# Patient Record
Sex: FEMALE | Race: BLACK OR AFRICAN AMERICAN | NOT HISPANIC OR LATINO | Employment: FULL TIME | URBAN - METROPOLITAN AREA
[De-identification: names, ages, dates, MRNs, and addresses within clinical notes are randomized per-mention and may not be internally consistent; named-entity substitution may affect disease eponyms.]

---

## 2017-07-10 ENCOUNTER — LAB VISIT (OUTPATIENT)
Dept: LAB | Facility: HOSPITAL | Age: 42
End: 2017-07-10
Attending: NURSE PRACTITIONER
Payer: COMMERCIAL

## 2017-07-10 ENCOUNTER — OFFICE VISIT (OUTPATIENT)
Dept: HEMATOLOGY/ONCOLOGY | Facility: CLINIC | Age: 42
End: 2017-07-10
Payer: COMMERCIAL

## 2017-07-10 VITALS
BODY MASS INDEX: 45.78 KG/M2 | RESPIRATION RATE: 16 BRPM | WEIGHT: 258.38 LBS | TEMPERATURE: 98 F | DIASTOLIC BLOOD PRESSURE: 67 MMHG | HEART RATE: 82 BPM | SYSTOLIC BLOOD PRESSURE: 110 MMHG | HEIGHT: 63 IN

## 2017-07-10 DIAGNOSIS — D72.10 EOSINOPHILIA: ICD-10-CM

## 2017-07-10 DIAGNOSIS — D63.8 ANEMIA OF OTHER CHRONIC DISEASE: Primary | ICD-10-CM

## 2017-07-10 DIAGNOSIS — D63.8 CHRONIC DISEASE ANEMIA: ICD-10-CM

## 2017-07-10 DIAGNOSIS — D63.8 ANEMIA OF OTHER CHRONIC DISEASE: ICD-10-CM

## 2017-07-10 DIAGNOSIS — D64.9 MILD ANEMIA: ICD-10-CM

## 2017-07-10 DIAGNOSIS — E87.6 HYPOKALEMIA: Primary | ICD-10-CM

## 2017-07-10 LAB
ALBUMIN SERPL BCP-MCNC: 4 G/DL
ALP SERPL-CCNC: 83 U/L
ALT SERPL W/O P-5'-P-CCNC: 30 U/L
ANION GAP SERPL CALC-SCNC: 7 MMOL/L
AST SERPL-CCNC: 27 U/L
BASOPHILS # BLD AUTO: 0.05 K/UL
BASOPHILS NFR BLD: 0.8 %
BILIRUB SERPL-MCNC: 0.5 MG/DL
BUN SERPL-MCNC: 12 MG/DL
CALCIUM SERPL-MCNC: 8.9 MG/DL
CHLORIDE SERPL-SCNC: 102 MMOL/L
CO2 SERPL-SCNC: 31 MMOL/L
CREAT SERPL-MCNC: 0.88 MG/DL
DIFFERENTIAL METHOD: ABNORMAL
EOSINOPHIL # BLD AUTO: 0.6 K/UL
EOSINOPHIL NFR BLD: 8.7 %
ERYTHROCYTE [DISTWIDTH] IN BLOOD BY AUTOMATED COUNT: 14.7 %
EST. GFR  (AFRICAN AMERICAN): >60 ML/MIN/1.73 M^2
EST. GFR  (NON AFRICAN AMERICAN): >60 ML/MIN/1.73 M^2
FERRITIN SERPL-MCNC: 54 NG/ML
GLUCOSE SERPL-MCNC: 108 MG/DL
HCT VFR BLD AUTO: 33.3 %
HGB BLD-MCNC: 10.6 G/DL
IRON SATN MFR SERPL: 18 %
IRON SERPL-MCNC: 50 UG/DL
LYMPHOCYTES # BLD AUTO: 2.5 K/UL
LYMPHOCYTES NFR BLD: 39.2 %
MCH RBC QN AUTO: 25.8 PG
MCHC RBC AUTO-ENTMCNC: 31.8 %
MCV RBC AUTO: 81 FL
MONOCYTES # BLD AUTO: 0.5 K/UL
MONOCYTES NFR BLD: 8.3 %
NEUTROPHILS # BLD AUTO: 2.7 K/UL
NEUTROPHILS NFR BLD: 43 %
NRBC BLD-RTO: 0 /100 WBC
PLATELET # BLD AUTO: 262 K/UL
PMV BLD AUTO: 10.2 FL
POTASSIUM SERPL-SCNC: 3.3 MMOL/L
PROT SERPL-MCNC: 7.5 G/DL
RBC # BLD AUTO: 4.11 M/UL
SODIUM SERPL-SCNC: 140 MMOL/L
TOTAL IRON BINDING CAPACITY: 284 UG/DL
WBC # BLD AUTO: 6.3 K/UL

## 2017-07-10 PROCEDURE — 82728 ASSAY OF FERRITIN: CPT

## 2017-07-10 PROCEDURE — 82728 ASSAY OF FERRITIN: CPT | Mod: PN

## 2017-07-10 PROCEDURE — 83540 ASSAY OF IRON: CPT

## 2017-07-10 PROCEDURE — 99999 PR PBB SHADOW E&M-EST. PATIENT-LVL IV: CPT | Mod: PBBFAC,,, | Performed by: NURSE PRACTITIONER

## 2017-07-10 PROCEDURE — 83540 ASSAY OF IRON: CPT | Mod: PN

## 2017-07-10 PROCEDURE — 80053 COMPREHEN METABOLIC PANEL: CPT | Mod: PN

## 2017-07-10 PROCEDURE — 80053 COMPREHEN METABOLIC PANEL: CPT

## 2017-07-10 PROCEDURE — 99213 OFFICE O/P EST LOW 20 MIN: CPT | Mod: S$GLB,,, | Performed by: NURSE PRACTITIONER

## 2017-07-10 PROCEDURE — 36415 COLL VENOUS BLD VENIPUNCTURE: CPT | Mod: PN

## 2017-07-10 PROCEDURE — 84238 ASSAY NONENDOCRINE RECEPTOR: CPT

## 2017-07-10 PROCEDURE — 85025 COMPLETE CBC W/AUTO DIFF WBC: CPT

## 2017-07-10 PROCEDURE — 85025 COMPLETE CBC W/AUTO DIFF WBC: CPT | Mod: PN

## 2017-07-10 RX ORDER — POTASSIUM CHLORIDE 20 MEQ/1
20 TABLET, EXTENDED RELEASE ORAL 2 TIMES DAILY
Qty: 60 TABLET | Refills: 11 | Status: SHIPPED | OUTPATIENT
Start: 2017-07-10 | End: 2017-07-25 | Stop reason: SDUPTHER

## 2017-07-10 NOTE — PROGRESS NOTES
HISTORY OF PRESENT ILLNESS:  This is a 42-year-old black female known to Dr. Caicedo for anemia of chronic disease as well as idiopathic eosinophilia.  The   patient did undergo a unilateral bone marrow aspiration/biopsy in 2007, which   did not reveal any clonal population of cells on flow and no evidence of   dysplasia within the marrow.  She is surveilled annually and presents to the   clinic today for evaluation.  She remains active and well.  She denies any recent   illness or difficulties with allergies.  She denies any fevers, chills, drenching night   sweats, painful lymphadenopathy, unexplained weight loss, weakness, fatigue,   chest pain, skin rashes, pruritus, etc. No other new complaints or pertinent findings   on a 14-point review of systems.    PHYSICAL EXAMINATION:  GENERAL:  Well-developed, well-nourished, obese black female in no acute   distress.  Alert and oriented x3.  VITAL SIGNS:  Weight 258.5 pounds (gain of 4 pounds in one year), /67,   pulse 82, respirations 16, temperature 97.9.  HEENT:  Normocephalic, atraumatic.  Oral mucosa pink and moist.  Lips without   lesions.  Tongue midline.  Oropharynx clear.  Nonicteric sclerae.   NECK:  Supple, no adenopathy.  No carotid bruits, thyromegaly or thyroid nodule.  HEART:  Regular rate and rhythm without murmur, gallop or rub.                LUNGS:  Clear to auscultation bilaterally.  Normal respiratory effort.       ABDOMEN:  Soft, nontender, nondistended with positive normoactive bowel sounds,   no hepatosplenomegaly.   EXTREMITIES:  2+ pretibial to ankle edema bilateral.  No cyanosis or clubbing.    Distal pulses are intact.  AXILLAE AND GROIN:  No palpable pathologic lymphadenopathy is appreciated.        SKIN:  Intact/turgor normal                                                       NEUROLOGIC:  Cranial nerves II-XII grossly intact.  Motor:  Good muscle bulk and   tone.  Strength/sensory 5/5 throughout.  Gait stable.     LABORATORY:     Lab Results   Component Value Date    WBC 6.30 07/10/2017    HGB 10.6 (L) 07/10/2017    HCT 33.3 (L) 07/10/2017    MCV 81 (L) 07/10/2017     07/10/2017   Eosinophils 8.7% (11.2, 9.1)  CMP  Sodium   Date Value Ref Range Status   07/10/2017 140 136 - 145 mmol/L Final     Potassium   Date Value Ref Range Status   07/10/2017 3.3 (L) 3.5 - 5.1 mmol/L Final     Chloride   Date Value Ref Range Status   07/10/2017 102 95 - 110 mmol/L Final     CO2   Date Value Ref Range Status   07/10/2017 31 22 - 31 mmol/L Final     Glucose   Date Value Ref Range Status   07/10/2017 108 70 - 110 mg/dL Final     Comment:     The ADA recommends the following guidelines for fasting glucose:  Normal:       less than 100 mg/dL  Prediabetes:  100 mg/dL to 125 mg/dL  Diabetes:     126 mg/dL or higher       BUN, Bld   Date Value Ref Range Status   07/10/2017 12 7 - 18 mg/dL Final     Creatinine   Date Value Ref Range Status   07/10/2017 0.88 0.50 - 1.40 mg/dL Final     Calcium   Date Value Ref Range Status   07/10/2017 8.9 8.4 - 10.2 mg/dL Final     Total Protein   Date Value Ref Range Status   07/10/2017 7.5 6.0 - 8.4 g/dL Final     Albumin   Date Value Ref Range Status   07/10/2017 4.0 3.5 - 5.2 g/dL Final     Total Bilirubin   Date Value Ref Range Status   07/10/2017 0.5 0.2 - 1.3 mg/dL Final     Comment:     For infants and newborns, interpretation of results should be based  on gestational age, weight and in agreement with clinical  observations.  Premature Infant recommended reference ranges:  Up to 24 hours.............<8.0 mg/dL  Up to 48 hours............<12.0 mg/dL  3-5 days..................<15.0 mg/dL  6-29 days.................<15.0 mg/dL       Alkaline Phosphatase   Date Value Ref Range Status   07/10/2017 83 38 - 145 U/L Final     AST   Date Value Ref Range Status   07/10/2017 27 14 - 36 U/L Final     ALT   Date Value Ref Range Status   07/10/2017 30 10 - 44 U/L Final     Anion Gap   Date Value Ref Range Status   07/10/2017  7 (L) 8 - 16 mmol/L Final     eGFR if    Date Value Ref Range Status   07/10/2017 >60 >60 mL/min/1.73 m^2 Final     eGFR if non    Date Value Ref Range Status   07/10/2017 >60 >60 mL/min/1.73 m^2 Final     Comment:     Calculation used to obtain the estimated glomerular filtration  rate (eGFR) is the CKD-EPI equation. Since race is unknown   in our information system, the eGFR values for   -American and Non--American patients are given   for each creatinine result.       IMPRESSION:  1.  Anemia of chronic disease, stable  2.  Mild idiopathic eosinophilia, elevated, stable.  3.  Chronic hypokalemia, followed by Dr. Booth.  4.  Obstructive sleep apnea, compliant with CPAP.  5.  Chronic right upper extremity lymphedema, not followed in the Lymphedema Clinic.  6.  Recent diagnosis of hepatomegaly, followed in Hepatology.    PLAN:  1.  In regards to #1 and #2, we will have the patient return in one year with   interval CBC and CMP for reevaluation.  2.  Iron studies/ferritin/sTFR to be drawn today; phone review.    Assessment/plan reviewed and approved by Dr. Caicedo.

## 2017-07-10 NOTE — TELEPHONE ENCOUNTER
----- Message from Anna Chilel sent at 7/10/2017 10:02 AM CDT -----  Contact: pt   Call pt regarding her potassium being low.  [phone]

## 2017-07-10 NOTE — TELEPHONE ENCOUNTER
K+ 3.3 today, Dr Caicedo's NP told her to call PCP as medications may need adjustment.  Patient reports hands cramping.

## 2017-07-10 NOTE — TELEPHONE ENCOUNTER
Please make sure she had been taking her potassium supplement as directed.  If so, then would recommend increasing it to 20 mEq twice a day with repeat potassium level in 2 weeks

## 2017-07-12 ENCOUNTER — PATIENT MESSAGE (OUTPATIENT)
Dept: HEMATOLOGY/ONCOLOGY | Facility: CLINIC | Age: 42
End: 2017-07-12

## 2017-07-12 LAB — STFR SERPL-MCNC: 3.9 MG/L

## 2017-07-24 ENCOUNTER — LAB VISIT (OUTPATIENT)
Dept: LAB | Facility: HOSPITAL | Age: 42
End: 2017-07-24
Attending: FAMILY MEDICINE
Payer: COMMERCIAL

## 2017-07-24 ENCOUNTER — TELEPHONE (OUTPATIENT)
Dept: FAMILY MEDICINE | Facility: CLINIC | Age: 42
End: 2017-07-24

## 2017-07-24 DIAGNOSIS — E87.6 HYPOKALEMIA: ICD-10-CM

## 2017-07-24 LAB — POTASSIUM SERPL-SCNC: 3.6 MMOL/L

## 2017-07-24 PROCEDURE — 84132 ASSAY OF SERUM POTASSIUM: CPT

## 2017-07-24 PROCEDURE — 36415 COLL VENOUS BLD VENIPUNCTURE: CPT | Mod: PO

## 2017-07-24 NOTE — TELEPHONE ENCOUNTER
----- Message from Donna Sheets sent at 7/24/2017  4:38 PM CDT -----  Patient requesting lab results. Please adv/call [phone].//thanks. cw

## 2017-07-25 DIAGNOSIS — E87.6 HYPOKALEMIA: ICD-10-CM

## 2017-07-25 RX ORDER — POTASSIUM CHLORIDE 20 MEQ/1
20 TABLET, EXTENDED RELEASE ORAL 2 TIMES DAILY
Qty: 60 TABLET | Refills: 0 | Status: SHIPPED | OUTPATIENT
Start: 2017-07-25 | End: 2017-08-18 | Stop reason: SDUPTHER

## 2017-08-18 ENCOUNTER — OFFICE VISIT (OUTPATIENT)
Dept: FAMILY MEDICINE | Facility: CLINIC | Age: 42
End: 2017-08-18
Payer: COMMERCIAL

## 2017-08-18 ENCOUNTER — HOSPITAL ENCOUNTER (OUTPATIENT)
Dept: RADIOLOGY | Facility: HOSPITAL | Age: 42
Discharge: HOME | End: 2017-08-18
Attending: NURSE PRACTITIONER
Payer: COMMERCIAL

## 2017-08-18 VITALS — BODY MASS INDEX: 43.24 KG/M2 | WEIGHT: 244.06 LBS | HEIGHT: 63 IN

## 2017-08-18 VITALS
DIASTOLIC BLOOD PRESSURE: 72 MMHG | SYSTOLIC BLOOD PRESSURE: 109 MMHG | HEART RATE: 81 BPM | WEIGHT: 244.19 LBS | BODY MASS INDEX: 43.27 KG/M2 | TEMPERATURE: 98 F | HEIGHT: 63 IN

## 2017-08-18 DIAGNOSIS — Z76.0 MEDICATION REFILL: ICD-10-CM

## 2017-08-18 DIAGNOSIS — E04.1 THYROID NODULE: ICD-10-CM

## 2017-08-18 DIAGNOSIS — Z00.00 ANNUAL PHYSICAL EXAM: ICD-10-CM

## 2017-08-18 DIAGNOSIS — Z00.00 ANNUAL PHYSICAL EXAM: Primary | ICD-10-CM

## 2017-08-18 DIAGNOSIS — I10 ESSENTIAL HYPERTENSION: ICD-10-CM

## 2017-08-18 DIAGNOSIS — E87.6 HYPOKALEMIA: ICD-10-CM

## 2017-08-18 DIAGNOSIS — D63.8 CHRONIC DISEASE ANEMIA: ICD-10-CM

## 2017-08-18 DIAGNOSIS — G47.30 SLEEP APNEA, UNSPECIFIED TYPE: ICD-10-CM

## 2017-08-18 PROCEDURE — 77063 BREAST TOMOSYNTHESIS BI: CPT | Mod: 26,,, | Performed by: RADIOLOGY

## 2017-08-18 PROCEDURE — 77067 SCR MAMMO BI INCL CAD: CPT | Mod: TC

## 2017-08-18 PROCEDURE — 77067 SCR MAMMO BI INCL CAD: CPT | Mod: 26,,, | Performed by: RADIOLOGY

## 2017-08-18 PROCEDURE — 99396 PREV VISIT EST AGE 40-64: CPT | Mod: S$GLB,,, | Performed by: NURSE PRACTITIONER

## 2017-08-18 PROCEDURE — 99999 PR PBB SHADOW E&M-EST. PATIENT-LVL IV: CPT | Mod: PBBFAC,,, | Performed by: NURSE PRACTITIONER

## 2017-08-18 RX ORDER — TRIAMTERENE/HYDROCHLOROTHIAZID 37.5-25 MG
1 TABLET ORAL DAILY
Qty: 90 TABLET | Refills: 3 | Status: SHIPPED | OUTPATIENT
Start: 2017-08-18 | End: 2018-08-30 | Stop reason: SDUPTHER

## 2017-08-18 RX ORDER — DILTIAZEM HYDROCHLORIDE 120 MG/1
120 CAPSULE, COATED, EXTENDED RELEASE ORAL DAILY
Qty: 90 CAPSULE | Refills: 3 | Status: SHIPPED | OUTPATIENT
Start: 2017-08-18 | End: 2018-08-30 | Stop reason: SDUPTHER

## 2017-08-18 RX ORDER — POTASSIUM CHLORIDE 20 MEQ/1
20 TABLET, EXTENDED RELEASE ORAL 2 TIMES DAILY
Qty: 60 TABLET | Refills: 11 | Status: SHIPPED | OUTPATIENT
Start: 2017-08-18 | End: 2018-08-30 | Stop reason: SDUPTHER

## 2017-08-18 RX ORDER — MONTELUKAST SODIUM 10 MG/1
10 TABLET ORAL NIGHTLY
Qty: 90 TABLET | Refills: 3 | Status: SHIPPED | OUTPATIENT
Start: 2017-08-18 | End: 2018-08-30 | Stop reason: SDUPTHER

## 2017-08-18 NOTE — PROGRESS NOTES
Subjective:       Patient ID: Akiko Jameson is a 42 y.o. female.    Chief Complaint: Annual Exam and Medication Refill  Pt in today for annual exam. HTN controlled with medication. Pt saw endocrinology in 2013 for thyroid nodule; states biopsy was unremarkable. Uses CPAP for sleep apnea; states compliant. History of anemia, hypokalemia; currently taking potassium. GERD controlled with medication. Seeing hematology for anemia; seen at lymphedema clinic last year. Labs, mammogram due. Pap smear UTD. Pt has no other complaints today.  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    Eosinophilia     mild    GERD (gastroesophageal reflux disease)     Granular cell tumor     Hypertension     Lymphedema of upper extremity following lymphadenectomy 12/6/2011    Mild anemia     Sleep apnea     Thyroid nodule     Urinary incontinence, mixed      Social History     Social History    Marital status:      Spouse name: N/A    Number of children: 2    Years of education: N/A     Occupational History     [employer]     Social History Main Topics    Smoking status: Never Smoker    Smokeless tobacco: Never Used    Alcohol use No    Drug use: No    Sexual activity: Not on file     Past Surgical History:   Procedure Laterality Date    AXILLARY NODE DISSECTION      granular cell tumor    back injections      CHOLECYSTECTOMY      ENDOMETRIAL ABLATION      FINE NEEDLE ASPIRATION      thyroid    NASAL SEPTUM SURGERY      SINUS SURGERY      TUBAL LIGATION         HPI  Review of Systems   Constitutional: Negative.  Negative for activity change and unexpected weight change.   HENT: Negative.  Negative for hearing loss, rhinorrhea and trouble swallowing.    Eyes: Negative.  Negative for discharge and visual disturbance.   Respiratory: Negative.  Negative for chest tightness and  wheezing.    Cardiovascular: Negative.  Negative for chest pain and palpitations.   Gastrointestinal: Negative.  Negative for blood in stool, constipation, diarrhea and vomiting.   Endocrine: Negative.  Negative for polydipsia and polyuria.   Genitourinary: Negative.  Negative for difficulty urinating, dysuria, hematuria and menstrual problem.   Musculoskeletal: Negative.  Negative for arthralgias, joint swelling and neck pain.   Skin: Negative.    Allergic/Immunologic: Negative.    Neurological: Negative.  Negative for weakness and headaches.   Psychiatric/Behavioral: Negative.  Negative for confusion and dysphoric mood.       Objective:      Physical Exam   Constitutional: She is oriented to person, place, and time. She appears well-developed and well-nourished.   HENT:   Head: Normocephalic.   Right Ear: External ear normal.   Left Ear: External ear normal.   Nose: Nose normal.   Mouth/Throat: Oropharynx is clear and moist.   Eyes: Conjunctivae are normal. Pupils are equal, round, and reactive to light.   Neck: Normal range of motion. Neck supple.   Cardiovascular: Normal rate, regular rhythm and normal heart sounds.    Pulmonary/Chest: Effort normal and breath sounds normal.   Abdominal: Soft. Bowel sounds are normal.   Musculoskeletal: Normal range of motion.   Neurological: She is alert and oriented to person, place, and time.   Skin: Skin is warm and dry. Capillary refill takes 2 to 3 seconds.   Psychiatric: She has a normal mood and affect. Her behavior is normal. Judgment and thought content normal.   Nursing note and vitals reviewed.      Assessment:       1. Annual physical exam    2. Thyroid nodule    3. Chronic disease anemia    4. Sleep apnea, unspecified type    5. Medication refill    6. Hypokalemia    7. Essential hypertension       Plan:       Akiko was seen today for annual exam and medication refill.    Diagnoses and all orders for this visit:    Annual physical exam  Chronic disease  anemia  Hypokalemia  Sleep apnea, unspecified type  Essential hypertension  -     Basic metabolic panel; Future  -     ALT (SGPT); Future  -     LIPID PANEL; Future  -     TSH; Future  -     CBC auto differential; Future  -     Mammo Digital Screening Bilateral With CAD; Future  -     US Soft Tissue Head Neck Thyroid; Future    Thyroid nodule  -     US Soft Tissue Head Neck Thyroid; Future    Medication refill  -     potassium chloride SA (K-DUR,KLOR-CON) 20 MEQ tablet; Take 1 tablet (20 mEq total) by mouth 2 (two) times daily. Appointment needed for further refills after this one  -     triamterene-hydrochlorothiazide 37.5-25 mg (MAXZIDE-25) 37.5-25 mg per tablet; Take 1 tablet by mouth once daily.  -     montelukast (SINGULAIR) 10 mg tablet; Take 1 tablet (10 mg total) by mouth nightly.  -     diltiaZEM (CARDIZEM CD) 120 MG Cp24; Take 1 capsule (120 mg total) by mouth once daily.

## 2017-08-22 ENCOUNTER — TELEPHONE (OUTPATIENT)
Dept: RADIOLOGY | Facility: HOSPITAL | Age: 42
End: 2017-08-22

## 2017-08-23 ENCOUNTER — HOSPITAL ENCOUNTER (OUTPATIENT)
Dept: RADIOLOGY | Facility: HOSPITAL | Age: 42
Discharge: HOME | End: 2017-08-23
Attending: NURSE PRACTITIONER
Payer: COMMERCIAL

## 2017-08-23 DIAGNOSIS — E04.1 THYROID NODULE: ICD-10-CM

## 2017-08-23 DIAGNOSIS — Z00.00 ANNUAL PHYSICAL EXAM: ICD-10-CM

## 2017-08-23 PROCEDURE — 76536 US EXAM OF HEAD AND NECK: CPT | Mod: 26,,, | Performed by: RADIOLOGY

## 2017-08-23 PROCEDURE — 76536 US EXAM OF HEAD AND NECK: CPT | Mod: TC,PO

## 2017-08-25 ENCOUNTER — TELEPHONE (OUTPATIENT)
Dept: FAMILY MEDICINE | Facility: CLINIC | Age: 42
End: 2017-08-25

## 2017-08-25 ENCOUNTER — PATIENT MESSAGE (OUTPATIENT)
Dept: FAMILY MEDICINE | Facility: CLINIC | Age: 42
End: 2017-08-25

## 2017-08-25 DIAGNOSIS — E01.0 THYROMEGALY: Primary | ICD-10-CM

## 2017-08-25 DIAGNOSIS — E04.1 THYROID NODULE: ICD-10-CM

## 2017-08-25 NOTE — TELEPHONE ENCOUNTER
Insurance form in box with lab filled in, needs signature.  Did you measure abd circumference at last visit, if not, she can come in for NV to have done, please advise

## 2017-08-28 ENCOUNTER — TELEPHONE (OUTPATIENT)
Dept: FAMILY MEDICINE | Facility: CLINIC | Age: 42
End: 2017-08-28

## 2017-08-28 NOTE — TELEPHONE ENCOUNTER
----- Message from Yeimi Abernathy NP sent at 8/25/2017  1:21 PM CDT -----  Reviewed; mammogram diagnostic, US left breast recommended.

## 2017-08-29 ENCOUNTER — CLINICAL SUPPORT (OUTPATIENT)
Dept: FAMILY MEDICINE | Facility: CLINIC | Age: 42
End: 2017-08-29
Payer: COMMERCIAL

## 2017-08-29 ENCOUNTER — HOSPITAL ENCOUNTER (OUTPATIENT)
Dept: RADIOLOGY | Facility: HOSPITAL | Age: 42
Discharge: HOME | End: 2017-08-29
Attending: NURSE PRACTITIONER
Payer: COMMERCIAL

## 2017-08-29 DIAGNOSIS — R92.8 ABNORMAL MAMMOGRAM: ICD-10-CM

## 2017-08-29 DIAGNOSIS — R60.9 CHRONIC EDEMA: Primary | ICD-10-CM

## 2017-08-29 PROCEDURE — 76642 ULTRASOUND BREAST LIMITED: CPT | Mod: TC,PO,LT

## 2017-08-29 PROCEDURE — 76642 ULTRASOUND BREAST LIMITED: CPT | Mod: 26,LT,, | Performed by: RADIOLOGY

## 2017-08-29 PROCEDURE — 99999 PR PBB SHADOW E&M-EST. PATIENT-LVL II: CPT | Mod: PBBFAC,,,

## 2017-08-29 PROCEDURE — 99499 UNLISTED E&M SERVICE: CPT | Mod: S$GLB,,, | Performed by: FAMILY MEDICINE

## 2017-08-31 ENCOUNTER — TELEPHONE (OUTPATIENT)
Dept: FAMILY MEDICINE | Facility: CLINIC | Age: 42
End: 2017-08-31

## 2017-08-31 ENCOUNTER — LAB VISIT (OUTPATIENT)
Dept: LAB | Facility: HOSPITAL | Age: 42
End: 2017-08-31
Payer: COMMERCIAL

## 2017-08-31 ENCOUNTER — HOSPITAL ENCOUNTER (OUTPATIENT)
Dept: ENDOCRINOLOGY | Facility: CLINIC | Age: 42
Discharge: HOME | End: 2017-08-31
Attending: INTERNAL MEDICINE
Payer: COMMERCIAL

## 2017-08-31 ENCOUNTER — OFFICE VISIT (OUTPATIENT)
Dept: ENDOCRINOLOGY | Facility: CLINIC | Age: 42
End: 2017-08-31
Payer: COMMERCIAL

## 2017-08-31 VITALS
WEIGHT: 257.94 LBS | HEART RATE: 77 BPM | RESPIRATION RATE: 16 BRPM | DIASTOLIC BLOOD PRESSURE: 82 MMHG | SYSTOLIC BLOOD PRESSURE: 135 MMHG | HEIGHT: 63 IN | BODY MASS INDEX: 45.7 KG/M2

## 2017-08-31 DIAGNOSIS — E66.01 MORBID OBESITY WITH BMI OF 45.0-49.9, ADULT: ICD-10-CM

## 2017-08-31 DIAGNOSIS — E04.2 MULTINODULAR GOITER (NONTOXIC): Primary | ICD-10-CM

## 2017-08-31 DIAGNOSIS — G47.30 SLEEP APNEA, UNSPECIFIED TYPE: ICD-10-CM

## 2017-08-31 LAB
ESTIMATED AVG GLUCOSE: 120 MG/DL
HBA1C MFR BLD HPLC: 5.8 %

## 2017-08-31 PROCEDURE — 88173 CYTOPATH EVAL FNA REPORT: CPT | Performed by: PATHOLOGY

## 2017-08-31 PROCEDURE — 3075F SYST BP GE 130 - 139MM HG: CPT | Mod: S$GLB,,, | Performed by: INTERNAL MEDICINE

## 2017-08-31 PROCEDURE — 76942 ECHO GUIDE FOR BIOPSY: CPT | Mod: S$GLB,,, | Performed by: INTERNAL MEDICINE

## 2017-08-31 PROCEDURE — 10022 US FINE NEEDLE ASPIRATION THYROID MULTI SITE (XPD): CPT | Mod: 59,S$GLB,, | Performed by: INTERNAL MEDICINE

## 2017-08-31 PROCEDURE — 99999 PR PBB SHADOW E&M-EST. PATIENT-LVL IV: CPT | Mod: PBBFAC,,, | Performed by: INTERNAL MEDICINE

## 2017-08-31 PROCEDURE — 3079F DIAST BP 80-89 MM HG: CPT | Mod: S$GLB,,, | Performed by: INTERNAL MEDICINE

## 2017-08-31 PROCEDURE — 3008F BODY MASS INDEX DOCD: CPT | Mod: S$GLB,,, | Performed by: INTERNAL MEDICINE

## 2017-08-31 PROCEDURE — 99204 OFFICE O/P NEW MOD 45 MIN: CPT | Mod: S$GLB,,, | Performed by: INTERNAL MEDICINE

## 2017-08-31 PROCEDURE — 83036 HEMOGLOBIN GLYCOSYLATED A1C: CPT

## 2017-08-31 PROCEDURE — 36415 COLL VENOUS BLD VENIPUNCTURE: CPT

## 2017-08-31 NOTE — PROGRESS NOTES
Subjective:      Patient ID: Akiko Jameson is a 42 y.o. female.    Chief Complaint:  Thyroid Nodule      History of Present Illness  Initial visit for thyroid nodules     Pt had initial thyroid u/s in 2013, and got left thyroid nodule FNA that was benign in 7/2013.   Her most recent f/u U/S in 8/2017 shows increase in left thyroid nodule from 2.1 to 4.1 cm (which was previously bx) and also increase in left lateral isthmus nodule from 0.9cm to 1.7cm.   She denies compressive sx.   No FH thyroid cancer. No radiation exposure to head/neck.  Has no personal hx of thyroid disease    Is morbidly obese. +FH DM. Has HTN, not previously screened for DM.   Denies polyuria, polydipsia, blurry vision      Review of Systems   Constitutional: Negative for unexpected weight change.   HENT: Positive for postnasal drip. Negative for trouble swallowing and voice change.    Eyes: Negative for visual disturbance.   Respiratory: Negative for choking.    Cardiovascular: Negative for leg swelling.   Gastrointestinal: Negative for abdominal pain.   Endocrine: Negative for polydipsia and polyuria.   Skin: Negative for wound.   Neurological: Negative for headaches.   Hematological: Does not bruise/bleed easily.   Psychiatric/Behavioral: Negative for sleep disturbance.       Objective:   Physical Exam   Constitutional: She appears well-developed.   HENT:   Right Ear: External ear normal.   Left Ear: External ear normal.   Nose: Nose normal.   Hearing normal  Dentition good    Neck: No tracheal deviation present. Thyromegaly (thyroid gland is asymmetrically enlarged. large left nodule, cannot appreciate isthmus nodule) present.   Cardiovascular: Normal rate.    No murmur heard.  Pulmonary/Chest: Effort normal and breath sounds normal.   Abdominal: Soft. There is no tenderness. No hernia.   Obese pannus, no wide purple striae    Musculoskeletal: She exhibits no edema.   Gait normal  No clubbing or cyanosis noted   Neurological: She  displays normal reflexes. No cranial nerve deficit.   Skin: No rash noted.   No nodules       Psychiatric: She has a normal mood and affect. Judgment normal.   Vitals reviewed.      Lab Review:   Lab Results   Component Value Date    TSH 0.666 08/18/2017         Assessment:     1. Multinodular goiter (nontoxic)  US Fine Needle Aspiration Thyroid Multi site (xpd)   2. Morbid obesity with BMI of 45.0-49.9, adult  Hemoglobin A1c   3. Sleep apnea, unspecified type          Plan:     Discussed management of thyroid nodules and indications for FNA and observation vs surgery   Will proceed with FNA of left thyroid nodule and left lateral isthmus  All questions answered    Will screen for DM given obesity and co morbidities     Discussed w Dr Rucker

## 2017-08-31 NOTE — TELEPHONE ENCOUNTER
This pt came in for abd circ measurement. Abd measurement is 120cm. She states you have her paperwork.   TY

## 2017-09-07 ENCOUNTER — PATIENT MESSAGE (OUTPATIENT)
Dept: ENDOCRINOLOGY | Facility: CLINIC | Age: 42
End: 2017-09-07

## 2017-09-08 NOTE — PROGRESS NOTES
I have reviewed the notes, assessments, performed this visit, and I concur with the documentation.

## 2017-10-28 ENCOUNTER — OFFICE VISIT (OUTPATIENT)
Dept: FAMILY MEDICINE | Facility: CLINIC | Age: 42
End: 2017-10-28
Payer: COMMERCIAL

## 2017-10-28 VITALS
DIASTOLIC BLOOD PRESSURE: 77 MMHG | SYSTOLIC BLOOD PRESSURE: 118 MMHG | HEIGHT: 63 IN | BODY MASS INDEX: 45.47 KG/M2 | WEIGHT: 256.63 LBS | HEART RATE: 84 BPM | TEMPERATURE: 98 F

## 2017-10-28 DIAGNOSIS — L03.119 CELLULITIS OF FOOT: Primary | ICD-10-CM

## 2017-10-28 PROCEDURE — 99213 OFFICE O/P EST LOW 20 MIN: CPT | Mod: S$GLB,,, | Performed by: NURSE PRACTITIONER

## 2017-10-28 PROCEDURE — 99999 PR PBB SHADOW E&M-EST. PATIENT-LVL III: CPT | Mod: PBBFAC,,, | Performed by: NURSE PRACTITIONER

## 2017-10-28 RX ORDER — CLINDAMYCIN HYDROCHLORIDE 150 MG/1
150 CAPSULE ORAL 2 TIMES DAILY
Qty: 14 CAPSULE | Refills: 0 | Status: SHIPPED | OUTPATIENT
Start: 2017-10-28 | End: 2017-12-14

## 2017-10-28 RX ORDER — PREDNISONE 20 MG/1
20 TABLET ORAL 2 TIMES DAILY
Qty: 10 TABLET | Refills: 0 | Status: SHIPPED | OUTPATIENT
Start: 2017-10-28 | End: 2017-11-02

## 2017-10-28 NOTE — PROGRESS NOTES
Subjective:       Patient ID: Akiko Jameson is a 42 y.o. female.    Chief Complaint: Insect Bite    Rash   This is a new problem. Episode onset: 3 days. The problem has been rapidly worsening since onset. The affected locations include the left foot. The rash is characterized by redness and swelling. She was exposed to an insect bite/sting. Pertinent negatives include no cough, diarrhea, eye pain, fatigue, fever, shortness of breath, sore throat or vomiting. Past treatments include antibiotic cream (alcohol rubs). The treatment provided no relief.       Review of Systems   Constitutional: Negative for fatigue, fever and unexpected weight change.   HENT: Negative for ear pain and sore throat.    Eyes: Negative for pain and visual disturbance.   Respiratory: Negative for cough and shortness of breath.    Cardiovascular: Negative for chest pain and palpitations.   Gastrointestinal: Negative for abdominal pain, diarrhea and vomiting.   Musculoskeletal: Negative for arthralgias and myalgias.   Skin: Positive for color change and rash.        Swelling to left foot   Neurological: Negative for dizziness and headaches.   Psychiatric/Behavioral: Negative for dysphoric mood and sleep disturbance. The patient is not nervous/anxious.        Vitals:    10/28/17 0913   BP: 118/77   Pulse: 84   Temp: 98.1 °F (36.7 °C)       Objective:     Current Outpatient Prescriptions   Medication Sig Dispense Refill    diltiaZEM (CARDIZEM CD) 120 MG Cp24 Take 1 capsule (120 mg total) by mouth once daily. 90 capsule 3    montelukast (SINGULAIR) 10 mg tablet Take 1 tablet (10 mg total) by mouth nightly. 90 tablet 3    multivitamin (THERAGRAN) per tablet Every day      potassium chloride SA (K-DUR,KLOR-CON) 20 MEQ tablet Take 1 tablet (20 mEq total) by mouth 2 (two) times daily. Appointment needed for further refills after this one 60 tablet 11    ranitidine (ZANTAC) 150 MG tablet Take 150 mg by mouth 2 (two) times daily.        triamterene-hydrochlorothiazide 37.5-25 mg (MAXZIDE-25) 37.5-25 mg per tablet Take 1 tablet by mouth once daily. 90 tablet 3    clindamycin (CLEOCIN) 150 MG capsule Take 1 capsule (150 mg total) by mouth 2 (two) times daily. 14 capsule 0    docusate sodium (COLACE) 100 MG capsule Take 1 capsule (100 mg total) by mouth 2 (two) times daily. Patient can take OTC if more cost effective 60 capsule 0    predniSONE (DELTASONE) 20 MG tablet Take 1 tablet (20 mg total) by mouth 2 (two) times daily. 10 tablet 0     No current facility-administered medications for this visit.        Physical Exam   Constitutional: She is oriented to person, place, and time. She appears well-developed and well-nourished. No distress.   HENT:   Head: Normocephalic and atraumatic.   Eyes: EOM are normal. Pupils are equal, round, and reactive to light.   Neck: Normal range of motion. Neck supple.   Cardiovascular: Normal rate and regular rhythm.    Pulmonary/Chest: Effort normal and breath sounds normal.   Musculoskeletal: Normal range of motion.   Neurological: She is alert and oriented to person, place, and time.   Skin: Skin is warm and dry. No rash noted.   3+ edema to left foot, + erythema, hot to touch, 2+ pedal pulses   Psychiatric: She has a normal mood and affect. Judgment normal.   Nursing note and vitals reviewed.      Assessment:       1. Cellulitis of foot        Plan:   Cellulitis of foot    Other orders  -     predniSONE (DELTASONE) 20 MG tablet; Take 1 tablet (20 mg total) by mouth 2 (two) times daily.  Dispense: 10 tablet; Refill: 0  -     clindamycin (CLEOCIN) 150 MG capsule; Take 1 capsule (150 mg total) by mouth 2 (two) times daily.  Dispense: 14 capsule; Refill: 0        No Follow-up on file.

## 2017-12-14 ENCOUNTER — HOSPITAL ENCOUNTER (OUTPATIENT)
Dept: RADIOLOGY | Facility: HOSPITAL | Age: 42
Discharge: HOME | End: 2017-12-14
Attending: FAMILY MEDICINE
Payer: COMMERCIAL

## 2017-12-14 ENCOUNTER — OFFICE VISIT (OUTPATIENT)
Dept: FAMILY MEDICINE | Facility: CLINIC | Age: 42
End: 2017-12-14
Payer: COMMERCIAL

## 2017-12-14 VITALS
SYSTOLIC BLOOD PRESSURE: 113 MMHG | TEMPERATURE: 98 F | HEIGHT: 63 IN | WEIGHT: 255 LBS | DIASTOLIC BLOOD PRESSURE: 73 MMHG | HEART RATE: 94 BPM | BODY MASS INDEX: 45.18 KG/M2

## 2017-12-14 DIAGNOSIS — M79.18 RHOMBOID MUSCLE PAIN: Primary | ICD-10-CM

## 2017-12-14 DIAGNOSIS — M79.18 RHOMBOID MUSCLE PAIN: ICD-10-CM

## 2017-12-14 PROCEDURE — 99213 OFFICE O/P EST LOW 20 MIN: CPT | Mod: 25,S$GLB,, | Performed by: FAMILY MEDICINE

## 2017-12-14 PROCEDURE — 99999 PR PBB SHADOW E&M-EST. PATIENT-LVL IV: CPT | Mod: PBBFAC,,, | Performed by: FAMILY MEDICINE

## 2017-12-14 PROCEDURE — 90471 IMMUNIZATION ADMIN: CPT | Mod: S$GLB,,, | Performed by: FAMILY MEDICINE

## 2017-12-14 PROCEDURE — 71020 XR CHEST PA AND LATERAL: CPT | Mod: 26,,, | Performed by: RADIOLOGY

## 2017-12-14 PROCEDURE — 90715 TDAP VACCINE 7 YRS/> IM: CPT | Mod: S$GLB,,, | Performed by: FAMILY MEDICINE

## 2017-12-14 PROCEDURE — 71020 XR CHEST PA AND LATERAL: CPT | Mod: TC,PO

## 2017-12-14 RX ORDER — MELOXICAM 15 MG/1
15 TABLET ORAL DAILY
Qty: 30 TABLET | Refills: 1 | Status: SHIPPED | OUTPATIENT
Start: 2017-12-14 | End: 2018-07-03 | Stop reason: ALTCHOICE

## 2017-12-14 NOTE — PROGRESS NOTES
Patient presents with a complaint of pain at the right rhomboid muscle area.  She states symptoms for several years, but some increase in the past 2 weeks.  Increased somewhat with turning and twisting motions.  No chest pain or shortness of breath.  No change with breathing.  No neck pain.  Current treatment Tylenol and Aleve without resolution.  No rash or cough.  No injury.    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    Eosinophilia     mild    GERD (gastroesophageal reflux disease)     Granular cell tumor     Hypertension     Lymphedema of upper extremity following lymphadenectomy 12/6/2011    Mild anemia     Sleep apnea     Thyroid nodule     Urinary incontinence, mixed      Past Surgical History:   Procedure Laterality Date    AXILLARY NODE DISSECTION      granular cell tumor    back injections      BREAST CYST ASPIRATION      CHOLECYSTECTOMY      ENDOMETRIAL ABLATION      FINE NEEDLE ASPIRATION      thyroid    NASAL SEPTUM SURGERY      SINUS SURGERY      TUBAL LIGATION       Social History     Social History    Marital status:      Spouse name: N/A    Number of children: 2    Years of education: N/A     Occupational History     [employer]     Social History Main Topics    Smoking status: Never Smoker    Smokeless tobacco: Never Used    Alcohol use No    Drug use: No    Sexual activity: Not on file     Other Topics Concern    Not on file     Social History Narrative    No narrative on file     Family History   Problem Relation Age of Onset    Diabetes Mother     Kidney failure Mother     Breast cancer Maternal Grandmother     Breast cancer Maternal Aunt     Cirrhosis Neg Hx      Review of patient's allergies indicates:   Allergen Reactions    Prevacid  [lansoprazole] Swelling     Lip swelling    Ace inhibitors      "Benazepril Other (See Comments)     Current Outpatient Prescriptions on File Prior to Visit   Medication Sig Dispense Refill    diltiaZEM (CARDIZEM CD) 120 MG Cp24 Take 1 capsule (120 mg total) by mouth once daily. 90 capsule 3    montelukast (SINGULAIR) 10 mg tablet Take 1 tablet (10 mg total) by mouth nightly. 90 tablet 3    multivitamin (THERAGRAN) per tablet Every day      potassium chloride SA (K-DUR,KLOR-CON) 20 MEQ tablet Take 1 tablet (20 mEq total) by mouth 2 (two) times daily. Appointment needed for further refills after this one 60 tablet 11    ranitidine (ZANTAC) 150 MG tablet Take 150 mg by mouth 2 (two) times daily.       triamterene-hydrochlorothiazide 37.5-25 mg (MAXZIDE-25) 37.5-25 mg per tablet Take 1 tablet by mouth once daily. 90 tablet 3    [DISCONTINUED] clindamycin (CLEOCIN) 150 MG capsule Take 1 capsule (150 mg total) by mouth 2 (two) times daily. 14 capsule 0    [DISCONTINUED] docusate sodium (COLACE) 100 MG capsule Take 1 capsule (100 mg total) by mouth 2 (two) times daily. Patient can take OTC if more cost effective 60 capsule 0     No current facility-administered medications on file prior to visit.            OBJECTIVE:     Vitals:    12/14/17 0759   BP: 113/73   Pulse: 94   Temp: 97.8 °F (36.6 °C)   Weight: 115.7 kg (255 lb)   Height: 5' 3" (1.6 m)     Wt Readings from Last 3 Encounters:   12/14/17 115.7 kg (255 lb)   10/28/17 116.4 kg (256 lb 9.9 oz)   08/31/17 117 kg (257 lb 15 oz)     APPEARANCE: Well nourished, well developed, in no acute distress.    HEAD: Normocephalic.  Atraumatic.  No sinus tenderness.  EYES:   Right eye: Pupil reactive.  Conjunctiva clear.    Left eye: Pupil reactive.  Conjunctiva clear.    NECK: Supple. No bruits.  No JVD.  No cervical lymphadenopathy.  No thyromegaly.    CHEST: Breath sounds clear bilaterally.  Normal respiratory effort  CARDIOVASCULAR: Normal rate.  Regular rhythm.  No murmurs.  No rub.  No gallops.  ABDOMEN: Bowel sounds normal.  " Soft.  No tenderness.  No organomegaly.  PERIPHERAL VASCULAR: No cyanosis.  No clubbing.  Chronic edema right upper extremity  NEUROLOGIC: No focal findings.  MENTAL STATUS: Alert.  Oriented x 3.  Back: She has tenderness palpation at the right rhomboid muscle area.  Some decreased range of motion.  Right shoulder full range of motion    Akiko was seen today for back pain.    Diagnoses and all orders for this visit:    Rhomboid muscle pain  -     X-Ray Chest PA And Lateral; Future  -     Ambulatory referral to Chiropractic    Other orders  -     meloxicam (MOBIC) 15 MG tablet; Take 1 tablet (15 mg total) by mouth once daily.  -     Tdap Vaccine; Future  -     Tdap Vaccine     follow-up not improved after above evaluation

## 2017-12-30 ENCOUNTER — OFFICE VISIT (OUTPATIENT)
Dept: FAMILY MEDICINE | Facility: CLINIC | Age: 42
End: 2017-12-30
Payer: COMMERCIAL

## 2017-12-30 VITALS
TEMPERATURE: 98 F | SYSTOLIC BLOOD PRESSURE: 139 MMHG | DIASTOLIC BLOOD PRESSURE: 82 MMHG | WEIGHT: 257.69 LBS | HEART RATE: 88 BPM | HEIGHT: 63 IN | BODY MASS INDEX: 45.66 KG/M2

## 2017-12-30 DIAGNOSIS — J01.90 ACUTE SINUSITIS, RECURRENCE NOT SPECIFIED, UNSPECIFIED LOCATION: Primary | ICD-10-CM

## 2017-12-30 DIAGNOSIS — R05.9 COUGH: ICD-10-CM

## 2017-12-30 PROCEDURE — 96372 THER/PROPH/DIAG INJ SC/IM: CPT | Mod: S$GLB,,, | Performed by: FAMILY MEDICINE

## 2017-12-30 PROCEDURE — 99999 PR PBB SHADOW E&M-EST. PATIENT-LVL III: CPT | Mod: PBBFAC,,, | Performed by: NURSE PRACTITIONER

## 2017-12-30 PROCEDURE — 99213 OFFICE O/P EST LOW 20 MIN: CPT | Mod: 25,S$GLB,, | Performed by: NURSE PRACTITIONER

## 2017-12-30 RX ORDER — DEXAMETHASONE SODIUM PHOSPHATE 4 MG/ML
8 INJECTION, SOLUTION INTRA-ARTICULAR; INTRALESIONAL; INTRAMUSCULAR; INTRAVENOUS; SOFT TISSUE
Status: COMPLETED | OUTPATIENT
Start: 2017-12-30 | End: 2017-12-30

## 2017-12-30 RX ORDER — CODEINE PHOSPHATE AND GUAIFENESIN 10; 100 MG/5ML; MG/5ML
10 SOLUTION ORAL EVERY 4 HOURS PRN
Qty: 240 ML | Refills: 0 | Status: SHIPPED | OUTPATIENT
Start: 2017-12-30 | End: 2018-01-09

## 2017-12-30 RX ORDER — AZITHROMYCIN 250 MG/1
250 TABLET, FILM COATED ORAL DAILY
Qty: 6 TABLET | Refills: 0 | Status: SHIPPED | OUTPATIENT
Start: 2017-12-30 | End: 2018-01-04

## 2017-12-30 RX ADMIN — DEXAMETHASONE SODIUM PHOSPHATE 8 MG: 4 INJECTION, SOLUTION INTRA-ARTICULAR; INTRALESIONAL; INTRAMUSCULAR; INTRAVENOUS; SOFT TISSUE at 11:12

## 2017-12-30 NOTE — PATIENT INSTRUCTIONS
Increase fluid intake  Rest  Tylenol/Motrin as needed for headache/pain  Mucinex (guaifenesin) twice daily to loosen secretions  Humidified air    Adult Self-Care for Colds  Colds are caused by viruses. They can't be cured with antibiotics. However, you can ease symptoms and support your body's efforts to heal itself.  No matter which symptoms you have, be sure to:  · Drink plenty of fluids (water or clear soup)  · Stop smoking and drinking alcohol  · Get plenty of rest    Understand a fever  · Take your temperature several times a day. If your fever is 100.4°F (38.0°C) for more than a day, call your healthcare provider.  · Relax, lie down. Go to bed if you want. Just get off your feet and rest. Also, drink plenty of fluids to avoid dehydration.  · Take acetaminophen or a nonsteroidal anti-inflammatory agent (NSAID), such as ibuprofen.  Treat a troubled nose kindly  · Breathe steam or heated humidified air to open blocked nasal passages.  a hot shower or use a vaporizer. Be careful not to get burned by the steam.  · Saline nasal sprays and decongestant tablets help open a stuffy nose. Antihistamines can also help, but they can cause side effects such as drowsiness and drying of the eyes, nose, and mouth.  Soothe a sore throat and cough  · Gargle every 2 hours with 1/4 teaspoon of salt dissolved in 1/2 cup of warm water. Suck on throat lozenges and cough drops to moisten your throat.  · Cough medicines are available but it is unclear how well they actually work.  · Take acetaminophen or an NSAID, such as ibuprofen, to ease throat pain  Ease digestive problems  · Put fluids back into your body. Take frequent sips of clear liquids such as water or broth. Avoid drinks that have a lot of sugar in them, such as juices and sodas. These can make diarrhea worse. Older children and adults can drink sports drinks.  · As your appetite returns, you can resume your normal diet. Ask your healthcare provider if there are  any foods you should avoid.  When to seek medical care  When you first notice symptoms, ask your healthcare provider if antiviral medicines are appropriate. Antibiotics should not be taken for colds or flu. Also, call your healthcare provider if you have any of the following symptoms or if you aren't feeling better after 7 days:  · Shortness of breath  · Pain or pressure in the chest or belly (abdomen)  · Worsening symptoms, especially after a period of improvement  · Fever of 100.4°F  (38.0°C) or higher, or fever that doesn't go down with medicine  · Sudden dizziness or confusion  · Severe or continued vomiting  · Signs of dehydration, including extreme thirst, dark urine, infrequent urination, dry mouth  · Spotted, red, or very sore throat   Date Last Reviewed: 12/1/2016  © 4961-3041 [employer]. [address] [place] [address], [place] [address]. All rights reserved. This information is not intended as a substitute for professional medical care. Always follow your healthcare professional's instructions.

## 2017-12-30 NOTE — PROGRESS NOTES
"Subjective:      Patient ID: Akiko Jameson is a 42 y.o. female.    Chief Complaint: Cough    Cough   This is a new problem. The current episode started in the past 7 days (4 days ago). The problem has been gradually worsening. The cough is productive of sputum (productive of yellow sputum). Associated symptoms include chills, ear pain, a fever, headaches, myalgias, nasal congestion, postnasal drip, rhinorrhea and a sore throat. Pertinent negatives include no chest pain, rash, shortness of breath or wheezing. Nothing aggravates the symptoms. She has tried OTC cough suppressant for the symptoms. The treatment provided mild relief.     Review of Systems   Constitutional: Positive for chills, fatigue and fever.   HENT: Positive for congestion, ear pain, postnasal drip, rhinorrhea, sinus pain, sinus pressure and sore throat.    Eyes: Negative.    Respiratory: Positive for cough. Negative for shortness of breath and wheezing.    Cardiovascular: Negative for chest pain, palpitations and leg swelling.   Gastrointestinal: Negative.    Endocrine: Negative.    Genitourinary: Negative.    Musculoskeletal: Positive for myalgias.   Skin: Negative for rash and wound.   Neurological: Positive for headaches. Negative for weakness and numbness.   Psychiatric/Behavioral: The patient is not nervous/anxious.        Objective:     /82   Pulse 88   Temp 98.4 °F (36.9 °C) (Oral)   Ht 5' 3" (1.6 m)   Wt 116.9 kg (257 lb 11.5 oz)   BMI 45.65 kg/m²     Physical Exam   Constitutional: She is oriented to person, place, and time. She appears well-developed and well-nourished.   HENT:   Head: Normocephalic.   Right Ear: Tympanic membrane is injected and retracted.   Left Ear: Tympanic membrane is injected.   Nose: Mucosal edema and rhinorrhea present. Nasal deformity: nasal mucosa erythematous and boggy. Right sinus exhibits maxillary sinus tenderness. Right sinus exhibits no frontal sinus tenderness. Left sinus exhibits " maxillary sinus tenderness. Left sinus exhibits no frontal sinus tenderness.   Mouth/Throat: Posterior oropharyngeal erythema present. No oropharyngeal exudate or posterior oropharyngeal edema.   Eyes: Pupils are equal, round, and reactive to light.   Cardiovascular: Normal rate, regular rhythm and normal heart sounds.    Pulmonary/Chest: Effort normal and breath sounds normal. No respiratory distress. She has no decreased breath sounds. She has no wheezes. She has no rhonchi. She has no rales.   Lymphadenopathy:     She has no cervical adenopathy.   Right arm chronic lymphedema   Neurological: She is alert and oriented to person, place, and time.   Skin: Skin is warm and dry. No rash noted.   Psychiatric: She has a normal mood and affect. Her behavior is normal. Judgment and thought content normal.   Vitals reviewed.    Assessment:     1. Acute sinusitis, recurrence not specified, unspecified location    2. Cough        Plan:     Problem List Items Addressed This Visit     None      Visit Diagnoses     Acute sinusitis, recurrence not specified, unspecified location    -  Primary    Relevant Medications    dexamethasone injection 8 mg (Start on 12/30/2017 11:45 AM)    azithromycin (ZITHROMAX Z-DENZEL) 250 MG tablet    guaifenesin-codeine 100-10 mg/5 ml (GUAIFENESIN AC)  mg/5 mL syrup    Cough        Relevant Medications    dexamethasone injection 8 mg (Start on 12/30/2017 11:45 AM)    guaifenesin-codeine 100-10 mg/5 ml (GUAIFENESIN AC)  mg/5 mL syrup      Symptomatic care discussed and educational handout provided  Report to ER if symptoms worsen    Return if symptoms worsen or fail to improve.

## 2018-05-24 ENCOUNTER — OFFICE VISIT (OUTPATIENT)
Dept: FAMILY MEDICINE | Facility: CLINIC | Age: 43
End: 2018-05-24
Payer: COMMERCIAL

## 2018-05-24 ENCOUNTER — HOSPITAL ENCOUNTER (OUTPATIENT)
Dept: RADIOLOGY | Facility: HOSPITAL | Age: 43
Discharge: HOME | End: 2018-05-24
Attending: NURSE PRACTITIONER
Payer: COMMERCIAL

## 2018-05-24 VITALS
SYSTOLIC BLOOD PRESSURE: 123 MMHG | WEIGHT: 255 LBS | HEART RATE: 83 BPM | HEIGHT: 65 IN | BODY MASS INDEX: 42.49 KG/M2 | DIASTOLIC BLOOD PRESSURE: 78 MMHG

## 2018-05-24 DIAGNOSIS — R19.8 PAIN WITH BOWEL MOVEMENTS: ICD-10-CM

## 2018-05-24 DIAGNOSIS — D17.9 LIPOMA, UNSPECIFIED SITE: Primary | ICD-10-CM

## 2018-05-24 DIAGNOSIS — R19.5 MUCUS IN STOOL: ICD-10-CM

## 2018-05-24 PROCEDURE — 3008F BODY MASS INDEX DOCD: CPT | Mod: CPTII,S$GLB,, | Performed by: NURSE PRACTITIONER

## 2018-05-24 PROCEDURE — 74019 RADEX ABDOMEN 2 VIEWS: CPT | Mod: TC,PO

## 2018-05-24 PROCEDURE — 3074F SYST BP LT 130 MM HG: CPT | Mod: CPTII,S$GLB,, | Performed by: NURSE PRACTITIONER

## 2018-05-24 PROCEDURE — 74019 RADEX ABDOMEN 2 VIEWS: CPT | Mod: 26,,, | Performed by: RADIOLOGY

## 2018-05-24 PROCEDURE — 99213 OFFICE O/P EST LOW 20 MIN: CPT | Mod: S$GLB,,, | Performed by: NURSE PRACTITIONER

## 2018-05-24 PROCEDURE — 99999 PR PBB SHADOW E&M-EST. PATIENT-LVL V: CPT | Mod: PBBFAC,,, | Performed by: NURSE PRACTITIONER

## 2018-05-24 PROCEDURE — 3078F DIAST BP <80 MM HG: CPT | Mod: CPTII,S$GLB,, | Performed by: NURSE PRACTITIONER

## 2018-05-24 RX ORDER — DOCUSATE SODIUM 100 MG/1
100 CAPSULE, LIQUID FILLED ORAL 2 TIMES DAILY
Qty: 14 CAPSULE | Refills: 0 | Status: SHIPPED | OUTPATIENT
Start: 2018-05-24 | End: 2018-05-31

## 2018-05-24 RX ORDER — POLYETHYLENE GLYCOL 3350 17 G/17G
17 POWDER, FOR SOLUTION ORAL DAILY
Qty: 1 BOTTLE | Refills: 0 | Status: SHIPPED | OUTPATIENT
Start: 2018-05-24 | End: 2018-05-31

## 2018-05-24 NOTE — PROGRESS NOTES
"Subjective:       Patient ID: Akiko Jameson is a 43 y.o. female.    Chief Complaint: No chief complaint on file.    Mass   This is a chronic problem. The current episode started more than 1 year ago. The problem occurs daily. The problem has been gradually worsening (increasing in size; painful). Associated symptoms include a change in bowel habit. Pertinent negatives include no abdominal pain, anorexia, arthralgias, chest pain, chills, congestion, coughing, diaphoresis, fatigue, fever, headaches, joint swelling, myalgias, nausea, neck pain, numbness, rash, sore throat, swollen glands, urinary symptoms, vertigo, visual change, vomiting or weakness. Nothing aggravates the symptoms. She has tried nothing (had US of area in 2016; showed fatty tissue) for the symptoms. The treatment provided no relief.   GI Problem   Primary symptoms do not include fever, weight loss, fatigue, abdominal pain, nausea, vomiting, diarrhea, melena, hematemesis, jaundice, hematochezia, dysuria, myalgias, arthralgias or rash. The illness began more than 7 days ago (x 3-4 m). The onset was gradual.   The illness does not include chills, anorexia, dysphagia, odynophagia, bloating, constipation, tenesmus, back pain or itching. Associated symptoms comments: Mucus in stool; states feels pain in "intestines" before bowel movements; states has BM daily. Associated medical issues do not include inflammatory bowel disease, GERD, gallstones, liver disease, alcohol abuse, PUD, gastric bypass, bowel resection, irritable bowel syndrome, hemorrhoids or diverticulitis.     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    Eosinophilia     mild    GERD (gastroesophageal reflux disease)     Granular cell tumor     Hypertension     Lymphedema of upper extremity following lymphadenectomy 12/6/2011    Mild anemia     Sleep apnea  "    Thyroid nodule     Urinary incontinence, mixed      Social History     Social History    Marital status:      Spouse name: N/A    Number of children: 2    Years of education: N/A     Occupational History     [employer]     Social History Main Topics    Smoking status: Never Smoker    Smokeless tobacco: Never Used    Alcohol use No    Drug use: No    Sexual activity: Not on file     Social History Narrative    No narrative on file     Past Surgical History:   Procedure Laterality Date    AXILLARY NODE DISSECTION      granular cell tumor    back injections      BREAST CYST ASPIRATION      CHOLECYSTECTOMY      ENDOMETRIAL ABLATION      FINE NEEDLE ASPIRATION      thyroid    NASAL SEPTUM SURGERY      SINUS SURGERY      TUBAL LIGATION         Review of Systems   Constitutional: Negative.  Negative for chills, diaphoresis, fatigue, fever and weight loss.   HENT: Negative.  Negative for congestion and sore throat.    Eyes: Negative.    Respiratory: Negative.  Negative for cough.    Cardiovascular: Negative.  Negative for chest pain.   Gastrointestinal: Positive for change in bowel habit. Negative for abdominal pain, anorexia, bloating, constipation, diarrhea, dysphagia, hematemesis, hematochezia, jaundice, melena, nausea and vomiting.        Mucus in stool, pain to abdomen prior to bowel movement   Endocrine: Negative.    Genitourinary: Negative.  Negative for dysuria.   Musculoskeletal: Negative.  Negative for arthralgias, back pain, joint swelling, myalgias and neck pain.   Skin: Negative.  Negative for itching and rash.   Allergic/Immunologic: Negative.    Neurological: Negative.  Negative for vertigo, weakness, numbness and headaches.   Psychiatric/Behavioral: Negative.        Objective:      Physical Exam   Constitutional: She is oriented to person, place, and time. She appears well-developed and well-nourished.   HENT:   Head: Normocephalic.   Right Ear: External ear normal.    Left Ear: External ear normal.   Nose: Nose normal.   Mouth/Throat: Oropharynx is clear and moist.   Eyes: Conjunctivae are normal. Pupils are equal, round, and reactive to light.   Neck: Normal range of motion. Neck supple.   Cardiovascular: Normal rate, regular rhythm and normal heart sounds.    Pulmonary/Chest: Effort normal and breath sounds normal.   Abdominal: Soft. Bowel sounds are normal. There is no tenderness. There is no rigidity, no rebound, no guarding, no CVA tenderness, no tenderness at McBurney's point and negative Still's sign.       Musculoskeletal: Normal range of motion.   Neurological: She is alert and oriented to person, place, and time.   Skin: Skin is warm and dry. Capillary refill takes 2 to 3 seconds.   Psychiatric: She has a normal mood and affect. Her behavior is normal. Judgment and thought content normal.   Nursing note and vitals reviewed.      Assessment:       1. Lipoma, unspecified site    2. Pain with bowel movements    3. Mucus in stool        Plan:           Diagnoses and all orders for this visit:    Lipoma, unspecified site  -     US Soft Tissue Misc; Future  -     Ambulatory referral to General Surgery    Pain with bowel movements  Mucus in stool  -     Ambulatory referral to Gastroenterology  -     Occult blood x 1, stool; Future  -     CULTURE, STOOL; Future  -     H. pylori antigen, stool; Future  -     WBC, Stool; Future  -     X-Ray Abdomen Flat And Erect; Future        Report to ER immediately if symptoms worsen

## 2018-05-25 ENCOUNTER — TELEPHONE (OUTPATIENT)
Dept: RADIOLOGY | Facility: HOSPITAL | Age: 43
End: 2018-05-25

## 2018-05-25 ENCOUNTER — LAB VISIT (OUTPATIENT)
Dept: LAB | Facility: HOSPITAL | Age: 43
End: 2018-05-25
Attending: FAMILY MEDICINE
Payer: COMMERCIAL

## 2018-05-25 DIAGNOSIS — R19.8 PAIN WITH BOWEL MOVEMENTS: ICD-10-CM

## 2018-05-25 LAB
OB PNL STL: NEGATIVE
WBC #/AREA STL HPF: NORMAL /[HPF]

## 2018-05-25 PROCEDURE — 87427 SHIGA-LIKE TOXIN AG IA: CPT | Mod: 59

## 2018-05-25 PROCEDURE — 87046 STOOL CULTR AEROBIC BACT EA: CPT

## 2018-05-25 PROCEDURE — 87338 HPYLORI STOOL AG IA: CPT

## 2018-05-25 PROCEDURE — 89055 LEUKOCYTE ASSESSMENT FECAL: CPT

## 2018-05-25 PROCEDURE — 82272 OCCULT BLD FECES 1-3 TESTS: CPT

## 2018-05-25 PROCEDURE — 87045 FECES CULTURE AEROBIC BACT: CPT

## 2018-05-28 ENCOUNTER — HOSPITAL ENCOUNTER (OUTPATIENT)
Dept: RADIOLOGY | Facility: HOSPITAL | Age: 43
Discharge: HOME | End: 2018-05-28
Attending: NURSE PRACTITIONER
Payer: COMMERCIAL

## 2018-05-28 ENCOUNTER — OFFICE VISIT (OUTPATIENT)
Dept: SURGERY | Facility: CLINIC | Age: 43
End: 2018-05-28
Payer: COMMERCIAL

## 2018-05-28 VITALS
DIASTOLIC BLOOD PRESSURE: 64 MMHG | SYSTOLIC BLOOD PRESSURE: 127 MMHG | BODY MASS INDEX: 42.81 KG/M2 | HEART RATE: 87 BPM | WEIGHT: 257.25 LBS

## 2018-05-28 DIAGNOSIS — D17.9 LIPOMA, UNSPECIFIED SITE: ICD-10-CM

## 2018-05-28 DIAGNOSIS — D17.1 LIPOMA OF ABDOMINAL WALL: Primary | ICD-10-CM

## 2018-05-28 LAB
BACTERIA STL CULT: NORMAL
E COLI SXT1 STL QL IA: NEGATIVE
E COLI SXT2 STL QL IA: NEGATIVE

## 2018-05-28 PROCEDURE — 76705 ECHO EXAM OF ABDOMEN: CPT | Mod: TC,PO

## 2018-05-28 PROCEDURE — 99999 PR PBB SHADOW E&M-EST. PATIENT-LVL III: CPT | Mod: PBBFAC,,, | Performed by: SURGERY

## 2018-05-28 PROCEDURE — 99243 OFF/OP CNSLTJ NEW/EST LOW 30: CPT | Mod: S$GLB,,, | Performed by: SURGERY

## 2018-05-28 PROCEDURE — 76705 ECHO EXAM OF ABDOMEN: CPT | Mod: 26,,, | Performed by: RADIOLOGY

## 2018-05-28 RX ORDER — SODIUM CHLORIDE 9 MG/ML
INJECTION, SOLUTION INTRAVENOUS CONTINUOUS
Status: CANCELLED | OUTPATIENT
Start: 2018-06-18

## 2018-05-28 NOTE — PATIENT INSTRUCTIONS
.Surgery is scheduled for 06/18/18 arrival time per the preop nurse.  Preop will call from [phone]  Fasting after midnight  Someone to drive you home      THE PREOP NURSE WILL CALL, SOMETIMES AS LATE AS 4 PM IN THE AFTERNOON THE DAY BEFORE SURGERY.    Bathe the night before and the morning of your procedure with a Chlorhexidine wash such as Hibiclens, can be purchased at most Pharmacy's.    Special Instruction:  .PRE-OP INSTRUCTIONS    Your procedure has been scheduled at:    [facility]pre-admit nurse  [phone]      DAY    DATE       Please call the pre-op nurse to schedule your pre-op testing and registration  Someone from the hospital will call you the evening before surgery to let you know what time you need to be at the hospital for surgery.                                               1:  DO NOT EAT OR DRINK ANYTHING AFTER MIDNIGHT THE NIGHT BEFORE SURGERY.     2:  You will need to stop any blood thinners 1 week prior to surgery.  This includes Aspirin, fish oil, Pradaxa, Coumadin, Plavix, Pletal.  Please contact the prescribing doctor to be sure it is ok to stop these medicines.    3:  Pre-admit nurse will go over your medicines and let you know which ones not to take the morning of surgery    4:  Plan to have someone drive you home after you are released from the hospital.  You WILL NOT be able to drive yourself.    5:  If you have any questions or need to change your surgery date, please call St[facility] at [phone]    AFTER SURGERY:    You can shower 48 hours after surgery, REMOVE WET BANDAGES AND BANDAIDS, leave the steri- strips on.  If you have not had a bowel movement within 3 days after surgery you may take a laxative of your choice.  Do not lift anything over 5-10 pounds.    You need to have a follow up appointment 7-14 days after surgery, call the office to schedule or if you have questions or concerns.    For [facility] patients, you will receive a [facility]  message with a link to schedule your post op appointment.

## 2018-05-28 NOTE — PROGRESS NOTES
Subjective:       Patient ID: Akiko Jameson is a 43 y.o. female.    Chief Complaint: Consult (lipoma/left side back )      HPI  43-year-old female presents with a several year history of a left flank mass.  She thinks this has slowly gotten bigger over the years.  Has never been infected.  She had an ultrasound in 2016 which showed evidence of a 1-2 cm lipoma in the area. The nodule is not very distinct at this time although there is a palpable mass there.  She would like to have it removed as it is painful and getting larger.    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    Eosinophilia     mild    GERD (gastroesophageal reflux disease)     Granular cell tumor     Hypertension     Lymphedema of upper extremity following lymphadenectomy 12/6/2011    Mild anemia     Sleep apnea     Thyroid nodule     Urinary incontinence, mixed      Past Surgical History:   Procedure Laterality Date    AXILLARY NODE DISSECTION      granular cell tumor    back injections      BREAST CYST ASPIRATION      CHOLECYSTECTOMY      ENDOMETRIAL ABLATION      FINE NEEDLE ASPIRATION      thyroid    NASAL SEPTUM SURGERY      SINUS SURGERY      TUBAL LIGATION           Current Outpatient Prescriptions:     diltiaZEM (CARDIZEM CD) 120 MG Cp24, Take 1 capsule (120 mg total) by mouth once daily., Disp: 90 capsule, Rfl: 3    docusate sodium (COLACE) 100 MG capsule, Take 1 capsule (100 mg total) by mouth 2 (two) times daily., Disp: 14 capsule, Rfl: 0    meloxicam (MOBIC) 15 MG tablet, Take 1 tablet (15 mg total) by mouth once daily., Disp: 30 tablet, Rfl: 1    montelukast (SINGULAIR) 10 mg tablet, Take 1 tablet (10 mg total) by mouth nightly., Disp: 90 tablet, Rfl: 3    multivitamin (THERAGRAN) per tablet, Every day, Disp: , Rfl:     potassium chloride SA (K-DUR,KLOR-CON) 20 MEQ tablet, Take 1  tablet (20 mEq total) by mouth 2 (two) times daily. Appointment needed for further refills after this one, Disp: 60 tablet, Rfl: 11    ranitidine (ZANTAC) 150 MG tablet, Take 150 mg by mouth 2 (two) times daily. , Disp: , Rfl:     triamterene-hydrochlorothiazide 37.5-25 mg (MAXZIDE-25) 37.5-25 mg per tablet, Take 1 tablet by mouth once daily., Disp: 90 tablet, Rfl: 3    polyethylene glycol (GLYCOLAX) 17 gram/dose powder, Take 17 g by mouth once daily., Disp: 1 Bottle, Rfl: 0    Review of patient's allergies indicates:   Allergen Reactions    Prevacid  [lansoprazole] Swelling     Lip swelling    Ace inhibitors     Benazepril Other (See Comments)       Family History   Problem Relation Age of Onset    Diabetes Mother     Kidney failure Mother     Breast cancer Maternal Grandmother     Breast cancer Maternal Aunt     Cirrhosis Neg Hx      Social History     Social History    Marital status:      Spouse name: N/A    Number of children: 2    Years of education: N/A     Occupational History     [employer]     Social History Main Topics    Smoking status: Never Smoker    Smokeless tobacco: Never Used    Alcohol use No    Drug use: No    Sexual activity: Not on file     Other Topics Concern    Not on file     Social History Narrative    No narrative on file       Review of Systems   Constitutional: Negative for chills, fever and unexpected weight change.   HENT: Negative for congestion, hearing loss, mouth sores and sore throat.    Eyes: Negative for visual disturbance.   Respiratory: Negative for cough, shortness of breath and wheezing.    Cardiovascular: Negative for chest pain and palpitations.   Gastrointestinal: Negative for abdominal distention, abdominal pain, blood in stool, diarrhea, nausea and vomiting.   Genitourinary: Negative for dysuria, frequency and hematuria.   Musculoskeletal: Negative for arthralgias, joint swelling and neck pain.   Skin: Negative for rash and wound.        See  HPI   Neurological: Negative for dizziness, syncope, weakness and headaches.   Hematological: Does not bruise/bleed easily.   Psychiatric/Behavioral: Negative for agitation, behavioral problems and dysphoric mood.     Objective:     Physical Exam   Constitutional: She is oriented to person, place, and time. She appears well-developed and well-nourished. No distress.   HENT:   Head: Normocephalic and atraumatic.   Mouth/Throat: Oropharynx is clear and moist. No oropharyngeal exudate.   Eyes: Conjunctivae and EOM are normal. Pupils are equal, round, and reactive to light. No scleral icterus.   Neck: Normal range of motion. No thyromegaly present.   Cardiovascular: Normal rate and regular rhythm.    No murmur heard.  Pulmonary/Chest: Effort normal and breath sounds normal. She has no wheezes. She has no rales.   Abdominal: Soft. Bowel sounds are normal. She exhibits mass. She exhibits no distension. There is no tenderness. No hernia.   At the left flank there is a palpable mass.  The borders are not distinct.  It is slightly tender.  There is no evidence of infection.   Musculoskeletal: Normal range of motion. She exhibits no edema.   Lymphadenopathy:     She has no cervical adenopathy.   Neurological: She is alert and oriented to person, place, and time. No cranial nerve deficit.   Skin: Skin is warm and dry. No rash noted. No erythema.   Psychiatric: She has a normal mood and affect. Her behavior is normal.     Narrative     Ultrasound of the left lateral abdominal wall in area of reported lump was performed.  There is 1.5 x 0.6 x 1.5 cm echogenic area suggesting fat and could be prominent fat accumulation or lipoma.  There is no focal fluid collection suggesting abscess.   Impression         Echogenic areas suggesting fat in the area of reported palpable finding           Assessment:     Encounter Diagnosis   Name Primary?    Lipoma of abdominal wall Yes       Plan:      1.  Plan excision of this left flank  palpable mass.  2. Risks and benefits of the planned procedure were discussed at length with the patient.  Risks and benefits of not proceeding with the procedure were discussed as well. All questions were answered. The patient expressed clear understanding and would like to proceed with the procedure as discussed.

## 2018-05-28 NOTE — LETTER
May 28, 2018      [place]  [address] [facility]  [place] [address]           [place] [facility] [address] [facility] [address] [place] [address]  Phone: [phone]          Patient: Akiko Jameson   MR Number: [id number]   YOB: 1975   Date of Visit: 5/28/2018       Dear Yeimi Abernathy:    Thank you for referring Akiko Jameson to me for evaluation. Attached you will find relevant portions of my assessment and plan of care.    If you have questions, please do not hesitate to call me. I look forward to following Akiko Jameson along with you.    Sincerely,    Armando Tate MD    Enclosure  CC:  No Recipients    If you would like to receive this communication electronically, please contact [email or web address] or [phone] to request more information on [employer] access.    For providers and/or their staff who would like to refer a patient to [facility], please contact us through our one-stop-shop provider referral line, [facility], at [phone].    If you feel you have received this communication in error or would no longer like to receive these types of communications, please e-mail [email or web address]

## 2018-05-29 ENCOUNTER — PATIENT MESSAGE (OUTPATIENT)
Dept: FAMILY MEDICINE | Facility: CLINIC | Age: 43
End: 2018-05-29

## 2018-05-31 LAB — H PYLORI AG STL QL: DETECTED

## 2018-06-01 ENCOUNTER — TELEPHONE (OUTPATIENT)
Dept: FAMILY MEDICINE | Facility: CLINIC | Age: 43
End: 2018-06-01

## 2018-06-01 DIAGNOSIS — A04.8 POSITIVE H. PYLORI TEST: Primary | ICD-10-CM

## 2018-06-01 RX ORDER — CLARITHROMYCIN 500 MG/1
500 TABLET, FILM COATED ORAL EVERY 12 HOURS
Qty: 28 TABLET | Refills: 0 | Status: SHIPPED | OUTPATIENT
Start: 2018-06-01 | End: 2018-06-15

## 2018-06-01 RX ORDER — AMOXICILLIN 500 MG/1
1000 CAPSULE ORAL EVERY 12 HOURS
Qty: 56 CAPSULE | Refills: 0 | Status: SHIPPED | OUTPATIENT
Start: 2018-06-01 | End: 2018-06-15

## 2018-06-01 NOTE — TELEPHONE ENCOUNTER
Pt appears to be allergic to PPIs, which are recommended in H. Pylori treatment. Continue zantac as prescribed and we'll recheck the h. Pylori stool in 8 w.

## 2018-06-01 NOTE — TELEPHONE ENCOUNTER
----- Message from Roxanne Live sent at 6/1/2018  9:09 AM CDT -----  Contact: pt  States she's returning Ewelina's call. Please call pt at [phone]. Thank you

## 2018-06-11 ENCOUNTER — OFFICE VISIT (OUTPATIENT)
Dept: GASTROENTEROLOGY | Facility: CLINIC | Age: 43
End: 2018-06-11
Payer: COMMERCIAL

## 2018-06-11 VITALS
DIASTOLIC BLOOD PRESSURE: 85 MMHG | SYSTOLIC BLOOD PRESSURE: 132 MMHG | BODY MASS INDEX: 45.32 KG/M2 | HEART RATE: 72 BPM | HEIGHT: 63 IN | WEIGHT: 255.75 LBS

## 2018-06-11 DIAGNOSIS — R10.13 EPIGASTRIC PAIN: ICD-10-CM

## 2018-06-11 DIAGNOSIS — Z86.19 HISTORY OF HELICOBACTER PYLORI INFECTION: ICD-10-CM

## 2018-06-11 DIAGNOSIS — R19.8 PAIN WITH BOWEL MOVEMENTS: ICD-10-CM

## 2018-06-11 DIAGNOSIS — R12 HEARTBURN: Primary | ICD-10-CM

## 2018-06-11 PROCEDURE — 99999 PR PBB SHADOW E&M-EST. PATIENT-LVL IV: CPT | Mod: PBBFAC,,, | Performed by: NURSE PRACTITIONER

## 2018-06-11 PROCEDURE — 99203 OFFICE O/P NEW LOW 30 MIN: CPT | Mod: S$GLB,,, | Performed by: NURSE PRACTITIONER

## 2018-06-11 PROCEDURE — 3075F SYST BP GE 130 - 139MM HG: CPT | Mod: CPTII,S$GLB,, | Performed by: NURSE PRACTITIONER

## 2018-06-11 PROCEDURE — 3008F BODY MASS INDEX DOCD: CPT | Mod: CPTII,S$GLB,, | Performed by: NURSE PRACTITIONER

## 2018-06-11 PROCEDURE — 3079F DIAST BP 80-89 MM HG: CPT | Mod: CPTII,S$GLB,, | Performed by: NURSE PRACTITIONER

## 2018-06-11 RX ORDER — METHOCARBAMOL 500 MG/1
500 TABLET, FILM COATED ORAL
Status: ON HOLD | COMMUNITY
Start: 2018-06-07 | End: 2018-06-18 | Stop reason: HOSPADM

## 2018-06-11 RX ORDER — POLYETHYLENE GLYCOL 3350 17 G/17G
17 POWDER, FOR SOLUTION ORAL DAILY
COMMUNITY
End: 2018-12-26

## 2018-06-11 RX ORDER — HYDROCODONE BITARTRATE AND ACETAMINOPHEN 5; 325 MG/1; MG/1
1 TABLET ORAL
Status: ON HOLD | COMMUNITY
Start: 2018-06-07 | End: 2018-06-18 | Stop reason: HOSPADM

## 2018-06-11 NOTE — PROGRESS NOTES
Subjective:       Patient ID: Akiko Jameson is a 43 y.o. female Body mass index is 45.3 kg/m².    Chief Complaint: Gastroesophageal Reflux ( painful BM)    This patient is new to me.  Referring Provider: MYA Montilla NP for pain with bowel movements and mucus in stool.  Established with KYLE Baez NP with Hepatology (last in 4/2015)    Gastroesophageal Reflux   She complains of abdominal pain (epigastric and periumbilical pain typically in the morning, worse if she skips breakfast, described as sharp pain; denies currently), belching, early satiety, globus sensation (occasional), heartburn and water brash. She reports no chest pain, no choking, no coughing, no dysphagia, no hoarse voice, no nausea or no sore throat. CHIEF COMPLAINT: MUCUS IN STOOL AND PAINFUL BOWEL MOVEMENTS (wakes her up from sleeping with bowel urgency)- started a couple of weeks ago, relieved once she has a bowel movement. This is a chronic problem. The current episode started more than 1 year ago (started several years ago). The problem occurs frequently (daily). The problem has been gradually worsening (since 4/2018). The heartburn changes with position. The symptoms are aggravated by lying down (ICEEs, snowballs; occurs with anything she eats & drinks). Pertinent negatives include no fatigue, melena or weight loss. Risk factors include obesity, NSAIDs and caffeine use (mobic use PRN occasional use taken daily for 2-3 days). She has tried a histamine-2 antagonist, an antibiotic, head elevation and a diet change (currently taking amoxil, biaxin and zantac 150 mg BID for h pylori (history of lip swelling with prevacid); TUMS PRN) for the symptoms. The treatment provided mild relief. Past procedures include an abdominal ultrasound and an EGD.     Review of Systems   Constitutional: Negative for appetite change, chills, fatigue, fever, unexpected weight change and weight loss.   HENT: Negative for hoarse voice, sore throat and trouble  swallowing.    Respiratory: Negative for cough, choking and shortness of breath.    Cardiovascular: Negative for chest pain.   Gastrointestinal: Positive for abdominal pain (epigastric and periumbilical pain typically in the morning, worse if she skips breakfast, described as sharp pain; denies currently) and heartburn. Negative for anal bleeding, blood in stool, constipation, diarrhea, dysphagia, melena, nausea, rectal pain and vomiting.        Bowel movements are daily of formed stool and occasional small amounts of stool; Controlled with glycolax daily; reports scheduled for lipoma removal with Dr. Tate on 6/18/18   Genitourinary: Negative for difficulty urinating, dysuria and flank pain.   Neurological: Negative for weakness.       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    Eosinophilia     mild    GERD (gastroesophageal reflux disease)     Granular cell tumor     Hypertension     Lymphedema of upper extremity following lymphadenectomy 12/6/2011    Mild anemia     Sleep apnea     compliant with CPAP    Thyroid nodule     Urinary incontinence, mixed      Past Surgical History:   Procedure Laterality Date    AXILLARY NODE DISSECTION      granular cell tumor    back injections      BREAST CYST ASPIRATION      CHOLECYSTECTOMY      ENDOMETRIAL ABLATION      FINE NEEDLE ASPIRATION      thyroid    KNEE ARTHROSCOPY W/ MENISCECTOMY Right     NASAL SEPTUM SURGERY      SINUS SURGERY      TUBAL LIGATION       Family History   Problem Relation Age of Onset    Diabetes Mother     Kidney failure Mother     Breast cancer Maternal Grandmother     Breast cancer Maternal Aunt     Cirrhosis Neg Hx     Colon polyps Neg Hx     Colon cancer Neg Hx     Crohn's disease Neg Hx     Esophageal cancer Neg Hx     Stomach cancer Neg Hx     Ulcerative colitis Neg Hx      Wt Readings  from Last 10 Encounters:   06/11/18 116 kg (255 lb 11.7 oz)   05/28/18 116.7 kg (257 lb 4.4 oz)   05/24/18 115.7 kg (255 lb)   12/30/17 116.9 kg (257 lb 11.5 oz)   12/14/17 115.7 kg (255 lb)   10/28/17 116.4 kg (256 lb 9.9 oz)   08/31/17 117 kg (257 lb 15 oz)   08/18/17 110.7 kg (244 lb 0.8 oz)   08/18/17 110.7 kg (244 lb 2.6 oz)   07/10/17 117.2 kg (258 lb 6.1 oz)     Lab Results   Component Value Date    WBC 5.94 08/18/2017    HGB 11.6 (L) 08/18/2017    HCT 36.1 (L) 08/18/2017    MCV 78 (L) 08/18/2017     08/18/2017     CMP  Sodium   Date Value Ref Range Status   08/18/2017 140 136 - 145 mmol/L Final     Potassium   Date Value Ref Range Status   08/18/2017 3.9 3.5 - 5.1 mmol/L Final     Comment:     *Slightly Hemolyzed     Chloride   Date Value Ref Range Status   08/18/2017 101 95 - 110 mmol/L Final     CO2   Date Value Ref Range Status   08/18/2017 27 23 - 29 mmol/L Final     Glucose   Date Value Ref Range Status   08/18/2017 93 70 - 110 mg/dL Final     BUN, Bld   Date Value Ref Range Status   08/18/2017 12 6 - 20 mg/dL Final     Creatinine   Date Value Ref Range Status   08/18/2017 0.9 0.5 - 1.4 mg/dL Final     Calcium   Date Value Ref Range Status   08/18/2017 10.1 8.7 - 10.5 mg/dL Final     Total Protein   Date Value Ref Range Status   07/10/2017 7.5 6.0 - 8.4 g/dL Final     Albumin   Date Value Ref Range Status   07/10/2017 4.0 3.5 - 5.2 g/dL Final     Total Bilirubin   Date Value Ref Range Status   07/10/2017 0.5 0.2 - 1.3 mg/dL Final     Comment:     For infants and newborns, interpretation of results should be based  on gestational age, weight and in agreement with clinical  observations.  Premature Infant recommended reference ranges:  Up to 24 hours.............<8.0 mg/dL  Up to 48 hours............<12.0 mg/dL  3-5 days..................<15.0 mg/dL  6-29 days.................<15.0 mg/dL       Alkaline Phosphatase   Date Value Ref Range Status   07/10/2017 83 38 - 145 U/L Final     AST   Date Value  Ref Range Status   07/10/2017 27 14 - 36 U/L Final     ALT   Date Value Ref Range Status   08/18/2017 26 10 - 44 U/L Final     Anion Gap   Date Value Ref Range Status   08/18/2017 12 8 - 16 mmol/L Final     eGFR if    Date Value Ref Range Status   08/18/2017 >60.0 >60 mL/min/1.73 m^2 Final     eGFR if non    Date Value Ref Range Status   08/18/2017 >60.0 >60 mL/min/1.73 m^2 Final     Comment:     Calculation used to obtain the estimated glomerular filtration  rate (eGFR) is the CKD-EPI equation. Since race is unknown   in our information system, the eGFR values for   -American and Non--American patients are given   for each creatinine result.       Lab Results   Component Value Date    TSH 0.666 08/18/2017     Lab Visit on 05/25/2018   Component Date Value Ref Range Status    Occult Blood 05/25/2018 Negative  Negative Final    Stool Culture 05/25/2018 No Salmonella,Shigella,Vibrio,Campylobacter,Yersinia isolated.   Final    H. Pylori Antigen, Stool 05/25/2018 Detected* Not detected Final    Comment: Antimicrobials, proton pump inhibitors, and bismuth preparations  inhibit H. pylori and ingestion up to two weeks prior to testing   may cause false negative results. If clinically indicated the   test should be repeated on a new specimen obtained two weeks   after discontinuing treatment.  @ Test Performed By:  [facility]  Modesto Mitchell M.D., Ph.D.,   [address] [place] [id number]  CLIA  [id number]      Stool WBC 05/25/2018 No neutrophils seen  No neutrophils seen Final    Shiga Toxin 1 E.coli 05/25/2018 Negative   Final    Shiga Toxin 2 E.coli 05/25/2018 Negative   Final     Reviewed prior medical records including radiology report of 5/28/18 limited abdominal ultrasound; 2/26/15 MRI/MRCP abdomen; 2/19/15 abdominal ultrasound; & 2/21/15 ct renal stone abdomen pelvis in care everywhere.    Objective:       Physical Exam   Constitutional: She is oriented to person, place, and time. She appears well-developed and well-nourished. No distress.   HENT:   Mouth/Throat: Oropharynx is clear and moist and mucous membranes are normal. No oral lesions. No oropharyngeal exudate.   Eyes: Conjunctivae are normal. Pupils are equal, round, and reactive to light. No scleral icterus.   Cardiovascular: Normal rate.    Pulmonary/Chest: Effort normal and breath sounds normal. No respiratory distress. She has no wheezes.   Abdominal: Soft. Normal appearance and bowel sounds are normal. She exhibits no distension, no abdominal bruit and no mass. There is no hepatosplenomegaly. There is no tenderness. There is no rigidity, no rebound, no guarding, no tenderness at McBurney's point and negative Still's sign. No hernia.   Neurological: She is alert and oriented to person, place, and time.   Skin: Skin is warm and dry. No rash noted. She is not diaphoretic. No erythema. No pallor.   Non-jaundiced   Psychiatric: She has a normal mood and affect. Her behavior is normal. Judgment and thought content normal.   Nursing note and vitals reviewed.      Assessment:       1. Heartburn    2. History of Helicobacter pylori infection    3. Pain with bowel movements    4. Epigastric pain        Plan:       Heartburn  - schedule EGD, discussed procedure with patient, patient verbalized understanding  - continue zantac 150 mg bid as directed  - complete amoxil and biaxin as directed  -discussed about the different types of medications used to treat reflux and how to use them, antacids can be used PRN for breakthrough heartburn symptoms by reducing stomach acid that is already produced, H2 blockers (zantac) work by limiting the amount acid production, & PPI's work to block acid production and are taken daily (history of allergy to prevacid), patient verbalized understanding.  -Educated patient on lifestyle modifications to help control/reduce  reflux/abdominal pain including: avoid large meals, avoid eating within 2-3 hours of bedtime (avoid late night eating & lying down soon after eating), elevate head of bed if nocturnal symptoms are present, smoking cessation (if current smoker), & weight loss (if overweight).   -Educated to avoid known foods which trigger reflux symptoms & to minimize/avoid high-fat foods, chocolate, caffeine, citrus, alcohol, & tomato products.  -Advised to avoid/limit use of NSAID's, since they can cause GI upset, bleeding, and/or ulcers. If needed, take with food.     History of Helicobacter pylori infection  - continue amoxil, biaxin and zantac as directed; discussed that typically h pylori treatment is with PPI but due to allergy to prevacid that this is likely why the prescribing provider avoid PPI use  - schedule EGD, discussed procedure with patient, patient verbalized understanding    Pain with bowel movements  Recommended daily exercise as tolerated, adequate water intake (six 8-oz glasses of water daily), and high fiber diet. OTC fiber supplements are recommended if diet does not reach daily fiber goal (25 grams daily), such as Metamucil, Citrucel, or FiberCon (take as directed, separate from other oral medications by >2 hours).  -Recommend taking an OTC stool softener such as Colace as directed to avoid hard stools and straining with bowel movements PRN  -continue OTC MiraLax once daily (17g PO) as directed  - If no improvement with above recommendations, try intermittently dosed Dulcolax OTC as directed (every 3-4  days) PRN to facilitate bowel movements  -If still no improvement with these measures, call/follow-up  - Possible colonoscopy pending results of testing and if symptoms persist  - Possible trial of bentyl or levsin pending results of testing and if symptoms persist    Epigastric pain  - schedule EGD, discussed procedure with patient, patient verbalized understanding    Follow-up in about 1 month (around  7/11/2018), or if symptoms worsen or fail to improve.      If no improvement in symptoms or symptoms worsen, call/follow-up at clinic or go to ER.

## 2018-06-11 NOTE — PATIENT INSTRUCTIONS
"  GERD (Adult)    The esophagus is a tube that carries food from the mouth to the stomach. A valve at the lower end of the esophagus prevents stomach acid from flowing upward. When this valve doesn't work properly, stomach contents may repeatedly flow back up (reflux) into the esophagus. This is called gastroesophageal reflux disease (GERD). GERD can irritate the esophagus. It can cause problems with swallowing or breathing. In severe cases, GERD can cause recurrent pneumonia or other serious problems.  Symptoms of reflux include burning, pressure or sharp pain in the upper abdomen or mid to lower chest. The pain can spread to the neck, back, or shoulder. There may be belching, an acid taste in the back of the throat, chronic cough, or sore throat or hoarseness. GERD symptoms often occur during the day after a big meal. They can also occur at night when lying down.   Home care  Lifestyle changes can help reduce symptoms. If needed, medicines may be prescribed. Symptoms often improve with treatment, but if treatment is stopped, the symptoms often return after a few months. So most persons with GERD will need to continue treatment.  Lifestyle changes  · Limit or avoid fatty, fried, and spicy foods, as well as coffee, chocolate, mint, and foods with high acid content such as tomatoes and citrus fruit and juices (orange, grapefruit, lemon).  · Dont eat large meals, especially at night. Frequent, smaller meals are best. Do not lie down right after eating. And dont eat anything 3 hours before going to bed.  · Avoid drinking alcohol and smoking. As much as possible, stay away from second hand smoke.  · If you are overweight, losing weight will reduce symptoms.   · Avoid wearing tight clothing around your stomach area.  · If your symptoms occur during sleep, use a foam wedge to elevate your upper body (not just your head.) Or, place 4" blocks under the head of your bed.  Medicines  If needed, medicines can help relieve " the symptoms of GERD and prevent damage to the esophagus. Discuss a medicine plan with your healthcare provider. This may include one or more of the following medicines:  · Antacids to help neutralize the normal acids in your stomach.  · Acid blockers (H2 blockers) to decrease acid production.  · Acid inhibitors (PPIs) to decrease acid production in a different way than the blockers. They may work better, but can take a little longer to take effect.  Take an antacid 30-60 minutes after eating and at bedtime, but not at the same time as an acid blocker.  Try not to take medicines such as ibuprofen and aspirin. If you are taking aspirin for your heart or other medical reasons, talk to your healthcare provider about stopping it.  Follow-up care  Follow up with your healthcare provider or as advised by our staff.  When to seek medical advice  Call your healthcare provider if any of the following occur:  · Stomach pain gets worse or moves to the lower right abdomen (appendix area)  · Chest pain appears or gets worse, or spreads to the back, neck, shoulder, or arm  · Frequent vomiting (cant keep down liquids)  · Blood in the stool or vomit (red or black in color)  · Feeling weak or dizzy  · Fever of 100.4ºF (38ºC) or higher, or as directed by your healthcare provider  Date Last Reviewed: 6/23/2015 © 2000-2017 [employer]. [address] [place] [address], [place] [address]. All rights reserved. This information is not intended as a substitute for professional medical care. Always follow your healthcare professional's instructions.        Treating Gastritis     Take your medicines as directed, even if your stomach pain goes away.    A medical evaluation will be done to find out the cause of your symptoms. The evaluation may include your health history, a physical exam, and some tests. Once your evaluation is done, treatment can begin. It may include taking certain medicines and making some lifestyle changes. Follow  your healthcare providers advice.  Taking medicines  Your healthcare providers may prescribe some medicines to neutralize or reduce excess stomach acids. If tests show that H. pylori are in your stomach lining, antibiotics may be prescribed. H.pylori are a type of bacteria that can cause gastritis.  Avoiding certain things  Be sure to avoid:  · Aspirin. Avoid taking aspirin and other anti-inflammatory medicines, such as ibuprofen. They can irritate your stomach lining. Also, check with your healthcare provider before taking or stopping any medicines.  · Spicy foods and caffeine. Stay away from foods prepared with spices, especially black pepper. Caffeine can also make your symptoms worse. So, avoid coffee, tea, cola drinks, and chocolate. Be sure to tell your healthcare provider about any other foods or liquids that bother your stomach.  · Tobacco and alcohol. Dont use tobacco or drink alcohol. Tobacco and alcohol can increase stomach acids and worsen your gastritis symptoms.  Reducing your stress  Stress may make your gastritis symptoms worse. Whenever you can, reduce the stress in your life. One way to do this is to start an exercise program--talk to your healthcare provider first. Also, try to get enough sleep, at least 8 hours a night.  Date Last Reviewed: 7/1/2016  © 9081-3159 [employer]. [address] [place] [address], [place] [address]. All rights reserved. This information is not intended as a substitute for professional medical care. Always follow your healthcare professional's instructions.

## 2018-06-11 NOTE — LETTER
June 13, 2018      [place]  [id number] [facility]  [place] [address]           [facility]  [address] [facility] [address] [place] [address]  Phone: [phone]          Patient: Akiko Jameson   MR Number: [id number]   YOB: 1975   Date of Visit: 6/11/2018       Dear Yeimi Abernathy:    Thank you for referring Akiko Jameson to me for evaluation. Attached you will find relevant portions of my assessment and plan of care.    If you have questions, please do not hesitate to call me. I look forward to following Akiko Jameson along with you.    Sincerely,    Gerda Gilliam, [facility]    Enclosure  CC:  No Recipients    If you would like to receive this communication electronically, please contact [email or web address] or [phone] to request more information on [employer] access.    For providers and/or their staff who would like to refer a patient to [facility], please contact us through our one-stop-shop provider referral line, [facility], at [phone].    If you feel you have received this communication in error or would no longer like to receive these types of communications, please e-mail [email or web address]

## 2018-06-13 ENCOUNTER — PATIENT MESSAGE (OUTPATIENT)
Dept: GASTROENTEROLOGY | Facility: CLINIC | Age: 43
End: 2018-06-13

## 2018-06-15 ENCOUNTER — ANESTHESIA EVENT (OUTPATIENT)
Dept: SURGERY | Facility: HOSPITAL | Age: 43
End: 2018-06-15
Payer: COMMERCIAL

## 2018-06-18 ENCOUNTER — ANESTHESIA (OUTPATIENT)
Dept: SURGERY | Facility: HOSPITAL | Age: 43
End: 2018-06-18
Payer: COMMERCIAL

## 2018-06-18 ENCOUNTER — SURGERY (OUTPATIENT)
Age: 43
End: 2018-06-18

## 2018-06-18 ENCOUNTER — HOSPITAL ENCOUNTER (OUTPATIENT)
Facility: HOSPITAL | Age: 43
Discharge: HOME | End: 2018-06-18
Attending: SURGERY | Admitting: SURGERY
Payer: COMMERCIAL

## 2018-06-18 DIAGNOSIS — D17.1 LIPOMA OF ABDOMINAL WALL: Primary | ICD-10-CM

## 2018-06-18 LAB
B-HCG UR QL: NEGATIVE
CTP QC/QA: YES

## 2018-06-18 PROCEDURE — 88307 TISSUE EXAM BY PATHOLOGIST: CPT | Performed by: PATHOLOGY

## 2018-06-18 PROCEDURE — 37000009 HC ANESTHESIA EA ADD 15 MINS: Mod: PO | Performed by: SURGERY

## 2018-06-18 PROCEDURE — 63600175 PHARM REV CODE 636 W HCPCS: Mod: PO | Performed by: NURSE ANESTHETIST, CERTIFIED REGISTERED

## 2018-06-18 PROCEDURE — 81025 URINE PREGNANCY TEST: CPT | Mod: PO | Performed by: SURGERY

## 2018-06-18 PROCEDURE — 71000033 HC RECOVERY, INTIAL HOUR: Mod: PO | Performed by: SURGERY

## 2018-06-18 PROCEDURE — D9220A PRA ANESTHESIA: Mod: CRNA,,, | Performed by: NURSE ANESTHETIST, CERTIFIED REGISTERED

## 2018-06-18 PROCEDURE — 63600175 PHARM REV CODE 636 W HCPCS: Mod: PO | Performed by: ANESTHESIOLOGY

## 2018-06-18 PROCEDURE — 63600175 PHARM REV CODE 636 W HCPCS: Mod: PO | Performed by: SURGERY

## 2018-06-18 PROCEDURE — 37000008 HC ANESTHESIA 1ST 15 MINUTES: Mod: PO | Performed by: SURGERY

## 2018-06-18 PROCEDURE — 36000707: Mod: PO | Performed by: SURGERY

## 2018-06-18 PROCEDURE — 25000003 PHARM REV CODE 250: Mod: PO | Performed by: NURSE ANESTHETIST, CERTIFIED REGISTERED

## 2018-06-18 PROCEDURE — 88307 TISSUE EXAM BY PATHOLOGIST: CPT | Mod: 26,,, | Performed by: PATHOLOGY

## 2018-06-18 PROCEDURE — 25000003 PHARM REV CODE 250: Mod: PO | Performed by: ANESTHESIOLOGY

## 2018-06-18 PROCEDURE — D9220A PRA ANESTHESIA: Mod: ANES,,, | Performed by: ANESTHESIOLOGY

## 2018-06-18 PROCEDURE — 36000706: Mod: PO | Performed by: SURGERY

## 2018-06-18 PROCEDURE — 21930 EXC BACK LES SC < 3 CM: CPT | Mod: ,,, | Performed by: SURGERY

## 2018-06-18 RX ORDER — SODIUM CHLORIDE 0.9 % (FLUSH) 0.9 %
3 SYRINGE (ML) INJECTION
Status: DISCONTINUED | OUTPATIENT
Start: 2018-06-18 | End: 2018-06-18 | Stop reason: HOSPADM

## 2018-06-18 RX ORDER — PROPOFOL 10 MG/ML
VIAL (ML) INTRAVENOUS
Status: DISCONTINUED | OUTPATIENT
Start: 2018-06-18 | End: 2018-06-18

## 2018-06-18 RX ORDER — ONDANSETRON 2 MG/ML
INJECTION INTRAMUSCULAR; INTRAVENOUS
Status: DISCONTINUED | OUTPATIENT
Start: 2018-06-18 | End: 2018-06-18

## 2018-06-18 RX ORDER — SODIUM CHLORIDE, SODIUM LACTATE, POTASSIUM CHLORIDE, CALCIUM CHLORIDE 600; 310; 30; 20 MG/100ML; MG/100ML; MG/100ML; MG/100ML
INJECTION, SOLUTION INTRAVENOUS CONTINUOUS
Status: DISCONTINUED | OUTPATIENT
Start: 2018-06-18 | End: 2018-06-18 | Stop reason: HOSPADM

## 2018-06-18 RX ORDER — SODIUM CHLORIDE 9 MG/ML
INJECTION, SOLUTION INTRAVENOUS CONTINUOUS
Status: DISCONTINUED | OUTPATIENT
Start: 2018-06-18 | End: 2018-06-18 | Stop reason: HOSPADM

## 2018-06-18 RX ORDER — MIDAZOLAM HYDROCHLORIDE 1 MG/ML
INJECTION, SOLUTION INTRAMUSCULAR; INTRAVENOUS
Status: DISCONTINUED | OUTPATIENT
Start: 2018-06-18 | End: 2018-06-18

## 2018-06-18 RX ORDER — FENTANYL CITRATE 50 UG/ML
INJECTION, SOLUTION INTRAMUSCULAR; INTRAVENOUS
Status: DISCONTINUED | OUTPATIENT
Start: 2018-06-18 | End: 2018-06-18

## 2018-06-18 RX ORDER — HYDROCODONE BITARTRATE AND ACETAMINOPHEN 5; 325 MG/1; MG/1
1 TABLET ORAL EVERY 4 HOURS PRN
Status: DISCONTINUED | OUTPATIENT
Start: 2018-06-18 | End: 2018-06-18 | Stop reason: HOSPADM

## 2018-06-18 RX ORDER — DEXAMETHASONE SODIUM PHOSPHATE 4 MG/ML
8 INJECTION, SOLUTION INTRA-ARTICULAR; INTRALESIONAL; INTRAMUSCULAR; INTRAVENOUS; SOFT TISSUE
Status: COMPLETED | OUTPATIENT
Start: 2018-06-18 | End: 2018-06-18

## 2018-06-18 RX ORDER — LIDOCAINE HYDROCHLORIDE 10 MG/ML
0.5 INJECTION, SOLUTION EPIDURAL; INFILTRATION; INTRACAUDAL; PERINEURAL ONCE AS NEEDED
Status: COMPLETED | OUTPATIENT
Start: 2018-06-18 | End: 2018-06-18

## 2018-06-18 RX ORDER — SUCCINYLCHOLINE CHLORIDE 20 MG/ML
INJECTION INTRAMUSCULAR; INTRAVENOUS
Status: DISCONTINUED | OUTPATIENT
Start: 2018-06-18 | End: 2018-06-18

## 2018-06-18 RX ORDER — OXYCODONE HYDROCHLORIDE 5 MG/1
5 TABLET ORAL ONCE AS NEEDED
Status: DISCONTINUED | OUTPATIENT
Start: 2018-06-18 | End: 2018-06-18 | Stop reason: HOSPADM

## 2018-06-18 RX ORDER — KETOROLAC TROMETHAMINE 30 MG/ML
INJECTION, SOLUTION INTRAMUSCULAR; INTRAVENOUS
Status: DISCONTINUED | OUTPATIENT
Start: 2018-06-18 | End: 2018-06-18

## 2018-06-18 RX ORDER — FENTANYL CITRATE 50 UG/ML
25 INJECTION, SOLUTION INTRAMUSCULAR; INTRAVENOUS EVERY 5 MIN PRN
Status: DISCONTINUED | OUTPATIENT
Start: 2018-06-18 | End: 2018-06-18 | Stop reason: HOSPADM

## 2018-06-18 RX ORDER — LIDOCAINE HCL/PF 100 MG/5ML
SYRINGE (ML) INTRAVENOUS
Status: DISCONTINUED | OUTPATIENT
Start: 2018-06-18 | End: 2018-06-18

## 2018-06-18 RX ORDER — ROCURONIUM BROMIDE 10 MG/ML
INJECTION, SOLUTION INTRAVENOUS
Status: DISCONTINUED | OUTPATIENT
Start: 2018-06-18 | End: 2018-06-18

## 2018-06-18 RX ORDER — KETAMINE HYDROCHLORIDE 100 MG/ML
INJECTION, SOLUTION INTRAMUSCULAR; INTRAVENOUS
Status: DISCONTINUED | OUTPATIENT
Start: 2018-06-18 | End: 2018-06-18

## 2018-06-18 RX ORDER — HYDROCODONE BITARTRATE AND ACETAMINOPHEN 7.5; 325 MG/1; MG/1
1 TABLET ORAL EVERY 4 HOURS PRN
Qty: 20 TABLET | Refills: 0 | Status: SHIPPED | OUTPATIENT
Start: 2018-06-18 | End: 2018-06-28

## 2018-06-18 RX ORDER — CEFAZOLIN SODIUM 1 G/3ML
2 INJECTION, POWDER, FOR SOLUTION INTRAMUSCULAR; INTRAVENOUS
Status: COMPLETED | OUTPATIENT
Start: 2018-06-18 | End: 2018-06-18

## 2018-06-18 RX ADMIN — LIDOCAINE HYDROCHLORIDE 1 MG: 10 INJECTION, SOLUTION EPIDURAL; INFILTRATION; INTRACAUDAL; PERINEURAL at 11:06

## 2018-06-18 RX ADMIN — KETAMINE HYDROCHLORIDE 20 MG: 100 INJECTION, SOLUTION, CONCENTRATE INTRAMUSCULAR; INTRAVENOUS at 12:06

## 2018-06-18 RX ADMIN — SUCCINYLCHOLINE CHLORIDE 140 MG: 20 INJECTION, SOLUTION INTRAMUSCULAR; INTRAVENOUS at 11:06

## 2018-06-18 RX ADMIN — DEXAMETHASONE SODIUM PHOSPHATE 8 MG: 4 INJECTION, SOLUTION INTRAMUSCULAR; INTRAVENOUS at 11:06

## 2018-06-18 RX ADMIN — CEFAZOLIN 2 G: 1 INJECTION, POWDER, FOR SOLUTION INTRAVENOUS at 11:06

## 2018-06-18 RX ADMIN — PROPOFOL 200 MG: 10 INJECTION, EMULSION INTRAVENOUS at 11:06

## 2018-06-18 RX ADMIN — SODIUM CHLORIDE, SODIUM LACTATE, POTASSIUM CHLORIDE, AND CALCIUM CHLORIDE: .6; .31; .03; .02 INJECTION, SOLUTION INTRAVENOUS at 11:06

## 2018-06-18 RX ADMIN — LIDOCAINE HYDROCHLORIDE 50 MG: 20 INJECTION PARENTERAL at 11:06

## 2018-06-18 RX ADMIN — MIDAZOLAM HYDROCHLORIDE 2 MG: 1 INJECTION, SOLUTION INTRAMUSCULAR; INTRAVENOUS at 11:06

## 2018-06-18 RX ADMIN — ROCURONIUM BROMIDE 5 MG: 10 INJECTION, SOLUTION INTRAVENOUS at 11:06

## 2018-06-18 RX ADMIN — KETOROLAC TROMETHAMINE 30 MG: 30 INJECTION, SOLUTION INTRAMUSCULAR; INTRAVENOUS at 12:06

## 2018-06-18 RX ADMIN — FENTANYL CITRATE 50 MCG: 50 INJECTION, SOLUTION INTRAMUSCULAR; INTRAVENOUS at 11:06

## 2018-06-18 RX ADMIN — LIDOCAINE HYDROCHLORIDE 50 MG: 20 INJECTION PARENTERAL at 12:06

## 2018-06-18 RX ADMIN — ONDANSETRON 4 MG: 2 INJECTION, SOLUTION INTRAMUSCULAR; INTRAVENOUS at 12:06

## 2018-06-18 NOTE — DISCHARGE SUMMARY
Discharge Summary  General Surgery      Admit Date: 6/18/2018    Discharge Date :6/18/2018    Attending Physician: Armando Tate     Discharge Physician: Armando Tate    Discharged Condition: good    Discharge Diagnosis: Lipoma of abdominal wall [D17.1]    Treatments/Procedures: Procedure(s) (LRB):  REMOVAL-MASS (Left)    Hospital Course: Uneventful; Discharged home from Recovery    Significant Diagnostic Studies: none    Disposition: Home or Self Care    Diet: Regular    Follow up: Office 10-14 days    Activity: No heavy lifting till seen in office.    Patient Instructions:   Current Discharge Medication List      START taking these medications    Details   HYDROcodone-acetaminophen (NORCO) 7.5-325 mg per tablet Take 1 tablet by mouth every 4 (four) hours as needed for Pain.  Qty: 20 tablet, Refills: 0         CONTINUE these medications which have NOT CHANGED    Details   diltiaZEM (CARDIZEM CD) 120 MG Cp24 Take 1 capsule (120 mg total) by mouth once daily.  Qty: 90 capsule, Refills: 3      montelukast (SINGULAIR) 10 mg tablet Take 1 tablet (10 mg total) by mouth nightly.  Qty: 90 tablet, Refills: 3      multivitamin (THERAGRAN) per tablet Every day      polyethylene glycol (GLYCOLAX) 17 gram/dose powder Take 17 g by mouth once daily.      potassium chloride SA (K-DUR,KLOR-CON) 20 MEQ tablet Take 1 tablet (20 mEq total) by mouth 2 (two) times daily. Appointment needed for further refills after this one  Qty: 60 tablet, Refills: 11    Associated Diagnoses: Hypokalemia      ranitidine (ZANTAC) 150 MG tablet Take 1 tablet (150 mg total) by mouth 2 (two) times daily.  Qty: 180 tablet, Refills: 2      triamterene-hydrochlorothiazide 37.5-25 mg (MAXZIDE-25) 37.5-25 mg per tablet Take 1 tablet by mouth once daily.  Qty: 90 tablet, Refills: 3      meloxicam (MOBIC) 15 MG tablet Take 1 tablet (15 mg total) by mouth once daily.  Qty: 30 tablet, Refills: 1         STOP taking these medications        HYDROcodone-acetaminophen (NORCO) 5-325 mg per tablet Comments:   Reason for Stopping:         methocarbamol (ROBAXIN) 500 MG Tab Comments:   Reason for Stopping:                 Discharge Procedure Orders  Diet general     Remove dressing in 48 hours

## 2018-06-18 NOTE — TRANSFER OF CARE
"Anesthesia Transfer of Care Note    Patient: Akiko Jameson    Procedure(s) Performed: Procedure(s) (LRB):  REMOVAL-MASS (Left)    Patient location: PACU    Anesthesia Type: general    Transport from OR: Transported from OR on room air with adequate spontaneous ventilation    Post pain: adequate analgesia    Post assessment: no apparent anesthetic complications    Post vital signs: stable    Level of consciousness: awake, alert and oriented    Nausea/Vomiting: no nausea/vomiting    Complications: none    Transfer of care protocol was followed      Last vitals:   Visit Vitals  /81 (BP Location: Right arm, Patient Position: Lying)   Pulse 70   Temp 36.3 °C (97.3 °F) (Skin)   Resp 18   Ht 5' 3" (1.6 m)   Wt 114.8 kg (253 lb)   SpO2 99%   Breastfeeding? No   BMI 44.82 kg/m²     "

## 2018-06-18 NOTE — BRIEF OP NOTE
Patient: Akiko Jameson     Date of Procedure: 6/18/2018    Procedure: Excision of left flank mass    Surgeon: Armando Krishna MD    Assistant: None    Pre-op Diagnosis: Lipoma of abdominal wall [D17.1]     Post-op Diagnosis: Lipoma of abdominal wall [D17.1]    Findings: lipoma    Specimen: lipoma    EBL: 15 cc    Complications: None

## 2018-06-18 NOTE — ANESTHESIA PREPROCEDURE EVALUATION
06/18/2018  Akiko Jameson is a 43 y.o., female.    Anesthesia Evaluation      I have reviewed the Medications.     Review of Systems  Anesthesia Hx:  No problems with previous Anesthesia   Social:  Non-Smoker, No Alcohol Use    Cardiovascular:   Hypertension    Pulmonary:   Sleep Apnea, CPAP    Renal/:  Renal/ Normal     Hepatic/GI:   GERD, poorly controlled    Neurological:  Neurology Normal    Endocrine:  Endocrine Normal        Physical Exam  General:  Morbid Obesity    Airway/Jaw/Neck:  Airway Findings: Mouth Opening: Normal Tongue: Normal  General Airway Assessment: Adult, Average  Mallampati: III  Jaw/Neck Findings:  Neck ROM: Normal ROM       Chest/Lungs:  Chest/Lungs Findings: Clear to auscultation, Normal Respiratory Rate     Heart/Vascular:  Heart Findings: Rate: Normal  Rhythm: Regular Rhythm  Sounds: Normal  Heart murmur: negative       Mental Status:  Mental Status Findings:  Cooperative, Alert and Oriented         Anesthesia Plan  Type of Anesthesia, risks & benefits discussed:  Anesthesia Type:  general  Patient's Preference:   Intra-op Monitoring Plan:   Intra-op Monitoring Plan Comments:   Post Op Pain Control Plan:   Post Op Pain Control Plan Comments:   Induction:   IV  Beta Blocker:  Patient is not currently on a Beta-Blocker (No further documentation required).       Informed Consent: Patient understands risks and agrees with Anesthesia plan.  Questions answered. Anesthesia consent signed with patient.  ASA Score: 3     Day of Surgery Review of History & Physical:            Ready For Surgery From Anesthesia Perspective.

## 2018-06-18 NOTE — INTERVAL H&P NOTE
The patient has been examined and the H&P has been reviewed:    I concur with the findings and no changes have occurred since H&P was written.    Anesthesia/Surgery risks, benefits and alternative options discussed and understood by patient/family.          Active Hospital Problems    Diagnosis  POA    Lipoma of abdominal wall [D17.1]  Yes      Resolved Hospital Problems    Diagnosis Date Resolved POA   No resolved problems to display.

## 2018-06-18 NOTE — ANESTHESIA POSTPROCEDURE EVALUATION
"Anesthesia Post Evaluation    Patient: Akiko Jameson    Procedure(s) Performed: Procedure(s) (LRB):  REMOVAL-MASS (Left)    Final Anesthesia Type: general  Patient location during evaluation: PACU  Patient participation: Yes- Able to Participate  Level of consciousness: awake and alert and oriented  Post-procedure vital signs: reviewed and stable  Pain management: adequate  Airway patency: patent  PONV status at discharge: No PONV  Anesthetic complications: no      Cardiovascular status: hemodynamically stable and blood pressure returned to baseline  Respiratory status: unassisted, spontaneous ventilation and room air  Hydration status: euvolemic  Follow-up not needed.        Visit Vitals  BP (!) 168/77 (BP Location: Left leg, Patient Position: Sitting)   Pulse 74   Temp 36.3 °C (97.3 °F) (Skin)   Resp 15   Ht 5' 3" (1.6 m)   Wt 114.8 kg (253 lb)   SpO2 98%   Breastfeeding? No   BMI 44.82 kg/m²       Pain/Cele Score: Pain Assessment Performed: Yes (6/18/2018 11:04 AM)  Presence of Pain: denies (6/18/2018  1:33 PM)  Cele Score: 10 (6/18/2018  1:15 PM)      "

## 2018-06-18 NOTE — OP NOTE
DATE OF PROCEDURE:  06/18/2018.    PROCEDURE PERFORMED:  Excision of left flank mass.    PREOPERATIVE DIAGNOSIS:  Left flank mass.    POSTOPERATIVE DIAGNOSIS:  Left flank mass.    SURGEON:  Armando Tate Jr., M.D.    SPECIMEN:  Left flank mass.    PROCEDURE IN DETAIL:  After appropriate consent was obtained, consent forms   signed and questions answered, the patient was taken to the Operating Room,   placed on the operating room table where general anesthesia was induced.    Preoperative antibiotics were administered.  SCDs were in place.  Time-out   procedure was performed.  The area had been previously marked.  This area was   prepped and draped in the usual sterile fashion.  A skin incision was made and   dissection performed into the subcutaneous tissues.  Dissection was then   performed around the mass, which had the appearance of a lipoma, and it was   excised in its entirety.  Once that was done, 0.25% Marcaine was injected and   adequate hemostasis was achieved after irrigation.  Deep tissues were then   closed with a running 3-0 Vicryl suture and the skin was closed with a 4-0   Monocryl subcuticular stitch.  Steri-Strips and dressings were applied.  The   patient was awakened, extubated and transferred to Recovery in stable condition.    She tolerated the procedure without complication.  Estimated blood loss was   approximately 20 mL.  Counts correct at the end of the procedure.      RTL/HN  dd: 06/18/2018 12:38:04 (CDT)  td: 06/18/2018 12:44:47 (CDT)  Doc ID   #[id number]  Job ID #[id number]    CC:

## 2018-06-18 NOTE — DISCHARGE INSTRUCTIONS
WOUND CARE    After Surgery    DOS:   May shower in 24 hours   May remove outer dressing in 48 hours. Leave steri-strips in place. If steri-strips get wet, pat dry. If they fall off, do not worry.   Minimal activity for 48 hours.   Advance diet as tolerated.   Resume home medications as prescribed    DONT:   Do not soak in the tub   No driving for 24 hours or while taking narcotic pain medication   DO NOT TAKE ADDITIONAL TYLENOL/ACETAMINOPHEN WHILE TAKING NARCOTIC PAIN MEDICATION THAT CONTAINS TYLENOL/ACETAMINOPHEN.    CALL PHYSICIAN FOR:   Redness, swelling or bleeding.   Fever greater then 101.   Drainage from the incision site that appears infected.   Persistent pain not relieved by pain medication.    RETURN APPOINTMENT: Call to schedule appointment in 10-14 days    FOR EMERGENCIES: Contact your doctor at [phone]        Recovery After Procedural Sedation (Adult)  You have been given medicine by vein to make you sleep during your surgery. This may have included both a pain medicine and sleeping medicine. Most of the effects have worn off. But you may still have some drowsiness for the next 6 to 8 hours.  Home care  Follow these guidelines when you get home:  · For the next 8 hours, you should be watched by a responsible adult. This person should make sure your condition is not getting worse.  · Don't drink any alcohol for the next 24 hours.  · Don't drive, operate dangerous machinery, or make important business or personal decisions during the next 24 hours.  Note: Your healthcare provider may tell you not to take any medicine by mouth for pain or sleep in the next 4 hours. These medicines may react with the medicines you were given in the hospital. This could cause a much stronger response than usual.  Follow-up care  Follow up with your healthcare provider if you are not alert and back to your usual level of activity within 12 hours.  When to seek medical advice  Call your healthcare  provider right away if any of these occur:  · Drowsiness gets worse  · Weakness or dizziness gets worse  · Repeated vomiting  · You can't be awakened   Date Last Reviewed: 10/18/2016  © 1338-7548 [employer]. [address] [place] [address], [place] [address]. All rights reserved. This information is not intended as a substitute for professional medical care. Always follow your healthcare professional's instructions.

## 2018-06-18 NOTE — H&P (VIEW-ONLY)
Subjective:       Patient ID: Akiko Jameson is a 43 y.o. female.    Chief Complaint: Consult (lipoma/left side back )      HPI  43-year-old female presents with a several year history of a left flank mass.  She thinks this has slowly gotten bigger over the years.  Has never been infected.  She had an ultrasound in 2016 which showed evidence of a 1-2 cm lipoma in the area. The nodule is not very distinct at this time although there is a palpable mass there.  She would like to have it removed as it is painful and getting larger.    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    Eosinophilia     mild    GERD (gastroesophageal reflux disease)     Granular cell tumor     Hypertension     Lymphedema of upper extremity following lymphadenectomy 12/6/2011    Mild anemia     Sleep apnea     Thyroid nodule     Urinary incontinence, mixed      Past Surgical History:   Procedure Laterality Date    AXILLARY NODE DISSECTION      granular cell tumor    back injections      BREAST CYST ASPIRATION      CHOLECYSTECTOMY      ENDOMETRIAL ABLATION      FINE NEEDLE ASPIRATION      thyroid    NASAL SEPTUM SURGERY      SINUS SURGERY      TUBAL LIGATION           Current Outpatient Prescriptions:     diltiaZEM (CARDIZEM CD) 120 MG Cp24, Take 1 capsule (120 mg total) by mouth once daily., Disp: 90 capsule, Rfl: 3    docusate sodium (COLACE) 100 MG capsule, Take 1 capsule (100 mg total) by mouth 2 (two) times daily., Disp: 14 capsule, Rfl: 0    meloxicam (MOBIC) 15 MG tablet, Take 1 tablet (15 mg total) by mouth once daily., Disp: 30 tablet, Rfl: 1    montelukast (SINGULAIR) 10 mg tablet, Take 1 tablet (10 mg total) by mouth nightly., Disp: 90 tablet, Rfl: 3    multivitamin (THERAGRAN) per tablet, Every day, Disp: , Rfl:     potassium chloride SA (K-DUR,KLOR-CON) 20 MEQ tablet, Take 1  tablet (20 mEq total) by mouth 2 (two) times daily. Appointment needed for further refills after this one, Disp: 60 tablet, Rfl: 11    ranitidine (ZANTAC) 150 MG tablet, Take 150 mg by mouth 2 (two) times daily. , Disp: , Rfl:     triamterene-hydrochlorothiazide 37.5-25 mg (MAXZIDE-25) 37.5-25 mg per tablet, Take 1 tablet by mouth once daily., Disp: 90 tablet, Rfl: 3    polyethylene glycol (GLYCOLAX) 17 gram/dose powder, Take 17 g by mouth once daily., Disp: 1 Bottle, Rfl: 0    Review of patient's allergies indicates:   Allergen Reactions    Prevacid  [lansoprazole] Swelling     Lip swelling    Ace inhibitors     Benazepril Other (See Comments)       Family History   Problem Relation Age of Onset    Diabetes Mother     Kidney failure Mother     Breast cancer Maternal Grandmother     Breast cancer Maternal Aunt     Cirrhosis Neg Hx      Social History     Social History    Marital status:      Spouse name: N/A    Number of children: 2    Years of education: N/A     Occupational History     [employer]     Social History Main Topics    Smoking status: Never Smoker    Smokeless tobacco: Never Used    Alcohol use No    Drug use: No    Sexual activity: Not on file     Other Topics Concern    Not on file     Social History Narrative    No narrative on file       Review of Systems   Constitutional: Negative for chills, fever and unexpected weight change.   HENT: Negative for congestion, hearing loss, mouth sores and sore throat.    Eyes: Negative for visual disturbance.   Respiratory: Negative for cough, shortness of breath and wheezing.    Cardiovascular: Negative for chest pain and palpitations.   Gastrointestinal: Negative for abdominal distention, abdominal pain, blood in stool, diarrhea, nausea and vomiting.   Genitourinary: Negative for dysuria, frequency and hematuria.   Musculoskeletal: Negative for arthralgias, joint swelling and neck pain.   Skin: Negative for rash and wound.        See  HPI   Neurological: Negative for dizziness, syncope, weakness and headaches.   Hematological: Does not bruise/bleed easily.   Psychiatric/Behavioral: Negative for agitation, behavioral problems and dysphoric mood.     Objective:     Physical Exam   Constitutional: She is oriented to person, place, and time. She appears well-developed and well-nourished. No distress.   HENT:   Head: Normocephalic and atraumatic.   Mouth/Throat: Oropharynx is clear and moist. No oropharyngeal exudate.   Eyes: Conjunctivae and EOM are normal. Pupils are equal, round, and reactive to light. No scleral icterus.   Neck: Normal range of motion. No thyromegaly present.   Cardiovascular: Normal rate and regular rhythm.    No murmur heard.  Pulmonary/Chest: Effort normal and breath sounds normal. She has no wheezes. She has no rales.   Abdominal: Soft. Bowel sounds are normal. She exhibits mass. She exhibits no distension. There is no tenderness. No hernia.   At the left flank there is a palpable mass.  The borders are not distinct.  It is slightly tender.  There is no evidence of infection.   Musculoskeletal: Normal range of motion. She exhibits no edema.   Lymphadenopathy:     She has no cervical adenopathy.   Neurological: She is alert and oriented to person, place, and time. No cranial nerve deficit.   Skin: Skin is warm and dry. No rash noted. No erythema.   Psychiatric: She has a normal mood and affect. Her behavior is normal.     Narrative     Ultrasound of the left lateral abdominal wall in area of reported lump was performed.  There is 1.5 x 0.6 x 1.5 cm echogenic area suggesting fat and could be prominent fat accumulation or lipoma.  There is no focal fluid collection suggesting abscess.   Impression         Echogenic areas suggesting fat in the area of reported palpable finding           Assessment:     Encounter Diagnosis   Name Primary?    Lipoma of abdominal wall Yes       Plan:      1.  Plan excision of this left flank  palpable mass.  2. Risks and benefits of the planned procedure were discussed at length with the patient.  Risks and benefits of not proceeding with the procedure were discussed as well. All questions were answered. The patient expressed clear understanding and would like to proceed with the procedure as discussed.

## 2018-06-19 ENCOUNTER — TELEPHONE (OUTPATIENT)
Dept: SURGERY | Facility: CLINIC | Age: 43
End: 2018-06-19

## 2018-06-19 VITALS
BODY MASS INDEX: 44.83 KG/M2 | DIASTOLIC BLOOD PRESSURE: 77 MMHG | TEMPERATURE: 97 F | RESPIRATION RATE: 15 BRPM | SYSTOLIC BLOOD PRESSURE: 168 MMHG | OXYGEN SATURATION: 98 % | HEART RATE: 74 BPM | HEIGHT: 63 IN | WEIGHT: 253 LBS

## 2018-06-19 LAB — HUMAN PAPILLOMAVIRUS (HPV): NORMAL

## 2018-06-19 NOTE — TELEPHONE ENCOUNTER
----- Message from Ramonita Johnson sent at 6/19/2018  8:18 AM CDT -----  Type:  Sooner Apoointment Request    Caller is requesting a sooner appointment.  Caller declined first available appointment listed below.  Caller will not accept being placed on the waitlist and is requesting a message be sent to doctor.    Name of Caller:  michael shah  When is the first available appointment?  7/16/18  Symptoms:  1 week post op  Best Call Back Number:  [phone]    Additional Information:  Na

## 2018-06-19 NOTE — TELEPHONE ENCOUNTER
Advised that Dr Tate will be out of the office for 3 weeks, returning July 16.  I can schedule her post op visit with his partner Dr Patterson.  Patient agrees and is scheduled on Thursday, June 28, 2018 at 830 am.  Requesting a return to work note to return on Thursday, June 21, 2018.  I will get the note tomorrow and send it to her via e mail.

## 2018-06-28 ENCOUNTER — OFFICE VISIT (OUTPATIENT)
Dept: SURGERY | Facility: CLINIC | Age: 43
End: 2018-06-28
Payer: COMMERCIAL

## 2018-06-28 VITALS — TEMPERATURE: 98 F | WEIGHT: 249.13 LBS | BODY MASS INDEX: 44.13 KG/M2

## 2018-06-28 DIAGNOSIS — Z09 POSTOP CHECK: Primary | ICD-10-CM

## 2018-06-28 PROBLEM — D17.1 LIPOMA OF ABDOMINAL WALL: Status: RESOLVED | Noted: 2018-06-18 | Resolved: 2018-06-28

## 2018-06-28 PROCEDURE — 99024 POSTOP FOLLOW-UP VISIT: CPT | Mod: S$GLB,,, | Performed by: SURGERY

## 2018-06-28 PROCEDURE — 99999 PR PBB SHADOW E&M-EST. PATIENT-LVL II: CPT | Mod: PBBFAC,,, | Performed by: SURGERY

## 2018-06-28 NOTE — PROGRESS NOTES
Cc: post op    HPI: 43 y.o.  female  1.5  weeks s/p excision of lesion from her L flank by Dr Tate.   Pt feeling well.  Having some soreness at incision site    PE: AFVSS    AAOx3  CTA  Soft/NT/nd  Inc: c/d/i    Induration and swelling noted beneath the incision.     Path: pendin    A/P:   Pt doing well post surgery.   F/U with me prn  Did recommend f/u wit Dr Tate in next week or two for pathology confirmation

## 2018-07-05 ENCOUNTER — ANESTHESIA EVENT (OUTPATIENT)
Dept: ENDOSCOPY | Facility: HOSPITAL | Age: 43
End: 2018-07-05
Payer: COMMERCIAL

## 2018-07-05 ENCOUNTER — ANESTHESIA (OUTPATIENT)
Dept: ENDOSCOPY | Facility: HOSPITAL | Age: 43
End: 2018-07-05
Payer: COMMERCIAL

## 2018-07-05 ENCOUNTER — PATIENT MESSAGE (OUTPATIENT)
Dept: SURGERY | Facility: CLINIC | Age: 43
End: 2018-07-05

## 2018-07-05 ENCOUNTER — SURGERY (OUTPATIENT)
Age: 43
End: 2018-07-05

## 2018-07-05 ENCOUNTER — HOSPITAL ENCOUNTER (OUTPATIENT)
Facility: HOSPITAL | Age: 43
Discharge: HOME | End: 2018-07-05
Attending: INTERNAL MEDICINE | Admitting: INTERNAL MEDICINE
Payer: COMMERCIAL

## 2018-07-05 VITALS
WEIGHT: 254 LBS | SYSTOLIC BLOOD PRESSURE: 129 MMHG | DIASTOLIC BLOOD PRESSURE: 83 MMHG | HEIGHT: 63 IN | RESPIRATION RATE: 18 BRPM | HEART RATE: 72 BPM | OXYGEN SATURATION: 98 % | BODY MASS INDEX: 45 KG/M2 | TEMPERATURE: 98 F

## 2018-07-05 DIAGNOSIS — R10.13 EPIGASTRIC PAIN: ICD-10-CM

## 2018-07-05 LAB
B-HCG UR QL: NEGATIVE
CTP QC/QA: YES
H PYLORI INDEX VALUE: POSITIVE

## 2018-07-05 PROCEDURE — 37000009 HC ANESTHESIA EA ADD 15 MINS: Mod: PO | Performed by: INTERNAL MEDICINE

## 2018-07-05 PROCEDURE — D9220A PRA ANESTHESIA: Mod: CRNA,,, | Performed by: NURSE ANESTHETIST, CERTIFIED REGISTERED

## 2018-07-05 PROCEDURE — 87449 NOS EACH ORGANISM AG IA: CPT | Mod: PO | Performed by: INTERNAL MEDICINE

## 2018-07-05 PROCEDURE — 88305 TISSUE EXAM BY PATHOLOGIST: CPT | Performed by: PATHOLOGY

## 2018-07-05 PROCEDURE — 88342 IMHCHEM/IMCYTCHM 1ST ANTB: CPT | Mod: 26,,, | Performed by: PATHOLOGY

## 2018-07-05 PROCEDURE — 88305 TISSUE EXAM BY PATHOLOGIST: CPT | Mod: 26,,, | Performed by: PATHOLOGY

## 2018-07-05 PROCEDURE — 81025 URINE PREGNANCY TEST: CPT | Mod: PO | Performed by: INTERNAL MEDICINE

## 2018-07-05 PROCEDURE — 37000008 HC ANESTHESIA 1ST 15 MINUTES: Mod: PO | Performed by: INTERNAL MEDICINE

## 2018-07-05 PROCEDURE — 25000003 PHARM REV CODE 250: Mod: PO | Performed by: INTERNAL MEDICINE

## 2018-07-05 PROCEDURE — 43239 EGD BIOPSY SINGLE/MULTIPLE: CPT | Mod: ,,, | Performed by: INTERNAL MEDICINE

## 2018-07-05 PROCEDURE — 25000003 PHARM REV CODE 250: Mod: PO | Performed by: NURSE ANESTHETIST, CERTIFIED REGISTERED

## 2018-07-05 PROCEDURE — 27201012 HC FORCEPS, HOT/COLD, DISP: Mod: PO | Performed by: INTERNAL MEDICINE

## 2018-07-05 PROCEDURE — 43239 EGD BIOPSY SINGLE/MULTIPLE: CPT | Mod: PO | Performed by: INTERNAL MEDICINE

## 2018-07-05 PROCEDURE — 88342 IMHCHEM/IMCYTCHM 1ST ANTB: CPT | Performed by: PATHOLOGY

## 2018-07-05 PROCEDURE — 63600175 PHARM REV CODE 636 W HCPCS: Mod: PO | Performed by: NURSE ANESTHETIST, CERTIFIED REGISTERED

## 2018-07-05 PROCEDURE — D9220A PRA ANESTHESIA: Mod: ANES,,, | Performed by: ANESTHESIOLOGY

## 2018-07-05 RX ORDER — GLYCOPYRROLATE 0.2 MG/ML
INJECTION INTRAMUSCULAR; INTRAVENOUS
Status: DISCONTINUED | OUTPATIENT
Start: 2018-07-05 | End: 2018-07-05

## 2018-07-05 RX ORDER — LIDOCAINE HYDROCHLORIDE 10 MG/ML
1 INJECTION, SOLUTION EPIDURAL; INFILTRATION; INTRACAUDAL; PERINEURAL ONCE
Status: COMPLETED | OUTPATIENT
Start: 2018-07-05 | End: 2018-07-05

## 2018-07-05 RX ORDER — PROPOFOL 10 MG/ML
VIAL (ML) INTRAVENOUS
Status: DISCONTINUED | OUTPATIENT
Start: 2018-07-05 | End: 2018-07-05

## 2018-07-05 RX ORDER — SODIUM CHLORIDE, SODIUM LACTATE, POTASSIUM CHLORIDE, CALCIUM CHLORIDE 600; 310; 30; 20 MG/100ML; MG/100ML; MG/100ML; MG/100ML
INJECTION, SOLUTION INTRAVENOUS CONTINUOUS
Status: DISCONTINUED | OUTPATIENT
Start: 2018-07-06 | End: 2018-07-05 | Stop reason: HOSPADM

## 2018-07-05 RX ORDER — LIDOCAINE HCL/PF 100 MG/5ML
SYRINGE (ML) INTRAVENOUS
Status: DISCONTINUED | OUTPATIENT
Start: 2018-07-05 | End: 2018-07-05

## 2018-07-05 RX ADMIN — PROPOFOL 30 MG: 10 INJECTION, EMULSION INTRAVENOUS at 08:07

## 2018-07-05 RX ADMIN — PROPOFOL 20 MG: 10 INJECTION, EMULSION INTRAVENOUS at 08:07

## 2018-07-05 RX ADMIN — PROPOFOL 100 MG: 10 INJECTION, EMULSION INTRAVENOUS at 08:07

## 2018-07-05 RX ADMIN — LIDOCAINE HYDROCHLORIDE: 10 INJECTION, SOLUTION EPIDURAL; INFILTRATION; INTRACAUDAL; PERINEURAL at 07:07

## 2018-07-05 RX ADMIN — SODIUM CHLORIDE, SODIUM LACTATE, POTASSIUM CHLORIDE, AND CALCIUM CHLORIDE: .6; .31; .03; .02 INJECTION, SOLUTION INTRAVENOUS at 08:07

## 2018-07-05 RX ADMIN — LIDOCAINE HYDROCHLORIDE 100 MG: 20 INJECTION, SOLUTION INTRAVENOUS at 08:07

## 2018-07-05 RX ADMIN — GLYCOPYRROLATE 0.2 MG: 0.2 INJECTION, SOLUTION INTRAMUSCULAR; INTRAVENOUS at 08:07

## 2018-07-05 NOTE — TRANSFER OF CARE
"Anesthesia Transfer of Care Note    Patient: Akiko Jameson    Procedure(s) Performed: Procedure(s) (LRB):  EGD (ESOPHAGOGASTRODUODENOSCOPY) (N/A)    Patient location: PACU    Anesthesia Type: general    Transport from OR: Transported from OR on room air with adequate spontaneous ventilation    Post pain: adequate analgesia    Post assessment: no apparent anesthetic complications and tolerated procedure well    Post vital signs: stable    Level of consciousness: awake, alert and oriented    Nausea/Vomiting: no nausea/vomiting    Complications: none    Transfer of care protocol was followed      Last vitals:   Visit Vitals  /67 (BP Location: Right arm, Patient Position: Lying)   Pulse 68   Temp 36.4 °C (97.5 °F) (Skin)   Resp 18   Ht 5' 3" (1.6 m)   Wt 115.2 kg (254 lb)   SpO2 98%   Breastfeeding? No   BMI 44.99 kg/m²     "

## 2018-07-05 NOTE — [employer] PATIENT INSTRUCTIONS
Discharge Summary/Instructions after an Endoscopic Procedure  Patient Name: Akiko Jameson  Patient MRN: [id number]  Patient YOB: 1975 Thursday, July 05, 2018  Edgar Gamble MD  RESTRICTIONS:  During your procedure today, you received medications for sedation.  These   medications may affect your judgment, balance and coordination.  Therefore,   for 24 hours, you have the following restrictions:   - DO NOT drive a car, operate machinery, make legal/financial decisions,   sign important papers or drink alcohol.    ACTIVITY:  Today: no heavy lifting, straining or running due to procedural   sedation/anesthesia.  The following day: return to full activity including work.  DIET:  Eat and drink normally unless instructed otherwise.     TREATMENT FOR COMMON SIDE EFFECTS:  - Mild abdominal pain, nausea, belching, bloating or excessive gas:  rest,   eat lightly and use a heating pad.  - Sore Throat: treat with throat lozenges and/or gargle with warm salt   water.  - Because air was used during the procedure, expelling large amounts of air   from your rectum or belching is normal.  - If a bowel prep was taken, you may not have a bowel movement for 1-3 days.    This is normal.  SYMPTOMS TO WATCH FOR AND REPORT TO YOUR PHYSICIAN:  1. Abdominal pain or bloating, other than gas cramps.  2. Chest pain.  3. Back pain.  4. Signs of infection such as: chills or fever occurring within 24 hours   after the procedure.  5. Rectal bleeding, which would show as bright red, maroon, or black stools.   (A tablespoon of blood from the rectum is not serious, especially if   hemorrhoids are present.)  6. Vomiting.  7. Weakness or dizziness.  GO DIRECTLY TO THE NEAREST EMERGENCY ROOM IF YOU HAVE ANY OF THE FOLLOWING:      Difficulty breathing              Chills and/or fever over 101 F   Persistent vomiting and/or vomiting blood   Severe abdominal pain   Severe chest pain   Black, tarry stools   Bleeding- more than one  tablespoon   Any other symptom or condition that you feel may need urgent attention  Your doctor recommends these additional instructions:  If any biopsies were taken, your doctors clinic will contact you in 1 to 2   weeks with any results.  - Discharge patient to home.   - Await pathology and CLOtest results.   - Follow an antireflux regimen.   - Continue present medications.   - Use Zantac (ranitidine) 300 mg PO BID.   - Call the G.I. clinic in 2 weeks for reports (if you haven't heard from us   sooner)  [phone].  - Return to GI clinic in 6-8 weeks.   - If no improvement, next perform a modified barium swallow/esophagram.  For questions, problems or results please call your physician - Edgar Gamble MD at Work:  [phone].  EMERGENCY PHONE NUMBER: [phone], LAB RESULTS: [phone]  IF A COMPLICATION OR EMERGENCY SITUATION ARISES AND YOU ARE UNABLE TO REACH   YOUR PHYSICIAN - GO DIRECTLY TO THE EMERGENCY ROOM.  ___________________________________________  Nurse Signature  ___________________________________________  Patient/Designated Responsible Party Signature  Edgar Gamble MD  7/5/2018 9:01:59 AM  This report has been verified and signed electronically.  [employer]

## 2018-07-05 NOTE — DISCHARGE INSTRUCTIONS
Recovery After Procedural Sedation (Adult)  You have been given medicine by vein to make you sleep during your surgery. This may have included both a pain medicine and sleeping medicine. Most of the effects have worn off. But you may still have some drowsiness for the next 6 to 8 hours.  Home care  Follow these guidelines when you get home:  · For the next 8 hours, you should be watched by a responsible adult. This person should make sure your condition is not getting worse.  · Don't drink any alcohol for the next 24 hours.  · Don't drive, operate dangerous machinery, or make important business or personal decisions during the next 24 hours.  Note: Your healthcare provider may tell you not to take any medicine by mouth for pain or sleep in the next 4 hours. These medicines may react with the medicines you were given in the hospital. This could cause a much stronger response than usual.  Follow-up care  Follow up with your healthcare provider if you are not alert and back to your usual level of activity within 12 hours.  When to seek medical advice  Call your healthcare provider right away if any of these occur:  · Drowsiness gets worse  · Weakness or dizziness gets worse  · Repeated vomiting  · You can't be awakened   Date Last Reviewed: 10/18/2016  © 4016-0545 [employer]. [address] [place] [address], [place] [address]. All rights reserved. This information is not intended as a substitute for professional medical care. Always follow your healthcare professional's instructions.        Tips to Control Acid Reflux    To control acid reflux, youll need to make some basic diet and lifestyle changes. The simple steps outlined below may be all youll need to ease discomfort.  Watch what you eat  · Avoid fatty foods and spicy foods.  · Eat fewer acidic foods, such as citrus and tomato-based foods. These can increase symptoms.  · Limit drinking alcohol, caffeine, and fizzy beverages. All increase acid  reflux.  · Try limiting chocolate, peppermint, and spearmint. These can worsen acid reflux in some people.  Watch when you eat  · Avoid lying down for 3 hours after eating.  · Do not snack before going to bed.  Raise your head  Raising your head and upper body by 4 to 6 inches helps limit reflux when youre lying down. Put blocks under the head of your bed frame to raise it.  Other changes  · Lose weight, if you need to  · Dont exercise near bedtime  · Avoid tight-fitting clothes  · Limit aspirin and ibuprofen  · Stop smoking   Date Last Reviewed: 7/1/2016  © 2385-6555 [employer]. [address] [place] [address], [place] [address]. All rights reserved. This information is not intended as a substitute for professional medical care. Always follow your healthcare professional's instructions.

## 2018-07-05 NOTE — ANESTHESIA POSTPROCEDURE EVALUATION
"Anesthesia Post Evaluation    Patient: Akiko Jameson    Procedure(s) Performed: Procedure(s) (LRB):  EGD (ESOPHAGOGASTRODUODENOSCOPY) (N/A)    Final Anesthesia Type: general  Patient location during evaluation: PACU  Patient participation: Yes- Able to Participate  Level of consciousness: awake and alert  Post-procedure vital signs: reviewed and stable  Pain management: adequate  Airway patency: patent  PONV status at discharge: No PONV  Anesthetic complications: no      Cardiovascular status: blood pressure returned to baseline  Respiratory status: unassisted  Hydration status: euvolemic  Follow-up not needed.        Visit Vitals  /83 (BP Location: Left arm, Patient Position: Lying)   Pulse 72   Temp 36.5 °C (97.7 °F) (Skin)   Resp 18   Ht 5' 3" (1.6 m)   Wt 115.2 kg (254 lb)   SpO2 98%   Breastfeeding? No   BMI 44.99 kg/m²       Pain/Cele Score: Pain Assessment Performed: Yes (7/5/2018  8:41 AM)  Presence of Pain: denies (7/5/2018  8:41 AM)  Cele Score: 9 (7/5/2018  8:41 AM)      "

## 2018-07-05 NOTE — ANESTHESIA PREPROCEDURE EVALUATION
07/05/2018  Akiko Jameson is a 43 y.o., female.    Pre-op Assessment    I have reviewed the Patient Summary Reports.     I have reviewed the Nursing Notes.   I have reviewed the Medications.     Review of Systems  Anesthesia Hx:  No problems with previous Anesthesia  Denies Family Hx of Anesthesia complications.   Denies Personal Hx of Anesthesia complications.   Social:  Non-Smoker, No Alcohol Use    Cardiovascular:   Hypertension    Pulmonary:   Sleep Apnea, CPAP    Renal/:  Renal/ Normal     Hepatic/GI:   GERD, poorly controlled    Neurological:  Neurology Normal    Endocrine:  Endocrine Normal        Physical Exam  General:  Morbid Obesity    Airway/Jaw/Neck:  Airway Findings: Mouth Opening: Normal Tongue: Normal  General Airway Assessment: Adult, Average  Mallampati: III  Jaw/Neck Findings:  Neck ROM: Normal ROM       Chest/Lungs:  Chest/Lungs Findings: Clear to auscultation, Normal Respiratory Rate     Heart/Vascular:  Heart Findings: Rate: Normal  Rhythm: Regular Rhythm  Sounds: Normal  Heart murmur: negative       Mental Status:  Mental Status Findings:  Cooperative, Alert and Oriented         Anesthesia Plan  Type of Anesthesia, risks & benefits discussed:  Anesthesia Type:  general  Patient's Preference:   Intra-op Monitoring Plan:   Intra-op Monitoring Plan Comments:   Post Op Pain Control Plan:   Post Op Pain Control Plan Comments:   Induction:   IV  Beta Blocker:  Patient is not currently on a Beta-Blocker (No further documentation required).       Informed Consent: Patient understands risks and agrees with Anesthesia plan.  Questions answered. Anesthesia consent signed with patient.  ASA Score: 3     Day of Surgery Review of History & Physical:    H&P update referred to the provider.         Ready For Surgery From Anesthesia Perspective.

## 2018-07-05 NOTE — BRIEF OP NOTE
Discharge Note  Short Stay      SUMMARY     Admit Date: 7/5/2018    Attending Physician: Edgar Gamble Jr., MD     Discharge Physician: Edgar Gamble Jr., MD    Discharge Date: 7/5/2018 9:05 AM    Final Diagnosis: Heartburn [R12]  Epigastric abdominal pain [R10.13]  H. pylori infection [A04.8]    Impression:          - Normal oropharynx.                       - GERD, as evident by free flow of gastric contents                        into the esophagus.                       - Normal esophagus.                       - Z-line regular, 35 cm from the incisors.                       - Patulous lower esophageal sphincter.                       - Small hiatal hernia.                       - Minimal antritis. Biopsied.                       - Normal stomach otherwise.                       - Normal examined duodenum.  Recommendation:      - Discharge patient to home.                       - Await pathology and CLOtest results.                       - Follow an antireflux regimen.                       - Continue present medications.                       - Use Zantac (ranitidine) 300 mg PO BID.                       - Call the G.I. clinic in 2 weeks for reports (if                        you haven't heard from us sooner) [phone].                       - Return to GI clinic in 6-8 weeks.                       - If no improvement, next perform a modified barium                        swallow/esophagram.  Edgar Gamble MD  7/5/2018   Disposition: HOME OR SELF CARE    Patient Instructions:   Current Discharge Medication List      CONTINUE these medications which have CHANGED    Details   ranitidine (ZANTAC) 300 MG tablet Take 1 tablet (300 mg total) by mouth 2 (two) times daily. , ac BF & about 1 hr ac HS.  Qty: 120 tablet, Refills: 5         CONTINUE these medications which have NOT CHANGED    Details   diltiaZEM (CARDIZEM CD) 120 MG Cp24 Take 1 capsule (120 mg total) by mouth once daily.  Qty: 90 capsule,  Refills: 3      montelukast (SINGULAIR) 10 mg tablet Take 1 tablet (10 mg total) by mouth nightly.  Qty: 90 tablet, Refills: 3      multivitamin (THERAGRAN) per tablet Every day      polyethylene glycol (GLYCOLAX) 17 gram/dose powder Take 17 g by mouth once daily.      potassium chloride SA (K-DUR,KLOR-CON) 20 MEQ tablet Take 1 tablet (20 mEq total) by mouth 2 (two) times daily. Appointment needed for further refills after this one  Qty: 60 tablet, Refills: 11    Associated Diagnoses: Hypokalemia      triamterene-hydrochlorothiazide 37.5-25 mg (MAXZIDE-25) 37.5-25 mg per tablet Take 1 tablet by mouth once daily.  Qty: 90 tablet, Refills: 3             Discharge Procedure Orders (must include Diet, Follow-up, Activity)    Follow Up:  Follow up with PCP as per your routine.  Please follow an anti reflux diet.  Activity as tolerated.    No driving day of procedure.

## 2018-07-06 ENCOUNTER — TELEPHONE (OUTPATIENT)
Dept: GASTROENTEROLOGY | Facility: CLINIC | Age: 43
End: 2018-07-06

## 2018-07-12 ENCOUNTER — OFFICE VISIT (OUTPATIENT)
Dept: HEMATOLOGY/ONCOLOGY | Facility: CLINIC | Age: 43
End: 2018-07-12
Payer: COMMERCIAL

## 2018-07-12 ENCOUNTER — LAB VISIT (OUTPATIENT)
Dept: LAB | Facility: HOSPITAL | Age: 43
End: 2018-07-12
Attending: NURSE PRACTITIONER
Payer: COMMERCIAL

## 2018-07-12 ENCOUNTER — TELEPHONE (OUTPATIENT)
Dept: GASTROENTEROLOGY | Facility: CLINIC | Age: 43
End: 2018-07-12

## 2018-07-12 ENCOUNTER — TELEPHONE (OUTPATIENT)
Dept: ENDOCRINOLOGY | Facility: CLINIC | Age: 43
End: 2018-07-12

## 2018-07-12 VITALS
BODY MASS INDEX: 44.57 KG/M2 | HEIGHT: 63 IN | DIASTOLIC BLOOD PRESSURE: 82 MMHG | WEIGHT: 251.56 LBS | HEART RATE: 83 BPM | RESPIRATION RATE: 18 BRPM | TEMPERATURE: 98 F | SYSTOLIC BLOOD PRESSURE: 143 MMHG

## 2018-07-12 DIAGNOSIS — D72.10 EOSINOPHILIA: ICD-10-CM

## 2018-07-12 DIAGNOSIS — D63.8 CHRONIC DISEASE ANEMIA: Primary | ICD-10-CM

## 2018-07-12 LAB
ALBUMIN SERPL BCP-MCNC: 4.2 G/DL
ALP SERPL-CCNC: 94 U/L
ALT SERPL W/O P-5'-P-CCNC: 40 U/L
ANION GAP SERPL CALC-SCNC: 12 MMOL/L
AST SERPL-CCNC: 36 U/L
BASOPHILS # BLD AUTO: 0.04 K/UL
BASOPHILS NFR BLD: 0.7 %
BILIRUB SERPL-MCNC: 0.5 MG/DL
BUN SERPL-MCNC: 14 MG/DL
CALCIUM SERPL-MCNC: 9.2 MG/DL
CHLORIDE SERPL-SCNC: 99 MMOL/L
CO2 SERPL-SCNC: 29 MMOL/L
CREAT SERPL-MCNC: 0.84 MG/DL
DIFFERENTIAL METHOD: ABNORMAL
EOSINOPHIL # BLD AUTO: 0.6 K/UL
EOSINOPHIL NFR BLD: 9.6 %
ERYTHROCYTE [DISTWIDTH] IN BLOOD BY AUTOMATED COUNT: 14.9 %
EST. GFR  (AFRICAN AMERICAN): >60 ML/MIN/1.73 M^2
EST. GFR  (NON AFRICAN AMERICAN): >60 ML/MIN/1.73 M^2
GLUCOSE SERPL-MCNC: 110 MG/DL
HCT VFR BLD AUTO: 33.9 %
HGB BLD-MCNC: 10.8 G/DL
LYMPHOCYTES # BLD AUTO: 2.6 K/UL
LYMPHOCYTES NFR BLD: 42.4 %
MCH RBC QN AUTO: 25.5 PG
MCHC RBC AUTO-ENTMCNC: 31.9 G/DL
MCV RBC AUTO: 80 FL
MONOCYTES # BLD AUTO: 0.6 K/UL
MONOCYTES NFR BLD: 9.4 %
NEUTROPHILS # BLD AUTO: 2.3 K/UL
NEUTROPHILS NFR BLD: 37.9 %
NRBC BLD-RTO: 0 /100 WBC
PLATELET # BLD AUTO: 285 K/UL
PMV BLD AUTO: 10.8 FL
POTASSIUM SERPL-SCNC: 3.5 MMOL/L
PROT SERPL-MCNC: 8.2 G/DL
RBC # BLD AUTO: 4.24 M/UL
SODIUM SERPL-SCNC: 140 MMOL/L
WBC # BLD AUTO: 6.15 K/UL

## 2018-07-12 PROCEDURE — 3077F SYST BP >= 140 MM HG: CPT | Mod: CPTII,S$GLB,, | Performed by: NURSE PRACTITIONER

## 2018-07-12 PROCEDURE — 80053 COMPREHEN METABOLIC PANEL: CPT | Mod: PN

## 2018-07-12 PROCEDURE — 99213 OFFICE O/P EST LOW 20 MIN: CPT | Mod: S$GLB,,, | Performed by: NURSE PRACTITIONER

## 2018-07-12 PROCEDURE — 85025 COMPLETE CBC W/AUTO DIFF WBC: CPT | Mod: PN

## 2018-07-12 PROCEDURE — 99999 PR PBB SHADOW E&M-EST. PATIENT-LVL III: CPT | Mod: PBBFAC,,, | Performed by: NURSE PRACTITIONER

## 2018-07-12 PROCEDURE — 80053 COMPREHEN METABOLIC PANEL: CPT

## 2018-07-12 PROCEDURE — 85025 COMPLETE CBC W/AUTO DIFF WBC: CPT

## 2018-07-12 PROCEDURE — 36415 COLL VENOUS BLD VENIPUNCTURE: CPT | Mod: PN

## 2018-07-12 PROCEDURE — 3008F BODY MASS INDEX DOCD: CPT | Mod: CPTII,S$GLB,, | Performed by: NURSE PRACTITIONER

## 2018-07-12 PROCEDURE — 3079F DIAST BP 80-89 MM HG: CPT | Mod: CPTII,S$GLB,, | Performed by: NURSE PRACTITIONER

## 2018-07-12 NOTE — TELEPHONE ENCOUNTER
----- Message from Sasha Balbuena sent at 7/11/2018 10:11 AM CDT -----  Contact: pt  Pt would like to be called back regarding scheduling an appt after 9/03/2018 want a morning appt for thyroid f/up    Pt can be reached at [phone]

## 2018-07-12 NOTE — PROGRESS NOTES
HISTORY OF PRESENT ILLNESS:  This is a 43-year-old black female known to   Dr. Caicedo for anemia of chronic disease as well as idiopathic eosinophilia.  The   patient did undergo a unilateral bone marrow aspiration/biopsy in 2007, which   did not reveal any clonal population of cells on flow and no evidence of   dysplasia within the marrow.  She is surveilled annually and presents to the   clinic today for evaluation.  She reports remaining active and well.  She denies   any recent illness or difficulties with allergies.  She denies any fevers, chills,   drenching night sweats, painful lymphadenopathy, unexplained weight loss,   weakness, fatigue, chest pain, skin rashes, pruritus, etc. No other new complaints  or pertinent findings on a 14-point review of systems.    PHYSICAL EXAMINATION:  GENERAL:  Well-developed, well-nourished, obese black female in no acute   distress.  Alert and oriented x3.  VITAL SIGNS:  Weight:  Loss of 7 pounds in 1 year  Wt Readings from Last 3 Encounters:   07/12/18 114.1 kg (251 lb 8.7 oz)   07/03/18 115.2 kg (254 lb)   06/28/18 113 kg (249 lb 1.9 oz)     Temp Readings from Last 3 Encounters:   07/12/18 98.1 °F (36.7 °C)   07/05/18 97.7 °F (36.5 °C) (Skin)   06/28/18 98.1 °F (36.7 °C)     BP Readings from Last 3 Encounters:   07/12/18 (!) 143/82   07/05/18 129/83   06/18/18 (!) 168/77     Pulse Readings from Last 3 Encounters:   07/12/18 83   07/05/18 72   06/18/18 74     HEENT:  Normocephalic, atraumatic.  Oral mucosa pink and moist.  Lips without   lesions.  Tongue midline.  Oropharynx clear.  Nonicteric sclerae.   NECK:  Supple, no adenopathy.  No carotid bruits, thyromegaly or thyroid nodule.  HEART:  Regular rate and rhythm without murmur, gallop or rub.                LUNGS:  Clear to auscultation bilaterally.  Normal respiratory effort.       ABDOMEN:  Soft, nontender, nondistended with positive normoactive bowel sounds.  EXTREMITIES:  1+ pretibial to ankle edema bilateral.  No  cyanosis or clubbing.    Distal pulses are intact.  AXILLAE AND GROIN:  No palpable pathologic lymphadenopathy is appreciated.        SKIN:  Intact/turgor normal                                                       NEUROLOGIC:  Cranial nerves II-XII grossly intact.  Motor:  Good muscle bulk and   tone.  Strength/sensory 5/5 throughout.  Gait stable.     LABORATORY:    Lab Results   Component Value Date    WBC 6.15 07/12/2018    HGB 10.8 (L) 07/12/2018    HCT 33.9 (L) 07/12/2018    MCV 80 (L) 07/12/2018     07/12/2018     37.9% grans,   Eosinophils 9.6 (8.7, 11.2, 9.1)    CMP  Sodium   Date Value Ref Range Status   07/12/2018 140 136 - 145 mmol/L Final     Potassium   Date Value Ref Range Status   07/12/2018 3.5 3.5 - 5.1 mmol/L Final     Chloride   Date Value Ref Range Status   07/12/2018 99 95 - 110 mmol/L Final     CO2   Date Value Ref Range Status   07/12/2018 29 22 - 31 mmol/L Final     Glucose   Date Value Ref Range Status   07/12/2018 110 70 - 110 mg/dL Final     Comment:     The ADA recommends the following guidelines for fasting glucose:  Normal:       less than 100 mg/dL  Prediabetes:  100 mg/dL to 125 mg/dL  Diabetes:     126 mg/dL or higher       BUN, Bld   Date Value Ref Range Status   07/12/2018 14 7 - 18 mg/dL Final     Creatinine   Date Value Ref Range Status   07/12/2018 0.84 0.50 - 1.40 mg/dL Final     Calcium   Date Value Ref Range Status   07/12/2018 9.2 8.4 - 10.2 mg/dL Final     Total Protein   Date Value Ref Range Status   07/12/2018 8.2 6.0 - 8.4 g/dL Final     Albumin   Date Value Ref Range Status   07/12/2018 4.2 3.5 - 5.2 g/dL Final     Total Bilirubin   Date Value Ref Range Status   07/12/2018 0.5 0.2 - 1.3 mg/dL Final     Alkaline Phosphatase   Date Value Ref Range Status   07/12/2018 94 38 - 145 U/L Final     AST   Date Value Ref Range Status   07/12/2018 36 14 - 36 U/L Final     ALT   Date Value Ref Range Status   07/12/2018 40 10 - 44 U/L Final     Anion Gap   Date Value Ref  Range Status   07/12/2018 12 8 - 16 mmol/L Final     eGFR if    Date Value Ref Range Status   07/12/2018 >60 >60 mL/min/1.73 m^2 Final     eGFR if non    Date Value Ref Range Status   07/12/2018 >60 >60 mL/min/1.73 m^2 Final     Comment:     Calculation used to obtain the estimated glomerular filtration  rate (eGFR) is the CKD-EPI equation.        IMPRESSION:  1.  Anemia of chronic disease, stable  2.  Mild idiopathic eosinophilia, elevated, stable.  3.  Chronic hypokalemia, followed by Dr. Booth.  4.  Obstructive sleep apnea, compliant with CPAP.  5.  Chronic right upper extremity lymphedema, not followed in the Lymphedema Clinic.  6.  Recent diagnosis of hepatomegaly, followed in Hepatology.    PLAN:   In regards to #1 and #2, we will have the patient return in one year with   interval CBC and CMP for reevaluation.      Assessment/plan reviewed and approved by Dr. Lane.

## 2018-07-12 NOTE — TELEPHONE ENCOUNTER
----- Message from Prudencio Sheets sent at 7/12/2018 11:57 AM CDT -----  Type: Needs Medical Advice    Who Called:  [facility]  Symptoms (please be specific):  NA   How long has patient had these symptoms:  [facility] name and phone #:  [facility] Call Back Number: [phone]  Additional Information: The representative called asking  if patient had biospy during EGD on 07/05/2018

## 2018-07-25 RX ORDER — AMOXICILLIN 500 MG/1
1000 CAPSULE ORAL 2 TIMES DAILY WITH MEALS
Qty: 56 CAPSULE | Refills: 0 | Status: SHIPPED | OUTPATIENT
Start: 2018-07-25 | End: 2018-08-08

## 2018-07-25 RX ORDER — CLARITHROMYCIN 500 MG/1
500 TABLET, FILM COATED ORAL 2 TIMES DAILY WITH MEALS
Qty: 28 TABLET | Refills: 0 | Status: SHIPPED | OUTPATIENT
Start: 2018-07-25 | End: 2018-08-08

## 2018-07-25 NOTE — TELEPHONE ENCOUNTER
Let her know they did special stains on the tissue from the stomach, and they saw the bacteria.  So we'll have her take abx for a couple weeks.     Biaxin 500, # 20, 1 po bid with meals x 14 days.  Amoxicillin 500 mg, # 40, 2 po bid with meals x 14 days.  Ranitidine 600 mg (2 tabs),   bid (before breakfast and 1 hour before supper) for 2 weeks, then back to 1 tab twice a day thereafter.

## 2018-08-24 ENCOUNTER — OFFICE VISIT (OUTPATIENT)
Dept: GASTROENTEROLOGY | Facility: CLINIC | Age: 43
End: 2018-08-24
Payer: COMMERCIAL

## 2018-08-24 VITALS
BODY MASS INDEX: 45.5 KG/M2 | SYSTOLIC BLOOD PRESSURE: 108 MMHG | HEIGHT: 63 IN | HEART RATE: 76 BPM | WEIGHT: 256.81 LBS | DIASTOLIC BLOOD PRESSURE: 73 MMHG

## 2018-08-24 DIAGNOSIS — K44.9 HIATAL HERNIA: ICD-10-CM

## 2018-08-24 DIAGNOSIS — R10.13 EPIGASTRIC PAIN: ICD-10-CM

## 2018-08-24 DIAGNOSIS — Z86.19 HISTORY OF HELICOBACTER PYLORI INFECTION: ICD-10-CM

## 2018-08-24 DIAGNOSIS — R19.8 PAIN WITH BOWEL MOVEMENTS: ICD-10-CM

## 2018-08-24 DIAGNOSIS — Z98.890 HISTORY OF ESOPHAGOGASTRODUODENOSCOPY (EGD): Primary | ICD-10-CM

## 2018-08-24 PROCEDURE — 99999 PR PBB SHADOW E&M-EST. PATIENT-LVL IV: CPT | Mod: PBBFAC,,, | Performed by: NURSE PRACTITIONER

## 2018-08-24 PROCEDURE — 3074F SYST BP LT 130 MM HG: CPT | Mod: CPTII,S$GLB,, | Performed by: NURSE PRACTITIONER

## 2018-08-24 PROCEDURE — 99214 OFFICE O/P EST MOD 30 MIN: CPT | Mod: S$GLB,,, | Performed by: NURSE PRACTITIONER

## 2018-08-24 PROCEDURE — 3078F DIAST BP <80 MM HG: CPT | Mod: CPTII,S$GLB,, | Performed by: NURSE PRACTITIONER

## 2018-08-24 PROCEDURE — 3008F BODY MASS INDEX DOCD: CPT | Mod: CPTII,S$GLB,, | Performed by: NURSE PRACTITIONER

## 2018-08-24 RX ORDER — HYDROCODONE POLISTIREX AND CHLORPHENIRAMINE POLISTIREX 10; 8 MG/5ML; MG/5ML
5 SUSPENSION, EXTENDED RELEASE ORAL
COMMUNITY
Start: 2016-07-22 | End: 2018-08-30

## 2018-08-24 RX ORDER — MELOXICAM 7.5 MG/1
7.5 TABLET ORAL
COMMUNITY
Start: 2018-06-07 | End: 2018-12-26

## 2018-08-24 NOTE — PATIENT INSTRUCTIONS
Gastritis (Adult)    Gastritis is inflammation and irritation of the stomach lining. It can be present for a short time (acute) or be long lasting (chronic). Gastritis is often caused by infection with bacteria called H pylori. More than a third of people in the [place] have this bacteria in their bodies. In many cases, H pylori causes no problems or symptoms. In some people, though, the infection irritates the stomach lining and causes gastritis. Other causes of stomach irritation include drinking alcohol or taking pain-relieving medicines called NSAIDs (such as aspirin or ibuprofen).   Symptoms of gastritis can include:  · Abdominal pain or bloating  · Loss of appetite  · Nausea or vomiting  · Vomiting blood or having black stools  · Feeling more tired than usual  An inflamed and irritated stomach lining is more likely to develop a sore called an ulcer. To help prevent this, gastritis should be treated.  Home care  If needed, medicines may be prescribed. If you have H pylori infection, treating it will likely relieve your symptoms. Other changes can help reduce stomach irritation and help it heal.  · If you have been prescribed medicines for H pylori infection, take them as directed. Take all of the medicine until it is finished or your healthcare provider tells you to stop, even if you feel better.  · Your healthcare provider may recommend avoiding NSAIDs. If you take daily aspirin for your heart or other medical reasons, do not stop without talking to your healthcare provider first.  · Avoid drinking alcohol.  · Stop smoking. Smoking can irritate the stomach and delay healing. As much as possible, stay away from second hand smoke.  Follow-up care  Follow up with your healthcare provider, or as advised by our staff. Testing may be needed to check for inflammation or an ulcer.  When to seek medical advice  Call your healthcare provider for any of the following:  · Stomach pain that gets worse or moves to the lower  right abdomen (appendix area)  · Chest pain that appears or gets worse, or spreads to the back, neck, shoulder, or arm  · Frequent vomiting (cant keep down liquids)  · Blood in the stool or vomit (red or black in color)  · Feeling weak or dizzy  · Fever of 100.4ºF (38ºC) or higher, or as directed by your healthcare provider  Date Last Reviewed: 6/22/2015 © 2000-2017 [employer]. [address] [place] [address]. All rights reserved. This information is not intended as a substitute for professional medical care. Always follow your healthcare professional's instructions.        What Is a Hiatal Hernia?    Hiatal hernia is when the area where the stomach and esophagus meet bulges up through the diaphragm into the chest cavity. In some cases, part of the stomach may bulge above the diaphragm. Stomach acid may move up into the esophagus and cause symptoms. The symptoms are often blamed on gastroesophageal reflux disease (GERD). You may only know about the hernia when it shows up on an X-ray taken for other reasons.   What you may feel  The hiatus is a normal hole in the diaphragm. The esophagus passes through this hole and leads to the stomach. In some cases, part of the stomach may bulge above the diaphragm. This bulge is called a hernia. Stomach acid may move up into the esophagus and cause symptoms.  When you eat, the muscle at the hiatus relaxes to allow food to pass into the stomach. It tightens again to keep food and digestive acids in the stomach.  Many people with hiatal hernias have mild symptoms. You may notice the following GERD symptoms:  · Heartburn or other chest discomfort  · A feeling of chest fullness after a meal  · Frequent burping  · Acid taste in the mouth  · Trouble swallowing  Treating symptoms  If you have been diagnosed with hiatal hernia, these suggestions may help improve symptoms:  · Lose excess weight. Extra weight puts pressure on the stomach and esophagus.  · Dont  lie down after eating. Sit up for at least an hour after eating. Lying down after eating can increase symptoms.  · Avoid certain foods and drinks. These include fatty foods, chocolate, coffee, mint, and other foods that cause symptoms for you.  · Dont smoke or drink alcohol. These can worsen symptoms.  · Look at your medicines. Discuss your medicines with your healthcare provider. Many medicines can cause symptoms.  · Consider an antacid medicine. Ask your healthcare provider about over-the-counter and prescription medicines that may help.  · Ask about surgery, if needed. Surgery is a treatment choice for some people. Your healthcare provider can determine if surgery is an option for you.    Date Last Reviewed: 10/1/2016  © 1667-7062 [employer]. [address] [place] [address], [place] [address]. All rights reserved. This information is not intended as a substitute for professional medical care. Always follow your healthcare professional's instructions.

## 2018-08-24 NOTE — PROGRESS NOTES
Subjective:       Patient ID: Akiko Jameson is a 43 y.o. female Body mass index is 45.5 kg/m².    Chief Complaint: Follow-up (s/p egd, Zantac helping)    Established with KYLE Baez NP with Hepatology (last in 4/2015), Dr. Gamble & myself.    Gastroesophageal Reflux   She complains of abdominal pain (occasional epigastric and periumbilical pain typically in the morning, described as dull pain; denies currently, significantly improved), belching, heartburn (rarely) and water brash. She reports no chest pain, no choking, no coughing, no dysphagia, no early satiety, no globus sensation, no hoarse voice, no nausea or no sore throat. PAINFUL BOWEL MOVEMENTS have significantly improved as well- only once since our last visit (wakes her up from sleeping with bowel urgency)- started a few months ago, relieved once she has a bowel movement. This is a chronic problem. The current episode started more than 1 year ago (started several years ago). The problem occurs rarely. The problem has been rapidly improving. The heartburn changes with position. The symptoms are aggravated by lying down. Pertinent negatives include no fatigue, melena or weight loss. Risk factors include obesity, NSAIDs, caffeine use and hiatal hernia (mobic use PRN occasional use). She has tried a histamine-2 antagonist, an antibiotic, head elevation and a diet change (zantac 300 mg BID for h pylori (history of lip swelling with prevacid); started second course of amoxil and biaxin on 8/18/18; PAST: amoxil & biaxin x 1 courses; TUMS) for the symptoms. The treatment provided significant relief. Past procedures include an abdominal ultrasound and an EGD.     Review of Systems   Constitutional: Negative for appetite change, chills, fatigue, fever, unexpected weight change and weight loss.   HENT: Negative for hoarse voice, sore throat and trouble swallowing.    Respiratory: Negative for cough, choking and shortness of breath.    Cardiovascular:  Negative for chest pain.   Gastrointestinal: Positive for abdominal pain (occasional epigastric and periumbilical pain typically in the morning, described as dull pain; denies currently, significantly improved) and heartburn (rarely). Negative for anal bleeding, blood in stool, constipation, diarrhea, dysphagia, melena, nausea, rectal pain and vomiting.        Bowel movements are daily of formed stool and occasional small amounts of stool; Controlled with glycolax once every other daily   Genitourinary: Negative for difficulty urinating, dysuria and flank pain.   Neurological: Negative for weakness.       Past Medical History:   Diagnosis Date    ALLERGIC RHINITIS     Cancer     Cellulitis     Eosinophilia     mild    GERD (gastroesophageal reflux disease)     Granular cell tumor     Hypertension     Lymphedema     Mild anemia     Sleep apnea     compliant with CPAP    Thyroid nodule     Urinary incontinence, mixed      Past Surgical History:   Procedure Laterality Date    ARTHROSCOPY- PARTIAL MEDIAL MENISCECTOMY Right 3/31/2016    [facility]    AXILLARY NODE DISSECTION      granular cell tumor    BREAST CYST ASPIRATION      CHOLECYSTECTOMY      CHONDROPLASTY-KNEE Right 3/31/2016    [facility]    EGD (ESOPHAGOGASTRODUODENOSCOPY) N/A 7/5/2018    Performed by Edgar Gamble Jr., MD at [facility] ENDO    ENDOMETRIAL ABLATION      ESOPHAGOGASTRODUODENOSCOPY N/A 7/5/2018    Procedure: EGD (ESOPHAGOGASTRODUODENOSCOPY);  Surgeon: Edgar Gamble Jr., MD;  Location: [facility] ENDO;  Service: Endoscopy;  Laterality: N/A;    EXCISION OF MASS OF ABDOMEN Left 6/18/2018    Procedure: EXCISION, MASS, ABDOMEN - flank left;  Surgeon: Armando Tate MD;  Location: [facility] OR;  Service: General;  Laterality: Left;  left flank removal of mass    EXCISION, MASS, ABDOMEN - flank left Left 6/18/2018    [facility] by [facility]ando [facility] at [facility] OR     FINE NEEDLE ASPIRATION      thyroid    KNEE ARTHROSCOPY W/ MENISCECTOMY Right     NASAL SEPTUM SURGERY      PARTIAL SYNOVECTOMY-KNEE Right 3/31/2016    [facility]    TUBAL LIGATION      UPPER GASTROINTESTINAL ENDOSCOPY  07/05/2018    Dr. Gamble     Family History   Problem Relation Age of Onset    Diabetes Mother     Kidney failure Mother     Breast cancer Maternal Grandmother     Breast cancer Maternal Aunt     Ovarian cancer Cousin     Cirrhosis Neg Hx     Colon polyps Neg Hx     Colon cancer Neg Hx     Crohn's disease Neg Hx     Esophageal cancer Neg Hx     Stomach cancer Neg Hx     Ulcerative colitis Neg Hx      Wt Readings from Last 10 Encounters:   08/24/18 116.5 kg (256 lb 13.4 oz)   07/12/18 114.1 kg (251 lb 8.7 oz)   07/03/18 115.2 kg (254 lb)   06/28/18 113 kg (249 lb 1.9 oz)   06/15/18 114.8 kg (253 lb)   06/11/18 116 kg (255 lb 11.7 oz)   05/28/18 116.7 kg (257 lb 4.4 oz)   05/24/18 115.7 kg (255 lb)   12/30/17 116.9 kg (257 lb 11.5 oz)   12/14/17 115.7 kg (255 lb)     Lab Results   Component Value Date    WBC 6.15 07/12/2018    HGB 10.8 (L) 07/12/2018    HCT 33.9 (L) 07/12/2018    MCV 80 (L) 07/12/2018     07/12/2018     CMP  Sodium   Date Value Ref Range Status   07/12/2018 140 136 - 145 mmol/L Final     Potassium   Date Value Ref Range Status   07/12/2018 3.5 3.5 - 5.1 mmol/L Final     Chloride   Date Value Ref Range Status   07/12/2018 99 95 - 110 mmol/L Final     CO2   Date Value Ref Range Status   07/12/2018 29 22 - 31 mmol/L Final     Glucose   Date Value Ref Range Status   07/12/2018 110 70 - 110 mg/dL Final     Comment:     The ADA recommends the following guidelines for fasting glucose:  Normal:       less than 100 mg/dL  Prediabetes:  100 mg/dL to 125 mg/dL  Diabetes:     126 mg/dL or higher       BUN, Bld   Date Value Ref Range Status   07/12/2018 14 7 - 18 mg/dL Final     Creatinine   Date Value Ref Range Status   07/12/2018 0.84 0.50  "- 1.40 mg/dL Final     Calcium   Date Value Ref Range Status   07/12/2018 9.2 8.4 - 10.2 mg/dL Final     Total Protein   Date Value Ref Range Status   07/12/2018 8.2 6.0 - 8.4 g/dL Final     Albumin   Date Value Ref Range Status   07/12/2018 4.2 3.5 - 5.2 g/dL Final     Total Bilirubin   Date Value Ref Range Status   07/12/2018 0.5 0.2 - 1.3 mg/dL Final     Alkaline Phosphatase   Date Value Ref Range Status   07/12/2018 94 38 - 145 U/L Final     AST   Date Value Ref Range Status   07/12/2018 36 14 - 36 U/L Final     ALT   Date Value Ref Range Status   07/12/2018 40 10 - 44 U/L Final     Anion Gap   Date Value Ref Range Status   07/12/2018 12 8 - 16 mmol/L Final     eGFR if    Date Value Ref Range Status   07/12/2018 >60 >60 mL/min/1.73 m^2 Final     eGFR if non    Date Value Ref Range Status   07/12/2018 >60 >60 mL/min/1.73 m^2 Final     Comment:     Calculation used to obtain the estimated glomerular filtration  rate (eGFR) is the CKD-EPI equation.        Lab Results   Component Value Date    TSH 0.666 08/18/2017     Reviewed prior medical records including radiology report of 5/28/18 limited abdominal ultrasound; 2/26/15 MRI/MRCP abdomen; 2/19/15 abdominal ultrasound; & 2/21/15 ct renal stone abdomen pelvis in care everywhere.    7/5/18 EGD was reviewed and procedure report states:   " Findings:       The oropharynx was normal.       Gastroesophageal reflux is evident by free flow of gastric contents        to at least the middle third of the esophagus.       The examined esophagus was normal.       The Z-line was regular and was found 35 cm from the incisors.       A patulous lower esophageal sphincter was found.       A small hiatal hernia was present.       Patchy minimal inflammation characterized by erythema was found in        the prepyloric region of the stomach. Biopsies were taken with a        cold forceps for Helicobacter pylori testing using CLOtest. Biopsies        were " "taken with a cold forceps for histology. Biopsies were taken        with a cold forceps for histology.       The stomach was otherwise normal.       The examined duodenum was normal.  Impression:          - Normal oropharynx.                       - GERD, as evident by free flow of gastric contents                        into the esophagus.                       - Normal esophagus.                       - Z-line regular, 35 cm from the incisors.                       - Patulous lower esophageal sphincter.                       - Small hiatal hernia.                       - Minimal antritis. Biopsied.                       - Normal stomach otherwise.                       - Normal examined duodenum.  Recommendation:      - Discharge patient to home.                       - Await pathology and CLOtest results.                       - Follow an antireflux regimen.                       - Continue present medications.                       - Use Zantac (ranitidine) 300 mg PO BID.                       - Call the G.I. clinic in 2 weeks for reports (if                        you haven't heard from us sooner) [phone].                       - Return to GI clinic in 6-8 weeks.                       - If no improvement, next perform a modified barium                        swallow/esophagram.".  Biopsy results:   "FINAL PATHOLOGIC DIAGNOSIS  Antrum, biopsy:  - Antral mucosa with moderate chronic active gastritis and reactive epithelial changes.  - Immunohistochemistry with appropriate control is positive for H. Pylori."  Objective:      Physical Exam   Constitutional: She is oriented to person, place, and time. She appears well-developed and well-nourished. No distress.   HENT:   Mouth/Throat: Oropharynx is clear and moist and mucous membranes are normal. No oral lesions. No oropharyngeal exudate.   Eyes: Conjunctivae are normal. Pupils are equal, round, and reactive to light. No scleral icterus.   Cardiovascular: Normal " rate.   Pulmonary/Chest: Effort normal and breath sounds normal. No respiratory distress. She has no wheezes.   Abdominal: Soft. Normal appearance and bowel sounds are normal. She exhibits no distension, no abdominal bruit and no mass. There is no hepatosplenomegaly. There is no tenderness. There is no rigidity, no rebound, no guarding, no tenderness at McBurney's point and negative Still's sign. No hernia.   Neurological: She is alert and oriented to person, place, and time.   Skin: Skin is warm and dry. No rash noted. She is not diaphoretic. No erythema. No pallor.   Non-jaundiced   Psychiatric: She has a normal mood and affect. Her behavior is normal. Judgment and thought content normal.   Nursing note and vitals reviewed.      Assessment:       1. History of esophagogastroduodenoscopy (EGD)    2. History of Helicobacter pylori infection    3. Hiatal hernia        Plan:       History of esophagogastroduodenoscopy (EGD), epigastric pain, & History of Helicobacter pylori infection  -     H. pylori antigen, stool; Future; Expected date: 11/24/2018  - continue amoxil, biaxin and zantac as directed by Dr. Gamble  - recommend taking zantac 600 mg bid as directed by Dr. Gamble while on antibiotics and thereafter recommend decreasing to 300 mg twice daily  -discussed about the different types of medications used to treat reflux and how to use them, antacids can be used PRN for breakthrough heartburn symptoms by reducing stomach acid that is already produced, H2 blockers (zantac) work by limiting the amount acid production, & PPI's work to block acid production and are taken daily (history of allergy to prevacid), patient verbalized understanding.  - CONTINUE lifestyle modifications to help control/reduce reflux/abdominal pain including: avoid large meals, avoid eating within 2-3 hours of bedtime (avoid late night eating & lying down soon after eating), elevate head of bed if nocturnal symptoms are present, smoking  cessation (if current smoker), & weight loss (if overweight).   - avoid known foods which trigger reflux symptoms & to minimize/avoid high-fat foods, chocolate, caffeine, citrus, alcohol, & tomato products.  - avoid/limit use of NSAID's, since they can cause GI upset, bleeding, and/or ulcers. If needed, take with food.     Hiatal hernia  -discussed diagnosis with patient & that it is usually managed by controlling reflux symptoms, surgery is an option, but usually performed if reflux is uncontrolled by medication management and lifestyle/dietary modifications; if symptoms persist despite medication management and lifestyle/dietary modifications, we can refer to general surgery to consult about surgical options, patient verbalized understanding    Pain with bowel movements  Recommended daily exercise as tolerated, adequate water intake (six 8-oz glasses of water daily), and high fiber diet. OTC fiber supplements are recommended if diet does not reach daily fiber goal (25 grams daily), such as Metamucil, Citrucel, or FiberCon (take as directed, separate from other oral medications by >2 hours).  -Recommend taking an OTC stool softener such as Colace as directed to avoid hard stools and straining with bowel movements PRN  -continue OTC MiraLax once daily (17g PO) as directed  - If no improvement with above recommendations, try intermittently dosed Dulcolax OTC as directed (every 3-4  days) PRN to facilitate bowel movements  -If still no improvement with these measures, call/follow-up    Follow-up in about 1 month (around 9/24/2018), or if symptoms worsen or fail to improve.      If no improvement in symptoms or symptoms worsen, call/follow-up at clinic or go to ER.

## 2018-08-28 ENCOUNTER — TELEPHONE (OUTPATIENT)
Dept: FAMILY MEDICINE | Facility: CLINIC | Age: 43
End: 2018-08-28

## 2018-08-28 DIAGNOSIS — Z12.39 SCREENING FOR BREAST CANCER: Primary | ICD-10-CM

## 2018-08-28 NOTE — TELEPHONE ENCOUNTER
----- Message from Elvia Hammer sent at 8/28/2018 10:49 AM CDT -----  Contact: pt  Calling in regards to schedule a mammogram and need orders and please advise [phone]

## 2018-08-29 ENCOUNTER — PATIENT OUTREACH (OUTPATIENT)
Dept: ADMINISTRATIVE | Facility: HOSPITAL | Age: 43
End: 2018-08-29

## 2018-08-30 ENCOUNTER — HOSPITAL ENCOUNTER (OUTPATIENT)
Dept: RADIOLOGY | Facility: HOSPITAL | Age: 43
Discharge: HOME | End: 2018-08-30
Attending: FAMILY MEDICINE
Payer: COMMERCIAL

## 2018-08-30 ENCOUNTER — OFFICE VISIT (OUTPATIENT)
Dept: FAMILY MEDICINE | Facility: CLINIC | Age: 43
End: 2018-08-30
Payer: COMMERCIAL

## 2018-08-30 VITALS
SYSTOLIC BLOOD PRESSURE: 112 MMHG | WEIGHT: 253 LBS | DIASTOLIC BLOOD PRESSURE: 70 MMHG | BODY MASS INDEX: 44.83 KG/M2 | HEART RATE: 74 BPM | BODY MASS INDEX: 45.36 KG/M2 | HEIGHT: 63 IN | WEIGHT: 256 LBS | HEIGHT: 63 IN | TEMPERATURE: 98 F

## 2018-08-30 DIAGNOSIS — E04.1 THYROID NODULE: ICD-10-CM

## 2018-08-30 DIAGNOSIS — Z76.0 MEDICATION REFILL: ICD-10-CM

## 2018-08-30 DIAGNOSIS — E87.6 HYPOKALEMIA: ICD-10-CM

## 2018-08-30 DIAGNOSIS — D63.8 CHRONIC DISEASE ANEMIA: ICD-10-CM

## 2018-08-30 DIAGNOSIS — E66.01 MORBID OBESITY WITH BMI OF 40.0-44.9, ADULT: ICD-10-CM

## 2018-08-30 DIAGNOSIS — I10 ESSENTIAL HYPERTENSION: ICD-10-CM

## 2018-08-30 DIAGNOSIS — Z12.39 SCREENING FOR BREAST CANCER: ICD-10-CM

## 2018-08-30 DIAGNOSIS — Z00.00 ANNUAL PHYSICAL EXAM: Primary | ICD-10-CM

## 2018-08-30 DIAGNOSIS — G47.30 SLEEP APNEA, UNSPECIFIED TYPE: ICD-10-CM

## 2018-08-30 PROCEDURE — 3074F SYST BP LT 130 MM HG: CPT | Mod: CPTII,S$GLB,, | Performed by: NURSE PRACTITIONER

## 2018-08-30 PROCEDURE — 77063 BREAST TOMOSYNTHESIS BI: CPT | Mod: TC,PO

## 2018-08-30 PROCEDURE — 77067 SCR MAMMO BI INCL CAD: CPT | Mod: 26,,, | Performed by: RADIOLOGY

## 2018-08-30 PROCEDURE — 99396 PREV VISIT EST AGE 40-64: CPT | Mod: S$GLB,,, | Performed by: NURSE PRACTITIONER

## 2018-08-30 PROCEDURE — 99999 PR PBB SHADOW E&M-EST. PATIENT-LVL IV: CPT | Mod: PBBFAC,,, | Performed by: NURSE PRACTITIONER

## 2018-08-30 PROCEDURE — 3078F DIAST BP <80 MM HG: CPT | Mod: CPTII,S$GLB,, | Performed by: NURSE PRACTITIONER

## 2018-08-30 PROCEDURE — 77063 BREAST TOMOSYNTHESIS BI: CPT | Mod: 26,,, | Performed by: RADIOLOGY

## 2018-08-30 RX ORDER — TRIAMTERENE/HYDROCHLOROTHIAZID 37.5-25 MG
1 TABLET ORAL DAILY
Qty: 90 TABLET | Refills: 3 | Status: SHIPPED | OUTPATIENT
Start: 2018-08-30 | End: 2019-09-03 | Stop reason: SDUPTHER

## 2018-08-30 RX ORDER — DILTIAZEM HYDROCHLORIDE 120 MG/1
120 CAPSULE, COATED, EXTENDED RELEASE ORAL DAILY
Qty: 90 CAPSULE | Refills: 3 | Status: SHIPPED | OUTPATIENT
Start: 2018-08-30 | End: 2019-09-03 | Stop reason: SDUPTHER

## 2018-08-30 RX ORDER — POTASSIUM CHLORIDE 20 MEQ/1
20 TABLET, EXTENDED RELEASE ORAL 2 TIMES DAILY
Qty: 60 TABLET | Refills: 11 | Status: SHIPPED | OUTPATIENT
Start: 2018-08-30 | End: 2019-09-03 | Stop reason: SDUPTHER

## 2018-08-30 RX ORDER — MONTELUKAST SODIUM 10 MG/1
10 TABLET ORAL NIGHTLY
Qty: 90 TABLET | Refills: 3 | Status: SHIPPED | OUTPATIENT
Start: 2018-08-30 | End: 2019-09-03 | Stop reason: SDUPTHER

## 2018-08-30 NOTE — PROGRESS NOTES
Subjective:       Patient ID: Akiko Jameson is a 43 y.o. female.    Chief Complaint: Annual Exam  Pt in today for annual exam. Pt sees hematology for chronic anemia. She sees endocrinology for thyroid nodules. Sleep apnea managed with CPAP. HTN, hypokalemia managed with medication. Healthy diet, weight loss discussed. GERD well controlled. Mammogram done today. Pap smear UTD. Pt has no other complaints today.  Past Medical History:   Diagnosis Date    ALLERGIC RHINITIS     Cancer     Cellulitis     Eosinophilia     mild    GERD (gastroesophageal reflux disease)     Granular cell tumor     Hypertension     Lymphedema     Mild anemia     Sleep apnea     compliant with CPAP    Thyroid nodule     Urinary incontinence, mixed      Social History     Socioeconomic History    Marital status:      Spouse name: Not on file    Number of children: 2    Years of education: Not on file    Highest education level: Not on file   Social Needs    Financial resource strain: Not on file    Food insecurity - worry: Not on file    Food insecurity - inability: Not on file    Transportation needs - medical: Not on file    Transportation needs - non-medical: Not on file   Occupational History     Employer: [employer]   Tobacco Use    Smoking status: Never Smoker    Smokeless tobacco: Never Used   Substance and Sexual Activity    Alcohol use: No    Drug use: No    Sexual activity: Not on file   Other Topics Concern    Not on file   Social History Narrative    Not on file     Past Surgical History:   Procedure Laterality Date    AXILLARY NODE DISSECTION      granular cell tumor    BREAST CYST ASPIRATION      CHOLECYSTECTOMY      ENDOMETRIAL ABLATION      FINE NEEDLE ASPIRATION      thyroid    KNEE ARTHROSCOPY W/ MENISCECTOMY Right     NASAL SEPTUM SURGERY      TUBAL LIGATION      UPPER GASTROINTESTINAL ENDOSCOPY  07/05/2018    Dr. Gamble       HPI  Review of Systems   Constitutional:  Negative.    HENT: Negative.    Eyes: Negative.    Respiratory: Negative.    Cardiovascular: Negative.    Gastrointestinal: Negative.    Endocrine: Negative.    Genitourinary: Negative.    Musculoskeletal: Negative.    Skin: Negative.    Allergic/Immunologic: Negative.    Neurological: Negative.    Psychiatric/Behavioral: Negative.        Objective:      Physical Exam   Constitutional: She is oriented to person, place, and time. She appears well-developed and well-nourished.   HENT:   Head: Normocephalic.   Right Ear: External ear normal.   Left Ear: External ear normal.   Nose: Nose normal.   Mouth/Throat: Oropharynx is clear and moist.   Eyes: Conjunctivae are normal. Pupils are equal, round, and reactive to light.   Neck: Normal range of motion. Neck supple.   Cardiovascular: Normal rate, regular rhythm and normal heart sounds.   Pulmonary/Chest: Effort normal and breath sounds normal.   Abdominal: Soft. Bowel sounds are normal.   Musculoskeletal: Normal range of motion.   Neurological: She is alert and oriented to person, place, and time.   Skin: Skin is warm and dry. Capillary refill takes 2 to 3 seconds.   Psychiatric: She has a normal mood and affect. Her behavior is normal. Judgment and thought content normal.   Nursing note and vitals reviewed.      Assessment:       1. Annual physical exam    2. Sleep apnea, unspecified type    3. Thyroid nodule    4. Chronic disease anemia    5. Medication refill    6. Hypokalemia    7. Morbid obesity with BMI of 40.0-44.9, adult        Plan:           Akiko was seen today for annual exam.    Diagnoses and all orders for this visit:    Annual physical exam  Essential hypertension  Sleep apnea, unspecified type  Thyroid nodule  Chronic disease anemia  Hypokalemia  Morbid obesity with BMI of 40.0-44.9, adult  -     Lipid panel; Future  -     TSH; Future        CBC, CMP UTD        Healthy diet, weight loss recommended        F/U with specialists as scheduled    Medication  refill  -     potassium chloride SA (K-DUR,KLOR-CON) 20 MEQ tablet; Take 1 tablet (20 mEq total) by mouth 2 (two) times daily. Appointment needed for further refills after this one  -     triamterene-hydrochlorothiazide 37.5-25 mg (MAXZIDE-25) 37.5-25 mg per tablet; Take 1 tablet by mouth once daily.  -     montelukast (SINGULAIR) 10 mg tablet; Take 1 tablet (10 mg total) by mouth nightly.  -     diltiaZEM (CARDIZEM CD) 120 MG Cp24; Take 1 capsule (120 mg total) by mouth once daily.

## 2018-09-24 ENCOUNTER — OFFICE VISIT (OUTPATIENT)
Dept: ENDOCRINOLOGY | Facility: CLINIC | Age: 43
End: 2018-09-24
Payer: COMMERCIAL

## 2018-09-24 ENCOUNTER — HOSPITAL ENCOUNTER (OUTPATIENT)
Dept: RADIOLOGY | Facility: HOSPITAL | Age: 43
Discharge: HOME | End: 2018-09-24
Attending: INTERNAL MEDICINE
Payer: COMMERCIAL

## 2018-09-24 VITALS
HEIGHT: 63 IN | WEIGHT: 251.75 LBS | DIASTOLIC BLOOD PRESSURE: 76 MMHG | RESPIRATION RATE: 16 BRPM | SYSTOLIC BLOOD PRESSURE: 122 MMHG | BODY MASS INDEX: 44.61 KG/M2

## 2018-09-24 DIAGNOSIS — E04.2 MULTINODULAR GOITER (NONTOXIC): ICD-10-CM

## 2018-09-24 PROCEDURE — 3074F SYST BP LT 130 MM HG: CPT | Mod: CPTII,S$GLB,, | Performed by: INTERNAL MEDICINE

## 2018-09-24 PROCEDURE — 3008F BODY MASS INDEX DOCD: CPT | Mod: CPTII,S$GLB,, | Performed by: INTERNAL MEDICINE

## 2018-09-24 PROCEDURE — 76536 US EXAM OF HEAD AND NECK: CPT | Mod: TC,PO

## 2018-09-24 PROCEDURE — 76536 US EXAM OF HEAD AND NECK: CPT | Mod: 26,,, | Performed by: RADIOLOGY

## 2018-09-24 PROCEDURE — 99999 PR PBB SHADOW E&M-EST. PATIENT-LVL III: CPT | Mod: PBBFAC,,, | Performed by: INTERNAL MEDICINE

## 2018-09-24 PROCEDURE — 99214 OFFICE O/P EST MOD 30 MIN: CPT | Mod: S$GLB,,, | Performed by: INTERNAL MEDICINE

## 2018-09-24 PROCEDURE — 3078F DIAST BP <80 MM HG: CPT | Mod: CPTII,S$GLB,, | Performed by: INTERNAL MEDICINE

## 2018-09-24 NOTE — PROGRESS NOTES
Subjective:      Patient ID: Akiko Jameson is a 43 y.o. female.    Chief Complaint:  MNG       History of Present Illness  Ms. Jameson returns to clinic today for MNG follow up   Last seen by Dr. Loza and Dr. Rucker     The patient initially was found to have an enlarged thyroid gland on physical examination with first thyroid ultrasound ~ 2013. She reports having a fine needle aspiration biopsy in [place] in 07/2013 which was benign     Her most recent f/u U/S in 8/2017 shows an increase in left thyroid nodule from 2.1 to 4.1 cm (which was previously bx) and also increase in left lateral isthmus nodule from 0.9cm to 1.7cm    Patient had FNA biopsy of isthmus and left thyroid nodule performed on 08/31/2017:   SPECIMEN  1) FNA Thyroid Isthmus (Clinician)  2) FNA Thyroid Left (Clinician)  FINAL PATHOLOGIC DIAGNOSIS  1. THYROID, ISTHMUS (ASPIRATION):  [facility] Thyroid Cytology Category: Benign  [place] follicular cells and abundant colloid  2. THYROID, LEFT (ASPIRATION):  [facility] Thyroid Cytology Category: Benign  [place] follicular cells and colloid      She denies compressive sx  Denies voice changes   Denies dysphagia   Denies shortness of breath when lying flat     She denies family history of thyroid cancer or thyroid disease     No radiation exposure to head/neck.  Has no personal hx of thyroid disease    Is morbidly obese. +FH DM. Has HTN  A1c - 5.8% from 08/2017   Denies polyuria, polydipsia, blurry vision      Review of Systems   Constitutional: Negative for unexpected weight change.   HENT: Negative for sore throat, trouble swallowing and voice change.    Eyes: Negative for visual disturbance.   Respiratory: Negative for choking.    Cardiovascular: Negative for leg swelling.   Gastrointestinal: Negative for abdominal pain.   Endocrine: Negative for polydipsia and polyuria.   Skin: Negative for wound.   Neurological: Negative for headaches.   Hematological: Does not bruise/bleed easily.    Psychiatric/Behavioral: Negative for sleep disturbance.     Objective:   Physical Exam   Constitutional: She is oriented to person, place, and time. She appears well-developed. No distress.   Neck: No tracheal deviation present. Thyromegaly (thyroid gland is asymmetrically enlarged. large left nodule, cannot appreciate isthmus nodule) present.   Gland is firm on palpation      Cardiovascular: Normal rate.   No murmur heard.  Pulmonary/Chest: Effort normal.   Musculoskeletal: She exhibits no edema.   Neurological: She is alert and oriented to person, place, and time.   Skin: No rash noted.   Psychiatric: She has a normal mood and affect.   Nursing note and vitals reviewed.      Lab Review:   Lab Results   Component Value Date    TSH 0.695 08/30/2018     Results for JESSIKA JIMENEZ (MRN [id number]) as of 9/24/2018 08:12   Ref. Range 8/31/2017 12:05   Hemoglobin A1C Latest Ref Range: 4.0 - 5.6 % 5.8 (H)   Estimated Avg Glucose Latest Ref Range: 68 - 131 mg/dL 120     Results for JESSIKA JIMENEZ (MRN [id number]) as of 9/24/2018 08:12   Ref. Range 8/30/2018 09:17   Cholesterol Latest Ref Range: 120 - 199 mg/dL 153   HDL Latest Ref Range: 40 - 75 mg/dL 49   LDL Cholesterol Latest Ref Range: 63.0 - 159.0 mg/dL 93.4   Total Cholesterol/HDL Ratio Latest Ref Range: 2.0 - 5.0  3.1   Triglycerides Latest Ref Range: 30 - 150 mg/dL 53     Assessment:     1. Multinodular goiter (nontoxic)  US Soft Tissue Head Neck Thyroid    CANCELED: US Soft Tissue Head Neck Thyroid        Plan:     1. MNG   -- reviewed thyroid USS and pathology report from 2017   -- discussed management options including possible repeat FNA, observation or surgery   -- discussed  Indications for repeat FNA: interval change, compressive symptoms   -- will proceed with repeat thyroid USS   -- will message with results   -- all of the patient's questions were answered

## 2018-09-27 ENCOUNTER — TELEPHONE (OUTPATIENT)
Dept: ENDOCRINOLOGY | Facility: CLINIC | Age: 43
End: 2018-09-27

## 2018-09-27 DIAGNOSIS — E04.2 MULTINODULAR GOITER (NONTOXIC): Primary | ICD-10-CM

## 2018-09-27 NOTE — TELEPHONE ENCOUNTER
Patient notified via phone to discuss results of thyroid ultrasound study. Patient informed of results: increasing size of dominant left lobe thyroid nodule. Discussed concerns regarding size of nodule and risk of malignancy. Patient is not interested in surgery at this time. Therefore we will proceed with repeat FNA biopsy of left thyroid nodule due interval change. Discussed possible FNA biopsy results including benign, malignant, FLUS or non diagnostic. Informed that non diagnostic and FLUS results will require repeat FNA. All of the patient's questions were answered.

## 2018-10-24 ENCOUNTER — HOSPITAL ENCOUNTER (OUTPATIENT)
Dept: ENDOCRINOLOGY | Facility: CLINIC | Age: 43
Discharge: HOME | End: 2018-10-24
Attending: INTERNAL MEDICINE
Payer: COMMERCIAL

## 2018-10-24 DIAGNOSIS — E04.2 MULTINODULAR GOITER (NONTOXIC): ICD-10-CM

## 2018-10-24 PROCEDURE — 76942 ECHO GUIDE FOR BIOPSY: CPT | Mod: S$GLB,,, | Performed by: INTERNAL MEDICINE

## 2018-10-24 PROCEDURE — 88173 CYTOPATH EVAL FNA REPORT: CPT | Mod: 26,,, | Performed by: PATHOLOGY

## 2018-10-24 PROCEDURE — 88173 CYTOPATH EVAL FNA REPORT: CPT | Performed by: PATHOLOGY

## 2018-10-24 PROCEDURE — 10022 US FINE NEEDLE ASPIRATION WITH IMAGING: CPT | Mod: S$GLB,,, | Performed by: INTERNAL MEDICINE

## 2018-12-04 ENCOUNTER — LAB VISIT (OUTPATIENT)
Dept: LAB | Facility: HOSPITAL | Age: 43
End: 2018-12-04
Attending: NURSE PRACTITIONER
Payer: COMMERCIAL

## 2018-12-04 DIAGNOSIS — Z86.19 HISTORY OF HELICOBACTER PYLORI INFECTION: ICD-10-CM

## 2018-12-04 PROCEDURE — 87338 HPYLORI STOOL AG IA: CPT

## 2018-12-11 ENCOUNTER — PATIENT MESSAGE (OUTPATIENT)
Dept: GASTROENTEROLOGY | Facility: CLINIC | Age: 43
End: 2018-12-11

## 2018-12-11 LAB — H PYLORI AG STL QL IA: DETECTED

## 2018-12-13 ENCOUNTER — TELEPHONE (OUTPATIENT)
Dept: GASTROENTEROLOGY | Facility: CLINIC | Age: 43
End: 2018-12-13

## 2018-12-13 DIAGNOSIS — A04.8 H. PYLORI INFECTION: Primary | ICD-10-CM

## 2018-12-13 RX ORDER — CLARITHROMYCIN 500 MG/1
500 TABLET, FILM COATED ORAL 2 TIMES DAILY
Qty: 28 TABLET | Refills: 0 | Status: SHIPPED | OUTPATIENT
Start: 2018-12-13 | End: 2018-12-26

## 2018-12-13 RX ORDER — OMEPRAZOLE 40 MG/1
40 CAPSULE, DELAYED RELEASE ORAL
Qty: 28 CAPSULE | Refills: 0 | Status: SHIPPED | OUTPATIENT
Start: 2018-12-13 | End: 2018-12-26

## 2018-12-13 RX ORDER — METRONIDAZOLE 500 MG/1
500 TABLET ORAL 3 TIMES DAILY
Qty: 42 TABLET | Refills: 0 | Status: SHIPPED | OUTPATIENT
Start: 2018-12-13 | End: 2018-12-21 | Stop reason: DRUGHIGH

## 2018-12-13 NOTE — TELEPHONE ENCOUNTER
Discussed case and results with Dr. Gamble. He recommended patient try OTC prilosec 20 mg and take 1 tablet every morning. If no lip swelling, then can take prilosec with treatment course of prilosec 40 mg BID x 14 days, biaxin 500 mg BID x 14 days and flagyl 500 mg TID x 14 days. If lip swelling occurs, then recommend stopping medicine and contacting our office or going to the ER for further evaluation and management.    I called the patient. Patient reports she has taken OTC prilosec without any lip swelling and without any problems. Patient also reports she has taken Benadryl in the past without problems. Informed patient that the lip swelling was not likely from the prevacid since she tolerated another PPI without problems and patient reports the prevacid was started the same time as the blood pressure medications that are listed as an allergy.  Discussed about h pylori treatment and that it is recommended with PPI use. Discussed about doing treatment with prilosec. Patient verbalized understanding and agreed with management plan. Recommend follow-up in 1-2 months with repeat h pylori stool study in 2 months. Patient reports she will contact us if she has any problems to contact us. Discontinue zantac while on h pylori treatment, can restart it if any GI symptoms return after h pylori treatment course is completed.

## 2018-12-18 ENCOUNTER — PATIENT MESSAGE (OUTPATIENT)
Dept: GASTROENTEROLOGY | Facility: CLINIC | Age: 43
End: 2018-12-18

## 2018-12-18 DIAGNOSIS — A04.8 H. PYLORI INFECTION: Primary | ICD-10-CM

## 2018-12-20 ENCOUNTER — PATIENT MESSAGE (OUTPATIENT)
Dept: GASTROENTEROLOGY | Facility: CLINIC | Age: 43
End: 2018-12-20

## 2018-12-20 NOTE — TELEPHONE ENCOUNTER
"I called patient to review Dr. Gamble's recommendations, but no answer on home or mobile phone, left messages    Dr. Gamble's recommendations: "Ms Jameson had tolerated 2 courses of Biaxin without SEs this year.  So I doubt that it is the culprit.   But I would nevertheless change the Biax to metronidazole, along with amoxicillin, and the protonix.   But start each one separately, taking just one for 2-3 days to see if any SEs.   Then the next. And the next.   metronidzole 500 mg tid with meals for 20 days.   Then add the amoxil 1000 mg tid w meals for 17 days.   Then protonix bid for a month.  The 3 will overlap for 14 days.     And if fails this, next refer to ID. (Routing comment) ".    "

## 2018-12-21 RX ORDER — AMOXICILLIN 500 MG/1
1000 CAPSULE ORAL EVERY 12 HOURS
Qty: 68 CAPSULE | Refills: 0 | Status: SHIPPED | OUTPATIENT
Start: 2018-12-21 | End: 2018-12-26

## 2018-12-21 RX ORDER — METRONIDAZOLE 500 MG/1
500 TABLET ORAL 3 TIMES DAILY
Qty: 60 TABLET | Refills: 0 | Status: SHIPPED | OUTPATIENT
Start: 2018-12-21 | End: 2018-12-26

## 2018-12-21 RX ORDER — PANTOPRAZOLE SODIUM 40 MG/1
40 TABLET, DELAYED RELEASE ORAL 2 TIMES DAILY
Qty: 60 TABLET | Refills: 0 | Status: SHIPPED | OUTPATIENT
Start: 2018-12-21 | End: 2019-03-15

## 2018-12-26 ENCOUNTER — OFFICE VISIT (OUTPATIENT)
Dept: FAMILY MEDICINE | Facility: CLINIC | Age: 43
End: 2018-12-26
Payer: COMMERCIAL

## 2018-12-26 VITALS
WEIGHT: 259.63 LBS | SYSTOLIC BLOOD PRESSURE: 136 MMHG | HEIGHT: 63 IN | TEMPERATURE: 99 F | BODY MASS INDEX: 46 KG/M2 | HEART RATE: 74 BPM | DIASTOLIC BLOOD PRESSURE: 86 MMHG

## 2018-12-26 DIAGNOSIS — L03.113 CELLULITIS OF RIGHT HAND: Primary | ICD-10-CM

## 2018-12-26 DIAGNOSIS — I89.0 LYMPHEDEMA OF UPPER EXTREMITY FOLLOWING LYMPHADENECTOMY: ICD-10-CM

## 2018-12-26 DIAGNOSIS — E89.89 LYMPHEDEMA OF UPPER EXTREMITY FOLLOWING LYMPHADENECTOMY: ICD-10-CM

## 2018-12-26 DIAGNOSIS — A04.8 H. PYLORI INFECTION: ICD-10-CM

## 2018-12-26 PROCEDURE — 3075F SYST BP GE 130 - 139MM HG: CPT | Mod: CPTII,S$GLB,, | Performed by: FAMILY MEDICINE

## 2018-12-26 PROCEDURE — 99999 PR PBB SHADOW E&M-EST. PATIENT-LVL III: CPT | Mod: PBBFAC,,, | Performed by: FAMILY MEDICINE

## 2018-12-26 PROCEDURE — 3079F DIAST BP 80-89 MM HG: CPT | Mod: CPTII,S$GLB,, | Performed by: FAMILY MEDICINE

## 2018-12-26 PROCEDURE — 99213 OFFICE O/P EST LOW 20 MIN: CPT | Mod: S$GLB,,, | Performed by: FAMILY MEDICINE

## 2018-12-26 PROCEDURE — 3008F BODY MASS INDEX DOCD: CPT | Mod: CPTII,S$GLB,, | Performed by: FAMILY MEDICINE

## 2018-12-26 RX ORDER — HYDROCODONE BITARTRATE AND ACETAMINOPHEN 5; 325 MG/1; MG/1
1 TABLET ORAL EVERY 6 HOURS PRN
Qty: 12 TABLET | Refills: 0 | Status: SHIPPED | OUTPATIENT
Start: 2018-12-26 | End: 2019-01-05

## 2018-12-26 RX ORDER — AMOXICILLIN AND CLAVULANATE POTASSIUM 875; 125 MG/1; MG/1
1 TABLET, FILM COATED ORAL 2 TIMES DAILY
Qty: 20 TABLET | Refills: 0 | Status: SHIPPED | OUTPATIENT
Start: 2018-12-26 | End: 2019-01-05

## 2018-12-26 RX ORDER — SULFAMETHOXAZOLE AND TRIMETHOPRIM 800; 160 MG/1; MG/1
1 TABLET ORAL 2 TIMES DAILY
Qty: 20 TABLET | Refills: 0 | Status: SHIPPED | OUTPATIENT
Start: 2018-12-26 | End: 2019-01-05

## 2018-12-26 NOTE — Clinical Note
Please see note.  Patient came in today with cellulitis at her right arm associated with lymphedema.  She is being placed on Augmentin and Bactrim.  Recently diagnosed H pylori and was given Biaxin, Flagyl, omeprazole.  Had presumed reaction with possible tongue and throat swelling with ER visit and medications were discontinued, but being started back 1 x 1 per GI.  I advised that she hold off on the treatment for H pylori until we resolve the cellulitis then she can go back and have that taken care of.  She has had prior angioedema assumed associated ACE-inhibitor which she is no longer taking, so I wonder if angioedema might not be related to medications at all..

## 2018-12-26 NOTE — PROGRESS NOTES
Patient presents with a complaint of pain and swelling in the right hand starting this morning.  No fever.  She has had multiple prior episodes of cellulitis in the right hand and arm due to chronic lymphedema related to prior remote surgery.  Prior hospitalizations for same, but none recently.  She has had successful treatment in the past using Augmentin and Bactrim.   Patient was recently diagnosed with H pylori couple weeks ago.  She was prescribed clarithromycin, metronidazole, omeprazole.  She started taking these but developed some possible swelling at the neck and tongue and went to the ER.  Medications were discontinued.  She was to resume medications 1 at a time, but has only resumed the metronidazole.  She does have a prior history of angioedema which we thought was related to Ace inhibitors which she is no longer taking.  She has tolerated Biaxin in the past as well as Augmentin and Bactrim.  She denies any current swelling or breathing symptoms.    Past Medical History:  Past Medical History:   Diagnosis Date    ALLERGIC RHINITIS     Cancer     Cellulitis     Eosinophilia     mild    GERD (gastroesophageal reflux disease)     Granular cell tumor     Hypertension     Lymphedema     Mild anemia     Sleep apnea     compliant with CPAP    Thyroid nodule     Urinary incontinence, mixed      Past Surgical History:   Procedure Laterality Date    ARTHROSCOPY- PARTIAL MEDIAL MENISCECTOMY Right 3/31/2016    [facility] OR    AXILLARY NODE DISSECTION      granular cell tumor    BREAST CYST ASPIRATION      CHOLECYSTECTOMY      CHONDROPLASTY-KNEE Right 3/31/2016    [facility] OR    EGD (ESOPHAGOGASTRODUODENOSCOPY) N/A 7/5/2018    Performed by Edgar Gamble Jr., MD at [facility] ENDO    ENDOMETRIAL ABLATION      EXCISION, MASS, ABDOMEN - flank left Left 6/18/2018    [facility] Armando Tate [facility] at [facility] OR    FINE NEEDLE ASPIRATION       thyroid    KNEE ARTHROSCOPY W/ MENISCECTOMY Right     NASAL SEPTUM SURGERY      PARTIAL SYNOVECTOMY-KNEE Right 3/31/2016    [facility]    TUBAL LIGATION      UPPER GASTROINTESTINAL ENDOSCOPY  07/05/2018    Dr. Gamble     Social History     Socioeconomic History    Marital status:      Spouse name: Not on file    Number of children: 2    Years of education: Not on file    Highest education level: Not on file   Social Needs    Financial resource strain: Not on file    Food insecurity - worry: Not on file    Food insecurity - inability: Not on file    Transportation needs - medical: Not on file    Transportation needs - non-medical: Not on file   Occupational History     Employer: [employer]   Tobacco Use    Smoking status: Never Smoker    Smokeless tobacco: Never Used   Substance and Sexual Activity    Alcohol use: No    Drug use: No    Sexual activity: Not on file   Other Topics Concern    Not on file   Social History Narrative    Not on file     Family History   Problem Relation Age of Onset    Diabetes Mother     Kidney failure Mother     Breast cancer Maternal Grandmother     Breast cancer Maternal Aunt     Ovarian cancer Cousin     Cirrhosis Neg Hx     Colon polyps Neg Hx     Colon cancer Neg Hx     Crohn's disease Neg Hx     Esophageal cancer Neg Hx     Stomach cancer Neg Hx     Ulcerative colitis Neg Hx      Review of patient's allergies indicates:   Allergen Reactions    Biaxin [clarithromycin] Swelling    Prilosec [omeprazole magnesium] Swelling    Prevacid [lansoprazole] Swelling     Lip swelling- was taking this along with blood pressure medication: benazepril; not sure which caused it. Reports she has taken OTC prilosec without any problems.    Ace inhibitors Swelling     Facial swelling    Benazepril Swelling     Lip swelling     Current Outpatient Medications on File Prior to Visit   Medication Sig Dispense Refill     diltiaZEM (CARDIZEM CD) 120 MG Cp24 Take 1 capsule (120 mg total) by mouth once daily. 90 capsule 3    montelukast (SINGULAIR) 10 mg tablet Take 1 tablet (10 mg total) by mouth nightly. 90 tablet 3    multivitamin (THERAGRAN) per tablet Every day      pantoprazole (PROTONIX) 40 MG tablet Take 1 tablet (40 mg total) by mouth 2 (two) times daily. 60 tablet 0    potassium chloride SA (K-DUR,KLOR-CON) 20 MEQ tablet Take 1 tablet (20 mEq total) by mouth 2 (two) times daily. Appointment needed for further refills after this one 60 tablet 11    triamterene-hydrochlorothiazide 37.5-25 mg (MAXZIDE-25) 37.5-25 mg per tablet Take 1 tablet by mouth once daily. 90 tablet 3    [DISCONTINUED] amoxicillin (AMOXIL) 500 MG capsule Take 2 capsules (1,000 mg total) by mouth every 12 (twelve) hours. for 17 days 68 capsule 0    [DISCONTINUED] clarithromycin (BIAXIN) 500 MG tablet Take 1 tablet (500 mg total) by mouth 2 (two) times daily. for 14 days 28 tablet 0    [DISCONTINUED] meloxicam (MOBIC) 7.5 MG tablet Take 7.5 mg by mouth.      [DISCONTINUED] metroNIDAZOLE (FLAGYL) 500 MG tablet Take 1 tablet (500 mg total) by mouth 3 (three) times daily. DO NOT drink any alcohol while taking & for 72 hours after taking for 20 days 60 tablet 0    [DISCONTINUED] omeprazole (PRILOSEC) 40 MG capsule Take 1 capsule (40 mg total) by mouth 2 (two) times daily before meals. for 14 days 28 capsule 0    [DISCONTINUED] polyethylene glycol (GLYCOLAX) 17 gram/dose powder Take 17 g by mouth once daily.       No current facility-administered medications on file prior to visit.            ROS:  GENERAL: No fever, chills,  or significant weight changes.   CARDIOVASCULAR: Denies chest pain, PND, orthopnea or reduced exercise tolerance.  ABDOMEN: Appetite fine. Denies diarrhea, abdominal pain, hematemesis or blood in stool.  URINARY: No flank pain, dysuria or hematuria.      OBJECTIVE:     Vitals:    12/26/18 1610   BP: 136/86   Pulse: 74   Temp: 98.6  "°F (37 °C)   TempSrc: Oral   Weight: 117.8 kg (259 lb 9.6 oz)   Height: 5' 3" (1.6 m)     Wt Readings from Last 3 Encounters:   12/26/18 117.8 kg (259 lb 9.6 oz)   09/24/18 114.2 kg (251 lb 12.3 oz)   08/30/18 116.1 kg (256 lb)     APPEARANCE: Well nourished, well developed, in no acute distress.    HEAD: Normocephalic.  Atraumatic.    EYES:   Right eye: Pupil reactive.  Conjunctiva clear.    Left eye: Pupil reactive.  Conjunctiva clear.    NECK: Supple.  MENTAL STATUS: Alert.  Oriented x 3.  She has some swelling and tenderness with associated lymphedema at the right hand and arm.  No adenopathy noted.      Akiko was seen today for hand pain.    Diagnoses and all orders for this visit:    Cellulitis of right hand    Lymphedema of upper extremity following lymphadenectomy    H. pylori infection    Other orders  -     sulfamethoxazole-trimethoprim 800-160mg (BACTRIM DS) 800-160 mg Tab; Take 1 tablet by mouth 2 (two) times daily. for 10 days  -     amoxicillin-clavulanate 875-125mg (AUGMENTIN) 875-125 mg per tablet; Take 1 tablet by mouth 2 (two) times daily. for 10 days  -     HYDROcodone-acetaminophen (NORCO) 5-325 mg per tablet; Take 1 tablet by mouth every 6 (six) hours as needed for Pain.      Discontinue the metronidazole for now.  Treat the cellulitis with Bactrim and Augmentin as she has tolerated and improved with before.  Once this has all resolved and then she can go back and have the H pylori treated.  I would have some suspicion that the symptoms she described may not be related to any of the medications as she has had prior problems with angioedema which we thought was related to ACE-inhibitor, but she is not on Ace inhibitors now.  If she has further symptoms we may have to have her see the allergist.    "

## 2018-12-27 ENCOUNTER — TELEPHONE (OUTPATIENT)
Dept: GASTROENTEROLOGY | Facility: CLINIC | Age: 43
End: 2018-12-27

## 2018-12-27 NOTE — TELEPHONE ENCOUNTER
----- Message from Dwayne Booth MD sent at 12/26/2018  4:48 PM CST -----  Please see note.  Patient came in today with cellulitis at her right arm associated with lymphedema.  She is being placed on Augmentin and Bactrim.  Recently diagnosed H pylori and was given Biaxin, Flagyl, omeprazole.  Had presumed reaction with possible tongue and throat swelling with ER visit and medications were discontinued, but being started back 1 x 1 per GI.  I advised that she hold off on the treatment for H pylori until we resolve the cellulitis then she can go back and have that taken care of.  She has had prior angioedema assumed associated ACE-inhibitor which she is no longer taking, so I wonder if angioedema might not be related to medications at all..

## 2019-03-15 ENCOUNTER — OFFICE VISIT (OUTPATIENT)
Dept: FAMILY MEDICINE | Facility: CLINIC | Age: 44
End: 2019-03-15
Payer: COMMERCIAL

## 2019-03-15 ENCOUNTER — LAB VISIT (OUTPATIENT)
Dept: LAB | Facility: HOSPITAL | Age: 44
End: 2019-03-15
Attending: NURSE PRACTITIONER
Payer: COMMERCIAL

## 2019-03-15 VITALS
WEIGHT: 262 LBS | TEMPERATURE: 99 F | DIASTOLIC BLOOD PRESSURE: 81 MMHG | HEIGHT: 63 IN | BODY MASS INDEX: 46.42 KG/M2 | HEART RATE: 76 BPM | SYSTOLIC BLOOD PRESSURE: 135 MMHG

## 2019-03-15 DIAGNOSIS — R22.1 NECK FULLNESS: ICD-10-CM

## 2019-03-15 DIAGNOSIS — E01.0 THYROMEGALY: Primary | ICD-10-CM

## 2019-03-15 DIAGNOSIS — R13.10 DYSPHAGIA, UNSPECIFIED TYPE: ICD-10-CM

## 2019-03-15 DIAGNOSIS — E01.0 THYROMEGALY: ICD-10-CM

## 2019-03-15 LAB
BASOPHILS # BLD AUTO: 0.04 K/UL
BASOPHILS NFR BLD: 0.6 %
DIFFERENTIAL METHOD: ABNORMAL
EOSINOPHIL # BLD AUTO: 0.6 K/UL
EOSINOPHIL NFR BLD: 8.7 %
ERYTHROCYTE [DISTWIDTH] IN BLOOD BY AUTOMATED COUNT: 15.1 %
HCT VFR BLD AUTO: 35.2 %
HGB BLD-MCNC: 10.9 G/DL
IMM GRANULOCYTES # BLD AUTO: 0.01 K/UL
IMM GRANULOCYTES NFR BLD AUTO: 0.1 %
LYMPHOCYTES # BLD AUTO: 2.3 K/UL
LYMPHOCYTES NFR BLD: 33.9 %
MCH RBC QN AUTO: 25.1 PG
MCHC RBC AUTO-ENTMCNC: 31 G/DL
MCV RBC AUTO: 81 FL
MONOCYTES # BLD AUTO: 0.7 K/UL
MONOCYTES NFR BLD: 10 %
NEUTROPHILS # BLD AUTO: 3.2 K/UL
NEUTROPHILS NFR BLD: 46.7 %
NRBC BLD-RTO: 0 /100 WBC
PLATELET # BLD AUTO: 292 K/UL
PMV BLD AUTO: 11.4 FL
RBC # BLD AUTO: 4.35 M/UL
TSH SERPL DL<=0.005 MIU/L-ACNC: 0.69 UIU/ML
WBC # BLD AUTO: 6.87 K/UL

## 2019-03-15 PROCEDURE — 3075F SYST BP GE 130 - 139MM HG: CPT | Mod: CPTII,S$GLB,, | Performed by: NURSE PRACTITIONER

## 2019-03-15 PROCEDURE — 99999 PR PBB SHADOW E&M-EST. PATIENT-LVL IV: CPT | Mod: PBBFAC,,, | Performed by: NURSE PRACTITIONER

## 2019-03-15 PROCEDURE — 99213 OFFICE O/P EST LOW 20 MIN: CPT | Mod: S$GLB,,, | Performed by: NURSE PRACTITIONER

## 2019-03-15 PROCEDURE — 99213 PR OFFICE/OUTPT VISIT, EST, LEVL III, 20-29 MIN: ICD-10-PCS | Mod: S$GLB,,, | Performed by: NURSE PRACTITIONER

## 2019-03-15 PROCEDURE — 3079F DIAST BP 80-89 MM HG: CPT | Mod: CPTII,S$GLB,, | Performed by: NURSE PRACTITIONER

## 2019-03-15 PROCEDURE — 84443 ASSAY THYROID STIM HORMONE: CPT

## 2019-03-15 PROCEDURE — 3008F PR BODY MASS INDEX (BMI) DOCUMENTED: ICD-10-PCS | Mod: CPTII,S$GLB,, | Performed by: NURSE PRACTITIONER

## 2019-03-15 PROCEDURE — 3008F BODY MASS INDEX DOCD: CPT | Mod: CPTII,S$GLB,, | Performed by: NURSE PRACTITIONER

## 2019-03-15 PROCEDURE — 85025 COMPLETE CBC W/AUTO DIFF WBC: CPT

## 2019-03-15 PROCEDURE — 3075F PR MOST RECENT SYSTOLIC BLOOD PRESS GE 130-139MM HG: ICD-10-PCS | Mod: CPTII,S$GLB,, | Performed by: NURSE PRACTITIONER

## 2019-03-15 PROCEDURE — 99999 PR PBB SHADOW E&M-EST. PATIENT-LVL IV: ICD-10-PCS | Mod: PBBFAC,,, | Performed by: NURSE PRACTITIONER

## 2019-03-15 PROCEDURE — 36415 COLL VENOUS BLD VENIPUNCTURE: CPT | Mod: PO

## 2019-03-15 PROCEDURE — 3079F PR MOST RECENT DIASTOLIC BLOOD PRESSURE 80-89 MM HG: ICD-10-PCS | Mod: CPTII,S$GLB,, | Performed by: NURSE PRACTITIONER

## 2019-03-15 NOTE — PROGRESS NOTES
"Subjective:       Patient ID: Akiko Jameson is a 44 y.o. female.    Chief Complaint: Dysphagia  Pt in today for neck fullness. Pt states began to experience 2-3 d ago; states, "It feels like my thyroid is swollen or like there is something in the front pushing on my throat. I can feel it when I swallow." Denies throat pain, GERD. She does have thyroid nodules; FNA 10/2018 was unremarkable. Pt has no other complaints today.  Past Medical History:   Diagnosis Date    ALLERGIC RHINITIS     Cancer     Cellulitis     Eosinophilia     mild    GERD (gastroesophageal reflux disease)     Granular cell tumor     Hypertension     Lymphedema     Mild anemia     Sleep apnea     compliant with CPAP    Thyroid nodule     Urinary incontinence, mixed      Social History     Socioeconomic History    Marital status:      Spouse name: Not on file    Number of children: 2    Years of education: Not on file    Highest education level: Not on file   Social Needs    Financial resource strain: Not on file    Food insecurity - worry: Not on file    Food insecurity - inability: Not on file    Transportation needs - medical: Not on file    Transportation needs - non-medical: Not on file   Occupational History     Employer: [employer]   Tobacco Use    Smoking status: Never Smoker    Smokeless tobacco: Never Used   Substance and Sexual Activity    Alcohol use: No    Drug use: No    Sexual activity: Not on file   Other Topics Concern    Not on file   Social History Narrative    Not on file     Past Surgical History:   Procedure Laterality Date    ARTHROSCOPY- PARTIAL MEDIAL MENISCECTOMY Right 3/31/2016    [facility] OR    AXILLARY NODE DISSECTION      granular cell tumor    BREAST CYST ASPIRATION      CHOLECYSTECTOMY      CHONDROPLASTY-KNEE Right 3/31/2016    [facility] OR    EGD (ESOPHAGOGASTRODUODENOSCOPY) N/A 7/5/2018    Performed by " Edgar [facility] Jr[facility] ENDO    ENDOMETRIAL ABLATION      EXCISION, MASS, ABDOMEN - flank left Left 6/18/2018    [facility]    FINE NEEDLE ASPIRATION      thyroid    KNEE ARTHROSCOPY W/ MENISCECTOMY Right     NASAL SEPTUM SURGERY      PARTIAL SYNOVECTOMY-KNEE Right 3/31/2016    [facility] OR    TUBAL LIGATION      UPPER GASTROINTESTINAL ENDOSCOPY  07/05/2018    Dr. Gamble       HPI  Review of Systems   Constitutional: Negative.  Negative for activity change and unexpected weight change.   HENT: Positive for trouble swallowing. Negative for hearing loss and rhinorrhea.         Neck fullness   Eyes: Negative.  Negative for discharge and visual disturbance.   Respiratory: Negative.  Negative for chest tightness and wheezing.    Cardiovascular: Negative.  Negative for chest pain and palpitations.   Gastrointestinal: Negative.  Negative for blood in stool, constipation, diarrhea and vomiting.   Endocrine: Negative.  Negative for polydipsia and polyuria.   Genitourinary: Negative.  Negative for difficulty urinating, dysuria, hematuria and menstrual problem.   Musculoskeletal: Negative.  Negative for arthralgias, joint swelling and neck pain.   Skin: Negative.    Allergic/Immunologic: Negative.    Neurological: Negative.  Negative for weakness and headaches.   Psychiatric/Behavioral: Negative.  Negative for confusion and dysphoric mood.       Objective:      Physical Exam   Constitutional: She is oriented to person, place, and time. She appears well-developed and well-nourished.   HENT:   Head: Normocephalic.   Right Ear: External ear normal.   Left Ear: External ear normal.   Nose: Nose normal.   Mouth/Throat: Oropharynx is clear and moist.   Eyes: Conjunctivae are normal. Pupils are equal, round, and reactive to light.   Neck: Normal range of motion. Neck supple. Thyromegaly present. No thyroid mass present.   Cardiovascular: Normal rate,  regular rhythm and normal heart sounds.   Pulmonary/Chest: Effort normal and breath sounds normal.   Abdominal: Soft. Bowel sounds are normal.   Musculoskeletal: Normal range of motion.   Neurological: She is alert and oriented to person, place, and time.   Skin: Skin is warm and dry. Capillary refill takes 2 to 3 seconds.   Psychiatric: She has a normal mood and affect. Her behavior is normal. Judgment and thought content normal.   Nursing note and vitals reviewed.      Assessment:       1. Thyromegaly    2. Neck fullness    3. Dysphagia, unspecified type        Plan:           Akiko was seen today for dysphagia.    Diagnoses and all orders for this visit:    Thyromegaly  Neck fullness  Dysphagia, unspecified type  -     US Soft Tissue Head Neck Thyroid; Future  -     TSH; Future  -     CBC auto differential; Future

## 2019-03-19 ENCOUNTER — HOSPITAL ENCOUNTER (OUTPATIENT)
Dept: RADIOLOGY | Facility: HOSPITAL | Age: 44
Discharge: HOME | End: 2019-03-19
Attending: NURSE PRACTITIONER
Payer: COMMERCIAL

## 2019-03-19 DIAGNOSIS — R13.10 DYSPHAGIA, UNSPECIFIED TYPE: ICD-10-CM

## 2019-03-19 DIAGNOSIS — R22.1 NECK FULLNESS: ICD-10-CM

## 2019-03-19 DIAGNOSIS — E01.0 THYROMEGALY: ICD-10-CM

## 2019-03-19 PROCEDURE — 76536 US EXAM OF HEAD AND NECK: CPT | Mod: TC,PO

## 2019-03-19 PROCEDURE — 76536 US EXAM OF HEAD AND NECK: CPT | Mod: 26,,, | Performed by: RADIOLOGY

## 2019-03-19 PROCEDURE — 76536 US SOFT TISSUE HEAD NECK THYROID: ICD-10-PCS | Mod: 26,,, | Performed by: RADIOLOGY

## 2019-04-11 ENCOUNTER — OFFICE VISIT (OUTPATIENT)
Dept: GASTROENTEROLOGY | Facility: CLINIC | Age: 44
End: 2019-04-11
Payer: COMMERCIAL

## 2019-04-11 VITALS
RESPIRATION RATE: 18 BRPM | BODY MASS INDEX: 46.6 KG/M2 | SYSTOLIC BLOOD PRESSURE: 137 MMHG | HEART RATE: 96 BPM | HEIGHT: 63 IN | WEIGHT: 263 LBS | DIASTOLIC BLOOD PRESSURE: 80 MMHG

## 2019-04-11 DIAGNOSIS — R10.33 PERIUMBILICAL ABDOMINAL PAIN: ICD-10-CM

## 2019-04-11 DIAGNOSIS — K62.89 RECTAL PAIN: Primary | ICD-10-CM

## 2019-04-11 DIAGNOSIS — Z86.2 HISTORY OF ANEMIA: ICD-10-CM

## 2019-04-11 DIAGNOSIS — Z86.19 HISTORY OF HELICOBACTER PYLORI INFECTION: ICD-10-CM

## 2019-04-11 PROCEDURE — 99214 PR OFFICE/OUTPT VISIT, EST, LEVL IV, 30-39 MIN: ICD-10-PCS | Mod: S$GLB,,, | Performed by: NURSE PRACTITIONER

## 2019-04-11 PROCEDURE — 99999 PR PBB SHADOW E&M-EST. PATIENT-LVL IV: CPT | Mod: PBBFAC,,, | Performed by: NURSE PRACTITIONER

## 2019-04-11 PROCEDURE — 99999 PR PBB SHADOW E&M-EST. PATIENT-LVL IV: ICD-10-PCS | Mod: PBBFAC,,, | Performed by: NURSE PRACTITIONER

## 2019-04-11 PROCEDURE — 99214 OFFICE O/P EST MOD 30 MIN: CPT | Mod: S$GLB,,, | Performed by: NURSE PRACTITIONER

## 2019-04-11 PROCEDURE — 3008F BODY MASS INDEX DOCD: CPT | Mod: CPTII,S$GLB,, | Performed by: NURSE PRACTITIONER

## 2019-04-11 PROCEDURE — 3079F PR MOST RECENT DIASTOLIC BLOOD PRESSURE 80-89 MM HG: ICD-10-PCS | Mod: CPTII,S$GLB,, | Performed by: NURSE PRACTITIONER

## 2019-04-11 PROCEDURE — 3079F DIAST BP 80-89 MM HG: CPT | Mod: CPTII,S$GLB,, | Performed by: NURSE PRACTITIONER

## 2019-04-11 PROCEDURE — 3008F PR BODY MASS INDEX (BMI) DOCUMENTED: ICD-10-PCS | Mod: CPTII,S$GLB,, | Performed by: NURSE PRACTITIONER

## 2019-04-11 PROCEDURE — 3075F SYST BP GE 130 - 139MM HG: CPT | Mod: CPTII,S$GLB,, | Performed by: NURSE PRACTITIONER

## 2019-04-11 PROCEDURE — 3075F PR MOST RECENT SYSTOLIC BLOOD PRESS GE 130-139MM HG: ICD-10-PCS | Mod: CPTII,S$GLB,, | Performed by: NURSE PRACTITIONER

## 2019-04-11 RX ORDER — DOCUSATE SODIUM 100 MG/1
100 CAPSULE, LIQUID FILLED ORAL EVERY OTHER DAY
COMMUNITY

## 2019-04-11 NOTE — PROGRESS NOTES
Subjective:       Patient ID: Akiko Jameson is a 44 y.o. female Body mass index is 46.59 kg/m².    Chief Complaint: Other (rectal pain)    Established with KYLE Baez NP with Hepatology, Dr. Gamble & myself.    Ptozzy is here with chief complaint of rectal pain. Rectal pain started ~3/2019- wakes her up from sleeping with rectal pain- lasts 3-4 minutes, described sharp pain; bowel urgency has resolved. Denies association of rectal pain with bowel movements.    Gastroesophageal Reflux   She complains of abdominal pain (rare epigastric and periumbilical pain, described as dull pain that occurs when she only eats one meal a day; denies currently, significantly improved, less often since our last visit), heartburn (rarely) and water brash. She reports no belching, no chest pain, no choking, no coughing, no dysphagia, no early satiety, no globus sensation, no hoarse voice, no nausea or no sore throat. This is a chronic problem. The current episode started more than 1 year ago (started several years ago). The problem occurs rarely. The problem has been resolved (controlled overall with zantac). The heartburn changes with position. The symptoms are aggravated by lying down. Pertinent negatives include no fatigue, melena or weight loss. Risk factors include obesity, NSAIDs, caffeine use and hiatal hernia (mobic use PRN occasional use). She has tried a histamine-2 antagonist, an antibiotic, head elevation and a diet change (zantac 300 mg BID; h pylori (history of lip swelling with prevacid); PAST: amoxil & biaxin x 1 courses; TUMS, reports she never completed h pylori treatment as previously advised by Dr. Gamble since her PCP told her it likely wouldn't work) for the symptoms. The treatment provided significant relief. Past procedures include an abdominal ultrasound and an EGD.     Review of Systems   Constitutional: Negative for appetite change, chills, fatigue, fever and weight loss.   HENT: Negative for hoarse  voice, sore throat and trouble swallowing.    Respiratory: Negative for cough, choking and shortness of breath.    Cardiovascular: Negative for chest pain.   Gastrointestinal: Positive for abdominal pain (rare epigastric and periumbilical pain, described as dull pain that occurs when she only eats one meal a day; denies currently, significantly improved, less often since our last visit), heartburn (rarely) and rectal pain (CHIEF COMPLAINT:). Negative for anal bleeding, blood in stool, constipation, diarrhea, dysphagia, melena, nausea and vomiting.        Bowel movements are 1-2 times daily of soft formed stool; Controlled with colace 100 mg once every other day; PAST TREATMENT: mirlax   Genitourinary: Negative for difficulty urinating, dysuria and flank pain.   Neurological: Negative for weakness.       No LMP recorded. Patient has had an ablation.    Past Medical History:   Diagnosis Date    ALLERGIC RHINITIS     Cellulitis     Eosinophilia     mild    GERD (gastroesophageal reflux disease)     Granular cell tumor     H. pylori infection 2018    Hypertension     Lymphedema     Mild anemia     Sleep apnea     compliant with CPAP    Thyroid nodule     Urinary incontinence, mixed      Past Surgical History:   Procedure Laterality Date    ARTHROSCOPY- PARTIAL MEDIAL MENISCECTOMY Right 3/31/2016    [facility]    AXILLARY NODE DISSECTION      granular cell tumor    BREAST CYST ASPIRATION      CHOLECYSTECTOMY      CHONDROPLASTY-KNEE Right 3/31/2016    [facility] OR    EGD (ESOPHAGOGASTRODUODENOSCOPY) N/A 7/5/2018    Performed by Edgar Gamble Jr., [facility] at [facility] ENDO    ENDOMETRIAL ABLATION      EXCISION, MASS, ABDOMEN - flank left Left 6/18/2018    [facility] Armando Tate [facility] OR    FINE NEEDLE ASPIRATION      thyroid    KNEE ARTHROSCOPY W/ MENISCECTOMY Right     NASAL SEPTUM SURGERY      PARTIAL SYNOVECTOMY-KNEE Right  3/31/2016    [facility]    TUBAL LIGATION      UPPER GASTROINTESTINAL ENDOSCOPY  07/05/2018    Dr. Gamble     Family History   Problem Relation Age of Onset    Diabetes Mother     Kidney failure Mother     Breast cancer Maternal Grandmother     Breast cancer Maternal Aunt     Ovarian cancer Cousin     Cirrhosis Neg Hx     Colon polyps Neg Hx     Colon cancer Neg Hx     Crohn's disease Neg Hx     Esophageal cancer Neg Hx     Stomach cancer Neg Hx     Ulcerative colitis Neg Hx      Wt Readings from Last 10 Encounters:   04/11/19 119.3 kg (263 lb 0.1 oz)   03/15/19 118.8 kg (262 lb)   12/26/18 117.8 kg (259 lb 9.6 oz)   09/24/18 114.2 kg (251 lb 12.3 oz)   08/30/18 116.1 kg (256 lb)   08/30/18 114.8 kg (253 lb)   08/24/18 116.5 kg (256 lb 13.4 oz)   07/12/18 114.1 kg (251 lb 8.7 oz)   07/03/18 115.2 kg (254 lb)   06/28/18 113 kg (249 lb 1.9 oz)     Lab Results   Component Value Date    WBC 6.87 03/15/2019    HGB 10.9 (L) 03/15/2019    HCT 35.2 (L) 03/15/2019    MCV 81 (L) 03/15/2019     03/15/2019     CMP  Sodium   Date Value Ref Range Status   07/12/2018 140 136 - 145 mmol/L Final     Potassium   Date Value Ref Range Status   07/12/2018 3.5 3.5 - 5.1 mmol/L Final     Chloride   Date Value Ref Range Status   07/12/2018 99 95 - 110 mmol/L Final     CO2   Date Value Ref Range Status   07/12/2018 29 22 - 31 mmol/L Final     Glucose   Date Value Ref Range Status   07/12/2018 110 70 - 110 mg/dL Final     Comment:     The ADA recommends the following guidelines for fasting glucose:  Normal:       less than 100 mg/dL  Prediabetes:  100 mg/dL to 125 mg/dL  Diabetes:     126 mg/dL or higher       BUN, Bld   Date Value Ref Range Status   07/12/2018 14 7 - 18 mg/dL Final     Creatinine   Date Value Ref Range Status   07/12/2018 0.84 0.50 - 1.40 mg/dL Final     Calcium   Date Value Ref Range Status   07/12/2018 9.2 8.4 - 10.2 mg/dL Final     Total Protein   Date Value Ref Range  "Status   07/12/2018 8.2 6.0 - 8.4 g/dL Final     Albumin   Date Value Ref Range Status   07/12/2018 4.2 3.5 - 5.2 g/dL Final     Total Bilirubin   Date Value Ref Range Status   07/12/2018 0.5 0.2 - 1.3 mg/dL Final     Alkaline Phosphatase   Date Value Ref Range Status   07/12/2018 94 38 - 145 U/L Final     AST   Date Value Ref Range Status   07/12/2018 36 14 - 36 U/L Final     ALT   Date Value Ref Range Status   07/12/2018 40 10 - 44 U/L Final     Anion Gap   Date Value Ref Range Status   07/12/2018 12 8 - 16 mmol/L Final     eGFR if    Date Value Ref Range Status   07/12/2018 >60 >60 mL/min/1.73 m^2 Final     eGFR if non    Date Value Ref Range Status   07/12/2018 >60 >60 mL/min/1.73 m^2 Final     Comment:     Calculation used to obtain the estimated glomerular filtration  rate (eGFR) is the CKD-EPI equation.        Lab Results   Component Value Date    TSH 0.694 03/15/2019     Reviewed prior medical records including radiology report of 5/28/18 limited abdominal ultrasound; 2/26/15 MRI/MRCP abdomen; 2/19/15 abdominal ultrasound; & 2/21/15 ct renal stone abdomen pelvis in care everywhere.    7/5/18 EGD was reviewed and procedure report states:   " Findings:       The oropharynx was normal.       Gastroesophageal reflux is evident by free flow of gastric contents        to at least the middle third of the esophagus.       The examined esophagus was normal.       The Z-line was regular and was found 35 cm from the incisors.       A patulous lower esophageal sphincter was found.       A small hiatal hernia was present.       Patchy minimal inflammation characterized by erythema was found in        the prepyloric region of the stomach. Biopsies were taken with a        cold forceps for Helicobacter pylori testing using CLOtest. Biopsies        were taken with a cold forceps for histology. Biopsies were taken        with a cold forceps for histology.       The stomach was otherwise " "normal.       The examined duodenum was normal.  Impression:          - Normal oropharynx.                       - GERD, as evident by free flow of gastric contents                        into the esophagus.                       - Normal esophagus.                       - Z-line regular, 35 cm from the incisors.                       - Patulous lower esophageal sphincter.                       - Small hiatal hernia.                       - Minimal antritis. Biopsied.                       - Normal stomach otherwise.                       - Normal examined duodenum.  Recommendation:      - Discharge patient to home.                       - Await pathology and CLOtest results.                       - Follow an antireflux regimen.                       - Continue present medications.                       - Use Zantac (ranitidine) 300 mg PO BID.                       - Call the G.I. clinic in 2 weeks for reports (if                        you haven't heard from us sooner) [phone].                       - Return to GI clinic in 6-8 weeks.                       - If no improvement, next perform a modified barium                        swallow/esophagram.".  Biopsy results:   "FINAL PATHOLOGIC DIAGNOSIS  Antrum, biopsy:  - Antral mucosa with moderate chronic active gastritis and reactive epithelial changes.  - Immunohistochemistry with appropriate control is positive for H. Pylori."  Objective:      Physical Exam   Constitutional: She is oriented to person, place, and time. She appears well-developed and well-nourished. No distress.   HENT:   Mouth/Throat: Oropharynx is clear and moist and mucous membranes are normal. No oral lesions. No oropharyngeal exudate.   Eyes: Pupils are equal, round, and reactive to light. Conjunctivae are normal. No scleral icterus.   Pulmonary/Chest: Effort normal and breath sounds normal. No respiratory distress. She has no wheezes.   Abdominal: Soft. Normal appearance and bowel sounds are " normal. She exhibits no distension, no abdominal bruit and no mass. There is no tenderness. There is no rigidity, no rebound, no guarding, no tenderness at McBurney's point and negative Still's sign.   Patient declined rectal exam.   Neurological: She is alert and oriented to person, place, and time.   Skin: Skin is warm and dry. No rash noted. She is not diaphoretic. No erythema. No pallor.   Non-jaundiced   Psychiatric: She has a normal mood and affect. Her behavior is normal. Judgment and thought content normal.   Nursing note and vitals reviewed.      Assessment:       1. Rectal pain    2. History of anemia    3. History of Helicobacter pylori infection    4. Periumbilical abdominal pain        Plan:       Rectal pain  - schedule Colonoscopy, discussed procedure with the patient, patient verbalized understanding  - Possible proctalgia fugax  Recommended daily exercise as tolerated, adequate water intake (six 8-oz glasses of water daily), and high fiber diet. OTC fiber supplements are recommended if diet does not reach daily fiber goal (25 grams daily), such as Metamucil, Citrucel, or FiberCon (take as directed, separate from other oral medications by >2 hours).  -CONTINUE OTC stool softener such as Colace as directed to avoid hard stools and straining with bowel movements PRN  -If still no improvement with these measures, call/follow-up    History of anemia  Recommend follow-up with Primary Care Provider/hematology for continued evaluation and management.  - schedule Colonoscopy, discussed procedure with the patient, patient verbalized understanding    History of Helicobacter pylori infection & Periumbilical abdominal pain  Recommend follow-up with Dr. Gamble for continued evaluation and management (patient had allergies/adverse reactions to medications used to treat H. Pylori).  - CONTINUE zantac 300 mg bid as directed by Dr. Gamble  -discussed about the different types of medications used to treat reflux and  how to use them, antacids can be used PRN for breakthrough heartburn symptoms by reducing stomach acid that is already produced, H2 blockers (zantac) work by limiting the amount acid production, & PPI's work to block acid production and are taken daily (history of allergy to prevacid), patient verbalized understanding.  - CONTINUE lifestyle modifications to help control/reduce reflux/abdominal pain including: avoid large meals, avoid eating within 2-3 hours of bedtime (avoid late night eating & lying down soon after eating), elevate head of bed if nocturnal symptoms are present, smoking cessation (if current smoker), & weight loss (if overweight).   - avoid known foods which trigger reflux symptoms & to minimize/avoid high-fat foods, chocolate, caffeine, citrus, alcohol, & tomato products.  - avoid/limit use of NSAID's, since they can cause GI upset, bleeding, and/or ulcers. If needed, take with food.     Follow up in about 1 month (around 5/11/2019), or if symptoms worsen or fail to improve, for follow-up with Dr. Gamble.      If no improvement in symptoms or symptoms worsen, call/follow-up at clinic or go to ER.

## 2019-04-11 NOTE — PATIENT INSTRUCTIONS
Eating a High-Fiber Diet  Fiber is what gives strength and structure to plants. Most grains, beans, vegetables, and fruits contain fiber. Foods rich in fiber are often low in calories and fat, and they fill you up more. They may also reduce your risks for certain health problems. To find out the amount of fiber in canned, packaged, or frozen foods, read the Nutrition Facts label. It tells you how much fiber is in a serving.    Types of fiber and their benefits  There are two types of fiber: insoluble and soluble. They both aid digestion and help you maintain a healthy weight.  · Insoluble fiber. This is found in whole grains, cereals, certain fruits and vegetables such as apple skin, corn, and carrots. Insoluble fiber may prevent constipation and reduce the risk for certain types of cancer.  · Soluble fiber. This type of fiber is in oats, beans, and certain fruits and vegetables such as strawberries and peas. Soluble fiber can reduce cholesterol, which may help lower the risk for heart disease. It also helps control blood sugar levels.  Look for high-fiber foods  Try these foods to add fiber to your diet:  · Whole-grain breads and cereals. Try to eat 6 to 8 ounces a day. Include wheat and oat bran cereals, whole-wheat muffins or toast, and corn tortillas in your meals.  · Fruits. Try to eat 2 cups a day. Apples, oranges, strawberries, pears, and bananas are good sources. (Note: Fruit juice is low in fiber.)  · Vegetables. Try to eat at least 2.5 cups a day. Add asparagus, carrots, broccoli, peas, and corn to your meals.  · Beans. One cup of cooked lentils gives you over 15 grams of fiber. Try navy beans, lentils, and chickpeas.  · Seeds. A small handful of seeds gives you about 3 grams of fiber. Try sunflower seeds.  Keep track of your fiber  Keep track of how much fiber you eat. Start by reading food labels. Then eat a variety of foods high in fiber. As you begin to eat more fiber, ask your healthcare provider  how much water you should be drinking to keep your digestive system working smoothly.  You should aim for a certain amount of fiber in your diet each day. If you are a woman, that amount is between 25 and 28 grams per day. Men should aim for 30 to 33 grams per day. After age 50, your daily fiber needs drop to 22 grams for women and 28 grams for men.  Before you reach for the fiber supplements, think about this. Fiber is found naturally in healthy whole foods. It gives you that feeling of fullness after you eat. Taking fiber supplements or eating fiber-enriched foods will not give you this full feeling.  Your fiber intake is a good measure for the quality of your overall diet. If you are missing out on your daily amount of fiber, you may be lacking other important nutrients as well.  Date Last Reviewed: 5/11/2015 © 2000-2017 [employer]. [address] [place] [address]. All rights reserved. This information is not intended as a substitute for professional medical care. Always follow your healthcare professional's instructions.

## 2019-05-08 ENCOUNTER — PATIENT MESSAGE (OUTPATIENT)
Dept: GASTROENTEROLOGY | Facility: CLINIC | Age: 44
End: 2019-05-08

## 2019-05-08 ENCOUNTER — TELEPHONE (OUTPATIENT)
Dept: GASTROENTEROLOGY | Facility: CLINIC | Age: 44
End: 2019-05-08

## 2019-05-08 NOTE — TELEPHONE ENCOUNTER
----- Message from Izzy Jurado sent at 5/8/2019 10:22 AM CDT -----  Type: Needs Medical Advice    Who Called:  Patient  Best Call Back Number: [phone]  Additional Information: Office just moved her procedure date. She is asking if office can send her the instructions on my [facility] or fax to [phone]. Please call to advise.

## 2019-05-09 NOTE — H&P
History & Physical - Short Stay  Gastroenterology      SUBJECTIVE:     Procedure: Colonoscopy    Chief Complaint/Indication for Procedure: Rectal pain.    History of Present Illness:  Office Visit     4/11/2019  [facility] - Gastroenterology      BETO Awan   Gastroenterology   Rectal pain +3 more   Dx   Other         Reason for Visit          Progress Notes        Subjective:       Patient ID: Akiko Jameson is a 44 y.o. female Body mass index is 46.59 kg/m².     Chief Complaint: Other (rectal pain)     Established with KYLE Baez NP with Hepatology, Dr. Gamble & myself.     Ptient is here with chief complaint of rectal pain. Rectal pain started ~3/2019- wakes her up from sleeping with rectal pain- lasts 3-4 minutes, described sharp pain; bowel urgency has resolved. Denies association of rectal pain with bowel movements.     Gastroesophageal Reflux   She complains of abdominal pain (rare epigastric and periumbilical pain, described as dull pain that occurs when she only eats one meal a day; denies currently, significantly improved, less often since our last visit), heartburn (rarely) and water brash. She reports no belching, no chest pain, no choking, no coughing, no dysphagia, no early satiety, no globus sensation, no hoarse voice, no nausea or no sore throat. This is a chronic problem. The current episode started more than 1 year ago (started several years ago). The problem occurs rarely. The problem has been resolved (controlled overall with zantac). The heartburn changes with position. The symptoms are aggravated by lying down. Pertinent negatives include no fatigue, melena or weight loss. Risk factors include obesity, NSAIDs, caffeine use and hiatal hernia (mobic use PRN occasional use). She has tried a histamine-2 antagonist, an antibiotic, head elevation and a diet change (zantac 300 mg BID; h pylori (history of lip swelling with prevacid); PAST: amoxil & biaxin x 1 courses; TUMS,  reports she never completed h pylori treatment as previously advised by Dr. Gamble since her PCP told her it likely wouldn't work) for the symptoms. The treatment provided significant relief. Past procedures include an abdominal ultrasound and an EGD.      Gastrointestinal: Positive for abdominal pain (rare epigastric and periumbilical pain, described as dull pain that occurs when she only eats one meal a day; denies currently, significantly improved, less often since our last visit), heartburn (rarely) and rectal pain (CHIEF COMPLAINT:). Negative for anal bleeding, blood in stool, constipation, diarrhea, dysphagia, melena, nausea and vomiting.        Bowel movements are 1-2 times daily of soft formed stool; Controlled with colace 100 mg once every other day; PAST TREATMENT: mirlax     Assessment:       1. Rectal pain    2. History of anemia    3. History of Helicobacter pylori infection    4. Periumbilical abdominal pain        Plan:       Rectal pain  - schedule Colonoscopy, discussed procedure with the patient, patient verbalized understanding  - Possible proctalgia fugax  Recommended daily exercise as tolerated, adequate water intake (six 8-oz glasses of water daily), and high fiber diet. OTC fiber supplements are recommended if diet does not reach daily fiber goal (25 grams daily), such as Metamucil, Citrucel, or FiberCon (take as directed, separate from other oral medications by >2 hours).  -CONTINUE OTC stool softener such as Colace as directed to avoid hard stools and straining with bowel movements PRN  -If still no improvement with these measures, call/follow-up     History of anemia  Recommend follow-up with Primary Care Provider/hematology for continued evaluation and management.  - schedule Colonoscopy, discussed procedure with the patient, patient verbalized understanding     History of Helicobacter pylori infection & Periumbilical abdominal pain  Recommend follow-up with Dr. Gamble for continued  evaluation and management (patient had allergies/adverse reactions to medications used to treat H. Pylori).  - CONTINUE zantac 300 mg bid as directed by Dr. Gamble  -discussed about the different types of medications used to treat reflux and how to use them, antacids can be used PRN for breakthrough heartburn symptoms by reducing stomach acid that is already produced, H2 blockers (zantac) work by limiting the amount acid production, & PPI's work to block acid production and are taken daily (history of allergy to prevacid), patient verbalized understanding.  - CONTINUE lifestyle modifications to help control/reduce reflux/abdominal pain including: avoid large meals, avoid eating within 2-3 hours of bedtime (avoid late night eating & lying down soon after eating), elevate head of bed if nocturnal symptoms are present, smoking cessation (if current smoker), & weight loss (if overweight).   - avoid known foods which trigger reflux symptoms & to minimize/avoid high-fat foods, chocolate, caffeine, citrus, alcohol, & tomato products.  - avoid/limit use of NSAID's, since they can cause GI upset, bleeding, and/or ulcers. If needed, take with food.      Follow up in about 1 month (around 5/11/2019), or if symptoms worsen or fail to improve, for follow-up with Dr. Gamble.              Wt Readings from Last 10 Encounters:   04/11/19 119.3 kg (263 lb 0.1 oz)   03/15/19 118.8 kg (262 lb)   12/26/18 117.8 kg (259 lb 9.6 oz)   09/24/18 114.2 kg (251 lb 12.3 oz)   08/30/18 116.1 kg (256 lb)   08/30/18 114.8 kg (253 lb)   08/24/18 116.5 kg (256 lb 13.4 oz)   07/12/18 114.1 kg (251 lb 8.7 oz)   07/03/18 115.2 kg (254 lb)   06/28/18 113 kg (249 lb 1.9 oz)         PTA Medications   Medication Sig    diltiaZEM (CARDIZEM CD) 120 MG Cp24 Take 1 capsule (120 mg total) by mouth once daily.    docusate sodium (COLACE) 100 MG capsule Take 100 mg by mouth every other day.     montelukast (SINGULAIR) 10 mg tablet Take 1 tablet (10 mg total) by  mouth nightly.    multivitamin (THERAGRAN) per tablet Every day    potassium chloride SA (K-DUR,KLOR-CON) 20 MEQ tablet Take 1 tablet (20 mEq total) by mouth 2 (two) times daily. Appointment needed for further refills after this one    ranitidine (ZANTAC) 300 MG tablet TAKE 1 TABLET BY MOUTH TWICE DAILY before breakfast and about ONE HOUR before bedtime    triamterene-hydrochlorothiazide 37.5-25 mg (MAXZIDE-25) 37.5-25 mg per tablet Take 1 tablet by mouth once daily.       Review of patient's allergies indicates:   Allergen Reactions    Biaxin [clarithromycin] Swelling    Prilosec [omeprazole magnesium] Swelling    Prevacid [lansoprazole] Swelling     Lip swelling- was taking this along with blood pressure medication: benazepril; not sure which caused it. Reports she has taken OTC prilosec without any problems.    Ace inhibitors Swelling     Facial swelling    Benazepril Swelling     Lip swelling        Past Medical History:   Diagnosis Date    ALLERGIC RHINITIS     Cellulitis     Eosinophilia     mild    GERD (gastroesophageal reflux disease)     Granular cell tumor     H. pylori infection 2018    Hypertension     Lymphedema     Mild anemia     Sleep apnea     compliant with CPAP    Thyroid nodule     Urinary incontinence, mixed      Past Surgical History:   Procedure Laterality Date    ARTHROSCOPY- PARTIAL MEDIAL MENISCECTOMY Right 3/31/2016    [facility]    AXILLARY NODE DISSECTION      granular cell tumor    BREAST CYST ASPIRATION      left    CHOLECYSTECTOMY      CHONDROPLASTY-KNEE Right 3/31/2016    [facility]    EGD (ESOPHAGOGASTRODUODENOSCOPY) N/A 7/5/2018    [facility] by Edgar [facility]ble Jr[facility] at [facility]    ENDOMETRIAL ABLATION      EXCISION, MASS, ABDOMEN - flank left Left 6/18/2018    [facility] Armando Tate [facility] at [facility]    FINE NEEDLE ASPIRATION      thyroid    KNEE ARTHROSCOPY W/ MENISCECTOMY  "Right     NASAL SEPTUM SURGERY      PARTIAL SYNOVECTOMY-KNEE Right 3/31/2016    [facility]    TUBAL LIGATION      UPPER GASTROINTESTINAL ENDOSCOPY  07/05/2018    Dr. Gamble     Family History   Problem Relation Age of Onset    Diabetes Mother     Kidney failure Mother     Breast cancer Maternal Grandmother     Breast cancer Maternal Aunt     Ovarian cancer Cousin     Cirrhosis Neg Hx     Colon polyps Neg Hx     Colon cancer Neg Hx     Crohn's disease Neg Hx     Esophageal cancer Neg Hx     Stomach cancer Neg Hx     Ulcerative colitis Neg Hx      Social History     Tobacco Use    Smoking status: Never Smoker    Smokeless tobacco: Never Used   Substance Use Topics    Alcohol use: No    Drug use: No         OBJECTIVE:     Vital Signs (Most Recent)  Temp: 97.5 °F (36.4 °C) (05/10/19 0856)  Pulse: 71 (05/10/19 0856)  Resp: 16 (05/10/19 0856)  BP: 135/72 (05/10/19 0856)  SpO2: 100 % (05/10/19 0856)    Physical Exam:  :Ht 5' 3" (1.6 m)   Wt 119.3 kg (263 lb 0.1 oz) BMI 46.59 kg/m²                   GENERAL:  Comfortable, in no acute distress.                                 HEENT EXAM:  Nonicteric.  No adenopathy.  Oropharynx is clear.            NECK:  Supple.                                                               LUNGS:  Clear.                                                               CARDIAC:  Regular rate and rhythm.  S1, S2.  No murmur.                      ABDOMEN:  Obese.  Soft, positive bowel sounds, nontender.  No hepatosplenomegaly or masses.  No rebound or guarding.                        EXTREMITIES:  No edema.     MENTAL STATUS:  Alert and oriented.    ASSESSMENT/PLAN:     Assessment: Colorectal cancer screening    Plan: Colonoscopy    Anesthesia Plan:   MAC / General Anaesthesia    ASA Grade: ASA 2 - Patient with mild systemic disease with no functional limitations    MALLAMPATI SCORE: II (hard and soft palate, upper portion of tonsils anduvula " visible)

## 2019-05-10 ENCOUNTER — ANESTHESIA (OUTPATIENT)
Dept: ENDOSCOPY | Facility: HOSPITAL | Age: 44
End: 2019-05-10
Payer: COMMERCIAL

## 2019-05-10 ENCOUNTER — ANESTHESIA EVENT (OUTPATIENT)
Dept: ENDOSCOPY | Facility: HOSPITAL | Age: 44
End: 2019-05-10
Payer: COMMERCIAL

## 2019-05-10 ENCOUNTER — HOSPITAL ENCOUNTER (OUTPATIENT)
Facility: HOSPITAL | Age: 44
Discharge: HOME | End: 2019-05-10
Attending: INTERNAL MEDICINE | Admitting: INTERNAL MEDICINE
Payer: COMMERCIAL

## 2019-05-10 VITALS
WEIGHT: 260 LBS | RESPIRATION RATE: 16 BRPM | SYSTOLIC BLOOD PRESSURE: 168 MMHG | OXYGEN SATURATION: 100 % | TEMPERATURE: 97 F | HEIGHT: 63 IN | HEART RATE: 72 BPM | BODY MASS INDEX: 46.07 KG/M2 | DIASTOLIC BLOOD PRESSURE: 80 MMHG

## 2019-05-10 DIAGNOSIS — K62.89 RECTAL PAIN: ICD-10-CM

## 2019-05-10 LAB
B-HCG UR QL: NEGATIVE
CTP QC/QA: YES

## 2019-05-10 PROCEDURE — 37000008 HC ANESTHESIA 1ST 15 MINUTES: Mod: PO | Performed by: INTERNAL MEDICINE

## 2019-05-10 PROCEDURE — 37000009 HC ANESTHESIA EA ADD 15 MINS: Mod: PO | Performed by: INTERNAL MEDICINE

## 2019-05-10 PROCEDURE — 45378 PR COLONOSCOPY,DIAGNOSTIC: ICD-10-PCS | Mod: ,,, | Performed by: INTERNAL MEDICINE

## 2019-05-10 PROCEDURE — D9220A PRA ANESTHESIA: Mod: CRNA,,, | Performed by: NURSE ANESTHETIST, CERTIFIED REGISTERED

## 2019-05-10 PROCEDURE — D9220A PRA ANESTHESIA: ICD-10-PCS | Mod: ANES,,, | Performed by: ANESTHESIOLOGY

## 2019-05-10 PROCEDURE — 63600175 PHARM REV CODE 636 W HCPCS: Mod: PO | Performed by: NURSE ANESTHETIST, CERTIFIED REGISTERED

## 2019-05-10 PROCEDURE — 45378 DIAGNOSTIC COLONOSCOPY: CPT | Mod: PO | Performed by: INTERNAL MEDICINE

## 2019-05-10 PROCEDURE — 45378 DIAGNOSTIC COLONOSCOPY: CPT | Mod: ,,, | Performed by: INTERNAL MEDICINE

## 2019-05-10 PROCEDURE — D9220A PRA ANESTHESIA: Mod: ANES,,, | Performed by: ANESTHESIOLOGY

## 2019-05-10 PROCEDURE — 81025 URINE PREGNANCY TEST: CPT | Mod: PO | Performed by: INTERNAL MEDICINE

## 2019-05-10 PROCEDURE — 25000003 PHARM REV CODE 250: Mod: PO | Performed by: INTERNAL MEDICINE

## 2019-05-10 PROCEDURE — 25000003 PHARM REV CODE 250: Mod: PO | Performed by: NURSE ANESTHETIST, CERTIFIED REGISTERED

## 2019-05-10 PROCEDURE — D9220A PRA ANESTHESIA: ICD-10-PCS | Mod: CRNA,,, | Performed by: NURSE ANESTHETIST, CERTIFIED REGISTERED

## 2019-05-10 RX ORDER — PROPOFOL 10 MG/ML
VIAL (ML) INTRAVENOUS CONTINUOUS PRN
Status: DISCONTINUED | OUTPATIENT
Start: 2019-05-10 | End: 2019-05-10

## 2019-05-10 RX ORDER — HYOSCYAMINE SULFATE 0.12 MG/1
0.25 TABLET SUBLINGUAL EVERY 4 HOURS PRN
Qty: 20 TABLET | Refills: 11 | Status: SHIPPED | OUTPATIENT
Start: 2019-05-10 | End: 2020-11-05

## 2019-05-10 RX ORDER — LIDOCAINE HCL/PF 100 MG/5ML
SYRINGE (ML) INTRAVENOUS
Status: DISCONTINUED | OUTPATIENT
Start: 2019-05-10 | End: 2019-05-10

## 2019-05-10 RX ORDER — SODIUM CHLORIDE 0.9 % (FLUSH) 0.9 %
10 SYRINGE (ML) INJECTION
Status: DISCONTINUED | OUTPATIENT
Start: 2019-05-10 | End: 2019-05-10 | Stop reason: HOSPADM

## 2019-05-10 RX ORDER — PROPOFOL 10 MG/ML
VIAL (ML) INTRAVENOUS
Status: DISCONTINUED | OUTPATIENT
Start: 2019-05-10 | End: 2019-05-10

## 2019-05-10 RX ORDER — SODIUM CHLORIDE, SODIUM LACTATE, POTASSIUM CHLORIDE, CALCIUM CHLORIDE 600; 310; 30; 20 MG/100ML; MG/100ML; MG/100ML; MG/100ML
INJECTION, SOLUTION INTRAVENOUS CONTINUOUS
Status: DISCONTINUED | OUTPATIENT
Start: 2019-05-10 | End: 2019-05-10 | Stop reason: HOSPADM

## 2019-05-10 RX ORDER — LIDOCAINE HYDROCHLORIDE 10 MG/ML
1 INJECTION INFILTRATION; PERINEURAL ONCE
Status: COMPLETED | OUTPATIENT
Start: 2019-05-10 | End: 2019-05-10

## 2019-05-10 RX ORDER — HYDROCORTISONE ACETATE PRAMOXINE HCL 2.5; 1 G/100G; G/100G
CREAM TOPICAL 3 TIMES DAILY
Qty: 30 G | Refills: 5 | Status: SHIPPED | OUTPATIENT
Start: 2019-05-10 | End: 2019-05-20

## 2019-05-10 RX ORDER — GLYCOPYRROLATE 0.2 MG/ML
INJECTION INTRAMUSCULAR; INTRAVENOUS
Status: DISCONTINUED | OUTPATIENT
Start: 2019-05-10 | End: 2019-05-10

## 2019-05-10 RX ADMIN — LIDOCAINE HYDROCHLORIDE: 10 INJECTION, SOLUTION EPIDURAL; INFILTRATION; INTRACAUDAL; PERINEURAL at 09:05

## 2019-05-10 RX ADMIN — PROPOFOL 150 MCG/KG/MIN: 10 INJECTION, EMULSION INTRAVENOUS at 09:05

## 2019-05-10 RX ADMIN — PROPOFOL 100 MG: 10 INJECTION, EMULSION INTRAVENOUS at 09:05

## 2019-05-10 RX ADMIN — GLYCOPYRROLATE 0.4 MG: 0.2 INJECTION, SOLUTION INTRAMUSCULAR; INTRAVENOUS at 09:05

## 2019-05-10 RX ADMIN — LIDOCAINE HYDROCHLORIDE 100 MG: 20 INJECTION, SOLUTION INTRAVENOUS at 09:05

## 2019-05-10 RX ADMIN — SODIUM CHLORIDE, SODIUM LACTATE, POTASSIUM CHLORIDE, AND CALCIUM CHLORIDE: .6; .31; .03; .02 INJECTION, SOLUTION INTRAVENOUS at 09:05

## 2019-05-10 NOTE — ANESTHESIA PREPROCEDURE EVALUATION
05/10/2019  Akiko Jameson is a 44 y.o., female.    Anesthesia Evaluation    I have reviewed the Patient Summary Reports.    I have reviewed the Nursing Notes.      Review of Systems  Anesthesia Hx:  No problems with previous Anesthesia    Cardiovascular:   Hypertension, well controlled    Pulmonary:   Sleep Apnea, CPAP    Hepatic/GI:   GERD, well controlled        Physical Exam  General:  Obesity    Airway/Jaw/Neck:  Airway Findings: Mallampati: II                Anesthesia Plan  Type of Anesthesia, risks & benefits discussed:  Anesthesia Type:  general  Patient's Preference:   Intra-op Monitoring Plan:   Intra-op Monitoring Plan Comments:   Post Op Pain Control Plan:   Post Op Pain Control Plan Comments:   Induction:   IV  Beta Blocker:  Patient is not currently on a Beta-Blocker (No further documentation required).       Informed Consent: Patient understands risks and agrees with Anesthesia plan.  Questions answered. Anesthesia consent signed with patient.  ASA Score: 2     Day of Surgery Review of History & Physical:    H&P update referred to the surgeon.         Ready For Surgery From Anesthesia Perspective.

## 2019-05-10 NOTE — PLAN OF CARE
Patient tolerating oral liquids without difficulty. No apparent s&s of distress noted at this time, no complaints voiced at this time  Discharge instructions reviewed with patient/family/friend with good verbal feedback received. Patient ready for discharge

## 2019-05-10 NOTE — TRANSFER OF CARE
"Anesthesia Transfer of Care Note    Patient: Akiko Jameson    Procedure(s) Performed: Procedure(s) (LRB):  COLONOSCOPY (N/A)    Patient location: PACU    Anesthesia Type: general    Transport from OR: Transported from OR on room air with adequate spontaneous ventilation    Post pain: adequate analgesia    Post assessment: no apparent anesthetic complications    Post vital signs: stable    Level of consciousness: responds to stimulation    Complications: none    Transfer of care protocol was followed      Last vitals:   Visit Vitals  /72 (BP Location: Left arm, Patient Position: Lying)   Pulse 71   Temp 36.4 °C (97.5 °F) (Skin)   Resp 16   Ht 5' 3" (1.6 m)   Wt 117.9 kg (260 lb)   SpO2 100%   Breastfeeding? No   BMI 46.06 kg/m²     "

## 2019-05-10 NOTE — DISCHARGE INSTRUCTIONS
Recovery After Procedural Sedation (Adult)  You have been given medicine by vein to make you sleep during your surgery. This may have included both a pain medicine and sleeping medicine. Most of the effects have worn off. But you may still have some drowsiness for the next 6 to 8 hours.  Home care  Follow these guidelines when you get home:  · For the next 8 hours, you should be watched by a responsible adult. This person should make sure your condition is not getting worse.  · Don't drink any alcohol for the next 24 hours.  · Don't drive, operate dangerous machinery, or make important business or personal decisions during the next 24 hours.  Note: Your healthcare provider may tell you not to take any medicine by mouth for pain or sleep in the next 4 hours. These medicines may react with the medicines you were given in the hospital. This could cause a much stronger response than usual.  Follow-up care  Follow up with your healthcare provider if you are not alert and back to your usual level of activity within 12 hours.  When to seek medical advice  Call your healthcare provider right away if any of these occur:  · Drowsiness gets worse  · Weakness or dizziness gets worse  · Repeated vomiting  · You can't be awakened   Date Last Reviewed: 10/18/2016  © 5946-2499 [employer]. [address] [place] [address], [place] [address]. All rights reserved. This information is not intended as a substitute for professional medical care. Always follow your healthcare professional's instructions.      PROBIOTICS:  Now that your colon is so cleaned out, now is a good time for a round of PROBIOTICS.  Eat a container of Greek Yogurt, such as OIKOS or CHOBANI,  Or Activia or Dannon    Greek Yogurt.    Or Take a similar Probiotic product such as Align or Culturelle or Aurelia-Q, every day for a month.                  (The products listed are non-prescription, but you may need to ask the pharmacist for their location.)  Repeat  this 5-6 times a year.        High-Fiber Diet  Fiber is in fruits, vegetables, cereals, and grains. Fiber passes through your body undigested. A high-fiber diet helps food move through your intestinal tract. The added bulk is helpful in preventing constipation. In people with diverticulosis, fiber helps clean out the pouches along the colon wall. It also prevents new pouches from forming. A high-fiber diet reduces the risk of colon cancer. It also lowers blood cholesterol and prevents high blood sugar in people with diabetes.    The fiber-rich foods listed below should be part of your diet. If you are not used to high-fiber foods, start with 1 or 2 foods from this list. Every 3 to 4 days add a new one to your diet. Do this until you are eating 4 high-fiber foods per day. This should give you 20 to 35 grams of fiber a day. It is also important to drink a lot of water when you are on this diet. You should have 6 to 8 glasses of water a day. Water makes the fiber swell and increases the benefit.  Foods high in dietary fiber  The following foods are high in dietary fiber:  · Breads. Breads made with 100% whole-wheat flour; diamond, wheat, or rye crackers; whole-grain tortillas, bran muffins.  · Cereals. Whole-grain and bran cereals with bran (shredded wheat, wheat flakes, raisin bran, corn bran); oatmeal, rolled oats, granola, and brown rice.  · Fruits. Fresh fruits and their edible skins (pears, prunes, raisins, berries, apples, and apricots); bananas, citrus fruit, mangoes, pineapple; and prune juice.  · Nuts. Any nuts and seeds.  · Vegetables. Best served raw or lightly cooked. All types, especially: green peas, celery, eggplant, potatoes, spinach, broccoli, [place] sprouts, winter squash, carrots, cauliflower, soybeans, lentils, and fresh and dried beans of all kinds.  · Other. Popcorn, any spices.  Date Last Reviewed: 8/1/2016  © 3663-3158 [employer]. [address] [place] [address], [place] [address]. All  rights reserved. This information is not intended as a substitute for professional medical care. Always follow your healthcare professional's instructions.

## 2019-05-10 NOTE — ANESTHESIA POSTPROCEDURE EVALUATION
Anesthesia Post Evaluation    Patient: Akiko Jameson    Procedure(s) Performed: Procedure(s) (LRB):  COLONOSCOPY (N/A)    Final Anesthesia Type: general  Patient location during evaluation: PACU  Patient participation: Yes- Able to Participate  Level of consciousness: awake and alert  Post-procedure vital signs: reviewed and stable  Pain management: adequate  Airway patency: patent  PONV status at discharge: No PONV  Anesthetic complications: no      Cardiovascular status: blood pressure returned to baseline and hemodynamically stable  Respiratory status: unassisted  Hydration status: euvolemic  Follow-up not needed.          Vitals Value Taken Time   /80 5/10/2019 10:26 AM   Temp 36.1 °C (97 °F) 5/10/2019  9:57 AM   Pulse 72 5/10/2019 10:26 AM   Resp 16 5/10/2019 10:26 AM   SpO2 100 % 5/10/2019 10:26 AM         No case tracking events are documented in the log.      Pain/Cele Score: Cele Score: 10 (5/10/2019 10:03 AM)

## 2019-05-10 NOTE — BRIEF OP NOTE
Discharge Note  Short Stay      SUMMARY     Admit Date: 5/10/2019    Attending Physician: Edgar Gamble Jr., MD     Discharge Physician: Edgar Gamble Jr., MD    Discharge Date: 5/10/2019 10:20 AM    Final Diagnosis: Rectal pain [K62.89]  Pain with bowel movements [R19.8]  Impression:          - The anus is normal.                       - Non-bleeding internal hemorrhoids.                       - The rectum and recto-sigmoid colon are normal.                       - Rectal spasms?                       - Diverticulosis in the sigmoid colon.                       - The examination was otherwise normal.                       - The examined portion of the ileum was normal.                       - No specimens collected.  Recommendation:      - Discharge patient to home.                       - High fiber diet.                       - Use fiber, for example Citrucel, Fibercon, Konsyl                        or Metamucil.                       - Take a PROBIOTIC, such as a carton of GREEK YOGURT                        (Chobani or Oikos, or Activia or Dannon); or tablets                        of ALIGN or CULTURELLE or LEE-Q (all                        non-prescription), every day for a month.                       - Use Analpram HC Cream 2.5%: Apply externally BID                        for 1 week.                       - Use Levsin, NuLev (hyoscyamine) 0.125 mg 1-2 tabs                        SL q 4 hours PRN.                       - If symptoms persist, refer to a colo-rectal                        surgeon.                       - Repeat colonoscopy in 10 years for screening                        purposes.                       - Continue present medications.                       - Patient has a contact number available for                        emergencies. The signs and symptoms of potential                        delayed complications were discussed with the                        patient. Return to normal  activities tomorrow.                        Written discharge instructions were provided to the                        patient.                       - Return to normal activities tomorrow.  Edgar Gamble MD  5/10/2019   Disposition: HOME OR SELF CARE    Patient Instructions:   Current Discharge Medication List      START taking these medications    Details   hydrocortisone-pramoxine (ANALPRAM-HC) 2.5-1 % Crea Place rectally 3 (three) times daily. Apply to perianal area up to 3 times a day as needed. for 10 days  Qty: 30 g, Refills: 5      hyoscyamine (LEVSIN/SL) 0.125 mg Subl Take 2 tablets (0.25 mg total) by mouth every 4 (four) hours as needed.  Qty: 20 tablet, Refills: 11         CONTINUE these medications which have NOT CHANGED    Details   diltiaZEM (CARDIZEM CD) 120 MG Cp24 Take 1 capsule (120 mg total) by mouth once daily.  Qty: 90 capsule, Refills: 3      docusate sodium (COLACE) 100 MG capsule Take 100 mg by mouth every other day.       montelukast (SINGULAIR) 10 mg tablet Take 1 tablet (10 mg total) by mouth nightly.  Qty: 90 tablet, Refills: 3      multivitamin (THERAGRAN) per tablet Every day      potassium chloride SA (K-DUR,KLOR-CON) 20 MEQ tablet Take 1 tablet (20 mEq total) by mouth 2 (two) times daily. Appointment needed for further refills after this one  Qty: 60 tablet, Refills: 11    Associated Diagnoses: Hypokalemia      ranitidine (ZANTAC) 300 MG tablet TAKE 1 TABLET BY MOUTH TWICE DAILY before breakfast and about ONE HOUR before bedtime  Refills: 5      triamterene-hydrochlorothiazide 37.5-25 mg (MAXZIDE-25) 37.5-25 mg per tablet Take 1 tablet by mouth once daily.  Qty: 90 tablet, Refills: 3             Discharge Procedure Orders (must include Diet, Follow-up, Activity)    Follow Up:  Follow up with PCP as per your routine.  Please follow a high fiber diet.  Activity as tolerated.    No driving day of procedure.    PROBIOTICS:  Now that your colon is so cleaned out, now is a good time  for a round of PROBIOTICS.  Eat a container of Greek Yogurt, such as OIKOS or CHOBANI,  Or Activia or Dannon    Greek Yogurt.    Or Take a similar Probiotic product such as Align or Culturelle or Aurelia-Q, every day for a month.                  (The products listed are non-prescription, but you may need to ask the pharmacist for their location.)  Repeat this 5-6 times a year.

## 2019-05-10 NOTE — [employer] PATIENT INSTRUCTIONS
Discharge Summary/Instructions for after Colonoscopy without   Biopsy/Polypectomy  Akiko Jameson    Friday, May 10, 2019  Edgar Gamble MD  RESTRICTIONS ON ACTIVITY:  - Do not drive a car or operate machinery until the day after the procedure.      - The following day: return to full activity including work.  - For  3 days: No heavy lifting, straining or running.  - Diet: You may eat solid foods, but no gassy foods (i.e. beans, broccoli,   cabbage, etc).  TREATMENT FOR COMMON SIDE EFFECTS:  - Mild abdominal pain and bloating or excessive gas: rest, eat lightly and   use a heating pad.  SYMPTOMS TO WATCH FOR AND REPORT TO YOUR PHYSICIAN:  1. Severe abdominal pain.  2. Fever within 24 hours after a procedure.  3. A large amount of rectal bleeding. (A small amount of blood from the   rectum is not serious, especially if hemorrhoids are present.  3.  Because air was put into your colon during the procedure, expelling   large amounts of air from your rectum is normal.  4.  You may not have a bowel movement for 1-3 days because of the   colonoscopy prep.  This is normal.  5.  Call immediately if you notice any of the following:   Chills and/or fever over 101   Persistent vomiting   Severe abdominal pain, other than gas cramps   Severe chest pain   Black, tarry stools   Any bleeding - exceeding one tablespoon  Your doctor recommends these additional instructions:  Eat a high fiber diet.   Take a fiber supplement, for example Citrucel, Fibercon, Konsyl or   Metamucil.   Take a PROBIOTIC, such as a carton of GREEK YOGURT (Chobani or Oikos,  or   Activia or Dannon);  or tablets of ALIGN or CULTURELLE or LEE-Q (all   non-prescription), every day for a month.   And repeat this 5-6 times a   year.  Use Analpram HC Cream 2.5%, apply externally twice a day for one week.   Take Levsin, NuLev (hyoscyamine) 0.125 mg 1-2 tablets under your tongue   every four hours as needed, to relieve rectal pain.   Your physician has  recommended a repeat colonoscopy in 10 years for   screening purposes.  None  If you have any questions or problems, please call your physician.  EMERGENCY PHONE NUMBER: [phone]  LAB RESULTS: Call in two (2) weeks for lab results, [phone]  ___________________________________________  Nurse Signature  ___________________________________________  Patient/Designated Responsible Party Signature  Edgar Gamble MD  5/10/2019 10:16:57 AM  This report has been verified and signed electronically.  [employer]

## 2019-06-04 ENCOUNTER — TELEPHONE (OUTPATIENT)
Dept: GASTROENTEROLOGY | Facility: CLINIC | Age: 44
End: 2019-06-04

## 2019-07-30 ENCOUNTER — TELEPHONE (OUTPATIENT)
Dept: FAMILY MEDICINE | Facility: CLINIC | Age: 44
End: 2019-07-30

## 2019-07-30 DIAGNOSIS — Z12.39 SCREENING FOR BREAST CANCER: Primary | ICD-10-CM

## 2019-07-30 NOTE — TELEPHONE ENCOUNTER
----- Message from Julia Garcia sent at 7/30/2019 11:42 AM CDT -----  Contact: pt  Please call pt @ [phone] regarding an order to get a mammograph, pt would like to do it the same day of annual exam 8/13.

## 2019-07-30 NOTE — TELEPHONE ENCOUNTER
----- Message from Julia Garcia sent at 7/30/2019 11:42 AM CDT -----  Contact: pt  Please call pt @ [phone] regarding an order to get a mammograph, pt would like to do it the same day of annual exam 8/13.

## 2019-07-30 NOTE — TELEPHONE ENCOUNTER
Left message on voice mail that mammogram last done 8/30/18 so 8/13/19 would be too soon and may not be covered by insurance.

## 2019-08-05 ENCOUNTER — TELEPHONE (OUTPATIENT)
Dept: HEMATOLOGY/ONCOLOGY | Facility: CLINIC | Age: 44
End: 2019-08-05

## 2019-08-05 NOTE — TELEPHONE ENCOUNTER
----- Message from Mary Beth Vicente sent at 8/2/2019  2:06 PM CDT -----  Contact: Self  Pt made contact via [facility] as follows:    Appointment Request From: Akiko Jameson    With Provider: Case Pal NP [[facility]]    Preferred Date Range: 9/3/2019 - 9/3/2019    Preferred Times: Monday Afternoon    Reason for visit: Existing Patient    Comments:  Annual visit to check blood levels.    Thank you.

## 2019-08-06 ENCOUNTER — PATIENT MESSAGE (OUTPATIENT)
Dept: HEMATOLOGY/ONCOLOGY | Facility: CLINIC | Age: 44
End: 2019-08-06

## 2019-08-09 DIAGNOSIS — M79.672 LEFT FOOT PAIN: Primary | ICD-10-CM

## 2019-08-13 ENCOUNTER — OFFICE VISIT (OUTPATIENT)
Dept: ORTHOPEDICS | Facility: CLINIC | Age: 44
End: 2019-08-13
Payer: COMMERCIAL

## 2019-08-13 ENCOUNTER — HOSPITAL ENCOUNTER (OUTPATIENT)
Dept: RADIOLOGY | Facility: HOSPITAL | Age: 44
Discharge: HOME | End: 2019-08-13
Attending: ORTHOPAEDIC SURGERY
Payer: COMMERCIAL

## 2019-08-13 ENCOUNTER — PATIENT MESSAGE (OUTPATIENT)
Dept: ORTHOPEDICS | Facility: CLINIC | Age: 44
End: 2019-08-13

## 2019-08-13 VITALS
WEIGHT: 259.94 LBS | HEART RATE: 87 BPM | HEIGHT: 63 IN | BODY MASS INDEX: 46.06 KG/M2 | SYSTOLIC BLOOD PRESSURE: 161 MMHG | DIASTOLIC BLOOD PRESSURE: 98 MMHG

## 2019-08-13 DIAGNOSIS — M79.672 LEFT FOOT PAIN: ICD-10-CM

## 2019-08-13 DIAGNOSIS — E66.01 OBESITY, CLASS III, BMI 40-49.9 (MORBID OBESITY): Primary | ICD-10-CM

## 2019-08-13 PROCEDURE — 99203 PR OFFICE/OUTPT VISIT, NEW, LEVL III, 30-44 MIN: ICD-10-PCS | Mod: S$GLB,,, | Performed by: ORTHOPAEDIC SURGERY

## 2019-08-13 PROCEDURE — 3080F DIAST BP >= 90 MM HG: CPT | Mod: CPTII,S$GLB,, | Performed by: ORTHOPAEDIC SURGERY

## 2019-08-13 PROCEDURE — 73630 X-RAY EXAM OF FOOT: CPT | Mod: TC,PO,LT

## 2019-08-13 PROCEDURE — 3008F PR BODY MASS INDEX (BMI) DOCUMENTED: ICD-10-PCS | Mod: CPTII,S$GLB,, | Performed by: ORTHOPAEDIC SURGERY

## 2019-08-13 PROCEDURE — 3077F SYST BP >= 140 MM HG: CPT | Mod: CPTII,S$GLB,, | Performed by: ORTHOPAEDIC SURGERY

## 2019-08-13 PROCEDURE — 3080F PR MOST RECENT DIASTOLIC BLOOD PRESSURE >= 90 MM HG: ICD-10-PCS | Mod: CPTII,S$GLB,, | Performed by: ORTHOPAEDIC SURGERY

## 2019-08-13 PROCEDURE — 73630 X-RAY EXAM OF FOOT: CPT | Mod: 26,LT,, | Performed by: RADIOLOGY

## 2019-08-13 PROCEDURE — 73630 XR FOOT COMPLETE 3 VIEW LEFT: ICD-10-PCS | Mod: 26,LT,, | Performed by: RADIOLOGY

## 2019-08-13 PROCEDURE — 3077F PR MOST RECENT SYSTOLIC BLOOD PRESSURE >= 140 MM HG: ICD-10-PCS | Mod: CPTII,S$GLB,, | Performed by: ORTHOPAEDIC SURGERY

## 2019-08-13 PROCEDURE — 3008F BODY MASS INDEX DOCD: CPT | Mod: CPTII,S$GLB,, | Performed by: ORTHOPAEDIC SURGERY

## 2019-08-13 PROCEDURE — 99999 PR PBB SHADOW E&M-EST. PATIENT-LVL III: ICD-10-PCS | Mod: PBBFAC,,, | Performed by: ORTHOPAEDIC SURGERY

## 2019-08-13 PROCEDURE — 99203 OFFICE O/P NEW LOW 30 MIN: CPT | Mod: S$GLB,,, | Performed by: ORTHOPAEDIC SURGERY

## 2019-08-13 PROCEDURE — 99999 PR PBB SHADOW E&M-EST. PATIENT-LVL III: CPT | Mod: PBBFAC,,, | Performed by: ORTHOPAEDIC SURGERY

## 2019-08-13 RX ORDER — MELOXICAM 15 MG/1
15 TABLET ORAL DAILY
Refills: 0 | COMMUNITY
Start: 2019-07-30 | End: 2019-08-14

## 2019-08-13 RX ORDER — MUPIROCIN 20 MG/G
OINTMENT TOPICAL
Refills: 0 | COMMUNITY
Start: 2019-06-28 | End: 2019-08-13

## 2019-08-14 RX ORDER — NABUMETONE 500 MG/1
500 TABLET, FILM COATED ORAL 2 TIMES DAILY
Qty: 60 TABLET | Refills: 3 | Status: SHIPPED | OUTPATIENT
Start: 2019-08-14 | End: 2020-06-08

## 2019-08-20 ENCOUNTER — PATIENT OUTREACH (OUTPATIENT)
Dept: ADMINISTRATIVE | Facility: HOSPITAL | Age: 44
End: 2019-08-20

## 2019-08-20 NOTE — PROGRESS NOTES
"CC: Heel pain     HPI: Akiko Jameson 44 y.o.  Pt c/o pain in the left heel which is burning/ aching in nature, 6/10 on the pain scale. The patient says the pain started about 1 month ago. No history of injury or trauma. Pt denies weakness, numbness, and tingling. No previous treatment for this problem. She works as a .       PAST MEDICAL/SURGICAL/FAMILY/SOCIAL/ HISTORY: REVIEWED     ALLERGIES/MEDICATIONS: REVIEWED     Review of Systems:     Constitution: Negative.   HEENT: Negative.   Eyes: Negative.   Cardiovascular: Negative.   Respiratory: Negative.   Endocrine: Negative.   Hematologic/Lymphatic: Negative.   Skin: Negative.   Musculoskeletal: Positive for pain in left heel.   Gastrointestinal: Negative.   Genitourinary: Negative.   Neurological: Negative.   Psychiatric/Behavioral: Negative.   Allergic/Immunologic: Negative.     PHYSICAL EXAM:     Vitals:    08/13/19 1534   BP: (!) 161/98   Pulse: 87     Ht Readings from Last 1 Encounters:   08/13/19 5' 3" (1.6 m)     Wt Readings from Last 1 Encounters:   08/13/19 117.9 kg (259 lb 14.8 oz)     BMI 47    GENERAL: Well developed, well nourished, no acute distress. Super morbidly obese.   SKIN: Skin is intact. No atrophy, abrasions or lesions are noted.   Neurological: Normal mental status. Appropriate and conversant. Alert and oriented x 3.  GAIT: Walks with non-antalgic gait.    left lower extremity compared with right lower extremity: 2+ dorsalis pedis pulse.  Normal range of motion tibiotalar and subtalar joints. Normal alignment of the forefoot and hindfoot.  5/5 strength plantar flexion and dorsiflexion. Positive moderate tenderness to palpation at the proximal medial insertion of plantar fascia. Sensation to light touch intact sural, saphenous, superficial peroneal and deep peroneal nerves.     XRAYS: 3 views of left foot obtained and reviewed today reveal  spurring at insertion of Achilles tendon and spurring at the proximal medial insertion " of plantar fascia on the os calcis. No evidence of fractures or dislocations.    ASSESSMENT: Left foot plantar fasciitis     PLAN:  I spent 20 minutes in consulation with the patient today. More than half the time was spent counseling the patient on their condition.  I instructed the patient on stretching exercises. I gave them the [employer] webpage to order 3/4 inch inserts. I gave them a handout on plantar fasciitis. We discussed injection but will start with PT first. We performed a custom orthotic/brace adjustment, fitting and training with the patient today. The patient demonstrated understanding and proper care. This was performed for 15 minutes.  Resting night splint was given. PT 2 times per week for 6 weeks. I also referred her to Dr. Perez for evaluation for weight loss surgery.

## 2019-09-03 ENCOUNTER — OFFICE VISIT (OUTPATIENT)
Dept: FAMILY MEDICINE | Facility: CLINIC | Age: 44
End: 2019-09-03
Payer: COMMERCIAL

## 2019-09-03 ENCOUNTER — HOSPITAL ENCOUNTER (OUTPATIENT)
Dept: RADIOLOGY | Facility: HOSPITAL | Age: 44
Discharge: HOME | End: 2019-09-03
Attending: FAMILY MEDICINE
Payer: COMMERCIAL

## 2019-09-03 VITALS
BODY MASS INDEX: 45.54 KG/M2 | DIASTOLIC BLOOD PRESSURE: 78 MMHG | HEART RATE: 67 BPM | WEIGHT: 257 LBS | TEMPERATURE: 98 F | HEIGHT: 63 IN | SYSTOLIC BLOOD PRESSURE: 124 MMHG

## 2019-09-03 VITALS — BODY MASS INDEX: 45.54 KG/M2 | WEIGHT: 257 LBS | HEIGHT: 63 IN

## 2019-09-03 DIAGNOSIS — E87.6 HYPOKALEMIA: ICD-10-CM

## 2019-09-03 DIAGNOSIS — I10 ESSENTIAL HYPERTENSION: ICD-10-CM

## 2019-09-03 DIAGNOSIS — Z12.39 SCREENING FOR BREAST CANCER: ICD-10-CM

## 2019-09-03 DIAGNOSIS — E89.89 LYMPHEDEMA OF UPPER EXTREMITY FOLLOWING LYMPHADENECTOMY: ICD-10-CM

## 2019-09-03 DIAGNOSIS — E04.1 THYROID NODULE: ICD-10-CM

## 2019-09-03 DIAGNOSIS — I89.0 LYMPHEDEMA OF UPPER EXTREMITY FOLLOWING LYMPHADENECTOMY: ICD-10-CM

## 2019-09-03 DIAGNOSIS — D63.8 CHRONIC DISEASE ANEMIA: ICD-10-CM

## 2019-09-03 DIAGNOSIS — Z76.0 MEDICATION REFILL: ICD-10-CM

## 2019-09-03 DIAGNOSIS — K21.9 GASTROESOPHAGEAL REFLUX DISEASE, ESOPHAGITIS PRESENCE NOT SPECIFIED: ICD-10-CM

## 2019-09-03 DIAGNOSIS — G47.30 SLEEP APNEA, UNSPECIFIED TYPE: ICD-10-CM

## 2019-09-03 DIAGNOSIS — Z23 NEED FOR PROPHYLACTIC VACCINATION AND INOCULATION AGAINST INFLUENZA: ICD-10-CM

## 2019-09-03 DIAGNOSIS — Z00.00 ANNUAL PHYSICAL EXAM: Primary | ICD-10-CM

## 2019-09-03 PROCEDURE — 77067 SCR MAMMO BI INCL CAD: CPT | Mod: 26,,, | Performed by: RADIOLOGY

## 2019-09-03 PROCEDURE — 90686 FLU VACCINE (QUAD) GREATER THAN OR EQUAL TO 3YO PRESERVATIVE FREE IM: ICD-10-PCS | Mod: S$GLB,,, | Performed by: NURSE PRACTITIONER

## 2019-09-03 PROCEDURE — 3074F PR MOST RECENT SYSTOLIC BLOOD PRESSURE < 130 MM HG: ICD-10-PCS | Mod: CPTII,S$GLB,, | Performed by: NURSE PRACTITIONER

## 2019-09-03 PROCEDURE — 3078F PR MOST RECENT DIASTOLIC BLOOD PRESSURE < 80 MM HG: ICD-10-PCS | Mod: CPTII,S$GLB,, | Performed by: NURSE PRACTITIONER

## 2019-09-03 PROCEDURE — 77067 MAMMO DIGITAL SCREENING BILAT WITH TOMOSYNTHESIS_CAD: ICD-10-PCS | Mod: 26,,, | Performed by: RADIOLOGY

## 2019-09-03 PROCEDURE — 77063 BREAST TOMOSYNTHESIS BI: CPT | Mod: 26,,, | Performed by: RADIOLOGY

## 2019-09-03 PROCEDURE — 90471 IMMUNIZATION ADMIN: CPT | Mod: S$GLB,,, | Performed by: NURSE PRACTITIONER

## 2019-09-03 PROCEDURE — 3074F SYST BP LT 130 MM HG: CPT | Mod: CPTII,S$GLB,, | Performed by: NURSE PRACTITIONER

## 2019-09-03 PROCEDURE — 99999 PR PBB SHADOW E&M-EST. PATIENT-LVL IV: ICD-10-PCS | Mod: PBBFAC,,, | Performed by: NURSE PRACTITIONER

## 2019-09-03 PROCEDURE — 77067 SCR MAMMO BI INCL CAD: CPT | Mod: TC,PO

## 2019-09-03 PROCEDURE — 3078F DIAST BP <80 MM HG: CPT | Mod: CPTII,S$GLB,, | Performed by: NURSE PRACTITIONER

## 2019-09-03 PROCEDURE — 90471 FLU VACCINE (QUAD) GREATER THAN OR EQUAL TO 3YO PRESERVATIVE FREE IM: ICD-10-PCS | Mod: S$GLB,,, | Performed by: NURSE PRACTITIONER

## 2019-09-03 PROCEDURE — 99396 PR PREVENTIVE VISIT,EST,40-64: ICD-10-PCS | Mod: 25,S$GLB,, | Performed by: NURSE PRACTITIONER

## 2019-09-03 PROCEDURE — 99999 PR PBB SHADOW E&M-EST. PATIENT-LVL IV: CPT | Mod: PBBFAC,,, | Performed by: NURSE PRACTITIONER

## 2019-09-03 PROCEDURE — 90686 IIV4 VACC NO PRSV 0.5 ML IM: CPT | Mod: S$GLB,,, | Performed by: NURSE PRACTITIONER

## 2019-09-03 PROCEDURE — 99396 PREV VISIT EST AGE 40-64: CPT | Mod: 25,S$GLB,, | Performed by: NURSE PRACTITIONER

## 2019-09-03 PROCEDURE — 77063 MAMMO DIGITAL SCREENING BILAT WITH TOMOSYNTHESIS_CAD: ICD-10-PCS | Mod: 26,,, | Performed by: RADIOLOGY

## 2019-09-03 RX ORDER — MONTELUKAST SODIUM 10 MG/1
10 TABLET ORAL NIGHTLY
Qty: 90 TABLET | Refills: 3 | Status: ON HOLD | OUTPATIENT
Start: 2019-09-03 | End: 2021-03-10 | Stop reason: SDUPTHER

## 2019-09-03 RX ORDER — TRIAMTERENE/HYDROCHLOROTHIAZID 37.5-25 MG
1 TABLET ORAL DAILY
Qty: 90 TABLET | Refills: 3 | Status: ON HOLD | OUTPATIENT
Start: 2019-09-03 | End: 2021-03-10 | Stop reason: SDUPTHER

## 2019-09-03 RX ORDER — POTASSIUM CHLORIDE 20 MEQ/1
20 TABLET, EXTENDED RELEASE ORAL 2 TIMES DAILY
Qty: 60 TABLET | Refills: 11 | Status: ON HOLD | OUTPATIENT
Start: 2019-09-03 | End: 2021-03-10 | Stop reason: SDUPTHER

## 2019-09-03 RX ORDER — DILTIAZEM HYDROCHLORIDE 120 MG/1
120 CAPSULE, COATED, EXTENDED RELEASE ORAL DAILY
Qty: 90 CAPSULE | Refills: 3 | Status: ON HOLD | OUTPATIENT
Start: 2019-09-03 | End: 2021-03-10 | Stop reason: SDUPTHER

## 2019-09-03 NOTE — PROGRESS NOTES
Subjective:       Patient ID: Akiko Jameson is a 44 y.o. female.    Chief Complaint: Annual Exam  Pt in today for annual exam. Pt sees hematology for chronic anemia. She sees endocrinology for thyroid nodules. Sleep apnea managed with CPAP. HTN, hypokalemia managed with medication. Healthy diet, weight loss discussed. GERD well controlled. Mammogram done today. Pap smear UTD. Pt has no other complaints today.  Past Medical History:   Diagnosis Date    ALLERGIC RHINITIS     Cellulitis     Eosinophilia     mild    GERD (gastroesophageal reflux disease)     Granular cell tumor     H. pylori infection 2018    Hypertension     Lymphedema     Mild anemia     Sleep apnea     compliant with CPAP    Thyroid nodule     Urinary incontinence, mixed      Social History     Socioeconomic History    Marital status:      Spouse name: Not on file    Number of children: 2    Years of education: Not on file    Highest education level: Not on file   Occupational History     Employer: [employer]   Social Needs    Financial resource strain: Not on file    Food insecurity:     Worry: Not on file     Inability: Not on file    Transportation needs:     Medical: Not on file     Non-medical: Not on file   Tobacco Use    Smoking status: Never Smoker    Smokeless tobacco: Never Used   Substance and Sexual Activity    Alcohol use: No    Drug use: No    Sexual activity: Not on file   Lifestyle    Physical activity:     Days per week: Not on file     Minutes per session: Not on file    Stress: Not on file   Relationships    Social connections:     Talks on phone: Not on file     Gets together: Not on file     Attends [employer] service: Not on file     Active member of club or organization: Not on file     Attends meetings of clubs or organizations: Not on file     Relationship status: Not on file   Other Topics Concern    Not on file   Social History Narrative    Not on file     Past Surgical History:    Procedure Laterality Date    ARTHROSCOPY- PARTIAL MEDIAL MENISCECTOMY Right 3/31/2016    [facility]    AXILLARY NODE DISSECTION      granular cell tumor    BREAST CYST ASPIRATION      left    CHOLECYSTECTOMY      CHONDROPLASTY-KNEE Right 3/31/2016    [facility] OR    COLONOSCOPY N/A 5/10/2019    [facility] Jr[facility] ENDO    EGD (ESOPHAGOGASTRODUODENOSCOPY) N/A 7/5/2018    [facility] ENDO    ENDOMETRIAL ABLATION      EXCISION, MASS, ABDOMEN - flank left Left 6/18/2018    [facility] OR    FINE NEEDLE ASPIRATION      thyroid    KNEE ARTHROSCOPY W/ MENISCECTOMY Right     NASAL SEPTUM SURGERY      PARTIAL SYNOVECTOMY-KNEE Right 3/31/2016    [facility] OR    TUBAL LIGATION      UPPER GASTROINTESTINAL ENDOSCOPY  07/05/2018    Dr. Gamble       HPI  Review of Systems   Constitutional: Negative.  Negative for activity change and unexpected weight change.   HENT: Negative.  Negative for hearing loss, rhinorrhea and trouble swallowing.    Eyes: Negative.  Negative for discharge and visual disturbance.   Respiratory: Negative.  Negative for chest tightness and wheezing.    Cardiovascular: Negative.  Negative for chest pain and palpitations.   Gastrointestinal: Negative.  Negative for blood in stool, constipation, diarrhea and vomiting.   Endocrine: Negative.  Negative for polydipsia and polyuria.   Genitourinary: Negative.  Negative for difficulty urinating, dysuria, hematuria and menstrual problem.   Musculoskeletal: Negative.  Negative for arthralgias, joint swelling and neck pain.   Skin: Negative.    Allergic/Immunologic: Negative.    Neurological: Negative.  Negative for weakness and headaches.   Psychiatric/Behavioral: Negative.  Negative for confusion and dysphoric mood.       Objective:      Physical Exam    Constitutional: She is oriented to person, place, and time. She appears well-developed and well-nourished.   HENT:   Head: Normocephalic.   Right Ear: External ear normal.   Left Ear: External ear normal.   Nose: Nose normal.   Mouth/Throat: Oropharynx is clear and moist.   Eyes: Pupils are equal, round, and reactive to light. Conjunctivae are normal.   Neck: Normal range of motion. Neck supple.   Cardiovascular: Normal rate, regular rhythm and normal heart sounds.   Pulmonary/Chest: Effort normal and breath sounds normal.   Abdominal: Soft. Bowel sounds are normal.   Musculoskeletal: Normal range of motion.   Neurological: She is alert and oriented to person, place, and time.   Skin: Skin is warm and dry. Capillary refill takes 2 to 3 seconds.   Psychiatric: She has a normal mood and affect. Her behavior is normal. Judgment and thought content normal.   Nursing note and vitals reviewed.      Assessment:       1. Annual physical exam    2. Essential hypertension    3. Thyroid nodule    4. Lymphedema of upper extremity following lymphadenectomy    5. Sleep apnea, unspecified type    6. Hypokalemia    7. Medication refill    8. Need for prophylactic vaccination and inoculation against influenza    9.      Gastroesophageal reflux disease, esophagitis presence not specified   10.    Chronic disease anemia  Plan:       Akiko was seen today for annual exam.    Diagnoses and all orders for this visit:    Annual physical exam  Essential hypertension  Thyroid nodule  Lymphedema of upper extremity following lymphadenectomy  Sleep apnea, unspecified type  Chronic disease anemia  Hypokalemia  Gastroesophageal reflux disease, esophagitis presence not specified  Medication refill  Need for prophylactic vaccination and inoculation against influenza  -     Flu Vaccine - Quadrivalent (PF) (6 months & older)  -     TSH; Future  -     Lipid panel; Future        CMP, CBC UTD  -     potassium chloride SA (K-DUR,KLOR-CON) 20 MEQ  tablet; Take 1 tablet (20 mEq total) by mouth 2 (two) times daily. Appointment needed for further refills after this one  -     diltiaZEM (CARDIZEM CD) 120 MG Cp24; Take 1 capsule (120 mg total) by mouth once daily.  -     montelukast (SINGULAIR) 10 mg tablet; Take 1 tablet (10 mg total) by mouth nightly.  -     triamterene-hydrochlorothiazide 37.5-25 mg (MAXZIDE-25) 37.5-25 mg per tablet; Take 1 tablet by mouth once daily.

## 2019-09-20 ENCOUNTER — OFFICE VISIT (OUTPATIENT)
Dept: OPHTHALMOLOGY | Facility: CLINIC | Age: 44
End: 2019-09-20
Payer: COMMERCIAL

## 2019-09-20 DIAGNOSIS — H53.8 BLURRY VISION, RIGHT EYE: Primary | ICD-10-CM

## 2019-09-20 DIAGNOSIS — H52.7 REFRACTIVE ERROR: ICD-10-CM

## 2019-09-20 DIAGNOSIS — H52.13 MYOPIC ASTIGMATISM OF BOTH EYES: ICD-10-CM

## 2019-09-20 DIAGNOSIS — H52.203 MYOPIC ASTIGMATISM OF BOTH EYES: ICD-10-CM

## 2019-09-20 PROCEDURE — 99999 PR PBB SHADOW E&M-EST. PATIENT-LVL III: CPT | Mod: PBBFAC,,, | Performed by: OPHTHALMOLOGY

## 2019-09-20 PROCEDURE — 99999 PR PBB SHADOW E&M-EST. PATIENT-LVL III: ICD-10-PCS | Mod: PBBFAC,,, | Performed by: OPHTHALMOLOGY

## 2019-09-20 PROCEDURE — 92004 PR EYE EXAM, NEW PATIENT,COMPREHESV: ICD-10-PCS | Mod: S$GLB,,, | Performed by: OPHTHALMOLOGY

## 2019-09-20 PROCEDURE — 92004 COMPRE OPH EXAM NEW PT 1/>: CPT | Mod: S$GLB,,, | Performed by: OPHTHALMOLOGY

## 2019-09-20 NOTE — PATIENT INSTRUCTIONS
Understanding Focusing Problems  Your vision depends on how light is focused in your eye. An eye has different parts to it, such as the cornea, lens, and iris. These parts work together to refract, or bend, and focus light rays. With normal vision, light is focused on the back of the eyeball. This area is called the retina. But in some cases, all or part of the eye is not the right shape. This causes light to focus in the wrong place. It makes vision blurry. Three common problems with focusing include hyperopia, myopia, and astigmatism.        Emmetropia       Hyperopia      Emmetropia or normal vision happens when light focuses on the retina.  Hyperopia or farsightedness occurs when light focuses behind the retina. Nearby objects look blurry.     Myopia       Astigmatism      Myopia or nearsightedness occurs when light focuses in front of the retina. Distant objects look blurry.  Astigmatism occurs when light focuses in more than one place. Both nearby and distant objects can look blurry.  If youre over 40  With age, the lens becomes stiff. Stiffening of the lens makes it more difficult for the lens to accommodate, or change shape, to focus light. This leads to presbyopia, or trouble focusing on nearby objects.   Date Last Reviewed: 8/16/2015  © 1131-6784 [employer]. [address] [place] [address], [place] [address]. All rights reserved. This information is not intended as a substitute for professional medical care. Always follow your healthcare professional's instructions.

## 2019-09-20 NOTE — PROGRESS NOTES
HPI     Eye Problem      Additional comments: Blurred va OD               Comments     DLE: 11/18 in Dr Diaz's office    Pt states over past 2 month she has noticed that OD seems blurrier without   her glasses. Sees well with her gls on but has not been able to read well   with present gls which are progressive. Went back to Dr Pagan's office   and was told the problem was that she does not want to embrace the   bifocals. This is her 1st pair of bifocals. Left eye seems fine.           Last edited by Cheryle Quintana on 9/20/2019 10:06 AM. (History)            Assessment /Plan     For exam results, see Encounter Report.    Blurry vision, right eye  -     Ambulatory Referral to Optometry    Myopic astigmatism of both eyes  -     Ambulatory Referral to Optometry    Refractive error  -     Ambulatory Referral to Optometry      Referral to Dr. Mccoy for contact lens fitting, may do better with CTL due to high astigmatism OD>OS  Rx for glasses as needed.

## 2019-09-26 ENCOUNTER — TELEPHONE (OUTPATIENT)
Dept: FAMILY MEDICINE | Facility: CLINIC | Age: 44
End: 2019-09-26

## 2019-09-26 NOTE — TELEPHONE ENCOUNTER
Patient recently seen for annual but forgot to ask you if you will refill her ranitidine, I informed her this has been prescribed by GI but she said you always prescribe it, please advise.

## 2019-09-26 NOTE — TELEPHONE ENCOUNTER
----- Message from Katherineskinny Brooks sent at 9/26/2019  9:07 AM CDT -----  Contact: self  Type:  RX Refill Request    Who Called: Paulinendtravis  Refill or New Rx:refill  RX Name and Strength:ranitidine (ZANTAC) 300 MG tablet  How is the patient currently taking it? (ex. 1XDay):  Is this a 30 day or 90 day RX:30  Preferred Pharmacy with phone number:  [facility] [place] [id number] [facility] [address]  Phone: [phone] Fax: [phone]    Local or Mail Order:local  Ordering Provider:Emmy  Would the patient rather a call back or a response via [facility]? call  Best Call Back Number:[phone]  Additional Information: patient states that she is completely out of this medication

## 2019-10-21 ENCOUNTER — OFFICE VISIT (OUTPATIENT)
Dept: FAMILY MEDICINE | Facility: CLINIC | Age: 44
End: 2019-10-21
Payer: COMMERCIAL

## 2019-10-21 VITALS
DIASTOLIC BLOOD PRESSURE: 79 MMHG | HEIGHT: 63 IN | HEART RATE: 88 BPM | SYSTOLIC BLOOD PRESSURE: 139 MMHG | BODY MASS INDEX: 45.93 KG/M2 | WEIGHT: 259.19 LBS | TEMPERATURE: 99 F

## 2019-10-21 DIAGNOSIS — J02.9 SORE THROAT: Primary | ICD-10-CM

## 2019-10-21 LAB — DEPRECATED S PYO AG THROAT QL EIA: NEGATIVE

## 2019-10-21 PROCEDURE — 87147 CULTURE TYPE IMMUNOLOGIC: CPT | Mod: 59

## 2019-10-21 PROCEDURE — 3008F BODY MASS INDEX DOCD: CPT | Mod: CPTII,S$GLB,, | Performed by: FAMILY MEDICINE

## 2019-10-21 PROCEDURE — 3008F PR BODY MASS INDEX (BMI) DOCUMENTED: ICD-10-PCS | Mod: CPTII,S$GLB,, | Performed by: FAMILY MEDICINE

## 2019-10-21 PROCEDURE — 87081 CULTURE SCREEN ONLY: CPT

## 2019-10-21 PROCEDURE — 3078F PR MOST RECENT DIASTOLIC BLOOD PRESSURE < 80 MM HG: ICD-10-PCS | Mod: CPTII,S$GLB,, | Performed by: FAMILY MEDICINE

## 2019-10-21 PROCEDURE — 99999 PR PBB SHADOW E&M-EST. PATIENT-LVL III: CPT | Mod: PBBFAC,,, | Performed by: FAMILY MEDICINE

## 2019-10-21 PROCEDURE — 3075F PR MOST RECENT SYSTOLIC BLOOD PRESS GE 130-139MM HG: ICD-10-PCS | Mod: CPTII,S$GLB,, | Performed by: FAMILY MEDICINE

## 2019-10-21 PROCEDURE — 99213 OFFICE O/P EST LOW 20 MIN: CPT | Mod: S$GLB,,, | Performed by: FAMILY MEDICINE

## 2019-10-21 PROCEDURE — 3075F SYST BP GE 130 - 139MM HG: CPT | Mod: CPTII,S$GLB,, | Performed by: FAMILY MEDICINE

## 2019-10-21 PROCEDURE — 99213 PR OFFICE/OUTPT VISIT, EST, LEVL III, 20-29 MIN: ICD-10-PCS | Mod: S$GLB,,, | Performed by: FAMILY MEDICINE

## 2019-10-21 PROCEDURE — 3078F DIAST BP <80 MM HG: CPT | Mod: CPTII,S$GLB,, | Performed by: FAMILY MEDICINE

## 2019-10-21 PROCEDURE — 99999 PR PBB SHADOW E&M-EST. PATIENT-LVL III: ICD-10-PCS | Mod: PBBFAC,,, | Performed by: FAMILY MEDICINE

## 2019-10-21 PROCEDURE — 87880 STREP A ASSAY W/OPTIC: CPT | Mod: PO

## 2019-10-21 RX ORDER — METHYLPREDNISOLONE 4 MG/1
TABLET ORAL
Qty: 21 TABLET | Refills: 0 | Status: SHIPPED | OUTPATIENT
Start: 2019-10-21 | End: 2019-10-29

## 2019-10-21 NOTE — PROGRESS NOTES
Complains of sore throat, swollen glands, 101.8 fever. No significant cough. Symptoms started _ yesterday.  States exposure to child with strep, but her child was not actually tested for strep.. No dysphagia.    Past Medical History:  Past Medical History:   Diagnosis Date    ALLERGIC RHINITIS     Cellulitis     Eosinophilia     mild    GERD (gastroesophageal reflux disease)     Granular cell tumor     H. pylori infection 2018    Hypertension     Lymphedema     Mild anemia     Sleep apnea     compliant with CPAP    Thyroid nodule     Urinary incontinence, mixed      Past Surgical History:   Procedure Laterality Date    AXILLARY NODE DISSECTION      granular cell tumor    BREAST CYST ASPIRATION      left    CHOLECYSTECTOMY      COLONOSCOPY N/A 5/10/2019    Procedure: COLONOSCOPY;  Surgeon: Edgar Gamble Jr., MD;  Location: [facility] ENDO;  Service: Endoscopy;  Laterality: N/A;    ENDOMETRIAL ABLATION      ESOPHAGOGASTRODUODENOSCOPY N/A 7/5/2018    Procedure: EGD (ESOPHAGOGASTRODUODENOSCOPY);  Surgeon: Edgar Gamble Jr., MD;  Location: [facility] ENDO;  Service: Endoscopy;  Laterality: N/A;    EXCISION OF MASS OF ABDOMEN Left 6/18/2018    Procedure: EXCISION, MASS, ABDOMEN - flank left;  Surgeon: Armando Tate MD;  Location: [facility] OR;  Service: General;  Laterality: Left;  left flank removal of mass    FINE NEEDLE ASPIRATION      thyroid    KNEE ARTHROSCOPY W/ MENISCECTOMY Right     NASAL SEPTUM SURGERY      TUBAL LIGATION      UPPER GASTROINTESTINAL ENDOSCOPY  07/05/2018    Dr. Gamble     Review of patient's allergies indicates:   Allergen Reactions    Biaxin [clarithromycin] Swelling    Prilosec [omeprazole magnesium] Swelling    Prevacid [lansoprazole] Swelling     Lip swelling- was taking this along with blood pressure medication: benazepril; not sure which caused it. Reports she has taken OTC prilosec without any problems.    Ace inhibitors Swelling     Facial swelling    Benazepril  Swelling     Lip swelling     Current Outpatient Medications on File Prior to Visit   Medication Sig Dispense Refill    diltiaZEM (CARDIZEM CD) 120 MG Cp24 Take 1 capsule (120 mg total) by mouth once daily. 90 capsule 3    docusate sodium (COLACE) 100 MG capsule Take 100 mg by mouth every other day.       montelukast (SINGULAIR) 10 mg tablet Take 1 tablet (10 mg total) by mouth nightly. 90 tablet 3    multivitamin (THERAGRAN) per tablet Every day      potassium chloride SA (K-DUR,KLOR-CON) 20 MEQ tablet Take 1 tablet (20 mEq total) by mouth 2 (two) times daily. Appointment needed for further refills after this one 60 tablet 11    ranitidine (ZANTAC) 300 MG tablet Take 1 tablet (300 mg total) by mouth every evening. 90 tablet 3    triamterene-hydrochlorothiazide 37.5-25 mg (MAXZIDE-25) 37.5-25 mg per tablet Take 1 tablet by mouth once daily. 90 tablet 3    hyoscyamine (LEVSIN/SL) 0.125 mg Subl Take 2 tablets (0.25 mg total) by mouth every 4 (four) hours as needed. 20 tablet 11    nabumetone (RELAFEN) 500 MG tablet Take 1 tablet (500 mg total) by mouth 2 (two) times daily. 60 tablet 3     No current facility-administered medications on file prior to visit.      Social History     Socioeconomic History    Marital status:      Spouse name: Not on file    Number of children: 2    Years of education: Not on file    Highest education level: Not on file   Occupational History     Employer: [employer]   Social Needs    Financial resource strain: Not on file    Food insecurity:     Worry: Not on file     Inability: Not on file    Transportation needs:     Medical: Not on file     Non-medical: Not on file   Tobacco Use    Smoking status: Never Smoker    Smokeless tobacco: Never Used   Substance and Sexual Activity    Alcohol use: No    Drug use: No    Sexual activity: Not on file   Lifestyle    Physical activity:     Days per week: Not on file     Minutes per session: Not on file    Stress: Not on  file   Relationships    Social connections:     Talks on phone: Not on file     Gets together: Not on file     Attends Religion service: Not on file     Active member of club or organization: Not on file     Attends meetings of clubs or organizations: Not on file     Relationship status: Not on file   Other Topics Concern    Not on file   Social History Narrative    Not on file     Family History   Problem Relation Age of Onset    Diabetes Mother     Kidney failure Mother     Breast cancer Maternal Grandmother     Breast cancer Maternal Aunt     Ovarian cancer Cousin     Breast cancer Cousin     Blindness Father     Cirrhosis Neg Hx     Colon polyps Neg Hx     Colon cancer Neg Hx     Crohn's disease Neg Hx     Esophageal cancer Neg Hx     Stomach cancer Neg Hx     Ulcerative colitis Neg Hx     Amblyopia Neg Hx     Cataracts Neg Hx     Glaucoma Neg Hx     Macular degeneration Neg Hx     Retinal detachment Neg Hx     Strabismus Neg Hx      Answers for HPI/ROS submitted by the patient on 10/21/2019   Sore throat  Chronicity: new  Onset: yesterday  Progression since onset: rapidly worsening  Pain worse on: right  Fever: 102 - 102.9 F  Fever duration: less than 1 day  Pain - numeric: 8/10  abdominal pain: No  congestion: No  cough: No  diarrhea: No  drooling: No  ear discharge: No  ear pain: Yes  headaches: No  hoarse voice: No  neck pain: No  plugged ear sensation: No  shortness of breath: No  stridor: No  swollen glands: No  trouble swallowing: Yes  vomiting: No  strep: Yes  Treatments tried: acetaminophen, gargles  Improvement on treatment: no relief  Pain severity: moderate          Physical Exam: See vital signs note   HEENT: Head is atraumatic normocephalic, sinuses nontender. Ears: Normal tympanic membranes and external auditory canal. eyes pupils equal and reactive. Nose clear.   Throat with pharyngeal erythema, exudate on the right.   Neck supple. mildly tender right anterior cervical  adenopathy.  Positive chronic thyromegaly   Chest clear to auscultation. Normal respiratory effort     Akiko was seen today for sore throat, chills, fever and nausea.    Diagnoses and all orders for this visit:    Sore throat  -     Throat Screen, Rapid    Other orders  -     Strep A culture, throat  -     methylPREDNISolone (MEDROL DOSEPACK) 4 mg tablet; follow package directions      Strep screen negative.  Culture pending.    Plan:   May use Tylenol or Advil. Sore throat lozenges. Follow-up if symptoms not resolving with recommended treatment

## 2019-10-23 ENCOUNTER — PATIENT MESSAGE (OUTPATIENT)
Dept: FAMILY MEDICINE | Facility: CLINIC | Age: 44
End: 2019-10-23

## 2019-10-23 LAB
BACTERIA THROAT CULT: ABNORMAL
BACTERIA THROAT CULT: ABNORMAL

## 2019-10-23 RX ORDER — CODEINE PHOSPHATE AND GUAIFENESIN 10; 100 MG/5ML; MG/5ML
SOLUTION ORAL
Qty: 118 ML | Refills: 0 | Status: SHIPPED | OUTPATIENT
Start: 2019-10-23 | End: 2019-12-07 | Stop reason: SDUPTHER

## 2019-10-23 RX ORDER — AMOXICILLIN 875 MG/1
875 TABLET, FILM COATED ORAL 2 TIMES DAILY
Qty: 20 TABLET | Refills: 0 | Status: SHIPPED | OUTPATIENT
Start: 2019-10-23 | End: 2019-11-02

## 2019-10-23 NOTE — TELEPHONE ENCOUNTER
The strep culture showed group G strep.  This is not 1 that shows up on the strep A screen.  Strep G does not always require treatment however since she was having symptoms I would recommend treat with amoxicillin.  Prescription sent to pharmacy.

## 2019-10-29 ENCOUNTER — HOSPITAL ENCOUNTER (OUTPATIENT)
Dept: RADIOLOGY | Facility: HOSPITAL | Age: 44
Discharge: HOME | End: 2019-10-29
Attending: NURSE PRACTITIONER
Payer: COMMERCIAL

## 2019-10-29 ENCOUNTER — OFFICE VISIT (OUTPATIENT)
Dept: FAMILY MEDICINE | Facility: CLINIC | Age: 44
End: 2019-10-29
Payer: COMMERCIAL

## 2019-10-29 VITALS
HEIGHT: 63 IN | DIASTOLIC BLOOD PRESSURE: 77 MMHG | RESPIRATION RATE: 18 BRPM | HEART RATE: 78 BPM | OXYGEN SATURATION: 96 % | SYSTOLIC BLOOD PRESSURE: 147 MMHG | BODY MASS INDEX: 45.36 KG/M2 | TEMPERATURE: 99 F | WEIGHT: 256 LBS

## 2019-10-29 DIAGNOSIS — R05.9 COUGH: ICD-10-CM

## 2019-10-29 DIAGNOSIS — J02.9 PHARYNGITIS, UNSPECIFIED ETIOLOGY: Primary | ICD-10-CM

## 2019-10-29 PROCEDURE — 96372 THER/PROPH/DIAG INJ SC/IM: CPT | Mod: S$GLB,,, | Performed by: NURSE PRACTITIONER

## 2019-10-29 PROCEDURE — 99999 PR PBB SHADOW E&M-EST. PATIENT-LVL V: ICD-10-PCS | Mod: PBBFAC,,, | Performed by: NURSE PRACTITIONER

## 2019-10-29 PROCEDURE — 71046 X-RAY EXAM CHEST 2 VIEWS: CPT | Mod: TC,PO

## 2019-10-29 PROCEDURE — 71046 XR CHEST PA AND LATERAL: ICD-10-PCS | Mod: 26,,, | Performed by: RADIOLOGY

## 2019-10-29 PROCEDURE — 3078F PR MOST RECENT DIASTOLIC BLOOD PRESSURE < 80 MM HG: ICD-10-PCS | Mod: CPTII,S$GLB,, | Performed by: NURSE PRACTITIONER

## 2019-10-29 PROCEDURE — 3008F BODY MASS INDEX DOCD: CPT | Mod: CPTII,S$GLB,, | Performed by: NURSE PRACTITIONER

## 2019-10-29 PROCEDURE — 96372 PR INJECTION,THERAP/PROPH/DIAG2ST, IM OR SUBCUT: ICD-10-PCS | Mod: S$GLB,,, | Performed by: NURSE PRACTITIONER

## 2019-10-29 PROCEDURE — 3077F SYST BP >= 140 MM HG: CPT | Mod: CPTII,S$GLB,, | Performed by: NURSE PRACTITIONER

## 2019-10-29 PROCEDURE — 99213 OFFICE O/P EST LOW 20 MIN: CPT | Mod: 25,S$GLB,, | Performed by: NURSE PRACTITIONER

## 2019-10-29 PROCEDURE — 3077F PR MOST RECENT SYSTOLIC BLOOD PRESSURE >= 140 MM HG: ICD-10-PCS | Mod: CPTII,S$GLB,, | Performed by: NURSE PRACTITIONER

## 2019-10-29 PROCEDURE — 71046 X-RAY EXAM CHEST 2 VIEWS: CPT | Mod: 26,,, | Performed by: RADIOLOGY

## 2019-10-29 PROCEDURE — 99213 PR OFFICE/OUTPT VISIT, EST, LEVL III, 20-29 MIN: ICD-10-PCS | Mod: 25,S$GLB,, | Performed by: NURSE PRACTITIONER

## 2019-10-29 PROCEDURE — 3008F PR BODY MASS INDEX (BMI) DOCUMENTED: ICD-10-PCS | Mod: CPTII,S$GLB,, | Performed by: NURSE PRACTITIONER

## 2019-10-29 PROCEDURE — 99999 PR PBB SHADOW E&M-EST. PATIENT-LVL V: CPT | Mod: PBBFAC,,, | Performed by: NURSE PRACTITIONER

## 2019-10-29 PROCEDURE — 3078F DIAST BP <80 MM HG: CPT | Mod: CPTII,S$GLB,, | Performed by: NURSE PRACTITIONER

## 2019-10-29 RX ORDER — PROMETHAZINE HYDROCHLORIDE AND DEXTROMETHORPHAN HYDROBROMIDE 6.25; 15 MG/5ML; MG/5ML
5 SYRUP ORAL 2 TIMES DAILY PRN
Qty: 118 ML | Refills: 0 | Status: SHIPPED | OUTPATIENT
Start: 2019-10-29 | End: 2019-11-08

## 2019-10-29 RX ORDER — DEXAMETHASONE SODIUM PHOSPHATE 4 MG/ML
4 INJECTION, SOLUTION INTRA-ARTICULAR; INTRALESIONAL; INTRAMUSCULAR; INTRAVENOUS; SOFT TISSUE
Status: COMPLETED | OUTPATIENT
Start: 2019-10-29 | End: 2019-10-29

## 2019-10-29 RX ADMIN — DEXAMETHASONE SODIUM PHOSPHATE 4 MG: 4 INJECTION, SOLUTION INTRA-ARTICULAR; INTRALESIONAL; INTRAMUSCULAR; INTRAVENOUS; SOFT TISSUE at 08:10

## 2019-10-29 NOTE — LETTER
October 29, 2019      [facility]  [address] [address]  Phone: [phone]  Fax: [phone]       Patient: Akiko Jameson   YOB: 1975  Date of Visit: 10/29/2019    To Whom It May Concern:    Claudio Jameson  was at [facility] on 10/29/2019. She may return to work/school on 10/20/2019 with no restrictions. If you have any questions or concerns, or if I can be of further assistance, please do not hesitate to contact me.    Sincerely,    Aida Valdez LPN

## 2019-10-29 NOTE — PROGRESS NOTES
Subjective:       Patient ID: Akiko Jameson is a 44 y.o. female.    Chief Complaint: Cough and Sore Throat    Cough   This is a recurrent problem. The current episode started in the past 7 days. The problem has been gradually worsening. The problem occurs every few minutes. The cough is productive of sputum. Associated symptoms include a sore throat. Pertinent negatives include no chest pain, chills, ear congestion, ear pain, fever, headaches, heartburn, hemoptysis, myalgias, nasal congestion, postnasal drip, rash, rhinorrhea, shortness of breath, sweats, weight loss or wheezing. Nothing aggravates the symptoms. She has tried OTC cough suppressant, leukotriene antagonists, oral steroids, prescription cough suppressant and rest (Currently taking abx) for the symptoms. The treatment provided no relief. There is no history of asthma, bronchiectasis, bronchitis, COPD, emphysema, environmental allergies or pneumonia.     Past Medical History:   Diagnosis Date    ALLERGIC RHINITIS     Cellulitis     Eosinophilia     mild    GERD (gastroesophageal reflux disease)     Granular cell tumor     H. pylori infection 2018    Hypertension     Lymphedema     Mild anemia     Sleep apnea     compliant with CPAP    Thyroid nodule     Urinary incontinence, mixed      Social History     Socioeconomic History    Marital status:      Spouse name: Not on file    Number of children: 2    Years of education: Not on file    Highest education level: Not on file   Occupational History     Employer: [employer]   Social Needs    Financial resource strain: Not on file    Food insecurity:     Worry: Not on file     Inability: Not on file    Transportation needs:     Medical: Not on file     Non-medical: Not on file   Tobacco Use    Smoking status: Never Smoker    Smokeless tobacco: Never Used   Substance and Sexual Activity    Alcohol use: No    Drug use: No    Sexual activity: Not on file   Lifestyle     Physical activity:     Days per week: Not on file     Minutes per session: Not on file    Stress: Not on file   Relationships    Social connections:     Talks on phone: Not on file     Gets together: Not on file     Attends Jewish service: Not on file     Active member of club or organization: Not on file     Attends meetings of clubs or organizations: Not on file     Relationship status: Not on file   Other Topics Concern    Not on file   Social History Narrative    Not on file     Past Surgical History:   Procedure Laterality Date    AXILLARY NODE DISSECTION      granular cell tumor    BREAST CYST ASPIRATION      left    CHOLECYSTECTOMY      COLONOSCOPY N/A 5/10/2019    Procedure: COLONOSCOPY;  Surgeon: Edgar Gamble Jr., MD;  Location: [facility] ENDO;  Service: Endoscopy;  Laterality: N/A;    ENDOMETRIAL ABLATION      ESOPHAGOGASTRODUODENOSCOPY N/A 7/5/2018    Procedure: EGD (ESOPHAGOGASTRODUODENOSCOPY);  Surgeon: Edgar Gamble Jr., MD;  Location: [facility] ENDO;  Service: Endoscopy;  Laterality: N/A;    EXCISION OF MASS OF ABDOMEN Left 6/18/2018    Procedure: EXCISION, MASS, ABDOMEN - flank left;  Surgeon: Armando Tate MD;  Location: [facility] OR;  Service: General;  Laterality: Left;  left flank removal of mass    FINE NEEDLE ASPIRATION      thyroid    KNEE ARTHROSCOPY W/ MENISCECTOMY Right     NASAL SEPTUM SURGERY      TUBAL LIGATION      UPPER GASTROINTESTINAL ENDOSCOPY  07/05/2018    Dr. Gamble       Review of Systems   Constitutional: Negative.  Negative for chills, fever and weight loss.   HENT: Positive for sore throat. Negative for ear pain, postnasal drip and rhinorrhea.    Eyes: Negative.    Respiratory: Positive for cough. Negative for hemoptysis, shortness of breath and wheezing.    Cardiovascular: Negative.  Negative for chest pain.   Gastrointestinal: Negative.  Negative for heartburn.   Endocrine: Negative.    Genitourinary: Negative.    Musculoskeletal: Negative.  Negative for  myalgias.   Skin: Negative.  Negative for rash.   Allergic/Immunologic: Negative.  Negative for environmental allergies.   Neurological: Negative.  Negative for headaches.   Psychiatric/Behavioral: Negative.        Objective:      Physical Exam   Constitutional: She is oriented to person, place, and time. She appears well-developed and well-nourished.   HENT:   Head: Normocephalic.   Right Ear: Hearing, tympanic membrane, external ear and ear canal normal.   Left Ear: Hearing, tympanic membrane, external ear and ear canal normal.   Nose: Rhinorrhea present. Right sinus exhibits no maxillary sinus tenderness and no frontal sinus tenderness. Left sinus exhibits no maxillary sinus tenderness and no frontal sinus tenderness.   Mouth/Throat: Uvula is midline and mucous membranes are normal. Posterior oropharyngeal erythema present.   Eyes: Pupils are equal, round, and reactive to light. Conjunctivae are normal.   Neck: Normal range of motion. Neck supple.   Cardiovascular: Normal rate, regular rhythm and normal heart sounds.   Pulmonary/Chest: Effort normal and breath sounds normal.   Abdominal: Soft. Bowel sounds are normal.   Musculoskeletal: Normal range of motion.   Neurological: She is alert and oriented to person, place, and time.   Skin: Skin is warm and dry. Capillary refill takes 2 to 3 seconds.   Psychiatric: She has a normal mood and affect. Her behavior is normal. Judgment and thought content normal.   Nursing note and vitals reviewed.      Assessment:       1. Pharyngitis, unspecified etiology    2. Cough       Plan:           Akiko was seen today for cough and sore throat.    Diagnoses and all orders for this visit:    Pharyngitis, unspecified etiology  Cough  -     X-Ray Chest PA And Lateral; Future  -     dexamethasone injection 4 mg  -     promethazine-dextromethorphan (PROMETHAZINE-DM) 6.25-15 mg/5 mL Syrp; Take 5 mLs by mouth 2 (two) times daily as needed.        Continue current medication         Report to ER immediately if symptoms worsen

## 2019-10-30 ENCOUNTER — OFFICE VISIT (OUTPATIENT)
Dept: OPTOMETRY | Facility: CLINIC | Age: 44
End: 2019-10-30
Payer: COMMERCIAL

## 2019-10-30 DIAGNOSIS — Z46.0 CONTACT LENS/GLASSES FITTING: Primary | ICD-10-CM

## 2019-10-30 PROCEDURE — 99499 NO LOS: ICD-10-PCS | Mod: S$GLB,,, | Performed by: OPTOMETRIST

## 2019-10-30 PROCEDURE — 99999 PR PBB SHADOW E&M-EST. PATIENT-LVL III: ICD-10-PCS | Mod: PBBFAC,,, | Performed by: OPTOMETRIST

## 2019-10-30 PROCEDURE — 92310 PR CONTACT LENS FITTING (NO CHANGE): ICD-10-PCS | Mod: CSM,,, | Performed by: OPTOMETRIST

## 2019-10-30 PROCEDURE — 99499 UNLISTED E&M SERVICE: CPT | Mod: S$GLB,,, | Performed by: OPTOMETRIST

## 2019-10-30 PROCEDURE — 99999 PR PBB SHADOW E&M-EST. PATIENT-LVL III: CPT | Mod: PBBFAC,,, | Performed by: OPTOMETRIST

## 2019-10-30 PROCEDURE — 92310 CONTACT LENS FITTING OU: CPT | Mod: CSM,,, | Performed by: OPTOMETRIST

## 2019-10-30 NOTE — PROGRESS NOTES
HPI     Contact lens fit-dle-9/20/19 Jose    Denies any changes since last visit.,    Last edited by Ankita Caruso on 10/30/2019  8:29 AM. (History)        ROS     Positive for: Eyes    Negative for: Constitutional, Gastrointestinal, Neurological, Skin,   Genitourinary, Musculoskeletal, HENT, Endocrine, Cardiovascular,   Respiratory, Psychiatric, Allergic/Imm, Heme/Lymph    Last edited by KIRA Mccoy, OD on 10/30/2019  8:42 AM. (History)        Assessment /Plan     For exam results, see Encounter Report.    Contact lens/glasses fitting      1. Good initial result with Oasys lenses  Will order higher power trials for OD--proclear XR and high cyl Oasys  Will need otc readers for near ~ +1.25-1.50    I/R today with good tolerance  DW build time  RTC after 2nd trials dispensing to finalize Rx

## 2019-10-30 NOTE — PATIENT INSTRUCTIONS
DISP TRIAL CLS  Reviewed insertion/removal and cleaning.  Gave sample solutions.  Build up wearing time-6-8 hours first day, add one hour per day, up to all waking hours.  Pt advised to use NP or CL rewetting gtts prn for dryness

## 2019-11-04 ENCOUNTER — OFFICE VISIT (OUTPATIENT)
Dept: ALLERGY | Facility: CLINIC | Age: 44
End: 2019-11-04
Payer: COMMERCIAL

## 2019-11-04 ENCOUNTER — LAB VISIT (OUTPATIENT)
Dept: LAB | Facility: HOSPITAL | Age: 44
End: 2019-11-04
Attending: ALLERGY & IMMUNOLOGY
Payer: COMMERCIAL

## 2019-11-04 ENCOUNTER — TELEPHONE (OUTPATIENT)
Dept: OPTOMETRY | Facility: CLINIC | Age: 44
End: 2019-11-04

## 2019-11-04 VITALS — OXYGEN SATURATION: 98 % | BODY MASS INDEX: 45.32 KG/M2 | HEIGHT: 63 IN | WEIGHT: 255.75 LBS | HEART RATE: 96 BPM

## 2019-11-04 DIAGNOSIS — L29.9 PRURITUS: ICD-10-CM

## 2019-11-04 DIAGNOSIS — J31.0 CHRONIC RHINITIS: ICD-10-CM

## 2019-11-04 DIAGNOSIS — L50.8 RECURRENT URTICARIA: ICD-10-CM

## 2019-11-04 DIAGNOSIS — T78.3XXA ANGIOEDEMA, INITIAL ENCOUNTER: ICD-10-CM

## 2019-11-04 DIAGNOSIS — T78.3XXA ANGIOEDEMA, INITIAL ENCOUNTER: Primary | ICD-10-CM

## 2019-11-04 LAB
THYROPEROXIDASE IGG SERPL-ACNC: <6 IU/ML
TSH SERPL DL<=0.005 MIU/L-ACNC: 0.6 UIU/ML (ref 0.4–4)

## 2019-11-04 PROCEDURE — 99204 PR OFFICE/OUTPT VISIT, NEW, LEVL IV, 45-59 MIN: ICD-10-PCS | Mod: S$GLB,,, | Performed by: ALLERGY & IMMUNOLOGY

## 2019-11-04 PROCEDURE — 36415 COLL VENOUS BLD VENIPUNCTURE: CPT | Mod: PO

## 2019-11-04 PROCEDURE — 84165 PROTEIN E-PHORESIS SERUM: CPT

## 2019-11-04 PROCEDURE — 86003 ALLG SPEC IGE CRUDE XTRC EA: CPT | Mod: 59

## 2019-11-04 PROCEDURE — 84443 ASSAY THYROID STIM HORMONE: CPT

## 2019-11-04 PROCEDURE — 99999 PR PBB SHADOW E&M-EST. PATIENT-LVL III: CPT | Mod: PBBFAC,,, | Performed by: ALLERGY & IMMUNOLOGY

## 2019-11-04 PROCEDURE — 88184 FLOWCYTOMETRY/ TC 1 MARKER: CPT

## 2019-11-04 PROCEDURE — 99999 PR PBB SHADOW E&M-EST. PATIENT-LVL III: ICD-10-PCS | Mod: PBBFAC,,, | Performed by: ALLERGY & IMMUNOLOGY

## 2019-11-04 PROCEDURE — 86376 MICROSOMAL ANTIBODY EACH: CPT

## 2019-11-04 PROCEDURE — 3008F BODY MASS INDEX DOCD: CPT | Mod: CPTII,S$GLB,, | Performed by: ALLERGY & IMMUNOLOGY

## 2019-11-04 PROCEDURE — 88185 FLOWCYTOMETRY/TC ADD-ON: CPT | Mod: 59

## 2019-11-04 PROCEDURE — 84165 PATHOLOGIST INTERPRETATION SPE: ICD-10-PCS | Mod: 26,,, | Performed by: PATHOLOGY

## 2019-11-04 PROCEDURE — 84165 PROTEIN E-PHORESIS SERUM: CPT | Mod: 26,,, | Performed by: PATHOLOGY

## 2019-11-04 PROCEDURE — 86003 ALLG SPEC IGE CRUDE XTRC EA: CPT

## 2019-11-04 PROCEDURE — 84445 ASSAY OF TSI GLOBULIN: CPT

## 2019-11-04 PROCEDURE — 99204 OFFICE O/P NEW MOD 45 MIN: CPT | Mod: S$GLB,,, | Performed by: ALLERGY & IMMUNOLOGY

## 2019-11-04 PROCEDURE — 3008F PR BODY MASS INDEX (BMI) DOCUMENTED: ICD-10-PCS | Mod: CPTII,S$GLB,, | Performed by: ALLERGY & IMMUNOLOGY

## 2019-11-04 NOTE — TELEPHONE ENCOUNTER
----- Message from Dinora Avila sent at 11/4/2019  9:42 AM CST -----  Ms Jameson came in saying that she was struggling to get used to her new contacts. A family member suggested there were drops he was given before that helped with the adjustment period. If yall can give her a call with suggestions to help, her cell is the best contact number.       spoke to pt about trouble with contacts let her know there is not a gtt to help with insertion. Only the gtts we used once for appt use only. Pt understood.

## 2019-11-04 NOTE — PROGRESS NOTES
"Subjective:       Patient ID: Akiko Jameson is a 44 y.o. female.    Chief Complaint:  Allergic Reaction (suspects she was having allergic reaction ot several things. face swelling, itching)      45 yo woman presents for new patient evaluation of face swelling and itching. She states been over the past year she has "allergic reactions" to different things. She was on prevacid then Prilosec and Biaxin and had face swelling and itching and throat tight so not sure which was trigger. Since then had itchy face and tight throat after banana chips. Had itching with shrimp but fine with crab and crawfish. Had hives and face swelling in a restaurant with peanuts on ground. Never had food allergy prior to this. No insect or latex allergy. She has rhinitis with sneeze, runny nose congestion off and on through year. Is on Singulair for it. Had bad reaction around hay once. No asthma, or eczema. She had lip and neck swelling to ACE-I in 211.       Environmental History: see history section for home environment  Review of Systems   Constitutional: Negative for appetite change, chills, fatigue and fever.   HENT: Positive for congestion, facial swelling, postnasal drip, rhinorrhea, sneezing and trouble swallowing. Negative for ear discharge, ear pain, nosebleeds, sinus pressure, sore throat and voice change.    Eyes: Negative for discharge, redness, itching and visual disturbance.   Respiratory: Negative for cough, choking, chest tightness, shortness of breath and wheezing.    Cardiovascular: Negative for chest pain, palpitations and leg swelling.   Gastrointestinal: Negative for abdominal distention, abdominal pain, constipation, diarrhea, nausea and vomiting.   Genitourinary: Negative for difficulty urinating.   Musculoskeletal: Negative for arthralgias, gait problem, joint swelling and myalgias.   Skin: Positive for rash. Negative for color change.   Neurological: Negative for dizziness, syncope, weakness, " light-headedness and headaches.   Hematological: Negative for adenopathy. Does not bruise/bleed easily.   Psychiatric/Behavioral: Negative for agitation, behavioral problems, confusion and sleep disturbance. The patient is not nervous/anxious.         Objective:      Physical Exam   Constitutional: She is oriented to person, place, and time. She appears well-developed and well-nourished. No distress.   HENT:   Head: Normocephalic and atraumatic.   Right Ear: Hearing, tympanic membrane, external ear and ear canal normal.   Left Ear: Hearing, tympanic membrane, external ear and ear canal normal.   Nose: No mucosal edema, rhinorrhea, sinus tenderness or septal deviation. No epistaxis. Right sinus exhibits no maxillary sinus tenderness and no frontal sinus tenderness. Left sinus exhibits no maxillary sinus tenderness and no frontal sinus tenderness.   Mouth/Throat: Uvula is midline, oropharynx is clear and moist and mucous membranes are normal. No uvula swelling.   Eyes: Conjunctivae are normal. Right eye exhibits no discharge. Left eye exhibits no discharge.   Neck: Normal range of motion. No thyromegaly present.   Cardiovascular: Normal rate, regular rhythm and normal heart sounds.   No murmur heard.  Pulmonary/Chest: Effort normal and breath sounds normal. No respiratory distress. She has no wheezes.   Abdominal: Soft. She exhibits no distension. There is no tenderness.   Musculoskeletal: Normal range of motion. She exhibits no edema or tenderness.   Lymphadenopathy:     She has no cervical adenopathy.   Neurological: She is alert and oriented to person, place, and time.   Skin: Skin is warm and dry. No rash noted. No erythema.   Psychiatric: She has a normal mood and affect. Her behavior is normal. Judgment and thought content normal.   Nursing note and vitals reviewed.      Laboratory:   none performed   Assessment:       1. Angioedema, initial encounter    2. Pruritus    3. Recurrent urticaria    4. Chronic  rhinitis         Plan:       1. Advised pt face swelling and itch may be allergic vs other systemic vs autoimmune, will send labs to further eval  2. Continue montelukast 10 mg daily and consider adding daily antihistamine  3. Phone review

## 2019-11-05 ENCOUNTER — TELEPHONE (OUTPATIENT)
Dept: FAMILY MEDICINE | Facility: CLINIC | Age: 44
End: 2019-11-05

## 2019-11-05 DIAGNOSIS — R05.3 COUGH, PERSISTENT: ICD-10-CM

## 2019-11-05 DIAGNOSIS — R05.3 PERSISTENT COUGH: ICD-10-CM

## 2019-11-05 DIAGNOSIS — Z01.89 LABORATORY TEST: Primary | ICD-10-CM

## 2019-11-05 LAB
ALBUMIN SERPL ELPH-MCNC: 3.71 G/DL (ref 3.35–5.55)
ALPHA1 GLOB SERPL ELPH-MCNC: 0.33 G/DL (ref 0.17–0.41)
ALPHA2 GLOB SERPL ELPH-MCNC: 0.7 G/DL (ref 0.43–0.99)
B-GLOBULIN SERPL ELPH-MCNC: 0.97 G/DL (ref 0.5–1.1)
GAMMA GLOB SERPL ELPH-MCNC: 1.49 G/DL (ref 0.67–1.58)
PATHOLOGIST INTERPRETATION SPE: NORMAL
PROT SERPL-MCNC: 7.2 G/DL (ref 6–8.4)

## 2019-11-05 NOTE — TELEPHONE ENCOUNTER
CT chest, d-dimer, serum creatinine ordered. Creatinine results needed prior to CT being ordered. If symptoms worsening, report to ER.

## 2019-11-05 NOTE — TELEPHONE ENCOUNTER
Pt states that the Promethazine-DM or the Tussi is not helping with her cough, pt is want to know what she should do next for her constant cough.

## 2019-11-05 NOTE — TELEPHONE ENCOUNTER
----- Message from Tarah Madera sent at 11/5/2019 12:09 PM CST -----  Contact: self/[phone]  Would like to consult with nurse regarding medication, patient states she was taking medication for a cough that Yeimi Montilla prescribe, and it not working. Please call back at [phone]. Thanks/ar

## 2019-11-06 ENCOUNTER — LAB VISIT (OUTPATIENT)
Dept: LAB | Facility: HOSPITAL | Age: 44
End: 2019-11-06
Attending: FAMILY MEDICINE
Payer: COMMERCIAL

## 2019-11-06 DIAGNOSIS — R05.3 PERSISTENT COUGH: ICD-10-CM

## 2019-11-06 DIAGNOSIS — R05.3 COUGH, PERSISTENT: ICD-10-CM

## 2019-11-06 DIAGNOSIS — Z01.89 LABORATORY TEST: ICD-10-CM

## 2019-11-06 LAB
A ALTERNATA IGE QN: <0.1 KU/L
A FUMIGATUS IGE QN: <0.1 KU/L
ALLERGEN MELON IGE: <0.1 KU/L
ALMOND IGE QN: <0.1 KU/L
AVOCADO IGE QN: <0.1 KU/L
BAHIA GRASS IGE QN: 0.14 KU/L
BALD CYPRESS IGE QN: <0.1 KU/L
BANANA IGE QN: <0.1 KU/L
BERMUDA GRASS IGE QN: 0.16 KU/L
CASHEW NUT IGE QN: <0.1 KU/L
CAT DANDER IGE QN: <0.1 KU/L
COMMON RAGWEED IGE QN: <0.1 KU/L
COTTONWOOD IGE QN: <0.1 KU/L
COW MILK IGE QN: <0.1 KU/L
CRAB IGE QN: <0.1 KU/L
CRAWFISH IGE QN: <0.1 KU/L
CREAT SERPL-MCNC: 0.8 MG/DL (ref 0.5–1.4)
D DIMER PPP IA.FEU-MCNC: 0.43 MG/L FEU
D FARINAE IGE QN: <0.1 KU/L
D PTERONYSS IGE QN: <0.1 KU/L
DEPRECATED A ALTERNATA IGE RAST QL: NORMAL
DEPRECATED A FUMIGATUS IGE RAST QL: NORMAL
DEPRECATED ALMOND IGE RAST QL: NORMAL
DEPRECATED AVOCADO IGE RAST QL: NORMAL
DEPRECATED BAHIA GRASS IGE RAST QL: ABNORMAL
DEPRECATED BALD CYPRESS IGE RAST QL: NORMAL
DEPRECATED BANANA IGE RAST QL: NORMAL
DEPRECATED BERMUDA GRASS IGE RAST QL: ABNORMAL
DEPRECATED CASHEW NUT IGE RAST QL: NORMAL
DEPRECATED CAT DANDER IGE RAST QL: NORMAL
DEPRECATED COMMON RAGWEED IGE RAST QL: NORMAL
DEPRECATED COTTONWOOD IGE RAST QL: NORMAL
DEPRECATED COW MILK IGE RAST QL: NORMAL
DEPRECATED CRAB IGE RAST QL: NORMAL
DEPRECATED CRAWFISH IGE RAST QL: NORMAL
DEPRECATED D FARINAE IGE RAST QL: NORMAL
DEPRECATED D PTERONYSS IGE RAST QL: NORMAL
DEPRECATED DOG DANDER IGE RAST QL: NORMAL
DEPRECATED EGG WHITE IGE RAST QL: NORMAL
DEPRECATED ELDER IGE RAST QL: NORMAL
DEPRECATED ENGL PLANTAIN IGE RAST QL: NORMAL
DEPRECATED JOHNSON GRASS IGE RAST QL: ABNORMAL
DEPRECATED LOBSTER IGE RAST QL: NORMAL
DEPRECATED MACADAMIA IGE RAST QL: NORMAL
DEPRECATED P NOTATUM IGE RAST QL: NORMAL
DEPRECATED PEANUT IGE RAST QL: NORMAL
DEPRECATED PECAN/HICK NUT IGE RAST QL: NORMAL
DEPRECATED PECAN/HICK TREE IGE RAST QL: NORMAL
DEPRECATED S ROSTRATA IGE RAST QL: NORMAL
DEPRECATED SALTWORT IGE RAST QL: NORMAL
DEPRECATED SHRIMP IGE RAST QL: NORMAL
DEPRECATED SILVER BIRCH IGE RAST QL: NORMAL
DEPRECATED SOYBEAN IGE RAST QL: NORMAL
DEPRECATED TIMOTHY IGE RAST QL: NORMAL
DEPRECATED WHEAT IGE RAST QL: NORMAL
DEPRECATED WHITE OAK IGE RAST QL: NORMAL
DEPRECATED WILLOW IGE RAST QL: NORMAL
DOG DANDER IGE QN: <0.1 KU/L
EGG WHITE IGE QN: <0.1 KU/L
ELDER IGE QN: <0.1 KU/L
ENGL PLANTAIN IGE QN: <0.1 KU/L
EST. GFR  (AFRICAN AMERICAN): >60 ML/MIN/1.73 M^2
EST. GFR  (NON AFRICAN AMERICAN): >60 ML/MIN/1.73 M^2
JOHNSON GRASS IGE QN: 0.16 KU/L
LOBSTER IGE QN: <0.1 KU/L
MACADAMIA IGE QN: <0.1 KU/L
MELON CLASS: NORMAL
P NOTATUM IGE QN: <0.1 KU/L
PEANUT IGE QN: <0.1 KU/L
PECAN/HICK NUT IGE QN: <0.1 KU/L
PECAN/HICK TREE IGE QN: <0.1 KU/L
S ROSTRATA IGE QN: <0.1 KU/L
SALTWORT IGE QN: <0.1 KU/L
SHRIMP IGE QN: <0.1 KU/L
SILVER BIRCH IGE QN: <0.1 KU/L
SOYBEAN IGE QN: <0.1 KU/L
TIMOTHY IGE QN: <0.1 KU/L
TSI SER-ACNC: <0.1 IU/L
WHEAT IGE QN: <0.1 KU/L
WHITE OAK IGE QN: <0.1 KU/L
WILLOW IGE QN: <0.1 KU/L

## 2019-11-06 PROCEDURE — 82565 ASSAY OF CREATININE: CPT

## 2019-11-06 PROCEDURE — 36415 COLL VENOUS BLD VENIPUNCTURE: CPT | Mod: PO

## 2019-11-06 PROCEDURE — 82565 ASSAY OF CREATININE: CPT | Mod: PO

## 2019-11-06 PROCEDURE — 85379 FIBRIN DEGRADATION QUANT: CPT

## 2019-11-18 ENCOUNTER — PATIENT MESSAGE (OUTPATIENT)
Dept: ALLERGY | Facility: CLINIC | Age: 44
End: 2019-11-18

## 2019-11-18 LAB
CIU ASSOCIATED BASOPHIL ACTIVATION: 2.7 % (ref 0–12)
IGE RECEPTOR ANTIBODY: NORMAL

## 2019-11-19 ENCOUNTER — HOSPITAL ENCOUNTER (OUTPATIENT)
Dept: RADIOLOGY | Facility: HOSPITAL | Age: 44
Discharge: HOME | End: 2019-11-19
Attending: NURSE PRACTITIONER
Payer: COMMERCIAL

## 2019-11-19 ENCOUNTER — TELEPHONE (OUTPATIENT)
Dept: FAMILY MEDICINE | Facility: CLINIC | Age: 44
End: 2019-11-19

## 2019-11-19 DIAGNOSIS — R05.3 PERSISTENT COUGH: ICD-10-CM

## 2019-11-19 DIAGNOSIS — R05.3 COUGH, PERSISTENT: ICD-10-CM

## 2019-11-19 PROCEDURE — 71260 CT CHEST WITH CONTRAST: ICD-10-PCS | Mod: 26,,, | Performed by: RADIOLOGY

## 2019-11-19 PROCEDURE — 25500020 PHARM REV CODE 255: Mod: PO | Performed by: NURSE PRACTITIONER

## 2019-11-19 PROCEDURE — 71260 CT THORAX DX C+: CPT | Mod: 26,,, | Performed by: RADIOLOGY

## 2019-11-19 PROCEDURE — 71260 CT THORAX DX C+: CPT | Mod: TC,PO

## 2019-11-19 RX ADMIN — IOHEXOL 100 ML: 300 INJECTION, SOLUTION INTRAVENOUS at 09:11

## 2019-11-19 NOTE — TELEPHONE ENCOUNTER
The CT scan shows enlarged thyroid as seen previously.  Small hiatal hernia.  Otherwise looks okay, no issues with the lungs..  Please follow up appointment with me if persistent symptoms.    This CT report should be within epic.  Please check with Radiology to see why this is not in our epic system.

## 2019-11-19 NOTE — TELEPHONE ENCOUNTER
Patient had CT ordered per Yeimi, done at [facility], results in box, please review and advise as Yeimi is out this week

## 2019-12-07 ENCOUNTER — OFFICE VISIT (OUTPATIENT)
Dept: FAMILY MEDICINE | Facility: CLINIC | Age: 44
End: 2019-12-07
Payer: COMMERCIAL

## 2019-12-07 VITALS
TEMPERATURE: 99 F | HEART RATE: 86 BPM | HEIGHT: 63 IN | SYSTOLIC BLOOD PRESSURE: 140 MMHG | WEIGHT: 255 LBS | BODY MASS INDEX: 45.18 KG/M2 | DIASTOLIC BLOOD PRESSURE: 86 MMHG

## 2019-12-07 DIAGNOSIS — R05.3 COUGH, PERSISTENT: Primary | ICD-10-CM

## 2019-12-07 PROCEDURE — 99213 OFFICE O/P EST LOW 20 MIN: CPT | Mod: S$GLB,,, | Performed by: INTERNAL MEDICINE

## 2019-12-07 PROCEDURE — 3077F SYST BP >= 140 MM HG: CPT | Mod: CPTII,S$GLB,, | Performed by: INTERNAL MEDICINE

## 2019-12-07 PROCEDURE — 99999 PR PBB SHADOW E&M-EST. PATIENT-LVL III: CPT | Mod: PBBFAC,,, | Performed by: INTERNAL MEDICINE

## 2019-12-07 PROCEDURE — 3077F PR MOST RECENT SYSTOLIC BLOOD PRESSURE >= 140 MM HG: ICD-10-PCS | Mod: CPTII,S$GLB,, | Performed by: INTERNAL MEDICINE

## 2019-12-07 PROCEDURE — 99213 PR OFFICE/OUTPT VISIT, EST, LEVL III, 20-29 MIN: ICD-10-PCS | Mod: S$GLB,,, | Performed by: INTERNAL MEDICINE

## 2019-12-07 PROCEDURE — 3079F PR MOST RECENT DIASTOLIC BLOOD PRESSURE 80-89 MM HG: ICD-10-PCS | Mod: CPTII,S$GLB,, | Performed by: INTERNAL MEDICINE

## 2019-12-07 PROCEDURE — 99999 PR PBB SHADOW E&M-EST. PATIENT-LVL III: ICD-10-PCS | Mod: PBBFAC,,, | Performed by: INTERNAL MEDICINE

## 2019-12-07 PROCEDURE — 3079F DIAST BP 80-89 MM HG: CPT | Mod: CPTII,S$GLB,, | Performed by: INTERNAL MEDICINE

## 2019-12-07 PROCEDURE — 3008F BODY MASS INDEX DOCD: CPT | Mod: CPTII,S$GLB,, | Performed by: INTERNAL MEDICINE

## 2019-12-07 PROCEDURE — 3008F PR BODY MASS INDEX (BMI) DOCUMENTED: ICD-10-PCS | Mod: CPTII,S$GLB,, | Performed by: INTERNAL MEDICINE

## 2019-12-07 RX ORDER — CETIRIZINE HYDROCHLORIDE 10 MG/1
10 TABLET ORAL DAILY
Refills: 0 | COMMUNITY
Start: 2019-12-07 | End: 2023-11-02

## 2019-12-07 RX ORDER — FLUTICASONE PROPIONATE 50 MCG
SPRAY, SUSPENSION (ML) NASAL
Qty: 16 G | Refills: 12 | Status: SHIPPED | OUTPATIENT
Start: 2019-12-07 | End: 2020-11-05

## 2019-12-07 RX ORDER — CODEINE PHOSPHATE AND GUAIFENESIN 10; 100 MG/5ML; MG/5ML
SOLUTION ORAL
Qty: 118 ML | Refills: 0 | Status: ON HOLD | OUTPATIENT
Start: 2019-12-07 | End: 2020-02-29 | Stop reason: HOSPADM

## 2019-12-07 RX ORDER — PROMETHAZINE HYDROCHLORIDE AND DEXTROMETHORPHAN HYDROBROMIDE 6.25; 15 MG/5ML; MG/5ML
5 SYRUP ORAL EVERY 6 HOURS PRN
Qty: 118 ML | Refills: 0 | Status: SHIPPED | OUTPATIENT
Start: 2019-12-07 | End: 2019-12-07 | Stop reason: SDUPTHER

## 2019-12-07 NOTE — PROGRESS NOTES
Assessment/Plan:    Cough, persistent  Chronic x 3 months. Non-productive. No other associated symptoms. Nasal mucosal erythema on exam. CXR and chest CT with no abnormal pulmonary findings. Suspect post-nasal drip present even though patient not aware, and could be etiology of cough. Plan to start on oral antihistamine and intranasal steroid. Patient also has hx of GERD with uncontrolled symptoms. Allergic to PPI. If no improvement with therapy provided today, may want further look into GI cause. Instructed to follow up with PCP if no improvement in one week    _____________________________________________________________________________________________________________________________________________________    Orders this visit:    Cough, persistent  -     fluticasone propionate (FLONASE) 50 mcg/actuation nasal spray; Use 2 sprays to each nostril daily  Dispense: 16 g; Refill: 12  -     cetirizine (ZYRTEC) 10 MG tablet; Take 1 tablet (10 mg total) by mouth once daily.; Refill: 0  -     Discontinue: promethazine-dextromethorphan (PROMETHAZINE-DM) 6.25-15 mg/5 mL Syrp; Take 5 mLs by mouth every 6 (six) hours as needed (cough).  Dispense: 118 mL; Refill: 0  -     guaifenesin-codeine 100-10 mg/5 ml (TUSSI-ORGANIDIN NR)  mg/5 mL syrup; Take 5-10 ml po every 6 hours as needed for cough  Dispense: 118 mL; Refill: 0      Follow up if symptoms worsen or fail to improve.    Maryann Ayoub MD  _____________________________________________________________________________________________________________________________________________________    HPI:    Patient is in clinic today as an established patient with a new complaint of chronic cough. Patient reports a cough for the past 3 months. Started after a strep  She describes cough as wet but unable to produce sputum. Cough occurs all day long but worse at night. Reports that cough was getting better but now worse again, starting last week. She is taking coricidin,  cough drops, cough syrups and tessalon perles. None of these things help. Denies nasal congestion, post-nasal drip, sore throat. Denies fevers. Denies tobacco use or tobacco exposure. Does have some worsening reflux symptoms but allergic to PPI. On zantac. She has been evaluated multiple times for this cough. Workup included chest XR and CT, all with no abnormal findings.     No other complaints today.    Past Medical History:  Past Medical History:   Diagnosis Date    ALLERGIC RHINITIS     Cellulitis     Eosinophilia     mild    GERD (gastroesophageal reflux disease)     Granular cell tumor     H. pylori infection 2018    Hypertension     Lymphedema     Mild anemia     Sleep apnea     compliant with CPAP    Thyroid nodule     Urinary incontinence, mixed      Past Surgical History:   Procedure Laterality Date    AXILLARY NODE DISSECTION      granular cell tumor    BREAST CYST ASPIRATION      left    CHOLECYSTECTOMY      COLONOSCOPY N/A 5/10/2019    Procedure: COLONOSCOPY;  Surgeon: Edgar Gamble Jr., MD;  Location: [facility];  Service: Endoscopy;  Laterality: N/A;    ENDOMETRIAL ABLATION      ESOPHAGOGASTRODUODENOSCOPY N/A 7/5/2018    Procedure: EGD (ESOPHAGOGASTRODUODENOSCOPY);  Surgeon: Edgar Gamble Jr., MD;  Location: [facility];  Service: Endoscopy;  Laterality: N/A;    EXCISION OF MASS OF ABDOMEN Left 6/18/2018    Procedure: EXCISION, MASS, ABDOMEN - flank left;  Surgeon: Armando Tate MD;  Location: [facility] OR;  Service: General;  Laterality: Left;  left flank removal of mass    FINE NEEDLE ASPIRATION      thyroid    KNEE ARTHROSCOPY W/ MENISCECTOMY Right     NASAL SEPTUM SURGERY      TUBAL LIGATION      UPPER GASTROINTESTINAL ENDOSCOPY  07/05/2018    Dr. Gamble     Review of patient's allergies indicates:   Allergen Reactions    Biaxin [clarithromycin] Swelling    Prilosec [omeprazole magnesium] Swelling    Prevacid [lansoprazole] Swelling     Lip swelling- was taking this  along with blood pressure medication: benazepril; not sure which caused it. Reports she has taken OTC prilosec without any problems.    Ace inhibitors Swelling     Facial swelling    Benazepril Swelling     Lip swelling     Social History     Tobacco Use    Smoking status: Never Smoker    Smokeless tobacco: Never Used   Substance Use Topics    Alcohol use: No    Drug use: No     Family History   Problem Relation Age of Onset    Diabetes Mother     Kidney failure Mother     Breast cancer Maternal Grandmother     Breast cancer Maternal Aunt     Ovarian cancer Cousin     Breast cancer Cousin     Blindness Father     Cirrhosis Neg Hx     Colon polyps Neg Hx     Colon cancer Neg Hx     Crohn's disease Neg Hx     Esophageal cancer Neg Hx     Stomach cancer Neg Hx     Ulcerative colitis Neg Hx     Amblyopia Neg Hx     Cataracts Neg Hx     Glaucoma Neg Hx     Macular degeneration Neg Hx     Retinal detachment Neg Hx     Strabismus Neg Hx     Allergic rhinitis Neg Hx     Allergies Neg Hx     Angioedema Neg Hx     Asthma Neg Hx     Atopy Neg Hx     Eczema Neg Hx     Immunodeficiency Neg Hx     Rhinitis Neg Hx     Urticaria Neg Hx      Current Outpatient Medications on File Prior to Visit   Medication Sig Dispense Refill    diltiaZEM (CARDIZEM CD) 120 MG Cp24 Take 1 capsule (120 mg total) by mouth once daily. 90 capsule 3    docusate sodium (COLACE) 100 MG capsule Take 100 mg by mouth every other day.       montelukast (SINGULAIR) 10 mg tablet Take 1 tablet (10 mg total) by mouth nightly. 90 tablet 3    multivitamin (THERAGRAN) per tablet Every day      potassium chloride SA (K-DUR,KLOR-CON) 20 MEQ tablet Take 1 tablet (20 mEq total) by mouth 2 (two) times daily. Appointment needed for further refills after this one 60 tablet 11    ranitidine (ZANTAC) 300 MG tablet Take 1 tablet (300 mg total) by mouth every evening. 90 tablet 3    triamterene-hydrochlorothiazide 37.5-25 mg  "(MAXZIDE-25) 37.5-25 mg per tablet Take 1 tablet by mouth once daily. 90 tablet 3    hyoscyamine (LEVSIN/SL) 0.125 mg Subl Take 2 tablets (0.25 mg total) by mouth every 4 (four) hours as needed. (Patient not taking: Reported on 12/7/2019) 20 tablet 11    nabumetone (RELAFEN) 500 MG tablet Take 1 tablet (500 mg total) by mouth 2 (two) times daily. (Patient not taking: Reported on 12/7/2019) 60 tablet 3    [DISCONTINUED] guaifenesin-codeine 100-10 mg/5 ml (TUSSI-ORGANIDIN NR)  mg/5 mL syrup Take 5-10 ml po every 6 hours as needed for cough (Patient not taking: Reported on 12/7/2019) 118 mL 0     No current facility-administered medications on file prior to visit.        Review of Systems   Constitutional: Negative for fever.   HENT: Negative for congestion, postnasal drip and sore throat.    Respiratory: Positive for cough. Negative for shortness of breath and wheezing.        Vitals:    12/07/19 0954   BP: (!) 140/86   Pulse: 86   Temp: 98.5 °F (36.9 °C)   Weight: 115.7 kg (255 lb)   Height: 5' 3" (1.6 m)       Wt Readings from Last 3 Encounters:   12/07/19 115.7 kg (255 lb)   11/04/19 116 kg (255 lb 11.7 oz)   10/29/19 116.1 kg (256 lb)       Physical Exam   Constitutional: She is oriented to person, place, and time. She appears well-developed and well-nourished. No distress.   HENT:   Head: Normocephalic and atraumatic.   Eyes: Conjunctivae and EOM are normal.   Neck: Normal range of motion. Neck supple.   Cardiovascular: Normal rate, regular rhythm, normal heart sounds and intact distal pulses.   No murmur heard.  Pulmonary/Chest: Effort normal and breath sounds normal. No respiratory distress. She has no wheezes. She has no rales.   Musculoskeletal: Normal range of motion.   Neurological: She is alert and oriented to person, place, and time.   Skin: Skin is warm and dry. No rash noted.   Psychiatric: She has a normal mood and affect.       "

## 2019-12-07 NOTE — PATIENT INSTRUCTIONS

## 2019-12-07 NOTE — ASSESSMENT & PLAN NOTE
Chronic x 3 months. Non-productive. No other associated symptoms. Nasal mucosal erythema on exam. CXR and chest CT with no abnormal pulmonary findings. Suspect post-nasal drip present even though patient not aware, and could be etiology of cough. Plan to start on oral antihistamine and intranasal steroid. Patient also has hx of GERD with uncontrolled symptoms. Allergic to PPI. If no improvement with therapy provided today, may want further look into GI cause. Instructed to follow up with PCP if no improvement in one week

## 2019-12-19 ENCOUNTER — OFFICE VISIT (OUTPATIENT)
Dept: FAMILY MEDICINE | Facility: CLINIC | Age: 44
End: 2019-12-19
Payer: COMMERCIAL

## 2019-12-19 VITALS
TEMPERATURE: 98 F | SYSTOLIC BLOOD PRESSURE: 139 MMHG | BODY MASS INDEX: 46.21 KG/M2 | HEART RATE: 75 BPM | DIASTOLIC BLOOD PRESSURE: 82 MMHG | WEIGHT: 260.81 LBS | HEIGHT: 63 IN

## 2019-12-19 DIAGNOSIS — K21.9 GASTROESOPHAGEAL REFLUX DISEASE, ESOPHAGITIS PRESENCE NOT SPECIFIED: ICD-10-CM

## 2019-12-19 DIAGNOSIS — R05.3 CHRONIC COUGH: Primary | ICD-10-CM

## 2019-12-19 PROCEDURE — 99999 PR PBB SHADOW E&M-EST. PATIENT-LVL V: CPT | Mod: PBBFAC,,, | Performed by: NURSE PRACTITIONER

## 2019-12-19 PROCEDURE — 99213 PR OFFICE/OUTPT VISIT, EST, LEVL III, 20-29 MIN: ICD-10-PCS | Mod: S$GLB,,, | Performed by: NURSE PRACTITIONER

## 2019-12-19 PROCEDURE — 3079F PR MOST RECENT DIASTOLIC BLOOD PRESSURE 80-89 MM HG: ICD-10-PCS | Mod: CPTII,S$GLB,, | Performed by: NURSE PRACTITIONER

## 2019-12-19 PROCEDURE — 3075F SYST BP GE 130 - 139MM HG: CPT | Mod: CPTII,S$GLB,, | Performed by: NURSE PRACTITIONER

## 2019-12-19 PROCEDURE — 3008F BODY MASS INDEX DOCD: CPT | Mod: CPTII,S$GLB,, | Performed by: NURSE PRACTITIONER

## 2019-12-19 PROCEDURE — 3075F PR MOST RECENT SYSTOLIC BLOOD PRESS GE 130-139MM HG: ICD-10-PCS | Mod: CPTII,S$GLB,, | Performed by: NURSE PRACTITIONER

## 2019-12-19 PROCEDURE — 99213 OFFICE O/P EST LOW 20 MIN: CPT | Mod: S$GLB,,, | Performed by: NURSE PRACTITIONER

## 2019-12-19 PROCEDURE — 99999 PR PBB SHADOW E&M-EST. PATIENT-LVL V: ICD-10-PCS | Mod: PBBFAC,,, | Performed by: NURSE PRACTITIONER

## 2019-12-19 PROCEDURE — 3079F DIAST BP 80-89 MM HG: CPT | Mod: CPTII,S$GLB,, | Performed by: NURSE PRACTITIONER

## 2019-12-19 PROCEDURE — 3008F PR BODY MASS INDEX (BMI) DOCUMENTED: ICD-10-PCS | Mod: CPTII,S$GLB,, | Performed by: NURSE PRACTITIONER

## 2019-12-19 RX ORDER — SUCRALFATE 1 G/1
1 TABLET ORAL 4 TIMES DAILY
Qty: 40 TABLET | Refills: 0 | Status: SHIPPED | OUTPATIENT
Start: 2019-12-19 | End: 2019-12-29

## 2019-12-19 NOTE — PROGRESS NOTES
Subjective:       Patient ID: Akiko Jameson is a 44 y.o. female.    Chief Complaint: Cough    Cough   This is a recurrent problem. The current episode started more than 1 month ago. The problem has been gradually worsening. The problem occurs every few minutes. The cough is productive of sputum. Associated symptoms include chest pain and heartburn. Pertinent negatives include no chills, ear congestion, ear pain, fever, headaches, hemoptysis, myalgias, nasal congestion, postnasal drip, rash, rhinorrhea, sore throat, shortness of breath, sweats, weight loss or wheezing. Associated symptoms comments: Currently taking zantac for GERD. Nothing aggravates the symptoms. She has tried OTC cough suppressant, a beta-agonist inhaler, body position changes, leukotriene antagonists, oral steroids, prescription cough suppressant and rest for the symptoms. The treatment provided mild relief. Her past medical history is significant for bronchitis. There is no history of asthma, bronchiectasis, COPD, emphysema, environmental allergies or pneumonia.   CXR, CT chest done in October, November; both unremarkable. Pt states had h pylori in 2018 but was unable to complete treatment due to allergic reaction to medication. Upper GI in 2018; last GI visit 4/2019; has not done 1 m follow up.   Past Medical History:   Diagnosis Date    ALLERGIC RHINITIS     Cellulitis     Eosinophilia     mild    GERD (gastroesophageal reflux disease)     Granular cell tumor     H. pylori infection 2018    Hypertension     Lymphedema     Mild anemia     Sleep apnea     compliant with CPAP    Thyroid nodule     Urinary incontinence, mixed      Social History     Socioeconomic History    Marital status:      Spouse name: Not on file    Number of children: 2    Years of education: Not on file    Highest education level: Not on file   Occupational History     Employer: [employer]   Social Needs    Financial resource strain: Not on  file    Food insecurity:     Worry: Not on file     Inability: Not on file    Transportation needs:     Medical: Not on file     Non-medical: Not on file   Tobacco Use    Smoking status: Never Smoker    Smokeless tobacco: Never Used   Substance and Sexual Activity    Alcohol use: No    Drug use: No    Sexual activity: Not on file   Lifestyle    Physical activity:     Days per week: Not on file     Minutes per session: Not on file    Stress: Not on file   Relationships    Social connections:     Talks on phone: Not on file     Gets together: Not on file     Attends Zoroastrianism service: Not on file     Active member of club or organization: Not on file     Attends meetings of clubs or organizations: Not on file     Relationship status: Not on file   Other Topics Concern    Not on file   Social History Narrative    Not on file     Past Surgical History:   Procedure Laterality Date    AXILLARY NODE DISSECTION      granular cell tumor    BREAST CYST ASPIRATION      left    CHOLECYSTECTOMY      COLONOSCOPY N/A 5/10/2019    Procedure: COLONOSCOPY;  Surgeon: Edgar Gamble Jr., MD;  Location: [facility];  Service: Endoscopy;  Laterality: N/A;    ENDOMETRIAL ABLATION      ESOPHAGOGASTRODUODENOSCOPY N/A 7/5/2018    Procedure: EGD (ESOPHAGOGASTRODUODENOSCOPY);  Surgeon: Edgar Gamble Jr., MD;  Location: [facility];  Service: Endoscopy;  Laterality: N/A;    EXCISION OF MASS OF ABDOMEN Left 6/18/2018    Procedure: EXCISION, MASS, ABDOMEN - flank left;  Surgeon: Armando Tate MD;  Location: [facility];  Service: General;  Laterality: Left;  left flank removal of mass    FINE NEEDLE ASPIRATION      thyroid    KNEE ARTHROSCOPY W/ MENISCECTOMY Right     NASAL SEPTUM SURGERY      TUBAL LIGATION      UPPER GASTROINTESTINAL ENDOSCOPY  07/05/2018    Dr. Gamble       Review of Systems   Constitutional: Negative.  Negative for chills, fever and weight loss.   HENT: Negative.  Negative for ear pain, postnasal  drip, rhinorrhea and sore throat.    Eyes: Negative.    Respiratory: Positive for cough. Negative for hemoptysis, shortness of breath and wheezing.    Cardiovascular: Positive for chest pain.   Gastrointestinal: Positive for heartburn.   Endocrine: Negative.    Genitourinary: Negative.    Musculoskeletal: Negative.  Negative for myalgias.   Skin: Negative.  Negative for rash.   Allergic/Immunologic: Negative.  Negative for environmental allergies.   Neurological: Negative.  Negative for headaches.   Psychiatric/Behavioral: Negative.        Objective:      Physical Exam   Constitutional: She is oriented to person, place, and time. She appears well-developed and well-nourished.   HENT:   Head: Normocephalic.   Right Ear: External ear normal.   Left Ear: External ear normal.   Nose: Nose normal.   Mouth/Throat: Oropharynx is clear and moist.   Eyes: Pupils are equal, round, and reactive to light. Conjunctivae are normal.   Neck: Normal range of motion. Neck supple.   Cardiovascular: Normal rate, regular rhythm and normal heart sounds.   Pulmonary/Chest: Effort normal and breath sounds normal.   Abdominal: Soft. Bowel sounds are normal.   Musculoskeletal: Normal range of motion.   Neurological: She is alert and oriented to person, place, and time.   Skin: Skin is warm and dry. Capillary refill takes 2 to 3 seconds.   Psychiatric: She has a normal mood and affect. Her behavior is normal. Judgment and thought content normal.   Nursing note and vitals reviewed.      Assessment:       1. Chronic cough    2. Gastroesophageal reflux disease, esophagitis presence not specified        Plan:           Akiko was seen today for cough.    Diagnoses and all orders for this visit:    Chronic cough  Gastroesophageal reflux disease, esophagitis presence not specified  -     H. pylori antigen, stool; Future  -     Ambulatory referral to Gastroenterology  -     sucralfate (CARAFATE) 1 gram tablet; Take 1 tablet (1 g total) by mouth 4  (four) times daily. for 10 days       Report to ER immediately if symptoms worsen

## 2019-12-20 ENCOUNTER — OFFICE VISIT (OUTPATIENT)
Dept: GASTROENTEROLOGY | Facility: CLINIC | Age: 44
End: 2019-12-20
Payer: COMMERCIAL

## 2019-12-20 ENCOUNTER — LAB VISIT (OUTPATIENT)
Dept: LAB | Facility: HOSPITAL | Age: 44
End: 2019-12-20
Attending: FAMILY MEDICINE
Payer: COMMERCIAL

## 2019-12-20 VITALS
SYSTOLIC BLOOD PRESSURE: 150 MMHG | HEIGHT: 63 IN | WEIGHT: 261 LBS | DIASTOLIC BLOOD PRESSURE: 90 MMHG | HEART RATE: 88 BPM | BODY MASS INDEX: 46.25 KG/M2

## 2019-12-20 DIAGNOSIS — Z88.9 DRUG ALLERGY: ICD-10-CM

## 2019-12-20 DIAGNOSIS — R05.3 CHRONIC COUGH: ICD-10-CM

## 2019-12-20 DIAGNOSIS — K21.9 GASTROESOPHAGEAL REFLUX DISEASE, ESOPHAGITIS PRESENCE NOT SPECIFIED: Primary | ICD-10-CM

## 2019-12-20 DIAGNOSIS — K21.9 GASTROESOPHAGEAL REFLUX DISEASE, ESOPHAGITIS PRESENCE NOT SPECIFIED: ICD-10-CM

## 2019-12-20 DIAGNOSIS — A04.8 H. PYLORI INFECTION: ICD-10-CM

## 2019-12-20 PROCEDURE — 3077F PR MOST RECENT SYSTOLIC BLOOD PRESSURE >= 140 MM HG: ICD-10-PCS | Mod: CPTII,S$GLB,, | Performed by: PHYSICIAN ASSISTANT

## 2019-12-20 PROCEDURE — 87338 HPYLORI STOOL AG IA: CPT

## 2019-12-20 PROCEDURE — 3077F SYST BP >= 140 MM HG: CPT | Mod: CPTII,S$GLB,, | Performed by: PHYSICIAN ASSISTANT

## 2019-12-20 PROCEDURE — 3080F DIAST BP >= 90 MM HG: CPT | Mod: CPTII,S$GLB,, | Performed by: PHYSICIAN ASSISTANT

## 2019-12-20 PROCEDURE — 99999 PR PBB SHADOW E&M-EST. PATIENT-LVL IV: CPT | Mod: PBBFAC,,, | Performed by: PHYSICIAN ASSISTANT

## 2019-12-20 PROCEDURE — 99999 PR PBB SHADOW E&M-EST. PATIENT-LVL IV: ICD-10-PCS | Mod: PBBFAC,,, | Performed by: PHYSICIAN ASSISTANT

## 2019-12-20 PROCEDURE — 3008F PR BODY MASS INDEX (BMI) DOCUMENTED: ICD-10-PCS | Mod: CPTII,S$GLB,, | Performed by: PHYSICIAN ASSISTANT

## 2019-12-20 PROCEDURE — 3008F BODY MASS INDEX DOCD: CPT | Mod: CPTII,S$GLB,, | Performed by: PHYSICIAN ASSISTANT

## 2019-12-20 PROCEDURE — 99214 PR OFFICE/OUTPT VISIT, EST, LEVL IV, 30-39 MIN: ICD-10-PCS | Mod: S$GLB,,, | Performed by: PHYSICIAN ASSISTANT

## 2019-12-20 PROCEDURE — 99214 OFFICE O/P EST MOD 30 MIN: CPT | Mod: S$GLB,,, | Performed by: PHYSICIAN ASSISTANT

## 2019-12-20 PROCEDURE — 3080F PR MOST RECENT DIASTOLIC BLOOD PRESSURE >= 90 MM HG: ICD-10-PCS | Mod: CPTII,S$GLB,, | Performed by: PHYSICIAN ASSISTANT

## 2019-12-20 NOTE — LETTER
December 23, 2019      [place]  [address]  [place] [address]           [facility]  [id number] [place] [id number]  Phone: [phone]  Fax: [phone]          Patient: Akiko Jameson   MR Number: [id number]   YOB: 1975   Date of Visit: 12/20/2019       Dear Yeimi Abernathy:    Thank you for referring Akiko Jameson to me for evaluation. Attached you will find relevant portions of my assessment and plan of care.    If you have questions, please do not hesitate to call me. I look forward to following Akiko Jameson along with you.    Sincerely,    Elyssa Gutierres PA-C    Enclosure  CC:  No Recipients    If you would like to receive this communication electronically, please contact [email or web address] or [phone] to request more information on [employer] access.    For providers and/or their staff who would like to refer a patient to [facility], please contact us through our one-stop-shop provider referral line, [facility] , at [phone].    If you feel you have received this communication in error or would no longer like to receive these types of communications, please e-mail [email or web address]

## 2019-12-20 NOTE — PROGRESS NOTES
Subjective:      Patient ID: Akiko Jameson is a 44 y.o. female.    Chief Complaint: Gastroesophageal Reflux    HPI:  Patient here for evaluation of the above. Patient has suffered with reflux for years and takes Ranitidine BID, as she has a possible allergy to PPI medication. She feels as though her body is immune to Ranitidine because she takes it so much. Patient was put on Omeprazole and Benazapril at the same time which resulted in angioedema. Patient is unaware of what medication actually caused symptoms. Her PCP also just put her on Carafate - she has only taken this since day before our appointment. She is now complaining of increase in reflux symptoms and chronic cough. She states that she knows the cough is from her reflux because she can feel it coming up in her throat. She complains of mild epigastric discomfort, lower chest discomfort and heartburn symptoms. She has had bronchitis in the past but significant lung history. She has tried numerous cough medications, none of which are improving symptoms.    Review of Systems   Constitutional: Negative for appetite change, chills and fever.   HENT: Negative for trouble swallowing.    Eyes: Negative for visual disturbance.   Respiratory: Positive for cough. Negative for chest tightness and shortness of breath.    Cardiovascular: Positive for chest pain (when refluxing).   Gastrointestinal: Positive for abdominal pain (some mid epigastric pain but also soreness from continued coughing.). Negative for blood in stool, constipation, diarrhea, nausea and vomiting.   Genitourinary: Negative for dysuria and hematuria.   Musculoskeletal: Negative for back pain and myalgias.   Skin: Negative for rash.   Neurological: Negative for dizziness, weakness and light-headedness.   Psychiatric/Behavioral: Negative for confusion. The patient is not nervous/anxious.        Medical History: Reviewed    Social History: Reviewed    Allergies: Reviewed    Objective:      Physical Exam   Constitutional: She is oriented to person, place, and time. She appears well-developed and well-nourished. No distress.   HENT:   Head: Normocephalic and atraumatic.   Eyes: EOM are normal. Right eye exhibits no discharge. Left eye exhibits no discharge.   Neck: Normal range of motion.   Cardiovascular: Normal rate, regular rhythm and normal heart sounds.   Pulmonary/Chest: Effort normal and breath sounds normal. No stridor. No respiratory distress. She has no wheezes.   Patient coughing consistently on exam.   Abdominal: Soft. Bowel sounds are normal. She exhibits no distension. There is no tenderness. There is no guarding.   Musculoskeletal: Normal range of motion. She exhibits no deformity.   Lymphedema to right hand.   Neurological: She is alert and oriented to person, place, and time.   Skin: Skin is warm and dry. She is not diaphoretic.   Psychiatric: She has a normal mood and affect. Her behavior is normal. Judgment and thought content normal.       Assessment:     1. Gastroesophageal reflux disease, esophagitis presence not specified    2. Chronic cough    3. H. pylori infection    4. Drug allergy        Plan:     -Refer to allergist for possible PPI allergy. Allergy was likely due to Benazapril, as it caused angioedema.  -Will schedule for ph study and manometry - after resulted will send referral to surgery.  -Wait for H. Pylori to result - if positive, will send to Dr. Bob as patient currently can't take PPI  -Continue with Sucralfate and Ranitidine. May switch to Famotidine.    Akiko was seen today for gastroesophageal reflux.    Diagnoses and all orders for this visit:    Gastroesophageal reflux disease, esophagitis presence not specified  -     Case request GI: IMPEDANCE PH STUDY, ESOPHAGEAL, 24 HOUR, IN PATIENT NOT TAKING ACID REDUCING MEDICATION, MANOMETRY, ESOPHAGUS, WITH IMPEDANCE MEASUREMENT    Chronic cough  -     Case request GI: IMPEDANCE PH STUDY, ESOPHAGEAL, 24 HOUR, IN  PATIENT NOT TAKING ACID REDUCING MEDICATION, MANOMETRY, ESOPHAGUS, WITH IMPEDANCE MEASUREMENT    H. pylori infection    Drug allergy  -     Ambulatory consult to Allergy        No follow-ups on file.    Thank you for the opportunity to participate in the care of this patient.   Elyssa Gutierres PA-C.

## 2019-12-26 ENCOUNTER — PATIENT MESSAGE (OUTPATIENT)
Dept: GASTROENTEROLOGY | Facility: CLINIC | Age: 44
End: 2019-12-26

## 2019-12-27 LAB — H PYLORI AG STL QL IA: DETECTED

## 2019-12-30 DIAGNOSIS — A04.8 H. PYLORI INFECTION: Primary | ICD-10-CM

## 2019-12-30 NOTE — PROGRESS NOTES
-Positive H. Pylori with possible allergy to PPI. Have also placed referral to allergy/immunology.

## 2020-01-06 ENCOUNTER — HOSPITAL ENCOUNTER (OUTPATIENT)
Facility: HOSPITAL | Age: 45
Discharge: HOME | End: 2020-01-06
Attending: INTERNAL MEDICINE | Admitting: INTERNAL MEDICINE
Payer: COMMERCIAL

## 2020-01-06 VITALS
HEART RATE: 70 BPM | BODY MASS INDEX: 45.32 KG/M2 | DIASTOLIC BLOOD PRESSURE: 81 MMHG | SYSTOLIC BLOOD PRESSURE: 157 MMHG | RESPIRATION RATE: 16 BRPM | WEIGHT: 255.75 LBS | HEIGHT: 63 IN

## 2020-01-06 PROCEDURE — 91035 G-ESOPH REFLX TST W/ELECTROD: CPT | Mod: 26,,, | Performed by: INTERNAL MEDICINE

## 2020-01-06 PROCEDURE — 91010 ESOPHAGUS MOTILITY STUDY: CPT

## 2020-01-06 PROCEDURE — 91035 PR GERD TST W/ MUCOS PH ELECTROD: ICD-10-PCS | Mod: 26,,, | Performed by: INTERNAL MEDICINE

## 2020-01-06 PROCEDURE — 25000003 PHARM REV CODE 250: Performed by: INTERNAL MEDICINE

## 2020-01-06 PROCEDURE — 91038 ESOPH IMPED FUNCT TEST > 1HR: CPT

## 2020-01-06 PROCEDURE — 91010 PR ESOPHAGEAL MOTILITY STUDY, MA2METRY: ICD-10-PCS | Mod: 26,,, | Performed by: INTERNAL MEDICINE

## 2020-01-06 PROCEDURE — 91010 ESOPHAGUS MOTILITY STUDY: CPT | Mod: 26,,, | Performed by: INTERNAL MEDICINE

## 2020-01-06 RX ORDER — LIDOCAINE HYDROCHLORIDE 20 MG/ML
SOLUTION OROPHARYNGEAL
Status: COMPLETED | OUTPATIENT
Start: 2020-01-06 | End: 2020-01-06

## 2020-01-06 RX ADMIN — LIDOCAINE HYDROCHLORIDE 2 ML: 20 SOLUTION ORAL; TOPICAL at 09:01

## 2020-01-06 NOTE — DISCHARGE INSTRUCTIONS
How Acid Reflux Affects Your Throat    Do you have to clear your throat or cough often? Are you hoarse? Do you have trouble swallowing? If you have these or other throat symptoms, you may have acid reflux. This occurs when stomach acid flows back up and irritates your throat.  Why you have throat symptoms  There are muscles (esophageal sphincters) at both ends of the tube that carries food to your stomach (the esophagus). These muscles relax to let food pass. Then they tighten to keep stomach acid down. When the lower esophageal sphincter (LES) doesnt tighten enough, acid can flow back (reflux) from your stomach into your esophagus. This may cause heartburn. In some cases the upper esophageal sphincter (UES) also doesnt work well. Then acid can travel higher and enter your throat (pharynx). In many cases, this causes throat symptoms.  Common throat symptoms  · Need to clear your throat often  · Feeling like youre choking  · Long-term (chronic) cough  · Hoarseness  · Trouble swallowing  · Feel like you have a lump in your throat  · Sour or acid taste  · Sore throat that keeps coming back   Date Last Reviewed: 7/1/2016  © 3820-0725 "[employer]". [address] [place] [address]. All rights reserved. This information is not intended as a substitute for professional medical care. Always follow your healthcare professional's instructions.        Esophageal Manometry     A catheter measures pressure along the esophagus.     Esophageal manometry is a test to measure the strength and function of the esophagus (the food pipe). Results can help identify causes of heartburn, swallowing problems, or chest pain. The test can also help plan surgery and determine the success of previous surgery.  Preparing for the test  Be sure to talk to your healthcare provider about any medicines you take. Some medicines can affect the test results. Also ask any questions you have about the risks of the test. These  include irritation to the nose and throat. Be sure not to smoke, eat, or drink for up to 12 hours before the test.  During the test  Manometry takes about an hour. Usually you lie down during the test. Your nose and throat are numbed. Then a soft, thin tube is placed through the nose and down the esophagus. At first you may notice a gagging feeling. You will be asked to swallow several times. Holes along the tube measure the pressure while you swallow. Measurements are printed out as tracings, much like a heart test tracing. After the test, another catheter may be left in the esophagus for up to 24 hours to measure acid (pH) levels.  After esophageal manometry  Youll probably discuss the results of the test with your healthcare provider at another appointment. This is because time is needed to review the tracings. You may have a mild sore throat for a short time. As soon as the numbness in your throat is gone, you can return to eating and your normal activities.  Date Last Reviewed: 6/1/2016  © 1603-3240 [employer]. [address] [place] [address], [place] [address]. All rights reserved. This information is not intended as a substitute for professional medical care. Always follow your healthcare professional's instructions.

## 2020-01-06 NOTE — OR NURSING
Pt was educated on pH probe test and discussed all discharge/home instructions. PH probe was inserted into pt's R nares and advanced to appropriate level. Pt tolerated insertion well. Probe was secured to nose/face and study began. Use of monitor was discussed and demonstrated. Pt was given handouts on all home instructions. Pt instructed to return to unit tomorrow for removal of probe. Pt VU. No immediate complications noted.

## 2020-01-06 NOTE — OR NURSING
R nares numbed with lidocaine prior to insertion. Manometry probe [id number] inserted into pt's R nares and advanced to position. Pt tolerated insertion well, no immediate complications noted. Procedure was completed. Manometry probe was then removed, intact, and no immediate complications noted. Pt tolerated procedure well.

## 2020-01-13 ENCOUNTER — PATIENT MESSAGE (OUTPATIENT)
Dept: GASTROENTEROLOGY | Facility: CLINIC | Age: 45
End: 2020-01-13

## 2020-01-20 NOTE — [employer] PATIENT INSTRUCTIONS
Discharge Summary/Instructions after an Endoscopic Procedure  Patient Name: Akiko Jameson  Patient MRN: [id number]  Patient YOB: 1975 Monday, January 06, 2020 Hortencia Ortiz MD  RESTRICTIONS:  During your procedure today, you received medications for sedation.  These   medications may affect your judgment, balance and coordination.  Therefore,   for 24 hours, you have the following restrictions:   - DO NOT drive a car, operate machinery, make legal/financial decisions,   sign important papers or drink alcohol.    ACTIVITY:  Today: no heavy lifting, straining or running due to procedural   sedation/anesthesia.  The following day: return to full activity including work.  DIET:  Eat and drink normally unless instructed otherwise.     TREATMENT FOR COMMON SIDE EFFECTS:  - Mild abdominal pain, nausea, belching, bloating or excessive gas:  rest,   eat lightly and use a heating pad.  - Sore Throat: treat with throat lozenges and/or gargle with warm salt   water.  - Because air was used during the procedure, expelling large amounts of air   from your rectum or belching is normal.  - If a bowel prep was taken, you may not have a bowel movement for 1-3 days.    This is normal.  SYMPTOMS TO WATCH FOR AND REPORT TO YOUR PHYSICIAN:  1. Abdominal pain or bloating, other than gas cramps.  2. Chest pain.  3. Back pain.  4. Signs of infection such as: chills or fever occurring within 24 hours   after the procedure.  5. Rectal bleeding, which would show as bright red, maroon, or black stools.   (A tablespoon of blood from the rectum is not serious, especially if   hemorrhoids are present.)  6. Vomiting.  7. Weakness or dizziness.  GO DIRECTLY TO THE NEAREST EMERGENCY ROOM IF YOU HAVE ANY OF THE FOLLOWING:      Difficulty breathing              Chills and/or fever over 101 F   Persistent vomiting and/or vomiting blood   Severe abdominal pain   Severe chest pain   Black, tarry stools   Bleeding- more than one  tablespoon   Any other symptom or condition that you feel may need urgent attention  Your doctor recommends these additional instructions:  If any biopsies were taken, your doctors clinic will contact you in 1 to 2   weeks with any results.  - Per referring provider.  For questions, problems or results please call your physician Hortencia Ortiz MD at Work:  [phone]  If you have any questions about the above instructions, call the GI   department at [phone] or call the endoscopy unit at [phone]   from 7am until 3 pm.  [facility], EMERGENCY ROOM PHONE NUMBER:   [phone]  IF A COMPLICATION OR EMERGENCY SITUATION ARISES AND YOU ARE UNABLE TO REACH   YOUR PHYSICIAN - GO DIRECTLY TO THE EMERGENCY ROOM.  I have read or have had read to me these discharge instructions for my   procedure and have received a written copy.  I understand these   instructions and will follow-up with my physician if I have any questions.     __________________________________       _____________________________________  Nurse Signature                                          Patient/Designated   Responsible Party Signature  MD Hortencia Starkey MD  1/20/2020 4:35:44 PM  This report has been verified and signed electronically.  [employer]

## 2020-01-21 NOTE — PROVATION PATIENT INSTRUCTIONS
Discharge Summary/Instructions after an Endoscopic Procedure  Patient Name: Akiko Jameson  Patient MRN: [id number]  Patient YOB: 1975 Monday, January 06, 2020 Hortencia Ortiz MD  RESTRICTIONS:  During your procedure today, you received medications for sedation.  These   medications may affect your judgment, balance and coordination.  Therefore,   for 24 hours, you have the following restrictions:   - DO NOT drive a car, operate machinery, make legal/financial decisions,   sign important papers or drink alcohol.    ACTIVITY:  Today: no heavy lifting, straining or running due to procedural   sedation/anesthesia.  The following day: return to full activity including work.  DIET:  Eat and drink normally unless instructed otherwise.     TREATMENT FOR COMMON SIDE EFFECTS:  - Mild abdominal pain, nausea, belching, bloating or excessive gas:  rest,   eat lightly and use a heating pad.  - Sore Throat: treat with throat lozenges and/or gargle with warm salt   water.  - Because air was used during the procedure, expelling large amounts of air   from your rectum or belching is normal.  - If a bowel prep was taken, you may not have a bowel movement for 1-3 days.    This is normal.  SYMPTOMS TO WATCH FOR AND REPORT TO YOUR PHYSICIAN:  1. Abdominal pain or bloating, other than gas cramps.  2. Chest pain.  3. Back pain.  4. Signs of infection such as: chills or fever occurring within 24 hours   after the procedure.  5. Rectal bleeding, which would show as bright red, maroon, or black stools.   (A tablespoon of blood from the rectum is not serious, especially if   hemorrhoids are present.)  6. Vomiting.  7. Weakness or dizziness.  GO DIRECTLY TO THE NEAREST EMERGENCY ROOM IF YOU HAVE ANY OF THE FOLLOWING:      Difficulty breathing              Chills and/or fever over 101 F   Persistent vomiting and/or vomiting blood   Severe abdominal pain   Severe chest pain   Black, tarry stools   Bleeding- more than one  tablespoon   Any other symptom or condition that you feel may need urgent attention  Your doctor recommends these additional instructions:  If any biopsies were taken, your doctors clinic will contact you in 1 to 2   weeks with any results.  - Per referring provider.  For questions, problems or results please call your physician Hortencia Ortiz MD at Work:  [phone]  If you have any questions about the above instructions, call the GI   department at [phone] or call the endoscopy unit at [phone]   from 7am until 3 pm.  [facility], EMERGENCY ROOM PHONE NUMBER:   [phone]  IF A COMPLICATION OR EMERGENCY SITUATION ARISES AND YOU ARE UNABLE TO REACH   YOUR PHYSICIAN - GO DIRECTLY TO THE EMERGENCY ROOM.  I have read or have had read to me these discharge instructions for my   procedure and have received a written copy.  I understand these   instructions and will follow-up with my physician if I have any questions.     __________________________________       _____________________________________  Nurse Signature                                          Patient/Designated   Responsible Party Signature  MD Hortencia Starkey MD  1/21/2020 1:32:43 PM  This report has been verified and signed electronically.  [employer]

## 2020-01-22 ENCOUNTER — PATIENT MESSAGE (OUTPATIENT)
Dept: ALLERGY | Facility: CLINIC | Age: 45
End: 2020-01-22

## 2020-01-22 ENCOUNTER — PATIENT MESSAGE (OUTPATIENT)
Dept: GASTROENTEROLOGY | Facility: CLINIC | Age: 45
End: 2020-01-22

## 2020-01-22 ENCOUNTER — TELEPHONE (OUTPATIENT)
Dept: GASTROENTEROLOGY | Facility: CLINIC | Age: 45
End: 2020-01-22

## 2020-01-22 ENCOUNTER — TELEPHONE (OUTPATIENT)
Dept: OPTOMETRY | Facility: CLINIC | Age: 45
End: 2020-01-22

## 2020-01-22 DIAGNOSIS — K44.9 HIATAL HERNIA: ICD-10-CM

## 2020-01-22 DIAGNOSIS — R05.3 CHRONIC COUGH: ICD-10-CM

## 2020-01-22 DIAGNOSIS — K21.9 GASTROESOPHAGEAL REFLUX DISEASE, ESOPHAGITIS PRESENCE NOT SPECIFIED: Primary | ICD-10-CM

## 2020-01-22 DIAGNOSIS — R10.13 EPIGASTRIC PAIN: ICD-10-CM

## 2020-01-22 NOTE — TELEPHONE ENCOUNTER
----- Message from Erika Palacios LPN sent at 1/21/2020  4:15 PM CST -----  Patient does not want to see Tamika Panchal Allergist. She is going to go back to her Allergist in [place].  ----- Message -----  From: Cori Almaguer MA  Sent: 1/21/2020   3:36 PM CST  To: Erika Palacios LPN, COLLINS Ng PA-C sent referral on 12/20/19. Referral # [id number]   ----- Message -----  From: Erika Palacios LPN  Sent: 1/21/2020   2:43 PM CST  To: Cori Almaguer MA    Patient last saw [facility].I do not see a good referrral.Please asvise. Thank you.  ----- Message -----  From: Cori Almaguer MA  Sent: 1/21/2020  10:32 AM CST  To: On Allergy/Immunology Clinical Support    Referral sent for patient appointment due to possible allergy to PPI.

## 2020-01-28 ENCOUNTER — TELEPHONE (OUTPATIENT)
Dept: OPTOMETRY | Facility: CLINIC | Age: 45
End: 2020-01-28

## 2020-02-04 ENCOUNTER — OFFICE VISIT (OUTPATIENT)
Dept: SURGERY | Facility: CLINIC | Age: 45
End: 2020-02-04
Payer: COMMERCIAL

## 2020-02-04 VITALS
BODY MASS INDEX: 46.47 KG/M2 | SYSTOLIC BLOOD PRESSURE: 150 MMHG | DIASTOLIC BLOOD PRESSURE: 84 MMHG | TEMPERATURE: 98 F | HEART RATE: 82 BPM | WEIGHT: 262.38 LBS

## 2020-02-04 DIAGNOSIS — K21.9 GASTROESOPHAGEAL REFLUX DISEASE WITHOUT ESOPHAGITIS: Primary | ICD-10-CM

## 2020-02-04 PROBLEM — I10 HYPERTENSIVE DISORDER: Status: ACTIVE | Noted: 2019-07-30

## 2020-02-04 PROCEDURE — 3008F BODY MASS INDEX DOCD: CPT | Mod: CPTII,S$GLB,, | Performed by: SURGERY

## 2020-02-04 PROCEDURE — 99214 PR OFFICE/OUTPT VISIT, EST, LEVL IV, 30-39 MIN: ICD-10-PCS | Mod: S$GLB,,, | Performed by: SURGERY

## 2020-02-04 PROCEDURE — 99999 PR PBB SHADOW E&M-EST. PATIENT-LVL III: ICD-10-PCS | Mod: PBBFAC,,, | Performed by: SURGERY

## 2020-02-04 PROCEDURE — 3079F DIAST BP 80-89 MM HG: CPT | Mod: CPTII,S$GLB,, | Performed by: SURGERY

## 2020-02-04 PROCEDURE — 3077F SYST BP >= 140 MM HG: CPT | Mod: CPTII,S$GLB,, | Performed by: SURGERY

## 2020-02-04 PROCEDURE — 99999 PR PBB SHADOW E&M-EST. PATIENT-LVL III: CPT | Mod: PBBFAC,,, | Performed by: SURGERY

## 2020-02-04 PROCEDURE — 3079F PR MOST RECENT DIASTOLIC BLOOD PRESSURE 80-89 MM HG: ICD-10-PCS | Mod: CPTII,S$GLB,, | Performed by: SURGERY

## 2020-02-04 PROCEDURE — 3077F PR MOST RECENT SYSTOLIC BLOOD PRESSURE >= 140 MM HG: ICD-10-PCS | Mod: CPTII,S$GLB,, | Performed by: SURGERY

## 2020-02-04 PROCEDURE — 3008F PR BODY MASS INDEX (BMI) DOCUMENTED: ICD-10-PCS | Mod: CPTII,S$GLB,, | Performed by: SURGERY

## 2020-02-04 PROCEDURE — 99214 OFFICE O/P EST MOD 30 MIN: CPT | Mod: S$GLB,,, | Performed by: SURGERY

## 2020-02-04 RX ORDER — FAMOTIDINE 10 MG/1
10 TABLET ORAL DAILY
COMMUNITY
End: 2020-06-08

## 2020-02-04 NOTE — H&P (VIEW-ONLY)
History & Physical  General Surgery      SUBJECTIVE:     Chief Complaint/Reason for Admission: Consult (reflux)      History of Present Illness:  Patient is a 45 y.o. female presents with Consult (reflux)    45-year-old female referred by Gastroenterology for evaluation of symptomatic reflux.  The patient has a longstanding history of reflux and is currently on 600 mg of Zantac daily with little relief due to PPI allergy.  She reports water brash, substernal burning, and continued pain with diet alteration.  Prior endoscopy in 2018 showed evidence of reflux without esophagitis.  Recent Bravo capsule study with DeMeester score of 88.  Normal manometry.  Due to her intolerance to medications and continued persistent symptoms, she was referred to surgery for evaluation.    She has a prior history of laparoscopic cholecystectomy.  Otherwise, no other abdominal operations.  Denies family history of GI malignancies.      Current Outpatient Medications:     cetirizine (ZYRTEC) 10 MG tablet, Take 1 tablet (10 mg total) by mouth once daily., Disp: , Rfl: 0    diltiaZEM (CARDIZEM CD) 120 MG Cp24, Take 1 capsule (120 mg total) by mouth once daily., Disp: 90 capsule, Rfl: 3    docusate sodium (COLACE) 100 MG capsule, Take 100 mg by mouth every other day. , Disp: , Rfl:     famotidine (PEPCID) 10 MG tablet, Take 10 mg by mouth once daily., Disp: , Rfl:     fluticasone propionate (FLONASE) 50 mcg/actuation nasal spray, Use 2 sprays to each nostril daily, Disp: 16 g, Rfl: 12    montelukast (SINGULAIR) 10 mg tablet, Take 1 tablet (10 mg total) by mouth nightly., Disp: 90 tablet, Rfl: 3    multivitamin (THERAGRAN) per tablet, Every day, Disp: , Rfl:     potassium chloride SA (K-DUR,KLOR-CON) 20 MEQ tablet, Take 1 tablet (20 mEq total) by mouth 2 (two) times daily. Appointment needed for further refills after this one, Disp: 60 tablet, Rfl: 11    triamterene-hydrochlorothiazide 37.5-25 mg (MAXZIDE-25) 37.5-25 mg per tablet,  Take 1 tablet by mouth once daily., Disp: 90 tablet, Rfl: 3    guaifenesin-codeine 100-10 mg/5 ml (TUSSI-ORGANIDIN NR)  mg/5 mL syrup, Take 5-10 ml po every 6 hours as needed for cough, Disp: 118 mL, Rfl: 0    hyoscyamine (LEVSIN/SL) 0.125 mg Subl, Take 2 tablets (0.25 mg total) by mouth every 4 (four) hours as needed. (Patient not taking: Reported on 12/7/2019), Disp: 20 tablet, Rfl: 11    nabumetone (RELAFEN) 500 MG tablet, Take 1 tablet (500 mg total) by mouth 2 (two) times daily. (Patient not taking: Reported on 12/7/2019), Disp: 60 tablet, Rfl: 3    ranitidine (ZANTAC) 300 MG tablet, Take 1 tablet (300 mg total) by mouth every evening., Disp: 90 tablet, Rfl: 3    Review of patient's allergies indicates:   Allergen Reactions    Biaxin [clarithromycin] Swelling    Prilosec [omeprazole magnesium] Swelling    Prevacid [lansoprazole] Swelling     Lip swelling- was taking this along with blood pressure medication: benazepril; not sure which caused it. Reports she has taken OTC prilosec without any problems.    Ace inhibitors Swelling     Facial swelling    Benazepril Swelling     Lip swelling       Past Medical History:   Diagnosis Date    ALLERGIC RHINITIS     Cellulitis     Eosinophilia     mild    GERD (gastroesophageal reflux disease)     Granular cell tumor     H. pylori infection 2018    Hypertension     Lymphedema     Mild anemia     Sleep apnea     compliant with CPAP    Thyroid nodule     Urinary incontinence, mixed      Past Surgical History:   Procedure Laterality Date    24 HOUR IMPEDANCE PH MONITORING OF ESOPHAGUS IN PATIENT NOT TAKING ACID REDUCING MEDICATIONS N/A 1/6/2020    Procedure: IMPEDANCE PH STUDY, ESOPHAGEAL, 24 HOUR, IN PATIENT NOT TAKING ACID REDUCING MEDICATION;  Surgeon: First Available Tamika Panchal;  Location: [facility];  Service: Endoscopy;  Laterality: N/A;    AXILLARY NODE DISSECTION      granular cell tumor    BREAST CYST ASPIRATION      left     CHOLECYSTECTOMY      COLONOSCOPY N/A 5/10/2019    Procedure: COLONOSCOPY;  Surgeon: Edgar Gamble Jr., MD;  Location: [facility] ENDO;  Service: Endoscopy;  Laterality: N/A;    ENDOMETRIAL ABLATION      ESOPHAGEAL MANOMETRY WITH MEASUREMENT OF IMPEDANCE N/A 1/6/2020    Procedure: MANOMETRY, ESOPHAGUS, WITH IMPEDANCE MEASUREMENT;  Surgeon: First Available Austin;  Location: [facility] ENDO;  Service: Endoscopy;  Laterality: N/A;    ESOPHAGOGASTRODUODENOSCOPY N/A 7/5/2018    Procedure: EGD (ESOPHAGOGASTRODUODENOSCOPY);  Surgeon: Edgar Gamble Jr., MD;  Location: [facility] ENDO;  Service: Endoscopy;  Laterality: N/A;    EXCISION OF MASS OF ABDOMEN Left 6/18/2018    Procedure: EXCISION, MASS, ABDOMEN - flank left;  Surgeon: Armando Tate MD;  Location: [facility] OR;  Service: General;  Laterality: Left;  left flank removal of mass    FINE NEEDLE ASPIRATION      thyroid    KNEE ARTHROSCOPY W/ MENISCECTOMY Right     NASAL SEPTUM SURGERY      TUBAL LIGATION      UPPER GASTROINTESTINAL ENDOSCOPY  07/05/2018    Dr. Gamble     Family History   Problem Relation Age of Onset    Diabetes Mother     Kidney failure Mother     Breast cancer Maternal Grandmother     Breast cancer Maternal Aunt     Ovarian cancer Cousin     Breast cancer Cousin     Blindness Father     Cirrhosis Neg Hx     Colon polyps Neg Hx     Colon cancer Neg Hx     Crohn's disease Neg Hx     Esophageal cancer Neg Hx     Stomach cancer Neg Hx     Ulcerative colitis Neg Hx     Amblyopia Neg Hx     Cataracts Neg Hx     Glaucoma Neg Hx     Macular degeneration Neg Hx     Retinal detachment Neg Hx     Strabismus Neg Hx     Allergic rhinitis Neg Hx     Allergies Neg Hx     Angioedema Neg Hx     Asthma Neg Hx     Atopy Neg Hx     Eczema Neg Hx     Immunodeficiency Neg Hx     Rhinitis Neg Hx     Urticaria Neg Hx      Social History     Tobacco Use    Smoking status: Never Smoker    Smokeless tobacco: Never Used   Substance Use Topics     Alcohol use: No    Drug use: No        Review of Systems:  Review of Systems   Constitutional: Negative for unexpected weight change.   HENT: Negative for congestion, trouble swallowing and voice change.    Eyes: Negative for visual disturbance.   Respiratory: Positive for cough (Chronic). Negative for shortness of breath.    Cardiovascular: Negative for chest pain.   Gastrointestinal: Positive for abdominal pain. Negative for constipation, diarrhea and nausea.   Genitourinary: Negative for difficulty urinating.   Musculoskeletal: Positive for joint swelling ( right hand lymphedema post surgery, chronic).   Skin: Negative for rash.   Allergic/Immunologic: Negative for immunocompromised state.   Neurological: Negative for weakness.   Psychiatric/Behavioral: The patient is not nervous/anxious.        OBJECTIVE:     Vital Signs (Most Recent)  BP (!) 150/84   Pulse 82   Temp 98 °F (36.7 °C) (Oral)   Wt 119 kg (262 lb 5.6 oz)   BMI 46.47 kg/m²     Physical Exam:   Physical Exam   Constitutional: She is oriented to person, place, and time. She appears well-developed and well-nourished. No distress.   Obese   HENT:   Head: Normocephalic and atraumatic.   Eyes: EOM are normal.   Neck: Neck supple.   Cardiovascular: Normal rate and regular rhythm.   Pulmonary/Chest: Effort normal.   Abdominal: Soft. She exhibits no distension. There is no tenderness.   Obese  Prior lap osmel incisions noted, healed   Musculoskeletal: Normal range of motion. She exhibits edema (Right hand).   Neurological: She is alert and oriented to person, place, and time.   Skin: Skin is warm.   Vitals reviewed.    CT chest reviewed, hernia present.     ASSESSMENT/PLAN:     Gastroesophageal reflux disease without esophagitis      Discuss surgical management of reflux.  Patient is candidate for Nissen fundoplication.     She will discuss surgery with her  and call with surgical date.    Risks, benefits, and alternatives including bleeding,  scarring, infection, as hematoma, seroma, damage to surrounding structures, or need for further procedures were discussed with the patient who agrees to proceed with surgery.     Jina Kaufman

## 2020-02-04 NOTE — PATIENT INSTRUCTIONS
POSTOPERATIVE GUIDELINES AFTER FUNDOPLICATION        You just had one of the following fundoplication surgeries for your severe reflux disease and hiatal hernia.   Laparoscopic Nissen fundoplication   Laparoscopic Toupet fundoplication   Laparoscopic [place] fundoplication   Transoral Incisionless fundoplication (TIF)   Open fundoplication     After surgery, you likely will stay in the hospital for two to seven days, depending on which surgery approach you had.  Use the following information to help in your recovery from surgery. Generally, you can expect a recovery period of two to six weeks after surgery. Smoking slows recovery time and weakens the surgical repair. If you smoke, ask your health care provider for smoking cessation information.    Activity guidelines    At times, you may feel tired and weak. However, increasing your activity gradually is important to regain your strength. As early as possible, walk and increase your level of daily activity. Walking is strongly encouraged after surgery. Start with short distances and increase as your tolerance allows. Stairs are okay. Sexual activity may resume after one week.  Depending on which surgery approach you had, you may be instructed to wait one to two weeks after hospital dismissal before driving. However, do not drive or operate motorized equipment if you are taking narcotic pain medication.    No lifting over 20 pounds the first two weeks. Weeks 3 thru 6, you may lift items up to 25 pounds. After 7 weeks, there are no activity restrictions. Do not return to physical exercise until at least 4 weeks after surgery. Examples of these strenuous activities include; vacuuming, shoveling snow, and swinging a tennis racket or golf club. Lifting heavy objects and participating in strenuous activities before your tissues heal may damage the fundoplication.    Discomfort    You may have pain near your incisions and some shoulder discomfort during recovery. This is  very normal and expected. You may have numbness or a tingling sensation near the incision or under the breast. Sometimes you will experience shoulder pain, neck pain, or deep pain in the chest. This is due in part to the gas used at laparoscopy, but more so to the sutures placed in the diaphragm muscle. These should diminish gradually during recovery. Moist heat to the shoulder tends to work better than narcotics for this pain. However if the pains are increasing and you are concerned about heart problems, call 911. It is also common to have a raw and sore throat following TIF. This will improve with time.    Medications    You will have been given a prescription for a narcotic pain reliever such as Oxycodone/Acetaminophen (Percocet). Use these as directed, not to exceed 10 per day. As soon as you are able to, please switch to Acetaminophen (Tylenol). As directed on the package, up to 3000 mg/day. If you have problems with pills sticking, avoid aspirin, ibuprofen (Motrin, Advil), Naproxen (Aleve), and other non-steroidal anti-inflammatories, as they can irritate the esophagus if they get stuck.   You may resume other medications you were on prior to surgery. If your pills are sticking, consult with your pharmacist or primary care provider about breaking or crushing your pills. You have a prescription for an antinausea medication, probably procholorperazine (Compazine). Use this as directed if you are nauseated, in order to avoid retching.  Special note on your heartburn medications:    You may discontinue any heartburn medications if you had laparoscopic fundoplication surgery.   You should continue heartburn medications for two weeks after TIF (Transoral Incisionless Fundoplication) surgery, you may discontinue then.    Wound care    Your incisions likely will take several weeks to months to heal fully.   Gently wash your incisions with soap and water and then rinse, either in the shower or with washcloth. Pat  the area dry with a clean towel. Then keep your incisions clean and dry. You must remove Band-Aid dressings over your incisions in 48 hours following your surgery, and should be left uncovered (no dressing) and open to the air. However, if an incision is draining, place gauze dressing over the site. Change the dressing daily to keep the area dry. Do not use ointments on the incisions. For one month after surgery, do not soak your incisions in water (no baths, hot tubs or swimming), and may leave your incisions open to the air.  There are no sutures to remove. Sutures are buried inside and will dissolve over time.    When to contact your surgeon    Notify my office if you have:   A temperature of 100.4 degrees Fahrenheit (38 degree Celsius) or grater   Increased tenderness, redness, drainage or foul smelling odor from the incisions   Difficulty swallowing fluids   Food that remains stuck in your esophagus   Unusual chest pain or leg pain   Vomiting not resolved with Compazine  Please call my office at [phone] during regular office hours or call the hospital and ask for a surgeon on call.     Diet    After surgery, swallowing may be more difficult. Food may seem to go down slowly or stick in your esophagus. The cause may be temporary swelling at the surgical site (the junction of the stomach and the esophagus), and should improve as you recover.    You may begin taking liquids the day after surgery. Stay on a liquid diet up to a yogurt consistency approximately 10 days after surgery. If broth and juice goes down okay, you may advance your diet to a yogurt consistency. You should not need to chew your food.  After tolerating liquid diet for ten days, you may need to continue the soft-food diet for two to three weeks.    While on a soft-food diet:   Eat foods that are moist, easy to chew, and break down in your mouth, so you can swallow them comfortably.   Avoid coarse foods such as raw vegetables, dry  rolls and tough meats.    You may feel full after eating only a few bites o food or feel bloated after eating. If so, try these suggestions:   Sit upright for 1 hour after eating and drink. Do not eat within 2 hours of bedtime.   Take small bites of food and chew well.   Eat slowly.   Stop eating when you start feeling full.   Eat small, frequent meals, if necessary.   Take a walk after eating.    To avoid bloating or gas, do not:   Use straws.   Drink carbonated beverages for two to four weeks after surgery.   Slurp your drinks.   Chew gum.   Avoid spicy foods and gas/acid forming foods such as tomato based products, peppermint, pepper, caffeine, alcohol, onions, green pepper, fatty foods, beans, citrus, and fiber supplements.   If you become constipated, it is okay to use a laxative, such as milk of magnesia.    If food sticks    It is not uncommon for patients to experience food sticking -sometimes the only thing you feel is severe pain on swallowing -for 4-8 weeks after surgery. When this happens the best things to do are to stand up, to walk around slowly, and to try sipping some lukewarm water. Generally these pains will pass within 10-15 minutes; if they persist longer you should call Dr. Kelly or surgeon on call.      Follow up appointment    Review the appointment instructions and follow-up recommendations on your discharge summary. About 14 days to 21 days after your surgery, return for a follow-up appointment. It is extremely important that you come in for this visit.   Dr. Kelly or his nurses at [phone]. If no answer, call main number for the hospital. You could send me an email also to [email or web address]. I check my email constantly during daytime.    Return to work    For Transoral Incisionless Fundoplication (TIF) patients, most patients can return to work in 3-7 days depending on the physical activity required for your job.  For all other laparoscopic antireflux surgery patients, 2  to 3 weeks of time-off is necessary.    Dietary guideline supplement    Eat small, frequent meals. Chew to baby food consistency before swallowing.  The followings are generally well-tolerated food group:   Beverages: all except carbonated drinks   Meats/alternatives:  o Tender or minced, moist meats  o Fish (no bones)  o Poultry with gravy or sauces  o Moist casseroles, stews  o Soft -cooked eggs  o Cottage cheese  o Cheese sauce  o Smooth peanut butter  o Legumes, lentils   Breads/cereals  o Hot cereals  o Well-soaked, cold cereals  o Pasta, rice  o Soda or diamond crackers (chew well and / or moisten)   Fruits  o Canned, cooked fruit  o Ripe, fresh fruit with skins and / or seeds removed  o Juice, nectars   Vegetables  o [id number], canned vegetables  o Vegetables in soups, sauces, stew, etc.   Desserts / snacks  o Puddings  o Yogurt  o Ice cream  o Cookies easily chewed.   Fats, oils: All    :   All hard, coarse, rough, doughy, tough, dry, stringy or crunchy food as well as all seeds / nuts is not tolerated well. Avoid them!

## 2020-02-04 NOTE — LETTER
February 4, 2020      Elyssa Gutierres PA-C  [address] [place] [address] [address] Tamika [address]OU[address]AS [id number]           [facility] General [facility]  [address] [place] [address] [address] [address]  Phone: [phone]  Fax: [phone]          Patient: Akiko Jameson   MR Number: [id number]   YOB: 1975   Date of Visit: 2/4/2020       Dear Elyssa Gutierres:    Thank you for referring Akiko Jameson to me for evaluation. Attached you will find relevant portions of my assessment and plan of care.    If you have questions, please do not hesitate to call me. I look forward to following Akiko Jameson along with you.    Sincerely,    Jina Kaufman MD    Enclosure  CC:  No Recipients    If you would like to receive this communication electronically, please contact [email or web address] or [phone] to request more information on [employer] access.    For providers and/or their staff who would like to refer a patient to [facility], please contact us through our one-stop-shop provider referral line, [facility], at [phone].    If you feel you have received this communication in error or would no longer like to receive these types of communications, please e-mail [email or web address]

## 2020-02-04 NOTE — PROGRESS NOTES
History & Physical  General Surgery      SUBJECTIVE:     Chief Complaint/Reason for Admission: Consult (reflux)      History of Present Illness:  Patient is a 45 y.o. female presents with Consult (reflux)    45-year-old female referred by Gastroenterology for evaluation of symptomatic reflux.  The patient has a longstanding history of reflux and is currently on 600 mg of Zantac daily with little relief due to PPI allergy.  She reports water brash, substernal burning, and continued pain with diet alteration.  Prior endoscopy in 2018 showed evidence of reflux without esophagitis.  Recent Bravo capsule study with DeMeester score of 88.  Normal manometry.  Due to her intolerance to medications and continued persistent symptoms, she was referred to surgery for evaluation.    She has a prior history of laparoscopic cholecystectomy.  Otherwise, no other abdominal operations.  Denies family history of GI malignancies.      Current Outpatient Medications:     cetirizine (ZYRTEC) 10 MG tablet, Take 1 tablet (10 mg total) by mouth once daily., Disp: , Rfl: 0    diltiaZEM (CARDIZEM CD) 120 MG Cp24, Take 1 capsule (120 mg total) by mouth once daily., Disp: 90 capsule, Rfl: 3    docusate sodium (COLACE) 100 MG capsule, Take 100 mg by mouth every other day. , Disp: , Rfl:     famotidine (PEPCID) 10 MG tablet, Take 10 mg by mouth once daily., Disp: , Rfl:     fluticasone propionate (FLONASE) 50 mcg/actuation nasal spray, Use 2 sprays to each nostril daily, Disp: 16 g, Rfl: 12    montelukast (SINGULAIR) 10 mg tablet, Take 1 tablet (10 mg total) by mouth nightly., Disp: 90 tablet, Rfl: 3    multivitamin (THERAGRAN) per tablet, Every day, Disp: , Rfl:     potassium chloride SA (K-DUR,KLOR-CON) 20 MEQ tablet, Take 1 tablet (20 mEq total) by mouth 2 (two) times daily. Appointment needed for further refills after this one, Disp: 60 tablet, Rfl: 11    triamterene-hydrochlorothiazide 37.5-25 mg (MAXZIDE-25) 37.5-25 mg per tablet,  Take 1 tablet by mouth once daily., Disp: 90 tablet, Rfl: 3    guaifenesin-codeine 100-10 mg/5 ml (TUSSI-ORGANIDIN NR)  mg/5 mL syrup, Take 5-10 ml po every 6 hours as needed for cough, Disp: 118 mL, Rfl: 0    hyoscyamine (LEVSIN/SL) 0.125 mg Subl, Take 2 tablets (0.25 mg total) by mouth every 4 (four) hours as needed. (Patient not taking: Reported on 12/7/2019), Disp: 20 tablet, Rfl: 11    nabumetone (RELAFEN) 500 MG tablet, Take 1 tablet (500 mg total) by mouth 2 (two) times daily. (Patient not taking: Reported on 12/7/2019), Disp: 60 tablet, Rfl: 3    ranitidine (ZANTAC) 300 MG tablet, Take 1 tablet (300 mg total) by mouth every evening., Disp: 90 tablet, Rfl: 3    Review of patient's allergies indicates:   Allergen Reactions    Biaxin [clarithromycin] Swelling    Prilosec [omeprazole magnesium] Swelling    Prevacid [lansoprazole] Swelling     Lip swelling- was taking this along with blood pressure medication: benazepril; not sure which caused it. Reports she has taken OTC prilosec without any problems.    Ace inhibitors Swelling     Facial swelling    Benazepril Swelling     Lip swelling       Past Medical History:   Diagnosis Date    ALLERGIC RHINITIS     Cellulitis     Eosinophilia     mild    GERD (gastroesophageal reflux disease)     Granular cell tumor     H. pylori infection 2018    Hypertension     Lymphedema     Mild anemia     Sleep apnea     compliant with CPAP    Thyroid nodule     Urinary incontinence, mixed      Past Surgical History:   Procedure Laterality Date    24 HOUR IMPEDANCE PH MONITORING OF ESOPHAGUS IN PATIENT NOT TAKING ACID REDUCING MEDICATIONS N/A 1/6/2020    Procedure: IMPEDANCE PH STUDY, ESOPHAGEAL, 24 HOUR, IN PATIENT NOT TAKING ACID REDUCING MEDICATION;  Surgeon: First Available Tamika Panchal;  Location: [facility];  Service: Endoscopy;  Laterality: N/A;    AXILLARY NODE DISSECTION      granular cell tumor    BREAST CYST ASPIRATION      left     CHOLECYSTECTOMY      COLONOSCOPY N/A 5/10/2019    Procedure: COLONOSCOPY;  Surgeon: Edgar Gamble Jr., MD;  Location: [facility] ENDO;  Service: Endoscopy;  Laterality: N/A;    ENDOMETRIAL ABLATION      ESOPHAGEAL MANOMETRY WITH MEASUREMENT OF IMPEDANCE N/A 1/6/2020    Procedure: MANOMETRY, ESOPHAGUS, WITH IMPEDANCE MEASUREMENT;  Surgeon: First Available Gwinn;  Location: [facility] ENDO;  Service: Endoscopy;  Laterality: N/A;    ESOPHAGOGASTRODUODENOSCOPY N/A 7/5/2018    Procedure: EGD (ESOPHAGOGASTRODUODENOSCOPY);  Surgeon: Edgar Gamble Jr., MD;  Location: [facility] ENDO;  Service: Endoscopy;  Laterality: N/A;    EXCISION OF MASS OF ABDOMEN Left 6/18/2018    Procedure: EXCISION, MASS, ABDOMEN - flank left;  Surgeon: Armando Tate MD;  Location: [facility] OR;  Service: General;  Laterality: Left;  left flank removal of mass    FINE NEEDLE ASPIRATION      thyroid    KNEE ARTHROSCOPY W/ MENISCECTOMY Right     NASAL SEPTUM SURGERY      TUBAL LIGATION      UPPER GASTROINTESTINAL ENDOSCOPY  07/05/2018    Dr. Gamble     Family History   Problem Relation Age of Onset    Diabetes Mother     Kidney failure Mother     Breast cancer Maternal Grandmother     Breast cancer Maternal Aunt     Ovarian cancer Cousin     Breast cancer Cousin     Blindness Father     Cirrhosis Neg Hx     Colon polyps Neg Hx     Colon cancer Neg Hx     Crohn's disease Neg Hx     Esophageal cancer Neg Hx     Stomach cancer Neg Hx     Ulcerative colitis Neg Hx     Amblyopia Neg Hx     Cataracts Neg Hx     Glaucoma Neg Hx     Macular degeneration Neg Hx     Retinal detachment Neg Hx     Strabismus Neg Hx     Allergic rhinitis Neg Hx     Allergies Neg Hx     Angioedema Neg Hx     Asthma Neg Hx     Atopy Neg Hx     Eczema Neg Hx     Immunodeficiency Neg Hx     Rhinitis Neg Hx     Urticaria Neg Hx      Social History     Tobacco Use    Smoking status: Never Smoker    Smokeless tobacco: Never Used   Substance Use Topics     Alcohol use: No    Drug use: No        Review of Systems:  Review of Systems   Constitutional: Negative for unexpected weight change.   HENT: Negative for congestion, trouble swallowing and voice change.    Eyes: Negative for visual disturbance.   Respiratory: Positive for cough (Chronic). Negative for shortness of breath.    Cardiovascular: Negative for chest pain.   Gastrointestinal: Positive for abdominal pain. Negative for constipation, diarrhea and nausea.   Genitourinary: Negative for difficulty urinating.   Musculoskeletal: Positive for joint swelling ( right hand lymphedema post surgery, chronic).   Skin: Negative for rash.   Allergic/Immunologic: Negative for immunocompromised state.   Neurological: Negative for weakness.   Psychiatric/Behavioral: The patient is not nervous/anxious.        OBJECTIVE:     Vital Signs (Most Recent)  BP (!) 150/84   Pulse 82   Temp 98 °F (36.7 °C) (Oral)   Wt 119 kg (262 lb 5.6 oz)   BMI 46.47 kg/m²     Physical Exam:   Physical Exam   Constitutional: She is oriented to person, place, and time. She appears well-developed and well-nourished. No distress.   Obese   HENT:   Head: Normocephalic and atraumatic.   Eyes: EOM are normal.   Neck: Neck supple.   Cardiovascular: Normal rate and regular rhythm.   Pulmonary/Chest: Effort normal.   Abdominal: Soft. She exhibits no distension. There is no tenderness.   Obese  Prior lap osmel incisions noted, healed   Musculoskeletal: Normal range of motion. She exhibits edema (Right hand).   Neurological: She is alert and oriented to person, place, and time.   Skin: Skin is warm.   Vitals reviewed.    CT chest reviewed, hernia present.     ASSESSMENT/PLAN:     Gastroesophageal reflux disease without esophagitis      Discuss surgical management of reflux.  Patient is candidate for Nissen fundoplication.     She will discuss surgery with her  and call with surgical date.    Risks, benefits, and alternatives including bleeding,  scarring, infection, as hematoma, seroma, damage to surrounding structures, or need for further procedures were discussed with the patient who agrees to proceed with surgery.     Jina Kaufman

## 2020-02-05 ENCOUNTER — TELEPHONE (OUTPATIENT)
Dept: SURGERY | Facility: CLINIC | Age: 45
End: 2020-02-05

## 2020-02-05 DIAGNOSIS — Z01.818 PRE-OP TESTING: Primary | ICD-10-CM

## 2020-02-05 RX ORDER — LIDOCAINE HYDROCHLORIDE 10 MG/ML
1 INJECTION, SOLUTION EPIDURAL; INFILTRATION; INTRACAUDAL; PERINEURAL ONCE
Status: DISCONTINUED | OUTPATIENT
Start: 2020-02-05 | End: 2020-02-29 | Stop reason: HOSPADM

## 2020-02-05 RX ORDER — SODIUM CHLORIDE 9 MG/ML
INJECTION, SOLUTION INTRAVENOUS CONTINUOUS
Status: CANCELLED | OUTPATIENT
Start: 2020-02-05

## 2020-02-05 NOTE — TELEPHONE ENCOUNTER
----- Message from Charleen Matthew sent at 2/5/2020  8:24 AM CST -----  Contact: Patient  Patient would like Zulema to give her a call back concerning scheduling her procedure for February 28, 2020. Please call to advise or send message on portal.  Work Ph [phone] ext [phone]

## 2020-02-05 NOTE — TELEPHONE ENCOUNTER
Called patient back to advise that Dr Kaufman is able to do her surgery on  2/28/2020 and to set up pre op testing. Left a message.

## 2020-02-05 NOTE — TELEPHONE ENCOUNTER
----- Message from Charleen Matthew sent at 2/5/2020  8:24 AM CST -----  Contact: Patient  Patient would like Zulema to give her a call back concerning scheduling her procedure for February 28, 2020. Please call to advise or send message on portal.  Work Ph [phone] ext [phone]

## 2020-02-12 ENCOUNTER — HOSPITAL ENCOUNTER (OUTPATIENT)
Dept: CARDIOLOGY | Facility: HOSPITAL | Age: 45
Discharge: HOME | End: 2020-02-12
Attending: SURGERY
Payer: COMMERCIAL

## 2020-02-12 DIAGNOSIS — Z01.818 PRE-OP TESTING: ICD-10-CM

## 2020-02-12 PROCEDURE — 93005 ELECTROCARDIOGRAM TRACING: CPT

## 2020-02-12 PROCEDURE — 93010 EKG 12-LEAD: ICD-10-PCS | Mod: ,,, | Performed by: INTERNAL MEDICINE

## 2020-02-12 PROCEDURE — 93010 ELECTROCARDIOGRAM REPORT: CPT | Mod: ,,, | Performed by: INTERNAL MEDICINE

## 2020-02-26 RX ORDER — CALCIUM CARBONATE 500(1250)
1 TABLET,CHEWABLE ORAL DAILY PRN
COMMUNITY
End: 2020-06-08

## 2020-02-26 NOTE — PRE ADMISSION SCREENING
Pre op instructions reviewed with patient per phone:    To confirm, Your surgeon has instructed you:  Surgery is scheduled 02/28/20 at 0800.      Please report to [facility] 1st floor main lobby by 0630.  Pre admit office to call afternoon prior to surgery with final arrival time.  If surgery is on Monday, Pre admit office to call Friday afternoon with with final arrival time changes      INSTRUCTIONS IMPORTANT!!!  ¨ No smoking after 12 midnight, the night before surgery.  ¨ No solid food after 12 midnight, but you may have clear liquids up until 3 hours prior to surgery.  This includes: grape, cranberry, and apple juice (not orange, and no coffee.)   ¨ OK to brush teeth, but no gum, candy or mints!    ¨ Take only these medicines with a small swallow of water-morning of surgery.  Diltiazem      ____  Do not wear makeup, including mascara.  ____  No powder, lotions or creams to surgical area.  ____  Please remove all jewelry, including piercings and leave at home.  ____  No money or valuables needed. Please leave at home.  ____  Please bring identification and insurance information to hospital.  ____  If going home the same day, arrange for a ride home. You will not be able to   drive if Anesthesia was used.  ____  Children, under 12 years old, must remain in the waiting room with an adult.  They are not allowed in patient areas.  ____  Wear loose fitting clothing. Allow for dressings, bandages.  ____  Stop Aspirin, Ibuprofen, Motrin and Aleve at least 5-7 days before surgery, unless otherwise instructed by your doctor, or the nurse.   You MAY use Tylenol/acetaminophen until day of surgery.  ____  If you take diabetic medication, do not take am of surgery unless instructed by   Doctor.  ____ Stop taking any Fish Oil supplement or any Vitamins that contain Vitamin E at least 5 days prior to surgery.          Bathing Instructions-- The night before surgery and the morning prior to coming to the  hospital:   -Do not shave the surgical area.   -Shower and wash your hair and body as usual with your regular soap and shampoo.   -Rinse your hair and body completely.   -Use one packet of hibiclens to wash the surgical site (using your hand) gently for 5 minutes.  Do not scrub you skin too hard.   -Do not use hibiclens on your head, face, or genitals.   -Do not wash with regular soap after you use the hibiclens.   -Rinse your body thoroughly.   -Dry with clean, soft towel.  Do not use lotion, cream, deodorant, or powders on   the surgical site.    Use antibacterial soap in place of hibiclens if your surgery is on the head, face or genitals.         Surgical Site Infection    Prevention of surgical site infections:     -Keep incisions clean and dry.   -Do not soak/submerge incisions in water until completely healed.   -Do not apply lotions, powders, creams, or deodorants to site.   -Always make sure hands are cleaned with antibacterial soap/ alcohol-based   prior to touching the surgical site.  (This includes doctors, nurses, staff, and yourself.)    Signs and symptoms:   -Redness and pain around the area where you had surgery   -Drainage of cloudy fluid from your surgical wound   -Fever over 100.4  I have read or had read and explained to me, and understand the above information.

## 2020-02-28 ENCOUNTER — ANESTHESIA (OUTPATIENT)
Dept: SURGERY | Facility: HOSPITAL | Age: 45
End: 2020-02-28
Payer: COMMERCIAL

## 2020-02-28 ENCOUNTER — ANESTHESIA EVENT (OUTPATIENT)
Dept: SURGERY | Facility: HOSPITAL | Age: 45
End: 2020-02-28
Payer: COMMERCIAL

## 2020-02-28 ENCOUNTER — HOSPITAL ENCOUNTER (OUTPATIENT)
Facility: HOSPITAL | Age: 45
Discharge: HOME | End: 2020-02-29
Attending: SURGERY | Admitting: SURGERY
Payer: COMMERCIAL

## 2020-02-28 DIAGNOSIS — R05.3 COUGH, PERSISTENT: ICD-10-CM

## 2020-02-28 DIAGNOSIS — K21.9 HIATAL HERNIA WITH GERD WITHOUT ESOPHAGITIS: ICD-10-CM

## 2020-02-28 DIAGNOSIS — K44.9 HIATAL HERNIA WITH GERD WITHOUT ESOPHAGITIS: ICD-10-CM

## 2020-02-28 DIAGNOSIS — K21.9 GASTROESOPHAGEAL REFLUX DISEASE WITHOUT ESOPHAGITIS: ICD-10-CM

## 2020-02-28 LAB
B-HCG UR QL: NEGATIVE
CTP QC/QA: YES

## 2020-02-28 PROCEDURE — 63600175 PHARM REV CODE 636 W HCPCS: Performed by: SURGERY

## 2020-02-28 PROCEDURE — 81025 URINE PREGNANCY TEST: CPT | Performed by: SURGERY

## 2020-02-28 PROCEDURE — 27000221 HC OXYGEN, UP TO 24 HOURS

## 2020-02-28 PROCEDURE — 36000712 HC OR TIME LEV V 1ST 15 MIN: Performed by: SURGERY

## 2020-02-28 PROCEDURE — 71000033 HC RECOVERY, INTIAL HOUR: Performed by: SURGERY

## 2020-02-28 PROCEDURE — 36000713 HC OR TIME LEV V EA ADD 15 MIN: Performed by: SURGERY

## 2020-02-28 PROCEDURE — 37000008 HC ANESTHESIA 1ST 15 MINUTES: Performed by: SURGERY

## 2020-02-28 PROCEDURE — 43280 PR LAP,ESOPHAGOGAST FUNDOPLASTY: ICD-10-PCS | Mod: 80,,, | Performed by: SURGERY

## 2020-02-28 PROCEDURE — 25000003 PHARM REV CODE 250: Performed by: SURGERY

## 2020-02-28 PROCEDURE — 71000039 HC RECOVERY, EACH ADD'L HOUR: Performed by: SURGERY

## 2020-02-28 PROCEDURE — 63600175 PHARM REV CODE 636 W HCPCS: Performed by: NURSE ANESTHETIST, CERTIFIED REGISTERED

## 2020-02-28 PROCEDURE — 97802 MEDICAL NUTRITION INDIV IN: CPT

## 2020-02-28 PROCEDURE — 94761 N-INVAS EAR/PLS OXIMETRY MLT: CPT

## 2020-02-28 PROCEDURE — 43280 LAPAROSCOPY FUNDOPLASTY: CPT | Mod: ,,, | Performed by: SURGERY

## 2020-02-28 PROCEDURE — 94660 CPAP INITIATION&MGMT: CPT

## 2020-02-28 PROCEDURE — 27201423 OPTIME MED/SURG SUP & DEVICES STERILE SUPPLY: Performed by: SURGERY

## 2020-02-28 PROCEDURE — 63600175 PHARM REV CODE 636 W HCPCS: Performed by: ANESTHESIOLOGY

## 2020-02-28 PROCEDURE — 25000003 PHARM REV CODE 250: Performed by: NURSE ANESTHETIST, CERTIFIED REGISTERED

## 2020-02-28 PROCEDURE — 43280 PR LAP,ESOPHAGOGAST FUNDOPLASTY: ICD-10-PCS | Mod: ,,, | Performed by: SURGERY

## 2020-02-28 PROCEDURE — 27000190 HC CPAP FULL FACE MASK W/VALVE

## 2020-02-28 PROCEDURE — 43280 LAPAROSCOPY FUNDOPLASTY: CPT | Mod: 80,,, | Performed by: SURGERY

## 2020-02-28 PROCEDURE — 37000009 HC ANESTHESIA EA ADD 15 MINS: Performed by: SURGERY

## 2020-02-28 PROCEDURE — 99900035 HC TECH TIME PER 15 MIN (STAT)

## 2020-02-28 PROCEDURE — 94799 UNLISTED PULMONARY SVC/PX: CPT

## 2020-02-28 RX ORDER — ACETAMINOPHEN 325 MG/1
650 TABLET ORAL EVERY 6 HOURS PRN
Status: DISCONTINUED | OUTPATIENT
Start: 2020-02-28 | End: 2020-02-29 | Stop reason: HOSPADM

## 2020-02-28 RX ORDER — SUCCINYLCHOLINE CHLORIDE 20 MG/ML
INJECTION INTRAMUSCULAR; INTRAVENOUS
Status: DISCONTINUED | OUTPATIENT
Start: 2020-02-28 | End: 2020-02-28

## 2020-02-28 RX ORDER — HYDROCODONE BITARTRATE AND ACETAMINOPHEN 7.5; 325 MG/15ML; MG/15ML
15 SOLUTION ORAL EVERY 4 HOURS PRN
Status: DISCONTINUED | OUTPATIENT
Start: 2020-02-28 | End: 2020-02-29 | Stop reason: HOSPADM

## 2020-02-28 RX ORDER — ONDANSETRON 2 MG/ML
INJECTION INTRAMUSCULAR; INTRAVENOUS
Status: DISCONTINUED | OUTPATIENT
Start: 2020-02-28 | End: 2020-02-28

## 2020-02-28 RX ORDER — LIDOCAINE HYDROCHLORIDE 10 MG/ML
INJECTION, SOLUTION EPIDURAL; INFILTRATION; INTRACAUDAL; PERINEURAL
Status: DISCONTINUED | OUTPATIENT
Start: 2020-02-28 | End: 2020-02-28

## 2020-02-28 RX ORDER — HYDROMORPHONE HYDROCHLORIDE 1 MG/ML
1 INJECTION, SOLUTION INTRAMUSCULAR; INTRAVENOUS; SUBCUTANEOUS
Status: DISCONTINUED | OUTPATIENT
Start: 2020-02-28 | End: 2020-02-29 | Stop reason: HOSPADM

## 2020-02-28 RX ORDER — NEOSTIGMINE METHYLSULFATE 1 MG/ML
INJECTION, SOLUTION INTRAVENOUS
Status: DISCONTINUED | OUTPATIENT
Start: 2020-02-28 | End: 2020-02-28

## 2020-02-28 RX ORDER — AMOXICILLIN 250 MG
1 CAPSULE ORAL 2 TIMES DAILY
Status: DISCONTINUED | OUTPATIENT
Start: 2020-02-28 | End: 2020-02-29 | Stop reason: HOSPADM

## 2020-02-28 RX ORDER — LACTULOSE 10 G/15ML
20 SOLUTION ORAL EVERY 6 HOURS PRN
Status: DISCONTINUED | OUTPATIENT
Start: 2020-02-28 | End: 2020-02-29 | Stop reason: HOSPADM

## 2020-02-28 RX ORDER — HYDROMORPHONE HYDROCHLORIDE 2 MG/ML
0.2 INJECTION, SOLUTION INTRAMUSCULAR; INTRAVENOUS; SUBCUTANEOUS EVERY 5 MIN PRN
Status: DISCONTINUED | OUTPATIENT
Start: 2020-02-28 | End: 2020-02-28 | Stop reason: HOSPADM

## 2020-02-28 RX ORDER — FENTANYL CITRATE 50 UG/ML
INJECTION, SOLUTION INTRAMUSCULAR; INTRAVENOUS
Status: DISCONTINUED | OUTPATIENT
Start: 2020-02-28 | End: 2020-02-28

## 2020-02-28 RX ORDER — DILTIAZEM HYDROCHLORIDE 120 MG/1
120 CAPSULE, COATED, EXTENDED RELEASE ORAL DAILY
Status: DISCONTINUED | OUTPATIENT
Start: 2020-02-29 | End: 2020-02-29 | Stop reason: HOSPADM

## 2020-02-28 RX ORDER — CHLORHEXIDINE GLUCONATE ORAL RINSE 1.2 MG/ML
10 SOLUTION DENTAL 2 TIMES DAILY
Status: DISCONTINUED | OUTPATIENT
Start: 2020-02-28 | End: 2020-02-29 | Stop reason: HOSPADM

## 2020-02-28 RX ORDER — SODIUM CHLORIDE, SODIUM LACTATE, POTASSIUM CHLORIDE, CALCIUM CHLORIDE 600; 310; 30; 20 MG/100ML; MG/100ML; MG/100ML; MG/100ML
INJECTION, SOLUTION INTRAVENOUS CONTINUOUS
Status: DISCONTINUED | OUTPATIENT
Start: 2020-02-28 | End: 2020-02-29 | Stop reason: HOSPADM

## 2020-02-28 RX ORDER — MIDAZOLAM HYDROCHLORIDE 1 MG/ML
INJECTION, SOLUTION INTRAMUSCULAR; INTRAVENOUS
Status: DISCONTINUED | OUTPATIENT
Start: 2020-02-28 | End: 2020-02-28

## 2020-02-28 RX ORDER — ONDANSETRON 8 MG/1
8 TABLET, ORALLY DISINTEGRATING ORAL EVERY 8 HOURS PRN
Status: DISCONTINUED | OUTPATIENT
Start: 2020-02-28 | End: 2020-02-29 | Stop reason: HOSPADM

## 2020-02-28 RX ORDER — DEXAMETHASONE SODIUM PHOSPHATE 4 MG/ML
INJECTION, SOLUTION INTRA-ARTICULAR; INTRALESIONAL; INTRAMUSCULAR; INTRAVENOUS; SOFT TISSUE
Status: DISCONTINUED | OUTPATIENT
Start: 2020-02-28 | End: 2020-02-28

## 2020-02-28 RX ORDER — TALC
9 POWDER (GRAM) TOPICAL NIGHTLY PRN
Status: DISCONTINUED | OUTPATIENT
Start: 2020-02-28 | End: 2020-02-29 | Stop reason: HOSPADM

## 2020-02-28 RX ORDER — MONTELUKAST SODIUM 10 MG/1
10 TABLET ORAL NIGHTLY
Status: DISCONTINUED | OUTPATIENT
Start: 2020-02-28 | End: 2020-02-29 | Stop reason: HOSPADM

## 2020-02-28 RX ORDER — SODIUM CHLORIDE 9 MG/ML
INJECTION, SOLUTION INTRAVENOUS CONTINUOUS
Status: DISCONTINUED | OUTPATIENT
Start: 2020-02-28 | End: 2020-02-28

## 2020-02-28 RX ORDER — DIPHENHYDRAMINE HYDROCHLORIDE 50 MG/ML
25 INJECTION INTRAMUSCULAR; INTRAVENOUS EVERY 4 HOURS PRN
Status: DISCONTINUED | OUTPATIENT
Start: 2020-02-28 | End: 2020-02-29 | Stop reason: HOSPADM

## 2020-02-28 RX ORDER — SODIUM CHLORIDE, SODIUM LACTATE, POTASSIUM CHLORIDE, CALCIUM CHLORIDE 600; 310; 30; 20 MG/100ML; MG/100ML; MG/100ML; MG/100ML
INJECTION, SOLUTION INTRAVENOUS CONTINUOUS PRN
Status: DISCONTINUED | OUTPATIENT
Start: 2020-02-28 | End: 2020-02-28

## 2020-02-28 RX ORDER — TRIAMTERENE AND HYDROCHLOROTHIAZIDE 37.5; 25 MG/1; MG/1
1 CAPSULE ORAL DAILY
Status: DISCONTINUED | OUTPATIENT
Start: 2020-02-28 | End: 2020-02-28

## 2020-02-28 RX ORDER — CEFAZOLIN SODIUM 2 G/50ML
2 SOLUTION INTRAVENOUS
Status: DISCONTINUED | OUTPATIENT
Start: 2020-02-28 | End: 2020-02-28 | Stop reason: HOSPADM

## 2020-02-28 RX ORDER — ONDANSETRON 2 MG/ML
4 INJECTION INTRAMUSCULAR; INTRAVENOUS DAILY PRN
Status: DISCONTINUED | OUTPATIENT
Start: 2020-02-28 | End: 2020-02-28 | Stop reason: HOSPADM

## 2020-02-28 RX ORDER — BISACODYL 10 MG
10 SUPPOSITORY, RECTAL RECTAL DAILY PRN
Status: DISCONTINUED | OUTPATIENT
Start: 2020-02-28 | End: 2020-02-29 | Stop reason: HOSPADM

## 2020-02-28 RX ORDER — DOCUSATE SODIUM 100 MG/1
100 CAPSULE, LIQUID FILLED ORAL EVERY OTHER DAY
Status: DISCONTINUED | OUTPATIENT
Start: 2020-02-29 | End: 2020-02-29 | Stop reason: HOSPADM

## 2020-02-28 RX ORDER — ROCURONIUM BROMIDE 10 MG/ML
INJECTION, SOLUTION INTRAVENOUS
Status: DISCONTINUED | OUTPATIENT
Start: 2020-02-28 | End: 2020-02-28

## 2020-02-28 RX ORDER — GLYCOPYRROLATE 0.2 MG/ML
INJECTION INTRAMUSCULAR; INTRAVENOUS
Status: DISCONTINUED | OUTPATIENT
Start: 2020-02-28 | End: 2020-02-28

## 2020-02-28 RX ORDER — BUPIVACAINE HYDROCHLORIDE 2.5 MG/ML
INJECTION, SOLUTION EPIDURAL; INFILTRATION; INTRACAUDAL
Status: DISCONTINUED | OUTPATIENT
Start: 2020-02-28 | End: 2020-02-28 | Stop reason: HOSPADM

## 2020-02-28 RX ORDER — CLONIDINE HYDROCHLORIDE 0.1 MG/1
0.1 TABLET ORAL EVERY 6 HOURS PRN
Status: DISCONTINUED | OUTPATIENT
Start: 2020-02-28 | End: 2020-02-29 | Stop reason: HOSPADM

## 2020-02-28 RX ORDER — PROPOFOL 10 MG/ML
VIAL (ML) INTRAVENOUS
Status: DISCONTINUED | OUTPATIENT
Start: 2020-02-28 | End: 2020-02-28

## 2020-02-28 RX ADMIN — HYDROCODONE BITARTRATE AND ACETAMINOPHEN 15 ML: 7.5; 325 SOLUTION ORAL at 06:02

## 2020-02-28 RX ADMIN — NEOSTIGMINE METHYLSULFATE 5 MG: 1 INJECTION INTRAVENOUS at 12:02

## 2020-02-28 RX ADMIN — Medication 10 MG: at 10:02

## 2020-02-28 RX ADMIN — ROCURONIUM BROMIDE 20 MG: 10 INJECTION, SOLUTION INTRAVENOUS at 10:02

## 2020-02-28 RX ADMIN — FENTANYL CITRATE 50 MCG: 50 INJECTION, SOLUTION INTRAMUSCULAR; INTRAVENOUS at 09:02

## 2020-02-28 RX ADMIN — DEXAMETHASONE SODIUM PHOSPHATE 4 MG: 4 INJECTION, SOLUTION INTRA-ARTICULAR; INTRALESIONAL; INTRAMUSCULAR; INTRAVENOUS; SOFT TISSUE at 09:02

## 2020-02-28 RX ADMIN — FENTANYL CITRATE 50 MCG: 50 INJECTION, SOLUTION INTRAMUSCULAR; INTRAVENOUS at 10:02

## 2020-02-28 RX ADMIN — SODIUM CHLORIDE, SODIUM LACTATE, POTASSIUM CHLORIDE, AND CALCIUM CHLORIDE: .6; .31; .03; .02 INJECTION, SOLUTION INTRAVENOUS at 01:02

## 2020-02-28 RX ADMIN — HYDROMORPHONE HYDROCHLORIDE 0.2 MG: 2 INJECTION INTRAMUSCULAR; INTRAVENOUS; SUBCUTANEOUS at 01:02

## 2020-02-28 RX ADMIN — PROPOFOL 200 MG: 10 INJECTION, EMULSION INTRAVENOUS at 09:02

## 2020-02-28 RX ADMIN — TRIAMTERENE AND HYDROCHLOROTHIAZIDE 1 CAPSULE: 37.5; 25 CAPSULE ORAL at 02:02

## 2020-02-28 RX ADMIN — MONTELUKAST 10 MG: 10 TABLET, FILM COATED ORAL at 08:02

## 2020-02-28 RX ADMIN — HYDROMORPHONE HYDROCHLORIDE 0.2 MG: 2 INJECTION INTRAMUSCULAR; INTRAVENOUS; SUBCUTANEOUS at 12:02

## 2020-02-28 RX ADMIN — SUCCINYLCHOLINE CHLORIDE 120 MG: 20 INJECTION, SOLUTION INTRAMUSCULAR; INTRAVENOUS at 09:02

## 2020-02-28 RX ADMIN — ONDANSETRON 4 MG: 2 INJECTION, SOLUTION INTRAMUSCULAR; INTRAVENOUS at 09:02

## 2020-02-28 RX ADMIN — Medication 30 MG: at 11:02

## 2020-02-28 RX ADMIN — MIDAZOLAM 2 MG: 1 INJECTION INTRAMUSCULAR; INTRAVENOUS at 09:02

## 2020-02-28 RX ADMIN — FENTANYL CITRATE 100 MCG: 50 INJECTION, SOLUTION INTRAMUSCULAR; INTRAVENOUS at 09:02

## 2020-02-28 RX ADMIN — SODIUM CHLORIDE, SODIUM LACTATE, POTASSIUM CHLORIDE, AND CALCIUM CHLORIDE: 600; 310; 30; 20 INJECTION, SOLUTION INTRAVENOUS at 09:02

## 2020-02-28 RX ADMIN — CHLORHEXIDINE GLUCONATE 0.12% ORAL RINSE 10 ML: 1.2 LIQUID ORAL at 08:02

## 2020-02-28 RX ADMIN — ROCURONIUM BROMIDE 10 MG: 10 INJECTION, SOLUTION INTRAVENOUS at 11:02

## 2020-02-28 RX ADMIN — ROBINUL 0.8 MG: 0.2 INJECTION INTRAMUSCULAR; INTRAVENOUS at 12:02

## 2020-02-28 RX ADMIN — Medication 50 MG: at 11:02

## 2020-02-28 RX ADMIN — HYDROCODONE BITARTRATE AND ACETAMINOPHEN 15 ML: 7.5; 325 SOLUTION ORAL at 02:02

## 2020-02-28 RX ADMIN — ROCURONIUM BROMIDE 10 MG: 10 INJECTION, SOLUTION INTRAVENOUS at 09:02

## 2020-02-28 RX ADMIN — LIDOCAINE HYDROCHLORIDE 50 MG: 10 INJECTION, SOLUTION EPIDURAL; INFILTRATION; INTRACAUDAL; PERINEURAL at 09:02

## 2020-02-28 RX ADMIN — HYDROCODONE BITARTRATE AND ACETAMINOPHEN 15 ML: 7.5; 325 SOLUTION ORAL at 11:02

## 2020-02-28 RX ADMIN — ROCURONIUM BROMIDE 40 MG: 10 INJECTION, SOLUTION INTRAVENOUS at 09:02

## 2020-02-28 RX ADMIN — SENNOSIDES, DOCUSATE SODIUM 1 TABLET: 50; 8.6 TABLET, FILM COATED ORAL at 08:02

## 2020-02-28 RX ADMIN — CEFAZOLIN SODIUM 3 G: 2 SOLUTION INTRAVENOUS at 09:02

## 2020-02-28 NOTE — PLAN OF CARE
Pt resting on stretcher s/p dot nissen fundoplication performed under general anesthesia by Dr. Kaufman. Respirations even and unlabored on 98% face tent with O2 sats of 98%. VSS. Will cont to monitor. See flow sheet for detailed assessment.

## 2020-02-28 NOTE — TRANSFER OF CARE
"Anesthesia Transfer of Care Note    Patient: Akiko Jameson    Procedure(s) Performed: Procedure(s) (LRB):  XI ROBOTIC FUNDOPLICATION, NISSEN (N/A)    Patient location: PACU    Anesthesia Type: general    Transport from OR: Transported from OR on room air with adequate spontaneous ventilation    Post pain: adequate analgesia    Post assessment: no apparent anesthetic complications    Post vital signs: stable    Level of consciousness: responds to stimulation    Nausea/Vomiting: no nausea/vomiting    Complications: none    Transfer of care protocol was followed      Last vitals:   Visit Vitals  BP (!) 152/72   Pulse 84   Temp 36.7 °C (98.1 °F) (Temporal)   Resp 20   Ht 5' 3" (1.6 m)   Wt 118.1 kg (260 lb 5.8 oz)   SpO2 100%   Breastfeeding? No   BMI 46.12 kg/m²     "

## 2020-02-28 NOTE — OP NOTE
[facility] -   General Surgery  Operative Note    SUMMARY     Date of Procedure: 2/28/2020     Procedure: Procedure(s) (LRB):  XI ROBOTIC FUNDOPLICATION, NISSEN (N/A)       Surgeon(s) and Role:     * Jina Kaufman MD - Primary     * Amrit Child MD - Assisting        Pre-Operative Diagnosis: Gastroesophageal reflux disease without esophagitis [K21.9], hiatal hernia    Post-Operative Diagnosis: Post-Op Diagnosis Codes:     * Gastroesophageal reflux disease without esophagitis [K21.9], hiatal hernia    Anesthesia: General    Drains, Packs, Catheters: none    Estimated Blood Loss (EBL): 20 mL           Implants: * No implants in log *    Specimens:   Specimen (12h ago, onward)    None           Description of the Findings of the Procedure: small hiatal hernia repaired, nissen fundoplication.     Significant Surgical Tasks Conducted by the Assistant(s), if Applicable: surgical assistance necessary due to large size of the stomach requiring retraction    Complications: No    Procedure in detail: The patient was brought to the operating room and laid on the operating table in supine position. General endotracheal anesthesia was administered. Bilateral arms were tucked and an orogastric tube was placed.  The abdomen was prepped and draped in sterile fashion with choloraprep solution.  A time out was performed to verify patient identification, correct surgical site, IV antibiotic administration, and other pertinent details of the case.     An incision was made at the umbilicus.  The umbilicus was grasped and elevated, and the Veress needle was inserted into the peritoneal cavity.  Position was confirmed by aspiration and saline meniscus test.  The abdomen was insufflated with carbon dioxide to 15 mm Hg.  The patient tolerated insufflation well.  The Veress needle was removed and a 8-mm incision was made in the midline 12 cm below the xiphoid process.  A 8-mm trocar was placed.    The laparoscope was  inserted and the abdomen inspected.  There were no injuries from initial trocar placement. Additional trocars were then inserted under direct visualization in the following locations:  an 8-mm trocar at the left costal margin in the midclavicular line, a 8-mm trocar at the left costal margin in the anterior axillary line, and 8-mm trocar at the right costal margin in the midclavicular and anterior axillary lines.      The patient was placed in reverse Trendelenburg position and the laparoscope was changed to a 30-degree scope.  A liver retractor was placed to expose the hiatus.  The pars flaccida was divided to expose the right pk. The short gastric vessels were divided with an ultrasonic scalpel all the way to the left pk of the diaphragm. A retroesophageal window was created and a Penrose drain was placed around the esophagus and grasped with a maximiliano through the assistant port.  While retracting the esophagus with the Penrose drain, the hiatus was closed using interrupted 0-ethibond sutures.    The fundus of the stomach was grasped with an atraumatic grasper and brought through the retroesophageal window.  A 50-Emirati bougie was placed in the esophagus.   A 360-degree fundoplication was created without tension using interrupted 2-0 silk sutures.  The most superior suture included a partial-thickness bite of esophagus to anchor the wrap.  The penrose drain was cut and removed.    The bougie was carefully removed.  Hemostasis was checked.  Secondary trocars were removed under direct vision.  The laparoscope was withdrawn and the abdomen was desufflated.  All incisions were closed with running 4-0 Monocryl and acrylate adhesive.  The patient tolerated the procedure well and was taken to the recovery room in stable condition.      Condition: Stable    Disposition: PACU - hemodynamically stable.    Attestation: I performed the procedure.    Jina Kaufman

## 2020-02-28 NOTE — ANESTHESIA RELEASE NOTE
"Anesthesia Release from PACU Note    Patient: Akiko Jameson    Procedure(s) Performed: Procedure(s) (LRB):  XI ROBOTIC FUNDOPLICATION, NISSEN (N/A)    Anesthesia type: general    Post pain: Adequate analgesia    Post assessment: no apparent anesthetic complications, tolerated procedure well and no evidence of recall    Last Vitals:   Visit Vitals  BP (!) 152/72   Pulse 84   Temp 36.7 °C (98.1 °F) (Temporal)   Resp 20   Ht 5' 3" (1.6 m)   Wt 118.1 kg (260 lb 5.8 oz)   SpO2 100%   Breastfeeding? No   BMI 46.12 kg/m²       Post vital signs: stable    Level of consciousness: responds to stimulation    Nausea/Vomiting: no nausea/no vomiting    Complications: none    Airway Patency: patent    Respiratory: unassisted    Cardiovascular: stable and blood pressure at baseline    Hydration: euvolemic     "

## 2020-02-28 NOTE — INTERVAL H&P NOTE
The patient has been examined and the H&P has been reviewed:    I concur with the findings and no changes have occurred since H&P was written.    Anesthesia/Surgery risks, benefits and alternative options discussed and understood by patient/family.          Patient Active Problem List   Diagnosis    Lymphedema of upper extremity following lymphadenectomy    Chronic edema    ALLERGIC RHINITIS    Sleep apnea    Urinary incontinence, mixed    Mild anemia    Eosinophil count raised    Obesity, Class III, BMI 40-49.9 (morbid obesity)    Chronic disease anemia    Right knee pain    Swelling of right knee joint    Chondromalacia of right patella    S/P right knee arthroscopy    Epigastric pain    Multinodular goiter (nontoxic)    Rectal pain    Cough, persistent    Gastroesophageal reflux disease without esophagitis    Hypertensive disorder

## 2020-02-28 NOTE — ANESTHESIA PREPROCEDURE EVALUATION
02/28/2020  Akiko Jameson is a 45 y.o., female.    Anesthesia Evaluation    I have reviewed the Patient Summary Reports.    I have reviewed the Nursing Notes.   I have reviewed the Medications.     Review of Systems  Anesthesia Hx:  No problems with previous Anesthesia Denies Hx of Anesthetic complications  Denies Family Hx of Anesthesia complications.   Denies Personal Hx of Anesthesia complications.   Social:  Non-Smoker, No Alcohol Use    Cardiovascular:   Hypertension ECG has been reviewed.    Pulmonary:   Sleep Apnea, CPAP    Renal/:  Renal/ Normal     Hepatic/GI:   GERD    Neurological:  Neurology Normal    Endocrine:  Endocrine Normal    Psych:  Psychiatric Normal         Patient Active Problem List   Diagnosis    Lymphedema of upper extremity following lymphadenectomy    Chronic edema    ALLERGIC RHINITIS    Sleep apnea    Urinary incontinence, mixed    Mild anemia    Eosinophil count raised    Obesity, Class III, BMI 40-49.9 (morbid obesity)    Chronic disease anemia    Right knee pain    Swelling of right knee joint    Chondromalacia of right patella    S/P right knee arthroscopy    Epigastric pain    Multinodular goiter (nontoxic)    Rectal pain    Cough, persistent    Gastroesophageal reflux disease without esophagitis    Hypertensive disorder     Past Surgical History:   Procedure Laterality Date    24 HOUR IMPEDANCE PH MONITORING OF ESOPHAGUS IN PATIENT NOT TAKING ACID REDUCING MEDICATIONS N/A 1/6/2020    Procedure: IMPEDANCE PH STUDY, ESOPHAGEAL, 24 HOUR, IN PATIENT NOT TAKING ACID REDUCING MEDICATION;  Surgeon: First Available Tamika Panchal;  Location: [facility];  Service: Endoscopy;  Laterality: N/A;    AXILLARY NODE DISSECTION      granular cell tumor    BREAST CYST ASPIRATION      left    CHOLECYSTECTOMY      COLONOSCOPY N/A 5/10/2019    Procedure:  COLONOSCOPY;  Surgeon: Edgar Gamble Jr., MD;  Location: [facility] ENDO;  Service: Endoscopy;  Laterality: N/A;    ENDOMETRIAL ABLATION      ESOPHAGEAL MANOMETRY WITH MEASUREMENT OF IMPEDANCE N/A 1/6/2020    Procedure: MANOMETRY, ESOPHAGUS, WITH IMPEDANCE MEASUREMENT;  Surgeon: First Available [place];  Location: [facility] ENDO;  Service: Endoscopy;  Laterality: N/A;    ESOPHAGOGASTRODUODENOSCOPY N/A 7/5/2018    Procedure: EGD (ESOPHAGOGASTRODUODENOSCOPY);  Surgeon: Edgar Gamble Jr., MD;  Location: [facility] ENDO;  Service: Endoscopy;  Laterality: N/A;    EXCISION OF MASS OF ABDOMEN Left 6/18/2018    Procedure: EXCISION, MASS, ABDOMEN - flank left;  Surgeon: Armando Tate MD;  Location: [facility] OR;  Service: General;  Laterality: Left;  left flank removal of mass    FINE NEEDLE ASPIRATION      thyroid    KNEE ARTHROSCOPY W/ MENISCECTOMY Right     NASAL SEPTUM SURGERY      TUBAL LIGATION      UPPER GASTROINTESTINAL ENDOSCOPY  07/05/2018    Dr. Gamble     No current facility-administered medications on file prior to encounter.      Current Outpatient Medications on File Prior to Encounter   Medication Sig Dispense Refill    calcium carbonate (OS-CARLA) 500 mg calcium (1,250 mg) chewable tablet Take 1 tablet by mouth daily as needed for Heartburn.      cetirizine (ZYRTEC) 10 MG tablet Take 1 tablet (10 mg total) by mouth once daily.  0    diltiaZEM (CARDIZEM CD) 120 MG Cp24 Take 1 capsule (120 mg total) by mouth once daily. 90 capsule 3    docusate sodium (COLACE) 100 MG capsule Take 100 mg by mouth every other day.       fluticasone propionate (FLONASE) 50 mcg/actuation nasal spray Use 2 sprays to each nostril daily (Patient taking differently: 2 sprays by Each Nostril route once daily. Use 2 sprays to each nostril daily) 16 g 12    montelukast (SINGULAIR) 10 mg tablet Take 1 tablet (10 mg total) by mouth nightly. 90 tablet 3    multivitamin (THERAGRAN) per tablet Take 1 tablet by mouth once daily. Every  day      potassium chloride SA (K-DUR,KLOR-CON) 20 MEQ tablet Take 1 tablet (20 mEq total) by mouth 2 (two) times daily. Appointment needed for further refills after this one 60 tablet 11    triamterene-hydrochlorothiazide 37.5-25 mg (MAXZIDE-25) 37.5-25 mg per tablet Take 1 tablet by mouth once daily. 90 tablet 3    famotidine (PEPCID) 10 MG tablet Take 10 mg by mouth once daily.      guaifenesin-codeine 100-10 mg/5 ml (TUSSI-ORGANIDIN NR)  mg/5 mL syrup Take 5-10 ml po every 6 hours as needed for cough 118 mL 0    hyoscyamine (LEVSIN/SL) 0.125 mg Subl Take 2 tablets (0.25 mg total) by mouth every 4 (four) hours as needed. (Patient not taking: Reported on 12/7/2019) 20 tablet 11    nabumetone (RELAFEN) 500 MG tablet Take 1 tablet (500 mg total) by mouth 2 (two) times daily. (Patient not taking: Reported on 12/7/2019) 60 tablet 3    ranitidine (ZANTAC) 300 MG tablet Take 1 tablet (300 mg total) by mouth every evening. 90 tablet 3           Physical Exam  General:  Morbid Obesity    Airway/Jaw/Neck:  Airway Findings: Mouth Opening: Normal Tongue: Normal  General Airway Assessment: Adult  Mallampati: II  TM Distance: 4 - 6 cm  Jaw/Neck Findings:  Neck ROM: Normal ROM  Neck Findings:  Girth Increased, Short Neck      Dental:  Dental Findings: In tact   Chest/Lungs:  Chest/Lungs Findings: Clear to auscultation, Normal Respiratory Rate     Heart/Vascular:  Heart Findings: Rate: Normal  Rhythm: Regular Rhythm  Sounds: Normal        Mental Status:  Mental Status Findings:  Cooperative, Alert and Oriented       Lab Results   Component Value Date    WBC 5.11 02/12/2020    HGB 11.2 (L) 02/12/2020    HCT 38.2 02/12/2020    MCV 84 02/12/2020     02/12/2020         Chemistry        Component Value Date/Time     02/12/2020 0856    K 3.8 02/12/2020 0856     02/12/2020 0856    CO2 28 02/12/2020 0856    BUN 15 02/12/2020 0856    CREATININE 1.0 02/12/2020 0856    GLU 73 02/12/2020 0856        Component  Value Date/Time    CALCIUM 9.7 02/12/2020 0856    ALKPHOS 84 08/13/2019 1641    AST 25 08/13/2019 1641    ALT 21 08/13/2019 1641    BILITOT 0.3 08/13/2019 1641    ESTGFRAFRICA >60.0 02/12/2020 0856    EGFRNONAA >60.0 02/12/2020 0856            Anesthesia Plan  Type of Anesthesia, risks & benefits discussed:  Anesthesia Type:  general  Patient's Preference:   Intra-op Monitoring Plan: standard ASA monitors  Intra-op Monitoring Plan Comments:   Post Op Pain Control Plan: per primary service following discharge from PACU  Post Op Pain Control Plan Comments:   Induction:   IV  Beta Blocker:  Patient is not currently on a Beta-Blocker (No further documentation required).       Informed Consent: Patient understands risks and agrees with Anesthesia plan.  Questions answered. Anesthesia consent signed with patient.  ASA Score: 2     Day of Surgery Review of History & Physical: I have interviewed and examined the patient. I have reviewed the patient's H&P dated:  There are no significant changes.  H&P update referred to the surgeon.         Ready For Surgery From Anesthesia Perspective.

## 2020-02-28 NOTE — PLAN OF CARE
Patient remains free of falls and safety precautions maintained. Afebrile. Pain controlled. Due to void. IV fluids per order. Bed alarm on. Incisions with dermabond. Will continue to monitor. 12 hour chart check.

## 2020-02-28 NOTE — CONSULTS
Food & Nutrition  Education    Diet Education: Nissen  Time Spent: 15 min  Learners: Family      Nutrition Education provided with handouts: Yes      Comments: Pt very drowsy during visit. Family at beside reports helping pt with food. Educated them on post-nissen diet progression. Provided [facility] packet on lists of allowed foods. Family verbalized understanding and asked questions. Pt with no weight loss. NFPE not performed d/t no signs of malnutrition.      All questions and concerns answered. Dietitian's contact information provided.       Follow-Up: No    Please Re-consult as needed        Thanks!

## 2020-02-29 VITALS
DIASTOLIC BLOOD PRESSURE: 71 MMHG | OXYGEN SATURATION: 94 % | HEART RATE: 68 BPM | WEIGHT: 260.38 LBS | BODY MASS INDEX: 46.14 KG/M2 | SYSTOLIC BLOOD PRESSURE: 141 MMHG | HEIGHT: 63 IN | TEMPERATURE: 98 F | RESPIRATION RATE: 18 BRPM

## 2020-02-29 LAB
ANION GAP SERPL CALC-SCNC: 9 MMOL/L (ref 8–16)
BUN SERPL-MCNC: 10 MG/DL (ref 6–20)
CALCIUM SERPL-MCNC: 9 MG/DL (ref 8.7–10.5)
CHLORIDE SERPL-SCNC: 103 MMOL/L (ref 95–110)
CO2 SERPL-SCNC: 23 MMOL/L (ref 23–29)
CREAT SERPL-MCNC: 0.9 MG/DL (ref 0.5–1.4)
ERYTHROCYTE [DISTWIDTH] IN BLOOD BY AUTOMATED COUNT: 15.7 % (ref 11.5–14.5)
EST. GFR  (AFRICAN AMERICAN): >60 ML/MIN/1.73 M^2
EST. GFR  (NON AFRICAN AMERICAN): >60 ML/MIN/1.73 M^2
GLUCOSE SERPL-MCNC: 117 MG/DL (ref 70–110)
HCT VFR BLD AUTO: 34.7 % (ref 37–48.5)
HGB BLD-MCNC: 10.7 G/DL (ref 12–16)
MCH RBC QN AUTO: 24.6 PG (ref 27–31)
MCHC RBC AUTO-ENTMCNC: 30.8 G/DL (ref 32–36)
MCV RBC AUTO: 80 FL (ref 82–98)
PLATELET # BLD AUTO: 301 K/UL (ref 150–350)
PMV BLD AUTO: 11.3 FL (ref 9.2–12.9)
POTASSIUM SERPL-SCNC: 3.7 MMOL/L (ref 3.5–5.1)
RBC # BLD AUTO: 4.35 M/UL (ref 4–5.4)
SODIUM SERPL-SCNC: 135 MMOL/L (ref 136–145)
WBC # BLD AUTO: 14.6 K/UL (ref 3.9–12.7)

## 2020-02-29 PROCEDURE — 99024 PR POST-OP FOLLOW-UP VISIT: ICD-10-PCS | Mod: ,,, | Performed by: SURGERY

## 2020-02-29 PROCEDURE — 99024 POSTOP FOLLOW-UP VISIT: CPT | Mod: ,,, | Performed by: SURGERY

## 2020-02-29 PROCEDURE — 36415 COLL VENOUS BLD VENIPUNCTURE: CPT

## 2020-02-29 PROCEDURE — 97161 PT EVAL LOW COMPLEX 20 MIN: CPT

## 2020-02-29 PROCEDURE — 99900035 HC TECH TIME PER 15 MIN (STAT)

## 2020-02-29 PROCEDURE — 80048 BASIC METABOLIC PNL TOTAL CA: CPT

## 2020-02-29 PROCEDURE — 94799 UNLISTED PULMONARY SVC/PX: CPT

## 2020-02-29 PROCEDURE — 94761 N-INVAS EAR/PLS OXIMETRY MLT: CPT

## 2020-02-29 PROCEDURE — 25000003 PHARM REV CODE 250: Performed by: SURGERY

## 2020-02-29 PROCEDURE — 85027 COMPLETE CBC AUTOMATED: CPT

## 2020-02-29 RX ORDER — ONDANSETRON 8 MG/1
8 TABLET, ORALLY DISINTEGRATING ORAL EVERY 8 HOURS PRN
Qty: 12 TABLET | Refills: 2 | Status: SHIPPED | OUTPATIENT
Start: 2020-02-29 | End: 2020-11-05

## 2020-02-29 RX ORDER — HYDROCODONE BITARTRATE AND ACETAMINOPHEN 7.5; 325 MG/15ML; MG/15ML
15 SOLUTION ORAL EVERY 6 HOURS PRN
Qty: 118 ML | Refills: 0 | Status: SHIPPED | OUTPATIENT
Start: 2020-02-29 | End: 2020-03-05

## 2020-02-29 RX ORDER — AMOXICILLIN 250 MG
1 CAPSULE ORAL DAILY
Qty: 5 TABLET | Refills: 0 | Status: SHIPPED | OUTPATIENT
Start: 2020-02-29 | End: 2020-03-05

## 2020-02-29 RX ADMIN — HYDROCODONE BITARTRATE AND ACETAMINOPHEN 15 ML: 7.5; 325 SOLUTION ORAL at 04:02

## 2020-02-29 RX ADMIN — CHLORHEXIDINE GLUCONATE 0.12% ORAL RINSE 10 ML: 1.2 LIQUID ORAL at 09:02

## 2020-02-29 RX ADMIN — DOCUSATE SODIUM 100 MG: 100 CAPSULE, LIQUID FILLED ORAL at 09:02

## 2020-02-29 RX ADMIN — HYDROCODONE BITARTRATE AND ACETAMINOPHEN 15 ML: 7.5; 325 SOLUTION ORAL at 09:02

## 2020-02-29 RX ADMIN — DILTIAZEM HYDROCHLORIDE 120 MG: 120 CAPSULE, COATED, EXTENDED RELEASE ORAL at 09:02

## 2020-02-29 RX ADMIN — SENNOSIDES, DOCUSATE SODIUM 1 TABLET: 50; 8.6 TABLET, FILM COATED ORAL at 09:02

## 2020-02-29 NOTE — CONSULTS
[facility] Hospital Medicine  Consult Note    Patient Name: Akiko Jameson  MRN: [id number]  Admission Date: 2/28/2020  Hospital Length of Stay: 0 days  Attending Physician: Jina Kaufman MD   Primary Care Provider: Dwayne Booth MD           Patient information was obtained from patient and ER records.     Inpatient consult to Internal Medicine  Consult performed by: BETO Ha  Consult ordered by: Jina Kaufman MD  Reason for consult: medical management        Subjective:     Principal Problem: Hiatal hernia with GERD without esophagitis    Chief Complaint: No chief complaint on file.       HPI: Akiko Jameson is a 45 year old female with a PMHx of KEIRY on CPAP, HTN, GERD, and Anemia admitted by General surgery s/p robotic nissen fundoplication today, 02/28/20, r/t GERD without esophagitis, hiatal hernia performed by Dr. Kaufman. EBL per Op note 20 mL. HM consulted for medical management.     Past Medical History:   Diagnosis Date    ALLERGIC RHINITIS     Cellulitis     Eosinophilia     mild    GERD (gastroesophageal reflux disease)     Granular cell tumor     H. pylori infection 2018    Hypertension     Lymphedema     Mild anemia     KEIRY on CPAP     does not regularly    Sleep apnea     compliant with CPAP    Thyroid nodule     Urinary incontinence, mixed        Past Surgical History:   Procedure Laterality Date    24 HOUR IMPEDANCE PH MONITORING OF ESOPHAGUS IN PATIENT NOT TAKING ACID REDUCING MEDICATIONS N/A 1/6/2020    Procedure: IMPEDANCE PH STUDY, ESOPHAGEAL, 24 HOUR, IN PATIENT NOT TAKING ACID REDUCING MEDICATION;  Surgeon: First Available Tamika Panchal;  Location: [facility];  Service: Endoscopy;  Laterality: N/A;    AXILLARY NODE DISSECTION      granular cell tumor    BREAST CYST ASPIRATION      left    CHOLECYSTECTOMY      COLONOSCOPY N/A 5/10/2019    Procedure: COLONOSCOPY;  Surgeon: Edgar Gamble Jr., MD;  Location: [facility];   Service: Endoscopy;  Laterality: N/A;    ENDOMETRIAL ABLATION      ESOPHAGEAL MANOMETRY WITH MEASUREMENT OF IMPEDANCE N/A 1/6/2020    Procedure: MANOMETRY, ESOPHAGUS, WITH IMPEDANCE MEASUREMENT;  Surgeon: First Available Benson;  Location: [facility] ENDO;  Service: Endoscopy;  Laterality: N/A;    ESOPHAGOGASTRODUODENOSCOPY N/A 7/5/2018    Procedure: EGD (ESOPHAGOGASTRODUODENOSCOPY);  Surgeon: Edgar Gamble Jr., MD;  Location: [facility] ENDO;  Service: Endoscopy;  Laterality: N/A;    EXCISION OF MASS OF ABDOMEN Left 6/18/2018    Procedure: EXCISION, MASS, ABDOMEN - flank left;  Surgeon: Armando Tate MD;  Location: [facility] OR;  Service: General;  Laterality: Left;  left flank removal of mass    FINE NEEDLE ASPIRATION      thyroid    KNEE ARTHROSCOPY W/ MENISCECTOMY Right     NASAL SEPTUM SURGERY      TUBAL LIGATION      UPPER GASTROINTESTINAL ENDOSCOPY  07/05/2018    Dr. Gamble       Review of patient's allergies indicates:   Allergen Reactions    Biaxin [clarithromycin] Swelling    Prilosec [omeprazole magnesium] Swelling    Prevacid [lansoprazole] Swelling     Lip swelling- was taking this along with blood pressure medication: benazepril; not sure which caused it. Reports she has taken OTC prilosec without any problems.    Ace inhibitors Swelling     Facial swelling    Benazepril Swelling     Lip swelling       No current facility-administered medications on file prior to encounter.      Current Outpatient Medications on File Prior to Encounter   Medication Sig    calcium carbonate (OS-CARLA) 500 mg calcium (1,250 mg) chewable tablet Take 1 tablet by mouth daily as needed for Heartburn.    cetirizine (ZYRTEC) 10 MG tablet Take 1 tablet (10 mg total) by mouth once daily.    diltiaZEM (CARDIZEM CD) 120 MG Cp24 Take 1 capsule (120 mg total) by mouth once daily.    docusate sodium (COLACE) 100 MG capsule Take 100 mg by mouth every other day.     fluticasone propionate (FLONASE) 50 mcg/actuation nasal  spray Use 2 sprays to each nostril daily (Patient taking differently: 2 sprays by Each Nostril route once daily. Use 2 sprays to each nostril daily)    montelukast (SINGULAIR) 10 mg tablet Take 1 tablet (10 mg total) by mouth nightly.    multivitamin (THERAGRAN) per tablet Take 1 tablet by mouth once daily. Every day    potassium chloride SA (K-DUR,KLOR-CON) 20 MEQ tablet Take 1 tablet (20 mEq total) by mouth 2 (two) times daily. Appointment needed for further refills after this one    triamterene-hydrochlorothiazide 37.5-25 mg (MAXZIDE-25) 37.5-25 mg per tablet Take 1 tablet by mouth once daily.    famotidine (PEPCID) 10 MG tablet Take 10 mg by mouth once daily.    guaifenesin-codeine 100-10 mg/5 ml (TUSSI-ORGANIDIN NR)  mg/5 mL syrup Take 5-10 ml po every 6 hours as needed for cough    hyoscyamine (LEVSIN/SL) 0.125 mg Subl Take 2 tablets (0.25 mg total) by mouth every 4 (four) hours as needed. (Patient not taking: Reported on 12/7/2019)    nabumetone (RELAFEN) 500 MG tablet Take 1 tablet (500 mg total) by mouth 2 (two) times daily.    ranitidine (ZANTAC) 300 MG tablet Take 1 tablet (300 mg total) by mouth every evening.     Family History     Problem Relation (Age of Onset)    Blindness Father    Breast cancer Maternal Grandmother, Maternal Aunt, Cousin    Diabetes Mother    Kidney failure Mother    Ovarian cancer Cousin        Tobacco Use    Smoking status: Never Smoker    Smokeless tobacco: Never Used   Substance and Sexual Activity    Alcohol use: No    Drug use: No    Sexual activity: Yes     Partners: Male     Review of Systems   Constitutional: Positive for activity change. Negative for chills and fever.   HENT: Negative for trouble swallowing.    Eyes: Negative for visual disturbance.   Respiratory: Negative for apnea, cough, choking, chest tightness, shortness of breath, wheezing and stridor.    Cardiovascular: Negative for chest pain, palpitations and leg swelling.   Gastrointestinal:  Positive for abdominal pain. Negative for constipation, diarrhea, nausea and vomiting.   Genitourinary: Negative for decreased urine volume, difficulty urinating, dysuria and urgency.   Musculoskeletal: Negative for arthralgias, back pain, gait problem, joint swelling, myalgias, neck pain and neck stiffness.   Skin: Negative for color change.   Neurological: Negative for dizziness, tremors, seizures, syncope, facial asymmetry, speech difficulty, weakness, light-headedness, numbness and headaches.   Hematological: Negative.    Psychiatric/Behavioral: Negative for agitation, behavioral problems and confusion.     Objective:     Vital Signs (Most Recent):  Temp: 98.5 °F (36.9 °C) (02/28/20 1950)  Pulse: 96 (02/28/20 2019)  Resp: 18 (02/28/20 2019)  BP: (!) 144/78 (02/28/20 1950)  SpO2: 95 % (02/28/20 2019) Vital Signs (24h Range):  Temp:  [97.8 °F (36.6 °C)-98.5 °F (36.9 °C)] 98.5 °F (36.9 °C)  Pulse:  [] 96  Resp:  [13-39] 18  SpO2:  [91 %-100 %] 95 %  BP: (141-170)/(72-96) 144/78     Weight: 118.1 kg (260 lb 5.8 oz)  Body mass index is 46.12 kg/m².    Physical Exam   Constitutional: She is oriented to person, place, and time. She appears well-developed and well-nourished. She is cooperative. She is easily aroused. No distress.   HENT:   Head: Normocephalic and atraumatic.   Eyes: EOM are normal.   Neck: Normal range of motion. Neck supple.   Cardiovascular: Normal rate, regular rhythm and intact distal pulses.   No murmur heard.  Pulmonary/Chest: Effort normal and breath sounds normal. No stridor. No respiratory distress. She has no wheezes. She has no rales. She exhibits no tenderness.   Abdominal: Soft. There is tenderness.   S/p robotic nissen fundoplication  5 lap sites noted   Genitourinary:   Genitourinary Comments: Deferred   Musculoskeletal: Normal range of motion. She exhibits no edema, tenderness or deformity.   Neurological: She is alert, oriented to person, place, and time and easily aroused. No  sensory deficit.   Skin: Skin is warm and dry. Capillary refill takes less than 2 seconds.   Psychiatric: She has a normal mood and affect. Her behavior is normal. Thought content normal.   Nursing note and vitals reviewed.      Significant Labs: All pertinent labs within the past 24 hours have been reviewed.    Significant Imaging: I have reviewed all pertinent imaging results/findings within the past 24 hours.    Assessment/Plan:     * Hiatal hernia with GERD without esophagitis  - General surgery primary  - S/p robotic nissen fundoplication today, 02/28  - Antiemetics/Analgesics PRN  - Encourage incentive spirometry use  - Management per primary team      Hypertensive disorder  - Pt takes triameterene-hctz and diltiazem  - Continue diltiazem and hold diuretic for now post op   - Clonidine PRN      Obesity, Class III, BMI 40-49.9 (morbid obesity)  - BMI 46.12  - Pt counseled on lifestyle modifications including diet and exercise      Anemia  - Hgb/hct 2 weeks ago stable at 11.2/38.2  - Per op note -- EBL was 20 mL  - Obtain CBC in AM to assess post op        Sleep apnea  - Pt reports being noncompliant with CPAP at home  - Nightly CPAP while hospitalized -- titrate to patient's comfort and O2 saturation      VTE Risk Mitigation (From admission, onward)         Ordered     Place sequential compression device  Until discontinued      02/28/20 1066                    Thank you for your consult. I will follow-up with patient. Please contact us if you have any additional questions.    BETO Bradley  Department of Hospital Medicine   [facility]

## 2020-02-29 NOTE — DISCHARGE SUMMARY
[facility]   General Surgery  Discharge Summary      Patient Name: Akiko Jameson  MRN: [id number]  Admission Date: 2/28/2020  Hospital Length of Stay: 0 days  Discharge Date and Time:  02/29/2020 2:30 PM  Attending Physician: Jina Zhou MD   Discharging Provider: Jina Zhou MD  Primary Care Provider: Dawyne Booth MD    HPI:   45-year-old female referred by Gastroenterology for evaluation of symptomatic reflux.  The patient has a longstanding history of reflux and is currently on 600 mg of Zantac daily with little relief due to PPI allergy.  She reports water brash, substernal burning, and continued pain with diet alteration.  Prior endoscopy in 2018 showed evidence of reflux without esophagitis.  Recent Bravo capsule study with DeMeester score of 88.  Normal manometry.  Due to her intolerance to medications and continued persistent symptoms, she was referred to surgery for evaluation.     She has a prior history of laparoscopic cholecystectomy.  Otherwise, no other abdominal operations.  Denies family history of GI malignancies.    Admitted for robotic assisted nissen fundoplication.    Procedure(s) (LRB):  XI ROBOTIC FUNDOPLICATION, NISSEN (N/A)      Indwelling Lines/Drains at time of discharge:   Lines/Drains/Airways     None               Hospital Course: 02/29/2020:  Postop day 1.  Remains afebrile with normal heart rate and stable vital signs. Tolerating clear liquid diet without nausea or vomiting.  Abdominal exam with appropriate incisional tenderness. Labs reviewed.  This time she is deemed stable and acceptable for discharge.  The family and the patient had been visited by nutritional services and have appropriate reference this for post Nissen diet.    Consults:   Consults (From admission, onward)        Status Ordering Provider     Consult to Case Management/Social Work  Once     Provider:  (Not yet assigned)    Completed JINA ZHOU     Inpatient consult  to Internal Medicine  Once     Provider:  (Not yet assigned)    JINA Reyes     Inpatient consult to Registered Dietitian/Nutritionist  Once     Provider:  (Not yet assigned)    JINA Reyes          Significant Diagnostic Studies: Labs:   BMP:   Recent Labs   Lab 02/29/20  0503   *   *   K 3.7      CO2 23   BUN 10   CREATININE 0.9   CALCIUM 9.0    and CMP   Recent Labs   Lab 02/29/20  0503   *   K 3.7      CO2 23   *   BUN 10   CREATININE 0.9   CALCIUM 9.0   ANIONGAP 9   ESTGFRAFRICA >60   EGFRNONAA >60       Pending Diagnostic Studies:     None        Final Active Diagnoses:    Diagnosis Date Noted POA    PRINCIPAL PROBLEM:  Hiatal hernia with GERD without esophagitis [K44.9, K21.9] 07/30/2019 Yes    Hypertensive disorder [I10] 07/30/2019 Yes    Multinodular goiter (nontoxic) [E04.2] 09/24/2018 Yes    Obesity, Class III, BMI 40-49.9 (morbid obesity) [E66.01] 05/17/2013 Yes    Sleep apnea [G47.30]  Yes    Anemia [D64.9]  Yes      Problems Resolved During this Admission:      Discharged Condition: stable    Disposition: Home or Self Care    Follow Up:  Follow-up Information     Jina Kaufman MD In 2 weeks.    Specialty:  General Surgery  Why:  For wound re-check  Contact information:  [address]  [place] [id number] [phone]                 Patient Instructions:      Diet clear liquid     Lifting restrictions     Notify your health care provider if you experience any of the following:  temperature >100.4     Notify your health care provider if you experience any of the following:  persistent nausea and vomiting or diarrhea     Notify your health care provider if you experience any of the following:  severe uncontrolled pain     Notify your health care provider if you experience any of the following:  redness, tenderness, or signs of infection (pain, swelling, redness, odor or green/yellow discharge around incision  site)     Notify your health care provider if you experience any of the following:  difficulty breathing or increased cough     No dressing needed     Activity as tolerated     Medications:  Reconciled Home Medications:      Medication List      START taking these medications    hydrocodone-apap 7.5-325 MG/15 ML oral solution  Commonly known as:  HYCET  Take 15 mLs by mouth every 6 (six) hours as needed for Pain.     ondansetron 8 MG Tbdl  Commonly known as:  ZOFRAN-ODT  Take 1 tablet (8 mg total) by mouth every 8 (eight) hours as needed (Nausea).     senna-docusate 8.6-50 mg 8.6-50 mg per tablet  Commonly known as:  Senna with Docusate Sodium  Take 1 tablet by mouth once daily. for 5 days        CHANGE how you take these medications    fluticasone propionate 50 mcg/actuation nasal spray  Commonly known as:  FLONASE  Use 2 sprays to each nostril daily  What changed:    · how much to take  · how to take this  · when to take this        CONTINUE taking these medications    calcium carbonate 500 mg calcium (1,250 mg) chewable tablet  Commonly known as:  OS-CARLA  Take 1 tablet by mouth daily as needed for Heartburn.     cetirizine 10 MG tablet  Commonly known as:  ZYRTEC  Take 1 tablet (10 mg total) by mouth once daily.     diltiaZEM 120 MG Cp24  Commonly known as:  CARDIZEM CD  Take 1 capsule (120 mg total) by mouth once daily.     docusate sodium 100 MG capsule  Commonly known as:  COLACE  Take 100 mg by mouth every other day.     famotidine 10 MG tablet  Commonly known as:  PEPCID  Take 10 mg by mouth once daily.     hyoscyamine 0.125 mg Subl  Commonly known as:  LEVSIN/SL  Take 2 tablets (0.25 mg total) by mouth every 4 (four) hours as needed.     montelukast 10 mg tablet  Commonly known as:  SINGULAIR  Take 1 tablet (10 mg total) by mouth nightly.     multivitamin per tablet  Commonly known as:  THERAGRAN  Take 1 tablet by mouth once daily. Every day     nabumetone 500 MG tablet  Commonly known as:  RELAFEN  Take 1  tablet (500 mg total) by mouth 2 (two) times daily.     potassium chloride SA 20 MEQ tablet  Commonly known as:  K-DUR,KLOR-CON  Take 1 tablet (20 mEq total) by mouth 2 (two) times daily. Appointment needed for further refills after this one     ranitidine 300 MG tablet  Commonly known as:  Zantac  Take 1 tablet (300 mg total) by mouth every evening.     triamterene-hydrochlorothiazide 37.5-25 mg 37.5-25 mg per tablet  Commonly known as:  MAXZIDE-25  Take 1 tablet by mouth once daily.        STOP taking these medications    guaifenesin-codeine 100-10 mg/5 ml  mg/5 mL syrup  Commonly known as:  TUSSI-GRETA NR          Time spent on the discharge of patient: 25 minutes    Jina Kaufman MD  General Surgery  [facility]

## 2020-02-29 NOTE — ASSESSMENT & PLAN NOTE
-stable  - Pt takes triameterene-hctz and diltiazem  - Continue diltiazem and hold diuretic for now post op   - Clonidine PRN

## 2020-02-29 NOTE — SUBJECTIVE & OBJECTIVE
Past Medical History:   Diagnosis Date    ALLERGIC RHINITIS     Cellulitis     Eosinophilia     mild    GERD (gastroesophageal reflux disease)     Granular cell tumor     H. pylori infection 2018    Hypertension     Lymphedema     Mild anemia     EKIRY on CPAP     does not regularly    Sleep apnea     compliant with CPAP    Thyroid nodule     Urinary incontinence, mixed        Past Surgical History:   Procedure Laterality Date    24 HOUR IMPEDANCE PH MONITORING OF ESOPHAGUS IN PATIENT NOT TAKING ACID REDUCING MEDICATIONS N/A 1/6/2020    Procedure: IMPEDANCE PH STUDY, ESOPHAGEAL, 24 HOUR, IN PATIENT NOT TAKING ACID REDUCING MEDICATION;  Surgeon: First Available Tamika Panchal;  Location: [facility] END[facility];  Service: Endoscopy;  Laterality: N/A;    AXILLARY NODE DISSECTION      granular cell tumor    BREAST CYST ASPIRATION      left    CHOLECYSTECTOMY      COLONOSCOPY N/A 5/10/2019    Procedure: COLONOSCOPY;  Surgeon: Edgar Gamble Jr., MD;  Location: [facility];  Service: Endoscopy;  Laterality: N/A;    ENDOMETRIAL ABLATION      ESOPHAGEAL MANOMETRY WITH MEASUREMENT OF IMPEDANCE N/A 1/6/2020    Procedure: MANOMETRY, ESOPHAGUS, WITH IMPEDANCE MEASUREMENT;  Surgeon: First Available Tamika Panchal;  Location: [facility] ENDO;  Service: Endoscopy;  Laterality: N/A;    ESOPHAGOGASTRODUODENOSCOPY N/A 7/5/2018    Procedure: EGD (ESOPHAGOGASTRODUODENOSCOPY);  Surgeon: Edgar Gamble Jr., MD;  Location: [facility] ENDO;  Service: Endoscopy;  Laterality: N/A;    EXCISION OF MASS OF ABDOMEN Left 6/18/2018    Procedure: EXCISION, MASS, ABDOMEN - flank left;  Surgeon: Armando Tate MD;  Location: [facility] OR;  Service: General;  Laterality: Left;  left flank removal of mass    FINE NEEDLE ASPIRATION      thyroid    KNEE ARTHROSCOPY W/ MENISCECTOMY Right     NASAL SEPTUM SURGERY      TUBAL LIGATION      UPPER GASTROINTESTINAL ENDOSCOPY  07/05/2018    Dr. Gamble       Review of patient's allergies indicates:   Allergen  Reactions    Biaxin [clarithromycin] Swelling    Prilosec [omeprazole magnesium] Swelling    Prevacid [lansoprazole] Swelling     Lip swelling- was taking this along with blood pressure medication: benazepril; not sure which caused it. Reports she has taken OTC prilosec without any problems.    Ace inhibitors Swelling     Facial swelling    Benazepril Swelling     Lip swelling       No current facility-administered medications on file prior to encounter.      Current Outpatient Medications on File Prior to Encounter   Medication Sig    calcium carbonate (OS-CARLA) 500 mg calcium (1,250 mg) chewable tablet Take 1 tablet by mouth daily as needed for Heartburn.    cetirizine (ZYRTEC) 10 MG tablet Take 1 tablet (10 mg total) by mouth once daily.    diltiaZEM (CARDIZEM CD) 120 MG Cp24 Take 1 capsule (120 mg total) by mouth once daily.    docusate sodium (COLACE) 100 MG capsule Take 100 mg by mouth every other day.     fluticasone propionate (FLONASE) 50 mcg/actuation nasal spray Use 2 sprays to each nostril daily (Patient taking differently: 2 sprays by Each Nostril route once daily. Use 2 sprays to each nostril daily)    montelukast (SINGULAIR) 10 mg tablet Take 1 tablet (10 mg total) by mouth nightly.    multivitamin (THERAGRAN) per tablet Take 1 tablet by mouth once daily. Every day    potassium chloride SA (K-DUR,KLOR-CON) 20 MEQ tablet Take 1 tablet (20 mEq total) by mouth 2 (two) times daily. Appointment needed for further refills after this one    triamterene-hydrochlorothiazide 37.5-25 mg (MAXZIDE-25) 37.5-25 mg per tablet Take 1 tablet by mouth once daily.    famotidine (PEPCID) 10 MG tablet Take 10 mg by mouth once daily.    guaifenesin-codeine 100-10 mg/5 ml (TUSSI-ORGANIDIN NR)  mg/5 mL syrup Take 5-10 ml po every 6 hours as needed for cough    hyoscyamine (LEVSIN/SL) 0.125 mg Subl Take 2 tablets (0.25 mg total) by mouth every 4 (four) hours as needed. (Patient not taking: Reported on  12/7/2019)    nabumetone (RELAFEN) 500 MG tablet Take 1 tablet (500 mg total) by mouth 2 (two) times daily.    ranitidine (ZANTAC) 300 MG tablet Take 1 tablet (300 mg total) by mouth every evening.     Family History     Problem Relation (Age of Onset)    Blindness Father    Breast cancer Maternal Grandmother, Maternal Aunt, Cousin    Diabetes Mother    Kidney failure Mother    Ovarian cancer Cousin        Tobacco Use    Smoking status: Never Smoker    Smokeless tobacco: Never Used   Substance and Sexual Activity    Alcohol use: No    Drug use: No    Sexual activity: Yes     Partners: Male     Review of Systems   Constitutional: Positive for activity change. Negative for chills and fever.   HENT: Negative for trouble swallowing.    Eyes: Negative for visual disturbance.   Respiratory: Negative for apnea, cough, choking, chest tightness, shortness of breath, wheezing and stridor.    Cardiovascular: Negative for chest pain, palpitations and leg swelling.   Gastrointestinal: Positive for abdominal pain. Negative for constipation, diarrhea, nausea and vomiting.   Genitourinary: Negative for decreased urine volume, difficulty urinating, dysuria and urgency.   Musculoskeletal: Negative for arthralgias, back pain, gait problem, joint swelling, myalgias, neck pain and neck stiffness.   Skin: Negative for color change.   Neurological: Negative for dizziness, tremors, seizures, syncope, facial asymmetry, speech difficulty, weakness, light-headedness, numbness and headaches.   Hematological: Negative.    Psychiatric/Behavioral: Negative for agitation, behavioral problems and confusion.     Objective:     Vital Signs (Most Recent):  Temp: 98.5 °F (36.9 °C) (02/28/20 1950)  Pulse: 96 (02/28/20 2019)  Resp: 18 (02/28/20 2019)  BP: (!) 144/78 (02/28/20 1950)  SpO2: 95 % (02/28/20 2019) Vital Signs (24h Range):  Temp:  [97.8 °F (36.6 °C)-98.5 °F (36.9 °C)] 98.5 °F (36.9 °C)  Pulse:  [] 96  Resp:  [13-39] 18  SpO2:  [91  %-100 %] 95 %  BP: (141-170)/(72-96) 144/78     Weight: 118.1 kg (260 lb 5.8 oz)  Body mass index is 46.12 kg/m².    Physical Exam   Constitutional: She is oriented to person, place, and time. She appears well-developed and well-nourished. She is cooperative. She is easily aroused. No distress.   HENT:   Head: Normocephalic and atraumatic.   Eyes: EOM are normal.   Neck: Normal range of motion. Neck supple.   Cardiovascular: Normal rate, regular rhythm and intact distal pulses.   No murmur heard.  Pulmonary/Chest: Effort normal and breath sounds normal. No stridor. No respiratory distress. She has no wheezes. She has no rales. She exhibits no tenderness.   Abdominal: Soft. There is tenderness.   S/p robotic nissen fundoplication  5 lap sites noted   Genitourinary:   Genitourinary Comments: Deferred   Musculoskeletal: Normal range of motion. She exhibits no edema, tenderness or deformity.   Neurological: She is alert, oriented to person, place, and time and easily aroused. No sensory deficit.   Skin: Skin is warm and dry. Capillary refill takes less than 2 seconds.   Psychiatric: She has a normal mood and affect. Her behavior is normal. Thought content normal.   Nursing note and vitals reviewed.      Significant Labs: All pertinent labs within the past 24 hours have been reviewed.    Significant Imaging: I have reviewed all pertinent imaging results/findings within the past 24 hours.

## 2020-02-29 NOTE — DISCHARGE INSTRUCTIONS
POSTOPERATIVE GUIDELINES AFTER FUNDOPLICATION        You just had one of the following fundoplication surgeries for your severe reflux disease and hiatal hernia.   Laparoscopic Nissen fundoplication   Laparoscopic Toupet fundoplication   Laparoscopic Aliza fundoplication   Transoral Incisionless fundoplication (TIF)   Open fundoplication     After surgery, you likely will stay in the hospital for two to seven days, depending on which surgery approach you had.  Use the following information to help in your recovery from surgery. Generally, you can expect a recovery period of two to six weeks after surgery. Smoking slows recovery time and weakens the surgical repair. If you smoke, ask your health care provider for smoking cessation information.    Activity guidelines    At times, you may feel tired and weak. However, increasing your activity gradually is important to regain your strength. As early as possible, walk and increase your level of daily activity. Walking is strongly encouraged after surgery. Start with short distances and increase as your tolerance allows. Stairs are okay. Sexual activity may resume after one week.  Depending on which surgery approach you had, you may be instructed to wait one to two weeks after hospital dismissal before driving. However, do not drive or operate motorized equipment if you are taking narcotic pain medication.    No lifting over 20 pounds the first two weeks. Weeks 3 thru 6, you may lift items up to 25 pounds. After 7 weeks, there are no activity restrictions. Do not return to physical exercise until at least 4 weeks after surgery. Examples of these strenuous activities include; vacuuming, shoveling snow, and swinging a tennis racket or golf club. Lifting heavy objects and participating in strenuous activities before your tissues heal may damage the fundoplication.    Discomfort    You may have pain near your incisions and some shoulder discomfort during recovery. This  is very normal and expected. You may have numbness or a tingling sensation near the incision or under the breast. Sometimes you will experience shoulder pain, neck pain, or deep pain in the chest. This is due in part to the gas used at laparoscopy, but more so to the sutures placed in the diaphragm muscle. These should diminish gradually during recovery. Moist heat to the shoulder tends to work better than narcotics for this pain. However if the pains are increasing and you are concerned about heart problems, call 911. It is also common to have a raw and sore throat following TIF. This will improve with time.    Medications    You will have been given a prescription for a narcotic pain reliever such as Oxycodone/Acetaminophen (Percocet). Use these as directed, not to exceed 10 per day. As soon as you are able to, please switch to Acetaminophen (Tylenol). As directed on the package, up to 3000 mg/day. If you have problems with pills sticking, avoid aspirin, ibuprofen (Motrin, Advil), Naproxen (Aleve), and other non-steroidal anti-inflammatories, as they can irritate the esophagus if they get stuck.   You may resume other medications you were on prior to surgery. If your pills are sticking, consult with your pharmacist or primary care provider about breaking or crushing your pills. You have a prescription for an antinausea medication, probably procholorperazine (Compazine). Use this as directed if you are nauseated, in order to avoid retching.  Special note on your heartburn medications:    You may discontinue any heartburn medications if you had laparoscopic fundoplication surgery.   You should continue heartburn medications for two weeks after TIF (Transoral Incisionless Fundoplication) surgery, you may discontinue then.    Wound care    Your incisions likely will take several weeks to months to heal fully.   Gently wash your incisions with soap and water and then rinse, either in the shower or with washcloth. Pat  the area dry with a clean towel. Then keep your incisions clean and dry.   There are no sutures to remove. Sutures are buried inside and will dissolve over time.    When to contact your surgeon    Notify my office if you have:   A temperature of 100.4 degrees Fahrenheit (38 degree Celsius) or grater   Increased tenderness, redness, drainage or foul smelling odor from the incisions   Difficulty swallowing fluids   Food that remains stuck in your esophagus   Unusual chest pain or leg pain   Vomiting not resolved with Compazine  Please call my office at [phone] during regular office hours or call the hospital and ask for a surgeon on call.     Diet    After surgery, swallowing may be more difficult. Food may seem to go down slowly or stick in your esophagus. The cause may be temporary swelling at the surgical site (the junction of the stomach and the esophagus), and should improve as you recover.    You may begin taking liquids the day after surgery. Stay on a liquid diet up to a yogurt consistency approximately 10 days after surgery. If broth and juice goes down okay, you may advance your diet to a yogurt consistency. You should not need to chew your food.  After tolerating liquid diet for ten days, you may need to continue the soft-food diet for two to three weeks.    While on a soft-food diet:   Eat foods that are moist, easy to chew, and break down in your mouth, so you can swallow them comfortably.   Avoid coarse foods such as raw vegetables, dry rolls and tough meats.    You may feel full after eating only a few bites o food or feel bloated after eating. If so, try these suggestions:   Sit upright for 1 hour after eating and drink. Do not eat within 2 hours of bedtime.   Take small bites of food and chew well.   Eat slowly.   Stop eating when you start feeling full.   Eat small, frequent meals, if necessary.   Take a walk after eating.    To avoid bloating or gas, do not:   Use  straws.   Drink carbonated beverages for two to four weeks after surgery.   Slurp your drinks.   Chew gum.   Avoid spicy foods and gas/acid forming foods such as tomato based products, peppermint, pepper, caffeine, alcohol, onions, green pepper, fatty foods, beans, citrus, and fiber supplements.   If you become constipated, it is okay to use a laxative, such as milk of magnesia.    If food sticks    It is not uncommon for patients to experience food sticking -sometimes the only thing you feel is severe pain on swallowing -for 4-8 weeks after surgery. When this happens the best things to do are to stand up, to walk around slowly, and to try sipping some lukewarm water. Generally these pains will pass within 10-15 minutes; if they persist longer you should call surgeon on call.      Follow up appointment    Review the appointment instructions and follow-up recommendations on your discharge summary. About 14 days to 21 days after your surgery, return for a follow-up appointment. It is extremely important that you come in for this visit.    Return to work    For Transoral Incisionless Fundoplication (TIF) patients, most patients can return to work in 3-7 days depending on the physical activity required for your job.  For all other laparoscopic antireflux surgery patients, 2 to 3 weeks of time-off is necessary.    Dietary guideline supplement    Eat small, frequent meals. Chew to baby food consistency before swallowing.  The followings are generally well-tolerated food group:   Beverages: all except carbonated drinks   Meats/alternatives:  o Tender or minced, moist meats  o Fish (no bones)  o Poultry with gravy or sauces  o Moist casseroles, stews  o Soft -cooked eggs  o Cottage cheese  o Cheese sauce  o Smooth peanut butter  o Legumes, lentils   Breads/cereals  o Hot cereals  o Well-soaked, cold cereals  o Pasta, rice  o Soda or diamond crackers (chew well and / or moisten)   Fruits  o Canned, cooked  fruit  o Ripe, fresh fruit with skins and / or seeds removed  o Juice, nectars   Vegetables  o [id number], canned vegetables  o Vegetables in soups, sauces, stew, etc.   Desserts / snacks  o Puddings  o Yogurt  o Ice cream  o Cookies easily chewed.   Fats, oils: All    :   All hard, coarse, rough, doughy, tough, dry, stringy or crunchy food as well as all seeds / nuts is not tolerated well. Avoid them!

## 2020-02-29 NOTE — PLAN OF CARE
02/29/20 1444   Final Note   Assessment Type Final Discharge Note   Anticipated Discharge Disposition Home   Right Care Referral Info   Post Acute Recommendation No Care

## 2020-02-29 NOTE — ASSESSMENT & PLAN NOTE
- Pt reports being noncompliant with CPAP at home  - Nightly CPAP while hospitalized -- titrate to patient's comfort and O2 saturation

## 2020-02-29 NOTE — PLAN OF CARE
Pt remains free from falls/injuries this shift. Safety precautions maintained. Pain managed with oral meds. IV fluids maintained. Pt to be discharged in stable condition. No s/s of acute distress noted. Will continue to monitor. Chart check completed.

## 2020-02-29 NOTE — NURSING
PT being discharged home. IV removed, catheter intact, pt tolerated well. Discharge instructions given, pt verbalized understanding. Pt left unit in wheelchair in stable condition.

## 2020-02-29 NOTE — HPI
45-year-old female referred by Gastroenterology for evaluation of symptomatic reflux.  The patient has a longstanding history of reflux and is currently on 600 mg of Zantac daily with little relief due to PPI allergy.  She reports water brash, substernal burning, and continued pain with diet alteration.  Prior endoscopy in 2018 showed evidence of reflux without esophagitis.  Recent Bravo capsule study with DeMeester score of 88.  Normal manometry.  Due to her intolerance to medications and continued persistent symptoms, she was referred to surgery for evaluation.     She has a prior history of laparoscopic cholecystectomy.  Otherwise, no other abdominal operations.  Denies family history of GI malignancies.    Admitted for robotic assisted nissen fundoplication.

## 2020-02-29 NOTE — ASSESSMENT & PLAN NOTE
- Pt takes triameterene-hctz and diltiazem  - Continue diltiazem and hold diuretic for now post op   - Clonidine PRN

## 2020-02-29 NOTE — ASSESSMENT & PLAN NOTE
- General surgery primary  - S/p robotic nissen fundoplication today, 02/28  - Antiemetics/Analgesics PRN  - Encourage incentive spirometry use  - Management per primary team

## 2020-02-29 NOTE — ASSESSMENT & PLAN NOTE
Postop day 1 status post robotic assisted Nissen fundoplication    Post Nissen diet as instructed  Follow-up in 2 weeks

## 2020-02-29 NOTE — PT/OT/SLP EVAL
Physical Therapy Evaluation and Discharge Note    Patient Name:  Akiko Jameson   MRN:  [id number]    Recommendations:     Discharge Recommendations:  home   Discharge Equipment Recommendations: none   Barriers to discharge: None    Assessment:     Akiko Jameson is a 45 y.o. female admitted with a medical diagnosis of Hiatal hernia with GERD without esophagitis. .  At this time, patient is functioning at their prior level of function and does not require further acute PT services.     Recent Surgery: Procedure(s) (LRB):  XI ROBOTIC FUNDOPLICATION, NISSEN (N/A) 1 Day Post-Op    Plan:     During this hospitalization, patient does not require further acute PT services.  Please re-consult if situation changes.      Subjective     Chief Complaint: Abdominal pain s/p sx  Patient/Family Comments/goals: To go home  Pain/Comfort:  · Pain Rating 1: 6/10  · Location - Side 1: Left  · Location - Orientation 1: upper  · Location 1: abdomen  · Pain Addressed 1: Other (see comments), Reposition, Distraction, Cessation of Activity(Not yet time for pain meds; patient to call nurse at appropriate time)  · Pain Rating Post-Intervention 1: 6/10    Patients cultural, spiritual, Jainism conflicts given the current situation: no    Living Environment:  Patient lives home with her spouse and daughter.  There are 4 steps with no railing to enter their mobile home.  Prior to admission, patients level of function was indep with amb and ADLs.  Equipment used at home: none.  DME owned (not currently used): none.  Upon discharge, patient will have assistance from her family.    Objective:     Communicated with [facility] and nurse Hills prior to session.  Patient found seated at edge of bed eating clear diet with peripheral IV upon PT entry to room.    General Precautions: Standard,     Orthopedic Precautions:N/A   Braces: N/A     Exams:  · Cognitive Exam:  Patient is oriented to Person, Place, Time and Situation  · Gross Motor  Coordination:  WFL  · Postural Exam:  Patient presented with the following abnormalities:    · -       Rounded shoulders  · -       Forward head  · Sensation:    · -       Intact  · RUE ROM: WFL  · RUE Strength: WFL  · LUE ROM: WFL  · LUE Strength: WFL  · RLE ROM: WFL  · RLE Strength: WFL  · LLE ROM: WFL  · LLE Strength: WFL    Functional Mobility:  · Bed Mobility:     · Rolling Right: modified independence  · Scooting: modified independence  · Supine to Sit: modified independence  · Sit to Supine: modified independence  · Transfers:     · Sit to Stand:  modified independence with no AD  · Bed to Chair: modified independence with  no AD  using  Stand Pivot  · Gait: Patient ambulated 200 feet without AD with modified independence (slow keke).  No significant gait deviations nor losses of balance noted.  · Balance: Normal sitting balance; good standing balance    AM-PAC 6 CLICK MOBILITY  Total Score:24       Other:  Patient moving in room and ambulating in hallways with modified independence (slow keke due to pain).  Her daughter is also in the room if she needs assistance.  Nurse Hills okay with not using chair alarm as patient is safe with mobility at this time.    AM-PAC 6 CLICK MOBILITY  Total Score:24     Patient left up in chair with all lines intact, call button in reach, nurse Hills notified and daughter present.    GOALS:   Multidisciplinary Problems     Physical Therapy Goals     Not on file                History:     Past Medical History:   Diagnosis Date    ALLERGIC RHINITIS     Cellulitis     Eosinophilia     mild    GERD (gastroesophageal reflux disease)     Granular cell tumor     H. pylori infection 2018    Hypertension     Lymphedema     Mild anemia     KEIRY on CPAP     does not regularly    Sleep apnea     compliant with CPAP    Thyroid nodule     Urinary incontinence, mixed        Past Surgical History:   Procedure Laterality Date    24 HOUR IMPEDANCE PH MONITORING OF ESOPHAGUS  IN PATIENT NOT TAKING ACID REDUCING MEDICATIONS N/A 1/6/2020    Procedure: IMPEDANCE PH STUDY, ESOPHAGEAL, 24 HOUR, IN PATIENT NOT TAKING ACID REDUCING MEDICATION;  Surgeon: First Available Tamika Panchal;  Location: [facility] ENDO;  Service: Endoscopy;  Laterality: N/A;    AXILLARY NODE DISSECTION      granular cell tumor    BREAST CYST ASPIRATION      left    CHOLECYSTECTOMY      COLONOSCOPY N/A 5/10/2019    Procedure: COLONOSCOPY;  Surgeon: Edgar Gamble Jr., MD;  Location: [facility] ENDO;  Service: Endoscopy;  Laterality: N/A;    ENDOMETRIAL ABLATION      ESOPHAGEAL MANOMETRY WITH MEASUREMENT OF IMPEDANCE N/A 1/6/2020    Procedure: MANOMETRY, ESOPHAGUS, WITH IMPEDANCE MEASUREMENT;  Surgeon: First Available Tamika Panchal;  Location: [facility] ENDO;  Service: Endoscopy;  Laterality: N/A;    ESOPHAGOGASTRODUODENOSCOPY N/A 7/5/2018    Procedure: EGD (ESOPHAGOGASTRODUODENOSCOPY);  Surgeon: Edgar Gamble Jr., MD;  Location: [facility] ENDO;  Service: Endoscopy;  Laterality: N/A;    EXCISION OF MASS OF ABDOMEN Left 6/18/2018    Procedure: EXCISION, MASS, ABDOMEN - flank left;  Surgeon: Armando Tate MD;  Location: [facility] OR;  Service: General;  Laterality: Left;  left flank removal of mass    FINE NEEDLE ASPIRATION      thyroid    KNEE ARTHROSCOPY W/ MENISCECTOMY Right     NASAL SEPTUM SURGERY      TUBAL LIGATION      UPPER GASTROINTESTINAL ENDOSCOPY  07/05/2018    Dr. Gamble       Time Tracking:     PT Received On: 02/29/20  PT Start Time: 0825     PT Stop Time: 0839  PT Total Time (min): 14 min     Billable Minutes: Evaluation 14 min      Yahaira Daniel, PT, DPT  02/29/2020

## 2020-02-29 NOTE — HPI
Akiko Jameson is a 45 year old female with a PMHx of KEIRY on CPAP, HTN, GERD, and Anemia admitted by General surgery s/p robotic nissen fundoplication today, 02/28/20, r/t GERD without esophagitis, hiatal hernia performed by Dr. Kaufman. EBL per Op note 20 mL. HM consulted for medical management.

## 2020-02-29 NOTE — HOSPITAL COURSE
02/29/2020:  Postop day 1.  Remains afebrile with normal heart rate and stable vital signs. Tolerating clear liquid diet without nausea or vomiting.  Abdominal exam with appropriate incisional tenderness. Labs reviewed.  This time she is deemed stable and acceptable for discharge.  The family and the patient had been visited by nutritional services and have appropriate reference this for post Nissen diet.

## 2020-02-29 NOTE — HOSPITAL COURSE
Pt admitted to Outpatient extended recovery s/p ROBOTIC FUNDOPLICATION, NISSEN per General Surgery.  Pt tolerated procedure without any distress or complication reported.  Pt tolerating increased oral intake.  H/H remained stable.  Leukocytosis noted.  Mild rash to RUE reported and evaluated by MD prior to discharge.  Lap sites intact with edges approximated.  Pt instructed to apply antibiotic ointment and follow up with PCP if needed.  Vital signs stable.  Pt seen and examined today and deemed suitable for discharge per primary team.

## 2020-02-29 NOTE — ASSESSMENT & PLAN NOTE
- Hgb/hct 2 weeks ago stable at 11.2/38.2  - Per op note -- EBL was 20 mL  - 02/29/2020- H/H stable with mild decline post procedure

## 2020-02-29 NOTE — ASSESSMENT & PLAN NOTE
- General surgery primary  - S/p robotic nissen fundoplication today, 02/28  - Antiemetics/Analgesics PRN  - Encourage incentive spirometry use  - IV hydration   - Management per primary team

## 2020-02-29 NOTE — SUBJECTIVE & OBJECTIVE
Interval History: pt stable post procedure.  Lap sites intact.  H/H stable.  Leukocytosis noted post procedure.  VSS.  Pt tolerating increased intake with no signs of acute distress.  Pt deemed suitable for discharge to home per primary team.      Review of Systems   Constitutional: Positive for activity change. Negative for chills and fever.   HENT: Negative for trouble swallowing.    Eyes: Negative for visual disturbance.   Respiratory: Negative for apnea, cough, choking, chest tightness, shortness of breath, wheezing and stridor.    Cardiovascular: Negative for chest pain, palpitations and leg swelling.   Gastrointestinal: Positive for abdominal pain. Negative for constipation, diarrhea, nausea and vomiting.   Genitourinary: Negative for decreased urine volume, difficulty urinating, dysuria and urgency.   Musculoskeletal: Negative for arthralgias, back pain, gait problem, joint swelling, myalgias, neck pain and neck stiffness.   Skin: Negative for color change.   Neurological: Negative for dizziness, tremors, seizures, syncope, facial asymmetry, speech difficulty, weakness, light-headedness, numbness and headaches.   Hematological: Negative.    Psychiatric/Behavioral: Negative for agitation, behavioral problems and confusion.     Objective:     Vital Signs (Most Recent):  Temp: 98.4 °F (36.9 °C) (02/29/20 0737)  Pulse: 68 (02/29/20 0737)  Resp: 18 (02/29/20 0737)  BP: (!) 141/71 (02/29/20 0737)  SpO2: (!) 94 % (02/29/20 0750) Vital Signs (24h Range):  Temp:  [97.8 °F (36.6 °C)-98.5 °F (36.9 °C)] 98.4 °F (36.9 °C)  Pulse:  [] 68  Resp:  [16-19] 18  SpO2:  [92 %-96 %] 94 %  BP: (135-144)/(64-80) 141/71     Weight: 118.1 kg (260 lb 5.8 oz)  Body mass index is 46.12 kg/m².    Intake/Output Summary (Last 24 hours) at 2/29/2020 1506  Last data filed at 2/29/2020 0800  Gross per 24 hour   Intake 2148.33 ml   Output 1600 ml   Net 548.33 ml      Physical Exam   Constitutional: She is oriented to person, place, and  time. She appears well-developed and well-nourished. She is cooperative. She is easily aroused. No distress.   HENT:   Head: Normocephalic and atraumatic.   Eyes: EOM are normal.   Neck: Normal range of motion. Neck supple.   Cardiovascular: Normal rate, regular rhythm and intact distal pulses.   No murmur heard.  Pulmonary/Chest: Effort normal and breath sounds normal. No stridor. No respiratory distress. She has no wheezes. She has no rales. She exhibits no tenderness.   Abdominal: Soft. Bowel sounds are normal. There is tenderness.   S/p robotic nissen fundoplication  5 lap sites noted   Genitourinary:   Genitourinary Comments: Deferred   Musculoskeletal: Normal range of motion. She exhibits no edema, tenderness or deformity.   Neurological: She is alert, oriented to person, place, and time and easily aroused. No sensory deficit.   Skin: Skin is warm and dry. Capillary refill takes less than 2 seconds.   Psychiatric: She has a normal mood and affect. Her behavior is normal. Thought content normal.   Nursing note and vitals reviewed.      Significant Labs:   BMP:   Recent Labs   Lab 02/29/20  0503   *   *   K 3.7      CO2 23   BUN 10   CREATININE 0.9   CALCIUM 9.0     CBC:   Recent Labs   Lab 02/29/20  0503   WBC 14.60*   HGB 10.7*   HCT 34.7*          Significant Imaging:

## 2020-02-29 NOTE — PLAN OF CARE
Chart reviewed, pt resting in bed with call light and personal belongings within reach. Vitals stable. Clear liquid diet. 6 surgical lap sites intact with dermabond, no drainage noted. Pain controlled with ordered meds in MAR. LR infusing at 100ml/hr. Pt using CPAP machine for sleep apnea. Pt absent of injury throughout shift, will continue to monitor.

## 2020-02-29 NOTE — PROGRESS NOTES
[facility] Hospital Medicine  Progress Note    Patient Name: Akiko Jameson  MRN: [id number]  Patient Class: OP- Outpatient Recovery   Admission Date: 2/28/2020  Length of Stay: 0 days  Attending Physician: Jina Kaufman MD  Primary Care Provider: Dwayne Booth MD        Subjective:     Principal Problem:Hiatal hernia with GERD without esophagitis        HPI:  Akiko Jameson is a 45 year old female with a PMHx of KERIY on CPAP, HTN, GERD, and Anemia admitted by General surgery s/p robotic nissen fundoplication today, 02/28/20, r/t GERD without esophagitis, hiatal hernia performed by Dr. Kaufman. EBL per Op note 20 mL. HM consulted for medical management.     Overview/Hospital Course:  Pt admitted to Outpatient extended recovery s/p ROBOTIC FUNDOPLICATION, NISSEN per General Surgery.  Pt tolerated procedure without any distress or complication reported.  Pt tolerating increased oral intake.  H/H remained stable.  Leukocytosis noted.  Mild rash to RUE reported and evaluated by MD prior to discharge.  Lap sites intact with edges approximated.  Pt instructed to apply antibiotic ointment and follow up with PCP if needed.  Vital signs stable.  Pt seen and examined today and deemed suitable for discharge per primary team.      Interval History: pt stable post procedure.  Lap sites intact.  H/H stable.  Leukocytosis noted post procedure.  VSS.  Pt tolerating increased intake with no signs of acute distress.  Pt deemed suitable for discharge to home per primary team.      Review of Systems   Constitutional: Positive for activity change. Negative for chills and fever.   HENT: Negative for trouble swallowing.    Eyes: Negative for visual disturbance.   Respiratory: Negative for apnea, cough, choking, chest tightness, shortness of breath, wheezing and stridor.    Cardiovascular: Negative for chest pain, palpitations and leg swelling.   Gastrointestinal: Positive for abdominal pain. Negative for  constipation, diarrhea, nausea and vomiting.   Genitourinary: Negative for decreased urine volume, difficulty urinating, dysuria and urgency.   Musculoskeletal: Negative for arthralgias, back pain, gait problem, joint swelling, myalgias, neck pain and neck stiffness.   Skin: Negative for color change.   Neurological: Negative for dizziness, tremors, seizures, syncope, facial asymmetry, speech difficulty, weakness, light-headedness, numbness and headaches.   Hematological: Negative.    Psychiatric/Behavioral: Negative for agitation, behavioral problems and confusion.     Objective:     Vital Signs (Most Recent):  Temp: 98.4 °F (36.9 °C) (02/29/20 0737)  Pulse: 68 (02/29/20 0737)  Resp: 18 (02/29/20 0737)  BP: (!) 141/71 (02/29/20 0737)  SpO2: (!) 94 % (02/29/20 0750) Vital Signs (24h Range):  Temp:  [97.8 °F (36.6 °C)-98.5 °F (36.9 °C)] 98.4 °F (36.9 °C)  Pulse:  [] 68  Resp:  [16-19] 18  SpO2:  [92 %-96 %] 94 %  BP: (135-144)/(64-80) 141/71     Weight: 118.1 kg (260 lb 5.8 oz)  Body mass index is 46.12 kg/m².    Intake/Output Summary (Last 24 hours) at 2/29/2020 1506  Last data filed at 2/29/2020 0800  Gross per 24 hour   Intake 2148.33 ml   Output 1600 ml   Net 548.33 ml      Physical Exam   Constitutional: She is oriented to person, place, and time. She appears well-developed and well-nourished. She is cooperative. She is easily aroused. No distress.   HENT:   Head: Normocephalic and atraumatic.   Eyes: EOM are normal.   Neck: Normal range of motion. Neck supple.   Cardiovascular: Normal rate, regular rhythm and intact distal pulses.   No murmur heard.  Pulmonary/Chest: Effort normal and breath sounds normal. No stridor. No respiratory distress. She has no wheezes. She has no rales. She exhibits no tenderness.   Abdominal: Soft. Bowel sounds are normal. There is tenderness.   S/p robotic nissen fundoplication  5 lap sites noted   Genitourinary:   Genitourinary Comments: Deferred   Musculoskeletal: Normal  range of motion. She exhibits no edema, tenderness or deformity.   Neurological: She is alert, oriented to person, place, and time and easily aroused. No sensory deficit.   Skin: Skin is warm and dry. Capillary refill takes less than 2 seconds.   Psychiatric: She has a normal mood and affect. Her behavior is normal. Thought content normal.   Nursing note and vitals reviewed.      Significant Labs:   BMP:   Recent Labs   Lab 02/29/20  0503   *   *   K 3.7      CO2 23   BUN 10   CREATININE 0.9   CALCIUM 9.0     CBC:   Recent Labs   Lab 02/29/20  0503   WBC 14.60*   HGB 10.7*   HCT 34.7*          Significant Imaging:           Assessment/Plan:      * Hiatal hernia with GERD without esophagitis  - General surgery primary  - S/p robotic nissen fundoplication today, 02/28  - Antiemetics/Analgesics PRN  - Encourage incentive spirometry use  - IV hydration   - Management per primary team      Hypertensive disorder  -stable  - Pt takes triameterene-hctz and diltiazem  - Continue diltiazem and hold diuretic for now post op   - Clonidine PRN      Multinodular goiter (nontoxic)  -monitor       Obesity, Class III, BMI 40-49.9 (morbid obesity)  - BMI 46.12  - Pt counseled on lifestyle modifications including diet and exercise      Anemia  - Hgb/hct 2 weeks ago stable at 11.2/38.2  - Per op note -- EBL was 20 mL  - 02/29/2020- H/H stable with mild decline post procedure        Sleep apnea  - Pt reports being noncompliant with CPAP at home  - Nightly CPAP while hospitalized -- titrate to patient's comfort and O2 saturation        VTE Risk Mitigation (From admission, onward)         Ordered     Place sequential compression device  Until discontinued      02/28/20 5548                      Meghna Rush NP  Department of Hospital Medicine   [facility]

## 2020-03-02 ENCOUNTER — TELEPHONE (OUTPATIENT)
Dept: SURGERY | Facility: CLINIC | Age: 45
End: 2020-03-02

## 2020-03-02 NOTE — TELEPHONE ENCOUNTER
Spoke to pt, scheduled her 2 week wound check appt with Dr Kaufman for Tuesday 3/17/2020 at 1000 - Pt advised she needed to fax over [place] papers for Dr Kaufman to fill out. - Gave fax number [phone] - Pt correctly repeated back to me all appt information and correct fax number.     ----- Message from Jeana Hsu RN sent at 2/29/2020  4:20 PM CST -----  Pt needs wound re-check appt in 2 weeks. Please call pt with appt. Thank you

## 2020-03-02 NOTE — ANESTHESIA POSTPROCEDURE EVALUATION
Anesthesia Post Evaluation    Patient: Akiko Jameson    Procedure(s) Performed: Procedure(s) (LRB):  XI ROBOTIC FUNDOPLICATION, NISSEN (N/A)    Final Anesthesia Type: general    Patient location during evaluation: PACU  Patient participation: Yes- Able to Participate  Level of consciousness: awake and alert  Post-procedure vital signs: reviewed and stable  Pain management: adequate  Airway patency: patent  KEIRY mitigation strategies: Extubation while patient is awake  PONV status at discharge: No PONV  Anesthetic complications: no      Cardiovascular status: hemodynamically stable  Respiratory status: spontaneous ventilation  Hydration status: euvolemic  Follow-up not needed.          Vitals Value Taken Time   /71 2/29/2020  7:37 AM   Temp 36.9 °C (98.4 °F) 2/29/2020  7:37 AM   Pulse 68 2/29/2020  7:37 AM   Resp 18 2/29/2020  7:37 AM   SpO2 94 % 2/29/2020  7:50 AM         Event Time     Out of Recovery 13:32:08          Pain/Cele Score: No data recorded

## 2020-03-06 ENCOUNTER — TELEPHONE (OUTPATIENT)
Dept: SURGERY | Facility: CLINIC | Age: 45
End: 2020-03-06

## 2020-03-06 NOTE — TELEPHONE ENCOUNTER
FMLA forms completed by Dr Kaufman - Faxed to Kaley Jameson at [phone]. Fax Confirmation sheet received.

## 2020-03-09 ENCOUNTER — TELEPHONE (OUTPATIENT)
Dept: SURGERY | Facility: CLINIC | Age: 45
End: 2020-03-09

## 2020-03-09 NOTE — TELEPHONE ENCOUNTER
Spoke to and advised pt that Dr Kaufman completed her FMLA forms last week and I faxed them to Kaley Jameson at the number given - Pt verbalized understanding.     ----- Message from Susana Cheema sent at 3/9/2020  9:36 AM CDT -----  Contact: pt  Pt calling on the status of her FMLA paperwork [phone]

## 2020-03-16 ENCOUNTER — TELEPHONE (OUTPATIENT)
Dept: SURGERY | Facility: CLINIC | Age: 45
End: 2020-03-16

## 2020-03-16 NOTE — TELEPHONE ENCOUNTER
Pt was calling to complete her travel screening - Screening has been completed    ----- Message from Elodia Chilel sent at 3/16/2020 11:50 AM CDT -----  Contact: Patient   Akiko returning call regards to a message that was left for her about her appointment, Please call her at [phone].    Thanks  Td

## 2020-03-17 ENCOUNTER — OFFICE VISIT (OUTPATIENT)
Dept: SURGERY | Facility: CLINIC | Age: 45
End: 2020-03-17
Payer: COMMERCIAL

## 2020-03-17 VITALS
HEART RATE: 76 BPM | DIASTOLIC BLOOD PRESSURE: 90 MMHG | BODY MASS INDEX: 44.25 KG/M2 | WEIGHT: 249.81 LBS | SYSTOLIC BLOOD PRESSURE: 137 MMHG | TEMPERATURE: 98 F

## 2020-03-17 DIAGNOSIS — Z98.890 S/P NISSEN FUNDOPLICATION (WITHOUT GASTROSTOMY TUBE) PROCEDURE: Primary | ICD-10-CM

## 2020-03-17 PROCEDURE — 99024 PR POST-OP FOLLOW-UP VISIT: ICD-10-PCS | Mod: S$GLB,,, | Performed by: SURGERY

## 2020-03-17 PROCEDURE — 99024 POSTOP FOLLOW-UP VISIT: CPT | Mod: S$GLB,,, | Performed by: SURGERY

## 2020-03-17 PROCEDURE — 99999 PR PBB SHADOW E&M-EST. PATIENT-LVL IV: CPT | Mod: PBBFAC,,, | Performed by: SURGERY

## 2020-03-17 PROCEDURE — 99999 PR PBB SHADOW E&M-EST. PATIENT-LVL IV: ICD-10-PCS | Mod: PBBFAC,,, | Performed by: SURGERY

## 2020-03-17 NOTE — LETTER
March 17, 2020    Akiko Jameson  [address]  [place] [address]         [facility]  [address] [place] [address]  Phone: [phone]  Fax: [phone] March 17, 2020     Patient: Akiko Jameson   YOB: 1975   Date of Visit: 3/17/2020       To Whom It May Concern:    It is my medical opinion that Akiko Jameson may return to work on 3/23/2020.    If you have any questions or concerns, please don't hesitate to call.    Sincerely,        Jina Kaufman MD

## 2020-03-19 PROBLEM — K21.9 HIATAL HERNIA WITH GERD WITHOUT ESOPHAGITIS: Status: RESOLVED | Noted: 2019-07-30 | Resolved: 2020-03-19

## 2020-03-19 PROBLEM — Z98.890 S/P NISSEN FUNDOPLICATION (WITHOUT GASTROSTOMY TUBE) PROCEDURE: Status: ACTIVE | Noted: 2020-03-19

## 2020-03-19 PROBLEM — K44.9 HIATAL HERNIA WITH GERD WITHOUT ESOPHAGITIS: Status: RESOLVED | Noted: 2019-07-30 | Resolved: 2020-03-19

## 2020-03-19 NOTE — PROGRESS NOTES
General Surgery     Postop Visit Note      Akiko Collazo Trino  45 y.o.    Review of patient's allergies indicates:   Allergen Reactions    Biaxin [clarithromycin] Swelling    Prilosec [omeprazole magnesium] Swelling    Prevacid [lansoprazole] Swelling     Lip swelling- was taking this along with blood pressure medication: benazepril; not sure which caused it. Reports she has taken OTC prilosec without any problems.    Ace inhibitors Swelling     Facial swelling    Benazepril Swelling     Lip swelling       Vitals:    03/17/20 1025   BP: (!) 137/90   Pulse: 76   Temp: 98.1 °F (36.7 °C)       SUBJECTIVE:  45 y.o. female presents s/p robotic nissen fundoplicaion.  Doing well. Has lost 16 lbs. Tolerating diet well. No symptoms of reflux. Able to sleep laying supine without pain.     OBJECTIVE:  Robotic incisions healing well, no erythema, no drainage, no tenderness with palpation.     Pathology- none    ASSESSMENT:  S/P Nissen fundoplication (without gastrostomy tube) procedure    continue diet as tolerated    Follow up in about 4 weeks (around 4/14/2020).    Jina Kaufman

## 2020-04-07 ENCOUNTER — OFFICE VISIT (OUTPATIENT)
Dept: SURGERY | Facility: CLINIC | Age: 45
End: 2020-04-07
Payer: COMMERCIAL

## 2020-04-07 DIAGNOSIS — Z98.890 S/P NISSEN FUNDOPLICATION (WITHOUT GASTROSTOMY TUBE) PROCEDURE: Primary | ICD-10-CM

## 2020-04-07 PROCEDURE — 99024 POSTOP FOLLOW-UP VISIT: CPT | Mod: 95,,, | Performed by: SURGERY

## 2020-04-07 PROCEDURE — 99024 PR POST-OP FOLLOW-UP VISIT: ICD-10-PCS | Mod: 95,,, | Performed by: SURGERY

## 2020-04-10 NOTE — PROGRESS NOTES
The patient location is: home  The chief complaint leading to consultation is: postop  Visit type: Virtual visit with synchronous audio and video  Total time spent with patient: 10 min  Each patient to whom he or she provides medical services by telemedicine is:  (1) informed of the relationship between the physician and patient and the respective role of any other health care provider with respect to management of the patient; and (2) notified that he or she may decline to receive medical services by telemedicine and may withdraw from such care at any time.    Notes:         General Surgery                History & Physical      Subjective:         Patient ID: Akiko Jameson is a 45 y.o. female.    Chief Complaint: Post-op Evaluation (Post Op )    6 weeks s/p xi nissen. Doing well. Reports continued weight loss. Tolerating solids and liquids. Having some indigestion with milk products. Continues to have resolution of preoperative reflux complaints.     Review of Systems   Constitutional: Negative for unexpected weight change.   HENT: Negative for congestion and trouble swallowing.    Eyes: Negative for visual disturbance.   Respiratory: Negative for shortness of breath.    Cardiovascular: Negative for chest pain.   Gastrointestinal: Negative for abdominal pain.   Genitourinary: Negative for difficulty urinating.   Skin: Negative for rash.   Allergic/Immunologic: Negative for immunocompromised state.   Neurological: Negative for weakness.   Psychiatric/Behavioral: The patient is not nervous/anxious.          Objective:      Physical Exam  - deferred, video visit     Assessment/Plan:   S/P Nissen fundoplication (without gastrostomy tube) procedure    continue post nissen diet  f/u 3 months    Jina Kaufman

## 2020-05-14 ENCOUNTER — TELEPHONE (OUTPATIENT)
Dept: PAIN MEDICINE | Facility: CLINIC | Age: 45
End: 2020-05-14

## 2020-05-15 ENCOUNTER — OFFICE VISIT (OUTPATIENT)
Dept: PAIN MEDICINE | Facility: CLINIC | Age: 45
End: 2020-05-15
Payer: COMMERCIAL

## 2020-05-15 VITALS
WEIGHT: 246.94 LBS | TEMPERATURE: 99 F | HEART RATE: 78 BPM | BODY MASS INDEX: 43.75 KG/M2 | HEIGHT: 63 IN | SYSTOLIC BLOOD PRESSURE: 144 MMHG | DIASTOLIC BLOOD PRESSURE: 98 MMHG

## 2020-05-15 DIAGNOSIS — M79.18 CHRONIC MYOFASCIAL PAIN: ICD-10-CM

## 2020-05-15 DIAGNOSIS — G89.29 CHRONIC MYOFASCIAL PAIN: ICD-10-CM

## 2020-05-15 DIAGNOSIS — M79.12 MYALGIA OF AUXILIARY MUSCLES, HEAD AND NECK: ICD-10-CM

## 2020-05-15 DIAGNOSIS — E89.89 LYMPHEDEMA OF UPPER EXTREMITY FOLLOWING LYMPHADENECTOMY: ICD-10-CM

## 2020-05-15 DIAGNOSIS — I89.0 LYMPHEDEMA OF UPPER EXTREMITY FOLLOWING LYMPHADENECTOMY: ICD-10-CM

## 2020-05-15 DIAGNOSIS — M79.18 MYALGIA, OTHER SITE: Primary | ICD-10-CM

## 2020-05-15 PROCEDURE — 3077F SYST BP >= 140 MM HG: CPT | Mod: CPTII,S$GLB,, | Performed by: PHYSICAL MEDICINE & REHABILITATION

## 2020-05-15 PROCEDURE — 99999 PR PBB SHADOW E&M-EST. PATIENT-LVL III: CPT | Mod: PBBFAC,,, | Performed by: PHYSICAL MEDICINE & REHABILITATION

## 2020-05-15 PROCEDURE — 3080F DIAST BP >= 90 MM HG: CPT | Mod: CPTII,S$GLB,, | Performed by: PHYSICAL MEDICINE & REHABILITATION

## 2020-05-15 PROCEDURE — 3077F PR MOST RECENT SYSTOLIC BLOOD PRESSURE >= 140 MM HG: ICD-10-PCS | Mod: CPTII,S$GLB,, | Performed by: PHYSICAL MEDICINE & REHABILITATION

## 2020-05-15 PROCEDURE — 99204 OFFICE O/P NEW MOD 45 MIN: CPT | Mod: 25,S$GLB,, | Performed by: PHYSICAL MEDICINE & REHABILITATION

## 2020-05-15 PROCEDURE — 3080F PR MOST RECENT DIASTOLIC BLOOD PRESSURE >= 90 MM HG: ICD-10-PCS | Mod: CPTII,S$GLB,, | Performed by: PHYSICAL MEDICINE & REHABILITATION

## 2020-05-15 PROCEDURE — 3008F PR BODY MASS INDEX (BMI) DOCUMENTED: ICD-10-PCS | Mod: CPTII,S$GLB,, | Performed by: PHYSICAL MEDICINE & REHABILITATION

## 2020-05-15 PROCEDURE — 3008F BODY MASS INDEX DOCD: CPT | Mod: CPTII,S$GLB,, | Performed by: PHYSICAL MEDICINE & REHABILITATION

## 2020-05-15 PROCEDURE — 20552 NJX 1/MLT TRIGGER POINT 1/2: CPT | Mod: S$GLB,,, | Performed by: PHYSICAL MEDICINE & REHABILITATION

## 2020-05-15 PROCEDURE — 99999 PR PBB SHADOW E&M-EST. PATIENT-LVL III: ICD-10-PCS | Mod: PBBFAC,,, | Performed by: PHYSICAL MEDICINE & REHABILITATION

## 2020-05-15 PROCEDURE — 20552 PR INJECT TRIGGER POINT, 1 OR 2: ICD-10-PCS | Mod: S$GLB,,, | Performed by: PHYSICAL MEDICINE & REHABILITATION

## 2020-05-15 PROCEDURE — 99204 PR OFFICE/OUTPT VISIT, NEW, LEVL IV, 45-59 MIN: ICD-10-PCS | Mod: 25,S$GLB,, | Performed by: PHYSICAL MEDICINE & REHABILITATION

## 2020-05-15 RX ORDER — METHYLPREDNISOLONE ACETATE 40 MG/ML
40 INJECTION, SUSPENSION INTRA-ARTICULAR; INTRALESIONAL; INTRAMUSCULAR; SOFT TISSUE
Status: COMPLETED | OUTPATIENT
Start: 2020-05-15 | End: 2020-05-15

## 2020-05-15 RX ORDER — TOBRAMYCIN 3 MG/ML
SOLUTION/ DROPS OPHTHALMIC
COMMUNITY
Start: 2020-05-13 | End: 2020-06-08

## 2020-05-15 RX ORDER — METHOCARBAMOL 500 MG/1
500 TABLET, FILM COATED ORAL 3 TIMES DAILY PRN
Qty: 90 TABLET | Refills: 1 | Status: SHIPPED | OUTPATIENT
Start: 2020-05-15 | End: 2020-07-14

## 2020-05-15 RX ADMIN — METHYLPREDNISOLONE ACETATE 40 MG: 40 INJECTION, SUSPENSION INTRA-ARTICULAR; INTRALESIONAL; INTRAMUSCULAR; SOFT TISSUE at 08:05

## 2020-05-15 NOTE — LETTER
May 15, 2020      Dwayne Booth MD  [address] [facility]  [place] [address]           [place] - Interventional Pain Medicine  [address] [facility]  [place] [address]  Phone: [phone]  Fax: [phone]          Patient: Akiko Jameson   MR Number: [id number]   YOB: 1975   Date of Visit: 5/15/2020       Dear Dr. Dwayne Booth:    Thank you for referring Akiko Jameson to me for evaluation. Attached you will find relevant portions of my assessment and plan of care.    If you have questions, please do not hesitate to call me. I look forward to following Akiko Jameson along with you.    Sincerely,    Anam Montero MD    Enclosure  CC:  No Recipients    If you would like to receive this communication electronically, please contact [email or web address] or [phone] to request more information on [employer] access.    For providers and/or their staff who would like to refer a patient to [facility], please contact us through our one-stop-shop provider referral line, [facility], at [phone].    If you feel you have received this communication in error or would no longer like to receive these types of communications, please e-mail [email or web address]

## 2020-05-15 NOTE — PATIENT INSTRUCTIONS
- performed trigger point injections to the thoracic paraspinals and upper/middle trapezius today in the clinic for diagnostic and therapeutic purposes.  - provided Robaxin 500 mg up to 3 times daily as needed for muscle related pain  - Continue other current medications as prescribed.  - Continue home based exercises and discussed the importance of a healthy and active lifestyle  - Return for follow up in 8 weeks or as needed.    Please do not hesitate to contact the clinic [phone] if you have any questions regarding your treatment plan.     Anam Montero MD  Interventional Pain Medicine  [facility] [place]      Shoulder Squeeze Exercise    To start, sit in a chair with your feet flat on the floor. Shift your weight slightly forward to avoid rounding your back. Relax. Keep your ears, shoulders, and hips aligned:  · Raise your arms to shoulder height, elbows bent and palms forward.  · Move your arms back, squeezing your shoulder blades together.  · Hold for 10 seconds. Return to starting position.   · Repeat 5 times.   For your safety, check with your healthcare provider before starting an exercise program.   Date Last Reviewed: 8/16/2015  © 7779-6106 [employer]. [address] [place] [address]. All rights reserved. This information is not intended as a substitute for professional medical care. Always follow your healthcare professional's instructions.        Shoulder Girdle Stretch    To start, sit in a chair with your feet flat on the floor. Your weight should be slightly forward so that youre balanced evenly on your buttocks. Relax your shoulders and keep your head level. Using a chair with arms may help you keep your balance:  · Place 1 hand on the outside elbow of the other arm.  · Pull the arm across your body. Hold for 30 to 60 seconds. Repeat once.  · Switch sides.  For your safety, check with your healthcare provider before starting an exercise program.   Date Last  Reviewed: 8/16/2015  © 7897-8262 [employer]. [address] [place] [address], [place] [address]. All rights reserved. This information is not intended as a substitute for professional medical care. Always follow your healthcare professional's instructions.

## 2020-05-15 NOTE — PROGRESS NOTES
New Patient Chronic Pain Note (Initial Visit)    Referring Physician: Dwayne Booth MD    PCP: Dwayne Booth MD    Chief Complaint:   Chief Complaint   Patient presents with    Mid-back Pain     DDD Thoracic        SUBJECTIVE:  Akiko Jameson is a 45 y.o. female, right-hand dominant, who presents to the clinic for the evaluation of mid back pain. The pain started 25+ years ago following a lymph node removal on the right that resulted in lymphedema of the right upper extremity and symptoms have been worsening.The pain is located in the right mid back area and radiates to the along the length of the upper to lower back.  The pain is described as aching, burning and throbbing and is rated as 6/10. The pain is rated with a score of  0/10 on the BEST day and a score of 10/10 on the WORST day.  Symptoms interfere with daily activity, sleeping and work. The pain is exacerbated by increased activity or use of the right upper extremity.  The pain is mitigated by rest. The patient reports spending 2-4 hours per day reclining. The patient reports 6-8 hours of uninterrupted sleep per night.  She reports that she works as a .    Patient denies night fever/night sweats, urinary incontinence, bowel incontinence, significant weight loss, significant motor weakness and loss of sensations.    Pain Disability Index Review:   No flowsheet data found.    Non-Pharmacologic Treatments:  Physical Therapy/Home Exercise: yes  Ice/Heat:yes  TENS: yes  Acupuncture: no  Massage: yes  Chiropractic: yes    Other: yes, compression sleeves      Pain Medications:  - Opioids: Norco  - Adjuvant Medications: Relafen  - Anti-Coagulants: None     report:  Reviewed and consistent with medication use as prescribed.        Pain Procedures:   -previously underwent thoracic epidurals x2 with first one being beneficial but the second one not so much      Imaging:   X-ray bilateral knees 03/16/2016:  AP and PA flexion standing views  of both knees as well as lateral and Merchant views of both knees were obtained.  The joint spaces are grossly preserved.  Mild patellofemoral spurring and prominent patellar enthesophytes bilaterally.  No joint effusion.  No acute fracture or malalignment.    MRI right knee 03/21/2016:  The anterior cruciate ligament, posterior cruciate ligament, medial collateral ligament, lateral collateral ligament complex normal popliteus tendon, IT band, quadriceps tendon, and patellar tendon are normal in appearance.    There is a complex, primarily radial tear of the posterior horn of the medial meniscus which extends to the posterior root attachment of the medial meniscus.  The medial meniscus is intact.    The articular cartilage of the medial and lateral tibiofemoral joint compartments is intact.  There is a 14 mm width area of full-thickness abnormal chondral signal observed in the lateral patellar facet at the level of the patellar midpole.  No abnormal subcortical signal abnormality appreciated.    There is quadriceps tendon and patellar tendon insertion enthesophyte formation at their respective attachments on the patella.  There is a small knee joint effusion present.  No acute fracture or pathologic marrow replacement process.  There is hematopoietic  bone marrow present within the distal femur and proximal tibia.      Past Medical History:   Diagnosis Date    ALLERGIC RHINITIS     Cellulitis     Eosinophilia     mild    GERD (gastroesophageal reflux disease)     Granular cell tumor     H. pylori infection 2018    Hypertension     Lymphedema     Mild anemia     KEIRY on CPAP     does not regularly    Sleep apnea     compliant with CPAP    Thyroid nodule     Urinary incontinence, mixed      Past Surgical History:   Procedure Laterality Date    24 HOUR IMPEDANCE PH MONITORING OF ESOPHAGUS IN PATIENT NOT TAKING ACID REDUCING MEDICATIONS N/A 1/6/2020    Procedure: IMPEDANCE PH STUDY, ESOPHAGEAL, 24 HOUR, IN  PATIENT NOT TAKING ACID REDUCING MEDICATION;  Surgeon: First Available [place];  Location: [facility] ENDO;  Service: Endoscopy;  Laterality: N/A;    AXILLARY NODE DISSECTION      granular cell tumor    BREAST CYST ASPIRATION      left    CHOLECYSTECTOMY      COLONOSCOPY N/A 5/10/2019    Procedure: COLONOSCOPY;  Surgeon: Edgar Gamble Jr., MD;  Location: [facility] ENDO;  Service: Endoscopy;  Laterality: N/A;    ENDOMETRIAL ABLATION      ESOPHAGEAL MANOMETRY WITH MEASUREMENT OF IMPEDANCE N/A 1/6/2020    Procedure: MANOMETRY, ESOPHAGUS, WITH IMPEDANCE MEASUREMENT;  Surgeon: First Available [place];  Location: [facility] ENDO;  Service: Endoscopy;  Laterality: N/A;    ESOPHAGOGASTRODUODENOSCOPY N/A 7/5/2018    Procedure: EGD (ESOPHAGOGASTRODUODENOSCOPY);  Surgeon: Edgar Gamble Jr., MD;  Location: [facility];  Service: Endoscopy;  Laterality: N/A;    EXCISION OF MASS OF ABDOMEN Left 6/18/2018    Procedure: EXCISION, MASS, ABDOMEN - flank left;  Surgeon: Armando Tate MD;  Location: [facility];  Service: General;  Laterality: Left;  left flank removal of mass    FINE NEEDLE ASPIRATION      thyroid    KNEE ARTHROSCOPY W/ MENISCECTOMY Right     NASAL SEPTUM SURGERY      ROBOT-ASSISTED NISSEN FUNDOPLICATION USING DA TAMAR XI N/A 2/28/2020    Procedure: XI ROBOTIC FUNDOPLICATION, NISSEN;  Surgeon: Jina Kaufman MD;  Location: [facility] OR;  Service: General;  Laterality: N/A;    TUBAL LIGATION      UPPER GASTROINTESTINAL ENDOSCOPY  07/05/2018    Dr. Gamble     Social History     Socioeconomic History    Marital status:      Spouse name: Not on file    Number of children: 2    Years of education: Not on file    Highest education level: Not on file   Occupational History     Employer: [employer]   Social Needs    Financial resource strain: Not on file    Food insecurity:     Worry: Not on file     Inability: Not on file    Transportation needs:     Medical: Not on file     Non-medical: Not on file    Tobacco Use    Smoking status: Never Smoker    Smokeless tobacco: Never Used   Substance and Sexual Activity    Alcohol use: No    Drug use: No    Sexual activity: Yes     Partners: Male   Lifestyle    Physical activity:     Days per week: Not on file     Minutes per session: Not on file    Stress: Not on file   Relationships    Social connections:     Talks on phone: Not on file     Gets together: Not on file     Attends Jainism service: Not on file     Active member of club or organization: Not on file     Attends meetings of clubs or organizations: Not on file     Relationship status: Not on file   Other Topics Concern    Not on file   Social History Narrative    Not on file     Family History   Problem Relation Age of Onset    Diabetes Mother     Kidney failure Mother     Breast cancer Maternal Grandmother     Breast cancer Maternal Aunt     Ovarian cancer Cousin     Breast cancer Cousin     Blindness Father     Cirrhosis Neg Hx     Colon polyps Neg Hx     Colon cancer Neg Hx     Crohn's disease Neg Hx     Esophageal cancer Neg Hx     Stomach cancer Neg Hx     Ulcerative colitis Neg Hx     Amblyopia Neg Hx     Cataracts Neg Hx     Glaucoma Neg Hx     Macular degeneration Neg Hx     Retinal detachment Neg Hx     Strabismus Neg Hx     Allergic rhinitis Neg Hx     Allergies Neg Hx     Angioedema Neg Hx     Asthma Neg Hx     Atopy Neg Hx     Eczema Neg Hx     Immunodeficiency Neg Hx     Rhinitis Neg Hx     Urticaria Neg Hx        Review of patient's allergies indicates:   Allergen Reactions    Biaxin [clarithromycin] Swelling    Prilosec [omeprazole magnesium] Swelling    Prevacid [lansoprazole] Swelling     Lip swelling- was taking this along with blood pressure medication: benazepril; not sure which caused it. Reports she has taken OTC prilosec without any problems.    Ace inhibitors Swelling     Facial swelling    Benazepril Swelling     Lip swelling       Current  Outpatient Medications   Medication Sig    cetirizine (ZYRTEC) 10 MG tablet Take 1 tablet (10 mg total) by mouth once daily.    diltiaZEM (CARDIZEM CD) 120 MG Cp24 Take 1 capsule (120 mg total) by mouth once daily.    docusate sodium (COLACE) 100 MG capsule Take 100 mg by mouth every other day.     fluticasone propionate (FLONASE) 50 mcg/actuation nasal spray Use 2 sprays to each nostril daily (Patient taking differently: 2 sprays by Each Nostril route once daily. Use 2 sprays to each nostril daily)    montelukast (SINGULAIR) 10 mg tablet Take 1 tablet (10 mg total) by mouth nightly.    multivitamin (THERAGRAN) per tablet Take 1 tablet by mouth once daily. Every day    potassium chloride SA (K-DUR,KLOR-CON) 20 MEQ tablet Take 1 tablet (20 mEq total) by mouth 2 (two) times daily. Appointment needed for further refills after this one    tobramycin sulfate 0.3% (TOBREX) 0.3 % ophthalmic solution Instill 1 drop into affected eye(s) q 4-6 hr    triamterene-hydrochlorothiazide 37.5-25 mg (MAXZIDE-25) 37.5-25 mg per tablet Take 1 tablet by mouth once daily.    calcium carbonate (OS-CARLA) 500 mg calcium (1,250 mg) chewable tablet Take 1 tablet by mouth daily as needed for Heartburn.    famotidine (PEPCID) 10 MG tablet Take 10 mg by mouth once daily.    hyoscyamine (LEVSIN/SL) 0.125 mg Subl Take 2 tablets (0.25 mg total) by mouth every 4 (four) hours as needed. (Patient not taking: Reported on 12/7/2019)    methocarbamoL (ROBAXIN) 500 MG Tab Take 1 tablet (500 mg total) by mouth 3 (three) times daily as needed (muscle spasms). May cause drowsiness.    nabumetone (RELAFEN) 500 MG tablet Take 1 tablet (500 mg total) by mouth 2 (two) times daily.    ondansetron (ZOFRAN-ODT) 8 MG TbDL Take 1 tablet (8 mg total) by mouth every 8 (eight) hours as needed (Nausea).    ranitidine (ZANTAC) 300 MG tablet Take 1 tablet (300 mg total) by mouth every evening.     No current facility-administered medications for this visit.   "      Review of Systems     GENERAL:  No weight loss, malaise or fevers.  HEENT:   No recent changes in vision or hearing  NECK:  Negative for lumps, no difficulty with swallowing.  RESPIRATORY:  Negative for cough, wheezing or shortness of breath, patient denies any recent URI.  CARDIOVASCULAR:  Negative for chest pain, leg swelling or palpitations.  GI:  Negative for abdominal discomfort, blood in stools or black stools or change in bowel habits.  MUSCULOSKELETAL:  See HPI.  SKIN:  Negative for lesions, rash, and itching.  PSYCH:  No mood disorder or recent psychosocial stressors.  Patients sleep is not disturbed secondary to pain.  HEMATOLOGY/LYMPHOLOGY:  Negative for prolonged bleeding, bruising easily or swollen nodes.  Patient is not currently taking any anti-coagulants  NEURO:   No history of headaches, syncope, paralysis, seizures or tremors.  All other reviewed and negative other than HPI.    OBJECTIVE:    BP (!) 144/98   Pulse 78   Temp 98.7 °F (37.1 °C)   Ht 5' 3" (1.6 m)   Wt 112 kg (246 lb 14.6 oz)   BMI 43.74 kg/m²         Physical Exam    GENERAL: Well appearing, in no acute distress, alert and oriented x3.  PSYCH:  Mood and affect appropriate.  SKIN: Skin color, texture, turgor normal, no rashes or lesions.  HEAD/FACE:  Normocephalic, atraumatic. Cranial nerves grossly intact.  NECK: No pain to palpation over the cervical paraspinous muscles. Spurling Negative. No pain with neck flexion, extension, or lateral flexion.   CV: RRR with palpation of the radial artery.  PULM: No evidence of respiratory difficulty, symmetric chest rise.  GI:  Soft and non-tender.  BACK:  Tenderness to palpation over the thoracic paraspinals, middle trapezius, and upper trapezius.  Straight leg raising in the sitting and supine positions is negative to radicular pain.  Mild pain to palpation over the facet joints of the lumbar spine and lumbar paraspinals. Normal range of motion without pain reproduction.  EXTREMITIES: " Peripheral joint ROM is full and pain free without obvious instability or laxity in all four extremities.  There is significant lymphedema present throughout the right upper extremity.  No other deformities or skin discoloration. Good capillary refill.  MUSCULOSKELETAL: Shoulder, hip, and knee provocative maneuvers are negative.  Bilateral upper and lower extremity strength is normal and symmetric.  No atrophy or tone abnormalities are noted.  NEURO: Bilateral upper and lower extremity coordination and muscle stretch reflexes are physiologic and symmetric.  Plantar response are downgoing. No clonus.  No loss of sensation is noted.  GAIT: normal.      LABS:  Lab Results   Component Value Date    WBC 14.60 (H) 02/29/2020    HGB 10.7 (L) 02/29/2020    HCT 34.7 (L) 02/29/2020    MCV 80 (L) 02/29/2020     02/29/2020       CMP  Sodium   Date Value Ref Range Status   02/29/2020 135 (L) 136 - 145 mmol/L Final     Potassium   Date Value Ref Range Status   02/29/2020 3.7 3.5 - 5.1 mmol/L Final     Chloride   Date Value Ref Range Status   02/29/2020 103 95 - 110 mmol/L Final     CO2   Date Value Ref Range Status   02/29/2020 23 23 - 29 mmol/L Final     Glucose   Date Value Ref Range Status   02/29/2020 117 (H) 70 - 110 mg/dL Final     BUN, Bld   Date Value Ref Range Status   02/29/2020 10 6 - 20 mg/dL Final     Creatinine   Date Value Ref Range Status   02/29/2020 0.9 0.5 - 1.4 mg/dL Final     Calcium   Date Value Ref Range Status   02/29/2020 9.0 8.7 - 10.5 mg/dL Final     Total Protein   Date Value Ref Range Status   08/13/2019 7.8 6.0 - 8.4 g/dL Final     Albumin   Date Value Ref Range Status   08/13/2019 4.2 3.5 - 5.2 g/dL Final     Total Bilirubin   Date Value Ref Range Status   08/13/2019 0.3 0.2 - 1.3 mg/dL Final     Alkaline Phosphatase   Date Value Ref Range Status   08/13/2019 84 38 - 145 U/L Final     AST   Date Value Ref Range Status   08/13/2019 25 14 - 36 U/L Final     ALT   Date Value Ref Range Status    08/13/2019 21 10 - 44 U/L Final     Anion Gap   Date Value Ref Range Status   02/29/2020 9 8 - 16 mmol/L Final     eGFR if    Date Value Ref Range Status   02/29/2020 >60 >60 mL/min/1.73 m^2 Final     eGFR if non    Date Value Ref Range Status   02/29/2020 >60 >60 mL/min/1.73 m^2 Final     Comment:     Calculation used to obtain the estimated glomerular filtration  rate (eGFR) is the CKD-EPI equation.          Lab Results   Component Value Date    HGBA1C 5.8 (H) 08/31/2017             ASSESSMENT: 45 y.o. year old female with mid back pain, consistent with     1. Myalgia, other site  methylPREDNISolone acetate injection 40 mg   2. Lymphedema of upper extremity following lymphadenectomy     3. Chronic myofascial pain     4. Myalgia of auxiliary muscles, head and neck           PLAN:   - Interventions: Performed trigger point injections to the thoracic paraspinals and upper/middle trapezius today in the clinic for diagnostic and therapeutic purposes.. Explained the risks and benefits of the procedure in detail with the patient today in clinic along with alternative treatment options, and the patient elected to pursue the intervention at this time.  Verbal consent obtained, see procedure note below for more details    - Anticoagulation use: no     - Medications: I have stressed the importance of physical activity and a home exercise plan to help with pain and improve health. and Patient can continue with medications for now since they are providing benefits, using them appropriately, and without side effects.  Providing Robaxin 500 mg up to 3 times daily as needed for muscle related pain.    - Therapy:  Advised patient to continue with compression sleeves and lymphedema specific exercises as previously learned from physical therapy sessions    - Labs:  Reviewed    - Imaging: Reviewed available imaging with patient and answered any questions they had regarding study.  No new imaging  needed at this time.    - Consults/Referrals:  Attempting to locate provider who may be able to assist her with her lymphedema maintenance    - Records:  Reviewed/Obtain old records from outside physicians and imaging    - Follow up visit: return to clinic in 8 weeks or as needed    - Counseled patient regarding the importance of activity modification and physical therapy    - This condition does not require this patient to take time off of work, and the primary goal of our Pain Management services is to improve the patient's functional capacity.    - Patient Questions: Answered all of the patient's questions regarding diagnosis, therapy, and treatment      PROCEDURE NOTE:  Trigger Point Injection:   The procedure was discussed with the patient including complications of nerve damage,  bleeding, infection, and failure of pain relief.   Trigger points were identified by palpation and marked. Alcohol swab prep of sites done. A mixture of 4mL 1% lidocaine + 40 mg Depo-Medrol  was prepared (5 mL total).   A 25-gauge needle was advanced to the point of maximal tenderness, and  1 to 1.25mL  was injected after negative aspiration. All sites done in the same manner. Patient tolerated the procedure well and without complications. Patient was monitored for 15 min following the injection before discharged from the clinic.  Sites injected included:  thoracic paraspinals and upper/middle trapezius   on the right        The above plan and management options were discussed at length with patient. Patient is in agreement with the above and verbalized understanding.    I discussed the goals of interventional chronic pain management with the patient on today's visit.  I explained the utility of injections for diagnostic and therapeutic purposes.  We discussed a multimodal approach to pain including treating the patient's given worst pain at any given time.  We will use a systematic approach to addressing pain.  We will also adopt a  multimodal approach that includes injections, adjuvant medications, physical therapy, at times psychiatry.  There may be a limited role for opioid use intermittently in the treatment of pain, more particularly for acute pain although no one approach can be used as a sole treatment modality.    I emphasized the importance of regular exercise, core strengthening and stretching, diet and weight loss as a cornerstone of long-term pain management.      Anam Montero MD  Interventional Pain Management  [facility]    Disclaimer:  This note was prepared using voice recognition system and is likely to have sound alike errors that may have been overlooked even after proof reading.  Please call me with any questions

## 2020-05-24 ENCOUNTER — PATIENT MESSAGE (OUTPATIENT)
Dept: PAIN MEDICINE | Facility: CLINIC | Age: 45
End: 2020-05-24

## 2020-05-28 ENCOUNTER — TELEPHONE (OUTPATIENT)
Dept: PAIN MEDICINE | Facility: CLINIC | Age: 45
End: 2020-05-28

## 2020-05-29 ENCOUNTER — OFFICE VISIT (OUTPATIENT)
Dept: PAIN MEDICINE | Facility: CLINIC | Age: 45
End: 2020-05-29
Payer: COMMERCIAL

## 2020-05-29 VITALS
TEMPERATURE: 98 F | SYSTOLIC BLOOD PRESSURE: 132 MMHG | HEIGHT: 63 IN | DIASTOLIC BLOOD PRESSURE: 84 MMHG | HEART RATE: 76 BPM | BODY MASS INDEX: 43.79 KG/M2 | WEIGHT: 247.13 LBS

## 2020-05-29 DIAGNOSIS — I89.0 LYMPHEDEMA OF UPPER EXTREMITY FOLLOWING LYMPHADENECTOMY: ICD-10-CM

## 2020-05-29 DIAGNOSIS — M79.12 MYALGIA OF AUXILIARY MUSCLES, HEAD AND NECK: ICD-10-CM

## 2020-05-29 DIAGNOSIS — M79.18 MYALGIA, OTHER SITE: Primary | ICD-10-CM

## 2020-05-29 DIAGNOSIS — M79.18 CHRONIC MYOFASCIAL PAIN: ICD-10-CM

## 2020-05-29 DIAGNOSIS — G89.29 CHRONIC MYOFASCIAL PAIN: ICD-10-CM

## 2020-05-29 DIAGNOSIS — E89.89 LYMPHEDEMA OF UPPER EXTREMITY FOLLOWING LYMPHADENECTOMY: ICD-10-CM

## 2020-05-29 PROCEDURE — 99214 PR OFFICE/OUTPT VISIT, EST, LEVL IV, 30-39 MIN: ICD-10-PCS | Mod: 25,S$GLB,, | Performed by: PHYSICAL MEDICINE & REHABILITATION

## 2020-05-29 PROCEDURE — 3075F PR MOST RECENT SYSTOLIC BLOOD PRESS GE 130-139MM HG: ICD-10-PCS | Mod: CPTII,S$GLB,, | Performed by: PHYSICAL MEDICINE & REHABILITATION

## 2020-05-29 PROCEDURE — 99214 OFFICE O/P EST MOD 30 MIN: CPT | Mod: 25,S$GLB,, | Performed by: PHYSICAL MEDICINE & REHABILITATION

## 2020-05-29 PROCEDURE — 20553 NJX 1/MLT TRIGGER POINTS 3/>: CPT | Mod: S$GLB,,, | Performed by: PHYSICAL MEDICINE & REHABILITATION

## 2020-05-29 PROCEDURE — 99999 PR PBB SHADOW E&M-EST. PATIENT-LVL IV: CPT | Mod: PBBFAC,,, | Performed by: PHYSICAL MEDICINE & REHABILITATION

## 2020-05-29 PROCEDURE — 20553 PR INJECT TRIGGER POINTS, > 3: ICD-10-PCS | Mod: S$GLB,,, | Performed by: PHYSICAL MEDICINE & REHABILITATION

## 2020-05-29 PROCEDURE — 3075F SYST BP GE 130 - 139MM HG: CPT | Mod: CPTII,S$GLB,, | Performed by: PHYSICAL MEDICINE & REHABILITATION

## 2020-05-29 PROCEDURE — 99999 PR PBB SHADOW E&M-EST. PATIENT-LVL IV: ICD-10-PCS | Mod: PBBFAC,,, | Performed by: PHYSICAL MEDICINE & REHABILITATION

## 2020-05-29 PROCEDURE — 3079F PR MOST RECENT DIASTOLIC BLOOD PRESSURE 80-89 MM HG: ICD-10-PCS | Mod: CPTII,S$GLB,, | Performed by: PHYSICAL MEDICINE & REHABILITATION

## 2020-05-29 PROCEDURE — 3008F BODY MASS INDEX DOCD: CPT | Mod: CPTII,S$GLB,, | Performed by: PHYSICAL MEDICINE & REHABILITATION

## 2020-05-29 PROCEDURE — 3079F DIAST BP 80-89 MM HG: CPT | Mod: CPTII,S$GLB,, | Performed by: PHYSICAL MEDICINE & REHABILITATION

## 2020-05-29 PROCEDURE — 3008F PR BODY MASS INDEX (BMI) DOCUMENTED: ICD-10-PCS | Mod: CPTII,S$GLB,, | Performed by: PHYSICAL MEDICINE & REHABILITATION

## 2020-05-29 RX ORDER — KETOROLAC TROMETHAMINE 30 MG/ML
30 INJECTION, SOLUTION INTRAMUSCULAR; INTRAVENOUS ONCE AS NEEDED
Status: COMPLETED | OUTPATIENT
Start: 2020-05-29 | End: 2020-05-29

## 2020-05-29 RX ADMIN — KETOROLAC TROMETHAMINE 30 MG: 30 INJECTION, SOLUTION INTRAMUSCULAR; INTRAVENOUS at 09:05

## 2020-05-29 NOTE — PROGRESS NOTES
Established Patient Chronic Pain Note (Follow up visit)    Chief Complaint:   Chief Complaint   Patient presents with    Muscle Pain     R. side of middle back       SUBJECTIVE:    Akiko Jameson is a 45 y.o. female who presents to the clinic for a follow-up appointment for mid back pain.  At the last visit, she was provided trigger point injections to the thoracic paraspinals and upper/middle trapezius on the right.  She reports that the trigger point injections provided 100% relief at the areas injected, but is having pain in other areas that she would like to also get injected today.  She was also started on Robaxin 500 mg up to 3 times daily as needed for muscle related pain.  She reports that the Robaxin has been of minimal benefit.  Since the last visit, Akiko Jameson states the pain has been improving. Current pain intensity is 8/10.  We discussed lymphedema management, and she is interested in going to a physical therapy clinic to help address this issue.    Patient denies night fever/night sweats, urinary incontinence, bowel incontinence, significant weight loss, significant motor weakness and loss of sensations.    Pain Disability Index Review:   No flowsheet data found.    Initial HPI 05/15/2020:  Akiko Jameson is a 45 y.o. female, right-hand dominant, who presents to the clinic for the evaluation of mid back pain. The pain started 25+ years ago following a lymph node removal on the right that resulted in lymphedema of the right upper extremity and symptoms have been worsening.The pain is located in the right mid back area and radiates to the along the length of the upper to lower back.  The pain is described as aching, burning and throbbing and is rated as 6/10. The pain is rated with a score of  0/10 on the BEST day and a score of 10/10 on the WORST day.  Symptoms interfere with daily activity, sleeping and work. The pain is exacerbated by increased activity or use of the  right upper extremity.  The pain is mitigated by rest. The patient reports spending 2-4 hours per day reclining. The patient reports 6-8 hours of uninterrupted sleep per night.  She reports that she works as a .         Non-Pharmacologic Treatments:  Physical Therapy/Home Exercise: yes  Ice/Heat:yes  TENS: yes  Acupuncture: no  Massage: yes  Chiropractic: yes    Other: yes, compression sleeves        Pain Medications:  - Opioids: Norco  - Adjuvant Medications: Relafen, Robaxin  - Anti-Coagulants: None      report:  Reviewed and consistent with medication use as prescribed.          Pain Procedures:   -previously underwent thoracic epidurals x2 with first one being beneficial but the second one not so much  -05/15/2020:  Trigger point injections to the thoracic paraspinals and upper/middle trapezius on the right, 100% relief        Imaging:   X-ray bilateral knees 03/16/2016:  AP and PA flexion standing views of both knees as well as lateral and Merchant views of both knees were obtained.  The joint spaces are grossly preserved.  Mild patellofemoral spurring and prominent patellar enthesophytes bilaterally.  No joint effusion.  No acute fracture or malalignment.     MRI right knee 03/21/2016:  The anterior cruciate ligament, posterior cruciate ligament, medial collateral ligament, lateral collateral ligament complex normal popliteus tendon, IT band, quadriceps tendon, and patellar tendon are normal in appearance.    There is a complex, primarily radial tear of the posterior horn of the medial meniscus which extends to the posterior root attachment of the medial meniscus.  The medial meniscus is intact.    The articular cartilage of the medial and lateral tibiofemoral joint compartments is intact.  There is a 14 mm width area of full-thickness abnormal chondral signal observed in the lateral patellar facet at the level of the patellar midpole.  No abnormal subcortical signal abnormality  appreciated.    There is quadriceps tendon and patellar tendon insertion enthesophyte formation at their respective attachments on the patella.  There is a small knee joint effusion present.  No acute fracture or pathologic marrow replacement process.  There is hematopoietic  bone marrow present within the distal femur and proximal tibia.        PMHx,PSHx, Social history, and Family history:  I have reviewed the patient's medical, surgical, social, and family history in detail and updated the computerized patient record.        Review of patient's allergies indicates:   Allergen Reactions    Biaxin [clarithromycin] Swelling    Prilosec [omeprazole magnesium] Swelling    Prevacid [lansoprazole] Swelling     Lip swelling- was taking this along with blood pressure medication: benazepril; not sure which caused it. Reports she has taken OTC prilosec without any problems.    Ace inhibitors Swelling     Facial swelling    Benazepril Swelling     Lip swelling       Current Outpatient Medications   Medication Sig    cetirizine (ZYRTEC) 10 MG tablet Take 1 tablet (10 mg total) by mouth once daily.    diltiaZEM (CARDIZEM CD) 120 MG Cp24 Take 1 capsule (120 mg total) by mouth once daily.    docusate sodium (COLACE) 100 MG capsule Take 100 mg by mouth every other day.     fluticasone propionate (FLONASE) 50 mcg/actuation nasal spray Use 2 sprays to each nostril daily (Patient taking differently: 2 sprays by Each Nostril route once daily. Use 2 sprays to each nostril daily)    methocarbamoL (ROBAXIN) 500 MG Tab Take 1 tablet (500 mg total) by mouth 3 (three) times daily as needed (muscle spasms). May cause drowsiness.    montelukast (SINGULAIR) 10 mg tablet Take 1 tablet (10 mg total) by mouth nightly.    multivitamin (THERAGRAN) per tablet Take 1 tablet by mouth once daily. Every day    potassium chloride SA (K-DUR,KLOR-CON) 20 MEQ tablet Take 1 tablet (20 mEq total) by mouth 2 (two) times daily. Appointment needed  for further refills after this one    tobramycin sulfate 0.3% (TOBREX) 0.3 % ophthalmic solution Instill 1 drop into affected eye(s) q 4-6 hr    triamterene-hydrochlorothiazide 37.5-25 mg (MAXZIDE-25) 37.5-25 mg per tablet Take 1 tablet by mouth once daily.    calcium carbonate (OS-CARLA) 500 mg calcium (1,250 mg) chewable tablet Take 1 tablet by mouth daily as needed for Heartburn.    famotidine (PEPCID) 10 MG tablet Take 10 mg by mouth once daily.    hyoscyamine (LEVSIN/SL) 0.125 mg Subl Take 2 tablets (0.25 mg total) by mouth every 4 (four) hours as needed. (Patient not taking: Reported on 12/7/2019)    nabumetone (RELAFEN) 500 MG tablet Take 1 tablet (500 mg total) by mouth 2 (two) times daily.    ondansetron (ZOFRAN-ODT) 8 MG TbDL Take 1 tablet (8 mg total) by mouth every 8 (eight) hours as needed (Nausea).    ranitidine (ZANTAC) 300 MG tablet Take 1 tablet (300 mg total) by mouth every evening.     Current Facility-Administered Medications   Medication    ketorolac injection 30 mg         REVIEW OF SYSTEMS:    GENERAL:  No weight loss, malaise or fevers.  HEENT:   No recent changes in vision or hearing  NECK:  Negative for lumps, no difficulty with swallowing.  RESPIRATORY:  Negative for cough, wheezing or shortness of breath, patient denies any recent URI.  CARDIOVASCULAR:  Negative for chest pain, leg swelling or palpitations.  GI:  Negative for abdominal discomfort, blood in stools or black stools or change in bowel habits.  MUSCULOSKELETAL:  See HPI.  SKIN:  Negative for lesions, rash, and itching.  PSYCH:  No mood disorder or recent psychosocial stressors.  Patients sleep is not disturbed secondary to pain.  HEMATOLOGY/LYMPHOLOGY:  Negative for prolonged bleeding, bruising easily or swollen nodes.  Patient is not currently taking any anti-coagulants  NEURO:   No history of headaches, syncope, paralysis, seizures or tremors.  All other reviewed and negative other than HPI.    OBJECTIVE:    /84  "  Pulse 76   Temp 98.3 °F (36.8 °C)   Ht 5' 3" (1.6 m)   Wt 112.1 kg (247 lb 2.2 oz)   BMI 43.78 kg/m²     PHYSICAL EXAMINATION:    GENERAL: Well appearing, in no acute distress, alert and oriented x3.  PSYCH:  Mood and affect appropriate.  SKIN: Skin color, texture, turgor normal, no rashes or lesions.  HEAD/FACE:  Normocephalic, atraumatic. Cranial nerves grossly intact.  NECK: No pain to palpation over the cervical paraspinous muscles. Spurling Negative. No pain with neck flexion, extension, or lateral flexion.   CV: RRR with palpation of the radial artery.  PULM: No evidence of respiratory difficulty, symmetric chest rise.  GI:  Soft and non-tender.  BACK:  Tenderness to palpation over the thoracic paraspinals, lower trapezius, and latissimus dorsi on the right.  Straight leg raising in the sitting and supine positions is negative to radicular pain.  Mild pain to palpation over the facet joints of the lumbar spine and lumbar paraspinals. Normal range of motion without pain reproduction.  EXTREMITIES: Peripheral joint ROM is full and pain free without obvious instability or laxity in all four extremities.  There is significant lymphedema present throughout the right upper extremity.  No other deformities or skin discoloration. Good capillary refill.  MUSCULOSKELETAL: Shoulder, hip, and knee provocative maneuvers are negative.  Bilateral upper and lower extremity strength is normal and symmetric.  No atrophy or tone abnormalities are noted.  NEURO: Bilateral upper and lower extremity coordination and muscle stretch reflexes are physiologic and symmetric.  Plantar response are downgoing. No clonus.  No loss of sensation is noted.  GAIT: normal.      LABS:  Lab Results   Component Value Date    WBC 14.60 (H) 02/29/2020    HGB 10.7 (L) 02/29/2020    HCT 34.7 (L) 02/29/2020    MCV 80 (L) 02/29/2020     02/29/2020       CMP  Sodium   Date Value Ref Range Status   02/29/2020 135 (L) 136 - 145 mmol/L Final "     Potassium   Date Value Ref Range Status   02/29/2020 3.7 3.5 - 5.1 mmol/L Final     Chloride   Date Value Ref Range Status   02/29/2020 103 95 - 110 mmol/L Final     CO2   Date Value Ref Range Status   02/29/2020 23 23 - 29 mmol/L Final     Glucose   Date Value Ref Range Status   02/29/2020 117 (H) 70 - 110 mg/dL Final     BUN, Bld   Date Value Ref Range Status   02/29/2020 10 6 - 20 mg/dL Final     Creatinine   Date Value Ref Range Status   02/29/2020 0.9 0.5 - 1.4 mg/dL Final     Calcium   Date Value Ref Range Status   02/29/2020 9.0 8.7 - 10.5 mg/dL Final     Total Protein   Date Value Ref Range Status   08/13/2019 7.8 6.0 - 8.4 g/dL Final     Albumin   Date Value Ref Range Status   08/13/2019 4.2 3.5 - 5.2 g/dL Final     Total Bilirubin   Date Value Ref Range Status   08/13/2019 0.3 0.2 - 1.3 mg/dL Final     Alkaline Phosphatase   Date Value Ref Range Status   08/13/2019 84 38 - 145 U/L Final     AST   Date Value Ref Range Status   08/13/2019 25 14 - 36 U/L Final     ALT   Date Value Ref Range Status   08/13/2019 21 10 - 44 U/L Final     Anion Gap   Date Value Ref Range Status   02/29/2020 9 8 - 16 mmol/L Final     eGFR if    Date Value Ref Range Status   02/29/2020 >60 >60 mL/min/1.73 m^2 Final     eGFR if non    Date Value Ref Range Status   02/29/2020 >60 >60 mL/min/1.73 m^2 Final     Comment:     Calculation used to obtain the estimated glomerular filtration  rate (eGFR) is the CKD-EPI equation.          Lab Results   Component Value Date    HGBA1C 5.8 (H) 08/31/2017             ASSESSMENT: 45 y.o. year old female with mid back pain, consistent with     1. Myalgia, other site  ketorolac injection 30 mg    Ambulatory referral/consult to Chiropractic   2. Lymphedema of upper extremity following lymphadenectomy  ketorolac injection 30 mg   3. Chronic myofascial pain  ketorolac injection 30 mg   4. Myalgia of auxiliary muscles, head and neck  ketorolac injection 30 mg          PLAN:   - Interventions: Performed trigger point injections to the thoracic paraspinals , middle trapezius, and latissimus dorsi today in the clinic for diagnostic and therapeutic purposes.. Explained the risks and benefits of the procedure in detail with the patient today in clinic along with alternative treatment options, and the patient elected to pursue the intervention at this time.  Verbal consent obtained, see procedure note below for more details     - Anticoagulation use: no      - Medications: I have stressed the importance of physical activity and a home exercise plan to help with pain and improve health. and Patient can continue with medications for now since they are providing benefits, using them appropriately, and without side effects.   continue with Robaxin 500 mg up to 3 times daily as needed for muscle related pain.  May also continue with Tylenol as needed.     - Therapy:   referral placed to chiropractic care clinic which patient is familiar with and has had lymphedema management with in the past.  Advised patient that in order to address her pain most effectively, she should target her treatment to lymphedema management as the weight of the right upper extremity is a causing excess stress to the axial skeletal support system..     - Labs:  Reviewed     - Imaging: Reviewed available imaging with patient and answered any questions they had regarding study.  No new imaging needed at this time.     - Consults/Referrals:   chiropractor/physical therapy for lymphedema management     - Records:  Reviewed/Obtain old records from outside physicians and imaging     - Follow up visit: return to clinic in 8 weeks or as needed     - Counseled patient regarding the importance of activity modification and physical therapy     - This condition does not require this patient to take time off of work, and the primary goal of our Pain Management services is to improve the patient's functional  capacity.     - Patient Questions: Answered all of the patient's questions regarding diagnosis, therapy, and treatment     PROCEDURE NOTE:  Trigger Point Injection:   The procedure was discussed with the patient including complications of nerve damage,  bleeding, infection, and failure of pain relief.   Trigger points were identified by palpation and marked. Alcohol swab prep of sites done. A mixture of 4mL 1% lidocaine +  30 mg Toradol   was prepared (5 mL total).   A 25-gauge needle was advanced to the point of maximal tenderness, and  1 to 1.25mL  was injected after negative aspiration. All sites done in the same manner. Patient tolerated the procedure well and without complications. Patient was monitored for 15 min following the injection before discharged from the clinic.  Sites injected included:  thoracic paraspinals, lower trapezius, and latissimus dorsi on the right      The above plan and management options were discussed at length with patient. Patient is in agreement with the above and verbalized understanding.      Anam Montero MD  Interventional Pain Management  [facility]    Disclaimer:  This note was prepared using voice recognition system and is likely to have sound alike errors that may have been overlooked even after proof reading.  Please call me with any questions

## 2020-05-29 NOTE — PATIENT INSTRUCTIONS
- performed repeat trigger point injections to the thoracic paraspinals, lower trapezius, and latissimus dorsi today in the clinic for diagnostic and therapeutic purposes.  - referral placed to a chiropractor/physical therapy for lymphedema management  - Continue current medications as prescribed.  - Continue home based exercises and discussed the importance of a healthy and active lifestyle  - Return for follow up in 8 weeks or as needed.    Please do not hesitate to contact the clinic [phone] if you have any questions regarding your treatment plan.     Anam Montero MD  Interventional Pain Medicine  [facility] [place]

## 2020-06-08 ENCOUNTER — OFFICE VISIT (OUTPATIENT)
Dept: FAMILY MEDICINE | Facility: CLINIC | Age: 45
End: 2020-06-08
Payer: COMMERCIAL

## 2020-06-08 ENCOUNTER — HOSPITAL ENCOUNTER (OUTPATIENT)
Dept: RADIOLOGY | Facility: HOSPITAL | Age: 45
Discharge: HOME | End: 2020-06-08
Attending: NURSE PRACTITIONER
Payer: COMMERCIAL

## 2020-06-08 VITALS
DIASTOLIC BLOOD PRESSURE: 78 MMHG | WEIGHT: 246 LBS | BODY MASS INDEX: 43.59 KG/M2 | SYSTOLIC BLOOD PRESSURE: 136 MMHG | HEART RATE: 78 BPM | TEMPERATURE: 99 F | HEIGHT: 63 IN

## 2020-06-08 DIAGNOSIS — R10.9 RIGHT FLANK PAIN: ICD-10-CM

## 2020-06-08 DIAGNOSIS — R10.84 GENERALIZED ABDOMINAL PAIN: ICD-10-CM

## 2020-06-08 DIAGNOSIS — Z98.890 HISTORY OF NISSEN FUNDOPLICATION: ICD-10-CM

## 2020-06-08 DIAGNOSIS — R10.33 PERIUMBILICAL ABDOMINAL PAIN: ICD-10-CM

## 2020-06-08 LAB
BILIRUB UR QL STRIP: NEGATIVE
CLARITY UR: CLEAR
COLOR UR: YELLOW
GLUCOSE UR QL STRIP: NEGATIVE
HGB UR QL STRIP: ABNORMAL
KETONES UR QL STRIP: NEGATIVE
LEUKOCYTE ESTERASE UR QL STRIP: NEGATIVE
NITRITE UR QL STRIP: NEGATIVE
PH UR STRIP: 6 [PH] (ref 5–8)
PROT UR QL STRIP: NEGATIVE
SP GR UR STRIP: 1.01 (ref 1–1.03)
URN SPEC COLLECT METH UR: ABNORMAL

## 2020-06-08 PROCEDURE — 3008F PR BODY MASS INDEX (BMI) DOCUMENTED: ICD-10-PCS | Mod: CPTII,S$GLB,, | Performed by: NURSE PRACTITIONER

## 2020-06-08 PROCEDURE — 99214 PR OFFICE/OUTPT VISIT, EST, LEVL IV, 30-39 MIN: ICD-10-PCS | Mod: S$GLB,,, | Performed by: NURSE PRACTITIONER

## 2020-06-08 PROCEDURE — 3078F DIAST BP <80 MM HG: CPT | Mod: CPTII,S$GLB,, | Performed by: NURSE PRACTITIONER

## 2020-06-08 PROCEDURE — 81002 URINALYSIS NONAUTO W/O SCOPE: CPT | Mod: PO

## 2020-06-08 PROCEDURE — 3075F PR MOST RECENT SYSTOLIC BLOOD PRESS GE 130-139MM HG: ICD-10-PCS | Mod: CPTII,S$GLB,, | Performed by: NURSE PRACTITIONER

## 2020-06-08 PROCEDURE — 3075F SYST BP GE 130 - 139MM HG: CPT | Mod: CPTII,S$GLB,, | Performed by: NURSE PRACTITIONER

## 2020-06-08 PROCEDURE — 3008F BODY MASS INDEX DOCD: CPT | Mod: CPTII,S$GLB,, | Performed by: NURSE PRACTITIONER

## 2020-06-08 PROCEDURE — 99999 PR PBB SHADOW E&M-EST. PATIENT-LVL III: CPT | Mod: PBBFAC,,, | Performed by: NURSE PRACTITIONER

## 2020-06-08 PROCEDURE — 74177 CT ABDOMEN PELVIS WITH CONTRAST: ICD-10-PCS | Mod: 26,,, | Performed by: RADIOLOGY

## 2020-06-08 PROCEDURE — 3078F PR MOST RECENT DIASTOLIC BLOOD PRESSURE < 80 MM HG: ICD-10-PCS | Mod: CPTII,S$GLB,, | Performed by: NURSE PRACTITIONER

## 2020-06-08 PROCEDURE — 99999 PR PBB SHADOW E&M-EST. PATIENT-LVL III: ICD-10-PCS | Mod: PBBFAC,,, | Performed by: NURSE PRACTITIONER

## 2020-06-08 PROCEDURE — 74177 CT ABD & PELVIS W/CONTRAST: CPT | Mod: TC,PO

## 2020-06-08 PROCEDURE — 99214 OFFICE O/P EST MOD 30 MIN: CPT | Mod: S$GLB,,, | Performed by: NURSE PRACTITIONER

## 2020-06-08 PROCEDURE — 74177 CT ABD & PELVIS W/CONTRAST: CPT | Mod: 26,,, | Performed by: RADIOLOGY

## 2020-06-08 PROCEDURE — 25500020 PHARM REV CODE 255: Mod: PO | Performed by: NURSE PRACTITIONER

## 2020-06-08 RX ADMIN — IOHEXOL 1000 ML: 12 SOLUTION ORAL at 02:06

## 2020-06-08 RX ADMIN — IOHEXOL 100 ML: 350 INJECTION, SOLUTION INTRAVENOUS at 02:06

## 2020-06-08 NOTE — PROGRESS NOTES
Subjective:       Patient ID: Akiok Jameson is a 45 y.o. female.    Chief Complaint: Flank Pain and Back Pain    Flank Pain   This is a chronic problem. The current episode started 1 to 4 weeks ago. The problem occurs constantly. The problem is unchanged. The quality of the pain is described as aching. Associated symptoms include abdominal pain. Pertinent negatives include no chest pain, dysuria, fever, headaches or weight loss.   Back Pain   Associated symptoms include abdominal pain. Pertinent negatives include no chest pain, dysuria, fever, headaches or weight loss.   Abdominal Pain   This is a new problem. The current episode started 1 to 4 weeks ago. The onset quality is undetermined. The problem occurs constantly. The problem has been rapidly worsening. The pain is located in the RUQ, epigastric region and periumbilical region. The pain is at a severity of 8/10. The pain is moderate. The quality of the pain is cramping, sharp and tearing. Associated symptoms include anorexia, constipation, diarrhea, flatus, myalgias and nausea. Pertinent negatives include no arthralgias, belching, dysuria, fever, frequency, headaches, hematochezia, hematuria, melena, vomiting or weight loss. The pain is aggravated by deep breathing and movement. The pain is relieved by nothing. She has tried acetaminophen for the symptoms. The treatment provided no relief. Prior diagnostic workup includes surgery. Her past medical history is significant for abdominal surgery, gallstones and GERD. There is no history of colon cancer, Crohn's disease, irritable bowel syndrome, pancreatitis, PUD or ulcerative colitis. Patient's medical history does not include kidney stones and UTI.   She was seen by pain management and given steroid injections, this gave relief to pain in the shoulder, arm, and upper back but did not improve the right flank, lower back, or abdominal pain. Surgery in February, Nissen fundoplication for severe acid  reflux.     Review of Systems   Constitutional: Negative for fatigue, fever, unexpected weight change and weight loss.   HENT: Negative for ear pain and sore throat.    Eyes: Negative for pain and visual disturbance.   Respiratory: Negative for cough and shortness of breath.    Cardiovascular: Negative for chest pain and palpitations.   Gastrointestinal: Positive for abdominal pain, anorexia, constipation, diarrhea, flatus and nausea. Negative for hematochezia, melena and vomiting.   Genitourinary: Positive for flank pain. Negative for dysuria, frequency and hematuria.   Musculoskeletal: Positive for back pain and myalgias. Negative for arthralgias.   Skin: Negative for color change and rash.   Neurological: Negative for dizziness and headaches.   Psychiatric/Behavioral: Negative for dysphoric mood and sleep disturbance. The patient is not nervous/anxious.        Vitals:    06/08/20 0710   BP: 136/78   Pulse: 78   Temp: 98.8 °F (37.1 °C)       Objective:     Current Outpatient Medications   Medication Sig Dispense Refill    cetirizine (ZYRTEC) 10 MG tablet Take 1 tablet (10 mg total) by mouth once daily.  0    diltiaZEM (CARDIZEM CD) 120 MG Cp24 Take 1 capsule (120 mg total) by mouth once daily. 90 capsule 3    docusate sodium (COLACE) 100 MG capsule Take 100 mg by mouth every other day.       fluticasone propionate (FLONASE) 50 mcg/actuation nasal spray Use 2 sprays to each nostril daily (Patient taking differently: 2 sprays by Each Nostril route once daily. Use 2 sprays to each nostril daily) 16 g 12    methocarbamoL (ROBAXIN) 500 MG Tab Take 1 tablet (500 mg total) by mouth 3 (three) times daily as needed (muscle spasms). May cause drowsiness. 90 tablet 1    montelukast (SINGULAIR) 10 mg tablet Take 1 tablet (10 mg total) by mouth nightly. 90 tablet 3    multivitamin (THERAGRAN) per tablet Take 1 tablet by mouth once daily. Every day      potassium chloride SA (K-DUR,KLOR-CON) 20 MEQ tablet Take 1 tablet  (20 mEq total) by mouth 2 (two) times daily. Appointment needed for further refills after this one 60 tablet 11    triamterene-hydrochlorothiazide 37.5-25 mg (MAXZIDE-25) 37.5-25 mg per tablet Take 1 tablet by mouth once daily. 90 tablet 3    hyoscyamine (LEVSIN/SL) 0.125 mg Subl Take 2 tablets (0.25 mg total) by mouth every 4 (four) hours as needed. (Patient not taking: Reported on 12/7/2019) 20 tablet 11    ondansetron (ZOFRAN-ODT) 8 MG TbDL Take 1 tablet (8 mg total) by mouth every 8 (eight) hours as needed (Nausea). 12 tablet 2     No current facility-administered medications for this visit.        Physical Exam   Constitutional: She is oriented to person, place, and time. She appears well-developed and well-nourished. No distress.   HENT:   Head: Normocephalic and atraumatic.   Eyes: Pupils are equal, round, and reactive to light. EOM are normal.   Neck: Normal range of motion. Neck supple.   Cardiovascular: Normal rate and regular rhythm.   Pulmonary/Chest: Effort normal and breath sounds normal.   Abdominal: Soft. Normal appearance and bowel sounds are normal. There is tenderness in the right upper quadrant and periumbilical area. There is CVA tenderness.   Musculoskeletal: Normal range of motion.   Neurological: She is alert and oriented to person, place, and time.   Skin: Skin is warm and dry. No rash noted.   Psychiatric: She has a normal mood and affect. Judgment normal.   Nursing note and vitals reviewed.      Assessment:       1. Periumbilical abdominal pain    2. Generalized abdominal pain    3. Right flank pain    4. History of Nissen fundoplication        Plan:   Periumbilical abdominal pain  -     Urinalysis  -     CT Abdomen Pelvis With Contrast; Future; Expected date: 06/08/2020    Generalized abdominal pain  -     CT Abdomen Pelvis With Contrast; Future; Expected date: 06/08/2020    Right flank pain  -     CT Abdomen Pelvis With Contrast; Future; Expected date: 06/08/2020    History of Nissen  fundoplication  -     CT Abdomen Pelvis With Contrast; Future; Expected date: 06/08/2020        Follow up if symptoms worsen or fail to improve.    There are no Patient Instructions on file for this visit.

## 2020-07-09 ENCOUNTER — OFFICE VISIT (OUTPATIENT)
Dept: SURGERY | Facility: CLINIC | Age: 45
End: 2020-07-09
Payer: COMMERCIAL

## 2020-07-09 VITALS
SYSTOLIC BLOOD PRESSURE: 134 MMHG | TEMPERATURE: 98 F | DIASTOLIC BLOOD PRESSURE: 84 MMHG | WEIGHT: 240.31 LBS | HEART RATE: 71 BPM | BODY MASS INDEX: 42.57 KG/M2

## 2020-07-09 DIAGNOSIS — E66.01 OBESITY, CLASS III, BMI 40-49.9 (MORBID OBESITY): ICD-10-CM

## 2020-07-09 DIAGNOSIS — Z98.890 S/P NISSEN FUNDOPLICATION (WITHOUT GASTROSTOMY TUBE) PROCEDURE: Primary | ICD-10-CM

## 2020-07-09 PROCEDURE — 3079F PR MOST RECENT DIASTOLIC BLOOD PRESSURE 80-89 MM HG: ICD-10-PCS | Mod: CPTII,S$GLB,, | Performed by: SURGERY

## 2020-07-09 PROCEDURE — 3075F PR MOST RECENT SYSTOLIC BLOOD PRESS GE 130-139MM HG: ICD-10-PCS | Mod: CPTII,S$GLB,, | Performed by: SURGERY

## 2020-07-09 PROCEDURE — 99213 PR OFFICE/OUTPT VISIT, EST, LEVL III, 20-29 MIN: ICD-10-PCS | Mod: S$GLB,,, | Performed by: SURGERY

## 2020-07-09 PROCEDURE — 3079F DIAST BP 80-89 MM HG: CPT | Mod: CPTII,S$GLB,, | Performed by: SURGERY

## 2020-07-09 PROCEDURE — 3075F SYST BP GE 130 - 139MM HG: CPT | Mod: CPTII,S$GLB,, | Performed by: SURGERY

## 2020-07-09 PROCEDURE — 99999 PR PBB SHADOW E&M-EST. PATIENT-LVL III: ICD-10-PCS | Mod: PBBFAC,,, | Performed by: SURGERY

## 2020-07-09 PROCEDURE — 99999 PR PBB SHADOW E&M-EST. PATIENT-LVL III: CPT | Mod: PBBFAC,,, | Performed by: SURGERY

## 2020-07-09 PROCEDURE — 3008F PR BODY MASS INDEX (BMI) DOCUMENTED: ICD-10-PCS | Mod: CPTII,S$GLB,, | Performed by: SURGERY

## 2020-07-09 PROCEDURE — 99213 OFFICE O/P EST LOW 20 MIN: CPT | Mod: S$GLB,,, | Performed by: SURGERY

## 2020-07-09 PROCEDURE — 3008F BODY MASS INDEX DOCD: CPT | Mod: CPTII,S$GLB,, | Performed by: SURGERY

## 2020-07-09 RX ORDER — TRAMADOL HYDROCHLORIDE 50 MG/1
50 TABLET ORAL EVERY 6 HOURS PRN
COMMUNITY
End: 2020-11-05

## 2020-07-09 NOTE — PROGRESS NOTES
General Surgery                History & Physical      Subjective:         Patient ID: Akiko Jameson is a 45 y.o. female.    Chief Complaint: Follow-up (3 month)    5 months status post robotic Nissen fundoplication for severe GERD.  She continues to do well.  She denies any reflux symptoms.  Is not on any antacid medications.  She does state some discomfort with carbonated beverages.  She is able to eat bread, meat, and all solid foods without dysphagia.  She has lost approximately 30 lb.    Review of Systems   Constitutional: Negative for unexpected weight change.   HENT: Negative for congestion.    Eyes: Negative for visual disturbance.   Respiratory: Negative for shortness of breath.    Cardiovascular: Negative for chest pain.   Gastrointestinal: Negative for abdominal pain.   Genitourinary: Negative for difficulty urinating.   Skin: Negative for rash.   Allergic/Immunologic: Negative for immunocompromised state.   Neurological: Negative for weakness.   Psychiatric/Behavioral: The patient is not nervous/anxious.          Objective:      Physical Exam  Vitals signs reviewed.   Constitutional:       General: She is not in acute distress.     Appearance: She is well-developed. She is obese.   HENT:      Head: Normocephalic and atraumatic.   Neck:      Musculoskeletal: Neck supple.   Cardiovascular:      Rate and Rhythm: Normal rate and regular rhythm.   Pulmonary:      Effort: Pulmonary effort is normal.   Abdominal:      General: There is no distension.      Palpations: Abdomen is soft.      Tenderness: There is no abdominal tenderness.      Comments: Incisions well healed   Musculoskeletal: Normal range of motion.   Skin:     General: Skin is warm.   Neurological:      Mental Status: She is alert and oriented to person, place, and time.           Assessment/Plan:   S/P Nissen fundoplication (without gastrostomy tube) procedure    Obesity, Class III, BMI 40-49.9 (morbid obesity)      Return to  [facility].    Jina Kaufman

## 2020-08-03 ENCOUNTER — TELEPHONE (OUTPATIENT)
Dept: HEMATOLOGY/ONCOLOGY | Facility: CLINIC | Age: 45
End: 2020-08-03

## 2020-08-03 DIAGNOSIS — D50.8 OTHER IRON DEFICIENCY ANEMIA: Primary | ICD-10-CM

## 2020-08-03 NOTE — TELEPHONE ENCOUNTER
----- Message from Mily  sent at 8/3/2020 11:51 AM CDT -----  Regarding: orders  Contact: pt  Type: Needs Medical Advice    Who Called:  pt    Best Call Back Number:     Additional Information: Requesting a call back from Nurse, regarding pt needs orders for blood work before appt 08/06/20 ,please call back with advice

## 2020-08-03 NOTE — TELEPHONE ENCOUNTER
S/w pt.  Pt made herself an appt on 8/6/2020.  Pt would like to have her yrly labs drawn prior to that appt.  Labs ordered per last visit note.  Pt verbalized understanding.

## 2020-08-06 ENCOUNTER — OFFICE VISIT (OUTPATIENT)
Dept: HEMATOLOGY/ONCOLOGY | Facility: CLINIC | Age: 45
End: 2020-08-06
Payer: COMMERCIAL

## 2020-08-06 ENCOUNTER — LAB VISIT (OUTPATIENT)
Dept: LAB | Facility: HOSPITAL | Age: 45
End: 2020-08-06
Attending: NURSE PRACTITIONER
Payer: COMMERCIAL

## 2020-08-06 VITALS
WEIGHT: 240.31 LBS | HEART RATE: 91 BPM | DIASTOLIC BLOOD PRESSURE: 79 MMHG | TEMPERATURE: 98 F | RESPIRATION RATE: 20 BRPM | SYSTOLIC BLOOD PRESSURE: 125 MMHG | BODY MASS INDEX: 42.57 KG/M2 | OXYGEN SATURATION: 98 %

## 2020-08-06 DIAGNOSIS — D50.8 OTHER IRON DEFICIENCY ANEMIA: ICD-10-CM

## 2020-08-06 DIAGNOSIS — D63.8 CHRONIC DISEASE ANEMIA: ICD-10-CM

## 2020-08-06 DIAGNOSIS — D72.10 IDIOPATHIC EOSINOPHILIA: Primary | ICD-10-CM

## 2020-08-06 LAB
ALBUMIN SERPL BCP-MCNC: 4 G/DL (ref 3.5–5.2)
ALP SERPL-CCNC: 109 U/L (ref 38–145)
ALT SERPL W/O P-5'-P-CCNC: 23 U/L (ref 0–35)
ANION GAP SERPL CALC-SCNC: 5 MMOL/L (ref 8–16)
AST SERPL-CCNC: 28 U/L (ref 14–36)
BASOPHILS # BLD AUTO: 0.04 K/UL (ref 0–0.2)
BASOPHILS NFR BLD: 0.6 % (ref 0–1.9)
BILIRUB SERPL-MCNC: 0.2 MG/DL (ref 0.2–1.3)
BUN SERPL-MCNC: 15 MG/DL (ref 7–18)
CALCIUM SERPL-MCNC: 8.9 MG/DL (ref 8.4–10.2)
CHLORIDE SERPL-SCNC: 103 MMOL/L (ref 95–110)
CO2 SERPL-SCNC: 30 MMOL/L (ref 22–31)
CREAT SERPL-MCNC: 0.95 MG/DL (ref 0.5–1.4)
DIFFERENTIAL METHOD: ABNORMAL
EOSINOPHIL # BLD AUTO: 0.6 K/UL (ref 0–0.5)
EOSINOPHIL NFR BLD: 8.6 % (ref 0–8)
ERYTHROCYTE [DISTWIDTH] IN BLOOD BY AUTOMATED COUNT: 14.1 % (ref 11.5–14.5)
EST. GFR  (AFRICAN AMERICAN): >60 ML/MIN/1.73 M^2
EST. GFR  (NON AFRICAN AMERICAN): >60 ML/MIN/1.73 M^2
FERRITIN SERPL-MCNC: 80 NG/ML (ref 7–137)
GLUCOSE SERPL-MCNC: 106 MG/DL (ref 70–110)
HCT VFR BLD AUTO: 33.8 % (ref 37–48.5)
HGB BLD-MCNC: 10.6 G/DL (ref 12–16)
IMM GRANULOCYTES # BLD AUTO: 0.02 K/UL (ref 0–0.04)
IMM GRANULOCYTES NFR BLD AUTO: 0.3 % (ref 0–0.5)
IRON SATN MFR SERPL: 12 % (ref 20–50)
IRON SERPL-MCNC: 35 UG/DL (ref 30–160)
LYMPHOCYTES # BLD AUTO: 2.2 K/UL (ref 1–4.8)
LYMPHOCYTES NFR BLD: 34.1 % (ref 18–48)
MCH RBC QN AUTO: 25.5 PG (ref 27–31)
MCHC RBC AUTO-ENTMCNC: 31.4 G/DL (ref 32–36)
MCV RBC AUTO: 81 FL (ref 82–98)
MONOCYTES # BLD AUTO: 0.6 K/UL (ref 0.3–1)
MONOCYTES NFR BLD: 9.4 % (ref 4–15)
NEUTROPHILS # BLD AUTO: 3 K/UL (ref 1.8–7.7)
NEUTROPHILS NFR BLD: 47 % (ref 38–73)
NRBC BLD-RTO: 0 /100 WBC
PLATELET # BLD AUTO: 323 K/UL (ref 150–350)
PMV BLD AUTO: 10.3 FL (ref 9.2–12.9)
POTASSIUM SERPL-SCNC: 3.6 MMOL/L (ref 3.5–5.1)
PROT SERPL-MCNC: 7.8 G/DL (ref 6–8.4)
RBC # BLD AUTO: 4.15 M/UL (ref 4–5.4)
SODIUM SERPL-SCNC: 138 MMOL/L (ref 136–145)
TOTAL IRON BINDING CAPACITY: 282 UG/DL (ref 265–497)
WBC # BLD AUTO: 6.4 K/UL (ref 3.9–12.7)

## 2020-08-06 PROCEDURE — 3008F BODY MASS INDEX DOCD: CPT | Mod: CPTII,S$GLB,, | Performed by: NURSE PRACTITIONER

## 2020-08-06 PROCEDURE — 36415 COLL VENOUS BLD VENIPUNCTURE: CPT | Mod: PN

## 2020-08-06 PROCEDURE — 85025 COMPLETE CBC W/AUTO DIFF WBC: CPT | Mod: PN

## 2020-08-06 PROCEDURE — 84238 ASSAY NONENDOCRINE RECEPTOR: CPT

## 2020-08-06 PROCEDURE — 80053 COMPREHEN METABOLIC PANEL: CPT

## 2020-08-06 PROCEDURE — 80053 COMPREHEN METABOLIC PANEL: CPT | Mod: PN

## 2020-08-06 PROCEDURE — 3078F DIAST BP <80 MM HG: CPT | Mod: CPTII,S$GLB,, | Performed by: NURSE PRACTITIONER

## 2020-08-06 PROCEDURE — 3074F PR MOST RECENT SYSTOLIC BLOOD PRESSURE < 130 MM HG: ICD-10-PCS | Mod: CPTII,S$GLB,, | Performed by: NURSE PRACTITIONER

## 2020-08-06 PROCEDURE — 82728 ASSAY OF FERRITIN: CPT

## 2020-08-06 PROCEDURE — 83540 ASSAY OF IRON: CPT | Mod: PN

## 2020-08-06 PROCEDURE — 3008F PR BODY MASS INDEX (BMI) DOCUMENTED: ICD-10-PCS | Mod: CPTII,S$GLB,, | Performed by: NURSE PRACTITIONER

## 2020-08-06 PROCEDURE — 3078F PR MOST RECENT DIASTOLIC BLOOD PRESSURE < 80 MM HG: ICD-10-PCS | Mod: CPTII,S$GLB,, | Performed by: NURSE PRACTITIONER

## 2020-08-06 PROCEDURE — 83540 ASSAY OF IRON: CPT

## 2020-08-06 PROCEDURE — 99999 PR PBB SHADOW E&M-EST. PATIENT-LVL IV: CPT | Mod: PBBFAC,,, | Performed by: NURSE PRACTITIONER

## 2020-08-06 PROCEDURE — 3074F SYST BP LT 130 MM HG: CPT | Mod: CPTII,S$GLB,, | Performed by: NURSE PRACTITIONER

## 2020-08-06 PROCEDURE — 82728 ASSAY OF FERRITIN: CPT | Mod: PN

## 2020-08-06 PROCEDURE — 85025 COMPLETE CBC W/AUTO DIFF WBC: CPT

## 2020-08-06 PROCEDURE — 99213 PR OFFICE/OUTPT VISIT, EST, LEVL III, 20-29 MIN: ICD-10-PCS | Mod: S$GLB,,, | Performed by: NURSE PRACTITIONER

## 2020-08-06 PROCEDURE — 99213 OFFICE O/P EST LOW 20 MIN: CPT | Mod: S$GLB,,, | Performed by: NURSE PRACTITIONER

## 2020-08-06 PROCEDURE — 99999 PR PBB SHADOW E&M-EST. PATIENT-LVL IV: ICD-10-PCS | Mod: PBBFAC,,, | Performed by: NURSE PRACTITIONER

## 2020-08-06 RX ORDER — AMOXICILLIN 500 MG/1
500 CAPSULE ORAL
COMMUNITY
End: 2020-08-18 | Stop reason: ALTCHOICE

## 2020-08-06 NOTE — PROGRESS NOTES
HISTORY OF PRESENT ILLNESS:  This is a 45-year-old black female known to Dr. Caicedo for anemia of chronic disease as well as idiopathic eosinophilia.  The patient did undergo a unilateral bone marrow aspiration/biopsy in 2007, which did not reveal any clonal population of cells on flow and no evidence of dysplasia within the marrow.  She is surveilled annually.    She presents late to clinic for evaluation.  She reports remaining active and well.  She states that she has had (2) surgeries since our last visit:  (1) Lap Nissen fundoplication, 02/28/2020, Dr. Kaufman & (2) Hysterectomy.  She denies   any recent illness or difficulties with allergies.  She denies any fevers, chills, drenching night sweats, painful lymphadenopathy, unexplained weight loss, weakness, fatigue, chest pain, skin rashes, pruritus, SOB, headaches, pica, etc. No other new complaints or pertinent findings on a 14-point review of systems.    PHYSICAL EXAMINATION:  GENERAL:  Well-developed, well-nourished, obese black female in no acute distress.  Alert and oriented x3.  VITAL SIGNS:  Weight:  Loss of 11 pounds in 2 years  Wt Readings from Last 3 Encounters:   08/06/20 109 kg (240 lb 4.8 oz)   07/09/20 109 kg (240 lb 4.8 oz)   06/08/20 111.6 kg (246 lb)     Temp Readings from Last 3 Encounters:   08/06/20 98 °F (36.7 °C) (Temporal)   07/09/20 98.1 °F (36.7 °C) (Oral)   06/08/20 98.8 °F (37.1 °C)     BP Readings from Last 3 Encounters:   08/06/20 125/79   07/09/20 134/84   06/08/20 136/78     Pulse Readings from Last 3 Encounters:   08/06/20 91   07/09/20 71   06/08/20 78     HEENT:  Normocephalic, atraumatic.  Oral mucosa pink and moist.  Lips without lesions.  Tongue midline.  Oropharynx clear.  Nonicteric sclerae.   NECK:  Supple, no adenopathy.  No carotid bruits, thyromegaly or thyroid nodule.  HEART:  Regular rate and rhythm without murmur, gallop or rub.                LUNGS:  Clear to auscultation bilaterally.  Normal respiratory effort.        ABDOMEN:  Soft, nontender, nondistended with positive normoactive bowel sounds.  EXTREMITIES:  Mild pretibial to ankle edema bilateral.  No cyanosis or clubbing.  Distal pulses are intact.  AXILLAE AND GROIN:  No palpable pathologic lymphadenopathy is appreciated.        SKIN:  Intact/turgor normal                                                       NEUROLOGIC:  Cranial nerves II-XII grossly intact.  Motor:  Good muscle bulk and tone.  Strength/sensory 5/5 throughout.  Gait stable.     LABORATORY:    Lab Results   Component Value Date    WBC 6.40 08/06/2020    RBC 4.15 08/06/2020    HGB 10.6 (L) 08/06/2020    HCT 33.8 (L) 08/06/2020    MCV 81 (L) 08/06/2020    MCH 25.5 (L) 08/06/2020    MCHC 31.4 (L) 08/06/2020    RDW 14.1 08/06/2020     08/06/2020    MPV 10.3 08/06/2020    GRAN 3.0 08/06/2020    GRAN 47.0 08/06/2020    LYMPH 2.2 08/06/2020    LYMPH 34.1 08/06/2020    MONO 0.6 08/06/2020    MONO 9.4 08/06/2020    EOS 0.6 (H) 08/06/2020    BASO 0.04 08/06/2020    EOSINOPHIL 8.6 (H) 08/06/2020    BASOPHIL 0.6 08/06/2020     CMP  Sodium   Date Value Ref Range Status   08/06/2020 138 136 - 145 mmol/L Final     Potassium   Date Value Ref Range Status   08/06/2020 3.6 3.5 - 5.1 mmol/L Final     Chloride   Date Value Ref Range Status   08/06/2020 103 95 - 110 mmol/L Final     CO2   Date Value Ref Range Status   08/06/2020 30 22 - 31 mmol/L Final     Glucose   Date Value Ref Range Status   08/06/2020 106 70 - 110 mg/dL Final     Comment:     The ADA recommends the following guidelines for fasting glucose:  Normal:       less than 100 mg/dL  Prediabetes:  100 mg/dL to 125 mg/dL  Diabetes:     126 mg/dL or higher       BUN, Bld   Date Value Ref Range Status   08/06/2020 15 7 - 18 mg/dL Final     Creatinine   Date Value Ref Range Status   08/06/2020 0.95 0.50 - 1.40 mg/dL Final     Calcium   Date Value Ref Range Status   08/06/2020 8.9 8.4 - 10.2 mg/dL Final     Total Protein   Date Value Ref Range Status    08/06/2020 7.8 6.0 - 8.4 g/dL Final     Albumin   Date Value Ref Range Status   08/06/2020 4.0 3.5 - 5.2 g/dL Final     Total Bilirubin   Date Value Ref Range Status   08/06/2020 0.2 0.2 - 1.3 mg/dL Final     Alkaline Phosphatase   Date Value Ref Range Status   08/06/2020 109 38 - 145 U/L Final     AST   Date Value Ref Range Status   08/06/2020 28 14 - 36 U/L Final     ALT   Date Value Ref Range Status   08/06/2020 23 0 - 35 U/L Final     Anion Gap   Date Value Ref Range Status   08/06/2020 5 (L) 8 - 16 mmol/L Final     eGFR if    Date Value Ref Range Status   08/06/2020 >60 >60 mL/min/1.73 m^2 Final     eGFR if non    Date Value Ref Range Status   08/06/2020 >60 >60 mL/min/1.73 m^2 Final     Comment:     Calculation used to obtain the estimated glomerular filtration  rate (eGFR) is the CKD-EPI equation.        IMPRESSION:  1.  Anemia of chronic disease, stable; will draw iron studies/ferritin/sTFR today  2.  Mild idiopathic eosinophilia - elevated, stable.  3.  Chronic hypokalemia - stable; followed by Dr. Booth.  4.  Obstructive sleep apnea -  compliant with CPAP.  5.  Chronic right upper extremity lymphedema - stable, not followed in the Lymphedema Clinic.  6.  Recent diagnosis of hepatomegaly -  followed in Hepatology.    PLAN:   In regards to #1 and #2, phone review iron studies today & f/u in one year with interval CBC, CMP for reevaluation.      Assessment/plan reviewed and approved by Dr. Caicedo.    Lab Results   Component Value Date    IRON 35 08/06/2020    TIBC 282 08/06/2020    FERRITIN 80 08/06/2020     Iron saturation = 12%

## 2020-08-10 LAB — STFR SERPL-MCNC: 3.1 MG/L (ref 1.8–4.6)

## 2020-08-18 ENCOUNTER — OFFICE VISIT (OUTPATIENT)
Dept: FAMILY MEDICINE | Facility: CLINIC | Age: 45
End: 2020-08-18
Payer: COMMERCIAL

## 2020-08-18 VITALS
HEIGHT: 63 IN | WEIGHT: 242.81 LBS | BODY MASS INDEX: 43.02 KG/M2 | HEART RATE: 97 BPM | TEMPERATURE: 98 F | SYSTOLIC BLOOD PRESSURE: 137 MMHG | DIASTOLIC BLOOD PRESSURE: 77 MMHG

## 2020-08-18 DIAGNOSIS — E89.89 LYMPHEDEMA OF UPPER EXTREMITY FOLLOWING LYMPHADENECTOMY: ICD-10-CM

## 2020-08-18 DIAGNOSIS — Z12.31 ENCOUNTER FOR SCREENING MAMMOGRAM FOR MALIGNANT NEOPLASM OF BREAST: ICD-10-CM

## 2020-08-18 DIAGNOSIS — I89.0 LYMPHEDEMA OF UPPER EXTREMITY FOLLOWING LYMPHADENECTOMY: ICD-10-CM

## 2020-08-18 DIAGNOSIS — L03.113 CELLULITIS OF RIGHT HAND: Primary | ICD-10-CM

## 2020-08-18 PROCEDURE — 3008F PR BODY MASS INDEX (BMI) DOCUMENTED: ICD-10-PCS | Mod: CPTII,S$GLB,, | Performed by: FAMILY MEDICINE

## 2020-08-18 PROCEDURE — 3078F DIAST BP <80 MM HG: CPT | Mod: CPTII,S$GLB,, | Performed by: FAMILY MEDICINE

## 2020-08-18 PROCEDURE — 99213 PR OFFICE/OUTPT VISIT, EST, LEVL III, 20-29 MIN: ICD-10-PCS | Mod: S$GLB,,, | Performed by: FAMILY MEDICINE

## 2020-08-18 PROCEDURE — 3075F SYST BP GE 130 - 139MM HG: CPT | Mod: CPTII,S$GLB,, | Performed by: FAMILY MEDICINE

## 2020-08-18 PROCEDURE — 99999 PR PBB SHADOW E&M-EST. PATIENT-LVL IV: CPT | Mod: PBBFAC,,, | Performed by: FAMILY MEDICINE

## 2020-08-18 PROCEDURE — 3078F PR MOST RECENT DIASTOLIC BLOOD PRESSURE < 80 MM HG: ICD-10-PCS | Mod: CPTII,S$GLB,, | Performed by: FAMILY MEDICINE

## 2020-08-18 PROCEDURE — 3075F PR MOST RECENT SYSTOLIC BLOOD PRESS GE 130-139MM HG: ICD-10-PCS | Mod: CPTII,S$GLB,, | Performed by: FAMILY MEDICINE

## 2020-08-18 PROCEDURE — 99999 PR PBB SHADOW E&M-EST. PATIENT-LVL IV: ICD-10-PCS | Mod: PBBFAC,,, | Performed by: FAMILY MEDICINE

## 2020-08-18 PROCEDURE — 99213 OFFICE O/P EST LOW 20 MIN: CPT | Mod: S$GLB,,, | Performed by: FAMILY MEDICINE

## 2020-08-18 PROCEDURE — 3008F BODY MASS INDEX DOCD: CPT | Mod: CPTII,S$GLB,, | Performed by: FAMILY MEDICINE

## 2020-08-18 RX ORDER — HYDROCODONE BITARTRATE AND ACETAMINOPHEN 5; 325 MG/1; MG/1
1 TABLET ORAL EVERY 6 HOURS PRN
Qty: 20 TABLET | Refills: 0 | Status: SHIPPED | OUTPATIENT
Start: 2020-08-18 | End: 2020-08-23

## 2020-08-18 RX ORDER — SULFAMETHOXAZOLE AND TRIMETHOPRIM 800; 160 MG/1; MG/1
1 TABLET ORAL 2 TIMES DAILY
Qty: 20 TABLET | Refills: 0 | Status: SHIPPED | OUTPATIENT
Start: 2020-08-18 | End: 2020-08-28

## 2020-08-18 RX ORDER — AMOXICILLIN AND CLAVULANATE POTASSIUM 875; 125 MG/1; MG/1
1 TABLET, FILM COATED ORAL 2 TIMES DAILY
Qty: 20 TABLET | Refills: 0 | Status: SHIPPED | OUTPATIENT
Start: 2020-08-18 | End: 2020-08-28

## 2020-08-18 NOTE — PROGRESS NOTES
Patient presents with a complaint of pain and swelling in the right hand starting x1 day.  No fever.  She has had multiple prior episodes of cellulitis in the right hand and arm due to chronic lymphedema related to prior remote surgery.  Prior hospitalizations for same, but none recently.  She has had successful treatment in the past using Augmentin and Bactrim.     Past Medical History:  Past Medical History:   Diagnosis Date    ALLERGIC RHINITIS     Cellulitis     Eosinophilia     mild    GERD (gastroesophageal reflux disease)     Granular cell tumor     H. pylori infection 2018    Hypertension     Lymphedema     Mild anemia     KEIRY on CPAP     does not regularly    Sleep apnea     compliant with CPAP    Thyroid nodule     Urinary incontinence, mixed      Past Surgical History:   Procedure Laterality Date    24 HOUR IMPEDANCE PH MONITORING OF ESOPHAGUS IN PATIENT NOT TAKING ACID REDUCING MEDICATIONS N/A 1/6/2020    Procedure: IMPEDANCE PH STUDY, ESOPHAGEAL, 24 HOUR, IN PATIENT NOT TAKING ACID REDUCING MEDICATION;  Surgeon: First Available [place];  Location: [facility];  Service: Endoscopy;  Laterality: N/A;    AXILLARY NODE DISSECTION      granular cell tumor    BILATERAL SALPINGO-OOPHORECTOMY (BSO)  07/21/2020    BREAST CYST ASPIRATION      left    CHOLECYSTECTOMY      COLONOSCOPY N/A 5/10/2019    Procedure: COLONOSCOPY;  Surgeon: Edgar Gamble Jr., MD;  Location: [facility];  Service: Endoscopy;  Laterality: N/A;    ENDOMETRIAL ABLATION      ESOPHAGEAL MANOMETRY WITH MEASUREMENT OF IMPEDANCE N/A 1/6/2020    Procedure: MANOMETRY, ESOPHAGUS, WITH IMPEDANCE MEASUREMENT;  Surgeon: First Available [place];  Location: [facility];  Service: Endoscopy;  Laterality: N/A;    ESOPHAGOGASTRODUODENOSCOPY N/A 7/5/2018    Procedure: EGD (ESOPHAGOGASTRODUODENOSCOPY);  Surgeon: Edgar Gamble Jr., MD;  Location: [facility];  Service: Endoscopy;  Laterality: N/A;    EXCISION OF MASS OF ABDOMEN  Left 6/18/2018    Procedure: EXCISION, MASS, ABDOMEN - flank left;  Surgeon: Armando Tate MD;  Location: [facility] OR;  Service: General;  Laterality: Left;  left flank removal of mass    FINE NEEDLE ASPIRATION      thyroid    HYSTERECTOMY  07/21/2020    KNEE ARTHROSCOPY W/ MENISCECTOMY Right     NASAL SEPTUM SURGERY      ROBOT-ASSISTED NISSEN FUNDOPLICATION USING DA TAMAR XI N/A 2/28/2020    Procedure: XI ROBOTIC FUNDOPLICATION, NISSEN;  Surgeon: Jina Kaufman MD;  Location: [facility] OR;  Service: General;  Laterality: N/A;    TUBAL LIGATION      UPPER GASTROINTESTINAL ENDOSCOPY  07/05/2018    Dr. Gamble     Social History     Socioeconomic History    Marital status:      Spouse name: Not on file    Number of children: 2    Years of education: Not on file    Highest education level: Not on file   Occupational History     Employer: [employer]   Social Needs    Financial resource strain: Not hard at all    Food insecurity     Worry: Never true     Inability: Never true    Transportation needs     Medical: No     Non-medical: No   Tobacco Use    Smoking status: Never Smoker    Smokeless tobacco: Never Used   Substance and Sexual Activity    Alcohol use: No     Frequency: Never    Drug use: No    Sexual activity: Yes     Partners: Male   Lifestyle    Physical activity     Days per week: 2 days     Minutes per session: 0 min    Stress: Not at all   Relationships    Social connections     Talks on phone: More than three times a week     Gets together: Once a week     Attends Sikh service: Not on file     Active member of club or organization: Yes     Attends meetings of clubs or organizations: More than 4 times per year     Relationship status:    Other Topics Concern    Not on file   Social History Narrative    Not on file     Family History   Problem Relation Age of Onset    Diabetes Mother     Kidney failure Mother     Breast cancer Maternal Grandmother     Breast  cancer Maternal Aunt     Ovarian cancer Cousin     Breast cancer Cousin     Blindness Father     Cirrhosis Neg Hx     Colon polyps Neg Hx     Colon cancer Neg Hx     Crohn's disease Neg Hx     Esophageal cancer Neg Hx     Stomach cancer Neg Hx     Ulcerative colitis Neg Hx     Amblyopia Neg Hx     Cataracts Neg Hx     Glaucoma Neg Hx     Macular degeneration Neg Hx     Retinal detachment Neg Hx     Strabismus Neg Hx     Allergic rhinitis Neg Hx     Allergies Neg Hx     Angioedema Neg Hx     Asthma Neg Hx     Atopy Neg Hx     Eczema Neg Hx     Immunodeficiency Neg Hx     Rhinitis Neg Hx     Urticaria Neg Hx      Review of patient's allergies indicates:   Allergen Reactions    Biaxin [clarithromycin] Swelling    Prilosec [omeprazole magnesium] Swelling    Prevacid [lansoprazole] Swelling     Lip swelling- was taking this along with blood pressure medication: benazepril; not sure which caused it. Reports she has taken OTC prilosec without any problems.    Ace inhibitors Swelling     Facial swelling    Benazepril Swelling     Lip swelling     Current Outpatient Medications on File Prior to Visit   Medication Sig Dispense Refill    cetirizine (ZYRTEC) 10 MG tablet Take 1 tablet (10 mg total) by mouth once daily.  0    diltiaZEM (CARDIZEM CD) 120 MG Cp24 Take 1 capsule (120 mg total) by mouth once daily. 90 capsule 3    docusate sodium (COLACE) 100 MG capsule Take 100 mg by mouth every other day.       fluticasone propionate (FLONASE) 50 mcg/actuation nasal spray Use 2 sprays to each nostril daily (Patient taking differently: 2 sprays by Each Nostril route once daily. Use 2 sprays to each nostril daily) 16 g 12    montelukast (SINGULAIR) 10 mg tablet Take 1 tablet (10 mg total) by mouth nightly. 90 tablet 3    multivitamin (THERAGRAN) per tablet Take 1 tablet by mouth once daily. Every day      potassium chloride SA (K-DUR,KLOR-CON) 20 MEQ tablet Take 1 tablet (20 mEq total) by mouth 2  "(two) times daily. Appointment needed for further refills after this one 60 tablet 11    triamterene-hydrochlorothiazide 37.5-25 mg (MAXZIDE-25) 37.5-25 mg per tablet Take 1 tablet by mouth once daily. 90 tablet 3    [DISCONTINUED] amoxicillin (AMOXIL) 500 MG capsule Take 500 mg by mouth every 8 (eight) hours.      hyoscyamine (LEVSIN/SL) 0.125 mg Subl Take 2 tablets (0.25 mg total) by mouth every 4 (four) hours as needed. (Patient not taking: Reported on 12/7/2019) 20 tablet 11    ondansetron (ZOFRAN-ODT) 8 MG TbDL Take 1 tablet (8 mg total) by mouth every 8 (eight) hours as needed (Nausea). 12 tablet 2    traMADoL (ULTRAM) 50 mg tablet Take 50 mg by mouth every 6 (six) hours as needed for Pain.       No current facility-administered medications on file prior to visit.        Answers for HPI/ROS submitted by the patient on 8/18/2020   activity change: No  unexpected weight change: No  neck pain: No  hearing loss: No  rhinorrhea: No  trouble swallowing: No  eye discharge: No  visual disturbance: No  chest tightness: No  wheezing: No  chest pain: No  palpitations: No  blood in stool: No  constipation: No  vomiting: No  diarrhea: No  polydipsia: No  polyuria: No  difficulty urinating: No  hematuria: No  menstrual problem: No  dysuria: No  joint swelling: No  arthralgias: No  headaches: No  weakness: No  confusion: No  dysphoric mood: No      OBJECTIVE:     Vitals:    08/18/20 1519   BP: 137/77   Pulse: 97   Temp: 97.5 °F (36.4 °C)   Weight: 110.1 kg (242 lb 12.8 oz)   Height: 5' 3" (1.6 m)     Wt Readings from Last 3 Encounters:   08/18/20 110.1 kg (242 lb 12.8 oz)   08/06/20 109 kg (240 lb 4.8 oz)   07/09/20 109 kg (240 lb 4.8 oz)     APPEARANCE: Well nourished, well developed, in no acute distress.    HEAD: Normocephalic.  Atraumatic.    EYES:   Right eye: Pupil reactive.  Conjunctiva clear.    Left eye: Pupil reactive.  Conjunctiva clear.    NECK: Supple.  MENTAL STATUS: Alert.  Oriented x 3.  She has some " swelling and tenderness with associated lymphedema at the right hand and arm.  Some warmth, slight erythema.  No fluctuance.       Akiko was seen today for edema.    Diagnoses and all orders for this visit:    Cellulitis of right hand    Lymphedema of upper extremity following lymphadenectomy    Encounter for screening mammogram for malignant neoplasm of breast  -     Mammo Digital Screening Bilat w/ Jama; Future    Other orders  -     amoxicillin-clavulanate 875-125mg (AUGMENTIN) 875-125 mg per tablet; Take 1 tablet by mouth 2 (two) times daily. for 10 days  -     sulfamethoxazole-trimethoprim 800-160mg (BACTRIM DS) 800-160 mg Tab; Take 1 tablet by mouth 2 (two) times daily. for 10 days  -     HYDROcodone-acetaminophen (NORCO) 5-325 mg per tablet; Take 1 tablet by mouth every 6 (six) hours as needed for Pain.      Follow-up if symptoms worsen or not improving with above.  ER precautions.  Elevation.  Cool compresses.

## 2020-10-07 ENCOUNTER — TELEPHONE (OUTPATIENT)
Dept: PAIN MEDICINE | Facility: CLINIC | Age: 45
End: 2020-10-07

## 2020-10-07 NOTE — TELEPHONE ENCOUNTER
Call patient to offer to do virtual visit with Dr. Montero due to bad weather. Patient preferred to R/S her appointment to 10.16.2020 at [place].

## 2020-10-12 ENCOUNTER — OFFICE VISIT (OUTPATIENT)
Dept: FAMILY MEDICINE | Facility: CLINIC | Age: 45
End: 2020-10-12
Payer: COMMERCIAL

## 2020-10-12 DIAGNOSIS — R05.9 COUGH: ICD-10-CM

## 2020-10-12 DIAGNOSIS — U07.1 COVID-19: Primary | ICD-10-CM

## 2020-10-12 PROCEDURE — 99213 PR OFFICE/OUTPT VISIT, EST, LEVL III, 20-29 MIN: ICD-10-PCS | Mod: 95,,, | Performed by: NURSE PRACTITIONER

## 2020-10-12 PROCEDURE — 99213 OFFICE O/P EST LOW 20 MIN: CPT | Mod: 95,,, | Performed by: NURSE PRACTITIONER

## 2020-10-12 RX ORDER — PROMETHAZINE HYDROCHLORIDE AND DEXTROMETHORPHAN HYDROBROMIDE 6.25; 15 MG/5ML; MG/5ML
5 SYRUP ORAL EVERY 8 HOURS PRN
Qty: 150 ML | Refills: 0 | Status: SHIPPED | OUTPATIENT
Start: 2020-10-12 | End: 2020-10-22

## 2020-10-12 RX ORDER — ALBUTEROL SULFATE 90 UG/1
2 AEROSOL, METERED RESPIRATORY (INHALATION) EVERY 6 HOURS PRN
Qty: 18 G | Refills: 0 | Status: SHIPPED | OUTPATIENT
Start: 2020-10-12 | End: 2021-01-25

## 2020-10-12 NOTE — PROGRESS NOTES
Subjective:       Patient ID: Akiko Jameson is a 45 y.o. female.    Chief Complaint: No chief complaint on file.   The patient location is: [place]  The chief complaint leading to consultation is: Positive COVID-19, cough    Visit type: audiovisual    Face to Face time with patient: 15 min  minutes of total time spent on the encounter, which includes face to face time and non-face to face time preparing to see the patient (eg, review of tests), Obtaining and/or reviewing separately obtained history, Documenting clinical information in the electronic or other health record, Independently interpreting results (not separately reported) and communicating results to the patient/family/caregiver, or Care coordination (not separately reported).         Each patient to whom he or she provides medical services by telemedicine is:  (1) informed of the relationship between the physician and patient and the respective role of any other health care provider with respect to management of the patient; and (2) notified that he or she may decline to receive medical services by telemedicine and may withdraw from such care at any time.    Notes:     Cough  This is a new problem. The current episode started in the past 7 days (Diagnosed with COVID-19 on Wednesday per pt report; states went [facility]). The problem has been waxing and waning. The problem occurs every few minutes. The cough is productive of sputum. Associated symptoms include nasal congestion and postnasal drip. Pertinent negatives include no chest pain, chills, ear congestion, ear pain, fever, headaches, heartburn, hemoptysis, myalgias, rash, rhinorrhea, sore throat, shortness of breath, sweats, weight loss or wheezing. Nothing aggravates the symptoms. She has tried OTC cough suppressant, body position changes, leukotriene antagonists and prescription cough suppressant for the symptoms. The treatment provided no relief. Her past medical history is significant for  bronchitis. There is no history of asthma, bronchiectasis, COPD, emphysema, environmental allergies or pneumonia.     Past Medical History:   Diagnosis Date    ALLERGIC RHINITIS     Cellulitis     Eosinophilia     mild    GERD (gastroesophageal reflux disease)     Granular cell tumor     H. pylori infection 2018    Hypertension     Lymphedema     Mild anemia     KEIRY on CPAP     does not regularly    Sleep apnea     compliant with CPAP    Thyroid nodule     Urinary incontinence, mixed      Social History     Socioeconomic History    Marital status:      Spouse name: Not on file    Number of children: 2    Years of education: Not on file    Highest education level: Not on file   Occupational History     Employer: [employer]   Social Needs    Financial resource strain: Not hard at all    Food insecurity     Worry: Never true     Inability: Never true    Transportation needs     Medical: No     Non-medical: No   Tobacco Use    Smoking status: Never Smoker    Smokeless tobacco: Never Used   Substance and Sexual Activity    Alcohol use: No     Frequency: Never    Drug use: No    Sexual activity: Yes     Partners: Male   Lifestyle    Physical activity     Days per week: 2 days     Minutes per session: 0 min    Stress: Not at all   Relationships    Social connections     Talks on phone: More than three times a week     Gets together: Once a week     Attends Synagogue service: Not on file     Active member of club or organization: Yes     Attends meetings of clubs or organizations: More than 4 times per year     Relationship status:    Other Topics Concern    Not on file   Social History Narrative    Not on file     Past Surgical History:   Procedure Laterality Date    24 HOUR IMPEDANCE PH MONITORING OF ESOPHAGUS IN PATIENT NOT TAKING ACID REDUCING MEDICATIONS N/A 1/6/2020    Procedure: IMPEDANCE PH STUDY, ESOPHAGEAL, 24 HOUR, IN PATIENT NOT TAKING ACID REDUCING MEDICATION;   Surgeon: First Available Tamika Panchal;  Location: [facility] ENDO;  Service: Endoscopy;  Laterality: N/A;    AXILLARY NODE DISSECTION      granular cell tumor    BILATERAL SALPINGO-OOPHORECTOMY (BSO)  07/21/2020    BREAST CYST ASPIRATION      left    CHOLECYSTECTOMY      COLONOSCOPY N/A 5/10/2019    Procedure: COLONOSCOPY;  Surgeon: Edgar Gamble Jr., MD;  Location: [facility] ENDO;  Service: Endoscopy;  Laterality: N/A;    ENDOMETRIAL ABLATION      ESOPHAGEAL MANOMETRY WITH MEASUREMENT OF IMPEDANCE N/A 1/6/2020    Procedure: MANOMETRY, ESOPHAGUS, WITH IMPEDANCE MEASUREMENT;  Surgeon: First Available Tamika Panchal;  Location: [facility] ENDO;  Service: Endoscopy;  Laterality: N/A;    ESOPHAGOGASTRODUODENOSCOPY N/A 7/5/2018    Procedure: EGD (ESOPHAGOGASTRODUODENOSCOPY);  Surgeon: Edgar Gamble Jr., MD;  Location: [facility];  Service: Endoscopy;  Laterality: N/A;    EXCISION OF MASS OF ABDOMEN Left 6/18/2018    Procedure: EXCISION, MASS, ABDOMEN - flank left;  Surgeon: Armando Tate MD;  Location: [facility] OR;  Service: General;  Laterality: Left;  left flank removal of mass    FINE NEEDLE ASPIRATION      thyroid    HYSTERECTOMY  07/21/2020    KNEE ARTHROSCOPY W/ MENISCECTOMY Right     NASAL SEPTUM SURGERY      ROBOT-ASSISTED NISSEN FUNDOPLICATION USING DA TAMAR XI N/A 2/28/2020    Procedure: XI ROBOTIC FUNDOPLICATION, NISSEN;  Surgeon: Jina Kaufman MD;  Location: [facility];  Service: General;  Laterality: N/A;    TUBAL LIGATION      UPPER GASTROINTESTINAL ENDOSCOPY  07/05/2018    Dr. Gamble       Review of Systems   Constitutional: Negative.  Negative for chills, fever and weight loss.   HENT: Positive for postnasal drip. Negative for ear pain, rhinorrhea and sore throat.    Eyes: Negative.    Respiratory: Positive for cough. Negative for hemoptysis, shortness of breath and wheezing.    Cardiovascular: Negative.  Negative for chest pain.   Gastrointestinal: Negative.  Negative for heartburn.   Endocrine:  Negative.    Genitourinary: Negative.    Musculoskeletal: Negative for myalgias.   Integumentary:  Negative for rash. Negative.   Allergic/Immunologic: Negative.  Negative for environmental allergies.   Neurological: Negative.  Negative for headaches.   Psychiatric/Behavioral: Negative.          Objective:      Physical Exam  Constitutional:       Appearance: Normal appearance.   Neurological:      Mental Status: She is alert.         Assessment:       1. COVID-19    2. Cough        Plan:           Diagnoses and all orders for this visit:    COVID-19  Cough  -     albuterol (PROVENTIL/VENTOLIN HFA) 90 mcg/actuation inhaler; Inhale 2 puffs into the lungs every 6 (six) hours as needed for Wheezing or Shortness of Breath.  -     promethazine-dextromethorphan (PROMETHAZINE-DM) 6.25-15 mg/5 mL Syrp; Take 5 mLs by mouth every 8 (eight) hours as needed.        -     COVID-19 Home Symptom Monitoring  - Duration (days): 14  Hydrate well  Rest  Tylenol OTC as directed  Report to ER immediately if symptoms worsen

## 2020-10-12 NOTE — PATIENT INSTRUCTIONS
Albuterol can cause jitteriness, palpitations  Cough syrup causes drowsiness  Hydrate well  Rest  Tylenol OTC as directed  Report to ER immediately if symptoms worsen

## 2020-10-13 ENCOUNTER — TELEPHONE (OUTPATIENT)
Dept: ADMINISTRATIVE | Facility: HOSPITAL | Age: 45
End: 2020-10-13

## 2020-10-13 NOTE — TELEPHONE ENCOUNTER
Pt rescheduled mammogram appt on 11/17/20 at 9:30am in [place] due to testing positive for Covid-19. lw

## 2020-10-20 ENCOUNTER — NURSE TRIAGE (OUTPATIENT)
Dept: ADMINISTRATIVE | Facility: CLINIC | Age: 45
End: 2020-10-20

## 2020-10-20 NOTE — TELEPHONE ENCOUNTER
Reason for Disposition   [1] COVID-19 diagnosed by HCP (doctor, NP or PA) AND [2] mild symptoms (e.g., cough, fever, others) AND [3] no complications or SOB    Additional Information   Negative: [1] COVID-19 diagnosed by positive lab test AND [2] mild symptoms (e.g., cough, fever, others) AND [3] no complications or SOB    Protocols used: CORONAVIRUS (COVID-19) DIAGNOSED OR [id number]

## 2020-10-20 NOTE — TELEPHONE ENCOUNTER
0943 1st attempt to call  [phone]: no answer; no contact; verified phone number; LM on VM to call for further assistance [phone] or call 911 or go to nearest ER in an emergency situation. vivit message sent with Covid-19 Home Care Advice.     0949 2nd attempt: call placed to [phone]: no answer; no contact, phone number verified.    1039 3rd attempt: call placed to [phone]; no answer; no contact.    1043 4ht attempt: call placed to [phone]; denies chest pain, fever, SOB, or difficulty breathing. Patient states that she has finished medications given to her by her PCP but still has a cough; Reviewed COVID-19 General Home Care Advice Protocol focusing on cough remedies and advised patient that I had sent more Home Care Protocol Advice information to her [employer] to also include COVID-19 protocol information for her to review; advised to call back with any further question or concerns; patient verbalizes understanding and agrees to follow care advice given.         Reason for Disposition   Message left on unidentified voice mail. Phone number verified.   Second attempt to contact family AND no contact made. Phone number verified.    Protocols used: NO CONTACT OR DUPLICATE CONTACT CALL-A-[place]

## 2020-11-05 ENCOUNTER — OFFICE VISIT (OUTPATIENT)
Dept: FAMILY MEDICINE | Facility: CLINIC | Age: 45
End: 2020-11-05
Payer: COMMERCIAL

## 2020-11-05 ENCOUNTER — HOSPITAL ENCOUNTER (OUTPATIENT)
Dept: RADIOLOGY | Facility: HOSPITAL | Age: 45
Discharge: HOME | End: 2020-11-05
Attending: NURSE PRACTITIONER
Payer: COMMERCIAL

## 2020-11-05 VITALS
SYSTOLIC BLOOD PRESSURE: 150 MMHG | TEMPERATURE: 98 F | DIASTOLIC BLOOD PRESSURE: 89 MMHG | BODY MASS INDEX: 41.11 KG/M2 | HEIGHT: 63 IN | WEIGHT: 232 LBS | HEART RATE: 97 BPM

## 2020-11-05 DIAGNOSIS — I10 ESSENTIAL HYPERTENSION: ICD-10-CM

## 2020-11-05 DIAGNOSIS — K21.9 GASTROESOPHAGEAL REFLUX DISEASE, UNSPECIFIED WHETHER ESOPHAGITIS PRESENT: Primary | ICD-10-CM

## 2020-11-05 DIAGNOSIS — R05.9 COUGH: ICD-10-CM

## 2020-11-05 DIAGNOSIS — E04.1 THYROID NODULE: ICD-10-CM

## 2020-11-05 PROCEDURE — 3077F PR MOST RECENT SYSTOLIC BLOOD PRESSURE >= 140 MM HG: ICD-10-PCS | Mod: CPTII,S$GLB,, | Performed by: NURSE PRACTITIONER

## 2020-11-05 PROCEDURE — 99999 PR PBB SHADOW E&M-EST. PATIENT-LVL IV: CPT | Mod: PBBFAC,,, | Performed by: NURSE PRACTITIONER

## 2020-11-05 PROCEDURE — 3077F SYST BP >= 140 MM HG: CPT | Mod: CPTII,S$GLB,, | Performed by: NURSE PRACTITIONER

## 2020-11-05 PROCEDURE — 3079F PR MOST RECENT DIASTOLIC BLOOD PRESSURE 80-89 MM HG: ICD-10-PCS | Mod: CPTII,S$GLB,, | Performed by: NURSE PRACTITIONER

## 2020-11-05 PROCEDURE — 71046 XR CHEST PA AND LATERAL: ICD-10-PCS | Mod: 26,,, | Performed by: RADIOLOGY

## 2020-11-05 PROCEDURE — 99214 PR OFFICE/OUTPT VISIT, EST, LEVL IV, 30-39 MIN: ICD-10-PCS | Mod: S$GLB,,, | Performed by: NURSE PRACTITIONER

## 2020-11-05 PROCEDURE — 3079F DIAST BP 80-89 MM HG: CPT | Mod: CPTII,S$GLB,, | Performed by: NURSE PRACTITIONER

## 2020-11-05 PROCEDURE — 99999 PR PBB SHADOW E&M-EST. PATIENT-LVL IV: ICD-10-PCS | Mod: PBBFAC,,, | Performed by: NURSE PRACTITIONER

## 2020-11-05 PROCEDURE — 99214 OFFICE O/P EST MOD 30 MIN: CPT | Mod: S$GLB,,, | Performed by: NURSE PRACTITIONER

## 2020-11-05 PROCEDURE — 3008F PR BODY MASS INDEX (BMI) DOCUMENTED: ICD-10-PCS | Mod: CPTII,S$GLB,, | Performed by: NURSE PRACTITIONER

## 2020-11-05 PROCEDURE — 71046 X-RAY EXAM CHEST 2 VIEWS: CPT | Mod: TC,PO

## 2020-11-05 PROCEDURE — 3008F BODY MASS INDEX DOCD: CPT | Mod: CPTII,S$GLB,, | Performed by: NURSE PRACTITIONER

## 2020-11-05 PROCEDURE — 71046 X-RAY EXAM CHEST 2 VIEWS: CPT | Mod: 26,,, | Performed by: RADIOLOGY

## 2020-11-05 RX ORDER — SUCRALFATE 1 G/1
1 TABLET ORAL 4 TIMES DAILY
Qty: 40 TABLET | Refills: 0 | Status: SHIPPED | OUTPATIENT
Start: 2020-11-05 | End: 2020-11-11 | Stop reason: ALTCHOICE

## 2020-11-05 RX ORDER — PROMETHAZINE HYDROCHLORIDE AND DEXTROMETHORPHAN HYDROBROMIDE 6.25; 15 MG/5ML; MG/5ML
5 SYRUP ORAL 2 TIMES DAILY PRN
Qty: 118 ML | Refills: 0 | Status: SHIPPED | OUTPATIENT
Start: 2020-11-05 | End: 2020-11-11

## 2020-11-05 NOTE — LETTER
November 5, 2020      [facility]  [address] [facility] [place] [address]  Phone: [phone]  Fax: [phone]       Patient: Akiko Jameson   YOB: 1975  Date of Visit: 11/05/2020    To Whom It May Concern:    Claudio Jameson  was at [facility] on 11/05/2020. She may return to work/school on 11/06/2020 with no restrictions. If you have any questions or concerns, or if I can be of further assistance, please do not hesitate to contact me.    Sincerely,    Ewelina Hsu LPN

## 2020-11-05 NOTE — PATIENT INSTRUCTIONS
Stop OTC cough medication  Phenergan dm causes drowsiness  Monitor BP daily; keep log x 1 w  Return log to clinic for review  Report to ER immediately if symptoms worsen

## 2020-11-05 NOTE — PROGRESS NOTES
"Subjective:       Patient ID: Akiko Jameson is a 45 y.o. female.    Chief Complaint: No chief complaint on file.    Gastroesophageal Reflux  She complains of coughing, heartburn and nausea. She reports no abdominal pain, no belching, no chest pain, no choking, no dysphagia, no early satiety, no globus sensation, no hoarse voice, no sore throat, no stridor, no tooth decay, no water brash or no wheezing. Pt states, "I can taste the acid.". This is a new problem. The current episode started in the past 7 days. The problem occurs frequently. The problem has been unchanged. The heartburn is located in the substernum. The heartburn is of moderate intensity. The heartburn does not wake her from sleep. The heartburn does not limit her activity. The heartburn changes with position. The symptoms are aggravated by lying down. Pertinent negatives include no anemia, fatigue, melena, muscle weakness, orthopnea or weight loss. Risk factors include obesity (Espohageal procedure 2/2020; positive COVID in October). She has tried nothing for the symptoms. The treatment provided no relief. Past procedures do not include an abdominal ultrasound, an EGD, esophageal manometry, esophageal pH monitoring, H. pylori antibody titer or a UGI. Past invasive treatments include reflux surgery (2/2020). Past invasive treatments do not include gastroplasty or gastroplication.   BP elevated today; pt states, "I've been taking over the counter cough medicine. That's why my blood pressure is up." Advised to stop OTC cough med immediately. Denies CP, HA, dizziness, SOB. Pt also request US thyroid; states, "my thyroid US is due. Can I get that done today?" Pt has no other complaints today.  Past Medical History:   Diagnosis Date    ALLERGIC RHINITIS     Cellulitis     Eosinophilia     mild    GERD (gastroesophageal reflux disease)     Granular cell tumor     H. pylori infection 2018    Hypertension     Lymphedema     Mild anemia     " KEIRY on CPAP     does not regularly    Sleep apnea     compliant with CPAP    Thyroid nodule     Urinary incontinence, mixed      Social History     Socioeconomic History    Marital status:      Spouse name: Not on file    Number of children: 2    Years of education: Not on file    Highest education level: Not on file   Occupational History     Employer: [employer]   Social Needs    Financial resource strain: Not hard at all    Food insecurity     Worry: Never true     Inability: Never true    Transportation needs     Medical: No     Non-medical: No   Tobacco Use    Smoking status: Never Smoker    Smokeless tobacco: Never Used   Substance and Sexual Activity    Alcohol use: No     Frequency: Never    Drug use: No    Sexual activity: Yes     Partners: Male   Lifestyle    Physical activity     Days per week: 2 days     Minutes per session: 0 min    Stress: Not at all   Relationships    Social connections     Talks on phone: More than three times a week     Gets together: Once a week     Attends Alevism service: Not on file     Active member of club or organization: Yes     Attends meetings of clubs or organizations: More than 4 times per year     Relationship status:    Other Topics Concern    Not on file   Social History Narrative    Not on file     Past Surgical History:   Procedure Laterality Date    24 HOUR IMPEDANCE PH MONITORING OF ESOPHAGUS IN PATIENT NOT TAKING ACID REDUCING MEDICATIONS N/A 1/6/2020    Procedure: IMPEDANCE PH STUDY, ESOPHAGEAL, 24 HOUR, IN PATIENT NOT TAKING ACID REDUCING MEDICATION;  Surgeon: First Available [place];  [facility];  Service: Endoscopy;  Laterality: N/A;    AXILLARY NODE DISSECTION      granular cell tumor    BILATERAL SALPINGO-OOPHORECTOMY (BSO)  07/21/2020    BREAST CYST ASPIRATION      left    CHOLECYSTECTOMY      COLONOSCOPY N/A 5/10/2019    Procedure: COLONOSCOPY;  Surgeon: Edgar Gamble Jr., MD;  Location[facility];   Service: Endoscopy;  Laterality: N/A;    ENDOMETRIAL ABLATION      ESOPHAGEAL MANOMETRY WITH MEASUREMENT OF IMPEDANCE N/A 1/6/2020    Procedure: MANOMETRY, ESOPHAGUS, WITH IMPEDANCE MEASUREMENT;  Surgeon: First Available [place];  Location: [facility] ENDO;  Service: Endoscopy;  Laterality: N/A;    ESOPHAGOGASTRODUODENOSCOPY N/A 7/5/2018    Procedure: EGD (ESOPHAGOGASTRODUODENOSCOPY);  Surgeon: Edgar Gamble Jr., MD;  Location: [facility] ENDO;  Service: Endoscopy;  Laterality: N/A;    EXCISION OF MASS OF ABDOMEN Left 6/18/2018    Procedure: EXCISION, MASS, ABDOMEN - flank left;  Surgeon: Armando Tate MD;  Location: [facility] OR;  Service: General;  Laterality: Left;  left flank removal of mass    FINE NEEDLE ASPIRATION      thyroid    HYSTERECTOMY  07/21/2020    KNEE ARTHROSCOPY W/ MENISCECTOMY Right     NASAL SEPTUM SURGERY      ROBOT-ASSISTED NISSEN FUNDOPLICATION USING DA TAMAR XI N/A 2/28/2020    Procedure: XI ROBOTIC FUNDOPLICATION, NISSEN;  Surgeon: Jina Kaufman MD;  Location: [facility] OR;  Service: General;  Laterality: N/A;    TUBAL LIGATION      UPPER GASTROINTESTINAL ENDOSCOPY  07/05/2018    Dr. Gamble       Review of Systems   Constitutional: Negative.  Negative for chills, fatigue, fever and weight loss.   HENT: Positive for postnasal drip. Negative for ear pain, hoarse voice, rhinorrhea and sore throat.    Eyes: Negative.    Respiratory: Positive for cough. Negative for choking, shortness of breath and wheezing.    Cardiovascular: Negative.  Negative for chest pain.   Gastrointestinal: Positive for heartburn, nausea and reflux. Negative for abdominal pain, dysphagia and melena.   Endocrine: Negative.    Genitourinary: Negative.    Musculoskeletal: Negative.  Negative for muscle weakness.   Integumentary:  Negative.   Allergic/Immunologic: Negative.  Negative for environmental allergies.   Neurological: Negative.    Psychiatric/Behavioral: Negative.          Objective:      Physical  Exam  Vitals signs and nursing note reviewed.   Constitutional:       Appearance: Normal appearance.   HENT:      Head: Normocephalic.      Right Ear: Tympanic membrane, ear canal and external ear normal.      Left Ear: Tympanic membrane, ear canal and external ear normal.      Nose: Nose normal.      Mouth/Throat:      Mouth: Mucous membranes are moist.      Pharynx: Oropharynx is clear.   Eyes:      Conjunctiva/sclera: Conjunctivae normal.      Pupils: Pupils are equal, round, and reactive to light.   Neck:      Musculoskeletal: Normal range of motion and neck supple.   Cardiovascular:      Rate and Rhythm: Normal rate and regular rhythm.      Pulses: Normal pulses.      Heart sounds: Normal heart sounds.   Pulmonary:      Effort: Pulmonary effort is normal.      Breath sounds: Normal breath sounds.   Abdominal:      General: Bowel sounds are normal.      Palpations: Abdomen is soft.      Tenderness: There is abdominal tenderness in the epigastric area. There is no right CVA tenderness, left CVA tenderness, guarding or rebound. Negative signs include Still's sign, Rovsing's sign, McBurney's sign, psoas sign and obturator sign.   Musculoskeletal: Normal range of motion.   Skin:     General: Skin is warm.      Capillary Refill: Capillary refill takes 2 to 3 seconds.   Neurological:      Mental Status: She is alert and oriented to person, place, and time.   Psychiatric:         Mood and Affect: Mood normal.         Behavior: Behavior normal.         Thought Content: Thought content normal.         Judgment: Judgment normal.         Assessment:       1. Gastroesophageal reflux disease, unspecified whether esophagitis present    2. Cough    3. Thyroid nodule    4. Essential hypertension        Plan:           Diagnoses and all orders for this visit:    Gastroesophageal reflux disease, unspecified whether esophagitis present  -     H. pylori antigen, stool; Future        -     sucralfate (CARAFATE) 1 gram tablet; Take  1 tablet (1 g total) by mouth 4 (four) times daily. for 10 days    Cough  -     X-Ray Chest PA And Lateral; Future  -     promethazine-dextromethorphan (PROMETHAZINE-DM) 6.25-15 mg/5 mL Syrp; Take 5 mLs by mouth 2 (two) times daily as needed.    Thyroid nodule  -     US Soft Tissue Head Neck Thyroid; Future    Essential hypertension  Monitor BP daily; keep log x 1 w  Return log to clinic for review    Report to ER immediately if symptoms worsen

## 2020-11-06 ENCOUNTER — PATIENT MESSAGE (OUTPATIENT)
Dept: SURGERY | Facility: CLINIC | Age: 45
End: 2020-11-06

## 2020-11-09 ENCOUNTER — PATIENT MESSAGE (OUTPATIENT)
Dept: FAMILY MEDICINE | Facility: CLINIC | Age: 45
End: 2020-11-09

## 2020-11-11 ENCOUNTER — OFFICE VISIT (OUTPATIENT)
Dept: FAMILY MEDICINE | Facility: CLINIC | Age: 45
End: 2020-11-11
Payer: COMMERCIAL

## 2020-11-11 VITALS
SYSTOLIC BLOOD PRESSURE: 136 MMHG | WEIGHT: 228.19 LBS | HEART RATE: 80 BPM | DIASTOLIC BLOOD PRESSURE: 82 MMHG | HEIGHT: 63 IN | BODY MASS INDEX: 40.43 KG/M2 | TEMPERATURE: 98 F

## 2020-11-11 DIAGNOSIS — K21.9 GASTROESOPHAGEAL REFLUX DISEASE, UNSPECIFIED WHETHER ESOPHAGITIS PRESENT: ICD-10-CM

## 2020-11-11 DIAGNOSIS — Z98.890 HISTORY OF NISSEN FUNDOPLICATION: ICD-10-CM

## 2020-11-11 DIAGNOSIS — Z86.16 HISTORY OF 2019 NOVEL CORONAVIRUS DISEASE (COVID-19): ICD-10-CM

## 2020-11-11 DIAGNOSIS — R05.9 COUGH: Primary | ICD-10-CM

## 2020-11-11 PROCEDURE — 99999 PR PBB SHADOW E&M-EST. PATIENT-LVL III: ICD-10-PCS | Mod: PBBFAC,,, | Performed by: FAMILY MEDICINE

## 2020-11-11 PROCEDURE — 99214 PR OFFICE/OUTPT VISIT, EST, LEVL IV, 30-39 MIN: ICD-10-PCS | Mod: 25,S$GLB,, | Performed by: FAMILY MEDICINE

## 2020-11-11 PROCEDURE — 3079F DIAST BP 80-89 MM HG: CPT | Mod: CPTII,S$GLB,, | Performed by: FAMILY MEDICINE

## 2020-11-11 PROCEDURE — 99999 PR PBB SHADOW E&M-EST. PATIENT-LVL III: CPT | Mod: PBBFAC,,, | Performed by: FAMILY MEDICINE

## 2020-11-11 PROCEDURE — 3079F PR MOST RECENT DIASTOLIC BLOOD PRESSURE 80-89 MM HG: ICD-10-PCS | Mod: CPTII,S$GLB,, | Performed by: FAMILY MEDICINE

## 2020-11-11 PROCEDURE — 90471 FLU VACCINE (QUAD) GREATER THAN OR EQUAL TO 3YO PRESERVATIVE FREE IM: ICD-10-PCS | Mod: S$GLB,,, | Performed by: FAMILY MEDICINE

## 2020-11-11 PROCEDURE — 3075F SYST BP GE 130 - 139MM HG: CPT | Mod: CPTII,S$GLB,, | Performed by: FAMILY MEDICINE

## 2020-11-11 PROCEDURE — 3008F BODY MASS INDEX DOCD: CPT | Mod: CPTII,S$GLB,, | Performed by: FAMILY MEDICINE

## 2020-11-11 PROCEDURE — 3075F PR MOST RECENT SYSTOLIC BLOOD PRESS GE 130-139MM HG: ICD-10-PCS | Mod: CPTII,S$GLB,, | Performed by: FAMILY MEDICINE

## 2020-11-11 PROCEDURE — 90686 FLU VACCINE (QUAD) GREATER THAN OR EQUAL TO 3YO PRESERVATIVE FREE IM: ICD-10-PCS | Mod: S$GLB,,, | Performed by: FAMILY MEDICINE

## 2020-11-11 PROCEDURE — 90686 IIV4 VACC NO PRSV 0.5 ML IM: CPT | Mod: S$GLB,,, | Performed by: FAMILY MEDICINE

## 2020-11-11 PROCEDURE — 90471 IMMUNIZATION ADMIN: CPT | Mod: S$GLB,,, | Performed by: FAMILY MEDICINE

## 2020-11-11 PROCEDURE — 3008F PR BODY MASS INDEX (BMI) DOCUMENTED: ICD-10-PCS | Mod: CPTII,S$GLB,, | Performed by: FAMILY MEDICINE

## 2020-11-11 PROCEDURE — 99214 OFFICE O/P EST MOD 30 MIN: CPT | Mod: 25,S$GLB,, | Performed by: FAMILY MEDICINE

## 2020-11-11 RX ORDER — FAMOTIDINE 20 MG/1
20 TABLET, FILM COATED ORAL 2 TIMES DAILY
Qty: 60 TABLET | Refills: 0 | Status: SHIPPED | OUTPATIENT
Start: 2020-11-11 | End: 2021-01-25

## 2020-11-11 NOTE — Clinical Note
ADRIAN Muro.  I think she recently sent you a message regarding this.  I recommended that she try Pepcid for 2 weeks and if symptoms not improved then would recommend referring her to you for endoscopy.  Thanks

## 2020-11-12 NOTE — PROGRESS NOTES
Patient complains of some cough occurring immediately after she eats.  She feels like she gets some burning to the throat.  Symptoms started over the past 2 weeks.  She did have COVID-19 approximately 4 weeks ago, but symptoms resolved.  Previous Nissen fundoplication.  She is currently taking Carafate without improvement.  Questionable allergy response in the past to omeprazole, doubt, but allergist had suggested she might need to have challenge with medication monitored.  She denies any chest pain or shortness of breath.  No fever chills.  No diarrhea.  Recent negative chest x-ray.      Akiko was seen today for cough.    Diagnoses and all orders for this visit:    Cough    History of 2019 novel coronavirus disease (COVID-19)    Gastroesophageal reflux disease, unspecified whether esophagitis present    History of Nissen fundoplication    Other orders  -     famotidine (PEPCID) 20 MG tablet; Take 1 tablet (20 mg total) by mouth 2 (two) times daily.  -     Influenza - Quadrivalent (PF)     Cough could be related to reflux.  Medication as above.  If no better in 2 weeks refer for endoscopy.              Past Medical History:  Past Medical History:   Diagnosis Date    ALLERGIC RHINITIS     Cellulitis     Eosinophilia     mild    GERD (gastroesophageal reflux disease)     Granular cell tumor     H. pylori infection 2018    History of 2019 novel coronavirus disease (COVID-19) 10/04/2020    Hypertension     Lymphedema     Mild anemia     KEIRY on CPAP     does not regularly    Sleep apnea     compliant with CPAP    Thyroid nodule     Urinary incontinence, mixed      Past Surgical History:   Procedure Laterality Date    24 HOUR IMPEDANCE PH MONITORING OF ESOPHAGUS IN PATIENT NOT TAKING ACID REDUCING MEDICATIONS N/A 1/6/2020    Procedure: IMPEDANCE PH STUDY, ESOPHAGEAL, 24 HOUR, IN PATIENT NOT TAKING ACID REDUCING MEDICATION;  Surgeon: First Available Tamika Panchal;  Location: [facility];  Service: Endoscopy;   Laterality: N/A;    AXILLARY NODE DISSECTION      granular cell tumor    BILATERAL SALPINGO-OOPHORECTOMY (BSO)  07/21/2020    BREAST CYST ASPIRATION      left    CHOLECYSTECTOMY      COLONOSCOPY N/A 5/10/2019    Procedure: COLONOSCOPY;  Surgeon: Edgar Gamble Jr., MD;  Location: [facility] ENDO;  Service: Endoscopy;  Laterality: N/A;    ENDOMETRIAL ABLATION      ESOPHAGEAL MANOMETRY WITH MEASUREMENT OF IMPEDANCE N/A 1/6/2020    Procedure: MANOMETRY, ESOPHAGUS, WITH IMPEDANCE MEASUREMENT;  Surgeon: First Puma Lundberg [place];  Location: [facility] ENDO;  Service: Endoscopy;  Laterality: N/A;    ESOPHAGOGASTRODUODENOSCOPY N/A 7/5/2018    Procedure: EGD (ESOPHAGOGASTRODUODENOSCOPY);  Surgeon: Edgar Gamble Jr., MD;  Location: [facility] ENDO;  Service: Endoscopy;  Laterality: N/A;    EXCISION OF MASS OF ABDOMEN Left 6/18/2018    Procedure: EXCISION, MASS, ABDOMEN - flank left;  Surgeon: Armando Tate MD;  Location: [facility] OR;  Service: General;  Laterality: Left;  left flank removal of mass    FINE NEEDLE ASPIRATION      thyroid    HYSTERECTOMY  07/21/2020    KNEE ARTHROSCOPY W/ MENISCECTOMY Right     NASAL SEPTUM SURGERY      ROBOT-ASSISTED NISSEN FUNDOPLICATION USING DA TAMAR XI N/A 2/28/2020    Procedure: XI ROBOTIC FUNDOPLICATION, NISSEN;  Surgeon: Jina Kaufman MD;  Location: [facility] OR;  Service: General;  Laterality: N/A;    TUBAL LIGATION      UPPER GASTROINTESTINAL ENDOSCOPY  07/05/2018    Dr. Gamble     Review of patient's allergies indicates:   Allergen Reactions    Biaxin [clarithromycin] Swelling    Prilosec [omeprazole magnesium] Swelling    Prevacid [lansoprazole] Swelling     Lip swelling- was taking this along with blood pressure medication: benazepril; not sure which caused it. Reports she has taken OTC prilosec without any problems.    Ace inhibitors Swelling     Facial swelling    Benazepril Swelling     Lip swelling     Current Outpatient Medications on File Prior to Visit    Medication Sig Dispense Refill    albuterol (PROVENTIL/VENTOLIN HFA) 90 mcg/actuation inhaler Inhale 2 puffs into the lungs every 6 (six) hours as needed for Wheezing or Shortness of Breath. 18 g 0    cetirizine (ZYRTEC) 10 MG tablet Take 1 tablet (10 mg total) by mouth once daily.  0    diltiaZEM (CARDIZEM CD) 120 MG Cp24 Take 1 capsule (120 mg total) by mouth once daily. 90 capsule 3    docusate sodium (COLACE) 100 MG capsule Take 100 mg by mouth every other day.       montelukast (SINGULAIR) 10 mg tablet Take 1 tablet (10 mg total) by mouth nightly. 90 tablet 3    multivitamin (THERAGRAN) per tablet Take 1 tablet by mouth once daily. Every day      potassium chloride SA (K-DUR,KLOR-CON) 20 MEQ tablet Take 1 tablet (20 mEq total) by mouth 2 (two) times daily. Appointment needed for further refills after this one 60 tablet 11    triamterene-hydrochlorothiazide 37.5-25 mg (MAXZIDE-25) 37.5-25 mg per tablet Take 1 tablet by mouth once daily. 90 tablet 3    [DISCONTINUED] sucralfate (CARAFATE) 1 gram tablet Take 1 tablet (1 g total) by mouth 4 (four) times daily. for 10 days 40 tablet 0    [DISCONTINUED] promethazine-dextromethorphan (PROMETHAZINE-DM) 6.25-15 mg/5 mL Syrp Take 5 mLs by mouth 2 (two) times daily as needed. 118 mL 0     No current facility-administered medications on file prior to visit.      Social History     Socioeconomic History    Marital status:      Spouse name: Not on file    Number of children: 2    Years of education: Not on file    Highest education level: Not on file   Occupational History     Employer: [employer]   Social Needs    Financial resource strain: Not hard at all    Food insecurity     Worry: Never true     Inability: Never true    Transportation needs     Medical: No     Non-medical: No   Tobacco Use    Smoking status: Never Smoker    Smokeless tobacco: Never Used   Substance and Sexual Activity    Alcohol use: No     Frequency: Never    Drug use: No  "   Sexual activity: Yes     Partners: Male   Lifestyle    Physical activity     Days per week: 2 days     Minutes per session: 0 min    Stress: Not at all   Relationships    Social connections     Talks on phone: More than three times a week     Gets together: Once a week     Attends Islam service: Not on file     Active member of club or organization: Yes     Attends meetings of clubs or organizations: More than 4 times per year     Relationship status:    Other Topics Concern    Not on file   Social History Narrative    Not on file     Family History   Problem Relation Age of Onset    Diabetes Mother     Kidney failure Mother     Breast cancer Maternal Grandmother     Breast cancer Maternal Aunt     Ovarian cancer Cousin     Breast cancer Cousin     Blindness Father     Cirrhosis Neg Hx     Colon polyps Neg Hx     Colon cancer Neg Hx     Crohn's disease Neg Hx     Esophageal cancer Neg Hx     Stomach cancer Neg Hx     Ulcerative colitis Neg Hx     Amblyopia Neg Hx     Cataracts Neg Hx     Glaucoma Neg Hx     Macular degeneration Neg Hx     Retinal detachment Neg Hx     Strabismus Neg Hx     Allergic rhinitis Neg Hx     Allergies Neg Hx     Angioedema Neg Hx     Asthma Neg Hx     Atopy Neg Hx     Eczema Neg Hx     Immunodeficiency Neg Hx     Rhinitis Neg Hx     Urticaria Neg Hx            ROS:  GENERAL: No fever, chills,  or significant weight changes.   CARDIOVASCULAR: Denies chest pain, PND, orthopnea or reduced exercise tolerance.  ABDOMEN: Appetite fine. Denies diarrhea, abdominal pain, hematemesis or blood in stool.  URINARY: No flank pain, dysuria or hematuria.    Vitals:    11/11/20 1516   BP: 136/82   Pulse: 80   Temp: 98 °F (36.7 °C)   Weight: 103.5 kg (228 lb 3.2 oz)   Height: 5' 3" (1.6 m)       Wt Readings from Last 3 Encounters:   11/11/20 103.5 kg (228 lb 3.2 oz)   11/05/20 105.2 kg (232 lb)   08/18/20 110.1 kg (242 lb 12.8 oz)       APPEARANCE: Well " nourished, well developed, in no acute distress.    HEAD: Normocephalic.  Atraumatic.  EYES:   Right eye: Pupil reactive.  Conjunctiva clear.    Left eye: Pupil reactive.  Conjunctiva clear.    NECK: Supple. No bruits.  No JVD.  No cervical lymphadenopathy.    CHEST: Breath sounds clear bilaterally.  Normal respiratory effort  CARDIOVASCULAR: Normal rate.  Regular rhythm.  No murmurs.  No rub.  No gallops.   No edema.  MENTAL STATUS: Alert.  Oriented x 3.  Abdomen soft, nontender.  No hepatosplenomegaly.

## 2020-11-13 ENCOUNTER — OFFICE VISIT (OUTPATIENT)
Dept: PAIN MEDICINE | Facility: CLINIC | Age: 45
End: 2020-11-13
Payer: COMMERCIAL

## 2020-11-13 VITALS
BODY MASS INDEX: 34.68 KG/M2 | SYSTOLIC BLOOD PRESSURE: 126 MMHG | HEIGHT: 68 IN | DIASTOLIC BLOOD PRESSURE: 80 MMHG | WEIGHT: 228.81 LBS | HEART RATE: 78 BPM

## 2020-11-13 DIAGNOSIS — M79.18 MYALGIA, OTHER SITE: Primary | ICD-10-CM

## 2020-11-13 DIAGNOSIS — G89.29 CHRONIC MYOFASCIAL PAIN: ICD-10-CM

## 2020-11-13 DIAGNOSIS — E89.89 LYMPHEDEMA OF UPPER EXTREMITY FOLLOWING LYMPHADENECTOMY: ICD-10-CM

## 2020-11-13 DIAGNOSIS — M79.18 CHRONIC MYOFASCIAL PAIN: ICD-10-CM

## 2020-11-13 DIAGNOSIS — M79.12 MYALGIA OF AUXILIARY MUSCLES, HEAD AND NECK: ICD-10-CM

## 2020-11-13 DIAGNOSIS — I89.0 LYMPHEDEMA OF UPPER EXTREMITY FOLLOWING LYMPHADENECTOMY: ICD-10-CM

## 2020-11-13 PROCEDURE — 3008F PR BODY MASS INDEX (BMI) DOCUMENTED: ICD-10-PCS | Mod: CPTII,S$GLB,, | Performed by: PHYSICAL MEDICINE & REHABILITATION

## 2020-11-13 PROCEDURE — 99999 PR PBB SHADOW E&M-EST. PATIENT-LVL III: ICD-10-PCS | Mod: PBBFAC,,, | Performed by: PHYSICAL MEDICINE & REHABILITATION

## 2020-11-13 PROCEDURE — 3079F DIAST BP 80-89 MM HG: CPT | Mod: CPTII,S$GLB,, | Performed by: PHYSICAL MEDICINE & REHABILITATION

## 2020-11-13 PROCEDURE — 3079F PR MOST RECENT DIASTOLIC BLOOD PRESSURE 80-89 MM HG: ICD-10-PCS | Mod: CPTII,S$GLB,, | Performed by: PHYSICAL MEDICINE & REHABILITATION

## 2020-11-13 PROCEDURE — 1125F PR PAIN SEVERITY QUANTIFIED, PAIN PRESENT: ICD-10-PCS | Mod: S$GLB,,, | Performed by: PHYSICAL MEDICINE & REHABILITATION

## 2020-11-13 PROCEDURE — 3074F PR MOST RECENT SYSTOLIC BLOOD PRESSURE < 130 MM HG: ICD-10-PCS | Mod: CPTII,S$GLB,, | Performed by: PHYSICAL MEDICINE & REHABILITATION

## 2020-11-13 PROCEDURE — 1125F AMNT PAIN NOTED PAIN PRSNT: CPT | Mod: S$GLB,,, | Performed by: PHYSICAL MEDICINE & REHABILITATION

## 2020-11-13 PROCEDURE — 99214 PR OFFICE/OUTPT VISIT, EST, LEVL IV, 30-39 MIN: ICD-10-PCS | Mod: 25,S$GLB,, | Performed by: PHYSICAL MEDICINE & REHABILITATION

## 2020-11-13 PROCEDURE — 99999 PR PBB SHADOW E&M-EST. PATIENT-LVL III: CPT | Mod: PBBFAC,,, | Performed by: PHYSICAL MEDICINE & REHABILITATION

## 2020-11-13 PROCEDURE — 20553 PR INJECT TRIGGER POINTS, > 3: ICD-10-PCS | Mod: S$GLB,,, | Performed by: PHYSICAL MEDICINE & REHABILITATION

## 2020-11-13 PROCEDURE — 3074F SYST BP LT 130 MM HG: CPT | Mod: CPTII,S$GLB,, | Performed by: PHYSICAL MEDICINE & REHABILITATION

## 2020-11-13 PROCEDURE — 99214 OFFICE O/P EST MOD 30 MIN: CPT | Mod: 25,S$GLB,, | Performed by: PHYSICAL MEDICINE & REHABILITATION

## 2020-11-13 PROCEDURE — 3008F BODY MASS INDEX DOCD: CPT | Mod: CPTII,S$GLB,, | Performed by: PHYSICAL MEDICINE & REHABILITATION

## 2020-11-13 PROCEDURE — 20553 NJX 1/MLT TRIGGER POINTS 3/>: CPT | Mod: S$GLB,,, | Performed by: PHYSICAL MEDICINE & REHABILITATION

## 2020-11-13 RX ORDER — KETOROLAC TROMETHAMINE 30 MG/ML
30 INJECTION, SOLUTION INTRAMUSCULAR; INTRAVENOUS ONCE
Status: COMPLETED | OUTPATIENT
Start: 2020-11-13 | End: 2020-11-13

## 2020-11-13 RX ADMIN — KETOROLAC TROMETHAMINE 30 MG: 30 INJECTION, SOLUTION INTRAMUSCULAR; INTRAVENOUS at 08:11

## 2020-11-13 NOTE — PROGRESS NOTES
Established Patient Chronic Pain Note (Follow up visit)    Chief Complaint:   Chief Complaint   Patient presents with    Mid-back Pain     Right side       SUBJECTIVE:    Akiko Jameson is a 45 y.o. female who presents to the clinic for a follow-up appointment for mid back pain.  At the last visit, she was provided trigger point injections to the thoracic paraspinals , lower trapezius, and latissimus dorsi on the right.  She reports that the trigger point injections provided significant relief relief.  She was maintained on Robaxin 500 mg up to 3 times daily as needed for muscle related pain.  Since the last visit, Akiko Jameson states the pain has been starting to return. Current pain intensity is 5/10.      Patient denies night fever/night sweats, urinary incontinence, bowel incontinence, significant weight loss, significant motor weakness and loss of sensations.    Pain Disability Index Review:   No flowsheet data found.    Interval HPI 05/29/2020:  Akiko Jameson is a 45 y.o. female who presents to the clinic for a follow-up appointment for mid back pain.  At the last visit, she was provided trigger point injections to the thoracic paraspinals and upper/middle trapezius on the right.  She reports that the trigger point injections provided 100% relief at the areas injected, but is having pain in other areas that she would like to also get injected today.  She was also started on Robaxin 500 mg up to 3 times daily as needed for muscle related pain.  She reports that the Robaxin has been of minimal benefit.  Since the last visit, Akiko Jameson states the pain has been improving. Current pain intensity is 8/10.  We discussed lymphedema management, and she is interested in going to a physical therapy clinic to help address this issue.    Initial HPI 05/15/2020:  Akiko Jameson is a 45 y.o. female, right-hand dominant, who presents to the clinic for the evaluation of mid back  pain. The pain started 25+ years ago following a lymph node removal on the right that resulted in lymphedema of the right upper extremity and symptoms have been worsening.The pain is located in the right mid back area and radiates to the along the length of the upper to lower back.  The pain is described as aching, burning and throbbing and is rated as 6/10. The pain is rated with a score of  0/10 on the BEST day and a score of 10/10 on the WORST day.  Symptoms interfere with daily activity, sleeping and work. The pain is exacerbated by increased activity or use of the right upper extremity.  The pain is mitigated by rest. The patient reports spending 2-4 hours per day reclining. The patient reports 6-8 hours of uninterrupted sleep per night.  She reports that she works as a .         Non-Pharmacologic Treatments:  Physical Therapy/Home Exercise: yes  Ice/Heat:yes  TENS: yes  Acupuncture: no  Massage: yes  Chiropractic: yes    Other: yes, compression sleeves        Pain Medications:  - Opioids: Norco  - Adjuvant Medications: Relafen, Robaxin  - Anti-Coagulants: None      report:  Reviewed and consistent with medication use as prescribed.          Pain Procedures:   -previously underwent thoracic epidurals x2 with first one being beneficial but the second one not so much  -05/15/2020:  Trigger point injections to the thoracic paraspinals and upper/middle trapezius on the right, 100% relief  -05/29/2020:  Trigger point injections to the thoracic paraspinals, lower trapezius, and latissimus dorsi on the right, significant relief      Imaging:   X-ray bilateral knees 03/16/2016:  AP and PA flexion standing views of both knees as well as lateral and Merchant views of both knees were obtained.  The joint spaces are grossly preserved.  Mild patellofemoral spurring and prominent patellar enthesophytes bilaterally.  No joint effusion.  No acute fracture or malalignment.     MRI right knee 03/21/2016:  The  anterior cruciate ligament, posterior cruciate ligament, medial collateral ligament, lateral collateral ligament complex normal popliteus tendon, IT band, quadriceps tendon, and patellar tendon are normal in appearance.    There is a complex, primarily radial tear of the posterior horn of the medial meniscus which extends to the posterior root attachment of the medial meniscus.  The medial meniscus is intact.    The articular cartilage of the medial and lateral tibiofemoral joint compartments is intact.  There is a 14 mm width area of full-thickness abnormal chondral signal observed in the lateral patellar facet at the level of the patellar midpole.  No abnormal subcortical signal abnormality appreciated.    There is quadriceps tendon and patellar tendon insertion enthesophyte formation at their respective attachments on the patella.  There is a small knee joint effusion present.  No acute fracture or pathologic marrow replacement process.  There is hematopoietic  bone marrow present within the distal femur and proximal tibia.        PMHx,PSHx, Social history, and Family history:  I have reviewed the patient's medical, surgical, social, and family history in detail and updated the computerized patient record.        Review of patient's allergies indicates:   Allergen Reactions    Biaxin [clarithromycin] Swelling    Omeprazole magnesium Swelling and Other (See Comments)    Prevacid [lansoprazole] Swelling     Lip swelling- was taking this along with blood pressure medication: benazepril; not sure which caused it. Reports she has taken OTC prilosec without any problems.    Ace inhibitors Swelling     Facial swelling    Benazepril Swelling     Lip swelling       Current Outpatient Medications   Medication Sig    albuterol (PROVENTIL/VENTOLIN HFA) 90 mcg/actuation inhaler Inhale 2 puffs into the lungs every 6 (six) hours as needed for Wheezing or Shortness of Breath.    cetirizine (ZYRTEC) 10 MG tablet Take 1  "tablet (10 mg total) by mouth once daily.    diltiaZEM (CARDIZEM CD) 120 MG Cp24 Take 1 capsule (120 mg total) by mouth once daily.    docusate sodium (COLACE) 100 MG capsule Take 100 mg by mouth every other day.     famotidine (PEPCID) 20 MG tablet Take 1 tablet (20 mg total) by mouth 2 (two) times daily.    montelukast (SINGULAIR) 10 mg tablet Take 1 tablet (10 mg total) by mouth nightly.    multivitamin (THERAGRAN) per tablet Take 1 tablet by mouth once daily. Every day    potassium chloride SA (K-DUR,KLOR-CON) 20 MEQ tablet Take 1 tablet (20 mEq total) by mouth 2 (two) times daily. Appointment needed for further refills after this one    triamterene-hydrochlorothiazide 37.5-25 mg (MAXZIDE-25) 37.5-25 mg per tablet Take 1 tablet by mouth once daily.     No current facility-administered medications for this visit.          REVIEW OF SYSTEMS:    GENERAL:  No weight loss, malaise or fevers.  HEENT:   No recent changes in vision or hearing  NECK:  Negative for lumps, no difficulty with swallowing.  RESPIRATORY:  Negative for cough, wheezing or shortness of breath, patient denies any recent URI.  CARDIOVASCULAR:  Negative for chest pain, leg swelling or palpitations.  GI:  Negative for abdominal discomfort, blood in stools or black stools or change in bowel habits.  MUSCULOSKELETAL:  See HPI.  SKIN:  Negative for lesions, rash, and itching.  PSYCH:  No mood disorder or recent psychosocial stressors.  Patients sleep is not disturbed secondary to pain.  HEMATOLOGY/LYMPHOLOGY:  Negative for prolonged bleeding, bruising easily or swollen nodes.  Patient is not currently taking any anti-coagulants  NEURO:   No history of headaches, syncope, paralysis, seizures or tremors.  All other reviewed and negative other than HPI.    OBJECTIVE:    /80   Pulse 78   Ht 5' 8" (1.727 m)   Wt 103.8 kg (228 lb 13.4 oz)   BMI 34.79 kg/m²     PHYSICAL EXAMINATION:    GENERAL: Well appearing, in no acute distress, alert and " oriented x3.  PSYCH:  Mood and affect appropriate.  SKIN: Skin color, texture, turgor normal, no rashes or lesions.  HEAD/FACE:  Normocephalic, atraumatic. Cranial nerves grossly intact.  NECK: No pain to palpation over the cervical paraspinous muscles. Spurling Negative. No pain with neck flexion, extension, or lateral flexion.   CV: RRR with palpation of the radial artery.  PULM: No evidence of respiratory difficulty, symmetric chest rise.  GI:  Soft and non-tender.  BACK:  Tenderness to palpation over the thoracic paraspinals, lower trapezius, and latissimus dorsi on the right.  Straight leg raising in the sitting and supine positions is negative to radicular pain.  Mild pain to palpation over the facet joints of the lumbar spine and lumbar paraspinals. Normal range of motion without pain reproduction.  EXTREMITIES: Peripheral joint ROM is full and pain free without obvious instability or laxity in all four extremities.  There is significant lymphedema present throughout the right upper extremity.  No other deformities or skin discoloration. Good capillary refill.  MUSCULOSKELETAL: Shoulder, hip, and knee provocative maneuvers are negative.  Bilateral upper and lower extremity strength is normal and symmetric.  No atrophy or tone abnormalities are noted.  NEURO: Bilateral upper and lower extremity coordination and muscle stretch reflexes are physiologic and symmetric.  Plantar response are downgoing. No clonus.  No loss of sensation is noted.  GAIT: normal.      LABS:  Lab Results   Component Value Date    WBC 6.40 08/06/2020    HGB 10.6 (L) 08/06/2020    HCT 33.8 (L) 08/06/2020    MCV 81 (L) 08/06/2020     08/06/2020       CMP  Sodium   Date Value Ref Range Status   08/06/2020 138 136 - 145 mmol/L Final     Potassium   Date Value Ref Range Status   08/06/2020 3.6 3.5 - 5.1 mmol/L Final     Chloride   Date Value Ref Range Status   08/06/2020 103 95 - 110 mmol/L Final     CO2   Date Value Ref Range Status    08/06/2020 30 22 - 31 mmol/L Final     Glucose   Date Value Ref Range Status   08/06/2020 106 70 - 110 mg/dL Final     Comment:     The ADA recommends the following guidelines for fasting glucose:  Normal:       less than 100 mg/dL  Prediabetes:  100 mg/dL to 125 mg/dL  Diabetes:     126 mg/dL or higher       BUN   Date Value Ref Range Status   08/06/2020 15 7 - 18 mg/dL Final     Creatinine   Date Value Ref Range Status   08/06/2020 0.95 0.50 - 1.40 mg/dL Final     Calcium   Date Value Ref Range Status   08/06/2020 8.9 8.4 - 10.2 mg/dL Final     Total Protein   Date Value Ref Range Status   08/06/2020 7.8 6.0 - 8.4 g/dL Final     Albumin   Date Value Ref Range Status   08/06/2020 4.0 3.5 - 5.2 g/dL Final     Total Bilirubin   Date Value Ref Range Status   08/06/2020 0.2 0.2 - 1.3 mg/dL Final     Alkaline Phosphatase   Date Value Ref Range Status   08/06/2020 109 38 - 145 U/L Final     AST   Date Value Ref Range Status   08/06/2020 28 14 - 36 U/L Final     ALT   Date Value Ref Range Status   08/06/2020 23 0 - 35 U/L Final     Anion Gap   Date Value Ref Range Status   08/06/2020 5 (L) 8 - 16 mmol/L Final     eGFR if    Date Value Ref Range Status   08/06/2020 >60 >60 mL/min/1.73 m^2 Final     eGFR if non    Date Value Ref Range Status   08/06/2020 >60 >60 mL/min/1.73 m^2 Final     Comment:     Calculation used to obtain the estimated glomerular filtration  rate (eGFR) is the CKD-EPI equation.          Lab Results   Component Value Date    HGBA1C 5.8 (H) 08/31/2017             ASSESSMENT: 45 y.o. year old female with mid back pain, consistent with     1. Myalgia, other site     2. Lymphedema of upper extremity following lymphadenectomy     3. Chronic myofascial pain     4. Myalgia of auxiliary muscles, head and neck           PLAN:   - Interventions: Performed trigger point injections to the thoracic paraspinals , middle/lower trapezius, and latissimus dorsi today in the clinic for  diagnostic and therapeutic purposes.. Explained the risks and benefits of the procedure in detail with the patient today in clinic along with alternative treatment options, and the patient elected to pursue the intervention at this time.   informed consent obtained, see procedure note below for more details     - Anticoagulation use: no      - Medications: I have stressed the importance of physical activity and a home exercise plan to help with pain and improve health. and Patient can continue with medications for now since they are providing benefits, using them appropriately, and without side effects.   continue with Robaxin 500 mg up to 3 times daily as needed for muscle related pain.  May also continue with Tylenol as needed.     - Therapy:   advised patient continue with gradual compression and activities as tolerated.     - Labs:  Reviewed     - Imaging: Reviewed available imaging with patient and answered any questions they had regarding study.  No new imaging needed at this time.     - Consults/Referrals:   none at this time     - Records:  Reviewed/Obtain old records from outside physicians and imaging     - Follow up visit: return to clinic in  3 months or as needed     - Counseled patient regarding the importance of activity modification and physical therapy     - This condition does not require this patient to take time off of work, and the primary goal of our Pain Management services is to improve the patient's functional capacity.     - Patient Questions: Answered all of the patient's questions regarding diagnosis, therapy, and treatment     PROCEDURE NOTE:  Trigger Point Injection:   The procedure was discussed with the patient including complications of nerve damage,  bleeding, infection, and failure of pain relief.   Trigger points were identified by palpation and marked. Alcohol swab prep of sites done. A mixture of 4mL 1% lidocaine +  30 mg Toradol   was prepared (5 mL total).   A 25-gauge needle  was advanced to the point of maximal tenderness, and  1 to 1.25mL  was injected after negative aspiration. All sites done in the same manner. Patient tolerated the procedure well and without complications. Patient was monitored for 15 min following the injection before discharged from the clinic.  Sites injected included:  thoracic paraspinals, lower trapezius, and latissimus dorsi on the right      The above plan and management options were discussed at length with patient. Patient is in agreement with the above and verbalized understanding.      Anam Montero MD  Interventional Pain Management  [facility]    Disclaimer:  This note was prepared using voice recognition system and is likely to have sound alike errors that may have been overlooked even after proof reading.  Please call me with any questions

## 2020-11-17 ENCOUNTER — HOSPITAL ENCOUNTER (OUTPATIENT)
Dept: RADIOLOGY | Facility: HOSPITAL | Age: 45
Discharge: HOME | End: 2020-11-17
Attending: FAMILY MEDICINE
Payer: COMMERCIAL

## 2020-11-17 VITALS — HEIGHT: 68 IN | BODY MASS INDEX: 34.56 KG/M2 | WEIGHT: 228 LBS

## 2020-11-17 DIAGNOSIS — Z12.31 ENCOUNTER FOR SCREENING MAMMOGRAM FOR MALIGNANT NEOPLASM OF BREAST: ICD-10-CM

## 2020-11-17 PROCEDURE — 77063 MAMMO DIGITAL SCREENING BILAT WITH TOMOSYNTHESIS_CAD: ICD-10-PCS | Mod: 26,,, | Performed by: RADIOLOGY

## 2020-11-17 PROCEDURE — 77063 BREAST TOMOSYNTHESIS BI: CPT | Mod: 26,,, | Performed by: RADIOLOGY

## 2020-11-17 PROCEDURE — 77067 SCR MAMMO BI INCL CAD: CPT | Mod: 26,,, | Performed by: RADIOLOGY

## 2020-11-17 PROCEDURE — 77067 SCR MAMMO BI INCL CAD: CPT | Mod: TC,PO

## 2020-11-17 PROCEDURE — 77067 MAMMO DIGITAL SCREENING BILAT WITH TOMOSYNTHESIS_CAD: ICD-10-PCS | Mod: 26,,, | Performed by: RADIOLOGY

## 2020-11-18 ENCOUNTER — PATIENT MESSAGE (OUTPATIENT)
Dept: FAMILY MEDICINE | Facility: CLINIC | Age: 45
End: 2020-11-18

## 2020-11-18 DIAGNOSIS — Z98.890 HISTORY OF NISSEN FUNDOPLICATION: ICD-10-CM

## 2020-11-18 DIAGNOSIS — K21.9 GASTROESOPHAGEAL REFLUX DISEASE, UNSPECIFIED WHETHER ESOPHAGITIS PRESENT: Primary | ICD-10-CM

## 2020-11-18 DIAGNOSIS — R05.3 PERSISTENT COUGH: ICD-10-CM

## 2020-11-18 NOTE — TELEPHONE ENCOUNTER
We had discussed having her get f/u EGD with Dr. Kaufman if symptoms were not improving due to her previous surgery and contact with Dr. Kaufman.  Order placed for EGD.  ADRIAN to Dr. Kaufman

## 2020-11-19 ENCOUNTER — TELEPHONE (OUTPATIENT)
Dept: ENDOSCOPY | Facility: HOSPITAL | Age: 45
End: 2020-11-19

## 2020-11-19 ENCOUNTER — PATIENT MESSAGE (OUTPATIENT)
Dept: ENDOSCOPY | Facility: HOSPITAL | Age: 45
End: 2020-11-19

## 2020-11-19 ENCOUNTER — TELEPHONE (OUTPATIENT)
Dept: PREADMISSION TESTING | Facility: HOSPITAL | Age: 45
End: 2020-11-19

## 2020-11-19 DIAGNOSIS — Z13.9 SCREENING PROCEDURE: Primary | ICD-10-CM

## 2020-11-19 NOTE — TELEPHONE ENCOUNTER
Location Screening:    If answers yes to any of the following, schedule at [facility] ONLY. If No, OK for either location.    1. Is there a diagnosis of heart failure, severe heart valve disease (aortic stenosis) or mechanical valve? no  a. Is the Left Ventricle Ejection Fraction <30% ? no    2. Does the pt have pulmonary hypertension? no   a. Is pulmonary arterial pressure gradient >50mmHg? no   b. Is there evidence of right ventricular dysfunction? no    3. Does the pt have achalasia? no    4. Any history of negative reaction to anesthesia? no   a. Myasthenia gravis? no   b. Malignant hyperthermia? no   c. Other? no    5. Is procedure for esophageal banding? no      COVID Screening    1. Have you had a fever in the last 7 days or have you used fever reducing medicines for a fever in the last 7 days?  no    2. Are you experiencing shortness of breath, cough, muscle aches, loss of taste or loss of smell?  no    If answered yes to questions 1 and 2, the patient must seek medical attention with their PCP.  Do not schedule their procedure.     3. Are you residing with anyone who has tested positive for Covid?  no    If answered yes to question 3, recommend 14 day self-quarantine from the date of relative's positive test and place special needs note in the depot.  Wait to schedule.     ENDO screening    1. Have you been admitted for cardiac, kidney or pulmonary causes to the hospital in the past 3 months? no   If yes, schedule an appointment with PCP before scheduling endoscopic procedure.     2. Have you had a stent placed in the last 12 months? no   If yes, for a screening visit, cancel and message the ordering provider.  The patient will need a new order when the time is appropriate.     3. Have you had a stroke or heart attack in the past 6 months? no   If yes, cancel and refer patient to ordering provider for clearance, also message ordering provider to inform.     4. Have you had any chest pain in the past 3 months?  "no   If yes, Have you been evaluated by your PCP and/or cardiologist and it was determined to not be heart related? not applicable   If No, Pt needs to be seen by PCP or Cardiologist .  Pt can be scheduled once clearance obtained by either of those providers.     5. Do you take prescription weight loss medications?  no   If yes, must stop for 2 weeks prior to procedure.     6. Have you been diagnosed with diverticulitis within the past 3 months? no   If yes, must have been seen by GI within the last 3 months, if not schedule with GI AMAN.    If pt has been seen by GI, schedule procedure 8-12 weeks post antibiotic treatment.     7. Are you on Dialysis? no  If yes, schedule procedure for the day AFTER dialysis.  Appt time should be 9am or later, patient arrival time is 2 hours prior.  Nulytely or miralax prep for all patients with kidney disease.     8. Are you diabetic?  no   If yes, schedule morning appt. Advise pt to hold all diabetic meds day of procedure.     9. If pt is older than 80 years of age and HAS NOT been seen by GI or PCP within the last 6 months, needs appt with GI AMAN.   If pt has been seen by the GI provider or PCP within the past 6  months AND meets criteria, schedule procedure AND send message to the endoscopist.     10. Is patient on a "high risk" medication (blood thinner/antiplatelet agent)?  no   If yes, has cardiac clearance been obtained within the last 60 days? N/A   If no, a new clearance needs to be obtained.     Final Questions:    1.I have reviewed the last colonoscopy for recommendations regarding next procedure bowel prep.  no  2. I have reviewed medications and allergies.  yes  3. I have verified the pharmacy information and appropriate prep sent if needed. yes  4. Prep instructions have been mailed or sent to portal per patient request. yes    Instructions per portal    All schedulers will have ability to reach out to the ordering GI provider to clarify any issues.     "

## 2020-11-19 NOTE — PRE-PROCEDURE INSTRUCTIONS
PAT call completed and patient educated  procedure instructions. Medical history discussed and patient informed of arrival time 9am. Pt will be accompanied by her  and is made aware of limited-visitor policy, and is made aware that  is to remain during entire visit. All questions and concerns addressed. Copy of instructions sent via my chart. Informed to take AM medications. NPO after 2200. Patient verbalizes understanding of teaching and all instructions. Pre-procedure Covid testing will be decided on arrival that morning. Patient with recent + on 10/7/2020 and we are awaiting paperwork. Patient aware.

## 2020-11-20 ENCOUNTER — ANESTHESIA EVENT (OUTPATIENT)
Dept: ENDOSCOPY | Facility: HOSPITAL | Age: 45
End: 2020-11-20
Payer: COMMERCIAL

## 2020-11-20 NOTE — ANESTHESIA PREPROCEDURE EVALUATION
11/20/2020  Akiko Jameson is a 45 y.o., female.  Past Medical History:   Diagnosis Date    ALLERGIC RHINITIS     Arthritis     Cellulitis     Eosinophilia     mild    GERD (gastroesophageal reflux disease)     Granular cell tumor     H. pylori infection 2018    History of 2019 novel coronavirus disease (COVID-19) 10/04/2020    Hypertension     Lymphedema     Mild anemia     KEIRY on CPAP     does not regularly    Sleep apnea     compliant with CPAP    Thyroid nodule     Urinary incontinence, mixed      Past Surgical History:   Procedure Laterality Date    24 HOUR IMPEDANCE PH MONITORING OF ESOPHAGUS IN PATIENT NOT TAKING ACID REDUCING MEDICATIONS N/A 1/6/2020    Procedure: IMPEDANCE PH STUDY, ESOPHAGEAL, 24 HOUR, IN PATIENT NOT TAKING ACID REDUCING MEDICATION;  Surgeon: First Available Tamika Panchal;  Location: [facility] ENDO;  Service: Endoscopy;  Laterality: N/A;    AXILLARY NODE DISSECTION      granular cell tumor    BILATERAL SALPINGO-OOPHORECTOMY (BSO)  07/21/2020    BREAST CYST ASPIRATION      left    CHOLECYSTECTOMY      COLONOSCOPY N/A 5/10/2019    Procedure: COLONOSCOPY;  Surgeon: Edgar Gamble Jr., MD;  Location: [facility] ENDO;  Service: Endoscopy;  Laterality: N/A;    ENDOMETRIAL ABLATION      ESOPHAGEAL MANOMETRY WITH MEASUREMENT OF IMPEDANCE N/A 1/6/2020    Procedure: MANOMETRY, ESOPHAGUS, WITH IMPEDANCE MEASUREMENT;  Surgeon: First Available Portal;  Location: [facility] ENDO;  Service: Endoscopy;  Laterality: N/A;    ESOPHAGOGASTRODUODENOSCOPY N/A 7/5/2018    Procedure: EGD (ESOPHAGOGASTRODUODENOSCOPY);  Surgeon: Edgar Gamble Jr., MD;  Location: [facility] ENDO;  Service: Endoscopy;  Laterality: N/A;    EXCISION OF MASS OF ABDOMEN Left 6/18/2018    Procedure: EXCISION, MASS, ABDOMEN - flank left;  Surgeon: Armando Tate MD;  Location: [facility] OR;  Service: General;   Laterality: Left;  left flank removal of mass    FINE NEEDLE ASPIRATION      thyroid    HYSTERECTOMY  07/21/2020    KNEE ARTHROSCOPY W/ MENISCECTOMY Right     NASAL SEPTUM SURGERY      ROBOT-ASSISTED NISSEN FUNDOPLICATION USING DA TAMAR XI N/A 2/28/2020    Procedure: XI ROBOTIC FUNDOPLICATION, NISSEN;  Surgeon: Jina Kaufman MD;  Location: [facility];  Service: General;  Laterality: N/A;    TUBAL LIGATION      UPPER GASTROINTESTINAL ENDOSCOPY  07/05/2018    Dr. Gamble         Anesthesia Evaluation    I have reviewed the Patient Summary Reports.    I have reviewed the Nursing Notes. I have reviewed the NPO Status.   I have reviewed the Medications.     Review of Systems  Social:  Non-Smoker, No Alcohol Use    Cardiovascular:   Hypertension ECG has been reviewed. NSR, LVH   Pulmonary:   Sleep Apnea    Hepatic/GI:   GERD    Musculoskeletal:   Arthritis         Physical Exam  General:  Obesity    Airway/Jaw/Neck:  Airway Findings: Mouth Opening: Normal Tongue: Normal  General Airway Assessment: Adult  Mallampati: II  TM Distance: Normal, at least 6 cm  Jaw/Neck Findings:  Neck ROM: Normal ROM      Dental:  Dental Findings: In tact             Anesthesia Plan  Type of Anesthesia, risks & benefits discussed:  Anesthesia Type:  general  Patient's Preference:   Intra-op Monitoring Plan: standard ASA monitors  Intra-op Monitoring Plan Comments:   Post Op Pain Control Plan: per primary service following discharge from PACU  Post Op Pain Control Plan Comments:   Induction:   IV  Beta Blocker:  Patient is not currently on a Beta-Blocker (No further documentation required).       Informed Consent: Patient understands risks and agrees with Anesthesia plan.  Questions answered. Anesthesia consent signed with patient.  ASA Score: 2     Day of Surgery Review of History & Physical:    H&P update referred to the provider.         Ready For Surgery From Anesthesia Perspective.

## 2020-11-23 ENCOUNTER — ANESTHESIA (OUTPATIENT)
Dept: ENDOSCOPY | Facility: HOSPITAL | Age: 45
End: 2020-11-23
Payer: COMMERCIAL

## 2020-11-23 ENCOUNTER — HOSPITAL ENCOUNTER (OUTPATIENT)
Facility: HOSPITAL | Age: 45
Discharge: HOME | End: 2020-11-23
Attending: SURGERY | Admitting: SURGERY
Payer: COMMERCIAL

## 2020-11-23 VITALS
TEMPERATURE: 98 F | SYSTOLIC BLOOD PRESSURE: 174 MMHG | HEART RATE: 85 BPM | HEIGHT: 68 IN | DIASTOLIC BLOOD PRESSURE: 78 MMHG | OXYGEN SATURATION: 99 % | RESPIRATION RATE: 15 BRPM | BODY MASS INDEX: 34.01 KG/M2 | WEIGHT: 224.44 LBS

## 2020-11-23 DIAGNOSIS — Z98.890 S/P NISSEN FUNDOPLICATION (WITHOUT GASTROSTOMY TUBE) PROCEDURE: Primary | ICD-10-CM

## 2020-11-23 PROCEDURE — 63600175 PHARM REV CODE 636 W HCPCS: Performed by: NURSE ANESTHETIST, CERTIFIED REGISTERED

## 2020-11-23 PROCEDURE — 37000008 HC ANESTHESIA 1ST 15 MINUTES: Performed by: SURGERY

## 2020-11-23 PROCEDURE — 43239 PR EGD, FLEX, W/BIOPSY, SGL/MULTI: ICD-10-PCS | Mod: ,,, | Performed by: SURGERY

## 2020-11-23 PROCEDURE — 27201012 HC FORCEPS, HOT/COLD, DISP: Performed by: SURGERY

## 2020-11-23 PROCEDURE — 37000009 HC ANESTHESIA EA ADD 15 MINS: Performed by: SURGERY

## 2020-11-23 PROCEDURE — 43239 EGD BIOPSY SINGLE/MULTIPLE: CPT | Performed by: SURGERY

## 2020-11-23 PROCEDURE — D9220A PRA ANESTHESIA: ICD-10-PCS | Mod: ANES,,, | Performed by: ANESTHESIOLOGY

## 2020-11-23 PROCEDURE — 88305 TISSUE EXAM BY PATHOLOGIST: CPT | Mod: 26,,, | Performed by: PATHOLOGY

## 2020-11-23 PROCEDURE — 88305 TISSUE EXAM BY PATHOLOGIST: ICD-10-PCS | Mod: 26,,, | Performed by: PATHOLOGY

## 2020-11-23 PROCEDURE — 88342 IMHCHEM/IMCYTCHM 1ST ANTB: CPT | Mod: 26,,, | Performed by: PATHOLOGY

## 2020-11-23 PROCEDURE — 88305 TISSUE EXAM BY PATHOLOGIST: CPT | Performed by: PATHOLOGY

## 2020-11-23 PROCEDURE — 25000003 PHARM REV CODE 250: Performed by: NURSE ANESTHETIST, CERTIFIED REGISTERED

## 2020-11-23 PROCEDURE — D9220A PRA ANESTHESIA: Mod: CRNA,,, | Performed by: NURSE ANESTHETIST, CERTIFIED REGISTERED

## 2020-11-23 PROCEDURE — 88342 IMHCHEM/IMCYTCHM 1ST ANTB: CPT | Performed by: PATHOLOGY

## 2020-11-23 PROCEDURE — 43239 EGD BIOPSY SINGLE/MULTIPLE: CPT | Mod: ,,, | Performed by: SURGERY

## 2020-11-23 PROCEDURE — D9220A PRA ANESTHESIA: Mod: ANES,,, | Performed by: ANESTHESIOLOGY

## 2020-11-23 PROCEDURE — 88342 CHG IMMUNOCYTOCHEMISTRY: ICD-10-PCS | Mod: 26,,, | Performed by: PATHOLOGY

## 2020-11-23 PROCEDURE — D9220A PRA ANESTHESIA: ICD-10-PCS | Mod: CRNA,,, | Performed by: NURSE ANESTHETIST, CERTIFIED REGISTERED

## 2020-11-23 RX ORDER — LIDOCAINE HYDROCHLORIDE 20 MG/ML
INJECTION INTRAVENOUS
Status: DISCONTINUED | OUTPATIENT
Start: 2020-11-23 | End: 2020-11-23

## 2020-11-23 RX ORDER — SODIUM CHLORIDE, SODIUM LACTATE, POTASSIUM CHLORIDE, CALCIUM CHLORIDE 600; 310; 30; 20 MG/100ML; MG/100ML; MG/100ML; MG/100ML
INJECTION, SOLUTION INTRAVENOUS CONTINUOUS PRN
Status: DISCONTINUED | OUTPATIENT
Start: 2020-11-23 | End: 2020-11-23

## 2020-11-23 RX ORDER — PROPOFOL 10 MG/ML
VIAL (ML) INTRAVENOUS CONTINUOUS PRN
Status: DISCONTINUED | OUTPATIENT
Start: 2020-11-23 | End: 2020-11-23

## 2020-11-23 RX ADMIN — PROPOFOL 150 MCG/KG/MIN: 10 INJECTION, EMULSION INTRAVENOUS at 11:11

## 2020-11-23 RX ADMIN — Medication 75 MG: at 11:11

## 2020-11-23 RX ADMIN — SODIUM CHLORIDE, SODIUM LACTATE, POTASSIUM CHLORIDE, AND CALCIUM CHLORIDE: 600; 310; 30; 20 INJECTION, SOLUTION INTRAVENOUS at 11:11

## 2020-11-23 RX ADMIN — GLYCOPYRROLATE 0.2 MG: 0.2 INJECTION, SOLUTION INTRAMUSCULAR; INTRAVITREAL at 11:11

## 2020-11-23 NOTE — PLAN OF CARE
Patient d/c home in stable condition via wheelchair with ride. Dr. Kaufman spoke with pt. Verbalized understanding of d/c instructions. Patient voiced no complaints. Patient stood at side of bed, walked steps with no new motor deficits. Neuro intact.

## 2020-11-23 NOTE — H&P
Endoscopy H&P    Procedure : Colonoscopy      Patient s/p xi assisted Nissen, recurrent reflux and coughing after meals since October 2020 when she had COVID.      Past Medical History:   Diagnosis Date    ALLERGIC RHINITIS     Arthritis     Cellulitis     Eosinophilia     mild    GERD (gastroesophageal reflux disease)     Granular cell tumor     H. pylori infection 2018    History of 2019 novel coronavirus disease (COVID-19) 10/04/2020    Hypertension     Lymphedema     Mild anemia     KEIRY on CPAP     does not regularly    Sleep apnea     compliant with CPAP    Thyroid nodule     Urinary incontinence, mixed              Review of Systems -ROS:  GENERAL: No fever, chills, fatigability or weight loss.  CHEST: Denies VERMA, cyanosis, wheezing, cough and sputum production.  CARDIOVASCULAR: Denies chest pain, PND, orthopnea or reduced exercise tolerance.   Musculoskeletal ROS: negative for - gait disturbance or joint pain  Neurological ROS: negative for - confusion or memory loss        Physical Exam:  General: well developed, well nourished, no distress  Head: normocephalic  Neck: supple, symmetrical, trachea midline  Lungs:  clear to auscultation bilaterally and normal respiratory effort  Heart: regular rate and rhythm, S1, S2 normal, no murmur, rub or gallop and regular rate and rhythm  Abdomen: soft, non-tender non-distented; bowel sounds normal; no masses,  no organomegaly  Extremities: no cyanosis or edema, or clubbing    ASA : III    IMP: abdominal pain and recurrent GERD    Plan: Endoscopy with Moderate sedation.  I have explained the procedure including indications, alternatives, expected outcomes and potential complications. The patient appears to understand and gives informed consent. The patient is medically ready for surgery.        Jina Kaufman MD

## 2020-11-23 NOTE — TRANSFER OF CARE
"Anesthesia Transfer of Care Note    Patient: Akiko Jameson    Procedure(s) Performed: Procedure(s) (LRB):  ESOPHAGOGASTRODUODENOSCOPY (EGD) (N/A)    Patient location: PACU    Anesthesia Type: general    Transport from OR: Transported from OR on 2-3 L/min O2 by NC with adequate spontaneous ventilation    Post pain: adequate analgesia    Post assessment: no apparent anesthetic complications    Post vital signs: stable    Level of consciousness: awake    Nausea/Vomiting: no nausea/vomiting    Complications: none    Transfer of care protocol was followed      Last vitals:   Visit Vitals  BP (!) 153/85 (BP Location: Right arm, Patient Position: Sitting)   Pulse 93   Temp 36.6 °C (97.9 °F) (Temporal)   Resp 16   Ht 5' 8" (1.727 m)   Wt 101.8 kg (224 lb 6.9 oz)   SpO2 97%   Breastfeeding No   BMI 34.12 kg/m²     "

## 2020-11-23 NOTE — ANESTHESIA POSTPROCEDURE EVALUATION
Anesthesia Post Evaluation    Patient: Akkio Jameson    Procedure(s) Performed: Procedure(s) (LRB):  ESOPHAGOGASTRODUODENOSCOPY (EGD) (N/A)    Final Anesthesia Type: general    Patient location during evaluation: PACU  Patient participation: Yes- Able to Participate  Level of consciousness: awake and alert and oriented  Post-procedure vital signs: reviewed and stable  Pain management: adequate  Airway patency: patent    PONV status at discharge: No PONV  Anesthetic complications: no      Cardiovascular status: blood pressure returned to baseline, stable and hemodynamically stable  Respiratory status: unassisted  Hydration status: euvolemic  Follow-up not needed.          Vitals Value Taken Time   /78 11/23/20 1232   Temp 36.8 °C (98.2 °F) 11/23/20 1213   Pulse 85 11/23/20 1232   Resp 15 11/23/20 1232   SpO2 99 % 11/23/20 1232         Event Time   Out of Recovery 12:40:00         Pain/Cele Score: Cele Score: 10 (11/23/2020 12:32 PM)

## 2020-11-23 NOTE — [employer] PATIENT INSTRUCTIONS
Discharge Summary/Instructions after an Endoscopic Procedure  Patient Name: Akiko Jameson  Patient MRN: [id number]  Patient YOB: 1975 Monday, November 23, 2020  Jina Kaufman MD  RESTRICTIONS:  During your procedure today, you received medications for sedation.  These   medications may affect your judgment, balance and coordination.  Therefore,   for 24 hours, you have the following restrictions:   - DO NOT drive a car, operate machinery, make legal/financial decisions,   sign important papers or drink alcohol.    ACTIVITY:  Today: no heavy lifting, straining or running due to procedural   sedation/anesthesia.  The following day: return to full activity including work.  DIET:  Eat and drink normally unless instructed otherwise.     TREATMENT FOR COMMON SIDE EFFECTS:  - Mild abdominal pain, nausea, belching, bloating or excessive gas:  rest,   eat lightly and use a heating pad.  - Sore Throat: treat with throat lozenges and/or gargle with warm salt   water.  - Because air was used during the procedure, expelling large amounts of air   from your rectum or belching is normal.  - If a bowel prep was taken, you may not have a bowel movement for 1-3 days.    This is normal.  SYMPTOMS TO WATCH FOR AND REPORT TO YOUR PHYSICIAN:  1. Abdominal pain or bloating, other than gas cramps.  2. Chest pain.  3. Back pain.  4. Signs of infection such as: chills or fever occurring within 24 hours   after the procedure.  5. Rectal bleeding, which would show as bright red, maroon, or black stools.   (A tablespoon of blood from the rectum is not serious, especially if   hemorrhoids are present.)  6. Vomiting.  7. Weakness or dizziness.  GO DIRECTLY TO THE NEAREST EMERGENCY ROOM IF YOU HAVE ANY OF THE FOLLOWING:      Difficulty breathing              Chills and/or fever over 101 F   Persistent vomiting and/or vomiting blood   Severe abdominal pain   Severe chest pain   Black, tarry stools   Bleeding- more than one  tablespoon   Any other symptom or condition that you feel may need urgent attention  Your doctor recommends these additional instructions:  If any biopsies were taken, your doctors clinic will contact you in 1 to 2   weeks with any results.  - Patient has a contact number available for emergencies.  The signs and   symptoms of potential delayed complications were discussed with the   patient.  Return to normal activities tomorrow.  Written discharge   instructions were provided to the patient.   - Resume previous diet.   - Continue present medications.   - Await pathology results.   - Discharge patient to home (ambulatory).   - Return to primary care physician as previously scheduled.   - Resume anticoagulant at prior dose tomorrow.   - Resume anticoagulant at prior dose.  For questions, problems or results please call your physician Jina Kaufman MD at Work:  [phone]  If you have any questions about the above instructions, call the GI   department at [phone] or call the endoscopy unit at [phone]   from 7am until 3 pm.  [facility] [place], EMERGENCY ROOM PHONE NUMBER:   [phone]  IF A COMPLICATION OR EMERGENCY SITUATION ARISES AND YOU ARE UNABLE TO REACH   YOUR PHYSICIAN - GO DIRECTLY TO THE EMERGENCY ROOM.  I have read or have had read to me these discharge instructions for my   procedure and have received a written copy.  I understand these   instructions and will follow-up with my physician if I have any questions.     __________________________________       _____________________________________  Nurse Signature                                          Patient/Designated   Responsible Party Signature  Jina Kaufman MD  11/23/2020 12:17:25 PM  This report has been verified and signed electronically.  [employer]

## 2020-11-23 NOTE — BRIEF OP NOTE
[facility] - Endoscopy  Brief Operative Note    Surgery Date: 11/23/2020     Surgeon(s) and Role:     * Jina Kaufman MD - Primary    Assisting Surgeon: None    Pre-op Diagnosis:  Persistent cough [R05]  Gastroesophageal reflux disease, unspecified whether esophagitis present [K21.9]  History of Nissen fundoplication [Z98.890]    Post-op Diagnosis:  Post-Op Diagnosis Codes:     * Persistent cough [R05]     * Gastroesophageal reflux disease, unspecified whether esophagitis present [K21.9]     * History of Nissen fundoplication [Z98.890]    Procedure(s) (LRB):  ESOPHAGOGASTRODUODENOSCOPY (EGD) (N/A)    Anesthesia: Choice    Description of the findings of the procedure(s): intact nissen, noman test sent    Estimated Blood Loss: * No values recorded between 11/23/2020 12:00 AM and 11/23/2020 12:05 PM *         Specimens:   Specimen (12h ago, onward)    None            Discharge Note    OUTCOME: Patient tolerated treatment/procedure well without complication and is now ready for discharge.    DISPOSITION: Home or Self Care    FINAL DIAGNOSIS:  GERD s/p nissen    FOLLOWUP: In clinic    DISCHARGE INSTRUCTIONS:    Discharge Procedure Orders   Ambulatory referral/consult to Gastroenterology   Standing Status: Future   Referral Priority: Routine Referral Type: Consultation   Referral Reason: Specialty Services Required   Requested Specialty: Gastroenterology   Number of Visits Requested: 1     Diet Adult Regular     Activity as tolerated

## 2020-11-24 ENCOUNTER — HOSPITAL ENCOUNTER (OUTPATIENT)
Dept: RADIOLOGY | Facility: HOSPITAL | Age: 45
Discharge: HOME | End: 2020-11-24
Attending: NURSE PRACTITIONER
Payer: COMMERCIAL

## 2020-11-24 DIAGNOSIS — E04.1 THYROID NODULE: ICD-10-CM

## 2020-11-24 PROCEDURE — 76536 US EXAM OF HEAD AND NECK: CPT | Mod: TC,PO

## 2020-11-24 PROCEDURE — 76536 US SOFT TISSUE HEAD NECK THYROID: ICD-10-PCS | Mod: 26,,, | Performed by: RADIOLOGY

## 2020-11-24 PROCEDURE — 76536 US EXAM OF HEAD AND NECK: CPT | Mod: 26,,, | Performed by: RADIOLOGY

## 2020-12-02 ENCOUNTER — TELEPHONE (OUTPATIENT)
Dept: ENDOCRINOLOGY | Facility: CLINIC | Age: 45
End: 2020-12-02

## 2020-12-02 LAB
COMMENT: NORMAL
FINAL PATHOLOGIC DIAGNOSIS: NORMAL
GROSS: NORMAL
Lab: NORMAL

## 2020-12-02 NOTE — TELEPHONE ENCOUNTER
----- Message from Muna Rodney sent at 12/2/2020  1:50 PM CST -----  Regarding: cirtual visit issues  Type:  Needs Medical Advice    Who Called: pt  Symptoms (please be specific): n/a   How long has patient had these symptoms:  n/a  Pharmacy name and phone #:  n/a  Would the patient rather a call back or a response via [facility]? Call back   Best Call Back Number: [phone]  Additional Information: Please call back.Thanks

## 2020-12-02 NOTE — TELEPHONE ENCOUNTER
S/w pt. Was having technical difficulties with [facility]. Advised her phone was saying waiting for MD but it was not showing p was checked in on our end. R/s pts appt. Verbalized understanding.

## 2020-12-04 ENCOUNTER — OFFICE VISIT (OUTPATIENT)
Dept: ENDOCRINOLOGY | Facility: CLINIC | Age: 45
End: 2020-12-04
Payer: COMMERCIAL

## 2020-12-04 ENCOUNTER — TELEPHONE (OUTPATIENT)
Dept: SURGERY | Facility: HOSPITAL | Age: 45
End: 2020-12-04

## 2020-12-04 DIAGNOSIS — A04.8 BACTERIAL INFECTION DUE TO H. PYLORI: ICD-10-CM

## 2020-12-04 DIAGNOSIS — E66.9 OBESITY, CLASS I, BMI 30-34.9: ICD-10-CM

## 2020-12-04 DIAGNOSIS — B96.81 CHRONIC GASTRIC ULCER DUE TO HELICOBACTER PYLORI: Primary | ICD-10-CM

## 2020-12-04 DIAGNOSIS — R05.3 CHRONIC COUGH: ICD-10-CM

## 2020-12-04 DIAGNOSIS — K25.7 CHRONIC GASTRIC ULCER DUE TO HELICOBACTER PYLORI: Primary | ICD-10-CM

## 2020-12-04 DIAGNOSIS — E04.2 MULTINODULAR GOITER (NONTOXIC): Primary | ICD-10-CM

## 2020-12-04 DIAGNOSIS — I10 ESSENTIAL HYPERTENSION: ICD-10-CM

## 2020-12-04 PROCEDURE — 99214 PR OFFICE/OUTPT VISIT, EST, LEVL IV, 30-39 MIN: ICD-10-PCS | Mod: 95,,, | Performed by: INTERNAL MEDICINE

## 2020-12-04 PROCEDURE — 99214 OFFICE O/P EST MOD 30 MIN: CPT | Mod: 95,,, | Performed by: INTERNAL MEDICINE

## 2020-12-04 NOTE — ASSESSMENT & PLAN NOTE
Patient with history of GERD.  Patient also with very large thyroid goiter which could contribute to symptoms.  Continue medical therapy for acid reflux. Follow-up with ENT.

## 2020-12-04 NOTE — PROGRESS NOTES
Subjective:    Patient ID:  Akiko Jameson is a 45 y.o. female.    Chief Complaint:  MNG      Pt presents to follow up MNG and review of chronic medical conditions as listed in the visit diagnosis section of this encounter.    Pt previously seen by NP on [facility] but is new to me.    Visit conducted over telemedicine. Start time 11:23 am.  End time 11:40 am.    The patient location is: [place], [place] (in the car)    Visit type: audiovisual    Face to Face time with patient: 17  20 minutes of total time spent on the encounter, which includes face to face time and non-face to face time preparing to see the patient (eg, review of tests), Obtaining and/or reviewing separately obtained history, Documenting clinical information in the electronic or other health record, Independently interpreting results (not separately reported) and communicating results to the patient/family/caregiver, or Care coordination (not separately reported).     Each patient to whom he or she provides medical services by telemedicine is:  (1) informed of the relationship between the physician and patient and the respective role of any other health care provider with respect to management of the patient; and (2) notified that he or she may decline to receive medical services by telemedicine and may withdraw from such care at any time.    H/o MNG. The patient initially was found to have an enlarged thyroid gland on physical examination with first thyroid ultrasound ~ 2013. She reports having a fine needle aspiration biopsy in [place] in 07/2013 which was benign. Had f/u U/S in 8/2017 showed an increase in left thyroid nodule from 2.1 to 4.1 cm (which was previously bx) and also increase in left lateral isthmus nodule from 0.9 cm to 1.7cm     Patient had FNA biopsy of isthmus and left thyroid nodule performed on 08/31/2017:   SPECIMEN  1) FNA Thyroid Isthmus (Clinician)  2) FNA Thyroid Left (Clinician)  FINAL PATHOLOGIC DIAGNOSIS  1.  THYROID, ISTHMUS (ASPIRATION):  [facility] Thyroid Cytology Category: Benign  [place] follicular cells and abundant colloid  2. THYROID, LEFT (ASPIRATION):  [facility] Thyroid Cytology Category: Benign  [place] follicular cells and colloid     Since last visit, pt notes increased GERD. Recently had fundoplication sx early this year. Notes chronic cough. Cough is worse at night when she lays down. Notes some weight loss due to reduced food intake. No fever, chills, or LAD.  No hoarseness, voice changes. +ve dysphagia and compressive symptoms. Has been sleeping in the recliner due to cough when lying back. No head/neck exposure to XRT. No personal or FH of thyroid cancer or MEN syndrome.     Is morbidly obese. +FH DM.     Has HTN and takes meds.    11/24/2020 US SOFT TISSUE HEAD NECK THYROID     CLINICAL HISTORY:  Nontoxic single thyroid nodule     TECHNIQUE:  Ultrasound of the thyroid and cervical lymph nodes was performed.     COMPARISON:  March 19, 2019     FINDINGS:  Right lobe measures 5.5 x 2.8 x 2.6 cm.  Left lobe 9.1 x 4.8 x 7.2 cm.  Isthmus is 4.5 mm AP diameter in total gland volume 166.5 mL.  Simple subcentimeter cyst noted in the right midpole location complex midpole cyst noted without significant interval change in appearance compared prior exam.  It has however increased minimally in size measuring 2.1 x 1.3 x 1.8 cm compared to 2.1 x 1.3 x 1.5 cm previously.     Three nodules identified within the left lobe.  Dominant nodule within the mid and inferior pole regions noted with similar appearance to prior exam however this has increased in overall size measuring 6.7 x 3.9 x 5.5 cm compared to 5.2 x 3.6 x 4.3 cm previously.     Superior pole nodule again noted in also similar in size prior exam with increased size measuring 2.6 x 1.8 x 2.4 cm compared to 2.0 x 1.5 x 2.0 cm previously.  Midpole nodule in close proximity to the isthmus identified and similar appearance previous exam.  This also has  increased in size measuring 2.6 x 1.3 x 1.8 cm compared to 2.1 x 1.3 x 1.4 cm previously.     Impression:     Multinodular goiter with interval increase in size of the gland compared previous exam with total volume presently at 166.5 mL compared to 69.8 mL previously.     Increased size of the right midpole and 3 left lobe nodules as described above.     Further evaluation and/or follow-up imaging as warranted.        Electronically signed by: Blas Hernandez MD  Date:                                            11/24/2020  Time:                                           21:31          Review of Systems   Constitutional: Positive for appetite change and fatigue. Negative for unexpected weight change.   HENT: Positive for postnasal drip and trouble swallowing. Negative for voice change.    Eyes: Negative for visual disturbance.   Respiratory: Negative for shortness of breath.    Cardiovascular: Negative for chest pain.   Gastrointestinal: Negative for abdominal pain.   Endocrine: Negative for cold intolerance and heat intolerance.   Musculoskeletal: Negative for myalgias.   Skin: Negative for rash.   Neurological: Negative for headaches.   Hematological: Negative for adenopathy.   Psychiatric/Behavioral: Negative for sleep disturbance.        Past Medical History:   Diagnosis Date    ALLERGIC RHINITIS     Arthritis     Cellulitis     Eosinophilia     mild    GERD (gastroesophageal reflux disease)     Granular cell tumor     H. pylori infection 2018    History of 2019 novel coronavirus disease (COVID-19) 10/04/2020    Hypertension     Lymphedema     Mild anemia     KEIRY on CPAP     does not regularly    Sleep apnea     compliant with CPAP    Thyroid nodule     Urinary incontinence, mixed       Social History     Tobacco Use    Smoking status: Never Smoker    Smokeless tobacco: Never Used   Substance Use Topics    Alcohol use: No     Frequency: Never    Drug use: No     Family History   Problem Relation  Age of Onset    Diabetes Mother     Kidney failure Mother     Breast cancer Maternal Grandmother     Breast cancer Maternal Aunt     Ovarian cancer Cousin     Breast cancer Cousin     Blindness Father     Cirrhosis Neg Hx     Colon polyps Neg Hx     Colon cancer Neg Hx     Crohn's disease Neg Hx     Esophageal cancer Neg Hx     Stomach cancer Neg Hx     Ulcerative colitis Neg Hx     Amblyopia Neg Hx     Cataracts Neg Hx     Glaucoma Neg Hx     Macular degeneration Neg Hx     Retinal detachment Neg Hx     Strabismus Neg Hx     Allergic rhinitis Neg Hx     Allergies Neg Hx     Angioedema Neg Hx     Asthma Neg Hx     Atopy Neg Hx     Eczema Neg Hx     Immunodeficiency Neg Hx     Rhinitis Neg Hx     Urticaria Neg Hx       Past Surgical History:   Procedure Laterality Date    24 HOUR IMPEDANCE PH MONITORING OF ESOPHAGUS IN PATIENT NOT TAKING ACID REDUCING MEDICATIONS N/A 1/6/2020    Procedure: IMPEDANCE PH STUDY, ESOPHAGEAL, 24 HOUR, IN PATIENT NOT TAKING ACID REDUCING MEDICATION;  Surgeon: First Available [place];  Location: [facility];  Service: Endoscopy;  Laterality: N/A;    AXILLARY NODE DISSECTION      granular cell tumor    BILATERAL SALPINGO-OOPHORECTOMY (BSO)  07/21/2020    BREAST CYST ASPIRATION      left    CHOLECYSTECTOMY      COLONOSCOPY N/A 5/10/2019    Procedure: COLONOSCOPY;  Surgeon: Edgar Gamble Jr., MD;  Location: [facility];  Service: Endoscopy;  Laterality: N/A;    ENDOMETRIAL ABLATION      ESOPHAGEAL MANOMETRY WITH MEASUREMENT OF IMPEDANCE N/A 1/6/2020    Procedure: MANOMETRY, ESOPHAGUS, WITH IMPEDANCE MEASUREMENT;  Surgeon: First Available [place];  Location: [facility];  Service: Endoscopy;  Laterality: N/A;    ESOPHAGOGASTRODUODENOSCOPY N/A 7/5/2018    Procedure: EGD (ESOPHAGOGASTRODUODENOSCOPY);  Surgeon: Edgar Gamble Jr., MD;  Location: [facility];  Service: Endoscopy;  Laterality: N/A;    ESOPHAGOGASTRODUODENOSCOPY N/A 11/23/2020    Procedure:  ESOPHAGOGASTRODUODENOSCOPY (EGD);  Surgeon: Jina Kaufman MD;  Location: [facility] ENDO;  Service: General;  Laterality: N/A;    EXCISION OF MASS OF ABDOMEN Left 6/18/2018    Procedure: EXCISION, MASS, ABDOMEN - flank left;  Surgeon: Armando Tate MD;  Location: [facility] OR;  Service: General;  Laterality: Left;  left flank removal of mass    FINE NEEDLE ASPIRATION      thyroid    HYSTERECTOMY  07/21/2020    KNEE ARTHROSCOPY W/ MENISCECTOMY Right     NASAL SEPTUM SURGERY      ROBOT-ASSISTED NISSEN FUNDOPLICATION USING DA TAMAR XI N/A 2/28/2020    Procedure: XI ROBOTIC FUNDOPLICATION, NISSEN;  Surgeon: Jina Kaufman MD;  Location[facility] OR;  Service: General;  Laterality: N/A;    TUBAL LIGATION      UPPER GASTROINTESTINAL ENDOSCOPY  07/05/2018    Dr. Gamble          Current Outpatient Medications:     albuterol (PROVENTIL/VENTOLIN HFA) 90 mcg/actuation inhaler, Inhale 2 puffs into the lungs every 6 (six) hours as needed for Wheezing or Shortness of Breath., Disp: 18 g, Rfl: 0    cetirizine (ZYRTEC) 10 MG tablet, Take 1 tablet (10 mg total) by mouth once daily., Disp: , Rfl: 0    diltiaZEM (CARDIZEM CD) 120 MG Cp24, Take 1 capsule (120 mg total) by mouth once daily., Disp: 90 capsule, Rfl: 3    docusate sodium (COLACE) 100 MG capsule, Take 100 mg by mouth every other day. , Disp: , Rfl:     famotidine (PEPCID) 20 MG tablet, Take 1 tablet (20 mg total) by mouth 2 (two) times daily., Disp: 60 tablet, Rfl: 0    montelukast (SINGULAIR) 10 mg tablet, Take 1 tablet (10 mg total) by mouth nightly., Disp: 90 tablet, Rfl: 3    multivitamin (THERAGRAN) per tablet, Take 1 tablet by mouth once daily. Every day, Disp: , Rfl:     potassium chloride SA (K-DUR,KLOR-CON) 20 MEQ tablet, Take 1 tablet (20 mEq total) by mouth 2 (two) times daily. Appointment needed for further refills after this one, Disp: 60 tablet, Rfl: 11    triamterene-hydrochlorothiazide 37.5-25 mg (MAXZIDE-25) 37.5-25 mg per tablet, Take 1  tablet by mouth once daily., Disp: 90 tablet, Rfl: 3     Review of patient's allergies indicates:   Allergen Reactions    Biaxin [clarithromycin] Swelling    Omeprazole magnesium Swelling and Other (See Comments)    Prevacid [lansoprazole] Swelling     Lip swelling- was taking this along with blood pressure medication: benazepril; not sure which caused it. Reports she has taken OTC prilosec without any problems.    Ace inhibitors Swelling     Facial swelling    Benazepril Swelling     Lip swelling        Objective:   There were no vitals taken for this visit.  BP Readings from Last 3 Encounters:   11/23/20 (!) 174/78   11/13/20 126/80   11/11/20 136/82     Wt Readings from Last 3 Encounters:   11/23/20 1032 101.8 kg (224 lb 6.9 oz)   11/19/20 1217 103.4 kg (228 lb)   11/17/20 0944 103.4 kg (228 lb)   11/13/20 0826 103.8 kg (228 lb 13.4 oz)          Physical Exam  Constitutional:       General: She is not in acute distress.     Appearance: Normal appearance. She is well-developed. She is obese. She is not ill-appearing, toxic-appearing or diaphoretic.   HENT:      Head: Normocephalic and atraumatic.      Right Ear: External ear normal.      Left Ear: External ear normal.   Eyes:      General: No scleral icterus.  Neck:      Trachea: No tracheal deviation.      Comments: +ve thyromegaly  Pulmonary:      Effort: Pulmonary effort is normal. No respiratory distress.   Abdominal:      General: There is no distension.   Neurological:      General: No focal deficit present.      Mental Status: She is alert and oriented to person, place, and time.   Psychiatric:         Thought Content: Thought content normal.           Lab Results   Component Value Date     08/06/2020    K 3.6 08/06/2020     08/06/2020    CO2 30 08/06/2020    BUN 15 08/06/2020    CREATININE 0.95 08/06/2020     08/06/2020    HGBA1C 5.8 (H) 08/31/2017    AST 28 08/06/2020    ALT 23 08/06/2020    ALBUMIN 4.0 08/06/2020    PROT 7.8  08/06/2020    BILITOT 0.2 08/06/2020    WBC 6.40 08/06/2020    HGB 10.6 (L) 08/06/2020    HCT 33.8 (L) 08/06/2020    MCV 81 (L) 08/06/2020    MCH 25.5 (L) 08/06/2020     08/06/2020    MPV 10.3 08/06/2020    GRAN 3.0 08/06/2020    GRAN 47.0 08/06/2020    LYMPH 2.2 08/06/2020    LYMPH 34.1 08/06/2020    CHOL 153 09/03/2019    HDL 55 09/03/2019    LDLCALC 82.0 09/03/2019    TRIG 80 09/03/2019       Lab Results   Component Value Date    TSH 0.596 11/04/2019    FREET4 1.17 05/25/2007        Thyroid Labs Latest Ref Rng & Units 2/29/2020 6/8/2020 8/6/2020   TSH 0.400 - 4.000 uIU/mL - - -   Free T4 0.71 - 1.51 ng/dl - - -   Sodium 136 - 145 mmol/L 135(L) - 138   Potassium 3.5 - 5.1 mmol/L 3.7 - 3.6   Chloride 95 - 110 mmol/L 103 - 103   Carbon Dioxide 22 - 31 mmol/L 23 - 30   Glucose 70 - 110 mg/dL 117(H) - 106   Blood Urea Nitrogen 7 - 18 mg/dL 10 - 15   Creatinine 0.50 - 1.40 mg/dL 0.9 1.0 0.95   Calcium 8.4 - 10.2 mg/dL 9.0 - 8.9   Total Protein 6.0 - 8.4 g/dL - - 7.8   Albumin 3.5 - 5.2 g/dL - - 4.0   Total Bilirubin 0.2 - 1.3 mg/dL - - 0.2   AST 14 - 36 U/L - - 28   ALT 0 - 35 U/L - - 23   Anion Gap 8 - 16 mmol/L 9 - 5(L)   eGFR (African American) >60 mL/min/1.73 m:2 >60 >60 >60   eGFR (Non-African American) >60 mL/min/1.73 m:2 >60 >60 >60   WBC 3.90 - 12.70 K/uL 14.60(H) - 6.40   RBC 4.00 - 5.40 M/uL 4.35 - 4.15   Hemoglobin 12.0 - 16.0 g/dL 10.7(L) - 10.6(L)   Hematocrit 37.0 - 48.5 % 34.7(L) - 33.8(L)   MCV 82 - 98 fL 80(L) - 81(L)   MCH 27.0 - 31.0 pg 24.6(L) - 25.5(L)   MCHC 32.0 - 36.0 g/dL 30.8(L) - 31.4(L)   RDW 11.5 - 14.5 % 15.7(H) - 14.1   Platelets 150 - 350 K/uL 301 - 323   MPV 9.2 - 12.9 fL 11.3 - 10.3   Gran # 1.8 - 7.7 K/uL - - 3.0   Lymph # 1.0 - 4.8 K/uL - - 2.2   Mono # 0.3 - 1.0 K/uL - - 0.6   Eos # 0.0 - 0.5 K/uL - - 0.6(H)   Baso # 0.00 - 0.20 K/uL - - 0.04   Gran % 38.0 - 73.0 % - - 47.0   Lymph % 18.0 - 48.0 % - - 34.1   Mono% 4.0 - 15.0 % - - 9.4   Eos % 0.0 - 8.0 % - - 8.6(H)   Baso %  0.0 - 1.9 % - - 0.6   Prothrombin Time 9.0 - 12.5 sec - - -   INR 0.8 - 1.2 - - -   Thyroperoxidase Antibodies <6.0 IU/mL - - -           Hemoglobin A1C   Date Value Ref Range Status   08/31/2017 5.8 (H) 4.0 - 5.6 % Final     Comment:     According to ADA guidelines, hemoglobin A1c <7.0% represents  optimal control in non-pregnant diabetic patients. Different  metrics may apply to specific patient populations.   Standards of Medical Care in Diabetes-2016.  For the purpose of screening for the presence of diabetes:  <5.7%     Consistent with the absence of diabetes  5.7-6.4%  Consistent with increasing risk for diabetes   (prediabetes)  >or=6.5%  Consistent with diabetes  Currently, no consensus exists for use of hemoglobin A1c  for diagnosis of diabetes for children.  This Hemoglobin A1c assay has significant interference with fetal   hemoglobin   (HbF). The results are invalid for patients with abnormal amounts of   HbF,   including those with known Hereditary Persistence   of Fetal Hemoglobin. Heterozygous hemoglobin variants (HbAS, HbAC,   HbAD, HbAE, HbA2) do not significantly interfere with this assay;   however, presence of multiple variants in a sample may impact the %   interference.           Assessment and plan:     Problem List Items Addressed This Visit        Pulmonary    Chronic cough    Current Assessment & Plan     Patient with history of GERD.  Patient also with very large thyroid goiter which could contribute to symptoms.  Continue medical therapy for acid reflux. Follow-up with ENT.            Cardiac/Vascular    Essential hypertension    Current Assessment & Plan     Continue current meds. Pt to notify PCP if BP consistently more than 140/90.              Endocrine    Multinodular goiter (nontoxic) - Primary    Current Assessment & Plan     Patient with significantly enlarged thyroid which has doubled in size from 1 year ago.  She has obstructive symptoms.  Counseled pt to chew food slowly and  thoroughly.  Will plan to send to ENT for total thyroidectomy.  Patient with multinodular goiter with increase in size of nodules.  Will defer to ENT decision to do FNA of dominant nodules prior to surgery. Prior FNAs benign.    Check CT of neck and TSH.         Relevant Orders    CT Soft Tissue Neck W WO Contrast    TSH    Ambulatory referral/consult to ENT    Obesity, Class I, BMI 30-34.9    Current Assessment & Plan     BMI 34. Encouraged healthy low fat low carb diet and increase physical activity                 CT of neck now    TSH now    ENT referal for total thyroidectomy

## 2020-12-04 NOTE — ASSESSMENT & PLAN NOTE
Patient with significantly enlarged thyroid which has doubled in size from 1 year ago.  She has obstructive symptoms.  Counseled pt to chew food slowly and thoroughly.  Will plan to send to ENT for total thyroidectomy.  Patient with multinodular goiter with increase in size of nodules.  Will defer to ENT decision to do FNA of dominant nodules prior to surgery. Prior FNAs benign.    Check CT of neck and TSH.

## 2020-12-08 ENCOUNTER — OFFICE VISIT (OUTPATIENT)
Dept: GASTROENTEROLOGY | Facility: CLINIC | Age: 45
End: 2020-12-08
Payer: COMMERCIAL

## 2020-12-08 ENCOUNTER — PATIENT MESSAGE (OUTPATIENT)
Dept: ENDOCRINOLOGY | Facility: CLINIC | Age: 45
End: 2020-12-08

## 2020-12-08 ENCOUNTER — HOSPITAL ENCOUNTER (OUTPATIENT)
Dept: RADIOLOGY | Facility: HOSPITAL | Age: 45
Discharge: HOME | End: 2020-12-08
Attending: INTERNAL MEDICINE
Payer: COMMERCIAL

## 2020-12-08 DIAGNOSIS — A04.8 H. PYLORI INFECTION: Primary | ICD-10-CM

## 2020-12-08 DIAGNOSIS — E04.2 MULTINODULAR GOITER (NONTOXIC): ICD-10-CM

## 2020-12-08 DIAGNOSIS — A04.8 BACTERIAL INFECTION DUE TO H. PYLORI: ICD-10-CM

## 2020-12-08 PROCEDURE — 70491 CT SOFT TISSUE NECK WITH CONTRAST: ICD-10-PCS | Mod: 26,,, | Performed by: RADIOLOGY

## 2020-12-08 PROCEDURE — 70491 CT SOFT TISSUE NECK W/DYE: CPT | Mod: TC,PO

## 2020-12-08 PROCEDURE — 99213 OFFICE O/P EST LOW 20 MIN: CPT | Mod: 95,,, | Performed by: PHYSICIAN ASSISTANT

## 2020-12-08 PROCEDURE — 99213 PR OFFICE/OUTPT VISIT, EST, LEVL III, 20-29 MIN: ICD-10-PCS | Mod: 95,,, | Performed by: PHYSICIAN ASSISTANT

## 2020-12-08 PROCEDURE — 25500020 PHARM REV CODE 255: Mod: PO | Performed by: INTERNAL MEDICINE

## 2020-12-08 PROCEDURE — 70491 CT SOFT TISSUE NECK W/DYE: CPT | Mod: 26,,, | Performed by: RADIOLOGY

## 2020-12-08 RX ORDER — FAMOTIDINE 40 MG/1
40 TABLET, FILM COATED ORAL 2 TIMES DAILY
Qty: 28 TABLET | Refills: 0 | Status: SHIPPED | OUTPATIENT
Start: 2020-12-08 | End: 2020-12-22

## 2020-12-08 RX ORDER — BISMUTH SUBCITRATE POTASSIUM, METRONIDAZOLE, TETRACYCLINE HYDROCHLORIDE 140; 125; 125 MG/1; MG/1; MG/1
3 CAPSULE ORAL
Qty: 120 CAPSULE | Refills: 0 | Status: SHIPPED | OUTPATIENT
Start: 2020-12-08 | End: 2020-12-18

## 2020-12-08 RX ADMIN — IOHEXOL 75 ML: 350 INJECTION, SOLUTION INTRAVENOUS at 09:12

## 2020-12-08 NOTE — PROGRESS NOTES
Subjective:      Patient ID: Akiko Jameson is a 45 y.o. female.    Chief Complaint: No chief complaint on file.  The patient location is: home    The chief complaint leading to consultation is: H. Pylori infection    Visit type: audiovisual    Face to Face time with patient: 10 mins   10 minutes of total time spent on the encounter, which includes face to face time and non-face to face time preparing to see the patient (eg, review of tests), Obtaining and/or reviewing separately obtained history, Documenting clinical information in the electronic or other health record, Independently interpreting results (not separately reported) and communicating results to the patient/family/caregiver, or Care coordination (not separately reported).     Each patient to whom he or she provides medical services by telemedicine is:  (1) informed of the relationship between the physician and patient and the respective role of any other health care provider with respect to management of the patient; and (2) notified that he or she may decline to receive medical services by telemedicine and may withdraw from such care at any time.    Notes:     HPI:  Patient reports to clinic today for discussion of H. Pylori treatment. Patient was seen in clinic at the beginning of the year for recurrent  and worsening GERD. PH and Manometry completed and patient was referred to surgery who completed Nissen in February. She had been doing well with no reflux until just recently when she began with coughing after meals. This is a symptom she previous to surgery. Scope was completed which confirmed H. Pylori infection. Patient has been referred back to GI for appropriate treatment of the infection, as patient has a possible PPI allergy. Has had angioedema while taking Omeprazole with Biaxin. At our previous visit, patient was referred to allergy. Patient followed up with allergy who stated they would need to complete a graded challenge. Patient  states she was not interested in this at the time. Otherwise, patient has no complaints today.      Review of Systems   Constitutional: Negative for activity change, appetite change, chills, diaphoresis, fatigue, fever and unexpected weight change.   HENT: Negative for trouble swallowing and voice change.    Respiratory: Positive for cough. Negative for shortness of breath.    Cardiovascular: Negative for chest pain.   Gastrointestinal: Negative for abdominal pain, blood in stool and vomiting.   Neurological: Negative for dizziness, weakness and light-headedness.   Psychiatric/Behavioral: Negative for dysphoric mood. The patient is not nervous/anxious.        Medical History: Reviewed    Social History: Reviewed    Allergies: Reviewed    Objective:     Physical Exam    Assessment:     1. H. pylori infection        Plan:     -Confirmed H. Pylori on EGD. Patient with possible PPI allergy (angioedema). Has not followed up with allergy to complete testing.  -Discussed case with Dr. Ortiz. Will try to treat with H2 blocker, Pepcid 40mg BID, rather than PPI. Explained to patient during our visit today that this was an option, although it may not be as effective.   -Follow up stool sample 3 weeks after completing regimen to check for eradication. If not resolved, will need to have risk vs. Benefit discussion with patient regarding treating H. Pylori. In order to treat, would need PPI graded challenge with allergy.    Diagnoses and all orders for this visit:    H. pylori infection  -     famotidine (PEPCID) 40 MG tablet; Take 1 tablet (40 mg total) by mouth 2 (two) times daily. for 14 days  -     bismuth-metronidazole-tetracycline (PYLERA) 140-125-125 mg per capsule; Take 3 capsules by mouth 4 (four) times daily before meals and nightly. for 10 days        No follow-ups on file.    Thank you for the opportunity to participate in the care of this patient.   Elyssa Gutierres PA-C.

## 2020-12-09 ENCOUNTER — PATIENT MESSAGE (OUTPATIENT)
Dept: ENDOCRINOLOGY | Facility: CLINIC | Age: 45
End: 2020-12-09

## 2020-12-10 ENCOUNTER — PATIENT MESSAGE (OUTPATIENT)
Dept: OTOLARYNGOLOGY | Facility: CLINIC | Age: 45
End: 2020-12-10

## 2020-12-14 ENCOUNTER — OFFICE VISIT (OUTPATIENT)
Dept: OTOLARYNGOLOGY | Facility: CLINIC | Age: 45
End: 2020-12-14
Payer: COMMERCIAL

## 2020-12-14 DIAGNOSIS — E04.2 MULTINODULAR GOITER (NONTOXIC): Primary | ICD-10-CM

## 2020-12-14 DIAGNOSIS — E66.01 MORBID OBESITY: ICD-10-CM

## 2020-12-14 DIAGNOSIS — G47.33 OSA (OBSTRUCTIVE SLEEP APNEA): ICD-10-CM

## 2020-12-14 PROCEDURE — 99244 OFF/OP CNSLTJ NEW/EST MOD 40: CPT | Mod: 95,,, | Performed by: OTOLARYNGOLOGY

## 2020-12-14 PROCEDURE — 99244 PR OFFICE CONSULTATION,LEVEL IV: ICD-10-PCS | Mod: 95,,, | Performed by: OTOLARYNGOLOGY

## 2020-12-14 NOTE — PROGRESS NOTES
Subjective:       Patient ID: Akiko Jameson is a 45 y.o. female.    Chief Complaint: No chief complaint on file.    Akiko is here for Multinodular thyroid goiter. Symptoms have been present for years, but she has had progressive worsening of compression symptoms over the past year.  Characteristics of symptoms: no pain, no dysphagia, no odynophagia, yes compressive symptoms, no voice changes. She has been having a persistent cough for the past 2 months and began around the time of a covid infection. She has compressive symptoms, particularly when she sleeps or lays flat.  yes previous biopsy, benign.  Since that time, there has been greater than 20% growth of the nodule as well as growth of an additional nodule.  no personal history of radiation  no family history of thyroid cancer  Htn, stable, no DM, CPAP for KEIRY       Social History     Tobacco Use   Smoking Status Never Smoker   Smokeless Tobacco Never Used     Social History     Substance and Sexual Activity   Alcohol Use No    Frequency: Never        Review of Systems   Constitutional: Negative for activity change and appetite change.   Eyes: Negative for discharge.   Respiratory: Negative for difficulty breathing and wheezing   Cardiovascular: Negative for chest pain.   Gastrointestinal: Negative for abdominal distention and abdominal pain.   Endocrine: Negative for cold intolerance and heat intolerance.   Genitourinary: Negative for dysuria.   Musculoskeletal: Negative for gait problem and joint swelling.   Skin: Negative for color change and pallor.   Neurological: Negative for syncope and weakness.   Psychiatric/Behavioral: Negative for agitation and confusion.     Objective:        Constitutional:   She is oriented to person, place, and time. She appears well-developed and well-nourished. She appears alert. She is active.     Mouth/Throat  Lips, teeth, and gums normal.     Pulmonary/Chest:   Effort normal. No accessory muscle usage. No  respiratory distress.     Psychiatric:   She has a normal mood and affect.     Neurological:   She is alert and oriented to person, place, and time.         Tests / Results:  I personally reviewed the following imaging studies and my impression is:  neck ultrasound and CT scan showing asymmetric MNG with very large left side. Right side with 2 nodules > 2 cm.   CT neck reviewed which shows thyroid as above as well as mildly prominent mediastinal lymph node inferior to innominate.     FNA from 2018 - L thyroid - Benign     Assessment:       1. Multinodular goiter (nontoxic)    2. KEIRY (obstructive sleep apnea)    3. Morbid obesity          Plan:         Due to degree of symptoms and size of nodules, I am recommending surgery.  We did discuss options of left hemithyroidectomy versus total thyroidectomy.  Because she has multiple nodules on the right side meeting biopsy criteria, high likelihood for future growth and need for monitoring due to young age, and with comorbid risk of anesthesia given sleep apnea and morbid obesity, I am recommending total thyroidectomy.  We did discuss relative risks and benefits of both trice and a total.   After discussing them, she is in agreement.    I did also review with the patient that she has mildly prominent mediastinal lymph nodes which are inferior to the innominate artery.  These would not be able to be biopsied easily through a transcervical approach.  I suspect these mildly prominent nodes are likely related to recent COVID infection, though I cannot completely rule out the possibility of early metastasis if the final result of her thyroid comes back malignant.  At this time, given her benign FNA, I would not plan for a level 7 lymph node dissection or biopsy because of limited access.  She understands that there is a small chance further treatment would be needed if pathology comes back different from previously noted.    I explained the risks of thyroidectomy include, but  are not limited to, infection, bleeding, scarring, failure to achieve the diagnosis, no evidence of cancer, recurrence, collection of blood or tissue fluid requiring drainage, injury to the recurrent laryngeal nerve with resultant temporary or permanent hoarseness (1% permanent risk with up to 10% temporary risk, greater in revision operations), injury to the superior laryngeal nerve with resultant loss of the upper register for singing or challenges with yelling, temporary or permanent hypocalcemia related to injury or devascularization of the parathyroid glands (less than 5% permanent, up to 30-60% when paratracheal dissection is accomplished, again greater in revision operations), need for tracheostomy in case of bilateral cord weakness, and the need for additional procedures or therapies. Time was allowed for questions, and all questions were answered to the patient's apparent satisfaction. The risks of paratracheal lymph node dissection are included above. Informed consent was obtained.      The patient location is: home  The chief complaint leading to consultation is: thyroid    Visit type: audiovisual    Face to Face time with patient: 30  45 minutes of total time spent on the encounter, which includes face to face time and non-face to face time preparing to see the patient (eg, review of tests), Obtaining and/or reviewing separately obtained history, Documenting clinical information in the electronic or other health record, Independently interpreting results (not separately reported) and communicating results to the patient/family/caregiver, or Care coordination (not separately reported).         Each patient to whom he or she provides medical services by telemedicine is:  (1) informed of the relationship between the physician and patient and the respective role of any other health care provider with respect to management of the patient; and (2) notified that he or she may decline to receive medical services  by telemedicine and may withdraw from such care at any time.

## 2020-12-14 NOTE — LETTER
December 15, 2020      Juliette L. Sandifer Kum-Nji, MD  [address] [facility] [address] [place] [address]           [place] [facility]  [address] [place] [address]  Phone: [phone]  Fax: [phone]          Patient: Akiko Jameson   MR Number: [id number]   YOB: 1975   Date of Visit: 12/14/2020       Dear Dr. Juliette L. Sandifer Kum-Nji:    Thank you for referring Akiko Jameson to me for evaluation. Attached you will find relevant portions of my assessment and plan of care.    If you have questions, please do not hesitate to call me. I look forward to following Akiko Jameson along with you.    Sincerely,    Nixon Moe MD    Enclosure  CC:  No Recipients    If you would like to receive this communication electronically, please contact [email or web address] or [phone] to request more information on [employer] access.    For providers and/or their staff who would like to refer a patient to [facility], please contact us through our one-stop-shop provider referral line, [facility], at [phone].    If you feel you have received this communication in error or would no longer like to receive these types of communications, please e-mail [email or web address]

## 2020-12-14 NOTE — PATIENT INSTRUCTIONS
Having Thyroid Surgery     The incision is made at the base of your neck.      You are having surgery to remove part or all of your thyroid. The thyroid is a gland in the front of the neck. It sits just below the voice box. The glands main job is to make thyroid hormone. This helps control the bodys metabolism. Thyroid surgery may be done to treat an enlarged thyroid (goiter). Or it may be done to remove a lump (nodule). It may also be done to treat an overactive gland (hyperthyroid). Or it may be done to treat a gland that may have cancer cells.  Getting ready for surgery  · You may need to stop taking some medicines. This includes aspirin and other blood thinners. It also includes herbs and other supplements.  · Do not eat or drink anything for 12 hours before the surgery.  During the surgery  · An intravenous line (IV) will be put in your arm or hand. Youll receive fluids and medicines through the IV during the surgery.  · Youll be given general anesthesia to keep you asleep and free of pain through the surgery.  · An incision is made in your neck, along a crease in your skin.  · Half of the thyroid gland may be taken out (lobectomy). Or most of the gland may be taken out (subtotal thyroidectomy). In some cases, all of the gland is taken out (total thyroidectomy). The surgeon may not know how much to take until the surgery.  · The incision is then closed with surgical strips, clips, or stitches (sutures). A thin tube (drain) may be left in the incision. This helps remove fluid that can build up.  After the surgery  · It may take a few hours for the anesthesia to wear off. You will get up and walk around soon after the surgery. You will be watched for bleeding.  · You may spend some time staying in the hospital or surgery center after the surgery.  · In most cases, you can eat and drink the evening after the surgery. You may still have nausea from the anesthesia.  · Youll be given medicine to help manage  pain, if needed.  · You will be tested to make sure your parathyroid glands are working. The stress of surgery may stop them from working for a short time. If this happens, you may be given calcium pills for a few days.   · You may have a sore throat and a hoarse voice for a week or so after the surgery.  Risks and possible complications  The risks and possible complications of this procedure include:  · Bleeding  · Infection  · Damage to nerves in your voice box. This can lead to a hoarse voice. Usually, this hoarseness gets better over time, though in rare cases it may last.  · Damage to the parathyroid glands or their blood supply. This can make them underactive (hypoparathyroidism). These glands control the amount of calcium in your blood. Usually, the hypoparathyroidism gets better over time. But you may need to take daily calcium pills for a long time, or for the rest of your life. You may also need to take vitamin D supplements.   Date Last Reviewed: 11/1/2016  © 9551-3644 [employer]. [address] [place] [address], [place] [address]. All rights reserved. This information is not intended as a substitute for professional medical care. Always follow your healthcare professional's instructions.

## 2020-12-15 ENCOUNTER — TELEPHONE (OUTPATIENT)
Dept: OTOLARYNGOLOGY | Facility: CLINIC | Age: 45
End: 2020-12-15

## 2020-12-15 NOTE — TELEPHONE ENCOUNTER
----- Message from Nixon Moe MD sent at 12/14/2020  5:36 PM CST -----  Pls call to schedule total thyroidectomyOvernight Keturah Sue to assist

## 2020-12-16 ENCOUNTER — PATIENT MESSAGE (OUTPATIENT)
Dept: GASTROENTEROLOGY | Facility: CLINIC | Age: 45
End: 2020-12-16

## 2020-12-17 DIAGNOSIS — Z01.818 PRE-OP TESTING: ICD-10-CM

## 2021-01-05 ENCOUNTER — TELEPHONE (OUTPATIENT)
Dept: OTOLARYNGOLOGY | Facility: CLINIC | Age: 46
End: 2021-01-05

## 2021-01-05 RX ORDER — BENZONATATE 100 MG/1
200 CAPSULE ORAL 3 TIMES DAILY PRN
Qty: 30 CAPSULE | Refills: 1 | Status: SHIPPED | OUTPATIENT
Start: 2021-01-05 | End: 2021-01-15

## 2021-01-14 ENCOUNTER — TELEPHONE (OUTPATIENT)
Dept: GASTROENTEROLOGY | Facility: CLINIC | Age: 46
End: 2021-01-14

## 2021-01-25 ENCOUNTER — OFFICE VISIT (OUTPATIENT)
Dept: OTOLARYNGOLOGY | Facility: CLINIC | Age: 46
End: 2021-01-25
Payer: COMMERCIAL

## 2021-01-25 ENCOUNTER — HOSPITAL ENCOUNTER (OUTPATIENT)
Dept: RADIOLOGY | Facility: HOSPITAL | Age: 46
Discharge: HOME | End: 2021-01-25
Attending: OTOLARYNGOLOGY
Payer: COMMERCIAL

## 2021-01-25 VITALS — WEIGHT: 213.38 LBS | HEIGHT: 68 IN | BODY MASS INDEX: 32.34 KG/M2

## 2021-01-25 DIAGNOSIS — E04.2 MULTINODULAR GOITER (NONTOXIC): Primary | ICD-10-CM

## 2021-01-25 DIAGNOSIS — R22.9 LOCALIZED SWELLING, MASS AND LUMP, UNSPECIFIED: ICD-10-CM

## 2021-01-25 DIAGNOSIS — Z01.818 PRE-OP TESTING: Primary | ICD-10-CM

## 2021-01-25 DIAGNOSIS — G47.33 OSA (OBSTRUCTIVE SLEEP APNEA): ICD-10-CM

## 2021-01-25 DIAGNOSIS — E66.01 MORBID OBESITY: ICD-10-CM

## 2021-01-25 PROCEDURE — 70491 CT SOFT TISSUE NECK W/DYE: CPT | Mod: 26,,, | Performed by: RADIOLOGY

## 2021-01-25 PROCEDURE — 99214 PR OFFICE/OUTPT VISIT, EST, LEVL IV, 30-39 MIN: ICD-10-PCS | Mod: 25,S$GLB,, | Performed by: OTOLARYNGOLOGY

## 2021-01-25 PROCEDURE — 1125F AMNT PAIN NOTED PAIN PRSNT: CPT | Mod: S$GLB,,, | Performed by: OTOLARYNGOLOGY

## 2021-01-25 PROCEDURE — 1125F PR PAIN SEVERITY QUANTIFIED, PAIN PRESENT: ICD-10-PCS | Mod: S$GLB,,, | Performed by: OTOLARYNGOLOGY

## 2021-01-25 PROCEDURE — 70491 CT SOFT TISSUE NECK WITH CONTRAST: ICD-10-PCS | Mod: 26,,, | Performed by: RADIOLOGY

## 2021-01-25 PROCEDURE — 70491 CT SOFT TISSUE NECK W/DYE: CPT | Mod: TC,PO

## 2021-01-25 PROCEDURE — 25500020 PHARM REV CODE 255: Mod: PO | Performed by: OTOLARYNGOLOGY

## 2021-01-25 PROCEDURE — 99999 PR PBB SHADOW E&M-EST. PATIENT-LVL III: CPT | Mod: PBBFAC,,, | Performed by: OTOLARYNGOLOGY

## 2021-01-25 PROCEDURE — 99214 OFFICE O/P EST MOD 30 MIN: CPT | Mod: 25,S$GLB,, | Performed by: OTOLARYNGOLOGY

## 2021-01-25 PROCEDURE — 99999 PR PBB SHADOW E&M-EST. PATIENT-LVL III: ICD-10-PCS | Mod: PBBFAC,,, | Performed by: OTOLARYNGOLOGY

## 2021-01-25 PROCEDURE — 31575 DIAGNOSTIC LARYNGOSCOPY: CPT | Mod: S$GLB,,, | Performed by: OTOLARYNGOLOGY

## 2021-01-25 PROCEDURE — 3008F BODY MASS INDEX DOCD: CPT | Mod: CPTII,S$GLB,, | Performed by: OTOLARYNGOLOGY

## 2021-01-25 PROCEDURE — 3008F PR BODY MASS INDEX (BMI) DOCUMENTED: ICD-10-PCS | Mod: CPTII,S$GLB,, | Performed by: OTOLARYNGOLOGY

## 2021-01-25 PROCEDURE — 31575 PR LARYNGOSCOPY, FLEXIBLE; DIAGNOSTIC: ICD-10-PCS | Mod: S$GLB,,, | Performed by: OTOLARYNGOLOGY

## 2021-01-25 RX ORDER — BENZONATATE 100 MG/1
200 CAPSULE ORAL 3 TIMES DAILY PRN
Qty: 30 CAPSULE | Refills: 1 | Status: SHIPPED | OUTPATIENT
Start: 2021-01-25 | End: 2021-02-04

## 2021-01-25 RX ADMIN — IOHEXOL 75 ML: 350 INJECTION, SOLUTION INTRAVENOUS at 12:01

## 2021-03-08 ENCOUNTER — LAB VISIT (OUTPATIENT)
Dept: FAMILY MEDICINE | Facility: CLINIC | Age: 46
End: 2021-03-08
Payer: COMMERCIAL

## 2021-03-08 DIAGNOSIS — Z01.818 PRE-OP TESTING: ICD-10-CM

## 2021-03-08 PROCEDURE — U0003 INFECTIOUS AGENT DETECTION BY NUCLEIC ACID (DNA OR RNA); SEVERE ACUTE RESPIRATORY SYNDROME CORONAVIRUS 2 (SARS-COV-2) (CORONAVIRUS DISEASE [COVID-19]), AMPLIFIED PROBE TECHNIQUE, MAKING USE OF HIGH THROUGHPUT TECHNOLOGIES AS DESCRIBED BY CMS-2020-01-R: HCPCS | Performed by: OTOLARYNGOLOGY

## 2021-03-08 PROCEDURE — U0005 INFEC AGEN DETEC AMPLI PROBE: HCPCS | Performed by: OTOLARYNGOLOGY

## 2021-03-09 LAB — SARS-COV-2 RNA RESP QL NAA+PROBE: NOT DETECTED

## 2021-03-10 DIAGNOSIS — E87.6 HYPOKALEMIA: ICD-10-CM

## 2021-03-11 PROBLEM — E04.2 MULTINODULAR GOITER (NONTOXIC): Status: RESOLVED | Noted: 2018-09-24 | Resolved: 2021-03-11

## 2021-03-11 PROBLEM — E04.2 MULTINODULAR GOITER (NONTOXIC): Status: ACTIVE | Noted: 2021-03-11

## 2021-03-11 RX ORDER — DILTIAZEM HYDROCHLORIDE 120 MG/1
120 CAPSULE, COATED, EXTENDED RELEASE ORAL DAILY
Qty: 90 CAPSULE | Refills: 3 | Status: SHIPPED | OUTPATIENT
Start: 2021-03-11 | End: 2021-07-09 | Stop reason: SDUPTHER

## 2021-03-11 RX ORDER — TRIAMTERENE/HYDROCHLOROTHIAZID 37.5-25 MG
1 TABLET ORAL DAILY
Qty: 90 TABLET | Refills: 3 | Status: SHIPPED | OUTPATIENT
Start: 2021-03-11 | End: 2021-07-09 | Stop reason: SDUPTHER

## 2021-03-11 RX ORDER — MONTELUKAST SODIUM 10 MG/1
10 TABLET ORAL NIGHTLY
Qty: 90 TABLET | Refills: 3 | Status: SHIPPED | OUTPATIENT
Start: 2021-03-11 | End: 2021-07-09 | Stop reason: SDUPTHER

## 2021-03-11 RX ORDER — POTASSIUM CHLORIDE 20 MEQ/1
20 TABLET, EXTENDED RELEASE ORAL 2 TIMES DAILY
Qty: 180 TABLET | Refills: 3 | Status: SHIPPED | OUTPATIENT
Start: 2021-03-11 | End: 2021-07-09 | Stop reason: SDUPTHER

## 2021-03-12 PROBLEM — E04.2 MULTINODULAR GOITER (NONTOXIC): Status: RESOLVED | Noted: 2021-03-11 | Resolved: 2021-03-12

## 2021-03-13 RX ORDER — CALCITRIOL 0.5 UG/1
0.5 CAPSULE ORAL DAILY
Qty: 30 CAPSULE | Refills: 0 | Status: SHIPPED | OUTPATIENT
Start: 2021-03-13 | End: 2021-04-12

## 2021-03-15 ENCOUNTER — OFFICE VISIT (OUTPATIENT)
Dept: OTOLARYNGOLOGY | Facility: CLINIC | Age: 46
End: 2021-03-15
Payer: COMMERCIAL

## 2021-03-15 VITALS — HEIGHT: 63 IN | BODY MASS INDEX: 36.99 KG/M2 | WEIGHT: 208.75 LBS

## 2021-03-15 DIAGNOSIS — E89.0 S/P THYROIDECTOMY: Primary | ICD-10-CM

## 2021-03-15 PROCEDURE — 1126F PR PAIN SEVERITY QUANTIFIED, NO PAIN PRESENT: ICD-10-PCS | Mod: S$GLB,,, | Performed by: OTOLARYNGOLOGY

## 2021-03-15 PROCEDURE — 99999 PR PBB SHADOW E&M-EST. PATIENT-LVL III: CPT | Mod: PBBFAC,,, | Performed by: OTOLARYNGOLOGY

## 2021-03-15 PROCEDURE — 1126F AMNT PAIN NOTED NONE PRSNT: CPT | Mod: S$GLB,,, | Performed by: OTOLARYNGOLOGY

## 2021-03-15 PROCEDURE — 3008F BODY MASS INDEX DOCD: CPT | Mod: CPTII,S$GLB,, | Performed by: OTOLARYNGOLOGY

## 2021-03-15 PROCEDURE — 99999 PR PBB SHADOW E&M-EST. PATIENT-LVL III: ICD-10-PCS | Mod: PBBFAC,,, | Performed by: OTOLARYNGOLOGY

## 2021-03-15 PROCEDURE — 3008F PR BODY MASS INDEX (BMI) DOCUMENTED: ICD-10-PCS | Mod: CPTII,S$GLB,, | Performed by: OTOLARYNGOLOGY

## 2021-03-15 PROCEDURE — 99024 POSTOP FOLLOW-UP VISIT: CPT | Mod: S$GLB,,, | Performed by: OTOLARYNGOLOGY

## 2021-03-15 PROCEDURE — 99024 PR POST-OP FOLLOW-UP VISIT: ICD-10-PCS | Mod: S$GLB,,, | Performed by: OTOLARYNGOLOGY

## 2021-03-22 ENCOUNTER — TELEPHONE (OUTPATIENT)
Dept: OTOLARYNGOLOGY | Facility: CLINIC | Age: 46
End: 2021-03-22

## 2021-03-22 DIAGNOSIS — R59.0 MEDIASTINAL ADENOPATHY: ICD-10-CM

## 2021-03-22 DIAGNOSIS — R05.9 COUGH: Primary | ICD-10-CM

## 2021-03-23 ENCOUNTER — PATIENT MESSAGE (OUTPATIENT)
Dept: OTOLARYNGOLOGY | Facility: CLINIC | Age: 46
End: 2021-03-23

## 2021-03-23 ENCOUNTER — HOSPITAL ENCOUNTER (OUTPATIENT)
Dept: RADIOLOGY | Facility: HOSPITAL | Age: 46
Discharge: HOME | End: 2021-03-23
Attending: OTOLARYNGOLOGY
Payer: COMMERCIAL

## 2021-03-23 DIAGNOSIS — L92.9 NON-CASEATING GRANULOMA: ICD-10-CM

## 2021-03-23 DIAGNOSIS — R05.9 COUGH: ICD-10-CM

## 2021-03-23 DIAGNOSIS — R91.8 PULMONARY NODULES/LESIONS, MULTIPLE: Primary | ICD-10-CM

## 2021-03-23 PROCEDURE — 71250 CT CHEST WITHOUT CONTRAST: ICD-10-PCS | Mod: 26,,, | Performed by: RADIOLOGY

## 2021-03-23 PROCEDURE — 71250 CT THORAX DX C-: CPT | Mod: TC,PO

## 2021-03-23 PROCEDURE — 71250 CT THORAX DX C-: CPT | Mod: 26,,, | Performed by: RADIOLOGY

## 2021-04-05 ENCOUNTER — OFFICE VISIT (OUTPATIENT)
Dept: OTOLARYNGOLOGY | Facility: CLINIC | Age: 46
End: 2021-04-05
Payer: COMMERCIAL

## 2021-04-05 ENCOUNTER — LAB VISIT (OUTPATIENT)
Dept: LAB | Facility: HOSPITAL | Age: 46
End: 2021-04-05
Attending: OTOLARYNGOLOGY
Payer: COMMERCIAL

## 2021-04-05 VITALS — WEIGHT: 212.94 LBS | BODY MASS INDEX: 37.73 KG/M2 | HEIGHT: 63 IN

## 2021-04-05 DIAGNOSIS — E83.51 HYPOCALCEMIA: ICD-10-CM

## 2021-04-05 DIAGNOSIS — E83.51 HYPOCALCEMIA: Primary | ICD-10-CM

## 2021-04-05 DIAGNOSIS — E89.0 S/P THYROIDECTOMY: ICD-10-CM

## 2021-04-05 LAB
ALBUMIN SERPL BCP-MCNC: 3.6 G/DL (ref 3.5–5.2)
ALP SERPL-CCNC: 156 U/L (ref 55–135)
ALT SERPL W/O P-5'-P-CCNC: 33 U/L (ref 10–44)
ANION GAP SERPL CALC-SCNC: 13 MMOL/L (ref 8–16)
AST SERPL-CCNC: 29 U/L (ref 10–40)
BILIRUB SERPL-MCNC: 0.5 MG/DL (ref 0.1–1)
BUN SERPL-MCNC: 12 MG/DL (ref 6–20)
CALCIUM SERPL-MCNC: 9.4 MG/DL (ref 8.7–10.5)
CHLORIDE SERPL-SCNC: 103 MMOL/L (ref 95–110)
CO2 SERPL-SCNC: 23 MMOL/L (ref 23–29)
CREAT SERPL-MCNC: 1 MG/DL (ref 0.5–1.4)
EST. GFR  (AFRICAN AMERICAN): >60 ML/MIN/1.73 M^2
EST. GFR  (NON AFRICAN AMERICAN): >60 ML/MIN/1.73 M^2
GLUCOSE SERPL-MCNC: 92 MG/DL (ref 70–110)
MAGNESIUM SERPL-MCNC: 1.8 MG/DL (ref 1.6–2.6)
PHOSPHATE SERPL-MCNC: 4.3 MG/DL (ref 2.7–4.5)
POTASSIUM SERPL-SCNC: 3.7 MMOL/L (ref 3.5–5.1)
PROT SERPL-MCNC: 8.3 G/DL (ref 6–8.4)
SODIUM SERPL-SCNC: 139 MMOL/L (ref 136–145)

## 2021-04-05 PROCEDURE — 3008F BODY MASS INDEX DOCD: CPT | Mod: CPTII,S$GLB,, | Performed by: OTOLARYNGOLOGY

## 2021-04-05 PROCEDURE — 99024 POSTOP FOLLOW-UP VISIT: CPT | Mod: S$GLB,,, | Performed by: OTOLARYNGOLOGY

## 2021-04-05 PROCEDURE — 84100 ASSAY OF PHOSPHORUS: CPT | Performed by: OTOLARYNGOLOGY

## 2021-04-05 PROCEDURE — 83735 ASSAY OF MAGNESIUM: CPT | Performed by: OTOLARYNGOLOGY

## 2021-04-05 PROCEDURE — 1126F PR PAIN SEVERITY QUANTIFIED, NO PAIN PRESENT: ICD-10-PCS | Mod: S$GLB,,, | Performed by: OTOLARYNGOLOGY

## 2021-04-05 PROCEDURE — 3008F PR BODY MASS INDEX (BMI) DOCUMENTED: ICD-10-PCS | Mod: CPTII,S$GLB,, | Performed by: OTOLARYNGOLOGY

## 2021-04-05 PROCEDURE — 80053 COMPREHEN METABOLIC PANEL: CPT | Performed by: OTOLARYNGOLOGY

## 2021-04-05 PROCEDURE — 99024 PR POST-OP FOLLOW-UP VISIT: ICD-10-PCS | Mod: S$GLB,,, | Performed by: OTOLARYNGOLOGY

## 2021-04-05 PROCEDURE — 99999 PR PBB SHADOW E&M-EST. PATIENT-LVL III: CPT | Mod: PBBFAC,,, | Performed by: OTOLARYNGOLOGY

## 2021-04-05 PROCEDURE — 1126F AMNT PAIN NOTED NONE PRSNT: CPT | Mod: S$GLB,,, | Performed by: OTOLARYNGOLOGY

## 2021-04-05 PROCEDURE — 99999 PR PBB SHADOW E&M-EST. PATIENT-LVL III: ICD-10-PCS | Mod: PBBFAC,,, | Performed by: OTOLARYNGOLOGY

## 2021-04-05 PROCEDURE — 36415 COLL VENOUS BLD VENIPUNCTURE: CPT | Mod: PO | Performed by: OTOLARYNGOLOGY

## 2021-04-05 PROCEDURE — 83970 ASSAY OF PARATHORMONE: CPT | Performed by: OTOLARYNGOLOGY

## 2021-04-06 ENCOUNTER — PATIENT MESSAGE (OUTPATIENT)
Dept: OTOLARYNGOLOGY | Facility: CLINIC | Age: 46
End: 2021-04-06

## 2021-04-06 LAB — PTH-INTACT SERPL-MCNC: 26 PG/ML (ref 9–77)

## 2021-04-07 PROBLEM — R91.8 PULMONARY NODULES: Status: ACTIVE | Noted: 2021-04-07

## 2021-04-07 PROBLEM — R59.0 HILAR ADENOPATHY: Status: ACTIVE | Noted: 2021-04-07

## 2021-04-13 ENCOUNTER — HOSPITAL ENCOUNTER (OUTPATIENT)
Dept: RADIOLOGY | Facility: HOSPITAL | Age: 46
Discharge: HOME | End: 2021-04-13
Attending: INTERNAL MEDICINE
Payer: COMMERCIAL

## 2021-04-13 DIAGNOSIS — L92.9 NON-CASEATING GRANULOMA: ICD-10-CM

## 2021-04-13 DIAGNOSIS — R91.8 PULMONARY NODULES/LESIONS, MULTIPLE: ICD-10-CM

## 2021-04-13 PROCEDURE — 71260 CT THORAX DX C+: CPT | Mod: TC,PO

## 2021-04-13 PROCEDURE — 71260 CT THORAX DX C+: CPT | Mod: 26,,, | Performed by: RADIOLOGY

## 2021-04-13 PROCEDURE — 25500020 PHARM REV CODE 255: Mod: PO | Performed by: INTERNAL MEDICINE

## 2021-04-13 PROCEDURE — 71260 CT CHEST WITH CONTRAST: ICD-10-PCS | Mod: 26,,, | Performed by: RADIOLOGY

## 2021-04-13 RX ADMIN — IOHEXOL 75 ML: 350 INJECTION, SOLUTION INTRAVENOUS at 01:04

## 2021-04-16 ENCOUNTER — PATIENT OUTREACH (OUTPATIENT)
Dept: INFECTIOUS DISEASES | Facility: HOSPITAL | Age: 46
End: 2021-04-16

## 2021-04-27 PROBLEM — R59.0 MEDIASTINAL ADENOPATHY: Status: ACTIVE | Noted: 2021-04-27

## 2021-04-29 ENCOUNTER — PATIENT MESSAGE (OUTPATIENT)
Dept: RESEARCH | Facility: HOSPITAL | Age: 46
End: 2021-04-29

## 2021-04-30 PROBLEM — D86.9 SARCOIDOSIS: Status: ACTIVE | Noted: 2021-04-30

## 2021-05-03 ENCOUNTER — PATIENT OUTREACH (OUTPATIENT)
Dept: ADMINISTRATIVE | Facility: OTHER | Age: 46
End: 2021-05-03

## 2021-05-19 ENCOUNTER — PATIENT MESSAGE (OUTPATIENT)
Dept: OTOLARYNGOLOGY | Facility: CLINIC | Age: 46
End: 2021-05-19

## 2021-05-19 ENCOUNTER — LAB VISIT (OUTPATIENT)
Dept: LAB | Facility: HOSPITAL | Age: 46
End: 2021-05-19
Attending: OTOLARYNGOLOGY
Payer: COMMERCIAL

## 2021-05-19 DIAGNOSIS — E83.51 HYPOCALCEMIA: Primary | ICD-10-CM

## 2021-05-19 DIAGNOSIS — E89.0 S/P THYROIDECTOMY: ICD-10-CM

## 2021-05-19 DIAGNOSIS — E83.51 HYPOCALCEMIA: ICD-10-CM

## 2021-05-19 LAB
ALBUMIN SERPL BCP-MCNC: 3.3 G/DL (ref 3.5–5.2)
ALP SERPL-CCNC: 131 U/L (ref 55–135)
ALT SERPL W/O P-5'-P-CCNC: 43 U/L (ref 10–44)
ANION GAP SERPL CALC-SCNC: 8 MMOL/L (ref 8–16)
AST SERPL-CCNC: 23 U/L (ref 10–40)
BILIRUB SERPL-MCNC: 0.4 MG/DL (ref 0.1–1)
BUN SERPL-MCNC: 22 MG/DL (ref 6–20)
CALCIUM SERPL-MCNC: 8 MG/DL (ref 8.7–10.5)
CHLORIDE SERPL-SCNC: 99 MMOL/L (ref 95–110)
CO2 SERPL-SCNC: 31 MMOL/L (ref 23–29)
CREAT SERPL-MCNC: 1.1 MG/DL (ref 0.5–1.4)
EST. GFR  (AFRICAN AMERICAN): >60 ML/MIN/1.73 M^2
EST. GFR  (NON AFRICAN AMERICAN): 59.5 ML/MIN/1.73 M^2
GLUCOSE SERPL-MCNC: 127 MG/DL (ref 70–110)
POTASSIUM SERPL-SCNC: 3.4 MMOL/L (ref 3.5–5.1)
PROT SERPL-MCNC: 7.3 G/DL (ref 6–8.4)
PTH-INTACT SERPL-MCNC: 35 PG/ML (ref 9–77)
SODIUM SERPL-SCNC: 138 MMOL/L (ref 136–145)

## 2021-05-19 PROCEDURE — 83970 ASSAY OF PARATHORMONE: CPT | Performed by: OTOLARYNGOLOGY

## 2021-05-19 PROCEDURE — 80053 COMPREHEN METABOLIC PANEL: CPT | Performed by: OTOLARYNGOLOGY

## 2021-05-19 PROCEDURE — 36415 COLL VENOUS BLD VENIPUNCTURE: CPT | Mod: PO | Performed by: OTOLARYNGOLOGY

## 2021-05-20 ENCOUNTER — PATIENT MESSAGE (OUTPATIENT)
Dept: FAMILY MEDICINE | Facility: CLINIC | Age: 46
End: 2021-05-20

## 2021-05-20 ENCOUNTER — TELEPHONE (OUTPATIENT)
Dept: OTOLARYNGOLOGY | Facility: CLINIC | Age: 46
End: 2021-05-20

## 2021-07-09 ENCOUNTER — LAB VISIT (OUTPATIENT)
Dept: LAB | Facility: HOSPITAL | Age: 46
End: 2021-07-09
Payer: COMMERCIAL

## 2021-07-09 ENCOUNTER — OFFICE VISIT (OUTPATIENT)
Dept: PAIN MEDICINE | Facility: CLINIC | Age: 46
End: 2021-07-09
Payer: COMMERCIAL

## 2021-07-09 ENCOUNTER — OFFICE VISIT (OUTPATIENT)
Dept: FAMILY MEDICINE | Facility: CLINIC | Age: 46
End: 2021-07-09
Payer: COMMERCIAL

## 2021-07-09 VITALS
BODY MASS INDEX: 41.48 KG/M2 | DIASTOLIC BLOOD PRESSURE: 88 MMHG | WEIGHT: 234.13 LBS | HEART RATE: 76 BPM | HEIGHT: 63 IN | SYSTOLIC BLOOD PRESSURE: 140 MMHG

## 2021-07-09 VITALS
SYSTOLIC BLOOD PRESSURE: 140 MMHG | HEIGHT: 63 IN | DIASTOLIC BLOOD PRESSURE: 88 MMHG | TEMPERATURE: 99 F | WEIGHT: 231.81 LBS | HEART RATE: 76 BPM | BODY MASS INDEX: 41.07 KG/M2

## 2021-07-09 DIAGNOSIS — D86.9 SARCOIDOSIS: ICD-10-CM

## 2021-07-09 DIAGNOSIS — E89.89 LYMPHEDEMA OF UPPER EXTREMITY FOLLOWING LYMPHADENECTOMY: ICD-10-CM

## 2021-07-09 DIAGNOSIS — M79.18 MYALGIA, OTHER SITE: ICD-10-CM

## 2021-07-09 DIAGNOSIS — M79.12 MYALGIA OF AUXILIARY MUSCLES, HEAD AND NECK: Primary | ICD-10-CM

## 2021-07-09 DIAGNOSIS — D63.8 CHRONIC DISEASE ANEMIA: ICD-10-CM

## 2021-07-09 DIAGNOSIS — G89.29 CHRONIC MYOFASCIAL PAIN: ICD-10-CM

## 2021-07-09 DIAGNOSIS — Z86.16 HISTORY OF 2019 NOVEL CORONAVIRUS DISEASE (COVID-19): ICD-10-CM

## 2021-07-09 DIAGNOSIS — M79.18 CHRONIC MYOFASCIAL PAIN: ICD-10-CM

## 2021-07-09 DIAGNOSIS — Z11.4 ENCOUNTER FOR SCREENING FOR HIV: ICD-10-CM

## 2021-07-09 DIAGNOSIS — I10 ESSENTIAL HYPERTENSION: ICD-10-CM

## 2021-07-09 DIAGNOSIS — I89.0 LYMPHEDEMA OF UPPER EXTREMITY FOLLOWING LYMPHADENECTOMY: ICD-10-CM

## 2021-07-09 DIAGNOSIS — Z00.00 ANNUAL PHYSICAL EXAM: Primary | ICD-10-CM

## 2021-07-09 DIAGNOSIS — E66.01 MORBID OBESITY WITH BMI OF 40.0-44.9, ADULT: ICD-10-CM

## 2021-07-09 DIAGNOSIS — Z00.00 ANNUAL PHYSICAL EXAM: ICD-10-CM

## 2021-07-09 DIAGNOSIS — Z76.0 MEDICATION REFILL: ICD-10-CM

## 2021-07-09 DIAGNOSIS — E87.6 HYPOKALEMIA: ICD-10-CM

## 2021-07-09 DIAGNOSIS — E03.9 HYPOTHYROIDISM, UNSPECIFIED TYPE: ICD-10-CM

## 2021-07-09 DIAGNOSIS — G47.30 SLEEP APNEA, UNSPECIFIED TYPE: ICD-10-CM

## 2021-07-09 LAB
CHOLEST SERPL-MCNC: 181 MG/DL (ref 120–199)
CHOLEST/HDLC SERPL: 2.4 {RATIO} (ref 2–5)
HDLC SERPL-MCNC: 77 MG/DL (ref 40–75)
HDLC SERPL: 42.5 % (ref 20–50)
LDLC SERPL CALC-MCNC: 90.4 MG/DL (ref 63–159)
NONHDLC SERPL-MCNC: 104 MG/DL
POTASSIUM SERPL-SCNC: 3.5 MMOL/L (ref 3.5–5.1)
TRIGL SERPL-MCNC: 68 MG/DL (ref 30–150)
TSH SERPL DL<=0.005 MIU/L-ACNC: 1.05 UIU/ML (ref 0.4–4)

## 2021-07-09 PROCEDURE — 1125F AMNT PAIN NOTED PAIN PRSNT: CPT | Mod: S$GLB,,, | Performed by: PHYSICAL MEDICINE & REHABILITATION

## 2021-07-09 PROCEDURE — 87389 HIV-1 AG W/HIV-1&-2 AB AG IA: CPT | Performed by: NURSE PRACTITIONER

## 2021-07-09 PROCEDURE — 99999 PR PBB SHADOW E&M-EST. PATIENT-LVL III: ICD-10-PCS | Mod: PBBFAC,,, | Performed by: PHYSICAL MEDICINE & REHABILITATION

## 2021-07-09 PROCEDURE — 20553 NJX 1/MLT TRIGGER POINTS 3/>: CPT | Mod: S$GLB,,, | Performed by: PHYSICAL MEDICINE & REHABILITATION

## 2021-07-09 PROCEDURE — 36415 COLL VENOUS BLD VENIPUNCTURE: CPT | Mod: PO | Performed by: NURSE PRACTITIONER

## 2021-07-09 PROCEDURE — 80061 LIPID PANEL: CPT | Performed by: NURSE PRACTITIONER

## 2021-07-09 PROCEDURE — 1126F AMNT PAIN NOTED NONE PRSNT: CPT | Mod: S$GLB,,, | Performed by: NURSE PRACTITIONER

## 2021-07-09 PROCEDURE — 3008F BODY MASS INDEX DOCD: CPT | Mod: CPTII,S$GLB,, | Performed by: PHYSICAL MEDICINE & REHABILITATION

## 2021-07-09 PROCEDURE — 84132 ASSAY OF SERUM POTASSIUM: CPT | Performed by: NURSE PRACTITIONER

## 2021-07-09 PROCEDURE — 1126F PR PAIN SEVERITY QUANTIFIED, NO PAIN PRESENT: ICD-10-PCS | Mod: S$GLB,,, | Performed by: NURSE PRACTITIONER

## 2021-07-09 PROCEDURE — 99214 PR OFFICE/OUTPT VISIT, EST, LEVL IV, 30-39 MIN: ICD-10-PCS | Mod: 25,S$GLB,, | Performed by: PHYSICAL MEDICINE & REHABILITATION

## 2021-07-09 PROCEDURE — 1125F PR PAIN SEVERITY QUANTIFIED, PAIN PRESENT: ICD-10-PCS | Mod: S$GLB,,, | Performed by: PHYSICAL MEDICINE & REHABILITATION

## 2021-07-09 PROCEDURE — 99999 PR PBB SHADOW E&M-EST. PATIENT-LVL IV: ICD-10-PCS | Mod: PBBFAC,,, | Performed by: NURSE PRACTITIONER

## 2021-07-09 PROCEDURE — 3008F BODY MASS INDEX DOCD: CPT | Mod: CPTII,S$GLB,, | Performed by: NURSE PRACTITIONER

## 2021-07-09 PROCEDURE — 99999 PR PBB SHADOW E&M-EST. PATIENT-LVL III: CPT | Mod: PBBFAC,,, | Performed by: PHYSICAL MEDICINE & REHABILITATION

## 2021-07-09 PROCEDURE — 99214 OFFICE O/P EST MOD 30 MIN: CPT | Mod: 25,S$GLB,, | Performed by: PHYSICAL MEDICINE & REHABILITATION

## 2021-07-09 PROCEDURE — 20553 PR INJECT TRIGGER POINTS, > 3: ICD-10-PCS | Mod: S$GLB,,, | Performed by: PHYSICAL MEDICINE & REHABILITATION

## 2021-07-09 PROCEDURE — 3008F PR BODY MASS INDEX (BMI) DOCUMENTED: ICD-10-PCS | Mod: CPTII,S$GLB,, | Performed by: NURSE PRACTITIONER

## 2021-07-09 PROCEDURE — 99396 PR PREVENTIVE VISIT,EST,40-64: ICD-10-PCS | Mod: S$GLB,,, | Performed by: NURSE PRACTITIONER

## 2021-07-09 PROCEDURE — 99396 PREV VISIT EST AGE 40-64: CPT | Mod: S$GLB,,, | Performed by: NURSE PRACTITIONER

## 2021-07-09 PROCEDURE — 99999 PR PBB SHADOW E&M-EST. PATIENT-LVL IV: CPT | Mod: PBBFAC,,, | Performed by: NURSE PRACTITIONER

## 2021-07-09 PROCEDURE — 84443 ASSAY THYROID STIM HORMONE: CPT | Performed by: NURSE PRACTITIONER

## 2021-07-09 PROCEDURE — 3008F PR BODY MASS INDEX (BMI) DOCUMENTED: ICD-10-PCS | Mod: CPTII,S$GLB,, | Performed by: PHYSICAL MEDICINE & REHABILITATION

## 2021-07-09 RX ORDER — MONTELUKAST SODIUM 10 MG/1
10 TABLET ORAL NIGHTLY
Qty: 90 TABLET | Refills: 3 | Status: SHIPPED | OUTPATIENT
Start: 2021-07-09 | End: 2022-07-27 | Stop reason: SDUPTHER

## 2021-07-09 RX ORDER — POTASSIUM CHLORIDE 20 MEQ/1
20 TABLET, EXTENDED RELEASE ORAL 2 TIMES DAILY
Qty: 180 TABLET | Refills: 3 | Status: SHIPPED | OUTPATIENT
Start: 2021-07-09 | End: 2022-07-27 | Stop reason: SDUPTHER

## 2021-07-09 RX ORDER — TRIAMTERENE/HYDROCHLOROTHIAZID 37.5-25 MG
1 TABLET ORAL DAILY
Qty: 90 TABLET | Refills: 3 | Status: SHIPPED | OUTPATIENT
Start: 2021-07-09 | End: 2022-07-13

## 2021-07-09 RX ORDER — METHYLPREDNISOLONE ACETATE 40 MG/ML
40 INJECTION, SUSPENSION INTRA-ARTICULAR; INTRALESIONAL; INTRAMUSCULAR; SOFT TISSUE
Status: COMPLETED | OUTPATIENT
Start: 2021-07-09 | End: 2021-07-09

## 2021-07-09 RX ORDER — DILTIAZEM HYDROCHLORIDE 120 MG/1
120 CAPSULE, COATED, EXTENDED RELEASE ORAL DAILY
Qty: 90 CAPSULE | Refills: 3 | Status: SHIPPED | OUTPATIENT
Start: 2021-07-09 | End: 2022-01-27 | Stop reason: ALTCHOICE

## 2021-07-09 RX ADMIN — METHYLPREDNISOLONE ACETATE 40 MG: 40 INJECTION, SUSPENSION INTRA-ARTICULAR; INTRALESIONAL; INTRAMUSCULAR; SOFT TISSUE at 01:07

## 2021-07-12 ENCOUNTER — TELEPHONE (OUTPATIENT)
Dept: FAMILY MEDICINE | Facility: CLINIC | Age: 46
End: 2021-07-12

## 2021-07-12 ENCOUNTER — PATIENT MESSAGE (OUTPATIENT)
Dept: FAMILY MEDICINE | Facility: CLINIC | Age: 46
End: 2021-07-12

## 2021-07-12 LAB — HIV 1+2 AB+HIV1 P24 AG SERPL QL IA: NEGATIVE

## 2021-07-20 ENCOUNTER — LAB VISIT (OUTPATIENT)
Dept: LAB | Facility: HOSPITAL | Age: 46
End: 2021-07-20
Attending: FAMILY MEDICINE
Payer: COMMERCIAL

## 2021-07-20 ENCOUNTER — TELEPHONE (OUTPATIENT)
Dept: FAMILY MEDICINE | Facility: CLINIC | Age: 46
End: 2021-07-20

## 2021-07-20 DIAGNOSIS — R30.0 DYSURIA: ICD-10-CM

## 2021-07-20 DIAGNOSIS — R30.0 DYSURIA: Primary | ICD-10-CM

## 2021-07-20 LAB
BILIRUB UR QL STRIP: NEGATIVE
CLARITY UR: CLEAR
COLOR UR: YELLOW
GLUCOSE UR QL STRIP: NEGATIVE
HGB UR QL STRIP: NEGATIVE
KETONES UR QL STRIP: NEGATIVE
LEUKOCYTE ESTERASE UR QL STRIP: NEGATIVE
NITRITE UR QL STRIP: NEGATIVE
PH UR STRIP: 6 [PH] (ref 5–8)
PROT UR QL STRIP: NEGATIVE
SP GR UR STRIP: 1.03 (ref 1–1.03)
URN SPEC COLLECT METH UR: NORMAL

## 2021-07-20 PROCEDURE — 81003 URINALYSIS AUTO W/O SCOPE: CPT | Mod: PO | Performed by: FAMILY MEDICINE

## 2021-07-28 ENCOUNTER — TELEPHONE (OUTPATIENT)
Dept: HEMATOLOGY/ONCOLOGY | Facility: CLINIC | Age: 46
End: 2021-07-28

## 2021-07-29 ENCOUNTER — PATIENT MESSAGE (OUTPATIENT)
Dept: HEMATOLOGY/ONCOLOGY | Facility: CLINIC | Age: 46
End: 2021-07-29

## 2021-07-29 ENCOUNTER — TELEPHONE (OUTPATIENT)
Dept: HEMATOLOGY/ONCOLOGY | Facility: CLINIC | Age: 46
End: 2021-07-29

## 2021-08-03 ENCOUNTER — PATIENT MESSAGE (OUTPATIENT)
Dept: HEMATOLOGY/ONCOLOGY | Facility: CLINIC | Age: 46
End: 2021-08-03

## 2021-08-03 ENCOUNTER — TELEPHONE (OUTPATIENT)
Dept: HEMATOLOGY/ONCOLOGY | Facility: CLINIC | Age: 46
End: 2021-08-03

## 2021-08-06 ENCOUNTER — HOSPITAL ENCOUNTER (OUTPATIENT)
Dept: RADIOLOGY | Facility: HOSPITAL | Age: 46
Discharge: HOME | End: 2021-08-06
Attending: FAMILY MEDICINE
Payer: COMMERCIAL

## 2021-08-06 ENCOUNTER — OFFICE VISIT (OUTPATIENT)
Dept: FAMILY MEDICINE | Facility: CLINIC | Age: 46
End: 2021-08-06
Payer: COMMERCIAL

## 2021-08-06 VITALS
DIASTOLIC BLOOD PRESSURE: 83 MMHG | BODY MASS INDEX: 41.23 KG/M2 | SYSTOLIC BLOOD PRESSURE: 134 MMHG | WEIGHT: 232.69 LBS | HEIGHT: 63 IN | HEART RATE: 77 BPM | TEMPERATURE: 98 F

## 2021-08-06 DIAGNOSIS — R73.09 ELEVATED GLUCOSE LEVEL: ICD-10-CM

## 2021-08-06 DIAGNOSIS — M54.6 CHRONIC RIGHT-SIDED THORACIC BACK PAIN: ICD-10-CM

## 2021-08-06 DIAGNOSIS — G89.29 CHRONIC RIGHT-SIDED THORACIC BACK PAIN: ICD-10-CM

## 2021-08-06 DIAGNOSIS — M79.18 CHRONIC MYOFASCIAL PAIN: Primary | ICD-10-CM

## 2021-08-06 DIAGNOSIS — D86.9 SARCOIDOSIS: ICD-10-CM

## 2021-08-06 DIAGNOSIS — G89.29 CHRONIC MYOFASCIAL PAIN: Primary | ICD-10-CM

## 2021-08-06 DIAGNOSIS — E66.01 MORBID OBESITY: ICD-10-CM

## 2021-08-06 PROBLEM — R73.03 PREDIABETES: Status: ACTIVE | Noted: 2021-08-06

## 2021-08-06 PROCEDURE — 1159F PR MEDICATION LIST DOCUMENTED IN MEDICAL RECORD: ICD-10-PCS | Mod: CPTII,S$GLB,, | Performed by: FAMILY MEDICINE

## 2021-08-06 PROCEDURE — 90471 IMMUNIZATION ADMIN: CPT | Mod: S$GLB,,, | Performed by: FAMILY MEDICINE

## 2021-08-06 PROCEDURE — 76770 US EXAM ABDO BACK WALL COMP: CPT | Mod: 26,,, | Performed by: RADIOLOGY

## 2021-08-06 PROCEDURE — 99214 PR OFFICE/OUTPT VISIT, EST, LEVL IV, 30-39 MIN: ICD-10-PCS | Mod: 25,S$GLB,, | Performed by: FAMILY MEDICINE

## 2021-08-06 PROCEDURE — 3008F BODY MASS INDEX DOCD: CPT | Mod: CPTII,S$GLB,, | Performed by: FAMILY MEDICINE

## 2021-08-06 PROCEDURE — 76770 US RETROPERITONEAL COMPLETE: ICD-10-PCS | Mod: 26,,, | Performed by: RADIOLOGY

## 2021-08-06 PROCEDURE — 3044F HG A1C LEVEL LT 7.0%: CPT | Mod: CPTII,S$GLB,, | Performed by: FAMILY MEDICINE

## 2021-08-06 PROCEDURE — 76770 US EXAM ABDO BACK WALL COMP: CPT | Mod: TC,PO

## 2021-08-06 PROCEDURE — 3079F PR MOST RECENT DIASTOLIC BLOOD PRESSURE 80-89 MM HG: ICD-10-PCS | Mod: CPTII,S$GLB,, | Performed by: FAMILY MEDICINE

## 2021-08-06 PROCEDURE — 90471 PNEUMOCOCCAL POLYSACCHARIDE VACCINE 23-VALENT =>2YO SQ IM: ICD-10-PCS | Mod: S$GLB,,, | Performed by: FAMILY MEDICINE

## 2021-08-06 PROCEDURE — 3008F PR BODY MASS INDEX (BMI) DOCUMENTED: ICD-10-PCS | Mod: CPTII,S$GLB,, | Performed by: FAMILY MEDICINE

## 2021-08-06 PROCEDURE — 3075F SYST BP GE 130 - 139MM HG: CPT | Mod: CPTII,S$GLB,, | Performed by: FAMILY MEDICINE

## 2021-08-06 PROCEDURE — 3079F DIAST BP 80-89 MM HG: CPT | Mod: CPTII,S$GLB,, | Performed by: FAMILY MEDICINE

## 2021-08-06 PROCEDURE — 1125F PR PAIN SEVERITY QUANTIFIED, PAIN PRESENT: ICD-10-PCS | Mod: CPTII,S$GLB,, | Performed by: FAMILY MEDICINE

## 2021-08-06 PROCEDURE — 99999 PR PBB SHADOW E&M-EST. PATIENT-LVL IV: ICD-10-PCS | Mod: PBBFAC,,, | Performed by: FAMILY MEDICINE

## 2021-08-06 PROCEDURE — 3075F PR MOST RECENT SYSTOLIC BLOOD PRESS GE 130-139MM HG: ICD-10-PCS | Mod: CPTII,S$GLB,, | Performed by: FAMILY MEDICINE

## 2021-08-06 PROCEDURE — 90732 PPSV23 VACC 2 YRS+ SUBQ/IM: CPT | Mod: S$GLB,,, | Performed by: FAMILY MEDICINE

## 2021-08-06 PROCEDURE — 99214 OFFICE O/P EST MOD 30 MIN: CPT | Mod: 25,S$GLB,, | Performed by: FAMILY MEDICINE

## 2021-08-06 PROCEDURE — 99999 PR PBB SHADOW E&M-EST. PATIENT-LVL IV: CPT | Mod: PBBFAC,,, | Performed by: FAMILY MEDICINE

## 2021-08-06 PROCEDURE — 1125F AMNT PAIN NOTED PAIN PRSNT: CPT | Mod: CPTII,S$GLB,, | Performed by: FAMILY MEDICINE

## 2021-08-06 PROCEDURE — 3044F PR MOST RECENT HEMOGLOBIN A1C LEVEL <7.0%: ICD-10-PCS | Mod: CPTII,S$GLB,, | Performed by: FAMILY MEDICINE

## 2021-08-06 PROCEDURE — 1159F MED LIST DOCD IN RCRD: CPT | Mod: CPTII,S$GLB,, | Performed by: FAMILY MEDICINE

## 2021-08-06 PROCEDURE — 90732 PNEUMOCOCCAL POLYSACCHARIDE VACCINE 23-VALENT =>2YO SQ IM: ICD-10-PCS | Mod: S$GLB,,, | Performed by: FAMILY MEDICINE

## 2021-08-06 RX ORDER — METHOCARBAMOL 500 MG/1
500 TABLET, FILM COATED ORAL 3 TIMES DAILY PRN
COMMUNITY
End: 2021-09-23

## 2021-08-17 ENCOUNTER — OFFICE VISIT (OUTPATIENT)
Dept: OPTOMETRY | Facility: CLINIC | Age: 46
End: 2021-08-17
Payer: COMMERCIAL

## 2021-08-17 ENCOUNTER — OFFICE VISIT (OUTPATIENT)
Dept: HEMATOLOGY/ONCOLOGY | Facility: CLINIC | Age: 46
End: 2021-08-17
Payer: COMMERCIAL

## 2021-08-17 ENCOUNTER — PATIENT MESSAGE (OUTPATIENT)
Dept: HEMATOLOGY/ONCOLOGY | Facility: CLINIC | Age: 46
End: 2021-08-17

## 2021-08-17 VITALS
HEART RATE: 77 BPM | BODY MASS INDEX: 42.58 KG/M2 | RESPIRATION RATE: 18 BRPM | HEIGHT: 63 IN | DIASTOLIC BLOOD PRESSURE: 83 MMHG | OXYGEN SATURATION: 98 % | SYSTOLIC BLOOD PRESSURE: 142 MMHG | TEMPERATURE: 97 F | WEIGHT: 240.31 LBS

## 2021-08-17 DIAGNOSIS — H52.203 MYOPIA WITH ASTIGMATISM AND PRESBYOPIA, BILATERAL: ICD-10-CM

## 2021-08-17 DIAGNOSIS — I89.0 LYMPHEDEMA OF UPPER EXTREMITY FOLLOWING LYMPHADENECTOMY: ICD-10-CM

## 2021-08-17 DIAGNOSIS — Z13.5 GLAUCOMA SCREENING: ICD-10-CM

## 2021-08-17 DIAGNOSIS — D72.19 OTHER EOSINOPHILIA: ICD-10-CM

## 2021-08-17 DIAGNOSIS — Z01.00 EXAMINATION OF EYES AND VISION: Primary | ICD-10-CM

## 2021-08-17 DIAGNOSIS — H52.4 MYOPIA WITH ASTIGMATISM AND PRESBYOPIA, BILATERAL: ICD-10-CM

## 2021-08-17 DIAGNOSIS — H52.13 MYOPIA WITH ASTIGMATISM AND PRESBYOPIA, BILATERAL: ICD-10-CM

## 2021-08-17 DIAGNOSIS — H43.393 VITREOUS FLOATERS, BILATERAL: ICD-10-CM

## 2021-08-17 DIAGNOSIS — D63.8 CHRONIC DISEASE ANEMIA: Primary | ICD-10-CM

## 2021-08-17 DIAGNOSIS — E89.89 LYMPHEDEMA OF UPPER EXTREMITY FOLLOWING LYMPHADENECTOMY: ICD-10-CM

## 2021-08-17 PROCEDURE — 1126F AMNT PAIN NOTED NONE PRSNT: CPT | Mod: CPTII,S$GLB,, | Performed by: NURSE PRACTITIONER

## 2021-08-17 PROCEDURE — 1160F RVW MEDS BY RX/DR IN RCRD: CPT | Mod: CPTII,S$GLB,, | Performed by: OPTOMETRIST

## 2021-08-17 PROCEDURE — 3008F BODY MASS INDEX DOCD: CPT | Mod: CPTII,S$GLB,, | Performed by: NURSE PRACTITIONER

## 2021-08-17 PROCEDURE — 3079F DIAST BP 80-89 MM HG: CPT | Mod: CPTII,S$GLB,, | Performed by: NURSE PRACTITIONER

## 2021-08-17 PROCEDURE — 1159F MED LIST DOCD IN RCRD: CPT | Mod: CPTII,S$GLB,, | Performed by: OPTOMETRIST

## 2021-08-17 PROCEDURE — 99999 PR PBB SHADOW E&M-EST. PATIENT-LVL III: CPT | Mod: PBBFAC,,, | Performed by: NURSE PRACTITIONER

## 2021-08-17 PROCEDURE — 3044F PR MOST RECENT HEMOGLOBIN A1C LEVEL <7.0%: ICD-10-PCS | Mod: CPTII,S$GLB,, | Performed by: OPTOMETRIST

## 2021-08-17 PROCEDURE — 3077F SYST BP >= 140 MM HG: CPT | Mod: CPTII,S$GLB,, | Performed by: NURSE PRACTITIONER

## 2021-08-17 PROCEDURE — 99213 OFFICE O/P EST LOW 20 MIN: CPT | Mod: S$GLB,,, | Performed by: NURSE PRACTITIONER

## 2021-08-17 PROCEDURE — 99999 PR PBB SHADOW E&M-EST. PATIENT-LVL III: ICD-10-PCS | Mod: PBBFAC,,, | Performed by: OPTOMETRIST

## 2021-08-17 PROCEDURE — 92015 DETERMINE REFRACTIVE STATE: CPT | Mod: S$GLB,,, | Performed by: OPTOMETRIST

## 2021-08-17 PROCEDURE — 3077F PR MOST RECENT SYSTOLIC BLOOD PRESSURE >= 140 MM HG: ICD-10-PCS | Mod: CPTII,S$GLB,, | Performed by: NURSE PRACTITIONER

## 2021-08-17 PROCEDURE — 92014 PR EYE EXAM, EST PATIENT,COMPREHESV: ICD-10-PCS | Mod: S$GLB,,, | Performed by: OPTOMETRIST

## 2021-08-17 PROCEDURE — 3079F PR MOST RECENT DIASTOLIC BLOOD PRESSURE 80-89 MM HG: ICD-10-PCS | Mod: CPTII,S$GLB,, | Performed by: NURSE PRACTITIONER

## 2021-08-17 PROCEDURE — 92014 COMPRE OPH EXAM EST PT 1/>: CPT | Mod: S$GLB,,, | Performed by: OPTOMETRIST

## 2021-08-17 PROCEDURE — 3044F HG A1C LEVEL LT 7.0%: CPT | Mod: CPTII,S$GLB,, | Performed by: OPTOMETRIST

## 2021-08-17 PROCEDURE — 3008F PR BODY MASS INDEX (BMI) DOCUMENTED: ICD-10-PCS | Mod: CPTII,S$GLB,, | Performed by: NURSE PRACTITIONER

## 2021-08-17 PROCEDURE — 99213 PR OFFICE/OUTPT VISIT, EST, LEVL III, 20-29 MIN: ICD-10-PCS | Mod: S$GLB,,, | Performed by: NURSE PRACTITIONER

## 2021-08-17 PROCEDURE — 1126F AMNT PAIN NOTED NONE PRSNT: CPT | Mod: CPTII,S$GLB,, | Performed by: OPTOMETRIST

## 2021-08-17 PROCEDURE — 99999 PR PBB SHADOW E&M-EST. PATIENT-LVL III: ICD-10-PCS | Mod: PBBFAC,,, | Performed by: NURSE PRACTITIONER

## 2021-08-17 PROCEDURE — 3044F HG A1C LEVEL LT 7.0%: CPT | Mod: CPTII,S$GLB,, | Performed by: NURSE PRACTITIONER

## 2021-08-17 PROCEDURE — 1126F PR PAIN SEVERITY QUANTIFIED, NO PAIN PRESENT: ICD-10-PCS | Mod: CPTII,S$GLB,, | Performed by: OPTOMETRIST

## 2021-08-17 PROCEDURE — 1159F PR MEDICATION LIST DOCUMENTED IN MEDICAL RECORD: ICD-10-PCS | Mod: CPTII,S$GLB,, | Performed by: OPTOMETRIST

## 2021-08-17 PROCEDURE — 1160F PR REVIEW ALL MEDS BY PRESCRIBER/CLIN PHARMACIST DOCUMENTED: ICD-10-PCS | Mod: CPTII,S$GLB,, | Performed by: OPTOMETRIST

## 2021-08-17 PROCEDURE — 99999 PR PBB SHADOW E&M-EST. PATIENT-LVL III: CPT | Mod: PBBFAC,,, | Performed by: OPTOMETRIST

## 2021-08-17 PROCEDURE — 3044F PR MOST RECENT HEMOGLOBIN A1C LEVEL <7.0%: ICD-10-PCS | Mod: CPTII,S$GLB,, | Performed by: NURSE PRACTITIONER

## 2021-08-17 PROCEDURE — 1126F PR PAIN SEVERITY QUANTIFIED, NO PAIN PRESENT: ICD-10-PCS | Mod: CPTII,S$GLB,, | Performed by: NURSE PRACTITIONER

## 2021-08-17 PROCEDURE — 92015 PR REFRACTION: ICD-10-PCS | Mod: S$GLB,,, | Performed by: OPTOMETRIST

## 2021-09-16 ENCOUNTER — PATIENT MESSAGE (OUTPATIENT)
Dept: PAIN MEDICINE | Facility: CLINIC | Age: 46
End: 2021-09-16

## 2021-09-23 ENCOUNTER — OFFICE VISIT (OUTPATIENT)
Dept: PAIN MEDICINE | Facility: CLINIC | Age: 46
End: 2021-09-23
Payer: COMMERCIAL

## 2021-09-23 VITALS
DIASTOLIC BLOOD PRESSURE: 97 MMHG | HEART RATE: 79 BPM | WEIGHT: 246.94 LBS | SYSTOLIC BLOOD PRESSURE: 178 MMHG | HEIGHT: 63 IN | BODY MASS INDEX: 43.75 KG/M2

## 2021-09-23 DIAGNOSIS — G89.29 CHRONIC MYOFASCIAL PAIN: ICD-10-CM

## 2021-09-23 DIAGNOSIS — M79.18 MYALGIA, OTHER SITE: ICD-10-CM

## 2021-09-23 DIAGNOSIS — I89.0 LYMPHEDEMA OF UPPER EXTREMITY FOLLOWING LYMPHADENECTOMY: ICD-10-CM

## 2021-09-23 DIAGNOSIS — M79.18 CHRONIC MYOFASCIAL PAIN: ICD-10-CM

## 2021-09-23 DIAGNOSIS — E89.89 LYMPHEDEMA OF UPPER EXTREMITY FOLLOWING LYMPHADENECTOMY: ICD-10-CM

## 2021-09-23 DIAGNOSIS — M79.12 MYALGIA OF AUXILIARY MUSCLES, HEAD AND NECK: Primary | ICD-10-CM

## 2021-09-23 PROCEDURE — 99214 PR OFFICE/OUTPT VISIT, EST, LEVL IV, 30-39 MIN: ICD-10-PCS | Mod: 25,S$GLB,, | Performed by: PHYSICAL MEDICINE & REHABILITATION

## 2021-09-23 PROCEDURE — 1160F RVW MEDS BY RX/DR IN RCRD: CPT | Mod: CPTII,S$GLB,, | Performed by: PHYSICAL MEDICINE & REHABILITATION

## 2021-09-23 PROCEDURE — 20552 NJX 1/MLT TRIGGER POINT 1/2: CPT | Mod: S$GLB,,, | Performed by: PHYSICAL MEDICINE & REHABILITATION

## 2021-09-23 PROCEDURE — 3077F SYST BP >= 140 MM HG: CPT | Mod: CPTII,S$GLB,, | Performed by: PHYSICAL MEDICINE & REHABILITATION

## 2021-09-23 PROCEDURE — 99214 OFFICE O/P EST MOD 30 MIN: CPT | Mod: 25,S$GLB,, | Performed by: PHYSICAL MEDICINE & REHABILITATION

## 2021-09-23 PROCEDURE — 99999 PR PBB SHADOW E&M-EST. PATIENT-LVL III: CPT | Mod: PBBFAC,,, | Performed by: PHYSICAL MEDICINE & REHABILITATION

## 2021-09-23 PROCEDURE — 3080F DIAST BP >= 90 MM HG: CPT | Mod: CPTII,S$GLB,, | Performed by: PHYSICAL MEDICINE & REHABILITATION

## 2021-09-23 PROCEDURE — 1160F PR REVIEW ALL MEDS BY PRESCRIBER/CLIN PHARMACIST DOCUMENTED: ICD-10-PCS | Mod: CPTII,S$GLB,, | Performed by: PHYSICAL MEDICINE & REHABILITATION

## 2021-09-23 PROCEDURE — 3044F HG A1C LEVEL LT 7.0%: CPT | Mod: CPTII,S$GLB,, | Performed by: PHYSICAL MEDICINE & REHABILITATION

## 2021-09-23 PROCEDURE — 99999 PR PBB SHADOW E&M-EST. PATIENT-LVL III: ICD-10-PCS | Mod: PBBFAC,,, | Performed by: PHYSICAL MEDICINE & REHABILITATION

## 2021-09-23 PROCEDURE — 3077F PR MOST RECENT SYSTOLIC BLOOD PRESSURE >= 140 MM HG: ICD-10-PCS | Mod: CPTII,S$GLB,, | Performed by: PHYSICAL MEDICINE & REHABILITATION

## 2021-09-23 PROCEDURE — 3044F PR MOST RECENT HEMOGLOBIN A1C LEVEL <7.0%: ICD-10-PCS | Mod: CPTII,S$GLB,, | Performed by: PHYSICAL MEDICINE & REHABILITATION

## 2021-09-23 PROCEDURE — 3008F PR BODY MASS INDEX (BMI) DOCUMENTED: ICD-10-PCS | Mod: CPTII,S$GLB,, | Performed by: PHYSICAL MEDICINE & REHABILITATION

## 2021-09-23 PROCEDURE — 3080F PR MOST RECENT DIASTOLIC BLOOD PRESSURE >= 90 MM HG: ICD-10-PCS | Mod: CPTII,S$GLB,, | Performed by: PHYSICAL MEDICINE & REHABILITATION

## 2021-09-23 PROCEDURE — 20552 PR INJECT TRIGGER POINT, 1 OR 2: ICD-10-PCS | Mod: S$GLB,,, | Performed by: PHYSICAL MEDICINE & REHABILITATION

## 2021-09-23 PROCEDURE — 1159F MED LIST DOCD IN RCRD: CPT | Mod: CPTII,S$GLB,, | Performed by: PHYSICAL MEDICINE & REHABILITATION

## 2021-09-23 PROCEDURE — 3008F BODY MASS INDEX DOCD: CPT | Mod: CPTII,S$GLB,, | Performed by: PHYSICAL MEDICINE & REHABILITATION

## 2021-09-23 PROCEDURE — 1159F PR MEDICATION LIST DOCUMENTED IN MEDICAL RECORD: ICD-10-PCS | Mod: CPTII,S$GLB,, | Performed by: PHYSICAL MEDICINE & REHABILITATION

## 2021-09-23 RX ORDER — METHYLPREDNISOLONE ACETATE 40 MG/ML
40 INJECTION, SUSPENSION INTRA-ARTICULAR; INTRALESIONAL; INTRAMUSCULAR; SOFT TISSUE
Status: COMPLETED | OUTPATIENT
Start: 2021-09-23 | End: 2021-09-23

## 2021-09-23 RX ORDER — METHOCARBAMOL 750 MG/1
750 TABLET, FILM COATED ORAL 3 TIMES DAILY PRN
Qty: 90 TABLET | Refills: 2 | Status: SHIPPED | OUTPATIENT
Start: 2021-09-23 | End: 2021-11-19 | Stop reason: SDUPTHER

## 2021-09-23 RX ADMIN — METHYLPREDNISOLONE ACETATE 40 MG: 40 INJECTION, SUSPENSION INTRA-ARTICULAR; INTRALESIONAL; INTRAMUSCULAR; SOFT TISSUE at 02:09

## 2021-10-14 ENCOUNTER — OFFICE VISIT (OUTPATIENT)
Dept: FAMILY MEDICINE | Facility: CLINIC | Age: 46
End: 2021-10-14
Payer: COMMERCIAL

## 2021-10-14 ENCOUNTER — LAB VISIT (OUTPATIENT)
Dept: LAB | Facility: HOSPITAL | Age: 46
End: 2021-10-14
Attending: NURSE PRACTITIONER
Payer: COMMERCIAL

## 2021-10-14 VITALS
BODY MASS INDEX: 43.57 KG/M2 | DIASTOLIC BLOOD PRESSURE: 80 MMHG | SYSTOLIC BLOOD PRESSURE: 129 MMHG | HEIGHT: 63 IN | HEART RATE: 88 BPM | TEMPERATURE: 98 F | WEIGHT: 245.88 LBS

## 2021-10-14 DIAGNOSIS — R25.2 MUSCLE CRAMPS: ICD-10-CM

## 2021-10-14 DIAGNOSIS — M79.10 MUSCLE PAIN: ICD-10-CM

## 2021-10-14 DIAGNOSIS — R25.2 MUSCLE CRAMPS: Primary | ICD-10-CM

## 2021-10-14 LAB
ALBUMIN SERPL BCP-MCNC: 3.7 G/DL (ref 3.5–5.2)
ALP SERPL-CCNC: 154 U/L (ref 55–135)
ALT SERPL W/O P-5'-P-CCNC: 50 U/L (ref 10–44)
ANION GAP SERPL CALC-SCNC: 13 MMOL/L (ref 8–16)
AST SERPL-CCNC: 32 U/L (ref 10–40)
BASOPHILS # BLD AUTO: 0.05 K/UL (ref 0–0.2)
BASOPHILS NFR BLD: 0.9 % (ref 0–1.9)
BILIRUB SERPL-MCNC: 0.5 MG/DL (ref 0.1–1)
BUN SERPL-MCNC: 19 MG/DL (ref 6–20)
CALCIUM SERPL-MCNC: 8.4 MG/DL (ref 8.7–10.5)
CHLORIDE SERPL-SCNC: 99 MMOL/L (ref 95–110)
CK SERPL-CCNC: 713 U/L (ref 20–180)
CO2 SERPL-SCNC: 30 MMOL/L (ref 23–29)
CREAT SERPL-MCNC: 1.2 MG/DL (ref 0.5–1.4)
DIFFERENTIAL METHOD: ABNORMAL
EOSINOPHIL # BLD AUTO: 0.1 K/UL (ref 0–0.5)
EOSINOPHIL NFR BLD: 2.2 % (ref 0–8)
ERYTHROCYTE [DISTWIDTH] IN BLOOD BY AUTOMATED COUNT: 16.3 % (ref 11.5–14.5)
ERYTHROCYTE [SEDIMENTATION RATE] IN BLOOD BY WESTERGREN METHOD: 32 MM/HR (ref 0–20)
EST. GFR  (AFRICAN AMERICAN): >60 ML/MIN/1.73 M^2
EST. GFR  (NON AFRICAN AMERICAN): 53.6 ML/MIN/1.73 M^2
GLUCOSE SERPL-MCNC: 81 MG/DL (ref 70–110)
HCT VFR BLD AUTO: 39.9 % (ref 37–48.5)
HGB BLD-MCNC: 12.2 G/DL (ref 12–16)
IMM GRANULOCYTES # BLD AUTO: 0.03 K/UL (ref 0–0.04)
IMM GRANULOCYTES NFR BLD AUTO: 0.5 % (ref 0–0.5)
LYMPHOCYTES # BLD AUTO: 2.4 K/UL (ref 1–4.8)
LYMPHOCYTES NFR BLD: 41.7 % (ref 18–48)
MCH RBC QN AUTO: 26 PG (ref 27–31)
MCHC RBC AUTO-ENTMCNC: 30.6 G/DL (ref 32–36)
MCV RBC AUTO: 85 FL (ref 82–98)
MONOCYTES # BLD AUTO: 0.7 K/UL (ref 0.3–1)
MONOCYTES NFR BLD: 11.2 % (ref 4–15)
NEUTROPHILS # BLD AUTO: 2.5 K/UL (ref 1.8–7.7)
NEUTROPHILS NFR BLD: 43.5 % (ref 38–73)
NRBC BLD-RTO: 0 /100 WBC
PLATELET # BLD AUTO: 298 K/UL (ref 150–450)
PMV BLD AUTO: 11 FL (ref 9.2–12.9)
POTASSIUM SERPL-SCNC: 3.3 MMOL/L (ref 3.5–5.1)
PROT SERPL-MCNC: 8.3 G/DL (ref 6–8.4)
RBC # BLD AUTO: 4.7 M/UL (ref 4–5.4)
RHEUMATOID FACT SERPL-ACNC: <13 IU/ML (ref 0–15)
SODIUM SERPL-SCNC: 142 MMOL/L (ref 136–145)
T4 FREE SERPL-MCNC: 1.08 NG/DL (ref 0.71–1.51)
TSH SERPL DL<=0.005 MIU/L-ACNC: 5 UIU/ML (ref 0.4–4)
URATE SERPL-MCNC: 5 MG/DL (ref 2.4–5.7)
WBC # BLD AUTO: 5.81 K/UL (ref 3.9–12.7)

## 2021-10-14 PROCEDURE — 86235 NUCLEAR ANTIGEN ANTIBODY: CPT | Mod: 59 | Performed by: NURSE PRACTITIONER

## 2021-10-14 PROCEDURE — 85651 RBC SED RATE NONAUTOMATED: CPT | Mod: PO | Performed by: NURSE PRACTITIONER

## 2021-10-14 PROCEDURE — 83874 ASSAY OF MYOGLOBIN: CPT | Performed by: NURSE PRACTITIONER

## 2021-10-14 PROCEDURE — 99999 PR PBB SHADOW E&M-EST. PATIENT-LVL IV: CPT | Mod: PBBFAC,,, | Performed by: NURSE PRACTITIONER

## 2021-10-14 PROCEDURE — 1159F PR MEDICATION LIST DOCUMENTED IN MEDICAL RECORD: ICD-10-PCS | Mod: CPTII,S$GLB,, | Performed by: NURSE PRACTITIONER

## 2021-10-14 PROCEDURE — 86431 RHEUMATOID FACTOR QUANT: CPT | Performed by: NURSE PRACTITIONER

## 2021-10-14 PROCEDURE — 3079F DIAST BP 80-89 MM HG: CPT | Mod: CPTII,S$GLB,, | Performed by: NURSE PRACTITIONER

## 2021-10-14 PROCEDURE — 84439 ASSAY OF FREE THYROXINE: CPT | Performed by: NURSE PRACTITIONER

## 2021-10-14 PROCEDURE — 1159F MED LIST DOCD IN RCRD: CPT | Mod: CPTII,S$GLB,, | Performed by: NURSE PRACTITIONER

## 2021-10-14 PROCEDURE — 84550 ASSAY OF BLOOD/URIC ACID: CPT | Performed by: NURSE PRACTITIONER

## 2021-10-14 PROCEDURE — 85025 COMPLETE CBC W/AUTO DIFF WBC: CPT | Performed by: NURSE PRACTITIONER

## 2021-10-14 PROCEDURE — 86038 ANTINUCLEAR ANTIBODIES: CPT | Performed by: NURSE PRACTITIONER

## 2021-10-14 PROCEDURE — 3079F PR MOST RECENT DIASTOLIC BLOOD PRESSURE 80-89 MM HG: ICD-10-PCS | Mod: CPTII,S$GLB,, | Performed by: NURSE PRACTITIONER

## 2021-10-14 PROCEDURE — 1160F RVW MEDS BY RX/DR IN RCRD: CPT | Mod: CPTII,S$GLB,, | Performed by: NURSE PRACTITIONER

## 2021-10-14 PROCEDURE — 3074F SYST BP LT 130 MM HG: CPT | Mod: CPTII,S$GLB,, | Performed by: NURSE PRACTITIONER

## 2021-10-14 PROCEDURE — 3044F PR MOST RECENT HEMOGLOBIN A1C LEVEL <7.0%: ICD-10-PCS | Mod: CPTII,S$GLB,, | Performed by: NURSE PRACTITIONER

## 2021-10-14 PROCEDURE — 3008F BODY MASS INDEX DOCD: CPT | Mod: CPTII,S$GLB,, | Performed by: NURSE PRACTITIONER

## 2021-10-14 PROCEDURE — 80053 COMPREHEN METABOLIC PANEL: CPT | Performed by: NURSE PRACTITIONER

## 2021-10-14 PROCEDURE — 36415 COLL VENOUS BLD VENIPUNCTURE: CPT | Mod: PO | Performed by: NURSE PRACTITIONER

## 2021-10-14 PROCEDURE — 1160F PR REVIEW ALL MEDS BY PRESCRIBER/CLIN PHARMACIST DOCUMENTED: ICD-10-PCS | Mod: CPTII,S$GLB,, | Performed by: NURSE PRACTITIONER

## 2021-10-14 PROCEDURE — 82550 ASSAY OF CK (CPK): CPT | Performed by: NURSE PRACTITIONER

## 2021-10-14 PROCEDURE — 99213 OFFICE O/P EST LOW 20 MIN: CPT | Mod: S$GLB,,, | Performed by: NURSE PRACTITIONER

## 2021-10-14 PROCEDURE — 99999 PR PBB SHADOW E&M-EST. PATIENT-LVL IV: ICD-10-PCS | Mod: PBBFAC,,, | Performed by: NURSE PRACTITIONER

## 2021-10-14 PROCEDURE — 99213 PR OFFICE/OUTPT VISIT, EST, LEVL III, 20-29 MIN: ICD-10-PCS | Mod: S$GLB,,, | Performed by: NURSE PRACTITIONER

## 2021-10-14 PROCEDURE — 3008F PR BODY MASS INDEX (BMI) DOCUMENTED: ICD-10-PCS | Mod: CPTII,S$GLB,, | Performed by: NURSE PRACTITIONER

## 2021-10-14 PROCEDURE — 3074F PR MOST RECENT SYSTOLIC BLOOD PRESSURE < 130 MM HG: ICD-10-PCS | Mod: CPTII,S$GLB,, | Performed by: NURSE PRACTITIONER

## 2021-10-14 PROCEDURE — 3044F HG A1C LEVEL LT 7.0%: CPT | Mod: CPTII,S$GLB,, | Performed by: NURSE PRACTITIONER

## 2021-10-14 PROCEDURE — 84443 ASSAY THYROID STIM HORMONE: CPT | Performed by: NURSE PRACTITIONER

## 2021-10-14 PROCEDURE — 86039 ANTINUCLEAR ANTIBODIES (ANA): CPT | Performed by: NURSE PRACTITIONER

## 2021-10-15 ENCOUNTER — PATIENT MESSAGE (OUTPATIENT)
Dept: FAMILY MEDICINE | Facility: CLINIC | Age: 46
End: 2021-10-15
Payer: COMMERCIAL

## 2021-10-15 ENCOUNTER — TELEPHONE (OUTPATIENT)
Dept: FAMILY MEDICINE | Facility: CLINIC | Age: 46
End: 2021-10-15

## 2021-10-15 DIAGNOSIS — E03.9 HYPOTHYROIDISM, UNSPECIFIED TYPE: Primary | ICD-10-CM

## 2021-10-15 DIAGNOSIS — E04.1 THYROID NODULE: Primary | ICD-10-CM

## 2021-10-15 DIAGNOSIS — E87.6 HYPOKALEMIA: ICD-10-CM

## 2021-10-15 LAB
ANA PATTERN 1: NORMAL
ANA SER QL IF: POSITIVE
ANA TITR SER IF: NORMAL {TITER}

## 2021-10-15 RX ORDER — POTASSIUM CHLORIDE 750 MG/1
10 CAPSULE, EXTENDED RELEASE ORAL DAILY
Qty: 7 CAPSULE | Refills: 0 | Status: SHIPPED | OUTPATIENT
Start: 2021-10-15 | End: 2021-11-16

## 2021-10-18 LAB — MYOGLOBIN SERPL-MCNC: 71 MCG/L

## 2021-10-19 LAB
ANTI SM ANTIBODY: 0.08 RATIO (ref 0–0.99)
ANTI SM/RNP ANTIBODY: 0.07 RATIO (ref 0–0.99)
ANTI-SM INTERPRETATION: NEGATIVE
ANTI-SM/RNP INTERPRETATION: NEGATIVE
ANTI-SSA ANTIBODY: 0.08 RATIO (ref 0–0.99)
ANTI-SSA INTERPRETATION: NEGATIVE
ANTI-SSB ANTIBODY: 0.08 RATIO (ref 0–0.99)
ANTI-SSB INTERPRETATION: NEGATIVE
DSDNA AB SER-ACNC: NORMAL [IU]/ML

## 2021-10-20 ENCOUNTER — TELEPHONE (OUTPATIENT)
Dept: RHEUMATOLOGY | Facility: CLINIC | Age: 46
End: 2021-10-20

## 2021-10-21 ENCOUNTER — TELEPHONE (OUTPATIENT)
Dept: FAMILY MEDICINE | Facility: CLINIC | Age: 46
End: 2021-10-21

## 2021-10-21 DIAGNOSIS — E83.51 HYPOCALCEMIA: Primary | ICD-10-CM

## 2021-10-22 ENCOUNTER — LAB VISIT (OUTPATIENT)
Dept: LAB | Facility: HOSPITAL | Age: 46
End: 2021-10-22
Attending: NURSE PRACTITIONER
Payer: COMMERCIAL

## 2021-10-22 DIAGNOSIS — E87.6 HYPOKALEMIA: ICD-10-CM

## 2021-10-22 DIAGNOSIS — E83.51 HYPOCALCEMIA: ICD-10-CM

## 2021-10-22 LAB
25(OH)D3+25(OH)D2 SERPL-MCNC: 22 NG/ML (ref 30–96)
POTASSIUM SERPL-SCNC: 3.4 MMOL/L (ref 3.5–5.1)
PTH-INTACT SERPL-MCNC: 54.3 PG/ML (ref 9–77)

## 2021-10-22 PROCEDURE — 83970 ASSAY OF PARATHORMONE: CPT | Performed by: FAMILY MEDICINE

## 2021-10-22 PROCEDURE — 82306 VITAMIN D 25 HYDROXY: CPT | Performed by: FAMILY MEDICINE

## 2021-10-22 PROCEDURE — 84132 ASSAY OF SERUM POTASSIUM: CPT | Performed by: NURSE PRACTITIONER

## 2021-10-22 PROCEDURE — 36415 COLL VENOUS BLD VENIPUNCTURE: CPT | Mod: PO | Performed by: FAMILY MEDICINE

## 2021-10-26 ENCOUNTER — TELEPHONE (OUTPATIENT)
Dept: FAMILY MEDICINE | Facility: CLINIC | Age: 46
End: 2021-10-26
Payer: COMMERCIAL

## 2021-10-26 DIAGNOSIS — R76.8 ANA POSITIVE: Primary | ICD-10-CM

## 2021-10-26 DIAGNOSIS — E55.9 VITAMIN D DEFICIENCY: Primary | ICD-10-CM

## 2021-10-26 RX ORDER — CALCIUM CARBONATE 600 MG
600 TABLET ORAL 2 TIMES DAILY WITH MEALS
COMMUNITY
End: 2022-11-03

## 2021-10-26 RX ORDER — ACETAMINOPHEN 500 MG
1 TABLET ORAL DAILY
COMMUNITY
End: 2021-11-16

## 2021-10-27 ENCOUNTER — OFFICE VISIT (OUTPATIENT)
Dept: RHEUMATOLOGY | Facility: CLINIC | Age: 46
End: 2021-10-27
Payer: COMMERCIAL

## 2021-10-27 ENCOUNTER — LAB VISIT (OUTPATIENT)
Dept: LAB | Facility: HOSPITAL | Age: 46
End: 2021-10-27
Attending: INTERNAL MEDICINE
Payer: COMMERCIAL

## 2021-10-27 ENCOUNTER — PATIENT MESSAGE (OUTPATIENT)
Dept: RHEUMATOLOGY | Facility: CLINIC | Age: 46
End: 2021-10-27

## 2021-10-27 VITALS
HEIGHT: 63 IN | DIASTOLIC BLOOD PRESSURE: 97 MMHG | BODY MASS INDEX: 43.36 KG/M2 | SYSTOLIC BLOOD PRESSURE: 145 MMHG | WEIGHT: 244.69 LBS | HEART RATE: 96 BPM

## 2021-10-27 DIAGNOSIS — R25.2 MUSCLE CRAMP: ICD-10-CM

## 2021-10-27 DIAGNOSIS — R76.8 ANA POSITIVE: Primary | ICD-10-CM

## 2021-10-27 DIAGNOSIS — Z86.2 HISTORY OF SARCOIDOSIS: ICD-10-CM

## 2021-10-27 DIAGNOSIS — Z71.89 COUNSELING ON HEALTH PROMOTION AND DISEASE PREVENTION: ICD-10-CM

## 2021-10-27 DIAGNOSIS — R76.8 ANA POSITIVE: ICD-10-CM

## 2021-10-27 LAB
C3 SERPL-MCNC: 173 MG/DL (ref 50–180)
C4 SERPL-MCNC: 56 MG/DL (ref 11–44)
CK SERPL-CCNC: 298 U/L (ref 20–180)

## 2021-10-27 PROCEDURE — 1159F MED LIST DOCD IN RCRD: CPT | Mod: CPTII,S$GLB,, | Performed by: INTERNAL MEDICINE

## 2021-10-27 PROCEDURE — 3080F DIAST BP >= 90 MM HG: CPT | Mod: CPTII,S$GLB,, | Performed by: INTERNAL MEDICINE

## 2021-10-27 PROCEDURE — 3044F PR MOST RECENT HEMOGLOBIN A1C LEVEL <7.0%: ICD-10-PCS | Mod: CPTII,S$GLB,, | Performed by: INTERNAL MEDICINE

## 2021-10-27 PROCEDURE — 36415 COLL VENOUS BLD VENIPUNCTURE: CPT | Performed by: INTERNAL MEDICINE

## 2021-10-27 PROCEDURE — 3077F SYST BP >= 140 MM HG: CPT | Mod: CPTII,S$GLB,, | Performed by: INTERNAL MEDICINE

## 2021-10-27 PROCEDURE — 86160 COMPLEMENT ANTIGEN: CPT | Mod: 59 | Performed by: INTERNAL MEDICINE

## 2021-10-27 PROCEDURE — 3080F PR MOST RECENT DIASTOLIC BLOOD PRESSURE >= 90 MM HG: ICD-10-PCS | Mod: CPTII,S$GLB,, | Performed by: INTERNAL MEDICINE

## 2021-10-27 PROCEDURE — 3008F BODY MASS INDEX DOCD: CPT | Mod: CPTII,S$GLB,, | Performed by: INTERNAL MEDICINE

## 2021-10-27 PROCEDURE — 82164 ANGIOTENSIN I ENZYME TEST: CPT | Performed by: INTERNAL MEDICINE

## 2021-10-27 PROCEDURE — 99204 PR OFFICE/OUTPT VISIT, NEW, LEVL IV, 45-59 MIN: ICD-10-PCS | Mod: S$GLB,,, | Performed by: INTERNAL MEDICINE

## 2021-10-27 PROCEDURE — 86225 DNA ANTIBODY NATIVE: CPT | Performed by: INTERNAL MEDICINE

## 2021-10-27 PROCEDURE — 3008F PR BODY MASS INDEX (BMI) DOCUMENTED: ICD-10-PCS | Mod: CPTII,S$GLB,, | Performed by: INTERNAL MEDICINE

## 2021-10-27 PROCEDURE — 99999 PR PBB SHADOW E&M-EST. PATIENT-LVL IV: ICD-10-PCS | Mod: PBBFAC,,, | Performed by: INTERNAL MEDICINE

## 2021-10-27 PROCEDURE — 3044F HG A1C LEVEL LT 7.0%: CPT | Mod: CPTII,S$GLB,, | Performed by: INTERNAL MEDICINE

## 2021-10-27 PROCEDURE — 99999 PR PBB SHADOW E&M-EST. PATIENT-LVL IV: CPT | Mod: PBBFAC,,, | Performed by: INTERNAL MEDICINE

## 2021-10-27 PROCEDURE — 83520 IMMUNOASSAY QUANT NOS NONAB: CPT | Mod: 59 | Performed by: INTERNAL MEDICINE

## 2021-10-27 PROCEDURE — 86235 NUCLEAR ANTIGEN ANTIBODY: CPT | Performed by: INTERNAL MEDICINE

## 2021-10-27 PROCEDURE — 1159F PR MEDICATION LIST DOCUMENTED IN MEDICAL RECORD: ICD-10-PCS | Mod: CPTII,S$GLB,, | Performed by: INTERNAL MEDICINE

## 2021-10-27 PROCEDURE — 3077F PR MOST RECENT SYSTOLIC BLOOD PRESSURE >= 140 MM HG: ICD-10-PCS | Mod: CPTII,S$GLB,, | Performed by: INTERNAL MEDICINE

## 2021-10-27 PROCEDURE — 82085 ASSAY OF ALDOLASE: CPT | Performed by: INTERNAL MEDICINE

## 2021-10-27 PROCEDURE — 99204 OFFICE O/P NEW MOD 45 MIN: CPT | Mod: S$GLB,,, | Performed by: INTERNAL MEDICINE

## 2021-10-27 PROCEDURE — 82550 ASSAY OF CK (CPK): CPT | Performed by: INTERNAL MEDICINE

## 2021-10-27 PROCEDURE — 86160 COMPLEMENT ANTIGEN: CPT | Performed by: INTERNAL MEDICINE

## 2021-10-27 RX ORDER — HYDROXYCHLOROQUINE SULFATE 200 MG/1
200 TABLET, FILM COATED ORAL 2 TIMES DAILY
Qty: 60 TABLET | Refills: 3 | Status: SHIPPED | OUTPATIENT
Start: 2021-10-27 | End: 2021-11-26

## 2021-10-28 ENCOUNTER — TELEPHONE (OUTPATIENT)
Dept: CARDIOLOGY | Facility: CLINIC | Age: 46
End: 2021-10-28
Payer: COMMERCIAL

## 2021-10-28 ENCOUNTER — OFFICE VISIT (OUTPATIENT)
Dept: ENDOCRINOLOGY | Facility: CLINIC | Age: 46
End: 2021-10-28
Payer: COMMERCIAL

## 2021-10-28 DIAGNOSIS — E89.0 POST-OPERATIVE HYPOTHYROIDISM: ICD-10-CM

## 2021-10-28 DIAGNOSIS — E03.9 HYPOTHYROIDISM, UNSPECIFIED TYPE: ICD-10-CM

## 2021-10-28 LAB — DSDNA AB SER-ACNC: NORMAL [IU]/ML

## 2021-10-28 PROCEDURE — 1160F RVW MEDS BY RX/DR IN RCRD: CPT | Mod: CPTII,95,, | Performed by: INTERNAL MEDICINE

## 2021-10-28 PROCEDURE — 99499 UNLISTED E&M SERVICE: CPT | Mod: 95,,, | Performed by: INTERNAL MEDICINE

## 2021-10-28 PROCEDURE — 1159F PR MEDICATION LIST DOCUMENTED IN MEDICAL RECORD: ICD-10-PCS | Mod: CPTII,95,, | Performed by: INTERNAL MEDICINE

## 2021-10-28 PROCEDURE — 99499 NO LOS: ICD-10-PCS | Mod: 95,,, | Performed by: INTERNAL MEDICINE

## 2021-10-28 PROCEDURE — 1159F MED LIST DOCD IN RCRD: CPT | Mod: CPTII,95,, | Performed by: INTERNAL MEDICINE

## 2021-10-28 PROCEDURE — 1160F PR REVIEW ALL MEDS BY PRESCRIBER/CLIN PHARMACIST DOCUMENTED: ICD-10-PCS | Mod: CPTII,95,, | Performed by: INTERNAL MEDICINE

## 2021-10-28 PROCEDURE — 3044F PR MOST RECENT HEMOGLOBIN A1C LEVEL <7.0%: ICD-10-PCS | Mod: CPTII,95,, | Performed by: INTERNAL MEDICINE

## 2021-10-28 PROCEDURE — 3044F HG A1C LEVEL LT 7.0%: CPT | Mod: CPTII,95,, | Performed by: INTERNAL MEDICINE

## 2021-10-29 LAB
ACE SERPL-CCNC: 36 U/L (ref 16–85)
ALDOLASE SERPL-CCNC: 9.1 U/L (ref 1.2–7.6)
SOL IL2 RECEP SERPL-MCNC: 725.1 PG/ML (ref 175.3–858.2)

## 2021-11-01 ENCOUNTER — TELEPHONE (OUTPATIENT)
Dept: FAMILY MEDICINE | Facility: CLINIC | Age: 46
End: 2021-11-01
Payer: COMMERCIAL

## 2021-11-01 DIAGNOSIS — Z12.39 SCREENING BREAST EXAMINATION: Primary | ICD-10-CM

## 2021-11-13 LAB
ANTI-PM/SCL AB: <20 UNITS
ANTI-SS-A 52 KD AB, IGG: <20 UNITS
EJ AB SER QL: NEGATIVE
ENA JO1 AB SER IA-ACNC: <20 UNITS
ENA SM+RNP AB SER IA-ACNC: <20 UNITS
FIBRILLARIN (U3 RNP): NEGATIVE
KU AB SER QL: NEGATIVE
MDA-5 (P140): <20 UNITS
MI2 AB SER QL: NEGATIVE
NXP-2 (P140): <20 UNITS
OJ AB SER QL: NEGATIVE
PL12 AB SER QL: NEGATIVE
PL7 AB SER QL: NEGATIVE
SRP AB SERPL QL: NEGATIVE
TIF1 GAMMA (P155/140): <20 UNITS
U2 SNRNP: NEGATIVE

## 2021-11-16 ENCOUNTER — OFFICE VISIT (OUTPATIENT)
Dept: ENDOCRINOLOGY | Facility: CLINIC | Age: 46
End: 2021-11-16
Payer: COMMERCIAL

## 2021-11-16 ENCOUNTER — OFFICE VISIT (OUTPATIENT)
Dept: FAMILY MEDICINE | Facility: CLINIC | Age: 46
End: 2021-11-16
Payer: COMMERCIAL

## 2021-11-16 ENCOUNTER — LAB VISIT (OUTPATIENT)
Dept: LAB | Facility: HOSPITAL | Age: 46
End: 2021-11-16
Attending: INTERNAL MEDICINE
Payer: COMMERCIAL

## 2021-11-16 ENCOUNTER — PATIENT MESSAGE (OUTPATIENT)
Dept: FAMILY MEDICINE | Facility: CLINIC | Age: 46
End: 2021-11-16

## 2021-11-16 VITALS
SYSTOLIC BLOOD PRESSURE: 122 MMHG | DIASTOLIC BLOOD PRESSURE: 70 MMHG | OXYGEN SATURATION: 99 % | BODY MASS INDEX: 44.24 KG/M2 | HEART RATE: 99 BPM | HEIGHT: 63 IN | WEIGHT: 249.69 LBS

## 2021-11-16 VITALS
WEIGHT: 249.31 LBS | BODY MASS INDEX: 44.18 KG/M2 | SYSTOLIC BLOOD PRESSURE: 134 MMHG | TEMPERATURE: 99 F | HEART RATE: 114 BPM | HEIGHT: 63 IN | DIASTOLIC BLOOD PRESSURE: 80 MMHG

## 2021-11-16 DIAGNOSIS — I89.0 LYMPHEDEMA OF UPPER EXTREMITY FOLLOWING LYMPHADENECTOMY: Primary | ICD-10-CM

## 2021-11-16 DIAGNOSIS — E89.0 POST-OPERATIVE HYPOTHYROIDISM: Primary | ICD-10-CM

## 2021-11-16 DIAGNOSIS — L03.113 CELLULITIS OF RIGHT HAND: ICD-10-CM

## 2021-11-16 DIAGNOSIS — I10 ESSENTIAL HYPERTENSION: ICD-10-CM

## 2021-11-16 DIAGNOSIS — E89.0 POST-OPERATIVE HYPOTHYROIDISM: ICD-10-CM

## 2021-11-16 DIAGNOSIS — D86.9 SARCOIDOSIS: ICD-10-CM

## 2021-11-16 DIAGNOSIS — E89.89 LYMPHEDEMA OF UPPER EXTREMITY FOLLOWING LYMPHADENECTOMY: Primary | ICD-10-CM

## 2021-11-16 DIAGNOSIS — R73.03 PREDIABETES: ICD-10-CM

## 2021-11-16 DIAGNOSIS — E66.01 CLASS 3 SEVERE OBESITY DUE TO EXCESS CALORIES WITH SERIOUS COMORBIDITY AND BODY MASS INDEX (BMI) OF 40.0 TO 44.9 IN ADULT: ICD-10-CM

## 2021-11-16 LAB
T4 FREE SERPL-MCNC: 1.25 NG/DL (ref 0.71–1.51)
TSH SERPL DL<=0.005 MIU/L-ACNC: 0.06 UIU/ML (ref 0.4–4)

## 2021-11-16 PROCEDURE — 3044F PR MOST RECENT HEMOGLOBIN A1C LEVEL <7.0%: ICD-10-PCS | Mod: CPTII,S$GLB,, | Performed by: STUDENT IN AN ORGANIZED HEALTH CARE EDUCATION/TRAINING PROGRAM

## 2021-11-16 PROCEDURE — 99999 PR PBB SHADOW E&M-EST. PATIENT-LVL IV: CPT | Mod: PBBFAC,,, | Performed by: INTERNAL MEDICINE

## 2021-11-16 PROCEDURE — 3044F HG A1C LEVEL LT 7.0%: CPT | Mod: CPTII,S$GLB,, | Performed by: STUDENT IN AN ORGANIZED HEALTH CARE EDUCATION/TRAINING PROGRAM

## 2021-11-16 PROCEDURE — 99214 PR OFFICE/OUTPT VISIT, EST, LEVL IV, 30-39 MIN: ICD-10-PCS | Mod: S$GLB,,, | Performed by: INTERNAL MEDICINE

## 2021-11-16 PROCEDURE — 3008F PR BODY MASS INDEX (BMI) DOCUMENTED: ICD-10-PCS | Mod: CPTII,S$GLB,, | Performed by: STUDENT IN AN ORGANIZED HEALTH CARE EDUCATION/TRAINING PROGRAM

## 2021-11-16 PROCEDURE — 3079F DIAST BP 80-89 MM HG: CPT | Mod: CPTII,S$GLB,, | Performed by: STUDENT IN AN ORGANIZED HEALTH CARE EDUCATION/TRAINING PROGRAM

## 2021-11-16 PROCEDURE — 99213 PR OFFICE/OUTPT VISIT, EST, LEVL III, 20-29 MIN: ICD-10-PCS | Mod: S$GLB,,, | Performed by: STUDENT IN AN ORGANIZED HEALTH CARE EDUCATION/TRAINING PROGRAM

## 2021-11-16 PROCEDURE — 1160F RVW MEDS BY RX/DR IN RCRD: CPT | Mod: CPTII,S$GLB,, | Performed by: STUDENT IN AN ORGANIZED HEALTH CARE EDUCATION/TRAINING PROGRAM

## 2021-11-16 PROCEDURE — 3078F PR MOST RECENT DIASTOLIC BLOOD PRESSURE < 80 MM HG: ICD-10-PCS | Mod: CPTII,S$GLB,, | Performed by: INTERNAL MEDICINE

## 2021-11-16 PROCEDURE — 99213 OFFICE O/P EST LOW 20 MIN: CPT | Mod: S$GLB,,, | Performed by: STUDENT IN AN ORGANIZED HEALTH CARE EDUCATION/TRAINING PROGRAM

## 2021-11-16 PROCEDURE — 1160F PR REVIEW ALL MEDS BY PRESCRIBER/CLIN PHARMACIST DOCUMENTED: ICD-10-PCS | Mod: CPTII,S$GLB,, | Performed by: INTERNAL MEDICINE

## 2021-11-16 PROCEDURE — 3074F SYST BP LT 130 MM HG: CPT | Mod: CPTII,S$GLB,, | Performed by: INTERNAL MEDICINE

## 2021-11-16 PROCEDURE — 1159F MED LIST DOCD IN RCRD: CPT | Mod: CPTII,S$GLB,, | Performed by: INTERNAL MEDICINE

## 2021-11-16 PROCEDURE — 1159F PR MEDICATION LIST DOCUMENTED IN MEDICAL RECORD: ICD-10-PCS | Mod: CPTII,S$GLB,, | Performed by: INTERNAL MEDICINE

## 2021-11-16 PROCEDURE — 3075F SYST BP GE 130 - 139MM HG: CPT | Mod: CPTII,S$GLB,, | Performed by: STUDENT IN AN ORGANIZED HEALTH CARE EDUCATION/TRAINING PROGRAM

## 2021-11-16 PROCEDURE — 36415 COLL VENOUS BLD VENIPUNCTURE: CPT | Mod: PO | Performed by: INTERNAL MEDICINE

## 2021-11-16 PROCEDURE — 84443 ASSAY THYROID STIM HORMONE: CPT | Performed by: INTERNAL MEDICINE

## 2021-11-16 PROCEDURE — 3079F PR MOST RECENT DIASTOLIC BLOOD PRESSURE 80-89 MM HG: ICD-10-PCS | Mod: CPTII,S$GLB,, | Performed by: STUDENT IN AN ORGANIZED HEALTH CARE EDUCATION/TRAINING PROGRAM

## 2021-11-16 PROCEDURE — 3044F HG A1C LEVEL LT 7.0%: CPT | Mod: CPTII,S$GLB,, | Performed by: INTERNAL MEDICINE

## 2021-11-16 PROCEDURE — 1160F PR REVIEW ALL MEDS BY PRESCRIBER/CLIN PHARMACIST DOCUMENTED: ICD-10-PCS | Mod: CPTII,S$GLB,, | Performed by: STUDENT IN AN ORGANIZED HEALTH CARE EDUCATION/TRAINING PROGRAM

## 2021-11-16 PROCEDURE — 99999 PR PBB SHADOW E&M-EST. PATIENT-LVL IV: ICD-10-PCS | Mod: PBBFAC,,, | Performed by: STUDENT IN AN ORGANIZED HEALTH CARE EDUCATION/TRAINING PROGRAM

## 2021-11-16 PROCEDURE — 1159F PR MEDICATION LIST DOCUMENTED IN MEDICAL RECORD: ICD-10-PCS | Mod: CPTII,S$GLB,, | Performed by: STUDENT IN AN ORGANIZED HEALTH CARE EDUCATION/TRAINING PROGRAM

## 2021-11-16 PROCEDURE — 1160F RVW MEDS BY RX/DR IN RCRD: CPT | Mod: CPTII,S$GLB,, | Performed by: INTERNAL MEDICINE

## 2021-11-16 PROCEDURE — 3008F BODY MASS INDEX DOCD: CPT | Mod: CPTII,S$GLB,, | Performed by: STUDENT IN AN ORGANIZED HEALTH CARE EDUCATION/TRAINING PROGRAM

## 2021-11-16 PROCEDURE — 99214 OFFICE O/P EST MOD 30 MIN: CPT | Mod: S$GLB,,, | Performed by: INTERNAL MEDICINE

## 2021-11-16 PROCEDURE — 99999 PR PBB SHADOW E&M-EST. PATIENT-LVL IV: ICD-10-PCS | Mod: PBBFAC,,, | Performed by: INTERNAL MEDICINE

## 2021-11-16 PROCEDURE — 3044F PR MOST RECENT HEMOGLOBIN A1C LEVEL <7.0%: ICD-10-PCS | Mod: CPTII,S$GLB,, | Performed by: INTERNAL MEDICINE

## 2021-11-16 PROCEDURE — 3008F PR BODY MASS INDEX (BMI) DOCUMENTED: ICD-10-PCS | Mod: CPTII,S$GLB,, | Performed by: INTERNAL MEDICINE

## 2021-11-16 PROCEDURE — 1159F MED LIST DOCD IN RCRD: CPT | Mod: CPTII,S$GLB,, | Performed by: STUDENT IN AN ORGANIZED HEALTH CARE EDUCATION/TRAINING PROGRAM

## 2021-11-16 PROCEDURE — 3078F DIAST BP <80 MM HG: CPT | Mod: CPTII,S$GLB,, | Performed by: INTERNAL MEDICINE

## 2021-11-16 PROCEDURE — 99999 PR PBB SHADOW E&M-EST. PATIENT-LVL IV: CPT | Mod: PBBFAC,,, | Performed by: STUDENT IN AN ORGANIZED HEALTH CARE EDUCATION/TRAINING PROGRAM

## 2021-11-16 PROCEDURE — 84439 ASSAY OF FREE THYROXINE: CPT | Performed by: INTERNAL MEDICINE

## 2021-11-16 PROCEDURE — 3074F PR MOST RECENT SYSTOLIC BLOOD PRESSURE < 130 MM HG: ICD-10-PCS | Mod: CPTII,S$GLB,, | Performed by: INTERNAL MEDICINE

## 2021-11-16 PROCEDURE — 3008F BODY MASS INDEX DOCD: CPT | Mod: CPTII,S$GLB,, | Performed by: INTERNAL MEDICINE

## 2021-11-16 PROCEDURE — 3075F PR MOST RECENT SYSTOLIC BLOOD PRESS GE 130-139MM HG: ICD-10-PCS | Mod: CPTII,S$GLB,, | Performed by: STUDENT IN AN ORGANIZED HEALTH CARE EDUCATION/TRAINING PROGRAM

## 2021-11-16 RX ORDER — SULFAMETHOXAZOLE AND TRIMETHOPRIM 800; 160 MG/1; MG/1
1 TABLET ORAL 2 TIMES DAILY
Qty: 20 TABLET | Refills: 0 | Status: SHIPPED | OUTPATIENT
Start: 2021-11-16 | End: 2021-11-26

## 2021-11-16 RX ORDER — HYDROCODONE BITARTRATE AND ACETAMINOPHEN 5; 325 MG/1; MG/1
1 TABLET ORAL EVERY 6 HOURS PRN
Qty: 20 TABLET | Refills: 0 | Status: SHIPPED | OUTPATIENT
Start: 2021-11-16 | End: 2021-11-26

## 2021-11-16 RX ORDER — AMOXICILLIN AND CLAVULANATE POTASSIUM 875; 125 MG/1; MG/1
1 TABLET, FILM COATED ORAL 2 TIMES DAILY
Qty: 20 TABLET | Refills: 0 | Status: SHIPPED | OUTPATIENT
Start: 2021-11-16 | End: 2021-11-26

## 2021-11-17 ENCOUNTER — PATIENT MESSAGE (OUTPATIENT)
Dept: ENDOCRINOLOGY | Facility: CLINIC | Age: 46
End: 2021-11-17
Payer: COMMERCIAL

## 2021-11-17 DIAGNOSIS — E89.0 POST-OPERATIVE HYPOTHYROIDISM: Primary | ICD-10-CM

## 2021-11-17 RX ORDER — LEVOTHYROXINE SODIUM 137 UG/1
137 TABLET ORAL
Qty: 30 TABLET | Refills: 11 | Status: SHIPPED | OUTPATIENT
Start: 2021-11-17 | End: 2022-01-13

## 2021-11-18 ENCOUNTER — TELEPHONE (OUTPATIENT)
Dept: PAIN MEDICINE | Facility: CLINIC | Age: 46
End: 2021-11-18
Payer: COMMERCIAL

## 2021-11-18 ENCOUNTER — HOSPITAL ENCOUNTER (OUTPATIENT)
Dept: RADIOLOGY | Facility: HOSPITAL | Age: 46
Discharge: HOME | End: 2021-11-18
Attending: FAMILY MEDICINE
Payer: COMMERCIAL

## 2021-11-18 VITALS — BODY MASS INDEX: 44.12 KG/M2 | WEIGHT: 249 LBS | HEIGHT: 63 IN

## 2021-11-18 DIAGNOSIS — Z12.39 SCREENING BREAST EXAMINATION: ICD-10-CM

## 2021-11-18 PROCEDURE — 77063 BREAST TOMOSYNTHESIS BI: CPT | Mod: 26,,, | Performed by: RADIOLOGY

## 2021-11-18 PROCEDURE — 77063 MAMMO DIGITAL SCREENING BILAT WITH TOMO: ICD-10-PCS | Mod: 26,,, | Performed by: RADIOLOGY

## 2021-11-18 PROCEDURE — 77067 SCR MAMMO BI INCL CAD: CPT | Mod: TC,PO

## 2021-11-18 PROCEDURE — 77067 MAMMO DIGITAL SCREENING BILAT WITH TOMO: ICD-10-PCS | Mod: 26,,, | Performed by: RADIOLOGY

## 2021-11-18 PROCEDURE — 77067 SCR MAMMO BI INCL CAD: CPT | Mod: 26,,, | Performed by: RADIOLOGY

## 2021-11-19 ENCOUNTER — OFFICE VISIT (OUTPATIENT)
Dept: CARDIOLOGY | Facility: CLINIC | Age: 46
End: 2021-11-19
Payer: COMMERCIAL

## 2021-11-19 ENCOUNTER — PATIENT MESSAGE (OUTPATIENT)
Dept: PAIN MEDICINE | Facility: CLINIC | Age: 46
End: 2021-11-19
Payer: COMMERCIAL

## 2021-11-19 ENCOUNTER — OFFICE VISIT (OUTPATIENT)
Dept: PAIN MEDICINE | Facility: CLINIC | Age: 46
End: 2021-11-19
Payer: COMMERCIAL

## 2021-11-19 VITALS
BODY MASS INDEX: 44.42 KG/M2 | WEIGHT: 250.69 LBS | DIASTOLIC BLOOD PRESSURE: 89 MMHG | HEART RATE: 91 BPM | SYSTOLIC BLOOD PRESSURE: 138 MMHG | HEIGHT: 63 IN

## 2021-11-19 VITALS
SYSTOLIC BLOOD PRESSURE: 126 MMHG | HEART RATE: 78 BPM | BODY MASS INDEX: 44.73 KG/M2 | HEIGHT: 63 IN | WEIGHT: 252.44 LBS | DIASTOLIC BLOOD PRESSURE: 88 MMHG

## 2021-11-19 DIAGNOSIS — M79.18 MYALGIA, OTHER SITE: ICD-10-CM

## 2021-11-19 DIAGNOSIS — G47.33 OSA ON CPAP: Chronic | ICD-10-CM

## 2021-11-19 DIAGNOSIS — R07.2 PRECORDIAL PAIN: Primary | ICD-10-CM

## 2021-11-19 DIAGNOSIS — M79.18 CHRONIC MYOFASCIAL PAIN: ICD-10-CM

## 2021-11-19 DIAGNOSIS — R01.1 UNDIAGNOSED CARDIAC MURMURS: ICD-10-CM

## 2021-11-19 DIAGNOSIS — Z82.49 FAMILY HISTORY OF PREMATURE CAD: Chronic | ICD-10-CM

## 2021-11-19 DIAGNOSIS — I89.0 LYMPHEDEMA OF UPPER EXTREMITY FOLLOWING LYMPHADENECTOMY: ICD-10-CM

## 2021-11-19 DIAGNOSIS — M79.12 MYALGIA OF AUXILIARY MUSCLES, HEAD AND NECK: Primary | ICD-10-CM

## 2021-11-19 DIAGNOSIS — E66.01 MORBID OBESITY: Chronic | ICD-10-CM

## 2021-11-19 DIAGNOSIS — R07.2 PRECORDIAL PAIN: ICD-10-CM

## 2021-11-19 DIAGNOSIS — G89.29 CHRONIC MYOFASCIAL PAIN: ICD-10-CM

## 2021-11-19 DIAGNOSIS — I10 ESSENTIAL HYPERTENSION: Chronic | ICD-10-CM

## 2021-11-19 DIAGNOSIS — E89.89 LYMPHEDEMA OF UPPER EXTREMITY FOLLOWING LYMPHADENECTOMY: ICD-10-CM

## 2021-11-19 PROCEDURE — 99213 PR OFFICE/OUTPT VISIT, EST, LEVL III, 20-29 MIN: ICD-10-PCS | Mod: S$GLB,,, | Performed by: PHYSICAL MEDICINE & REHABILITATION

## 2021-11-19 PROCEDURE — 3079F DIAST BP 80-89 MM HG: CPT | Mod: CPTII,S$GLB,, | Performed by: INTERNAL MEDICINE

## 2021-11-19 PROCEDURE — 1159F MED LIST DOCD IN RCRD: CPT | Mod: CPTII,S$GLB,, | Performed by: INTERNAL MEDICINE

## 2021-11-19 PROCEDURE — 3044F PR MOST RECENT HEMOGLOBIN A1C LEVEL <7.0%: ICD-10-PCS | Mod: CPTII,S$GLB,, | Performed by: PHYSICAL MEDICINE & REHABILITATION

## 2021-11-19 PROCEDURE — 99999 PR PBB SHADOW E&M-EST. PATIENT-LVL III: ICD-10-PCS | Mod: PBBFAC,,, | Performed by: PHYSICAL MEDICINE & REHABILITATION

## 2021-11-19 PROCEDURE — 1159F MED LIST DOCD IN RCRD: CPT | Mod: CPTII,S$GLB,, | Performed by: PHYSICAL MEDICINE & REHABILITATION

## 2021-11-19 PROCEDURE — 99999 PR PBB SHADOW E&M-EST. PATIENT-LVL IV: CPT | Mod: PBBFAC,,, | Performed by: INTERNAL MEDICINE

## 2021-11-19 PROCEDURE — 93010 ELECTROCARDIOGRAM REPORT: CPT | Mod: S$GLB,,, | Performed by: INTERNAL MEDICINE

## 2021-11-19 PROCEDURE — 93010 EKG 12-LEAD: ICD-10-PCS | Mod: S$GLB,,, | Performed by: INTERNAL MEDICINE

## 2021-11-19 PROCEDURE — 3044F HG A1C LEVEL LT 7.0%: CPT | Mod: CPTII,S$GLB,, | Performed by: PHYSICAL MEDICINE & REHABILITATION

## 2021-11-19 PROCEDURE — 99999 PR PBB SHADOW E&M-EST. PATIENT-LVL III: CPT | Mod: PBBFAC,,, | Performed by: PHYSICAL MEDICINE & REHABILITATION

## 2021-11-19 PROCEDURE — 3079F PR MOST RECENT DIASTOLIC BLOOD PRESSURE 80-89 MM HG: ICD-10-PCS | Mod: CPTII,S$GLB,, | Performed by: INTERNAL MEDICINE

## 2021-11-19 PROCEDURE — 99204 OFFICE O/P NEW MOD 45 MIN: CPT | Mod: S$GLB,,, | Performed by: INTERNAL MEDICINE

## 2021-11-19 PROCEDURE — 3008F BODY MASS INDEX DOCD: CPT | Mod: CPTII,S$GLB,, | Performed by: PHYSICAL MEDICINE & REHABILITATION

## 2021-11-19 PROCEDURE — 3079F PR MOST RECENT DIASTOLIC BLOOD PRESSURE 80-89 MM HG: ICD-10-PCS | Mod: CPTII,S$GLB,, | Performed by: PHYSICAL MEDICINE & REHABILITATION

## 2021-11-19 PROCEDURE — 3008F BODY MASS INDEX DOCD: CPT | Mod: CPTII,S$GLB,, | Performed by: INTERNAL MEDICINE

## 2021-11-19 PROCEDURE — 93005 ELECTROCARDIOGRAM TRACING: CPT | Mod: PO

## 2021-11-19 PROCEDURE — 99999 PR PBB SHADOW E&M-EST. PATIENT-LVL IV: ICD-10-PCS | Mod: PBBFAC,,, | Performed by: INTERNAL MEDICINE

## 2021-11-19 PROCEDURE — 1159F PR MEDICATION LIST DOCUMENTED IN MEDICAL RECORD: ICD-10-PCS | Mod: CPTII,S$GLB,, | Performed by: INTERNAL MEDICINE

## 2021-11-19 PROCEDURE — 3074F SYST BP LT 130 MM HG: CPT | Mod: CPTII,S$GLB,, | Performed by: PHYSICAL MEDICINE & REHABILITATION

## 2021-11-19 PROCEDURE — 3008F PR BODY MASS INDEX (BMI) DOCUMENTED: ICD-10-PCS | Mod: CPTII,S$GLB,, | Performed by: INTERNAL MEDICINE

## 2021-11-19 PROCEDURE — 3044F PR MOST RECENT HEMOGLOBIN A1C LEVEL <7.0%: ICD-10-PCS | Mod: CPTII,S$GLB,, | Performed by: INTERNAL MEDICINE

## 2021-11-19 PROCEDURE — 3079F DIAST BP 80-89 MM HG: CPT | Mod: CPTII,S$GLB,, | Performed by: PHYSICAL MEDICINE & REHABILITATION

## 2021-11-19 PROCEDURE — 3075F SYST BP GE 130 - 139MM HG: CPT | Mod: CPTII,S$GLB,, | Performed by: INTERNAL MEDICINE

## 2021-11-19 PROCEDURE — 1160F PR REVIEW ALL MEDS BY PRESCRIBER/CLIN PHARMACIST DOCUMENTED: ICD-10-PCS | Mod: CPTII,S$GLB,, | Performed by: PHYSICAL MEDICINE & REHABILITATION

## 2021-11-19 PROCEDURE — 99204 PR OFFICE/OUTPT VISIT, NEW, LEVL IV, 45-59 MIN: ICD-10-PCS | Mod: S$GLB,,, | Performed by: INTERNAL MEDICINE

## 2021-11-19 PROCEDURE — 3074F PR MOST RECENT SYSTOLIC BLOOD PRESSURE < 130 MM HG: ICD-10-PCS | Mod: CPTII,S$GLB,, | Performed by: PHYSICAL MEDICINE & REHABILITATION

## 2021-11-19 PROCEDURE — 3008F PR BODY MASS INDEX (BMI) DOCUMENTED: ICD-10-PCS | Mod: CPTII,S$GLB,, | Performed by: PHYSICAL MEDICINE & REHABILITATION

## 2021-11-19 PROCEDURE — 1160F RVW MEDS BY RX/DR IN RCRD: CPT | Mod: CPTII,S$GLB,, | Performed by: PHYSICAL MEDICINE & REHABILITATION

## 2021-11-19 PROCEDURE — 3075F PR MOST RECENT SYSTOLIC BLOOD PRESS GE 130-139MM HG: ICD-10-PCS | Mod: CPTII,S$GLB,, | Performed by: INTERNAL MEDICINE

## 2021-11-19 PROCEDURE — 99213 OFFICE O/P EST LOW 20 MIN: CPT | Mod: S$GLB,,, | Performed by: PHYSICAL MEDICINE & REHABILITATION

## 2021-11-19 PROCEDURE — 1159F PR MEDICATION LIST DOCUMENTED IN MEDICAL RECORD: ICD-10-PCS | Mod: CPTII,S$GLB,, | Performed by: PHYSICAL MEDICINE & REHABILITATION

## 2021-11-19 PROCEDURE — 3044F HG A1C LEVEL LT 7.0%: CPT | Mod: CPTII,S$GLB,, | Performed by: INTERNAL MEDICINE

## 2021-11-19 RX ORDER — METHOCARBAMOL 750 MG/1
750 TABLET, FILM COATED ORAL 3 TIMES DAILY PRN
Qty: 90 TABLET | Refills: 2 | Status: ON HOLD | OUTPATIENT
Start: 2021-11-19 | End: 2022-03-29 | Stop reason: HOSPADM

## 2021-11-23 DIAGNOSIS — R07.2 PRECORDIAL PAIN: Primary | ICD-10-CM

## 2021-11-30 ENCOUNTER — CLINICAL SUPPORT (OUTPATIENT)
Dept: CARDIOLOGY | Facility: HOSPITAL | Age: 46
End: 2021-11-30
Attending: INTERNAL MEDICINE
Payer: COMMERCIAL

## 2021-11-30 VITALS — BODY MASS INDEX: 45 KG/M2 | WEIGHT: 254 LBS | HEIGHT: 63 IN

## 2021-11-30 DIAGNOSIS — I10 ESSENTIAL HYPERTENSION: ICD-10-CM

## 2021-11-30 DIAGNOSIS — R01.1 UNDIAGNOSED CARDIAC MURMURS: ICD-10-CM

## 2021-11-30 DIAGNOSIS — R07.2 PRECORDIAL PAIN: ICD-10-CM

## 2021-11-30 PROCEDURE — 93351 STRESS ECHO (CUPID ONLY): ICD-10-PCS | Mod: 26,,, | Performed by: INTERNAL MEDICINE

## 2021-11-30 PROCEDURE — 93351 STRESS TTE COMPLETE: CPT | Mod: PO

## 2021-11-30 PROCEDURE — 93351 STRESS TTE COMPLETE: CPT | Mod: 26,,, | Performed by: INTERNAL MEDICINE

## 2021-12-02 LAB
ASCENDING AORTA: 3.24 CM
AV INDEX (PROSTH): 0.91
AV MEAN GRADIENT: 4 MMHG
AV PEAK GRADIENT: 8 MMHG
AV VALVE AREA: 3.2 CM2
AV VELOCITY RATIO: 0.81
BSA FOR ECHO PROCEDURE: 2.26 M2
CV ECHO LV RWT: 0.33 CM
CV STRESS BASE HR: 82 BPM
DIASTOLIC BLOOD PRESSURE: 76 MMHG
DOP CALC AO PEAK VEL: 1.44 M/S
DOP CALC AO VTI: 25.73 CM
DOP CALC LVOT AREA: 3.5 CM2
DOP CALC LVOT DIAMETER: 2.12 CM
DOP CALC LVOT PEAK VEL: 1.17 M/S
DOP CALC LVOT STROKE VOLUME: 82.28 CM3
DOP CALCLVOT PEAK VEL VTI: 23.32 CM
E WAVE DECELERATION TIME: 109.12 MSEC
E/A RATIO: 0.8
E/E' RATIO: 10.57 M/S
ECHO LV POSTERIOR WALL: 0.87 CM (ref 0.6–1.1)
EJECTION FRACTION: 70 %
FRACTIONAL SHORTENING: 40 % (ref 28–44)
INTERVENTRICULAR SEPTUM: 0.88 CM (ref 0.6–1.1)
LA MAJOR: 4.72 CM
LA MINOR: 4.68 CM
LA WIDTH: 3.46 CM
LEFT ATRIUM SIZE: 4.38 CM
LEFT ATRIUM VOLUME INDEX: 28.3 ML/M2
LEFT ATRIUM VOLUME: 60.54 CM3
LEFT INTERNAL DIMENSION IN SYSTOLE: 3.12 CM (ref 2.1–4)
LEFT VENTRICLE DIASTOLIC VOLUME INDEX: 61.54 ML/M2
LEFT VENTRICLE DIASTOLIC VOLUME: 131.69 ML
LEFT VENTRICLE MASS INDEX: 77 G/M2
LEFT VENTRICLE SYSTOLIC VOLUME INDEX: 18 ML/M2
LEFT VENTRICLE SYSTOLIC VOLUME: 38.49 ML
LEFT VENTRICULAR INTERNAL DIMENSION IN DIASTOLE: 5.24 CM (ref 3.5–6)
LEFT VENTRICULAR MASS: 165.05 G
LV LATERAL E/E' RATIO: 12.33 M/S
LV SEPTAL E/E' RATIO: 9.25 M/S
MV A" WAVE DURATION": 11.8 MSEC
MV PEAK A VEL: 0.92 M/S
MV PEAK E VEL: 0.74 M/S
MV STENOSIS PRESSURE HALF TIME: 31.65 MS
MV VALVE AREA P 1/2 METHOD: 6.95 CM2
OHS CV CPX 1 MINUTE RECOVERY HEART RATE: 111 BPM
OHS CV CPX 85 PERCENT MAX PREDICTED HEART RATE MALE: 139
OHS CV CPX ESTIMATED METS: 6
OHS CV CPX MAX PREDICTED HEART RATE: 164
OHS CV CPX PATIENT IS FEMALE: 1
OHS CV CPX PATIENT IS MALE: 0
OHS CV CPX PEAK DIASTOLIC BLOOD PRESSURE: 83 MMHG
OHS CV CPX PEAK HEAR RATE: 151 BPM
OHS CV CPX PEAK RATE PRESSURE PRODUCT: NORMAL
OHS CV CPX PEAK SYSTOLIC BLOOD PRESSURE: 210 MMHG
OHS CV CPX PERCENT MAX PREDICTED HEART RATE ACHIEVED: 92
OHS CV CPX RATE PRESSURE PRODUCT PRESENTING: NORMAL
PULM VEIN S/D RATIO: 2.17
PV PEAK D VEL: 0.3 M/S
PV PEAK S VEL: 0.65 M/S
RA MAJOR: 4.54 CM
RA PRESSURE: 3 MMHG
RA WIDTH: 2.74 CM
RIGHT VENTRICULAR END-DIASTOLIC DIMENSION: 3.43 CM
RV TISSUE DOPPLER FREE WALL SYSTOLIC VELOCITY 1 (APICAL 4 CHAMBER VIEW): 13.23 CM/S
SINUS: 3.12 CM
STJ: 3.02 CM
STRESS ECHO POST EXERCISE DUR MIN: 3 MINUTES
STRESS ECHO POST EXERCISE DUR SEC: 17 SECONDS
SYSTOLIC BLOOD PRESSURE: 134 MMHG
TDI LATERAL: 0.06 M/S
TDI SEPTAL: 0.08 M/S
TDI: 0.07 M/S
TRICUSPID ANNULAR PLANE SYSTOLIC EXCURSION: 1.88 CM

## 2021-12-08 ENCOUNTER — LAB VISIT (OUTPATIENT)
Dept: LAB | Facility: HOSPITAL | Age: 46
End: 2021-12-08
Attending: FAMILY MEDICINE
Payer: COMMERCIAL

## 2021-12-08 DIAGNOSIS — E04.1 THYROID NODULE: ICD-10-CM

## 2021-12-08 LAB
T4 FREE SERPL-MCNC: 0.78 NG/DL (ref 0.71–1.51)
TSH SERPL DL<=0.005 MIU/L-ACNC: 16.62 UIU/ML (ref 0.4–4)

## 2021-12-08 PROCEDURE — 36415 COLL VENOUS BLD VENIPUNCTURE: CPT | Mod: PO | Performed by: FAMILY MEDICINE

## 2021-12-08 PROCEDURE — 84439 ASSAY OF FREE THYROXINE: CPT | Performed by: FAMILY MEDICINE

## 2021-12-08 PROCEDURE — 84443 ASSAY THYROID STIM HORMONE: CPT | Performed by: FAMILY MEDICINE

## 2021-12-09 ENCOUNTER — TELEPHONE (OUTPATIENT)
Dept: PAIN MEDICINE | Facility: CLINIC | Age: 46
End: 2021-12-09
Payer: COMMERCIAL

## 2021-12-09 ENCOUNTER — PATIENT MESSAGE (OUTPATIENT)
Dept: ENDOCRINOLOGY | Facility: CLINIC | Age: 46
End: 2021-12-09
Payer: COMMERCIAL

## 2021-12-10 ENCOUNTER — OFFICE VISIT (OUTPATIENT)
Dept: PAIN MEDICINE | Facility: CLINIC | Age: 46
End: 2021-12-10
Payer: COMMERCIAL

## 2021-12-10 ENCOUNTER — PATIENT MESSAGE (OUTPATIENT)
Dept: PAIN MEDICINE | Facility: CLINIC | Age: 46
End: 2021-12-10

## 2021-12-10 VITALS
BODY MASS INDEX: 45.08 KG/M2 | WEIGHT: 254.44 LBS | DIASTOLIC BLOOD PRESSURE: 76 MMHG | SYSTOLIC BLOOD PRESSURE: 128 MMHG | HEART RATE: 96 BPM | HEIGHT: 63 IN

## 2021-12-10 DIAGNOSIS — G89.29 CHRONIC MYOFASCIAL PAIN: ICD-10-CM

## 2021-12-10 DIAGNOSIS — I89.0 LYMPHEDEMA OF UPPER EXTREMITY FOLLOWING LYMPHADENECTOMY: ICD-10-CM

## 2021-12-10 DIAGNOSIS — M79.18 MYALGIA, OTHER SITE: ICD-10-CM

## 2021-12-10 DIAGNOSIS — M79.18 CHRONIC MYOFASCIAL PAIN: ICD-10-CM

## 2021-12-10 DIAGNOSIS — M79.12 MYALGIA OF AUXILIARY MUSCLES, HEAD AND NECK: Primary | ICD-10-CM

## 2021-12-10 DIAGNOSIS — E89.89 LYMPHEDEMA OF UPPER EXTREMITY FOLLOWING LYMPHADENECTOMY: ICD-10-CM

## 2021-12-10 PROCEDURE — 20553 NJX 1/MLT TRIGGER POINTS 3/>: CPT | Mod: S$GLB,,, | Performed by: PHYSICAL MEDICINE & REHABILITATION

## 2021-12-10 PROCEDURE — 20553 PR INJECT TRIGGER POINTS, > 3: ICD-10-PCS | Mod: S$GLB,,, | Performed by: PHYSICAL MEDICINE & REHABILITATION

## 2021-12-10 PROCEDURE — 99999 PR PBB SHADOW E&M-EST. PATIENT-LVL III: CPT | Mod: PBBFAC,,, | Performed by: PHYSICAL MEDICINE & REHABILITATION

## 2021-12-10 PROCEDURE — 99214 OFFICE O/P EST MOD 30 MIN: CPT | Mod: 25,S$GLB,, | Performed by: PHYSICAL MEDICINE & REHABILITATION

## 2021-12-10 PROCEDURE — 99214 PR OFFICE/OUTPT VISIT, EST, LEVL IV, 30-39 MIN: ICD-10-PCS | Mod: 25,S$GLB,, | Performed by: PHYSICAL MEDICINE & REHABILITATION

## 2021-12-10 PROCEDURE — 99999 PR PBB SHADOW E&M-EST. PATIENT-LVL III: ICD-10-PCS | Mod: PBBFAC,,, | Performed by: PHYSICAL MEDICINE & REHABILITATION

## 2021-12-10 RX ORDER — KETOROLAC TROMETHAMINE 30 MG/ML
30 INJECTION, SOLUTION INTRAMUSCULAR; INTRAVENOUS ONCE
Status: COMPLETED | OUTPATIENT
Start: 2021-12-10 | End: 2021-12-10

## 2021-12-10 RX ORDER — HYDROXYCHLOROQUINE SULFATE 200 MG/1
200 TABLET, FILM COATED ORAL 2 TIMES DAILY
COMMUNITY
End: 2022-05-05 | Stop reason: SDUPTHER

## 2021-12-10 RX ADMIN — KETOROLAC TROMETHAMINE 30 MG: 30 INJECTION, SOLUTION INTRAMUSCULAR; INTRAVENOUS at 09:12

## 2022-01-07 ENCOUNTER — LAB VISIT (OUTPATIENT)
Dept: LAB | Facility: HOSPITAL | Age: 47
End: 2022-01-07
Attending: INTERNAL MEDICINE
Payer: COMMERCIAL

## 2022-01-07 DIAGNOSIS — E89.0 POST-OPERATIVE HYPOTHYROIDISM: ICD-10-CM

## 2022-01-07 LAB
T4 FREE SERPL-MCNC: 0.85 NG/DL (ref 0.71–1.51)
TSH SERPL DL<=0.005 MIU/L-ACNC: 10.76 UIU/ML (ref 0.4–4)

## 2022-01-07 PROCEDURE — 84439 ASSAY OF FREE THYROXINE: CPT | Performed by: INTERNAL MEDICINE

## 2022-01-07 PROCEDURE — 84443 ASSAY THYROID STIM HORMONE: CPT | Performed by: INTERNAL MEDICINE

## 2022-01-07 PROCEDURE — 36415 COLL VENOUS BLD VENIPUNCTURE: CPT | Mod: PO | Performed by: INTERNAL MEDICINE

## 2022-01-08 ENCOUNTER — TELEPHONE (OUTPATIENT)
Dept: ENDOCRINOLOGY | Facility: CLINIC | Age: 47
End: 2022-01-08
Payer: COMMERCIAL

## 2022-01-08 DIAGNOSIS — E89.0 POST-OPERATIVE HYPOTHYROIDISM: Primary | ICD-10-CM

## 2022-01-08 NOTE — TELEPHONE ENCOUNTER
TSH still not at goal. Change to 137 Mon-Sat and 1 whole tab on Sunday only. Get repeat labs in 4 weeks.

## 2022-01-11 ENCOUNTER — PATIENT MESSAGE (OUTPATIENT)
Dept: ENDOCRINOLOGY | Facility: CLINIC | Age: 47
End: 2022-01-11
Payer: COMMERCIAL

## 2022-01-12 ENCOUNTER — PATIENT MESSAGE (OUTPATIENT)
Dept: ENDOCRINOLOGY | Facility: CLINIC | Age: 47
End: 2022-01-12
Payer: COMMERCIAL

## 2022-01-12 DIAGNOSIS — E89.0 POST-OPERATIVE HYPOTHYROIDISM: Primary | ICD-10-CM

## 2022-01-12 NOTE — TELEPHONE ENCOUNTER
Please ensure that pt read my initial message from Dec. For clarification, please confirm what dose/frequency she is currently taking/was taking leading up to her last labs lab on 1/7/2022.

## 2022-01-13 RX ORDER — LEVOTHYROXINE SODIUM 150 UG/1
150 TABLET ORAL
Qty: 90 TABLET | Refills: 1 | Status: SHIPPED | OUTPATIENT
Start: 2022-01-13 | End: 2022-07-11

## 2022-01-13 NOTE — TELEPHONE ENCOUNTER
"In the past we switched to 150 and her thyroid became overactive. However, the current dose of thyroid hormone is seemingly not enough either.     Maybe now that she is consistently using Synthroid (which is the "generic" that is used by [facility]) we might see more consistent absorption instead of the up and down that she was getting before.     So will change dose to 150 mcg once daily. She is seemingly super sensitive to thyroid hormone. Hoping we can figure out the right dose soon. Please thank Mrs. Jameson for her patience! I purposely did not put "brand" on the script sent to [facility]. As above, they use "branded synthroid as their generic" but charge the pt more for the same thing. She can confirm this with them but the last time I checked this is what they were doing. Should let me know if something has changed    Labs in 4 weeks after starting new dose  "

## 2022-01-24 ENCOUNTER — PATIENT MESSAGE (OUTPATIENT)
Dept: ENDOCRINOLOGY | Facility: CLINIC | Age: 47
End: 2022-01-24
Payer: COMMERCIAL

## 2022-01-25 ENCOUNTER — PATIENT MESSAGE (OUTPATIENT)
Dept: ENDOCRINOLOGY | Facility: CLINIC | Age: 47
End: 2022-01-25
Payer: COMMERCIAL

## 2022-01-26 ENCOUNTER — LAB VISIT (OUTPATIENT)
Dept: LAB | Facility: HOSPITAL | Age: 47
End: 2022-01-26
Attending: FAMILY MEDICINE
Payer: COMMERCIAL

## 2022-01-26 DIAGNOSIS — E55.9 VITAMIN D DEFICIENCY: ICD-10-CM

## 2022-01-26 LAB
ANION GAP SERPL CALC-SCNC: 10 MMOL/L (ref 8–16)
BUN SERPL-MCNC: 18 MG/DL (ref 6–20)
CALCIUM SERPL-MCNC: 9.5 MG/DL (ref 8.7–10.5)
CHLORIDE SERPL-SCNC: 101 MMOL/L (ref 95–110)
CO2 SERPL-SCNC: 30 MMOL/L (ref 23–29)
CREAT SERPL-MCNC: 1.1 MG/DL (ref 0.5–1.4)
EST. GFR  (AFRICAN AMERICAN): >60 ML/MIN/1.73 M^2
EST. GFR  (NON AFRICAN AMERICAN): 59.5 ML/MIN/1.73 M^2
GLUCOSE SERPL-MCNC: 85 MG/DL (ref 70–110)
POTASSIUM SERPL-SCNC: 3.5 MMOL/L (ref 3.5–5.1)
SODIUM SERPL-SCNC: 141 MMOL/L (ref 136–145)

## 2022-01-26 PROCEDURE — 36415 COLL VENOUS BLD VENIPUNCTURE: CPT | Mod: PO | Performed by: FAMILY MEDICINE

## 2022-01-26 PROCEDURE — 80048 BASIC METABOLIC PNL TOTAL CA: CPT | Performed by: FAMILY MEDICINE

## 2022-01-27 ENCOUNTER — OFFICE VISIT (OUTPATIENT)
Dept: CARDIOLOGY | Facility: CLINIC | Age: 47
End: 2022-01-27
Payer: COMMERCIAL

## 2022-01-27 VITALS
HEART RATE: 87 BPM | SYSTOLIC BLOOD PRESSURE: 138 MMHG | WEIGHT: 255.06 LBS | HEIGHT: 63 IN | BODY MASS INDEX: 45.19 KG/M2 | DIASTOLIC BLOOD PRESSURE: 88 MMHG

## 2022-01-27 DIAGNOSIS — E66.01 CLASS 3 SEVERE OBESITY DUE TO EXCESS CALORIES WITH SERIOUS COMORBIDITY AND BODY MASS INDEX (BMI) OF 40.0 TO 44.9 IN ADULT: Chronic | ICD-10-CM

## 2022-01-27 DIAGNOSIS — I77.810 ASCENDING AORTA DILATION: ICD-10-CM

## 2022-01-27 DIAGNOSIS — I10 ESSENTIAL HYPERTENSION: Primary | Chronic | ICD-10-CM

## 2022-01-27 DIAGNOSIS — G47.33 OSA ON CPAP: Chronic | ICD-10-CM

## 2022-01-27 PROCEDURE — 3075F PR MOST RECENT SYSTOLIC BLOOD PRESS GE 130-139MM HG: ICD-10-PCS | Mod: CPTII,S$GLB,, | Performed by: INTERNAL MEDICINE

## 2022-01-27 PROCEDURE — 99214 OFFICE O/P EST MOD 30 MIN: CPT | Mod: S$GLB,,, | Performed by: INTERNAL MEDICINE

## 2022-01-27 PROCEDURE — 3008F PR BODY MASS INDEX (BMI) DOCUMENTED: ICD-10-PCS | Mod: CPTII,S$GLB,, | Performed by: INTERNAL MEDICINE

## 2022-01-27 PROCEDURE — 1159F MED LIST DOCD IN RCRD: CPT | Mod: CPTII,S$GLB,, | Performed by: INTERNAL MEDICINE

## 2022-01-27 PROCEDURE — 99999 PR PBB SHADOW E&M-EST. PATIENT-LVL III: ICD-10-PCS | Mod: PBBFAC,,, | Performed by: INTERNAL MEDICINE

## 2022-01-27 PROCEDURE — 3079F DIAST BP 80-89 MM HG: CPT | Mod: CPTII,S$GLB,, | Performed by: INTERNAL MEDICINE

## 2022-01-27 PROCEDURE — 99214 PR OFFICE/OUTPT VISIT, EST, LEVL IV, 30-39 MIN: ICD-10-PCS | Mod: S$GLB,,, | Performed by: INTERNAL MEDICINE

## 2022-01-27 PROCEDURE — 3079F PR MOST RECENT DIASTOLIC BLOOD PRESSURE 80-89 MM HG: ICD-10-PCS | Mod: CPTII,S$GLB,, | Performed by: INTERNAL MEDICINE

## 2022-01-27 PROCEDURE — 3075F SYST BP GE 130 - 139MM HG: CPT | Mod: CPTII,S$GLB,, | Performed by: INTERNAL MEDICINE

## 2022-01-27 PROCEDURE — 3008F BODY MASS INDEX DOCD: CPT | Mod: CPTII,S$GLB,, | Performed by: INTERNAL MEDICINE

## 2022-01-27 PROCEDURE — 99999 PR PBB SHADOW E&M-EST. PATIENT-LVL III: CPT | Mod: PBBFAC,,, | Performed by: INTERNAL MEDICINE

## 2022-01-27 PROCEDURE — 1159F PR MEDICATION LIST DOCUMENTED IN MEDICAL RECORD: ICD-10-PCS | Mod: CPTII,S$GLB,, | Performed by: INTERNAL MEDICINE

## 2022-01-27 RX ORDER — CARVEDILOL 12.5 MG/1
12.5 TABLET ORAL 2 TIMES DAILY
Qty: 180 TABLET | Refills: 1 | Status: SHIPPED | OUTPATIENT
Start: 2022-01-27 | End: 2022-09-30 | Stop reason: SDUPTHER

## 2022-01-27 NOTE — PROGRESS NOTES
Subjective:    Patient ID:  Akiko Jameson is a 46 y.o. female who presents for Precordial pain        HPI  DISCUSSED TESTS, NEGATIVE STRESS ECHO NORMAL LV FUNCTION MILDLY DECREASED EXERCISE CAPACITY MILDLY DILATED ASCENDING AORTA, WALKING MORE, BMP OK POTASSIUM 3.5 SEE REVIEW OF SYSTEMS    Past Medical History:   Diagnosis Date    ALLERGIC RHINITIS     Arthritis     Cellulitis     Constipation due to opioid therapy     Eosinophilia     mild    GERD (gastroesophageal reflux disease)     Granular cell tumor     H. pylori infection 2018    History of 2019 novel coronavirus disease (COVID-19) 10/04/2020    Hypertension     Lymphedema     Mild anemia     KEIRY on CPAP     does not regularly    Prediabetes 8/6/2021    Sleep apnea     compliant with CPAP    Thyroid nodule     Urinary incontinence, mixed      Past Surgical History:   Procedure Laterality Date    24 HOUR IMPEDANCE PH MONITORING OF ESOPHAGUS IN PATIENT NOT TAKING ACID REDUCING MEDICATIONS N/A 1/6/2020    Procedure: IMPEDANCE PH STUDY, ESOPHAGEAL, 24 HOUR, IN PATIENT NOT TAKING ACID REDUCING MEDICATION;  Surgeon: First Available Tamika Panchal;  Location: [facility];  Service: Endoscopy;  Laterality: N/A;    AXILLARY NODE DISSECTION Right     granular cell tumor    BILATERAL SALPINGO-OOPHORECTOMY (BSO)  07/21/2020    BREAST CYST ASPIRATION      left    CHOLECYSTECTOMY      COLONOSCOPY N/A 5/10/2019    Procedure: COLONOSCOPY;  Surgeon: Edgar Gamble Jr., MD;  Location: [facility];  Service: Endoscopy;  Laterality: N/A;    ENDOBRONCHIAL ULTRASOUND Bilateral 4/27/2021    Procedure: ENDOBRONCHIAL ULTRASOUND (EBUS);  Surgeon: Armando Kirby MD;  Location: [facility];  Service: Pulmonary;  Laterality: Bilateral;  need fluoroscopy    ENDOMETRIAL ABLATION      ESOPHAGEAL MANOMETRY WITH MEASUREMENT OF IMPEDANCE N/A 1/6/2020    Procedure: MANOMETRY, ESOPHAGUS, WITH IMPEDANCE MEASUREMENT;  Surgeon: First Available Tamika Panchal;  Location:  [facility] ENDO;  Service: Endoscopy;  Laterality: N/A;    ESOPHAGOGASTRODUODENOSCOPY N/A 7/5/2018    Procedure: EGD (ESOPHAGOGASTRODUODENOSCOPY);  Surgeon: Edgar Gamble Jr., MD;  Location: [facility];  Service: Endoscopy;  Laterality: N/A;    ESOPHAGOGASTRODUODENOSCOPY N/A 11/23/2020    Procedure: ESOPHAGOGASTRODUODENOSCOPY (EGD);  Surgeon: Jina Kaufman MD;  Location[facility];  Service: General;  Laterality: N/A;    EXCISION OF MASS OF ABDOMEN Left 6/18/2018    Procedure: EXCISION, MASS, ABDOMEN - flank left;  Surgeon: Armando Tate MD;  Location: [facility] OR;  Service: General;  Laterality: Left;  left flank removal of mass    FINE NEEDLE ASPIRATION      thyroid    HYSTERECTOMY  07/21/2020    KNEE ARTHROSCOPY W/ MENISCECTOMY Right     NASAL SEPTUM SURGERY      ROBOT-ASSISTED NISSEN FUNDOPLICATION USING [employer] XI N/A 2/28/2020    Procedure: XI ROBOTIC FUNDOPLICATION, NISSEN;  Surgeon: Jina Kaufman MD;  Location: [facility];  Service: General;  Laterality: N/A;    THYROIDECTOMY Bilateral 3/11/2021    Procedure: THYROIDECTOMY;  Surgeon: Nixon Moe MD;  Location: [facility];  Service: ENT;  Laterality: Bilateral;    TUBAL LIGATION      UPPER GASTROINTESTINAL ENDOSCOPY  07/05/2018    Dr. Gamble     Family History   Problem Relation Age of Onset    Diabetes Mother     Kidney failure Mother     Heart attack Mother     Breast cancer Maternal Grandmother     Breast cancer Maternal Aunt     Ovarian cancer Cousin     Breast cancer Cousin     Blindness Father     Cirrhosis Neg Hx     Colon polyps Neg Hx     Colon cancer Neg Hx     Crohn's disease Neg Hx     Esophageal cancer Neg Hx     Stomach cancer Neg Hx     Ulcerative colitis Neg Hx     Amblyopia Neg Hx     Cataracts Neg Hx     Glaucoma Neg Hx     Macular degeneration Neg Hx     Retinal detachment Neg Hx     Strabismus Neg Hx     Allergic rhinitis Neg Hx     Allergies Neg Hx     Angioedema Neg Hx     Asthma Neg Hx      Atopy Neg Hx     Eczema Neg Hx     Immunodeficiency Neg Hx     Rhinitis Neg Hx     Urticaria Neg Hx      Social History     Socioeconomic History    Marital status:     Number of children: 2   Occupational History     Employer: [employer]   Tobacco Use    Smoking status: Never Smoker    Smokeless tobacco: Never Used   Substance and Sexual Activity    Alcohol use: No    Drug use: No    Sexual activity: Yes     Partners: Male     Social Determinants of Health     Financial Resource Strain: Low Risk     Difficulty of Paying Living Expenses: Not hard at all   Food Insecurity: No Food Insecurity    Worried About Running Out of Food in the Last Year: Never true    Ran Out of Food in the Last Year: Never true   Transportation Needs: No Transportation Needs    Lack of Transportation (Medical): No    Lack of Transportation (Non-Medical): No   Physical Activity: Insufficiently Active    Days of Exercise per Week: 2 days    Minutes of Exercise per Session: 30 min   Stress: No Stress Concern Present    Feeling of Stress : Not at all   Social Connections: Unknown    Frequency of Communication with Friends and Family: More than three times a week    Frequency of Social Gatherings with Friends and Family: Once a week    Active Member of Clubs or Organizations: Yes    Attends Club or Organization Meetings: More than 4 times per year    Marital Status:    Housing Stability: Low Risk     Unable to Pay for Housing in the Last Year: No    Number of Places Lived in the Last Year: 1    Unstable Housing in the Last Year: No       Review of patient's allergies indicates:   Allergen Reactions    Biaxin [clarithromycin] Swelling    Omeprazole magnesium Swelling and Other (See Comments)    Prevacid [lansoprazole] Swelling     Lip swelling- was taking this along with blood pressure medication: benazepril; not sure which caused it. Reports she has taken OTC prilosec without any problems.    Ace  inhibitors Swelling     Facial swelling    Benazepril Swelling     Lip swelling       Current Outpatient Medications:     calcium carbonate (OS-CARLA) 600 mg calcium (1,500 mg) Tab, Take 600 mg by mouth 2 (two) times daily with meals., Disp: , Rfl:     cetirizine (ZYRTEC) 10 MG tablet, Take 1 tablet (10 mg total) by mouth once daily. (Patient taking differently: Take 10 mg by mouth once daily.), Disp: , Rfl: 0    docusate sodium (COLACE) 100 MG capsule, Take 100 mg by mouth every other day. , Disp: , Rfl:     hydrOXYchloroQUINE (PLAQUENIL) 200 mg tablet, Take 200 mg by mouth 2 (two) times daily., Disp: , Rfl:     levothyroxine (SYNTHROID) 150 MCG tablet, Take 1 tablet (150 mcg total) by mouth before breakfast., Disp: 90 tablet, Rfl: 1    methocarbamoL (ROBAXIN) 750 MG Tab, Take 1 tablet (750 mg total) by mouth 3 (three) times daily as needed (pain)., Disp: 90 tablet, Rfl: 2    montelukast (SINGULAIR) 10 mg tablet, Take 1 tablet (10 mg total) by mouth nightly., Disp: 90 tablet, Rfl: 3    multivitamin (THERAGRAN) per tablet, Take 1 tablet by mouth once daily. Every day, Disp: , Rfl:     potassium chloride SA (K-DUR,KLOR-CON) 20 MEQ tablet, Take 1 tablet (20 mEq total) by mouth 2 (two) times daily., Disp: 180 tablet, Rfl: 3    triamterene-hydrochlorothiazide 37.5-25 mg (MAXZIDE-25) 37.5-25 mg per tablet, Take 1 tablet by mouth once daily., Disp: 90 tablet, Rfl: 3    carvediloL (COREG) 12.5 MG tablet, Take 1 tablet (12.5 mg total) by mouth 2 (two) times daily., Disp: 180 tablet, Rfl: 1    Review of Systems   Constitutional: Negative for chills, diaphoresis, fever, malaise/fatigue and night sweats.   HENT: Negative for congestion and nosebleeds.    Eyes: Negative for blurred vision and visual disturbance.   Cardiovascular: Negative for chest pain, claudication, cyanosis, dyspnea on exertion, irregular heartbeat, leg swelling (OCC), near-syncope, orthopnea, palpitations, paroxysmal nocturnal dyspnea and syncope.  "  Respiratory: Negative for cough, hemoptysis and shortness of breath.    Endocrine: Negative for cold intolerance, heat intolerance and polyuria.   Hematologic/Lymphatic: Negative for adenopathy (LYMPHEDEDEMA RUE, BENIGN LN REMOVED, ). Does not bruise/bleed easily.   Skin: Negative for color change and rash.   Musculoskeletal: Positive for back pain (MRI). Negative for falls and joint pain.   Gastrointestinal: Negative for abdominal pain, change in bowel habit, melena, nausea and vomiting.   Genitourinary: Negative for dysuria and flank pain.   Neurological: Negative for brief paralysis, dizziness, focal weakness, light-headedness, loss of balance and weakness.   Psychiatric/Behavioral: Negative for altered mental status, depression and memory loss.   Allergic/Immunologic: Negative.         Objective:      Vitals:    01/27/22 1258   BP: 138/88   Pulse: 87   Weight: 115.7 kg (255 lb 1.2 oz)   Height: 5' 3" (1.6 m)   PainSc: 0-No pain     Body mass index is 45.18 kg/m².    Physical Exam  Constitutional:       Appearance: She is obese.   HENT:      Head: Normocephalic and atraumatic.   Eyes:      General: No scleral icterus.  Cardiovascular:      Rate and Rhythm: Normal rate and regular rhythm.      Pulses: Normal pulses.      Heart sounds: No friction rub. No gallop.    Pulmonary:      Effort: Pulmonary effort is normal.      Breath sounds: Normal breath sounds. No rales.   Abdominal:      Palpations: Abdomen is soft.      Tenderness: There is no abdominal tenderness.   Musculoskeletal:      Right lower leg: No edema.      Left lower leg: No edema.      Comments: RUE LYMPHEDEMA   Skin:     General: Skin is warm and dry.      Capillary Refill: Capillary refill takes less than 2 seconds.   Neurological:      General: No focal deficit present.      Mental Status: She is alert.   Psychiatric:         Mood and Affect: Mood normal.         Behavior: Behavior normal.                 ..    Chemistry        Component Value " Date/Time     01/26/2022 0741    K 3.5 01/26/2022 0741     01/26/2022 0741    CO2 30 (H) 01/26/2022 0741    BUN 18 01/26/2022 0741    CREATININE 1.1 01/26/2022 0741    GLU 85 01/26/2022 0741        Component Value Date/Time    CALCIUM 9.5 01/26/2022 0741    ALKPHOS 154 (H) 10/14/2021 0810    AST 32 10/14/2021 0810    ALT 50 (H) 10/14/2021 0810    BILITOT 0.5 10/14/2021 0810    ESTGFRAFRICA >60.0 01/26/2022 0741    EGFRNONAA 59.5 (A) 01/26/2022 0741            ..  Lab Results   Component Value Date    CHOL 181 07/09/2021    CHOL 153 09/03/2019    CHOL 153 08/30/2018     Lab Results   Component Value Date    HDL 77 (H) 07/09/2021    HDL 55 09/03/2019    HDL 49 08/30/2018     Lab Results   Component Value Date    LDLCALC 90.4 07/09/2021    LDLCALC 82.0 09/03/2019    LDLCALC 93.4 08/30/2018     Lab Results   Component Value Date    TRIG 68 07/09/2021    TRIG 80 09/03/2019    TRIG 53 08/30/2018     Lab Results   Component Value Date    CHOLHDL 42.5 07/09/2021    CHOLHDL 35.9 09/03/2019    CHOLHDL 32.0 08/30/2018     ..  Lab Results   Component Value Date    WBC 5.81 10/14/2021    HGB 12.2 10/14/2021    HCT 39.9 10/14/2021    MCV 85 10/14/2021     10/14/2021       Test(s) Reviewed  I have reviewed the following in detail:  [x] Stress test   [] Angiography   [x] Echocardiogram   [x] Labs   [] Other:       Assessment:         ICD-10-CM ICD-9-CM   1. Essential hypertension  I10 401.9   2. Ascending aorta dilation  I77.810 447.71   3. KEIRY on CPAP  G47.33 327.23    Z99.89 V46.8   4. Class 3 severe obesity due to excess calories with serious comorbidity and body mass index (BMI) of 40.0 to 44.9 in adult  E66.01 278.01    Z68.41 V85.41     Problem List Items Addressed This Visit        Cardiac/Vascular    Essential hypertension - Primary    Ascending aorta dilation       Other    KEIRY on CPAP    Class 3 severe obesity due to excess calories with serious comorbidity and body mass index (BMI) of 40.0 to 44.9 in  adult           Plan:     CHANGE CCB TO BB, IN VIEW OF AORTIC DILATATION, FOR BETTER BLOOD PRESSURE CONTROL WATCH BLOOD PRESSURE BRING HOME MACHINE, BLOOD PRESSURE LOG, DAILY CPAP,ALL CV CLINICALLY STABLE, NO ANGINA, NO HF, NO TIA, NO CLINICAL ARRHYTHMIA,CONTINUE CURRENT MEDS, EDUCATION, DIET, EXERCISE, WEIGHT LOSS RETURN TO CLINIC IN 6 MO      Essential hypertension    Ascending aorta dilation  Comments:  CHANGE MEDS    KEIRY on CPAP    Class 3 severe obesity due to excess calories with serious comorbidity and body mass index (BMI) of 40.0 to 44.9 in adult    Other orders  -     carvediloL (COREG) 12.5 MG tablet; Take 1 tablet (12.5 mg total) by mouth 2 (two) times daily.  Dispense: 180 tablet; Refill: 1    RTC Low level/low impact aerobic exercise 5x's/wk. Heart healthy diet and risk factor modification.    See labs and med orders.    Aerobic exercise 5x's/wk. Heart healthy diet and risk factor modification.    See labs and med orders.

## 2022-02-11 ENCOUNTER — OFFICE VISIT (OUTPATIENT)
Dept: PAIN MEDICINE | Facility: CLINIC | Age: 47
End: 2022-02-11
Payer: COMMERCIAL

## 2022-02-11 VITALS — RESPIRATION RATE: 17 BRPM | BODY MASS INDEX: 45.18 KG/M2 | HEIGHT: 63 IN | WEIGHT: 255 LBS

## 2022-02-11 DIAGNOSIS — G89.29 CHRONIC MYOFASCIAL PAIN: ICD-10-CM

## 2022-02-11 DIAGNOSIS — I89.0 LYMPHEDEMA OF UPPER EXTREMITY FOLLOWING LYMPHADENECTOMY: ICD-10-CM

## 2022-02-11 DIAGNOSIS — M47.816 LUMBAR SPONDYLOSIS: Primary | ICD-10-CM

## 2022-02-11 DIAGNOSIS — M79.18 MYALGIA, OTHER SITE: ICD-10-CM

## 2022-02-11 DIAGNOSIS — E89.89 LYMPHEDEMA OF UPPER EXTREMITY FOLLOWING LYMPHADENECTOMY: ICD-10-CM

## 2022-02-11 DIAGNOSIS — M79.18 CHRONIC MYOFASCIAL PAIN: ICD-10-CM

## 2022-02-11 PROCEDURE — 99214 OFFICE O/P EST MOD 30 MIN: CPT | Mod: S$GLB,,, | Performed by: PHYSICAL MEDICINE & REHABILITATION

## 2022-02-11 PROCEDURE — 99214 PR OFFICE/OUTPT VISIT, EST, LEVL IV, 30-39 MIN: ICD-10-PCS | Mod: S$GLB,,, | Performed by: PHYSICAL MEDICINE & REHABILITATION

## 2022-02-11 PROCEDURE — 3008F BODY MASS INDEX DOCD: CPT | Mod: CPTII,S$GLB,, | Performed by: PHYSICAL MEDICINE & REHABILITATION

## 2022-02-11 PROCEDURE — 1160F PR REVIEW ALL MEDS BY PRESCRIBER/CLIN PHARMACIST DOCUMENTED: ICD-10-PCS | Mod: CPTII,S$GLB,, | Performed by: PHYSICAL MEDICINE & REHABILITATION

## 2022-02-11 PROCEDURE — 1159F MED LIST DOCD IN RCRD: CPT | Mod: CPTII,S$GLB,, | Performed by: PHYSICAL MEDICINE & REHABILITATION

## 2022-02-11 PROCEDURE — 99999 PR PBB SHADOW E&M-EST. PATIENT-LVL IV: ICD-10-PCS | Mod: PBBFAC,,, | Performed by: PHYSICAL MEDICINE & REHABILITATION

## 2022-02-11 PROCEDURE — 3008F PR BODY MASS INDEX (BMI) DOCUMENTED: ICD-10-PCS | Mod: CPTII,S$GLB,, | Performed by: PHYSICAL MEDICINE & REHABILITATION

## 2022-02-11 PROCEDURE — 1160F RVW MEDS BY RX/DR IN RCRD: CPT | Mod: CPTII,S$GLB,, | Performed by: PHYSICAL MEDICINE & REHABILITATION

## 2022-02-11 PROCEDURE — 99999 PR PBB SHADOW E&M-EST. PATIENT-LVL IV: CPT | Mod: PBBFAC,,, | Performed by: PHYSICAL MEDICINE & REHABILITATION

## 2022-02-11 PROCEDURE — 1159F PR MEDICATION LIST DOCUMENTED IN MEDICAL RECORD: ICD-10-PCS | Mod: CPTII,S$GLB,, | Performed by: PHYSICAL MEDICINE & REHABILITATION

## 2022-02-11 NOTE — H&P (VIEW-ONLY)
Established Patient Chronic Pain Note (Follow up visit)    Chief Complaint:   Chief Complaint   Patient presents with    Mid-back Pain     Right        SUBJECTIVE:    Akiko Jameson is a 47 y.o. female who presents to the clinic for a follow-up appointment for mid back pain.  At the last visit, she was provided trigger point injections to the thoracic paraspinals , lower trapezius, and latissimus dorsi on the right.  She reports that the trigger point injections provided moderate relief.  She was maintained on Robaxin 750 mg up to 3 times daily as needed for muscle related pain.  Since the last visit, Akiko Jameson states the pain has been starting to return. Current pain intensity is 8/10.     Patient denies night fever/night sweats, urinary incontinence, bowel incontinence, significant weight loss, significant motor weakness and loss of sensations.    Pain Disability Index Review:  Last 3 PDI Scores 2/11/2022 12/10/2021 11/19/2021   Pain Disability Index (PDI) 45 56 50     Interval HPI 12/10/2021:  Akiko Jameson is a 47 y.o. female who presents to the clinic for a follow-up appointment for mid back pain.  At the last visit, she was provided trigger point injections to the thoracic paraspinals , lower trapezius, and latissimus dorsi on the right.  She reports that the trigger point injections provided moderate relief.  She was maintained on Robaxin 750 mg up to 3 times daily as needed for muscle related pain.  Since the last visit, Akiko Jameson states the pain has been starting to return. Current pain intensity is 5/10.     Interval HPI 11/19/2021:  Akiko Jameson is a 46 y.o. female who presents to the clinic for a follow-up appointment for mid back pain.  At the last visit, she was provided trigger point injections to the thoracic paraspinals , lower trapezius, and latissimus dorsi on the right.  She reports that the trigger point injections provided moderate relief.   She was maintained on Robaxin 750 mg up to 3 times daily as needed for muscle related pain.  Since the last visit, Akiko Jameson states the pain has been starting to return. Current pain intensity is 5/10.  Unfortunately, the patient is currently on antibiotics for cellulitis of the right upper extremity.      Interval HPI 09/23/2021:  Akiko Jameson is a 46 y.o. female who presents to the clinic for a follow-up appointment for mid back pain.  At the last visit, she was provided trigger point injections to the thoracic paraspinals , lower trapezius, and latissimus dorsi on the right.  She reports that the trigger point injections provided limited relief, but they have been beneficial previously.  She was maintained on Robaxin 500 mg up to 3 times daily as needed for muscle related pain.  Since the last visit, Akiko Jameson states the pain has been starting to return. Current pain intensity is 8/10.      Interval HPI 07/09/2021:  Akiko Jameson is a 46 y.o. female who presents to the clinic for a follow-up appointment for mid back pain.  At the last visit, she was provided trigger point injections to the thoracic paraspinals , lower trapezius, and latissimus dorsi on the right.  She reports that the trigger point injections provided significant relief relief.  She was maintained on Robaxin 500 mg up to 3 times daily as needed for muscle related pain.  Since the last visit, Akiko Jameson states the pain has been starting to return. Current pain intensity is 8/10.  Of note, in the interim patient has had thyroidectomy and was recently diagnosed with sarcoidosis.    Interval HPI 11/13/2020:  Akiko Jameson is a 46 y.o. female who presents to the clinic for a follow-up appointment for mid back pain.  At the last visit, she was provided trigger point injections to the thoracic paraspinals , lower trapezius, and latissimus dorsi on the right.  She reports that the trigger  point injections provided significant relief relief.  She was maintained on Robaxin 500 mg up to 3 times daily as needed for muscle related pain.  Since the last visit, Akiko Jameson states the pain has been starting to return. Current pain intensity is 5/10.      Interval HPI 05/29/2020:  Akiko Jameson is a 45 y.o. female who presents to the clinic for a follow-up appointment for mid back pain.  At the last visit, she was provided trigger point injections to the thoracic paraspinals and upper/middle trapezius on the right.  She reports that the trigger point injections provided 100% relief at the areas injected, but is having pain in other areas that she would like to also get injected today.  She was also started on Robaxin 500 mg up to 3 times daily as needed for muscle related pain.  She reports that the Robaxin has been of minimal benefit.  Since the last visit, Akiko Jameson states the pain has been improving. Current pain intensity is 8/10.  We discussed lymphedema management, and she is interested in going to a physical therapy clinic to help address this issue.    Initial HPI 05/15/2020:  Akiko Jameson is a 45 y.o. female, right-hand dominant, who presents to the clinic for the evaluation of mid back pain. The pain started 25+ years ago following a lymph node removal on the right that resulted in lymphedema of the right upper extremity and symptoms have been worsening.The pain is located in the right mid back area and radiates to the along the length of the upper to lower back.  The pain is described as aching, burning and throbbing and is rated as 6/10. The pain is rated with a score of  0/10 on the BEST day and a score of 10/10 on the WORST day.  Symptoms interfere with daily activity, sleeping and work. The pain is exacerbated by increased activity or use of the right upper extremity.  The pain is mitigated by rest. The patient reports spending 2-4 hours per day  reclining. The patient reports 6-8 hours of uninterrupted sleep per night.  She reports that she works as a .         Non-Pharmacologic Treatments:  Physical Therapy/Home Exercise: yes  Ice/Heat:yes  TENS: yes  Acupuncture: no  Massage: yes  Chiropractic: yes    Other: yes, compression sleeves        Pain Medications:  - Opioids: Norco  - Adjuvant Medications: Relafen, Robaxin  - Anti-Coagulants: None      report:  Reviewed and consistent with medication use as prescribed.          Pain Procedures:   -previously underwent thoracic epidurals x2 with first one being beneficial but the second one not so much  -05/15/2020:  Trigger point injections to the thoracic paraspinals and upper/middle trapezius on the right, 100% relief  -05/29/2020:  Trigger point injections to the thoracic paraspinals, lower trapezius, and latissimus dorsi on the right, significant relief   -11/13/2020:  Trigger point injections to the thoracic paraspinals, lower trapezius, and latissimus dorsi on the right, significant relief  -07/09/2021: Trigger point injections to the thoracic paraspinals, lower trapezius, and latissimus dorsi on the right, limited relief  - 09/23/2021: Trigger point injections to the thoracic paraspinals, lower trapezius, and latissimus dorsi on the right, moderate relief  - 12/10/2020: Trigger point injections to the thoracic paraspinals, lower trapezius, and latissimus dorsi on the right, moderate relief     Imaging:       X-ray bilateral knees 03/16/2016:  AP and PA flexion standing views of both knees as well as lateral and Merchant views of both knees were obtained.  The joint spaces are grossly preserved.  Mild patellofemoral spurring and prominent patellar enthesophytes bilaterally.  No joint effusion.  No acute fracture or malalignment.     MRI right knee 03/21/2016:  The anterior cruciate ligament, posterior cruciate ligament, medial collateral ligament, lateral collateral ligament complex normal  popliteus tendon, IT band, quadriceps tendon, and patellar tendon are normal in appearance.    There is a complex, primarily radial tear of the posterior horn of the medial meniscus which extends to the posterior root attachment of the medial meniscus.  The medial meniscus is intact.    The articular cartilage of the medial and lateral tibiofemoral joint compartments is intact.  There is a 14 mm width area of full-thickness abnormal chondral signal observed in the lateral patellar facet at the level of the patellar midpole.  No abnormal subcortical signal abnormality appreciated.    There is quadriceps tendon and patellar tendon insertion enthesophyte formation at their respective attachments on the patella.  There is a small knee joint effusion present.  No acute fracture or pathologic marrow replacement process.  There is hematopoietic  bone marrow present within the distal femur and proximal tibia.        PMHx,PSHx, Social history, and Family history:  I have reviewed the patient's medical, surgical, social, and family history in detail and updated the computerized patient record.        Review of patient's allergies indicates:   Allergen Reactions    Biaxin [clarithromycin] Swelling    Omeprazole magnesium Swelling and Other (See Comments)    Prevacid [lansoprazole] Swelling     Lip swelling- was taking this along with blood pressure medication: benazepril; not sure which caused it. Reports she has taken OTC prilosec without any problems.    Ace inhibitors Swelling     Facial swelling    Benazepril Swelling     Lip swelling       Current Outpatient Medications   Medication Sig    calcium carbonate (OS-CARLA) 600 mg calcium (1,500 mg) Tab Take 600 mg by mouth 2 (two) times daily with meals.    carvediloL (COREG) 12.5 MG tablet Take 1 tablet (12.5 mg total) by mouth 2 (two) times daily.    docusate sodium (COLACE) 100 MG capsule Take 100 mg by mouth every other day.     hydrOXYchloroQUINE (PLAQUENIL) 200 mg  "tablet Take 200 mg by mouth 2 (two) times daily.    levothyroxine (SYNTHROID) 150 MCG tablet Take 1 tablet (150 mcg total) by mouth before breakfast.    methocarbamoL (ROBAXIN) 750 MG Tab Take 1 tablet (750 mg total) by mouth 3 (three) times daily as needed (pain).    montelukast (SINGULAIR) 10 mg tablet Take 1 tablet (10 mg total) by mouth nightly.    multivitamin (THERAGRAN) per tablet Take 1 tablet by mouth once daily. Every day    potassium chloride SA (K-DUR,KLOR-CON) 20 MEQ tablet Take 1 tablet (20 mEq total) by mouth 2 (two) times daily.    triamterene-hydrochlorothiazide 37.5-25 mg (MAXZIDE-25) 37.5-25 mg per tablet Take 1 tablet by mouth once daily.    cetirizine (ZYRTEC) 10 MG tablet Take 1 tablet (10 mg total) by mouth once daily. (Patient taking differently: Take 10 mg by mouth once daily.)     No current facility-administered medications for this visit.         REVIEW OF SYSTEMS:    GENERAL:  No weight loss, malaise or fevers.  HEENT:   No recent changes in vision or hearing  NECK:  Negative for lumps, no difficulty with swallowing.  RESPIRATORY:  Negative for cough, wheezing or shortness of breath, patient denies any recent URI.  CARDIOVASCULAR:  Negative for chest pain, leg swelling or palpitations.  GI:  Negative for abdominal discomfort, blood in stools or black stools or change in bowel habits.  MUSCULOSKELETAL:  See HPI.  SKIN:  Negative for lesions, rash, and itching.  PSYCH:  No mood disorder or recent psychosocial stressors.  Patients sleep is not disturbed secondary to pain.  HEMATOLOGY/LYMPHOLOGY:  Negative for prolonged bleeding, bruising easily or swollen nodes.  Patient is not currently taking any anti-coagulants  NEURO:   No history of headaches, syncope, paralysis, seizures or tremors.  All other reviewed and negative other than HPI.    OBJECTIVE:    Resp 17   Ht 5' 3" (1.6 m)   Wt 115.7 kg (255 lb)   BMI 45.17 kg/m²     PHYSICAL EXAMINATION:    GENERAL: Well appearing, in no " acute distress, alert and oriented x3.  PSYCH:  Mood and affect appropriate.  SKIN: Skin color, texture, turgor normal, no rashes or lesions.  HEAD/FACE:  Normocephalic, atraumatic. Cranial nerves grossly intact.  NECK: No pain to palpation over the cervical paraspinous muscles. Spurling Negative. No pain with neck flexion, extension, or lateral flexion.   CV: RRR with palpation of the radial artery.  PULM: No evidence of respiratory difficulty, symmetric chest rise.  GI:  Soft and non-tender.  BACK:  Tenderness to palpation over the thoracic paraspinals, lower trapezius, and latissimus dorsi on the right.  Straight leg raising in the sitting and supine positions is negative to radicular pain.   mild-to-moderate pain to palpation over the facet joints of the lumbar spine and lumbar paraspinals.  Fair range of motion secondary to pain reproduction.  Axial loading positive on the right.  EXTREMITIES: Peripheral joint ROM is full and pain free without obvious instability or laxity in all four extremities.  There is significant lymphedema present throughout the right upper extremity.  No other deformities or skin discoloration. Good capillary refill.  MUSCULOSKELETAL: Shoulder, hip, and knee provocative maneuvers are negative.  Bilateral upper and lower extremity strength is normal and symmetric.  No atrophy or tone abnormalities are noted.  NEURO: Bilateral upper and lower extremity coordination and muscle stretch reflexes are physiologic and symmetric.  Plantar response are downgoing. No clonus.  No loss of sensation is noted.  GAIT: normal.      LABS:  Lab Results   Component Value Date    WBC 5.81 10/14/2021    HGB 12.2 10/14/2021    HCT 39.9 10/14/2021    MCV 85 10/14/2021     10/14/2021       CMP  Sodium   Date Value Ref Range Status   01/26/2022 141 136 - 145 mmol/L Final     Potassium   Date Value Ref Range Status   01/26/2022 3.5 3.5 - 5.1 mmol/L Final     Chloride   Date Value Ref Range Status    01/26/2022 101 95 - 110 mmol/L Final     CO2   Date Value Ref Range Status   01/26/2022 30 (H) 23 - 29 mmol/L Final     Glucose   Date Value Ref Range Status   01/26/2022 85 70 - 110 mg/dL Final     BUN   Date Value Ref Range Status   01/26/2022 18 6 - 20 mg/dL Final     Creatinine   Date Value Ref Range Status   01/26/2022 1.1 0.5 - 1.4 mg/dL Final     Calcium   Date Value Ref Range Status   01/26/2022 9.5 8.7 - 10.5 mg/dL Final     Total Protein   Date Value Ref Range Status   10/14/2021 8.3 6.0 - 8.4 g/dL Final     Albumin   Date Value Ref Range Status   10/14/2021 3.7 3.5 - 5.2 g/dL Final     Total Bilirubin   Date Value Ref Range Status   10/14/2021 0.5 0.1 - 1.0 mg/dL Final     Comment:     For infants and newborns, interpretation of results should be based  on gestational age, weight and in agreement with clinical  observations.    Premature Infant recommended reference ranges:  Up to 24 hours.............<8.0 mg/dL  Up to 48 hours............<12.0 mg/dL  3-5 days..................<15.0 mg/dL  6-29 days.................<15.0 mg/dL       Alkaline Phosphatase   Date Value Ref Range Status   10/14/2021 154 (H) 55 - 135 U/L Final     AST   Date Value Ref Range Status   10/14/2021 32 10 - 40 U/L Final     ALT   Date Value Ref Range Status   10/14/2021 50 (H) 10 - 44 U/L Final     Anion Gap   Date Value Ref Range Status   01/26/2022 10 8 - 16 mmol/L Final     eGFR if    Date Value Ref Range Status   01/26/2022 >60.0 >60 mL/min/1.73 m^2 Final     eGFR if non    Date Value Ref Range Status   01/26/2022 59.5 (A) >60 mL/min/1.73 m^2 Final     Comment:     Calculation used to obtain the estimated glomerular filtration  rate (eGFR) is the CKD-EPI equation.          Lab Results   Component Value Date    HGBA1C 5.8 (H) 08/06/2021             ASSESSMENT: 47 y.o. year old female with mid back pain, consistent with     1. Lumbar spondylosis  Case Request-RAD/Other Procedure Area: Right L3, 4,  5 MBB    IR Facet Inj Lumbar 1st Vert Uni    IR Facet Inj Lumbar 2nd Vert Uni   2. Myalgia, other site     3. Chronic myofascial pain     4. Lymphedema of upper extremity following lymphadenectomy           PLAN:   - Interventions:  Akiko Jameson has chronic, moderate to severe axial lower back pain present for over the past 3 months that has failed to respond to physical therapy, PT-directed home exercises, NSAIDs and muscle relaxants. There is no untreated radiculopathy or claudication present.  The patient has clinical and radiologic findings suggestive of facet mediated pain.  We will schedule for 1st diagnostic right lumbar L4/5 and L5/S1 medial branch blocks.      - Anticoagulation use: no      - Medications: I have stressed the importance of physical activity and a home exercise plan to help with pain and improve health. and Patient can continue with medications for now since they are providing benefits, using them appropriately, and without side effects.   continue Robaxin 750mg up to 3 times daily as needed for muscle related pain.  May also continue with Tylenol as needed.     - Therapy:   advised patient continue with gradual compression for her lymphedema and activities as tolerated.     - Labs:  Reviewed     - Imaging: Reviewed available imaging with patient and answered any questions they had regarding study.  No new imaging needed at this time.     - Consults/Referrals:   none at this time     - Records:  Reviewed/Obtain old records from outside physicians and imaging     - Follow up visit: return to clinic as needed, will contact patient following medial branch blocks to determine future plan of care.  If there is significant relief with medial branch blocks, will schedule for lumbar radiofrequency ablation.     - Counseled patient regarding the importance of activity modification and physical therapy     - This condition does not require this patient to take time off of work, and the  primary goal of our Pain Management services is to improve the patient's functional capacity.     - Patient Questions: Answered all of the patient's questions regarding diagnosis, therapy, and treatment         The above plan and management options were discussed at length with patient. Patient is in agreement with the above and verbalized understanding.      Anam Montero MD  Interventional Pain Management  [facility]    Disclaimer:  This note was prepared using voice recognition system and is likely to have sound alike errors that may have been overlooked even after proof reading.  Please call me with any questions

## 2022-02-11 NOTE — PATIENT INSTRUCTIONS
Pain Management Pre-Procedure Instructions  (also available in your [employer] account)    Patient Name:___Akiko Jameson____MRN: [id number] you are scheduled to have the following procedure:__ Lumbar Medial Branch Block  _with______Anam Montero MD on: ___02/24/22____ at: [facility]    You will be contacted the day before your procedure to be given an arrival and procedure time                                                                                                        Day of Procedure   Ensure you have obtained arrival time from the Pain Management department  o We will call 48 hours in advance with your arrival time. Please check any voicemails you may have  o If you arrive past your scheduled procedure time, you may be asked to reschedule your procedure.   For your safety, ensure you have a  with you to remain present throughout your procedure   o If you arrive without a responsible adult to stay with you and drive you home, you may be asked to reschedule your procedure   Take all of your prescribed medications (exceptions noted below) with a small amount of water    Stop all insulin and blood sugar medications night before and day of , take other medications as prescribed   o [x] Nothing by mouth after midnight the night before your procedure.  It is ok to take your regular medications with a small sip of water.     Wear loose, comfortable clothing    You may wear glasses, dentures, contact lenses and/or hearing aids. Please leave all valuable items at home.   Contact the Pain Management department at [phone] or via [employer] if you are:  o Running a fever above 100 degrees  o Feel ill, have any type of infection, or are taking antibiotics now or have in the past 2 weeks  o Have had any outpatient procedures in the past 2 weeks (colonoscopy, major dental work, etc.)  o If you are allergic to iodine, IVP dye or shellfish.     PLEASE WAIT AT LEAST 7 DAYS AFTER  RECEIVING ALL VACCINES (FLU, SHINGLES, ETC.) TO COME IN FOR YOUR PROCEDURES. IF YOU HAVE RECEIVED THE FIRST DOSE OF COVID VACCINE BUT NOT THE 2ND DOSE, PLEASE NOTIFY THE PAIN DEPARTMENT. FOR THOSE WHO QUALIFY FOR BOOSTER COVID VACCINE, MAY RECEIVE 7 DAYS PRIOR TO PROCEDURE OR TWO WEEKS AFTER PROCEDURE. THIS AVOIDS ANY PROBLEMS WITH YOUR IMMUNE SYSTEM AND IMMUNE RESPONSE TO THE VACCINE.  You have been scheduled for medial branch blocks This procedure is for diagnostic purposes. You will be contacted with 1-3 days to determine the effectiveness of this injection and will be provided with the next plan of care dependent on results of the diagnostic injection.      Please call the office at [phone] if you experience any of the following after the procedure: weakness or loss of sensation, fever > 101.5, pain uncontrolled with oral medications, persistent nausea/vomiting/or diarrhea, redness or drainage from the incisions, or any other worrisome concerns. If physician on call was not reached or could not communicate with our office for any reason please go to the nearest emergency department.      Contact Information: [phone], ask to speak to the pain management department with any questions or concerns or send a message via [employer]

## 2022-02-17 NOTE — PRE-PROCEDURE INSTRUCTIONS
Spoke with patient regarding procedure scheduled on 2/24     Arrival time 0720     Has patient been sick with fever or on antibiotics within the last 7 days? No     Does the patient have any open wounds, sores or rashes? No     Does the patient have any recent fractures? no     Has patient received a vaccination within the last 7 days? No     Received the COVID vaccination? yes     Has the patient stopped all medications as directed? na     Does patient have a pacemaker and or defibrillator? no     Does the patient have a ride to and from procedure and someone reliable to remain with patient? towandra     Is the patient diabetic? no     Does the patient have sleep apnea? Or use O2 at home? cpap maria teresa     Is the patient receiving sedation? yes     Is the patient instructed to remain NPO beginning at midnight the night before their procedure? yes     Procedure location confirmed with patient? Yes     Covid- Denies signs/symptoms. Instructed to notify PAT/MD if any changes.

## 2022-02-22 ENCOUNTER — LAB VISIT (OUTPATIENT)
Dept: LAB | Facility: HOSPITAL | Age: 47
End: 2022-02-22
Attending: INTERNAL MEDICINE
Payer: COMMERCIAL

## 2022-02-22 DIAGNOSIS — E89.0 POST-OPERATIVE HYPOTHYROIDISM: ICD-10-CM

## 2022-02-22 LAB
T4 FREE SERPL-MCNC: 1.07 NG/DL (ref 0.71–1.51)
TSH SERPL DL<=0.005 MIU/L-ACNC: 3.02 UIU/ML (ref 0.4–4)

## 2022-02-22 PROCEDURE — 84439 ASSAY OF FREE THYROXINE: CPT | Performed by: INTERNAL MEDICINE

## 2022-02-22 PROCEDURE — 36415 COLL VENOUS BLD VENIPUNCTURE: CPT | Mod: PO | Performed by: INTERNAL MEDICINE

## 2022-02-22 PROCEDURE — 84443 ASSAY THYROID STIM HORMONE: CPT | Performed by: INTERNAL MEDICINE

## 2022-02-23 ENCOUNTER — TELEPHONE (OUTPATIENT)
Dept: ENDOCRINOLOGY | Facility: CLINIC | Age: 47
End: 2022-02-23
Payer: COMMERCIAL

## 2022-02-23 ENCOUNTER — PATIENT MESSAGE (OUTPATIENT)
Dept: ENDOCRINOLOGY | Facility: CLINIC | Age: 47
End: 2022-02-23
Payer: COMMERCIAL

## 2022-02-23 NOTE — TELEPHONE ENCOUNTER
"Most recent thyroid test wnls. Continue same dose of thyroid hormone. Have pt confirm that her pill bottle from [facility] says "Synthroid." Just want to confirm that [facility] is still using branded "Synthroid" as their generic. If so, just let her know she should pay attention to this in case they change this in the future. I want her to take the branded "Synthroid" due to variations in levels in the past.   "

## 2022-02-24 ENCOUNTER — HOSPITAL ENCOUNTER (OUTPATIENT)
Facility: HOSPITAL | Age: 47
Discharge: HOME | End: 2022-02-24
Attending: PHYSICAL MEDICINE & REHABILITATION | Admitting: PHYSICAL MEDICINE & REHABILITATION
Payer: COMMERCIAL

## 2022-02-24 VITALS
OXYGEN SATURATION: 99 % | HEART RATE: 70 BPM | DIASTOLIC BLOOD PRESSURE: 77 MMHG | BODY MASS INDEX: 44.79 KG/M2 | HEIGHT: 63 IN | WEIGHT: 252.75 LBS | SYSTOLIC BLOOD PRESSURE: 143 MMHG | RESPIRATION RATE: 15 BRPM | TEMPERATURE: 98 F

## 2022-02-24 DIAGNOSIS — M47.816 LUMBAR SPONDYLOSIS: Primary | ICD-10-CM

## 2022-02-24 PROCEDURE — 64493 INJ PARAVERT F JNT L/S 1 LEV: CPT | Mod: RT,,, | Performed by: PHYSICAL MEDICINE & REHABILITATION

## 2022-02-24 PROCEDURE — 63600175 PHARM REV CODE 636 W HCPCS: Performed by: PHYSICAL MEDICINE & REHABILITATION

## 2022-02-24 PROCEDURE — 64494 INJ PARAVERT F JNT L/S 2 LEV: CPT | Performed by: PHYSICAL MEDICINE & REHABILITATION

## 2022-02-24 PROCEDURE — 64493 PR INJ DX/THER AGNT PARAVERT FACET JOINT,IMG GUIDE,LUMBAR/SAC,1ST LVL: ICD-10-PCS | Mod: RT,,, | Performed by: PHYSICAL MEDICINE & REHABILITATION

## 2022-02-24 PROCEDURE — 64494 PR INJ DX/THER AGNT PARAVERT FACET JOINT,IMG GUIDE,LUMBAR/SAC, 2ND LEVEL: ICD-10-PCS | Mod: RT,,, | Performed by: PHYSICAL MEDICINE & REHABILITATION

## 2022-02-24 PROCEDURE — 64494 INJ PARAVERT F JNT L/S 2 LEV: CPT | Mod: RT,,, | Performed by: PHYSICAL MEDICINE & REHABILITATION

## 2022-02-24 PROCEDURE — 64493 INJ PARAVERT F JNT L/S 1 LEV: CPT | Performed by: PHYSICAL MEDICINE & REHABILITATION

## 2022-02-24 PROCEDURE — 25000003 PHARM REV CODE 250: Performed by: PHYSICAL MEDICINE & REHABILITATION

## 2022-02-24 RX ORDER — BUPIVACAINE HYDROCHLORIDE 5 MG/ML
INJECTION, SOLUTION EPIDURAL; INTRACAUDAL
Status: DISCONTINUED | OUTPATIENT
Start: 2022-02-24 | End: 2022-02-24 | Stop reason: HOSPADM

## 2022-02-24 RX ORDER — FENTANYL CITRATE 50 UG/ML
INJECTION, SOLUTION INTRAMUSCULAR; INTRAVENOUS
Status: DISCONTINUED | OUTPATIENT
Start: 2022-02-24 | End: 2022-02-24 | Stop reason: HOSPADM

## 2022-02-24 RX ORDER — ONDANSETRON 2 MG/ML
4 INJECTION INTRAMUSCULAR; INTRAVENOUS ONCE AS NEEDED
Status: DISCONTINUED | OUTPATIENT
Start: 2022-02-24 | End: 2022-02-24 | Stop reason: HOSPADM

## 2022-02-24 RX ORDER — MIDAZOLAM HYDROCHLORIDE 1 MG/ML
INJECTION, SOLUTION INTRAMUSCULAR; INTRAVENOUS
Status: DISCONTINUED | OUTPATIENT
Start: 2022-02-24 | End: 2022-02-24 | Stop reason: HOSPADM

## 2022-02-24 NOTE — OP NOTE
LUMBAR Medial Branch Block Under Fluoroscopy  Time-out taken to identify patient and procedure side prior to starting the procedure.            02/24/2022                                                         PROCEDURE:  Right medial branch block at the transverse processes at the level of L4, L5, Sacral ala    REASON FOR PROCEDURE: Lumbar spondylosis [M47.816]    PHYSICIAN: Anam Montero MD  ASSISTANTS: None    MEDICATIONS INJECTED: 0.5% bupivicane, 1mL at each level    LOCAL ANESTHETIC USED: Xylocaine 1% 10ml    SEDATION MEDICATIONS: Conscious sedation provided by M.D    The patient was monitored with continuous pulse oximetry, EKG, and intermittent blood pressure monitors, immediately prior to administration of sedation.  The patient was hemodynamically stable throughout the entire process was responsive to voice, and breathing spontaneously.  Supplemental O2 was provided at 2L/min via nasal cannula.  Patient was comfortable for the duration of the procedure.     There was a total of 2mg IV Midazolam and 25mcg Fentanyl titrated for the procedure    Total sedation time was <10 minutes      ESTIMATED BLOOD LOSS:  None.    COMPLICATIONS:  None.    TECHNIQUE: Laying in a prone position, the patient was prepped and draped in the usual sterile fashion using ChloraPrep and fenestrated drape.  The level was determined under fluoroscopic guidance.  Local anesthetic was given by going down to the hub of the 27-gauge 1.25in needle and raising a wheel.  A 22-gauge 3.5inch needle was introduced to the anatomic local of the medial branch at each of the above levels using fluoroscopy in the AP, oblique, and lateral views.  After negative aspiration, medication was injected slowly. The patient tolerated the procedure well.       The patient was monitored after the procedure.  Patient was given post procedure and discharge instructions to follow at home.  We will see the patient back in two weeks or the patient may call to  inform of status. The patient was discharged in a stable condition

## 2022-02-24 NOTE — DISCHARGE SUMMARY
Discharge Note  Short Stay      SUMMARY     Admit Date: 2/24/2022    Attending Physician: Anam Montero MD        Discharge Physician: Anam Montero MD        Discharge Date: 2/24/2022 8:22 AM    Procedure(s) (LRB):  Right L3, 4, 5 MBB (Right)    Final Diagnosis: Lumbar spondylosis [M47.816]    Disposition: Home or self care    Patient Instructions:   Current Discharge Medication List      CONTINUE these medications which have NOT CHANGED    Details   carvediloL (COREG) 12.5 MG tablet Take 1 tablet (12.5 mg total) by mouth 2 (two) times daily.  Qty: 180 tablet, Refills: 1    Comments: .      hydrOXYchloroQUINE (PLAQUENIL) 200 mg tablet Take 200 mg by mouth 2 (two) times daily.      levothyroxine (SYNTHROID) 150 MCG tablet Take 1 tablet (150 mcg total) by mouth before breakfast.  Qty: 90 tablet, Refills: 1    Associated Diagnoses: Post-operative hypothyroidism      methocarbamoL (ROBAXIN) 750 MG Tab Take 1 tablet (750 mg total) by mouth 3 (three) times daily as needed (pain).  Qty: 90 tablet, Refills: 2      triamterene-hydrochlorothiazide 37.5-25 mg (MAXZIDE-25) 37.5-25 mg per tablet Take 1 tablet by mouth once daily.  Qty: 90 tablet, Refills: 3    Comments: .      calcium carbonate (OS-CARLA) 600 mg calcium (1,500 mg) Tab Take 600 mg by mouth 2 (two) times daily with meals.      cetirizine (ZYRTEC) 10 MG tablet Take 1 tablet (10 mg total) by mouth once daily.  Refills: 0    Associated Diagnoses: Cough, persistent      docusate sodium (COLACE) 100 MG capsule Take 100 mg by mouth every other day.       montelukast (SINGULAIR) 10 mg tablet Take 1 tablet (10 mg total) by mouth nightly.  Qty: 90 tablet, Refills: 3      multivitamin (THERAGRAN) per tablet Take 1 tablet by mouth once daily. Every day      potassium chloride SA (K-DUR,KLOR-CON) 20 MEQ tablet Take 1 tablet (20 mEq total) by mouth 2 (two) times daily.  Qty: 180 tablet, Refills: 3    Associated Diagnoses: Hypokalemia                 Discharge Diagnosis:  Lumbar spondylosis [M47.816]  Condition on Discharge: Stable with no complications to procedure   Diet on Discharge: Same as before.  Activity: as per instruction sheet.  Discharge to: Home with a responsible adult.  Follow up: 2-4 weeks       Please call the office at [phone] if you experience any weakness or loss of sensation, fever > 101.5, pain uncontrolled with oral medications, persistent nausea/vomiting/or diarrhea, redness or drainage from the incisions, or any other worrisome concerns. If physician on call was not reached or could not communicate with our office for any reason please go to the nearest emergency department

## 2022-02-24 NOTE — DISCHARGE INSTRUCTIONS

## 2022-02-25 ENCOUNTER — PATIENT MESSAGE (OUTPATIENT)
Dept: PAIN MEDICINE | Facility: CLINIC | Age: 47
End: 2022-02-25
Payer: COMMERCIAL

## 2022-02-25 ENCOUNTER — TELEPHONE (OUTPATIENT)
Dept: PAIN MEDICINE | Facility: CLINIC | Age: 47
End: 2022-02-25
Payer: COMMERCIAL

## 2022-02-25 NOTE — TELEPHONE ENCOUNTER
Patient Name:___________Akiko Jameson______________________MRN:  [id number]       underwent the following procedure:_____right l3-5_____ Lumbar Medial Branch Block  _with______Anam Montero MD on: ___02/24/22___________________ and  received _______________90_____% RELIEF.    Contacted pt. Appt for procedure scheduled 03/22 and 03/29 with .Pt will make f/u on portal 4 -6 weeks post procedure on portal with Sary , pt confirmed knowing how to do Went over instructions with pt and pt verbalized understanding.Instructions also mailed to pt.  All questions answered.

## 2022-03-10 ENCOUNTER — PATIENT MESSAGE (OUTPATIENT)
Dept: RHEUMATOLOGY | Facility: CLINIC | Age: 47
End: 2022-03-10
Payer: COMMERCIAL

## 2022-03-23 NOTE — PRE-PROCEDURE INSTRUCTIONS
Spoke with patient regarding procedure scheduled on 3.29     Arrival time 0700     Has patient been sick with fever or on antibiotics within the last 7 days? No     Does the patient have any open wounds, sores or rashes? No     Does the patient have any recent fractures? no     Has patient received a vaccination within the last 7 days? No     Received the COVID vaccination? yes     Has the patient stopped all medications as directed? na     Does patient have a pacemaker and or defibrillator? no     Does the patient have a ride to and from procedure and someone reliable to remain with patient? cousin     Is the patient diabetic? no     Does the patient have sleep apnea? Or use O2 at home? maria teresa     Is the patient receiving sedation? yes     Is the patient instructed to remain NPO beginning at midnight the night before their procedure? yes     Procedure location confirmed with patient? Yes     Covid- Denies signs/symptoms. Instructed to notify PAT/MD if any changes.

## 2022-03-29 ENCOUNTER — HOSPITAL ENCOUNTER (OUTPATIENT)
Facility: HOSPITAL | Age: 47
Discharge: HOME | End: 2022-03-29
Attending: PHYSICAL MEDICINE & REHABILITATION | Admitting: PHYSICAL MEDICINE & REHABILITATION
Payer: COMMERCIAL

## 2022-03-29 VITALS
HEIGHT: 63 IN | DIASTOLIC BLOOD PRESSURE: 75 MMHG | TEMPERATURE: 98 F | HEART RATE: 63 BPM | SYSTOLIC BLOOD PRESSURE: 156 MMHG | WEIGHT: 251.56 LBS | BODY MASS INDEX: 44.57 KG/M2 | OXYGEN SATURATION: 98 % | RESPIRATION RATE: 18 BRPM

## 2022-03-29 DIAGNOSIS — M47.816 LUMBAR SPONDYLOSIS: Primary | ICD-10-CM

## 2022-03-29 PROCEDURE — 64493 INJ PARAVERT F JNT L/S 1 LEV: CPT | Performed by: PHYSICAL MEDICINE & REHABILITATION

## 2022-03-29 PROCEDURE — 63600175 PHARM REV CODE 636 W HCPCS: Performed by: PHYSICAL MEDICINE & REHABILITATION

## 2022-03-29 PROCEDURE — 64494 INJ PARAVERT F JNT L/S 2 LEV: CPT | Mod: RT,,, | Performed by: PHYSICAL MEDICINE & REHABILITATION

## 2022-03-29 PROCEDURE — 64494 PR INJ DX/THER AGNT PARAVERT FACET JOINT,IMG GUIDE,LUMBAR/SAC, 2ND LEVEL: ICD-10-PCS | Mod: RT,,, | Performed by: PHYSICAL MEDICINE & REHABILITATION

## 2022-03-29 PROCEDURE — 64494 INJ PARAVERT F JNT L/S 2 LEV: CPT | Performed by: PHYSICAL MEDICINE & REHABILITATION

## 2022-03-29 PROCEDURE — 64493 PR INJ DX/THER AGNT PARAVERT FACET JOINT,IMG GUIDE,LUMBAR/SAC,1ST LVL: ICD-10-PCS | Mod: RT,,, | Performed by: PHYSICAL MEDICINE & REHABILITATION

## 2022-03-29 PROCEDURE — 64493 INJ PARAVERT F JNT L/S 1 LEV: CPT | Mod: RT,,, | Performed by: PHYSICAL MEDICINE & REHABILITATION

## 2022-03-29 PROCEDURE — 25000003 PHARM REV CODE 250: Performed by: PHYSICAL MEDICINE & REHABILITATION

## 2022-03-29 RX ORDER — BUPIVACAINE HYDROCHLORIDE 5 MG/ML
INJECTION, SOLUTION EPIDURAL; INTRACAUDAL
Status: DISCONTINUED | OUTPATIENT
Start: 2022-03-29 | End: 2022-03-29 | Stop reason: HOSPADM

## 2022-03-29 RX ORDER — ONDANSETRON 2 MG/ML
4 INJECTION INTRAMUSCULAR; INTRAVENOUS ONCE AS NEEDED
Status: DISCONTINUED | OUTPATIENT
Start: 2022-03-29 | End: 2022-03-29 | Stop reason: HOSPADM

## 2022-03-29 RX ORDER — FENTANYL CITRATE 50 UG/ML
INJECTION, SOLUTION INTRAMUSCULAR; INTRAVENOUS
Status: DISCONTINUED | OUTPATIENT
Start: 2022-03-29 | End: 2022-03-29 | Stop reason: HOSPADM

## 2022-03-29 RX ORDER — MIDAZOLAM HYDROCHLORIDE 1 MG/ML
INJECTION, SOLUTION INTRAMUSCULAR; INTRAVENOUS
Status: DISCONTINUED | OUTPATIENT
Start: 2022-03-29 | End: 2022-03-29 | Stop reason: HOSPADM

## 2022-03-29 NOTE — H&P
HPI  Patient presenting for Procedure(s) (LRB):  Right L3, 4, 5 MBB  (2nd block) (Right)     Patient on Anti-coagulation No    No health changes since previous encounter    Past Medical History:   Diagnosis Date    ALLERGIC RHINITIS     Arthritis     Cellulitis     Constipation due to opioid therapy     Eosinophilia     mild    GERD (gastroesophageal reflux disease)     Granular cell tumor     H. pylori infection 2018    History of 2019 novel coronavirus disease (COVID-19) 10/04/2020    Hypertension     Lymphedema     Mild anemia     KEIRY on CPAP     does not regularly    Prediabetes 8/6/2021    Sleep apnea     compliant with CPAP    Thyroid nodule     Urinary incontinence, mixed      Past Surgical History:   Procedure Laterality Date    24 HOUR IMPEDANCE PH MONITORING OF ESOPHAGUS IN PATIENT NOT TAKING ACID REDUCING MEDICATIONS N/A 1/6/2020    Procedure: IMPEDANCE PH STUDY, ESOPHAGEAL, 24 HOUR, IN PATIENT NOT TAKING ACID REDUCING MEDICATION;  Surgeon: First Available Tamika Panchal;  Location: [facility];  Service: Endoscopy;  Laterality: N/A;    AXILLARY NODE DISSECTION Right     granular cell tumor    BILATERAL SALPINGO-OOPHORECTOMY (BSO)  07/21/2020    BREAST CYST ASPIRATION      left    CHOLECYSTECTOMY      COLONOSCOPY N/A 5/10/2019    Procedure: COLONOSCOPY;  Surgeon: Edgar Gamble Jr., MD;  Location: [facility];  Service: Endoscopy;  Laterality: N/A;    ENDOBRONCHIAL ULTRASOUND Bilateral 4/27/2021    Procedure: ENDOBRONCHIAL ULTRASOUND (EBUS);  Surgeon: Armando Kirby MD;  Location: [facility];  Service: Pulmonary;  Laterality: Bilateral;  need fluoroscopy    ENDOMETRIAL ABLATION      ESOPHAGEAL MANOMETRY WITH MEASUREMENT OF IMPEDANCE N/A 1/6/2020    Procedure: MANOMETRY, ESOPHAGUS, WITH IMPEDANCE MEASUREMENT;  Surgeon: First Available [place];  Location: [facility];  Service: Endoscopy;  Laterality: N/A;    ESOPHAGOGASTRODUODENOSCOPY N/A 7/5/2018    Procedure: EGD  (ESOPHAGOGASTRODUODENOSCOPY);  Surgeon: Edgar Gamble Jr., MD;  Location: [facility] ENDO;  Service: Endoscopy;  Laterality: N/A;    ESOPHAGOGASTRODUODENOSCOPY N/A 11/23/2020    Procedure: ESOPHAGOGASTRODUODENOSCOPY (EGD);  Surgeon: Jina Kaufman MD;  Location: [facility] ENDO;  Service: General;  Laterality: N/A;    EXCISION OF MASS OF ABDOMEN Left 6/18/2018    Procedure: EXCISION, MASS, ABDOMEN - flank left;  Surgeon: Armando Tate MD;  Location: [facility] OR;  Service: General;  Laterality: Left;  left flank removal of mass    FINE NEEDLE ASPIRATION      thyroid    HYSTERECTOMY  07/21/2020    INJECTION OF ANESTHETIC AGENT AROUND MEDIAL BRANCH NERVES INNERVATING LUMBAR FACET JOINT Right 2/24/2022    Procedure: Right L3, 4, 5 MBB;  Surgeon: Anam Montero MD;  Location: [facility];  Service: Pain Management;  Laterality: Right;    KNEE ARTHROSCOPY W/ MENISCECTOMY Right     NASAL SEPTUM SURGERY      ROBOT-ASSISTED NISSEN FUNDOPLICATION USING [employer] XI N/A 2/28/2020    Procedure: XI ROBOTIC FUNDOPLICATION, NISSEN;  Surgeon: Jina Kaufman MD;  Location: [facility] OR;  Service: General;  Laterality: N/A;    THYROIDECTOMY Bilateral 3/11/2021    Procedure: THYROIDECTOMY;  Surgeon: Nixon Moe MD;  Location: [facility] OR;  Service: ENT;  Laterality: Bilateral;    TUBAL LIGATION      UPPER GASTROINTESTINAL ENDOSCOPY  07/05/2018    Dr. Gamble     Review of patient's allergies indicates:   Allergen Reactions    Biaxin [clarithromycin] Swelling    Omeprazole magnesium Swelling and Other (See Comments)    Prevacid [lansoprazole] Swelling     Lip swelling- was taking this along with blood pressure medication: benazepril; not sure which caused it. Reports she has taken OTC prilosec without any problems.    Ace inhibitors Swelling     Facial swelling    Benazepril Swelling     Lip swelling        No current facility-administered medications on file prior to encounter.     Current Outpatient Medications on  "File Prior to Encounter   Medication Sig Dispense Refill    carvediloL (COREG) 12.5 MG tablet Take 1 tablet (12.5 mg total) by mouth 2 (two) times daily. 180 tablet 1    hydrOXYchloroQUINE (PLAQUENIL) 200 mg tablet Take 200 mg by mouth 2 (two) times daily.      levothyroxine (SYNTHROID) 150 MCG tablet Take 1 tablet (150 mcg total) by mouth before breakfast. 90 tablet 1    triamterene-hydrochlorothiazide 37.5-25 mg (MAXZIDE-25) 37.5-25 mg per tablet Take 1 tablet by mouth once daily. 90 tablet 3    calcium carbonate (OS-CARLA) 600 mg calcium (1,500 mg) Tab Take 600 mg by mouth 2 (two) times daily with meals.      cetirizine (ZYRTEC) 10 MG tablet Take 1 tablet (10 mg total) by mouth once daily. (Patient taking differently: Take 10 mg by mouth once daily.)  0    docusate sodium (COLACE) 100 MG capsule Take 100 mg by mouth every other day.       methocarbamoL (ROBAXIN) 750 MG Tab Take 1 tablet (750 mg total) by mouth 3 (three) times daily as needed (pain). 90 tablet 2    montelukast (SINGULAIR) 10 mg tablet Take 1 tablet (10 mg total) by mouth nightly. 90 tablet 3    multivitamin (THERAGRAN) per tablet Take 1 tablet by mouth once daily. Every day      potassium chloride SA (K-DUR,KLOR-CON) 20 MEQ tablet Take 1 tablet (20 mEq total) by mouth 2 (two) times daily. 180 tablet 3        PMHx, PSHx, Allergies, Medications reviewed in epic    ROS negative except pain complaints in HPI    OBJECTIVE:    /79 (BP Location: Left arm, Patient Position: Sitting)   Pulse 74   Temp 97.6 °F (36.4 °C) (Temporal)   Resp 18   Ht 5' 3" (1.6 m)   Wt 114.1 kg (251 lb 8.7 oz)   SpO2 98%   Breastfeeding No   BMI 44.56 kg/m²     PHYSICAL EXAMINATION:    GENERAL: Well appearing, in no acute distress, alert and oriented x3.  PSYCH:  Mood and affect appropriate.  SKIN: Skin color, texture, turgor normal, no rashes or lesions which will impact the procedure.  CV: RRR with palpation of the radial artery.  PULM: No evidence of " respiratory difficulty, symmetric chest rise. Clear to auscultation.  NEURO: Cranial nerves grossly intact.    Plan:    Proceed with procedure as planned Procedure(s) (LRB):  Right L3, 4, 5 MBB  (2nd block) (Right)    Anam Montero MD  03/29/2022

## 2022-03-29 NOTE — OP NOTE
LUMBAR Medial Branch Block Under Fluoroscopy  Time-out taken to identify patient and procedure side prior to starting the procedure.            03/29/2022                                                         PROCEDURE:  Right medial branch block at the transverse processes at the level of L4, L5, Sacral ala    REASON FOR PROCEDURE: Lumbar spondylosis [M47.816]    PHYSICIAN: Anam Montero MD  ASSISTANTS: None    MEDICATIONS INJECTED: 0.5% bupivicane, 1mL at each level    LOCAL ANESTHETIC USED: Xylocaine 1% 10ml    SEDATION MEDICATIONS: Conscious sedation provided by M.D    The patient was monitored with continuous pulse oximetry, EKG, and intermittent blood pressure monitors, immediately prior to administration of sedation.  The patient was hemodynamically stable throughout the entire process was responsive to voice, and breathing spontaneously.  Supplemental O2 was provided at 2L/min via nasal cannula.  Patient was comfortable for the duration of the procedure.     There was a total of 1mg IV Midazolam and 25mcg Fentanyl titrated for the procedure    Total sedation time was <10 minutes      ESTIMATED BLOOD LOSS:  None.    COMPLICATIONS:  None.    TECHNIQUE: Laying in a prone position, the patient was prepped and draped in the usual sterile fashion using ChloraPrep and fenestrated drape.  The level was determined under fluoroscopic guidance.  Local anesthetic was given by going down to the hub of the 27-gauge 1.25in needle and raising a wheel.  A 22-gauge 3.5inch needle was introduced to the anatomic local of the medial branch at each of the above levels using fluoroscopy in the AP, oblique, and lateral views.  After negative aspiration, medication was injected slowly. The patient tolerated the procedure well.       The patient was monitored after the procedure.  Patient was given post procedure and discharge instructions to follow at home.  We will see the patient back in two weeks or the patient may call to  inform of status. The patient was discharged in a stable condition

## 2022-03-29 NOTE — DISCHARGE SUMMARY
Discharge Note  Short Stay      SUMMARY     Admit Date: 3/29/2022    Attending Physician: Anam Montero MD        Discharge Physician: Anam Montero MD        Discharge Date: 3/29/2022 8:01 AM    Procedure(s) (LRB):  Right L3, 4, 5 MBB  (2nd block) (Right)    Final Diagnosis: Lumbar spondylosis [M47.816]    Disposition: Home or self care    Patient Instructions:   Current Discharge Medication List      CONTINUE these medications which have NOT CHANGED    Details   carvediloL (COREG) 12.5 MG tablet Take 1 tablet (12.5 mg total) by mouth 2 (two) times daily.  Qty: 180 tablet, Refills: 1    Comments: .      hydrOXYchloroQUINE (PLAQUENIL) 200 mg tablet Take 200 mg by mouth 2 (two) times daily.      levothyroxine (SYNTHROID) 150 MCG tablet Take 1 tablet (150 mcg total) by mouth before breakfast.  Qty: 90 tablet, Refills: 1    Associated Diagnoses: Post-operative hypothyroidism      triamterene-hydrochlorothiazide 37.5-25 mg (MAXZIDE-25) 37.5-25 mg per tablet Take 1 tablet by mouth once daily.  Qty: 90 tablet, Refills: 3    Comments: .      calcium carbonate (OS-CARLA) 600 mg calcium (1,500 mg) Tab Take 600 mg by mouth 2 (two) times daily with meals.      cetirizine (ZYRTEC) 10 MG tablet Take 1 tablet (10 mg total) by mouth once daily.  Refills: 0    Associated Diagnoses: Cough, persistent      docusate sodium (COLACE) 100 MG capsule Take 100 mg by mouth every other day.       montelukast (SINGULAIR) 10 mg tablet Take 1 tablet (10 mg total) by mouth nightly.  Qty: 90 tablet, Refills: 3      multivitamin (THERAGRAN) per tablet Take 1 tablet by mouth once daily. Every day      potassium chloride SA (K-DUR,KLOR-CON) 20 MEQ tablet Take 1 tablet (20 mEq total) by mouth 2 (two) times daily.  Qty: 180 tablet, Refills: 3    Associated Diagnoses: Hypokalemia      predniSONE (DELTASONE) 20 MG tablet Take 1 tablet (20 mg total) by mouth once daily for 6 days, THEN 0.5 tablets (10 mg total) once daily for 6 days.  Qty: 10  tablet, Refills: 0         STOP taking these medications       methocarbamoL (ROBAXIN) 750 MG Tab Comments:   Reason for Stopping:                   Discharge Diagnosis: Lumbar spondylosis [M47.816]  Condition on Discharge: Stable with no complications to procedure   Diet on Discharge: Same as before.  Activity: as per instruction sheet.  Discharge to: Home with a responsible adult.  Follow up: 2-4 weeks       Please call the office at [phone] if you experience any weakness or loss of sensation, fever > 101.5, pain uncontrolled with oral medications, persistent nausea/vomiting/or diarrhea, redness or drainage from the incisions, or any other worrisome concerns. If physician on call was not reached or could not communicate with our office for any reason please go to the nearest emergency department

## 2022-03-29 NOTE — DISCHARGE INSTRUCTIONS

## 2022-03-31 ENCOUNTER — TELEPHONE (OUTPATIENT)
Dept: PAIN MEDICINE | Facility: CLINIC | Age: 47
End: 2022-03-31
Payer: COMMERCIAL

## 2022-03-31 NOTE — TELEPHONE ENCOUNTER
Patient Name:___________Akiko Collazo Jackson______________________MRN:  [id number]       underwent the following procedure:__________ Lumbar Medial Branch Block  _with______Anam Montero MD on: ______03/29/22________________ and  received _________100___________% RELIEF.

## 2022-04-01 NOTE — PRE-PROCEDURE INSTRUCTIONS
Attempted to PAT patient for procedure on 4.5 with Dr. Montero. No answer. M with return phone number. No return call at this time.

## 2022-04-04 NOTE — PRE-PROCEDURE INSTRUCTIONS
Spoke with patient regarding procedure scheduled on 4.5    Arrival time 0830    Has patient been sick with fever or on antibiotics within the last 7 days? No    Does the patient have any open wounds, sores or rashes? No    Does the patient have any recent fractures? no    Has patient received a vaccination within the last 7 days? No    Received the COVID vaccination? yes    Has the patient stopped all medications as directed? Na    Does patient have a pacemaker and or defibrillator? no    Does the patient have a ride to and from procedure and someone reliable to remain with patient? cousin    Is the patient diabetic? pre    Does the patient have sleep apnea? Or use O2 at home? maria teresa on cpap    Is the patient receiving sedation? yes    Is the patient instructed to remain NPO beginning at midnight the night before their procedure? yes    Procedure location confirmed with patient? Yes    Covid- Denies signs/symptoms. Instructed to notify PAT/MD if any changes.

## 2022-04-05 ENCOUNTER — HOSPITAL ENCOUNTER (OUTPATIENT)
Facility: HOSPITAL | Age: 47
Discharge: HOME | End: 2022-04-05
Attending: PHYSICAL MEDICINE & REHABILITATION | Admitting: PHYSICAL MEDICINE & REHABILITATION
Payer: COMMERCIAL

## 2022-04-05 VITALS
HEART RATE: 73 BPM | OXYGEN SATURATION: 98 % | RESPIRATION RATE: 12 BRPM | BODY MASS INDEX: 45.04 KG/M2 | WEIGHT: 254.19 LBS | TEMPERATURE: 99 F | DIASTOLIC BLOOD PRESSURE: 80 MMHG | SYSTOLIC BLOOD PRESSURE: 152 MMHG | HEIGHT: 63 IN

## 2022-04-05 DIAGNOSIS — M47.816 LUMBAR SPONDYLOSIS: Primary | ICD-10-CM

## 2022-04-05 PROCEDURE — 99152 MOD SED SAME PHYS/QHP 5/>YRS: CPT | Performed by: PHYSICAL MEDICINE & REHABILITATION

## 2022-04-05 PROCEDURE — 25000003 PHARM REV CODE 250: Performed by: PHYSICAL MEDICINE & REHABILITATION

## 2022-04-05 PROCEDURE — 64636 PR DESTROY L/S FACET JNT ADDL: ICD-10-PCS | Mod: RT,,, | Performed by: PHYSICAL MEDICINE & REHABILITATION

## 2022-04-05 PROCEDURE — 64635 DESTROY LUMB/SAC FACET JNT: CPT | Performed by: PHYSICAL MEDICINE & REHABILITATION

## 2022-04-05 PROCEDURE — 64635 PR DESTROY LUMB/SAC FACET JNT: ICD-10-PCS | Mod: RT,,, | Performed by: PHYSICAL MEDICINE & REHABILITATION

## 2022-04-05 PROCEDURE — 63600175 PHARM REV CODE 636 W HCPCS: Performed by: PHYSICAL MEDICINE & REHABILITATION

## 2022-04-05 PROCEDURE — 64636 DESTROY L/S FACET JNT ADDL: CPT | Mod: RT,,, | Performed by: PHYSICAL MEDICINE & REHABILITATION

## 2022-04-05 PROCEDURE — 64636 DESTROY L/S FACET JNT ADDL: CPT | Performed by: PHYSICAL MEDICINE & REHABILITATION

## 2022-04-05 PROCEDURE — 64635 DESTROY LUMB/SAC FACET JNT: CPT | Mod: RT,,, | Performed by: PHYSICAL MEDICINE & REHABILITATION

## 2022-04-05 RX ORDER — BUPIVACAINE HYDROCHLORIDE 2.5 MG/ML
INJECTION, SOLUTION EPIDURAL; INFILTRATION; INTRACAUDAL
Status: DISCONTINUED | OUTPATIENT
Start: 2022-04-05 | End: 2022-04-05 | Stop reason: HOSPADM

## 2022-04-05 RX ORDER — FENTANYL CITRATE 50 UG/ML
INJECTION, SOLUTION INTRAMUSCULAR; INTRAVENOUS
Status: DISCONTINUED | OUTPATIENT
Start: 2022-04-05 | End: 2022-04-05 | Stop reason: HOSPADM

## 2022-04-05 RX ORDER — ONDANSETRON 2 MG/ML
4 INJECTION INTRAMUSCULAR; INTRAVENOUS ONCE AS NEEDED
Status: DISCONTINUED | OUTPATIENT
Start: 2022-04-05 | End: 2022-04-05 | Stop reason: HOSPADM

## 2022-04-05 RX ORDER — MIDAZOLAM HYDROCHLORIDE 1 MG/ML
INJECTION, SOLUTION INTRAMUSCULAR; INTRAVENOUS
Status: DISCONTINUED | OUTPATIENT
Start: 2022-04-05 | End: 2022-04-05 | Stop reason: HOSPADM

## 2022-04-05 NOTE — DISCHARGE INSTRUCTIONS

## 2022-04-05 NOTE — DISCHARGE SUMMARY
Discharge Note  Short Stay      SUMMARY     Admit Date: 4/5/2022    Attending Physician: Anam Montero MD        Discharge Physician: Anam Montero MD        Discharge Date: 4/5/2022 9:58 AM    Procedure(s) (LRB):  Right L3-5 Lumbar RFA (Right)    Final Diagnosis: Lumbar spondylosis [M47.816]    Disposition: Home or self care    Patient Instructions:   Current Discharge Medication List      CONTINUE these medications which have NOT CHANGED    Details   carvediloL (COREG) 12.5 MG tablet Take 1 tablet (12.5 mg total) by mouth 2 (two) times daily.  Qty: 180 tablet, Refills: 1    Comments: .      hydrOXYchloroQUINE (PLAQUENIL) 200 mg tablet Take 200 mg by mouth 2 (two) times daily.      levothyroxine (SYNTHROID) 150 MCG tablet Take 1 tablet (150 mcg total) by mouth before breakfast.  Qty: 90 tablet, Refills: 1    Associated Diagnoses: Post-operative hypothyroidism      potassium chloride SA (K-DUR,KLOR-CON) 20 MEQ tablet Take 1 tablet (20 mEq total) by mouth 2 (two) times daily.  Qty: 180 tablet, Refills: 3    Associated Diagnoses: Hypokalemia      triamterene-hydrochlorothiazide 37.5-25 mg (MAXZIDE-25) 37.5-25 mg per tablet Take 1 tablet by mouth once daily.  Qty: 90 tablet, Refills: 3    Comments: .      calcium carbonate (OS-CARLA) 600 mg calcium (1,500 mg) Tab Take 600 mg by mouth 2 (two) times daily with meals.      cetirizine (ZYRTEC) 10 MG tablet Take 1 tablet (10 mg total) by mouth once daily.  Refills: 0    Associated Diagnoses: Cough, persistent      docusate sodium (COLACE) 100 MG capsule Take 100 mg by mouth every other day.       montelukast (SINGULAIR) 10 mg tablet Take 1 tablet (10 mg total) by mouth nightly.  Qty: 90 tablet, Refills: 3      multivitamin (THERAGRAN) per tablet Take 1 tablet by mouth once daily. Every day                 Discharge Diagnosis: Lumbar spondylosis [M47.816]  Condition on Discharge: Stable with no complications to procedure   Diet on Discharge: Same as before.  Activity:  as per instruction sheet.  Discharge to: Home with a responsible adult.  Follow up: 2-4 weeks       Please call the office at [phone] if you experience any weakness or loss of sensation, fever > 101.5, pain uncontrolled with oral medications, persistent nausea/vomiting/or diarrhea, redness or drainage from the incisions, or any other worrisome concerns. If physician on call was not reached or could not communicate with our office for any reason please go to the nearest emergency department

## 2022-04-05 NOTE — H&P
HPI  Patient presenting for Procedure(s) (LRB):  Right L3-5 Lumbar RFA (Right)     Patient on Anti-coagulation No    No health changes since previous encounter    Past Medical History:   Diagnosis Date    ALLERGIC RHINITIS     Arthritis     Cellulitis     Constipation due to opioid therapy     Eosinophilia     mild    GERD (gastroesophageal reflux disease)     Granular cell tumor     H. pylori infection 2018    History of 2019 novel coronavirus disease (COVID-19) 10/04/2020    Hypertension     Lymphedema     Mild anemia     KEIRY on CPAP     does not regularly    Prediabetes 8/6/2021    Sleep apnea     compliant with CPAP    Thyroid nodule     Urinary incontinence, mixed      Past Surgical History:   Procedure Laterality Date    24 HOUR IMPEDANCE PH MONITORING OF ESOPHAGUS IN PATIENT NOT TAKING ACID REDUCING MEDICATIONS N/A 1/6/2020    Procedure: IMPEDANCE PH STUDY, ESOPHAGEAL, 24 HOUR, IN PATIENT NOT TAKING ACID REDUCING MEDICATION;  Surgeon: First Available Tamika Panchal;  Location: [facility];  Service: Endoscopy;  Laterality: N/A;    AXILLARY NODE DISSECTION Right     granular cell tumor    BILATERAL SALPINGO-OOPHORECTOMY (BSO)  07/21/2020    BREAST CYST ASPIRATION      left    CHOLECYSTECTOMY      COLONOSCOPY N/A 5/10/2019    Procedure: COLONOSCOPY;  Surgeon: Edgar Gamble Jr., MD;  Location: [facility];  Service: Endoscopy;  Laterality: N/A;    ENDOBRONCHIAL ULTRASOUND Bilateral 4/27/2021    Procedure: ENDOBRONCHIAL ULTRASOUND (EBUS);  Surgeon: Armando Kirby MD;  Location: [facility];  Service: Pulmonary;  Laterality: Bilateral;  need fluoroscopy    ENDOMETRIAL ABLATION      ESOPHAGEAL MANOMETRY WITH MEASUREMENT OF IMPEDANCE N/A 1/6/2020    Procedure: MANOMETRY, ESOPHAGUS, WITH IMPEDANCE MEASUREMENT;  Surgeon: First Available [place];  Location: [facility];  Service: Endoscopy;  Laterality: N/A;    ESOPHAGOGASTRODUODENOSCOPY N/A 7/5/2018    Procedure: EGD  (ESOPHAGOGASTRODUODENOSCOPY);  Surgeon: Edgar Gmable Jr., MD;  Location: [facility] ENDO;  Service: Endoscopy;  Laterality: N/A;    ESOPHAGOGASTRODUODENOSCOPY N/A 11/23/2020    Procedure: ESOPHAGOGASTRODUODENOSCOPY (EGD);  Surgeon: Jina Kaufman MD;  Location: [facility] ENDO;  Service: General;  Laterality: N/A;    EXCISION OF MASS OF ABDOMEN Left 6/18/2018    Procedure: EXCISION, MASS, ABDOMEN - flank left;  Surgeon: Armando Tate MD;  Location: [facility] OR;  Service: General;  Laterality: Left;  left flank removal of mass    FINE NEEDLE ASPIRATION      thyroid    HYSTERECTOMY  07/21/2020    INJECTION OF ANESTHETIC AGENT AROUND MEDIAL BRANCH NERVES INNERVATING LUMBAR FACET JOINT Right 2/24/2022    Procedure: Right L3, 4, 5 MBB;  Surgeon: Anam Montero MD;  Location: [facility] PAIN MGT;  Service: Pain Management;  Laterality: Right;    INJECTION OF ANESTHETIC AGENT AROUND MEDIAL BRANCH NERVES INNERVATING LUMBAR FACET JOINT Right 3/29/2022    Procedure: Right L3, 4, 5 MBB  (2nd block);  Surgeon: Anam Montero MD;  Location: [facility] PAIN MGT;  Service: Pain Management;  Laterality: Right;    KNEE ARTHROSCOPY W/ MENISCECTOMY Right     NASAL SEPTUM SURGERY      ROBOT-ASSISTED NISSEN FUNDOPLICATION USING DA TMAAR XI N/A 2/28/2020    Procedure: XI ROBOTIC FUNDOPLICATION, NISSEN;  Surgeon: Jina Kaufman MD;  Location: [facility] OR;  Service: General;  Laterality: N/A;    THYROIDECTOMY Bilateral 3/11/2021    Procedure: THYROIDECTOMY;  Surgeon: Nixon Moe MD;  Location: [facility] OR;  Service: ENT;  Laterality: Bilateral;    TUBAL LIGATION      UPPER GASTROINTESTINAL ENDOSCOPY  07/05/2018    Dr. Gamble     Review of patient's allergies indicates:   Allergen Reactions    Biaxin [clarithromycin] Swelling    Omeprazole magnesium Swelling and Other (See Comments)    Prevacid [lansoprazole] Swelling     Lip swelling- was taking this along with blood pressure medication: benazepril; not sure which caused it.  "Reports she has taken OTC prilosec without any problems.    Ace inhibitors Swelling     Facial swelling    Benazepril Swelling     Lip swelling        No current facility-administered medications on file prior to encounter.     Current Outpatient Medications on File Prior to Encounter   Medication Sig Dispense Refill    carvediloL (COREG) 12.5 MG tablet Take 1 tablet (12.5 mg total) by mouth 2 (two) times daily. 180 tablet 1    hydrOXYchloroQUINE (PLAQUENIL) 200 mg tablet Take 200 mg by mouth 2 (two) times daily.      levothyroxine (SYNTHROID) 150 MCG tablet Take 1 tablet (150 mcg total) by mouth before breakfast. 90 tablet 1    potassium chloride SA (K-DUR,KLOR-CON) 20 MEQ tablet Take 1 tablet (20 mEq total) by mouth 2 (two) times daily. 180 tablet 3    triamterene-hydrochlorothiazide 37.5-25 mg (MAXZIDE-25) 37.5-25 mg per tablet Take 1 tablet by mouth once daily. 90 tablet 3    calcium carbonate (OS-CARLA) 600 mg calcium (1,500 mg) Tab Take 600 mg by mouth 2 (two) times daily with meals.      cetirizine (ZYRTEC) 10 MG tablet Take 1 tablet (10 mg total) by mouth once daily. (Patient taking differently: Take 10 mg by mouth once daily.)  0    docusate sodium (COLACE) 100 MG capsule Take 100 mg by mouth every other day.       montelukast (SINGULAIR) 10 mg tablet Take 1 tablet (10 mg total) by mouth nightly. 90 tablet 3    multivitamin (THERAGRAN) per tablet Take 1 tablet by mouth once daily. Every day          PMHx, PSHx, Allergies, Medications reviewed in epic    ROS negative except pain complaints in HPI    OBJECTIVE:    BP (!) 142/81 (BP Location: Right arm, Patient Position: Sitting)   Pulse 75   Temp 97.6 °F (36.4 °C) (Temporal)   Resp 17   Ht 5' 3" (1.6 m)   Wt 115.3 kg (254 lb 3.1 oz)   SpO2 98%   Breastfeeding No   BMI 45.03 kg/m²     PHYSICAL EXAMINATION:    GENERAL: Well appearing, in no acute distress, alert and oriented x3.  PSYCH:  Mood and affect appropriate.  SKIN: Skin color, texture, " turgor normal, no rashes or lesions which will impact the procedure.  CV: RRR with palpation of the radial artery.  PULM: No evidence of respiratory difficulty, symmetric chest rise. Clear to auscultation.  NEURO: Cranial nerves grossly intact.    Plan:    Proceed with procedure as planned Procedure(s) (LRB):  Right L3-5 Lumbar RFA (Right)    Anam Montero MD  04/05/2022

## 2022-04-05 NOTE — OP NOTE
Lumbar Medial nerve branch block radiofrequency ablation Under Fluoroscopy     Time-out taken to identify patient and procedure side prior to starting the procedure.     04/05/2022    PROCEDURE: Right radiofrequency ablation of the the medial branch nerves at the   transverse process of  L4, L5 and sacral ala    2)Conscious sedation provided by MD     REASON FOR PROCEDURE: Lumbar spondylosis [M47.816]     PHYSICIAN: Anam Montero MD    ASSISTANTS: None     SEDATION: Conscious sedation provided by M.D    The patient was monitored with continuous pulse oximetry, EKG, and intermittent blood pressure monitors, immediately prior to administration of sedation.  The patient was hemodynamically stable throughout the entire process was responsive to voice, and breathing spontaneously.  Supplemental O2 was provided at 2L/min via nasal cannula.  Patient was comfortable for the duration of the procedure.     There was a total of 2mg IV Midazolam and 100mcg Fentanyl titrated for the procedure    Total sedation time was >10minutes and <20minutes      MEDICATIONS INJECTED: 0.25% Bupivicaine total 6mL     LOCAL ANESTHETIC USED: Xylocaine 1% 1mL / site     ESTIMATED BLOOD LOSS: None.     COMPLICATIONS: None.     Interval history: Patient reports that he had complete relief of pain for the day of the procedure, we will proceed with the RFA     TECHNIQUE: Laying in a prone position, the patient was prepped and draped in the usual sterile fashion using ChloraPrep and fenestrated drape. The level was determined under fluoroscopic guidance. Local anesthetic was given by going down to the hub of the 27-gauge 1.25in needle and raising a wheel. A 18-gauge 10mm curved active tip needle was introduced to the anatomic local of the medial branch at each of the above levels using fluoroscopy. Then sensory and motor testing was performed to confirm that the needle tips were in the correct location. Then after negative aspiration, 1 mL of  0.25% bupivacaine was injected into each level. This was followed by thermal lesioning at 80 degrees celsius for 90 seconds.     The patient tolerated the procedure well. Did not have any procedure related motor deficit at the conclusion of the procedure    The patient was monitored after the procedure. Patient was given post procedure and discharge instructions to follow at home. We will see the patient back in two weeks or the patient may call to inform of status. The patient was discharged in a stable condition

## 2022-05-04 ENCOUNTER — PATIENT OUTREACH (OUTPATIENT)
Dept: ADMINISTRATIVE | Facility: OTHER | Age: 47
End: 2022-05-04
Payer: COMMERCIAL

## 2022-05-04 NOTE — PROGRESS NOTES
Health Maintenance Due   Topic Date Due    COVID-19 Vaccine (4 - Booster for Moderna series) 02/23/2022     Updates were requested from care everywhere.  Chart was reviewed for overdue Proactive [facility] Encounters ([facility]) topics (CRS, Breast Cancer Screening, Eye exam)  Health Maintenance has been updated.  LINKS immunization registry triggered.  Immunizations were reconciled.

## 2022-05-05 ENCOUNTER — OFFICE VISIT (OUTPATIENT)
Dept: RHEUMATOLOGY | Facility: CLINIC | Age: 47
End: 2022-05-05
Payer: COMMERCIAL

## 2022-05-05 VITALS
HEIGHT: 63 IN | BODY MASS INDEX: 45.03 KG/M2 | SYSTOLIC BLOOD PRESSURE: 138 MMHG | HEART RATE: 76 BPM | DIASTOLIC BLOOD PRESSURE: 83 MMHG

## 2022-05-05 DIAGNOSIS — R76.8 ANA POSITIVE: ICD-10-CM

## 2022-05-05 DIAGNOSIS — R74.8 ELEVATED CK: ICD-10-CM

## 2022-05-05 DIAGNOSIS — J84.9 INTERSTITIAL PULMONARY DISEASE, UNSPECIFIED: ICD-10-CM

## 2022-05-05 DIAGNOSIS — D86.0 SARCOIDOSIS OF LUNG: Primary | ICD-10-CM

## 2022-05-05 PROCEDURE — 3075F PR MOST RECENT SYSTOLIC BLOOD PRESS GE 130-139MM HG: ICD-10-PCS | Mod: CPTII,S$GLB,, | Performed by: INTERNAL MEDICINE

## 2022-05-05 PROCEDURE — 99999 PR PBB SHADOW E&M-EST. PATIENT-LVL III: ICD-10-PCS | Mod: PBBFAC,,, | Performed by: INTERNAL MEDICINE

## 2022-05-05 PROCEDURE — 3008F BODY MASS INDEX DOCD: CPT | Mod: CPTII,S$GLB,, | Performed by: INTERNAL MEDICINE

## 2022-05-05 PROCEDURE — 99214 OFFICE O/P EST MOD 30 MIN: CPT | Mod: S$GLB,,, | Performed by: INTERNAL MEDICINE

## 2022-05-05 PROCEDURE — 3008F PR BODY MASS INDEX (BMI) DOCUMENTED: ICD-10-PCS | Mod: CPTII,S$GLB,, | Performed by: INTERNAL MEDICINE

## 2022-05-05 PROCEDURE — 3075F SYST BP GE 130 - 139MM HG: CPT | Mod: CPTII,S$GLB,, | Performed by: INTERNAL MEDICINE

## 2022-05-05 PROCEDURE — 3079F DIAST BP 80-89 MM HG: CPT | Mod: CPTII,S$GLB,, | Performed by: INTERNAL MEDICINE

## 2022-05-05 PROCEDURE — 1159F PR MEDICATION LIST DOCUMENTED IN MEDICAL RECORD: ICD-10-PCS | Mod: CPTII,S$GLB,, | Performed by: INTERNAL MEDICINE

## 2022-05-05 PROCEDURE — 1159F MED LIST DOCD IN RCRD: CPT | Mod: CPTII,S$GLB,, | Performed by: INTERNAL MEDICINE

## 2022-05-05 PROCEDURE — 3079F PR MOST RECENT DIASTOLIC BLOOD PRESSURE 80-89 MM HG: ICD-10-PCS | Mod: CPTII,S$GLB,, | Performed by: INTERNAL MEDICINE

## 2022-05-05 PROCEDURE — 99214 PR OFFICE/OUTPT VISIT, EST, LEVL IV, 30-39 MIN: ICD-10-PCS | Mod: S$GLB,,, | Performed by: INTERNAL MEDICINE

## 2022-05-05 PROCEDURE — 99999 PR PBB SHADOW E&M-EST. PATIENT-LVL III: CPT | Mod: PBBFAC,,, | Performed by: INTERNAL MEDICINE

## 2022-05-05 RX ORDER — HYDROXYCHLOROQUINE SULFATE 200 MG/1
200 TABLET, FILM COATED ORAL 2 TIMES DAILY
Qty: 180 TABLET | Refills: 3 | Status: SHIPPED | OUTPATIENT
Start: 2022-05-05 | End: 2022-08-03

## 2022-05-05 NOTE — PROGRESS NOTES
RHEUMATOLOGY OUTPATIENT CLINIC NOTE    5/5/2022    Attending Rheumatologist: Urban Marion  Primary Care Provider/Physician Requesting Consultation: Dwayne Booth MD   Chief Complaint/Reason For Consultation:  spondylosis and Joint Pain      Subjective:     Akiko Jameson is a 47 y.o. Black or  female with positive CHARLENE and sarcoidosis with lung involvement for follow-up visit.    No acute complaints.  Excellent response in terms of arthralgia relief with Plaquenil.    Review of Systems   Constitutional: Negative for fever and malaise/fatigue.   Eyes: Negative for blurred vision, photophobia and pain.   Respiratory: Negative for shortness of breath.    Cardiovascular: Negative for palpitations.   Genitourinary: Negative for dysuria, hematuria and urgency.   Musculoskeletal: Negative for joint pain and myalgias.   Skin: Negative for rash.        Denies photosensitivity or Raynaud's phenomena   Neurological: Negative for sensory change and weakness.       Chronic comorbid conditions affecting medical decision making today:  Past Medical History:   Diagnosis Date    ALLERGIC RHINITIS     Arthritis     Cellulitis     Constipation due to opioid therapy     Eosinophilia     mild    GERD (gastroesophageal reflux disease)     Granular cell tumor     H. pylori infection 2018    History of 2019 novel coronavirus disease (COVID-19) 10/04/2020    Hypertension     Lymphedema     Mild anemia     KEIRY on CPAP     does not regularly    Prediabetes 8/6/2021    Sleep apnea     compliant with CPAP    Thyroid nodule     Urinary incontinence, mixed      Past Surgical History:   Procedure Laterality Date    24 HOUR IMPEDANCE PH MONITORING OF ESOPHAGUS IN PATIENT NOT TAKING ACID REDUCING MEDICATIONS N/A 1/6/2020    Procedure: IMPEDANCE PH STUDY, ESOPHAGEAL, 24 HOUR, IN PATIENT NOT TAKING ACID REDUCING MEDICATION;  Surgeon: First Available Tamika Panchal;  Location: [facility];  Service:  Endoscopy;  Laterality: N/A;    AXILLARY NODE DISSECTION Right     granular cell tumor    BILATERAL SALPINGO-OOPHORECTOMY (BSO)  07/21/2020    BREAST CYST ASPIRATION      left    CHOLECYSTECTOMY      COLONOSCOPY N/A 5/10/2019    Procedure: COLONOSCOPY;  Surgeon: Edgar Gamble Jr., MD;  Location: [facility] ENDO;  Service: Endoscopy;  Laterality: N/A;    ENDOBRONCHIAL ULTRASOUND Bilateral 4/27/2021    Procedure: ENDOBRONCHIAL ULTRASOUND (EBUS);  Surgeon: Armando Kirby MD;  Location: [facility];  Service: Pulmonary;  Laterality: Bilateral;  need fluoroscopy    ENDOMETRIAL ABLATION      ESOPHAGEAL MANOMETRY WITH MEASUREMENT OF IMPEDANCE N/A 1/6/2020    Procedure: MANOMETRY, ESOPHAGUS, WITH IMPEDANCE MEASUREMENT;  Surgeon: First Available [place];  Location: [facility];  Service: Endoscopy;  Laterality: N/A;    ESOPHAGOGASTRODUODENOSCOPY N/A 7/5/2018    Procedure: EGD (ESOPHAGOGASTRODUODENOSCOPY);  Surgeon: Edgar Gamble Jr., MD;  Location: [facility];  Service: Endoscopy;  Laterality: N/A;    ESOPHAGOGASTRODUODENOSCOPY N/A 11/23/2020    Procedure: ESOPHAGOGASTRODUODENOSCOPY (EGD);  Surgeon: Jina Kaufman MD;  Location: [facility];  Service: General;  Laterality: N/A;    EXCISION OF MASS OF ABDOMEN Left 6/18/2018    Procedure: EXCISION, MASS, ABDOMEN - flank left;  Surgeon: Armando Tate MD;  Location: [facility];  Service: General;  Laterality: Left;  left flank removal of mass    FINE NEEDLE ASPIRATION      thyroid    HYSTERECTOMY  07/21/2020    INJECTION OF ANESTHETIC AGENT AROUND MEDIAL BRANCH NERVES INNERVATING LUMBAR FACET JOINT Right 2/24/2022    Procedure: Right L3, 4, 5 MBB;  Surgeon: Anam Montero MD;  Location: [facility] PAIN MGT;  Service: Pain Management;  Laterality: Right;    INJECTION OF ANESTHETIC AGENT AROUND MEDIAL BRANCH NERVES INNERVATING LUMBAR FACET JOINT Right 3/29/2022    Procedure: Right L3, 4, 5 MBB  (2nd block);  Surgeon: Anam Montero MD;  Location: [facility] PAIN  MGT;  Service: Pain Management;  Laterality: Right;    KNEE ARTHROSCOPY W/ MENISCECTOMY Right     NASAL SEPTUM SURGERY      RADIOFREQUENCY THERMOCOAGULATION Right 4/5/2022    Procedure: Right L3-5 Lumbar RFA;  Surgeon: Anam Montero MD;  Location: [facility] PAIN MGT;  Service: Pain Management;  Laterality: Right;    ROBOT-ASSISTED NISSEN FUNDOPLICATION USING DA TAMAR XI N/A 2/28/2020    Procedure: XI ROBOTIC FUNDOPLICATION, NISSEN;  Surgeon: Jina Kaufman MD;  Location: [facility] OR;  Service: General;  Laterality: N/A;    THYROIDECTOMY Bilateral 3/11/2021    Procedure: THYROIDECTOMY;  Surgeon: Nixon Moe MD;  Location: [facility] OR;  Service: ENT;  Laterality: Bilateral;    TUBAL LIGATION      UPPER GASTROINTESTINAL ENDOSCOPY  07/05/2018    Dr. Gamble     Family History   Problem Relation Age of Onset    Diabetes Mother     Kidney failure Mother     Heart attack Mother     Breast cancer Maternal Grandmother     Breast cancer Maternal Aunt     Ovarian cancer Cousin     Breast cancer Cousin     Blindness Father     Cirrhosis Neg Hx     Colon polyps Neg Hx     Colon cancer Neg Hx     Crohn's disease Neg Hx     Esophageal cancer Neg Hx     Stomach cancer Neg Hx     Ulcerative colitis Neg Hx     Amblyopia Neg Hx     Cataracts Neg Hx     Glaucoma Neg Hx     Macular degeneration Neg Hx     Retinal detachment Neg Hx     Strabismus Neg Hx     Allergic rhinitis Neg Hx     Allergies Neg Hx     Angioedema Neg Hx     Asthma Neg Hx     Atopy Neg Hx     Eczema Neg Hx     Immunodeficiency Neg Hx     Rhinitis Neg Hx     Urticaria Neg Hx      Social History     Substance and Sexual Activity   Alcohol Use No     Social History     Tobacco Use   Smoking Status Never Smoker   Smokeless Tobacco Never Used     Social History     Substance and Sexual Activity   Drug Use No       Current Outpatient Medications:     calcium carbonate (OS-CARLA) 600 mg calcium (1,500 mg) Tab, Take 600 mg by mouth 2 (two)  times daily with meals., Disp: , Rfl:     carvediloL (COREG) 12.5 MG tablet, Take 1 tablet (12.5 mg total) by mouth 2 (two) times daily., Disp: 180 tablet, Rfl: 1    docusate sodium (COLACE) 100 MG capsule, Take 100 mg by mouth every other day. , Disp: , Rfl:     hydrOXYchloroQUINE (PLAQUENIL) 200 mg tablet, Take 200 mg by mouth 2 (two) times daily., Disp: , Rfl:     levothyroxine (SYNTHROID) 150 MCG tablet, Take 1 tablet (150 mcg total) by mouth before breakfast., Disp: 90 tablet, Rfl: 1    montelukast (SINGULAIR) 10 mg tablet, Take 1 tablet (10 mg total) by mouth nightly., Disp: 90 tablet, Rfl: 3    multivitamin (THERAGRAN) per tablet, Take 1 tablet by mouth once daily. Every day, Disp: , Rfl:     potassium chloride SA (K-DUR,KLOR-CON) 20 MEQ tablet, Take 1 tablet (20 mEq total) by mouth 2 (two) times daily., Disp: 180 tablet, Rfl: 3    triamterene-hydrochlorothiazide 37.5-25 mg (MAXZIDE-25) 37.5-25 mg per tablet, Take 1 tablet by mouth once daily., Disp: 90 tablet, Rfl: 3    cetirizine (ZYRTEC) 10 MG tablet, Take 1 tablet (10 mg total) by mouth once daily. (Patient taking differently: Take 10 mg by mouth once daily.), Disp: , Rfl: 0     Objective:     Vitals:    05/05/22 0708   BP: 138/83   Pulse: 76     Physical Exam   Constitutional: She appears obese.   Eyes: Conjunctivae are normal.   Cardiovascular:   Lymphedema right arm status post removal of tumor >20 years ago   Pulmonary/Chest: Effort normal. No respiratory distress.   Musculoskeletal:         General: No swelling or tenderness. Normal range of motion.   Neurological:   Muscle strength 5/5 proximally and distally throughout   Skin: No rash noted. No erythema.       Reviewed available old and all outside pertinent medical records available.    All lab results personally reviewed and interpreted by me.  Lab Results   Component Value Date    WBC 5.81 10/14/2021    HGB 12.2 10/14/2021    HCT 39.9 10/14/2021    MCV 85 10/14/2021    RDW 16.3 (H)  10/14/2021     10/14/2021    PLTEST Normal 06/01/2012       Lab Results   Component Value Date     01/26/2022    K 3.5 01/26/2022     01/26/2022    CO2 30 (H) 01/26/2022    GLU 85 01/26/2022    BUN 18 01/26/2022    CALCIUM 9.5 01/26/2022    PROT 8.3 10/14/2021    ALBUMIN 3.7 10/14/2021    BILITOT 0.5 10/14/2021    AST 32 10/14/2021    ALKPHOS 154 (H) 10/14/2021    ALT 50 (H) 10/14/2021       Lab Results   Component Value Date    COLORU Yellow 07/20/2021    APPEARANCEUA Clear 07/20/2021    SPECGRAV 1.030 07/20/2021    PHUR 6.0 07/20/2021    PROTEINUA Negative 07/20/2021    KETONESU Negative 07/20/2021    LEUKOCYTESUR Negative 07/20/2021    NITRITE Negative 07/20/2021       Lab Results   Component Value Date    PTH 54.3 10/22/2021       Lab Results   Component Value Date    URICACID 5.0 10/14/2021       No results found for: CRP, ERYTHROCYTES    Lab Results   Component Value Date    ANATITER 1:160 10/14/2021       No components found for: TSPOTTB,  QUANTIFERON     ASSESSMENT:     History of sarcoidosis with probable LN and lung involvement biopsy-proven.  Completed course of systemic steroids per Pulmonary on November, on Plaquenil for arthralgias.  Good response to monotherapy with Plaquenil.  Denies symptoms suggestive of sarcoidosis relapse or concomitant SLE in setting of positive autoantibodies.  Decreased on CK levels although still a reference value.  Patient continues to be without suspicious rashes for connective tissue disorder or reproducible muscle weakness on exam.  Will continue Plaquenil unchanged for now.  Repeat markers of disease activity for sarcoidosis and SLE, within normal range when last tested with no evidence of end organ damage.  Consider repeating chest CT to monitor sarcoidosis progression.    PLAN:     1. Sarcoidosis with lung involvement    2. Positive CHARLENE    3. Hyper CK sandrita    4. Other specified counseling    Disclaimer: This note is prepared using voice recognition  software and as such is likely to have errors and has not been proof read. Please contact me for questions.     Urban Marion M.D.

## 2022-05-13 ENCOUNTER — OFFICE VISIT (OUTPATIENT)
Dept: PAIN MEDICINE | Facility: CLINIC | Age: 47
End: 2022-05-13
Payer: COMMERCIAL

## 2022-05-13 VITALS
SYSTOLIC BLOOD PRESSURE: 106 MMHG | WEIGHT: 255.94 LBS | HEART RATE: 91 BPM | BODY MASS INDEX: 45.35 KG/M2 | HEIGHT: 63 IN | DIASTOLIC BLOOD PRESSURE: 71 MMHG

## 2022-05-13 DIAGNOSIS — G89.29 CHRONIC MYOFASCIAL PAIN: ICD-10-CM

## 2022-05-13 DIAGNOSIS — M79.18 CHRONIC MYOFASCIAL PAIN: ICD-10-CM

## 2022-05-13 DIAGNOSIS — M79.12 MYALGIA OF AUXILIARY MUSCLES, HEAD AND NECK: ICD-10-CM

## 2022-05-13 DIAGNOSIS — M47.816 LUMBAR SPONDYLOSIS: Primary | ICD-10-CM

## 2022-05-13 PROCEDURE — 3008F PR BODY MASS INDEX (BMI) DOCUMENTED: ICD-10-PCS | Mod: CPTII,S$GLB,, | Performed by: PHYSICIAN ASSISTANT

## 2022-05-13 PROCEDURE — 1160F PR REVIEW ALL MEDS BY PRESCRIBER/CLIN PHARMACIST DOCUMENTED: ICD-10-PCS | Mod: CPTII,S$GLB,, | Performed by: PHYSICIAN ASSISTANT

## 2022-05-13 PROCEDURE — 99999 PR PBB SHADOW E&M-EST. PATIENT-LVL IV: ICD-10-PCS | Mod: PBBFAC,,, | Performed by: PHYSICIAN ASSISTANT

## 2022-05-13 PROCEDURE — 3008F BODY MASS INDEX DOCD: CPT | Mod: CPTII,S$GLB,, | Performed by: PHYSICIAN ASSISTANT

## 2022-05-13 PROCEDURE — 99999 PR PBB SHADOW E&M-EST. PATIENT-LVL IV: CPT | Mod: PBBFAC,,, | Performed by: PHYSICIAN ASSISTANT

## 2022-05-13 PROCEDURE — 1159F MED LIST DOCD IN RCRD: CPT | Mod: CPTII,S$GLB,, | Performed by: PHYSICIAN ASSISTANT

## 2022-05-13 PROCEDURE — 99214 PR OFFICE/OUTPT VISIT, EST, LEVL IV, 30-39 MIN: ICD-10-PCS | Mod: S$GLB,,, | Performed by: PHYSICIAN ASSISTANT

## 2022-05-13 PROCEDURE — 3074F PR MOST RECENT SYSTOLIC BLOOD PRESSURE < 130 MM HG: ICD-10-PCS | Mod: CPTII,S$GLB,, | Performed by: PHYSICIAN ASSISTANT

## 2022-05-13 PROCEDURE — 99214 OFFICE O/P EST MOD 30 MIN: CPT | Mod: S$GLB,,, | Performed by: PHYSICIAN ASSISTANT

## 2022-05-13 PROCEDURE — 1159F PR MEDICATION LIST DOCUMENTED IN MEDICAL RECORD: ICD-10-PCS | Mod: CPTII,S$GLB,, | Performed by: PHYSICIAN ASSISTANT

## 2022-05-13 PROCEDURE — 1160F RVW MEDS BY RX/DR IN RCRD: CPT | Mod: CPTII,S$GLB,, | Performed by: PHYSICIAN ASSISTANT

## 2022-05-13 PROCEDURE — 3078F PR MOST RECENT DIASTOLIC BLOOD PRESSURE < 80 MM HG: ICD-10-PCS | Mod: CPTII,S$GLB,, | Performed by: PHYSICIAN ASSISTANT

## 2022-05-13 PROCEDURE — 3078F DIAST BP <80 MM HG: CPT | Mod: CPTII,S$GLB,, | Performed by: PHYSICIAN ASSISTANT

## 2022-05-13 PROCEDURE — 3074F SYST BP LT 130 MM HG: CPT | Mod: CPTII,S$GLB,, | Performed by: PHYSICIAN ASSISTANT

## 2022-05-13 NOTE — PROGRESS NOTES
"Established Patient Chronic Pain Note (Follow up visit)    Chief Complaint:   Chief Complaint   Patient presents with    Lumbar spondylosis       SUBJECTIVE:    Interval History (5/13/2022): Akiko Jameson presents today for follow-up visit.  she underwent right L3-5 RFA on 4/5/22.  The patient reports that she is/was better following the procedure.  she reports 95% pain relief.  The changes lasted >4 weeks so far.  The changes have continued through this visit.  Patient reports pain as "0/10 today.    Interval HPI (2/11/22):  Akiko Jameson is a 47 y.o. who presents to the clinic for a follow-up appointment for mid back pain.  At the last visit, she was provided trigger point injections to the thoracic paraspinals , lower trapezius, and latissimus dorsi on the right.  She reports that the trigger point injections provided moderate relief.  She was maintained on Robaxin 750 mg up to 3 times daily as needed for muscle related pain.  Since the last visit, Akiko Jameson states the pain has been starting to return. Current pain intensity is 8/10.   Patient denies night fever/night sweats, urinary incontinence, bowel incontinence, significant weight loss, significant motor weakness and loss of sensations.    Pain Disability Index Review:  Last 3 PDI Scores 5/13/2022 2/11/2022 12/10/2021   Pain Disability Index (PDI) 19 45 56     Interval HPI 12/10/2021:  Akiko Jameson is a 47 y.o. female who presents to the clinic for a follow-up appointment for mid back pain.  At the last visit, she was provided trigger point injections to the thoracic paraspinals , lower trapezius, and latissimus dorsi on the right.  She reports that the trigger point injections provided moderate relief.  She was maintained on Robaxin 750 mg up to 3 times daily as needed for muscle related pain.  Since the last visit, Akiko Jameson states the pain has been starting to return. Current pain intensity is 5/10. "     Interval HPI 11/19/2021:  Akiko Jameson is a 46 y.o. female who presents to the clinic for a follow-up appointment for mid back pain.  At the last visit, she was provided trigger point injections to the thoracic paraspinals , lower trapezius, and latissimus dorsi on the right.  She reports that the trigger point injections provided moderate relief.  She was maintained on Robaxin 750 mg up to 3 times daily as needed for muscle related pain.  Since the last visit, Akiko Jameson states the pain has been starting to return. Current pain intensity is 5/10.  Unfortunately, the patient is currently on antibiotics for cellulitis of the right upper extremity.      Interval HPI 09/23/2021:  Akiko Jameson is a 46 y.o. female who presents to the clinic for a follow-up appointment for mid back pain.  At the last visit, she was provided trigger point injections to the thoracic paraspinals , lower trapezius, and latissimus dorsi on the right.  She reports that the trigger point injections provided limited relief, but they have been beneficial previously.  She was maintained on Robaxin 500 mg up to 3 times daily as needed for muscle related pain.  Since the last visit, Akiko Jameson states the pain has been starting to return. Current pain intensity is 8/10.      Interval HPI 07/09/2021:  Akiko Jameson is a 46 y.o. female who presents to the clinic for a follow-up appointment for mid back pain.  At the last visit, she was provided trigger point injections to the thoracic paraspinals , lower trapezius, and latissimus dorsi on the right.  She reports that the trigger point injections provided significant relief relief.  She was maintained on Robaxin 500 mg up to 3 times daily as needed for muscle related pain.  Since the last visit, Akiko Jameson states the pain has been starting to return. Current pain intensity is 8/10.  Of note, in the interim patient has had thyroidectomy  and was recently diagnosed with sarcoidosis.    Interval HPI 11/13/2020:  Akiko Jameson is a 46 y.o. female who presents to the clinic for a follow-up appointment for mid back pain.  At the last visit, she was provided trigger point injections to the thoracic paraspinals , lower trapezius, and latissimus dorsi on the right.  She reports that the trigger point injections provided significant relief relief.  She was maintained on Robaxin 500 mg up to 3 times daily as needed for muscle related pain.  Since the last visit, Akiko Jameson states the pain has been starting to return. Current pain intensity is 5/10.      Interval HPI 05/29/2020:  Akiko Jameson is a 45 y.o. female who presents to the clinic for a follow-up appointment for mid back pain.  At the last visit, she was provided trigger point injections to the thoracic paraspinals and upper/middle trapezius on the right.  She reports that the trigger point injections provided 100% relief at the areas injected, but is having pain in other areas that she would like to also get injected today.  She was also started on Robaxin 500 mg up to 3 times daily as needed for muscle related pain.  She reports that the Robaxin has been of minimal benefit.  Since the last visit, Akiko Jameson states the pain has been improving. Current pain intensity is 8/10.  We discussed lymphedema management, and she is interested in going to a physical therapy clinic to help address this issue.    Initial HPI 05/15/2020:  Akiko Jameson is a 45 y.o. female, right-hand dominant, who presents to the clinic for the evaluation of mid back pain. The pain started 25+ years ago following a lymph node removal on the right that resulted in lymphedema of the right upper extremity and symptoms have been worsening.The pain is located in the right mid back area and radiates to the along the length of the upper to lower back.  The pain is described as aching,  burning and throbbing and is rated as 6/10. The pain is rated with a score of  0/10 on the BEST day and a score of 10/10 on the WORST day.  Symptoms interfere with daily activity, sleeping and work. The pain is exacerbated by increased activity or use of the right upper extremity.  The pain is mitigated by rest. The patient reports spending 2-4 hours per day reclining. The patient reports 6-8 hours of uninterrupted sleep per night.  She reports that she works as a .         Non-Pharmacologic Treatments:  Physical Therapy/Home Exercise: yes  Ice/Heat:yes  TENS: yes  Acupuncture: no  Massage: yes  Chiropractic: yes    Other: yes, compression sleeves        Pain Medications:  - Opioids: Norco  - Adjuvant Medications: Relafen, Robaxin  - Anti-Coagulants: None           Pain Procedures:   -previously underwent thoracic epidurals x2 with first one being beneficial but the second one not so much  -05/15/2020:  Trigger point injections to the thoracic paraspinals and upper/middle trapezius on the right, 100% relief  -05/29/2020:  Trigger point injections to the thoracic paraspinals, lower trapezius, and latissimus dorsi on the right, significant relief   -11/13/2020:  Trigger point injections to the thoracic paraspinals, lower trapezius, and latissimus dorsi on the right, significant relief  -07/09/2021: Trigger point injections to the thoracic paraspinals, lower trapezius, and latissimus dorsi on the right, limited relief  - 09/23/2021: Trigger point injections to the thoracic paraspinals, lower trapezius, and latissimus dorsi on the right, moderate relief  - 12/10/2020: Trigger point injections to the thoracic paraspinals, lower trapezius, and latissimus dorsi on the right, moderate relief  - diagnostic right L3-5 MBB on 02/24/2022 and 03/29/2022 with reported % pain relief  - right L3-5 RFA on 4/5/22 with 95% pain relief      Imaging (Reviewed on 5/17/2022):       X-ray bilateral knees 03/16/2016:  AP and  PA flexion standing views of both knees as well as lateral and Merchant views of both knees were obtained.  The joint spaces are grossly preserved.  Mild patellofemoral spurring and prominent patellar enthesophytes bilaterally.  No joint effusion.  No acute fracture or malalignment.     MRI right knee 03/21/2016:  The anterior cruciate ligament, posterior cruciate ligament, medial collateral ligament, lateral collateral ligament complex normal popliteus tendon, IT band, quadriceps tendon, and patellar tendon are normal in appearance.    There is a complex, primarily radial tear of the posterior horn of the medial meniscus which extends to the posterior root attachment of the medial meniscus.  The medial meniscus is intact.    The articular cartilage of the medial and lateral tibiofemoral joint compartments is intact.  There is a 14 mm width area of full-thickness abnormal chondral signal observed in the lateral patellar facet at the level of the patellar midpole.  No abnormal subcortical signal abnormality appreciated.    There is quadriceps tendon and patellar tendon insertion enthesophyte formation at their respective attachments on the patella.  There is a small knee joint effusion present.  No acute fracture or pathologic marrow replacement process.  There is hematopoietic  bone marrow present within the distal femur and proximal tibia.          REVIEW OF SYSTEMS:    GENERAL:  No weight loss, malaise or fevers.  HEENT:   No recent changes in vision or hearing  NECK:  Negative for lumps, no difficulty with swallowing.  RESPIRATORY:  Negative for cough, wheezing or shortness of breath, patient denies any recent URI.  CARDIOVASCULAR:  Negative for chest pain, leg swelling or palpitations.  GI:  Negative for abdominal discomfort, blood in stools or black stools or change in bowel habits.  MUSCULOSKELETAL:  See HPI.  SKIN:  Negative for lesions, rash, and itching.  PSYCH:  No mood disorder or recent psychosocial  "stressors.  Patients sleep is not disturbed secondary to pain.  HEMATOLOGY/LYMPHOLOGY:  Negative for prolonged bleeding, bruising easily or swollen nodes.  Patient is not currently taking any anti-coagulants  NEURO:   No history of headaches, syncope, paralysis, seizures or tremors.  All other reviewed and negative other than HPI.      OBJECTIVE:    PHYSICAL EXAMINATION:  Vitals:    05/13/22 0906   BP: 106/71   Pulse: 91   Weight: 116.1 kg (255 lb 15.3 oz)   Height: 5' 3" (1.6 m)   PainSc: 0-No pain    Body mass index is 45.34 kg/m².   (reviewed on 5/17/2022)    GENERAL: Well appearing, in no acute distress, alert and oriented x3.  PSYCH:  Mood and affect appropriate.  SKIN: Skin color, texture, turgor normal, no rashes or lesions.  HEAD/FACE:  Normocephalic, atraumatic. Cranial nerves grossly intact.  NECK: No pain to palpation over the cervical paraspinous muscles. Spurling Negative. No pain with neck flexion, extension, or lateral flexion.   PULM: No evidence of respiratory difficulty, symmetric chest rise.  GI:  Soft and non-tender.  BACK:  TTP over the thoracic paraspinals, lower trapezius, and latissimus dorsi on the right.  SLR in the sitting and supine positions is negative to radicular pain.   mild-to-moderate pain to palpation over the facet joints of the lumbar spine and lumbar paraspinals - Present, mild, improved since procedure.  Fair range of motion secondary to pain reproduction.  Axial loading positive on the right - Present, mild, improved since procedure.  EXTREMITIES: Peripheral joint ROM is full and pain free without obvious instability or laxity in all four extremities.  There is significant lymphedema present throughout the right upper extremity.  No other deformities or skin discoloration. Good capillary refill.  MUSCULOSKELETAL: Shoulder, hip, and knee provocative maneuvers are negative.  BUE & BLE strength is normal and symmetric.  No atrophy or tone abnormalities are noted.  NEURO: BUE & " BLE coordination and muscle stretch reflexes are physiologic and symmetric.  Plantar response are downgoing. No clonus.  No loss of sensation is noted.  GAIT: normal.            ASSESSMENT: 47 y.o. year old female with mid back pain, consistent with     1. Lumbar spondylosis  Ambulatory referral/consult to Physical/Occupational Therapy   2. Chronic myofascial pain  Ambulatory referral/consult to Physical/Occupational Therapy   3. Myalgia of auxiliary muscles, head and neck  Ambulatory referral/consult to Physical/Occupational Therapy         PLAN:   1. Interventional: S/p right L3-5 RFA on 4/5/22 with 95% pain relief .     2. Pharmacologic:   - Continue Robaxin 750mg PRN. Can Refill if needed in the future.   - Anticoagulation use: None.     3. Rehabilitative: Encouraged regular exercise. Therapy: advised patient continue with gradual compression for her lymphedema and activities as tolerated.     4. Diagnostic: None for now.    5. Follow up: PRN     - This condition does not require this patient to take time off of work, and the primary goal of our Pain Management services is to improve the patient's functional capacity.   - I discussed the risks, benefits, and alternatives to potential treatment options. All questions and concerns were fully addressed today in clinic.           Disclaimer:  This note was prepared using voice recognition system and is likely to have sound alike errors that may have been overlooked even after proof reading.  Please call me with any questions.

## 2022-05-23 ENCOUNTER — OFFICE VISIT (OUTPATIENT)
Dept: FAMILY MEDICINE | Facility: CLINIC | Age: 47
End: 2022-05-23
Payer: COMMERCIAL

## 2022-05-23 VITALS
OXYGEN SATURATION: 98 % | TEMPERATURE: 99 F | RESPIRATION RATE: 18 BRPM | BODY MASS INDEX: 45.15 KG/M2 | WEIGHT: 254.81 LBS | DIASTOLIC BLOOD PRESSURE: 83 MMHG | SYSTOLIC BLOOD PRESSURE: 131 MMHG | HEIGHT: 63 IN | HEART RATE: 86 BPM

## 2022-05-23 DIAGNOSIS — L03.90 RECURRENT CELLULITIS: Primary | ICD-10-CM

## 2022-05-23 PROCEDURE — 99999 PR PBB SHADOW E&M-EST. PATIENT-LVL V: ICD-10-PCS | Mod: PBBFAC,,, | Performed by: NURSE PRACTITIONER

## 2022-05-23 PROCEDURE — 3008F PR BODY MASS INDEX (BMI) DOCUMENTED: ICD-10-PCS | Mod: CPTII,S$GLB,, | Performed by: NURSE PRACTITIONER

## 2022-05-23 PROCEDURE — 3075F SYST BP GE 130 - 139MM HG: CPT | Mod: CPTII,S$GLB,, | Performed by: NURSE PRACTITIONER

## 2022-05-23 PROCEDURE — 99213 PR OFFICE/OUTPT VISIT, EST, LEVL III, 20-29 MIN: ICD-10-PCS | Mod: 25,S$GLB,, | Performed by: NURSE PRACTITIONER

## 2022-05-23 PROCEDURE — 96372 THER/PROPH/DIAG INJ SC/IM: CPT | Mod: S$GLB,,, | Performed by: NURSE PRACTITIONER

## 2022-05-23 PROCEDURE — 3008F BODY MASS INDEX DOCD: CPT | Mod: CPTII,S$GLB,, | Performed by: NURSE PRACTITIONER

## 2022-05-23 PROCEDURE — 99999 PR PBB SHADOW E&M-EST. PATIENT-LVL V: CPT | Mod: PBBFAC,,, | Performed by: NURSE PRACTITIONER

## 2022-05-23 PROCEDURE — 3075F PR MOST RECENT SYSTOLIC BLOOD PRESS GE 130-139MM HG: ICD-10-PCS | Mod: CPTII,S$GLB,, | Performed by: NURSE PRACTITIONER

## 2022-05-23 PROCEDURE — 1159F MED LIST DOCD IN RCRD: CPT | Mod: CPTII,S$GLB,, | Performed by: NURSE PRACTITIONER

## 2022-05-23 PROCEDURE — 1159F PR MEDICATION LIST DOCUMENTED IN MEDICAL RECORD: ICD-10-PCS | Mod: CPTII,S$GLB,, | Performed by: NURSE PRACTITIONER

## 2022-05-23 PROCEDURE — 96372 PR INJECTION,THERAP/PROPH/DIAG2ST, IM OR SUBCUT: ICD-10-PCS | Mod: S$GLB,,, | Performed by: NURSE PRACTITIONER

## 2022-05-23 PROCEDURE — 3079F DIAST BP 80-89 MM HG: CPT | Mod: CPTII,S$GLB,, | Performed by: NURSE PRACTITIONER

## 2022-05-23 PROCEDURE — 99213 OFFICE O/P EST LOW 20 MIN: CPT | Mod: 25,S$GLB,, | Performed by: NURSE PRACTITIONER

## 2022-05-23 PROCEDURE — 3079F PR MOST RECENT DIASTOLIC BLOOD PRESSURE 80-89 MM HG: ICD-10-PCS | Mod: CPTII,S$GLB,, | Performed by: NURSE PRACTITIONER

## 2022-05-23 PROCEDURE — 1160F RVW MEDS BY RX/DR IN RCRD: CPT | Mod: CPTII,S$GLB,, | Performed by: NURSE PRACTITIONER

## 2022-05-23 PROCEDURE — 1160F PR REVIEW ALL MEDS BY PRESCRIBER/CLIN PHARMACIST DOCUMENTED: ICD-10-PCS | Mod: CPTII,S$GLB,, | Performed by: NURSE PRACTITIONER

## 2022-05-23 RX ORDER — TRAMADOL HYDROCHLORIDE 50 MG/1
50 TABLET ORAL EVERY 8 HOURS PRN
Qty: 15 EACH | Refills: 0 | Status: SHIPPED | OUTPATIENT
Start: 2022-05-23 | End: 2022-05-28

## 2022-05-23 RX ORDER — DOXYCYCLINE HYCLATE 100 MG
100 TABLET ORAL 2 TIMES DAILY
Qty: 20 TABLET | Refills: 0 | Status: SHIPPED | OUTPATIENT
Start: 2022-05-23 | End: 2022-06-02

## 2022-05-23 RX ORDER — CEFTRIAXONE 1 G/1
1 INJECTION, POWDER, FOR SOLUTION INTRAMUSCULAR; INTRAVENOUS
Status: COMPLETED | OUTPATIENT
Start: 2022-05-23 | End: 2022-05-23

## 2022-05-23 RX ADMIN — CEFTRIAXONE 1 G: 1 INJECTION, POWDER, FOR SOLUTION INTRAMUSCULAR; INTRAVENOUS at 02:05

## 2022-05-23 NOTE — TELEPHONE ENCOUNTER
This pt has recurrent cellulitis, chronic lymphadenopathy in her right arm. Would it be acceptable to refer her to infectious disease or is there another specialist that you would recommend?

## 2022-05-23 NOTE — PATIENT INSTRUCTIONS
Ultram request to PCP to approve  Avoid prolonged sun exposure while taking doxycycline  Report to ER immediately if symptoms worsen or persist

## 2022-05-23 NOTE — LETTER
May 23, 2022      [facility]  [address] [place] [address]  Phone: [phone]  Fax: [phone]       Patient: Akiko Jameson   YOB: 1975  Date of Visit: 05/23/2022    To Whom It May Concern:    Claudio Jameson  was at [facility] on 05/23/2022. The patient may return to work on 05/25/2022 with no restrictions. If you have any questions or concerns, or if I can be of further assistance, please do not hesitate to contact me.    Sincerely,    Yeimi Abernathy NP

## 2022-05-23 NOTE — PROGRESS NOTES
Subjective:       Patient ID: Akiko Jameson is a 47 y.o. female.    Chief Complaint: Arm Pain (C/o right arm pain, redness, and swelling since this morning)    Arm Pain   The incident occurred 12 to 24 hours ago (right forearm; has recurrent cellulitis; last tx 11/2021; has chronic lymphadema to right arm). There was no injury mechanism. The pain is present in the right forearm. The quality of the pain is described as aching. The pain does not radiate. The pain is moderate. The pain has been constant since the incident. Pertinent negatives include no chest pain, muscle weakness, numbness or tingling. Associated symptoms comments: Increased warmth to touch, erythema. Nothing aggravates the symptoms. She has tried nothing for the symptoms. The treatment provided no relief.     Past Medical History:   Diagnosis Date    ALLERGIC RHINITIS     Arthritis     Cellulitis     Constipation due to opioid therapy     Eosinophilia     mild    GERD (gastroesophageal reflux disease)     Granular cell tumor     H. pylori infection 2018    History of 2019 novel coronavirus disease (COVID-19) 10/04/2020    Hypertension     Lymphedema     Mild anemia     KEIRY on CPAP     does not regularly    Prediabetes 8/6/2021    Sleep apnea     compliant with CPAP    Thyroid nodule     Urinary incontinence, mixed      Social History     Socioeconomic History    Marital status:     Number of children: 2   Occupational History     Employer: [employer]   Tobacco Use    Smoking status: Never Smoker    Smokeless tobacco: Never Used   Substance and Sexual Activity    Alcohol use: No    Drug use: No    Sexual activity: Yes     Partners: Male     Social Determinants of Health     Financial Resource Strain: Low Risk     Difficulty of Paying Living Expenses: Not hard at all   Food Insecurity: No Food Insecurity    Worried About Running Out of Food in the Last Year: Never true    Ran Out of Food in the Last Year: Never  true   Transportation Needs: No Transportation Needs    Lack of Transportation (Medical): No    Lack of Transportation (Non-Medical): No   Physical Activity: Insufficiently Active    Days of Exercise per Week: 2 days    Minutes of Exercise per Session: 30 min   Stress: No Stress Concern Present    Feeling of Stress : Not at all   Social Connections: Unknown    Frequency of Communication with Friends and Family: More than three times a week    Frequency of Social Gatherings with Friends and Family: Once a week    Active Member of Clubs or Organizations: Yes    Attends Club or Organization Meetings: 1 to 4 times per year    Marital Status:    Housing Stability: Low Risk     Unable to Pay for Housing in the Last Year: No    Number of Places Lived in the Last Year: 1    Unstable Housing in the Last Year: No     Past Surgical History:   Procedure Laterality Date    24 HOUR IMPEDANCE PH MONITORING OF ESOPHAGUS IN PATIENT NOT TAKING ACID REDUCING MEDICATIONS N/A 1/6/2020    Procedure: IMPEDANCE PH STUDY, ESOPHAGEAL, 24 HOUR, IN PATIENT NOT TAKING ACID REDUCING MEDICATION;  Surgeon: First Available Hall;  Location: [facility];  Service: Endoscopy;  Laterality: N/A;    AXILLARY NODE DISSECTION Right     granular cell tumor    BILATERAL SALPINGO-OOPHORECTOMY (BSO)  07/21/2020    BREAST CYST ASPIRATION      left    CHOLECYSTECTOMY      COLONOSCOPY N/A 5/10/2019    Procedure: COLONOSCOPY;  Surgeon: Edgar Gamble Jr., MD;  Location: [facility];  Service: Endoscopy;  Laterality: N/A;    ENDOBRONCHIAL ULTRASOUND Bilateral 4/27/2021    Procedure: ENDOBRONCHIAL ULTRASOUND (EBUS);  Surgeon: Armando Kirby MD;  Location: [facility];  Service: Pulmonary;  Laterality: Bilateral;  need fluoroscopy    ENDOMETRIAL ABLATION      ESOPHAGEAL MANOMETRY WITH MEASUREMENT OF IMPEDANCE N/A 1/6/2020    Procedure: MANOMETRY, ESOPHAGUS, WITH IMPEDANCE MEASUREMENT;  Surgeon: First Available Hall;  Location:  [facility] ENDO;  Service: Endoscopy;  Laterality: N/A;    ESOPHAGOGASTRODUODENOSCOPY N/A 7/5/2018    Procedure: EGD (ESOPHAGOGASTRODUODENOSCOPY);  Surgeon: Edgar Gamble Jr., MD;  Location: [facility] ENDO;  Service: Endoscopy;  Laterality: N/A;    ESOPHAGOGASTRODUODENOSCOPY N/A 11/23/2020    Procedure: ESOPHAGOGASTRODUODENOSCOPY (EGD);  Surgeon: Jina Kaufman MD;  Location[facility];  Service: General;  Laterality: N/A;    EXCISION OF MASS OF ABDOMEN Left 6/18/2018    Procedure: EXCISION, MASS, ABDOMEN - flank left;  Surgeon: Armando Tate MD;  Location: [facility] OR;  Service: General;  Laterality: Left;  left flank removal of mass    FINE NEEDLE ASPIRATION      thyroid    HYSTERECTOMY  07/21/2020    INJECTION OF ANESTHETIC AGENT AROUND MEDIAL BRANCH NERVES INNERVATING LUMBAR FACET JOINT Right 2/24/2022    Procedure: Right L3, 4, 5 MBB;  Surgeon: Anam Montero MD;  Location: [facility]GT;  Service: Pain Management;  Laterality: Right;    INJECTION OF ANESTHETIC AGENT AROUND MEDIAL BRANCH NERVES INNERVATING LUMBAR FACET JOINT Right 3/29/2022    Procedure: Right L3, 4, 5 MBB  (2nd block);  Surgeon: Anam Montero MD;  Location: [facility];  Service: Pain Management;  Laterality: Right;    KNEE ARTHROSCOPY W/ MENISCECTOMY Right     NASAL SEPTUM SURGERY      RADIOFREQUENCY THERMOCOAGULATION Right 4/5/2022    Procedure: Right L3-5 Lumbar RFA;  Surgeon: Anam Montero MD;  Location: [facility];  Service: Pain Management;  Laterality: Right;    ROBOT-ASSISTED NISSEN FUNDOPLICATION USING DA TAMAR XI N/A 2/28/2020    Procedure: XI ROBOTIC FUNDOPLICATION, NISSEN;  Surgeon: Jina Kaufman MD;  Location: [facility];  Service: General;  Laterality: N/A;    THYROIDECTOMY Bilateral 3/11/2021    Procedure: THYROIDECTOMY;  Surgeon: Nixon Moe MD;  Location: [facility];  Service: ENT;  Laterality: Bilateral;    TUBAL LIGATION      UPPER GASTROINTESTINAL ENDOSCOPY  07/05/2018    Dr. Gamble        Review of Systems   Constitutional: Negative.    HENT: Negative.    Eyes: Negative.    Respiratory: Negative.    Cardiovascular: Negative.  Negative for chest pain.   Gastrointestinal: Negative.    Endocrine: Negative.    Genitourinary: Negative.    Musculoskeletal: Negative.    Integumentary:         Right forearm cellulitis   Allergic/Immunologic: Negative.    Neurological: Negative.  Negative for tingling and numbness.   Psychiatric/Behavioral: Negative.          Objective:      Physical Exam  Vitals and nursing note reviewed.   Constitutional:       Appearance: Normal appearance. She is obese.   HENT:      Head: Normocephalic.      Right Ear: Tympanic membrane, ear canal and external ear normal.      Left Ear: Tympanic membrane, ear canal and external ear normal.      Nose: Nose normal.      Mouth/Throat:      Mouth: Mucous membranes are moist.      Pharynx: Oropharynx is clear.   Eyes:      Conjunctiva/sclera: Conjunctivae normal.      Pupils: Pupils are equal, round, and reactive to light.   Cardiovascular:      Rate and Rhythm: Normal rate and regular rhythm.      Pulses: Normal pulses.      Heart sounds: Normal heart sounds.   Pulmonary:      Effort: Pulmonary effort is normal.      Breath sounds: Normal breath sounds.   Abdominal:      General: Bowel sounds are normal.      Palpations: Abdomen is soft.   Musculoskeletal:         General: Normal range of motion.        Arms:       Cervical back: Normal range of motion and neck supple.   Skin:     Capillary Refill: Capillary refill takes 2 to 3 seconds.   Neurological:      Mental Status: She is alert and oriented to person, place, and time.   Psychiatric:         Mood and Affect: Mood normal.         Behavior: Behavior normal.         Thought Content: Thought content normal.         Judgment: Judgment normal.         Assessment:       Problem List Items Addressed This Visit    None     Visit Diagnoses     Recurrent cellulitis    -  Primary           Plan:           Akiko was seen today for arm pain.    Diagnoses and all orders for this visit:    Recurrent cellulitis  -     doxycycline (VIBRA-TABS) 100 MG tablet; Take 1 tablet (100 mg total) by mouth 2 (two) times daily. for 10 days  -     cefTRIAXone injection 1 g  Ultram request to PCP to approve  Report to ER immediately if symptoms worsen or persist

## 2022-05-31 ENCOUNTER — PATIENT MESSAGE (OUTPATIENT)
Dept: FAMILY MEDICINE | Facility: CLINIC | Age: 47
End: 2022-05-31
Payer: COMMERCIAL

## 2022-06-06 ENCOUNTER — PATIENT MESSAGE (OUTPATIENT)
Dept: RHEUMATOLOGY | Facility: CLINIC | Age: 47
End: 2022-06-06
Payer: COMMERCIAL

## 2022-06-08 ENCOUNTER — PATIENT MESSAGE (OUTPATIENT)
Dept: RHEUMATOLOGY | Facility: CLINIC | Age: 47
End: 2022-06-08
Payer: COMMERCIAL

## 2022-06-08 NOTE — TELEPHONE ENCOUNTER
MD Doni Harrison Staff 5 minutes ago (2:45 PM)         Symptoms suggestive of trigger finger.  Recommend Voltaren gel OTC: 4gm every 6 hours with splint use for 2-3 weeks.  May take at Aleve over-the-counter as well.  If no improvement may come to clinic to receive local corticosteroid injection for symptomatic relief under any provider per schedule availability.

## 2022-06-28 ENCOUNTER — OFFICE VISIT (OUTPATIENT)
Dept: GASTROENTEROLOGY | Facility: CLINIC | Age: 47
End: 2022-06-28
Payer: COMMERCIAL

## 2022-06-28 DIAGNOSIS — K59.00 CONSTIPATION, UNSPECIFIED CONSTIPATION TYPE: ICD-10-CM

## 2022-06-28 DIAGNOSIS — K62.89 RECTAL PAIN: Primary | ICD-10-CM

## 2022-06-28 DIAGNOSIS — A04.8 H. PYLORI INFECTION: ICD-10-CM

## 2022-06-28 DIAGNOSIS — R19.7 DIARRHEA, UNSPECIFIED TYPE: ICD-10-CM

## 2022-06-28 PROCEDURE — 99214 PR OFFICE/OUTPT VISIT, EST, LEVL IV, 30-39 MIN: ICD-10-PCS | Mod: 95,,, | Performed by: PHYSICIAN ASSISTANT

## 2022-06-28 PROCEDURE — 99214 OFFICE O/P EST MOD 30 MIN: CPT | Mod: 95,,, | Performed by: PHYSICIAN ASSISTANT

## 2022-06-28 NOTE — PROGRESS NOTES
Subjective:      Patient ID: Akiko Jameson is a 47 y.o. female.    Chief Complaint: No chief complaint on file.  The patient location is: [place], [place]  The chief complaint leading to consultation is: constipation, rectal pain    Visit type: audiovisual    Face to Face time with patient: 12 mins  15 minutes of total time spent on the encounter, which includes face to face time and non-face to face time preparing to see the patient (eg, review of tests), Obtaining and/or reviewing separately obtained history, Documenting clinical information in the electronic or other health record, Independently interpreting results (not separately reported) and communicating results to the patient/family/caregiver, or Care coordination (not separately reported).     Each patient to whom he or she provides medical services by telemedicine is:  (1) informed of the relationship between the physician and patient and the respective role of any other health care provider with respect to management of the patient; and (2) notified that he or she may decline to receive medical services by telemedicine and may withdraw from such care at any time.    Notes:     HPI:  Patient reports today via telemedicine for evaluation of the above.  Last seen in 2020 with H. Pylori infection. She has completed treatment regimen but has not completed stool to ensure eradication.   Today she complains of ongoing constipation/diarrhea that started a few months ago. In addition, she has noticed a return of deep rectal discomfort. She has experienced rectal pain in the past, but this feels different. It does not feel like a spasm. It is very sharp in nature. Feels inflamed - she can feel stool as it moves down the left side to her rectum. She denies blood in the stool. Colonoscopy in 2019 with 10 yr recall. During this time she was having spasm type rectal discomfort. Levsin was tried but not helpful.     Review of Systems   Constitutional: Negative for  activity change, appetite change, chills, diaphoresis, fatigue, fever and unexpected weight change.   HENT: Negative for trouble swallowing.    Respiratory: Negative for shortness of breath.    Cardiovascular: Negative for chest pain.   Gastrointestinal: Positive for constipation, diarrhea and rectal pain. Negative for abdominal distention, abdominal pain, anal bleeding, blood in stool, nausea and vomiting.   Skin: Negative for color change and pallor.   Neurological: Negative for dizziness and weakness.   Psychiatric/Behavioral: Negative for dysphoric mood. The patient is nervous/anxious.        Medical History: Reviewed    Social History: Reviewed    Allergies: Reviewed    Objective:     Physical Exam  Constitutional:       General: She is not in acute distress.     Appearance: Normal appearance. She is not ill-appearing, toxic-appearing or diaphoretic.   HENT:      Head: Normocephalic and atraumatic.   Neurological:      Mental Status: She is alert.         Assessment:     1. Rectal pain    2. Constipation, unspecified constipation type    3. Diarrhea, unspecified type    4. H. pylori infection        Plan:     -Xray today for further evaluation of stool. If large amount of stool in the rectum, will try to clear this with enema. Large amount of stool in the rectal vault could be contributing to her discomfort.   -If Xray normal, can discuss another trial of Levsin vs. Flex sig. If flex sig normal, will need follow up with colorectal for further evaluation.   -H. Pylori stool to ensure eradication of infection.    Diagnoses and all orders for this visit:    Rectal pain  -     X-Ray Abdomen Flat And Erect; Future    Constipation, unspecified constipation type  -     X-Ray Abdomen Flat And Erect; Future    Diarrhea, unspecified type  -     X-Ray Abdomen Flat And Erect; Future    H. pylori infection  -     H. pylori antigen, stool; Future        No follow-ups on file.    Thank you for the opportunity to participate  in the care of this patient.   Elyssa Gutierres PA-C.

## 2022-07-01 ENCOUNTER — HOSPITAL ENCOUNTER (OUTPATIENT)
Dept: RADIOLOGY | Facility: HOSPITAL | Age: 47
Discharge: HOME | End: 2022-07-01
Attending: PHYSICIAN ASSISTANT
Payer: COMMERCIAL

## 2022-07-01 DIAGNOSIS — K59.00 CONSTIPATION, UNSPECIFIED CONSTIPATION TYPE: ICD-10-CM

## 2022-07-01 DIAGNOSIS — R19.7 DIARRHEA, UNSPECIFIED TYPE: ICD-10-CM

## 2022-07-01 DIAGNOSIS — K62.89 RECTAL PAIN: ICD-10-CM

## 2022-07-01 PROCEDURE — 74019 RADEX ABDOMEN 2 VIEWS: CPT | Mod: TC,PO

## 2022-07-01 PROCEDURE — 74019 XR ABDOMEN FLAT AND ERECT: ICD-10-PCS | Mod: 26,,, | Performed by: RADIOLOGY

## 2022-07-01 PROCEDURE — 74019 RADEX ABDOMEN 2 VIEWS: CPT | Mod: 26,,, | Performed by: RADIOLOGY

## 2022-07-07 ENCOUNTER — PATIENT MESSAGE (OUTPATIENT)
Dept: GASTROENTEROLOGY | Facility: CLINIC | Age: 47
End: 2022-07-07
Payer: COMMERCIAL

## 2022-07-07 ENCOUNTER — LAB VISIT (OUTPATIENT)
Dept: LAB | Facility: HOSPITAL | Age: 47
End: 2022-07-07
Attending: PHYSICIAN ASSISTANT
Payer: COMMERCIAL

## 2022-07-07 DIAGNOSIS — A04.8 H. PYLORI INFECTION: ICD-10-CM

## 2022-07-07 DIAGNOSIS — K62.89 RECTAL PAIN: Primary | ICD-10-CM

## 2022-07-07 PROCEDURE — 87338 HPYLORI STOOL AG IA: CPT | Performed by: PHYSICIAN ASSISTANT

## 2022-07-13 LAB
H PYLORI AG STL QL IA: DETECTED
SPECIMEN SOURCE: ABNORMAL

## 2022-07-13 RX ORDER — TRIAMTERENE/HYDROCHLOROTHIAZID 37.5-25 MG
TABLET ORAL
Qty: 90 TABLET | Refills: 0 | Status: SHIPPED | OUTPATIENT
Start: 2022-07-13 | End: 2022-07-18 | Stop reason: ALTCHOICE

## 2022-07-13 NOTE — TELEPHONE ENCOUNTER
No new care gaps identified.  [facility] Embedded Care Gaps. Reference number: [id number]. 7/13/2022   5:27:51 PM CDT

## 2022-07-14 NOTE — TELEPHONE ENCOUNTER
Refill Decision Note   Akiko Jameson  is requesting a refill authorization.  Brief Assessment and Rationale for Refill:  Approve     Medication Therapy Plan:       Medication Reconciliation Completed: No   Comments:     No Care Gaps recommended.     Note composed:10:05 PM 07/13/2022             No

## 2022-07-17 NOTE — PROGRESS NOTES
Subjective:    Patient ID:  Akiko Jameson is a 47 y.o. female who presents for Hypertension        HPI  LAST BMP OK THYROID FUNCTION TESTS OK, DOING WELL, BP OK, DX WITH H PYLORI,OFF STEROIDS FOR SARCOIDOSIS, SEE ROS    Past Medical History:   Diagnosis Date    ALLERGIC RHINITIS     Arthritis     Cellulitis     Constipation due to opioid therapy     Eosinophilia     mild    GERD (gastroesophageal reflux disease)     Granular cell tumor     H. pylori infection 2018    History of 2019 novel coronavirus disease (COVID-19) 10/04/2020    Hypertension     Lymphedema     Mild anemia     KEIRY on CPAP     does not regularly    Prediabetes 8/6/2021    Sleep apnea     compliant with CPAP    Thyroid nodule     Urinary incontinence, mixed      Past Surgical History:   Procedure Laterality Date    24 HOUR IMPEDANCE PH MONITORING OF ESOPHAGUS IN PATIENT NOT TAKING ACID REDUCING MEDICATIONS N/A 1/6/2020    Procedure: IMPEDANCE PH STUDY, ESOPHAGEAL, 24 HOUR, IN PATIENT NOT TAKING ACID REDUCING MEDICATION;  Surgeon: First Available [place];  Location: [facility];  Service: Endoscopy;  Laterality: N/A;    AXILLARY NODE DISSECTION Right     granular cell tumor    BILATERAL SALPINGO-OOPHORECTOMY (BSO)  07/21/2020    BREAST CYST ASPIRATION      left    CHOLECYSTECTOMY      COLONOSCOPY N/A 5/10/2019    Procedure: COLONOSCOPY;  Surgeon: Edgar Gamble Jr., MD;  Location: [facility];  Service: Endoscopy;  Laterality: N/A;    ENDOBRONCHIAL ULTRASOUND Bilateral 4/27/2021    Procedure: ENDOBRONCHIAL ULTRASOUND (EBUS);  Surgeon: Armando Kirby MD;  Location[facility];  Service: Pulmonary;  Laterality: Bilateral;  need fluoroscopy    ENDOMETRIAL ABLATION      ESOPHAGEAL MANOMETRY WITH MEASUREMENT OF IMPEDANCE N/A 1/6/2020    Procedure: MANOMETRY, ESOPHAGUS, WITH IMPEDANCE MEASUREMENT;  Surgeon: First Available [place];  Location: [facility];  Service: Endoscopy;  Laterality: N/A;     ESOPHAGOGASTRODUODENOSCOPY N/A 7/5/2018    Procedure: EGD (ESOPHAGOGASTRODUODENOSCOPY);  Surgeon: Edgar Gamble Jr., MD;  Location: [facility] ENDO;  Service: Endoscopy;  Laterality: N/A;    ESOPHAGOGASTRODUODENOSCOPY N/A 11/23/2020    Procedure: ESOPHAGOGASTRODUODENOSCOPY (EGD);  Surgeon: Jina Kaufman MD;  Location: [facility] ENDO;  Service: General;  Laterality: N/A;    EXCISION OF MASS OF ABDOMEN Left 6/18/2018    Procedure: EXCISION, MASS, ABDOMEN - flank left;  Surgeon: Armando Tate MD;  Location: [facility] OR;  Service: General;  Laterality: Left;  left flank removal of mass    FINE NEEDLE ASPIRATION      thyroid    HYSTERECTOMY  07/21/2020    INJECTION OF ANESTHETIC AGENT AROUND MEDIAL BRANCH NERVES INNERVATING LUMBAR FACET JOINT Right 2/24/2022    Procedure: Right L3, 4, 5 MBB;  Surgeon: Anam Montero MD;  Location: [facility] PAIN MGT;  Service: Pain Management;  Laterality: Right;    INJECTION OF ANESTHETIC AGENT AROUND MEDIAL BRANCH NERVES INNERVATING LUMBAR FACET JOINT Right 3/29/2022    Procedure: Right L3, 4, 5 MBB  (2nd block);  Surgeon: Anam Montero MD;  Location: [facility] PAIN MGT;  Service: Pain Management;  Laterality: Right;    KNEE ARTHROSCOPY W/ MENISCECTOMY Right     NASAL SEPTUM SURGERY      RADIOFREQUENCY THERMOCOAGULATION Right 4/5/2022    Procedure: Right L3-5 Lumbar RFA;  Surgeon: Anam Montero MD;  Location: [facility] PAIN MGT;  Service: Pain Management;  Laterality: Right;    ROBOT-ASSISTED NISSEN FUNDOPLICATION USING DA TAMAR XI N/A 2/28/2020    Procedure: XI ROBOTIC FUNDOPLICATION, NISSEN;  Surgeon: iJna Kaufman MD;  Location: [facility] OR;  Service: General;  Laterality: N/A;    THYROIDECTOMY Bilateral 3/11/2021    Procedure: THYROIDECTOMY;  Surgeon: Nixon Moe MD;  Location: [facility] OR;  Service: ENT;  Laterality: Bilateral;    TUBAL LIGATION      UPPER GASTROINTESTINAL ENDOSCOPY  07/05/2018    Dr. Gamble     Family History   Problem Relation Age of Onset     Diabetes Mother     Kidney failure Mother     Heart attack Mother     Breast cancer Maternal Grandmother     Breast cancer Maternal Aunt     Ovarian cancer Cousin     Breast cancer Cousin     Blindness Father     Cirrhosis Neg Hx     Colon polyps Neg Hx     Colon cancer Neg Hx     Crohn's disease Neg Hx     Esophageal cancer Neg Hx     Stomach cancer Neg Hx     Ulcerative colitis Neg Hx     Amblyopia Neg Hx     Cataracts Neg Hx     Glaucoma Neg Hx     Macular degeneration Neg Hx     Retinal detachment Neg Hx     Strabismus Neg Hx     Allergic rhinitis Neg Hx     Allergies Neg Hx     Angioedema Neg Hx     Asthma Neg Hx     Atopy Neg Hx     Eczema Neg Hx     Immunodeficiency Neg Hx     Rhinitis Neg Hx     Urticaria Neg Hx      Social History     Socioeconomic History    Marital status:     Number of children: 2   Occupational History     Employer: [employer]   Tobacco Use    Smoking status: Never Smoker    Smokeless tobacco: Never Used   Substance and Sexual Activity    Alcohol use: No    Drug use: No    Sexual activity: Yes     Partners: Male     Social Determinants of Health     Financial Resource Strain: Low Risk     Difficulty of Paying Living Expenses: Not hard at all   Food Insecurity: No Food Insecurity    Worried About Running Out of Food in the Last Year: Never true    Ran Out of Food in the Last Year: Never true   Transportation Needs: No Transportation Needs    Lack of Transportation (Medical): No    Lack of Transportation (Non-Medical): No   Physical Activity: Insufficiently Active    Days of Exercise per Week: 2 days    Minutes of Exercise per Session: 50 min   Stress: No Stress Concern Present    Feeling of Stress : Not at all   Social Connections: Unknown    Frequency of Communication with Friends and Family: More than three times a week    Frequency of Social Gatherings with Friends and Family: Once a week    Active Member of Clubs or Organizations:  Yes    Attends Club or Organization Meetings: More than 4 times per year    Marital Status:    Housing Stability: Low Risk     Unable to Pay for Housing in the Last Year: No    Number of Places Lived in the Last Year: 1    Unstable Housing in the Last Year: No       Review of patient's allergies indicates:   Allergen Reactions    Biaxin [clarithromycin] Swelling    Omeprazole magnesium Swelling and Other (See Comments)    Prevacid [lansoprazole] Swelling     Lip swelling- was taking this along with blood pressure medication: benazepril; not sure which caused it. Reports she has taken OTC prilosec without any problems.    Ace inhibitors Swelling     Facial swelling    Benazepril Swelling     Lip swelling       Current Outpatient Medications:     calcium carbonate (OS-CARLA) 600 mg calcium (1,500 mg) Tab, Take 600 mg by mouth 2 (two) times daily with meals., Disp: , Rfl:     carvediloL (COREG) 12.5 MG tablet, Take 1 tablet (12.5 mg total) by mouth 2 (two) times daily., Disp: 180 tablet, Rfl: 1    cetirizine (ZYRTEC) 10 MG tablet, Take 1 tablet (10 mg total) by mouth once daily. (Patient taking differently: Take 10 mg by mouth once daily.), Disp: , Rfl: 0    docusate sodium (COLACE) 100 MG capsule, Take 100 mg by mouth every other day. , Disp: , Rfl:     hydrOXYchloroQUINE (PLAQUENIL) 200 mg tablet, Take 1 tablet (200 mg total) by mouth 2 (two) times daily., Disp: 180 tablet, Rfl: 3    levothyroxine (SYNTHROID) 150 MCG tablet, TAKE 1 TABLET BEFORE BREAKFAST, Disp: 90 tablet, Rfl: 3    montelukast (SINGULAIR) 10 mg tablet, Take 1 tablet (10 mg total) by mouth nightly., Disp: 90 tablet, Rfl: 3    multivitamin (THERAGRAN) per tablet, Take 1 tablet by mouth once daily. Every day, Disp: , Rfl:     potassium chloride SA (K-DUR,KLOR-CON) 20 MEQ tablet, Take 1 tablet (20 mEq total) by mouth 2 (two) times daily., Disp: 180 tablet, Rfl: 3    olmesartan-hydrochlorothiazide (BENICAR HCT) 20-12.5 mg per  "tablet, Take 1 tablet by mouth once daily., Disp: 90 tablet, Rfl: 1    Review of Systems   Constitutional: Negative for chills, diaphoresis, fever, malaise/fatigue and night sweats.   HENT: Negative for congestion and nosebleeds.    Eyes: Negative.    Cardiovascular: Negative for chest pain, claudication, cyanosis, dyspnea on exertion, irregular heartbeat, leg swelling (OCC), near-syncope, orthopnea, palpitations, paroxysmal nocturnal dyspnea and syncope.   Respiratory: Negative for cough, hemoptysis, shortness of breath and wheezing.    Endocrine: Negative.    Hematologic/Lymphatic: Negative for adenopathy (LYMPHEDEDEMA RUE). Does not bruise/bleed easily.   Skin: Negative for color change and rash.   Musculoskeletal: Negative for back pain (HAD RFA) and falls.   Gastrointestinal: Positive for heartburn. Negative for abdominal pain, change in bowel habit, melena, nausea and vomiting.   Genitourinary: Negative for dysuria and flank pain.   Neurological: Negative for brief paralysis, focal weakness, headaches, light-headedness, loss of balance, numbness and weakness.   Psychiatric/Behavioral: Negative for altered mental status, depression and memory loss.   Allergic/Immunologic: Negative.         Objective:      Vitals:    07/18/22 0832 07/18/22 0844   BP: (!) 153/95 138/86   Pulse: 74    Weight: 116 kg (255 lb 11.7 oz)    Height: 5' 3" (1.6 m)    PainSc: 0-No pain      Body mass index is 45.3 kg/m².    Physical Exam  Constitutional:       Appearance: She is obese.   HENT:      Head: Normocephalic and atraumatic.   Eyes:      Extraocular Movements: Extraocular movements intact.      Pupils: Pupils are equal, round, and reactive to light.   Cardiovascular:      Rate and Rhythm: Normal rate and regular rhythm.  No extrasystoles are present.     Pulses: Normal pulses.           Carotid pulses are 2+ on the right side and 2+ on the left side.       Radial pulses are 2+ on the right side and 2+ on the left side.        " Posterior tibial pulses are 2+ on the right side and 2+ on the left side.      Heart sounds:     No friction rub. No gallop. No S4 sounds.   Pulmonary:      Effort: Pulmonary effort is normal.      Breath sounds: Normal breath sounds. No rales.   Abdominal:      Palpations: Abdomen is soft.      Tenderness: There is no abdominal tenderness.   Musculoskeletal:      Cervical back: Neck supple.      Right lower leg: No edema.      Left lower leg: No edema.      Comments: RUE LYMPHEDEMA   Skin:     General: Skin is warm and dry.      Capillary Refill: Capillary refill takes less than 2 seconds.   Neurological:      General: No focal deficit present.      Mental Status: She is alert and oriented to person, place, and time.      Cranial Nerves: No cranial nerve deficit.   Psychiatric:         Mood and Affect: Mood normal.         Behavior: Behavior normal.                 ..    Chemistry        Component Value Date/Time     01/26/2022 0741    K 3.5 01/26/2022 0741     01/26/2022 0741    CO2 30 (H) 01/26/2022 0741    BUN 18 01/26/2022 0741    CREATININE 1.1 01/26/2022 0741    GLU 85 01/26/2022 0741        Component Value Date/Time    CALCIUM 9.5 01/26/2022 0741    ALKPHOS 154 (H) 10/14/2021 0810    AST 32 10/14/2021 0810    ALT 50 (H) 10/14/2021 0810    BILITOT 0.5 10/14/2021 0810    ESTGFRAFRICA >60.0 01/26/2022 0741    EGFRNONAA 59.5 (A) 01/26/2022 0741            ..  Lab Results   Component Value Date    CHOL 181 07/09/2021    CHOL 153 09/03/2019    CHOL 153 08/30/2018     Lab Results   Component Value Date    HDL 77 (H) 07/09/2021    HDL 55 09/03/2019    HDL 49 08/30/2018     Lab Results   Component Value Date    LDLCALC 90.4 07/09/2021    LDLCALC 82.0 09/03/2019    LDLCALC 93.4 08/30/2018     Lab Results   Component Value Date    TRIG 68 07/09/2021    TRIG 80 09/03/2019    TRIG 53 08/30/2018     Lab Results   Component Value Date    CHOLHDL 42.5 07/09/2021    CHOLHDL 35.9 09/03/2019    CHOLHDL 32.0  08/30/2018     ..  Lab Results   Component Value Date    WBC 5.81 10/14/2021    HGB 12.2 10/14/2021    HCT 39.9 10/14/2021    MCV 85 10/14/2021     10/14/2021       Test(s) Reviewed  I have reviewed the following in detail:  [] Stress test   [] Angiography   [] Echocardiogram   [x] Labs   [] Other:       Assessment:         ICD-10-CM ICD-9-CM   1. Essential hypertension  I10 401.9   2. Ascending aorta dilation  I77.810 447.71   3. KEIRY on CPAP  G47.33 327.23    Z99.89 V46.8   4. 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 Sarcoidosis  D86.9 135     Problem List Items Addressed This Visit        Cardiac/Vascular    Essential hypertension - Primary    Relevant Orders    Basic Metabolic Panel    Ascending aorta dilation       Immunology/Multi System    Sarcoidosis       Endocrine    Class 3 drug-induced obesity with body mass index (BMI) of 45.0 to 49.9 in adult       Other    KEIRY on CPAP           Plan:         CHANGE MAXIDE TO BENICAR /HCTZ 20/12.5 , 1/2 FOR 2 WEEKS THEN FULL PILL FOR BETTER BLOOD PRESSURE CONTROL, WATCH BLOOD PRESSURE PILL, BMP IN 1 MO. ALL OTHER CV CLINICALLY STABLE, NO ANGINA, NO HF, NO TIA, NO CLINICAL ARRHYTHMIA,CONTINUE CURRENT MEDS, EDUCATION, DIET, EXERCISE, NIGHTLY CPAP, NEEDS SIGNIFICANT WEIGHT LOSS RETURN TO CLINIC IN 6 MO  Essential hypertension  -     Basic Metabolic Panel; Future; Expected date: 08/18/2022    Ascending aorta dilation    KEIRY on CPAP    Class 3 drug-induced obesity with serious comorbidity and body mass index (BMI) of 45.0 to 49.9 in adult    Sarcoidosis    Other orders  -     olmesartan-hydrochlorothiazide (BENICAR HCT) 20-12.5 mg per tablet; Take 1 tablet by mouth once daily.  Dispense: 90 tablet; Refill: 1    RTC Low level/low impact aerobic exercise 5x's/wk. Heart healthy diet and risk factor modification.    See labs and med orders.    Aerobic exercise 5x's/wk.  Heart healthy diet and risk factor modification.    See labs and med orders.

## 2022-07-18 ENCOUNTER — HOSPITAL ENCOUNTER (OUTPATIENT)
Dept: RADIOLOGY | Facility: HOSPITAL | Age: 47
Discharge: HOME | End: 2022-07-18
Attending: INTERNAL MEDICINE
Payer: COMMERCIAL

## 2022-07-18 ENCOUNTER — OFFICE VISIT (OUTPATIENT)
Dept: CARDIOLOGY | Facility: CLINIC | Age: 47
End: 2022-07-18
Payer: COMMERCIAL

## 2022-07-18 VITALS
SYSTOLIC BLOOD PRESSURE: 138 MMHG | BODY MASS INDEX: 45.32 KG/M2 | HEART RATE: 74 BPM | HEIGHT: 63 IN | DIASTOLIC BLOOD PRESSURE: 86 MMHG | WEIGHT: 255.75 LBS

## 2022-07-18 DIAGNOSIS — J84.9 INTERSTITIAL PULMONARY DISEASE, UNSPECIFIED: ICD-10-CM

## 2022-07-18 DIAGNOSIS — I10 ESSENTIAL HYPERTENSION: Primary | Chronic | ICD-10-CM

## 2022-07-18 DIAGNOSIS — D86.9 SARCOIDOSIS: Chronic | ICD-10-CM

## 2022-07-18 DIAGNOSIS — E66.1 CLASS 3 DRUG-INDUCED OBESITY WITH SERIOUS COMORBIDITY AND BODY MASS INDEX (BMI) OF 45.0 TO 49.9 IN ADULT: Chronic | ICD-10-CM

## 2022-07-18 DIAGNOSIS — G47.33 OSA ON CPAP: Chronic | ICD-10-CM

## 2022-07-18 DIAGNOSIS — I77.810 ASCENDING AORTA DILATION: Chronic | ICD-10-CM

## 2022-07-18 DIAGNOSIS — D86.0 SARCOIDOSIS OF LUNG: ICD-10-CM

## 2022-07-18 PROCEDURE — 71250 CT THORAX DX C-: CPT | Mod: TC,PO

## 2022-07-18 PROCEDURE — 99999 PR PBB SHADOW E&M-EST. PATIENT-LVL III: ICD-10-PCS | Mod: PBBFAC,,, | Performed by: INTERNAL MEDICINE

## 2022-07-18 PROCEDURE — 99999 PR PBB SHADOW E&M-EST. PATIENT-LVL III: CPT | Mod: PBBFAC,,, | Performed by: INTERNAL MEDICINE

## 2022-07-18 PROCEDURE — 3079F PR MOST RECENT DIASTOLIC BLOOD PRESSURE 80-89 MM HG: ICD-10-PCS | Mod: CPTII,S$GLB,, | Performed by: INTERNAL MEDICINE

## 2022-07-18 PROCEDURE — 1159F MED LIST DOCD IN RCRD: CPT | Mod: CPTII,S$GLB,, | Performed by: INTERNAL MEDICINE

## 2022-07-18 PROCEDURE — 1159F PR MEDICATION LIST DOCUMENTED IN MEDICAL RECORD: ICD-10-PCS | Mod: CPTII,S$GLB,, | Performed by: INTERNAL MEDICINE

## 2022-07-18 PROCEDURE — 99214 PR OFFICE/OUTPT VISIT, EST, LEVL IV, 30-39 MIN: ICD-10-PCS | Mod: S$GLB,,, | Performed by: INTERNAL MEDICINE

## 2022-07-18 PROCEDURE — 3075F SYST BP GE 130 - 139MM HG: CPT | Mod: CPTII,S$GLB,, | Performed by: INTERNAL MEDICINE

## 2022-07-18 PROCEDURE — 3079F DIAST BP 80-89 MM HG: CPT | Mod: CPTII,S$GLB,, | Performed by: INTERNAL MEDICINE

## 2022-07-18 PROCEDURE — 71250 CT THORAX DX C-: CPT | Mod: 26,,, | Performed by: RADIOLOGY

## 2022-07-18 PROCEDURE — 3008F BODY MASS INDEX DOCD: CPT | Mod: CPTII,S$GLB,, | Performed by: INTERNAL MEDICINE

## 2022-07-18 PROCEDURE — 71250 CT CHEST WITHOUT CONTRAST: ICD-10-PCS | Mod: 26,,, | Performed by: RADIOLOGY

## 2022-07-18 PROCEDURE — 99214 OFFICE O/P EST MOD 30 MIN: CPT | Mod: S$GLB,,, | Performed by: INTERNAL MEDICINE

## 2022-07-18 PROCEDURE — 3008F PR BODY MASS INDEX (BMI) DOCUMENTED: ICD-10-PCS | Mod: CPTII,S$GLB,, | Performed by: INTERNAL MEDICINE

## 2022-07-18 PROCEDURE — 3075F PR MOST RECENT SYSTOLIC BLOOD PRESS GE 130-139MM HG: ICD-10-PCS | Mod: CPTII,S$GLB,, | Performed by: INTERNAL MEDICINE

## 2022-07-18 RX ORDER — OLMESARTAN MEDOXOMIL AND HYDROCHLOROTHIAZIDE 20/12.5 20; 12.5 MG/1; MG/1
1 TABLET ORAL DAILY
Qty: 90 TABLET | Refills: 1 | Status: SHIPPED | OUTPATIENT
Start: 2022-07-18 | End: 2023-03-13

## 2022-07-21 ENCOUNTER — PATIENT MESSAGE (OUTPATIENT)
Dept: GASTROENTEROLOGY | Facility: CLINIC | Age: 47
End: 2022-07-21

## 2022-07-21 ENCOUNTER — OFFICE VISIT (OUTPATIENT)
Dept: GASTROENTEROLOGY | Facility: CLINIC | Age: 47
End: 2022-07-21
Payer: COMMERCIAL

## 2022-07-21 VITALS
DIASTOLIC BLOOD PRESSURE: 90 MMHG | SYSTOLIC BLOOD PRESSURE: 138 MMHG | BODY MASS INDEX: 45.58 KG/M2 | OXYGEN SATURATION: 98 % | HEART RATE: 64 BPM | WEIGHT: 257.25 LBS | HEIGHT: 63 IN

## 2022-07-21 DIAGNOSIS — A04.8 H. PYLORI INFECTION: Primary | ICD-10-CM

## 2022-07-21 DIAGNOSIS — K62.89 RECTAL PAIN: ICD-10-CM

## 2022-07-21 PROCEDURE — 3008F BODY MASS INDEX DOCD: CPT | Mod: CPTII,S$GLB,, | Performed by: PHYSICIAN ASSISTANT

## 2022-07-21 PROCEDURE — 99999 PR PBB SHADOW E&M-EST. PATIENT-LVL IV: ICD-10-PCS | Mod: PBBFAC,,, | Performed by: PHYSICIAN ASSISTANT

## 2022-07-21 PROCEDURE — 99214 OFFICE O/P EST MOD 30 MIN: CPT | Mod: S$GLB,,, | Performed by: PHYSICIAN ASSISTANT

## 2022-07-21 PROCEDURE — 99214 PR OFFICE/OUTPT VISIT, EST, LEVL IV, 30-39 MIN: ICD-10-PCS | Mod: S$GLB,,, | Performed by: PHYSICIAN ASSISTANT

## 2022-07-21 PROCEDURE — 1159F MED LIST DOCD IN RCRD: CPT | Mod: CPTII,S$GLB,, | Performed by: PHYSICIAN ASSISTANT

## 2022-07-21 PROCEDURE — 3080F DIAST BP >= 90 MM HG: CPT | Mod: CPTII,S$GLB,, | Performed by: PHYSICIAN ASSISTANT

## 2022-07-21 PROCEDURE — 3080F PR MOST RECENT DIASTOLIC BLOOD PRESSURE >= 90 MM HG: ICD-10-PCS | Mod: CPTII,S$GLB,, | Performed by: PHYSICIAN ASSISTANT

## 2022-07-21 PROCEDURE — 1159F PR MEDICATION LIST DOCUMENTED IN MEDICAL RECORD: ICD-10-PCS | Mod: CPTII,S$GLB,, | Performed by: PHYSICIAN ASSISTANT

## 2022-07-21 PROCEDURE — 3075F SYST BP GE 130 - 139MM HG: CPT | Mod: CPTII,S$GLB,, | Performed by: PHYSICIAN ASSISTANT

## 2022-07-21 PROCEDURE — 3008F PR BODY MASS INDEX (BMI) DOCUMENTED: ICD-10-PCS | Mod: CPTII,S$GLB,, | Performed by: PHYSICIAN ASSISTANT

## 2022-07-21 PROCEDURE — 3075F PR MOST RECENT SYSTOLIC BLOOD PRESS GE 130-139MM HG: ICD-10-PCS | Mod: CPTII,S$GLB,, | Performed by: PHYSICIAN ASSISTANT

## 2022-07-21 PROCEDURE — 99999 PR PBB SHADOW E&M-EST. PATIENT-LVL IV: CPT | Mod: PBBFAC,,, | Performed by: PHYSICIAN ASSISTANT

## 2022-07-21 RX ORDER — BISMUTH SUBCITRATE POTASSIUM, METRONIDAZOLE, TETRACYCLINE HYDROCHLORIDE 140; 125; 125 MG/1; MG/1; MG/1
3 CAPSULE ORAL
Qty: 168 CAPSULE | Refills: 0 | Status: SHIPPED | OUTPATIENT
Start: 2022-07-21 | End: 2022-08-04

## 2022-07-21 RX ORDER — FAMOTIDINE 40 MG/1
40 TABLET, FILM COATED ORAL 2 TIMES DAILY
Qty: 28 TABLET | Refills: 0 | Status: ON HOLD | OUTPATIENT
Start: 2022-07-21 | End: 2022-08-24 | Stop reason: ALTCHOICE

## 2022-07-21 NOTE — PROGRESS NOTES
Subjective:      Patient ID: Akiko Jameson is a 47 y.o. female.    Chief Complaint: discuss H Pylori treatment    HPI:  Patient with recurrent history of H. pylori infection. Last treated in 2020 but did not complete stool to check for eradication. She was treated with Pylera and Pepcid 40mg BID, as there is question regarding PPI allergy. She continues with abdominal pain with eating. She as history of Nissen with hiatal hernia repair, but patient states recent CT shows return of hiatal hernia. She is now reporting some acid reflux symptoms which had previously resolved after her Nissen. She is also belching often.    Has flex sig scheduled due to ongoing deep rectal discomfort. Denies rectal bleeding or hematochezia.      Review of Systems   Constitutional: Negative for activity change, appetite change, chills, diaphoresis, fatigue, fever and unexpected weight change.   HENT: Negative for trouble swallowing.    Respiratory: Negative for cough and shortness of breath.    Cardiovascular: Negative for chest pain.   Gastrointestinal: Positive for rectal pain and vomiting. Negative for abdominal distention, anal bleeding, blood in stool, constipation, diarrhea and nausea.        Acid reflux symptoms   Genitourinary: Negative for dysuria and hematuria.   Musculoskeletal: Negative for back pain.   Neurological: Negative for dizziness, weakness and light-headedness.   Psychiatric/Behavioral: Negative for dysphoric mood. The patient is not nervous/anxious.        Medical History: Reviewed    Social History: Reviewed    Allergies: Reviewed    Objective:     Physical Exam  Constitutional:       General: She is not in acute distress.     Appearance: Normal appearance. She is not ill-appearing, toxic-appearing or diaphoretic.   HENT:      Head: Normocephalic and atraumatic.   Eyes:      General: No scleral icterus.     Extraocular Movements: Extraocular movements intact.   Cardiovascular:      Rate and Rhythm: Normal  rate and regular rhythm.   Pulmonary:      Effort: Pulmonary effort is normal. No respiratory distress.      Breath sounds: Normal breath sounds.   Abdominal:      General: Bowel sounds are normal. There is no distension.      Palpations: Abdomen is soft.      Tenderness: There is no abdominal tenderness. There is no guarding.   Musculoskeletal:         General: Normal range of motion.      Cervical back: Normal range of motion.   Skin:     General: Skin is warm and dry.      Coloration: Skin is not jaundiced or pale.   Neurological:      Mental Status: She is alert and oriented to person, place, and time.         Assessment:     1. H. pylori infection    2. Rectal pain        Plan:     -Positive stool H. Pylori. Discussed treatment options in depth. Option 1 would be to retreat with Pepcid 40mg BID, as I am unsure if her infection was gone and returned or if it never completely resolved after previous treatment. Option 2 would be to try quad therapy with lower dose PPI. She has had Prilosec OTC in the past with no problems. However, this was prior to her angioedema episode. I explained that I believe her angioedema was likely related to her -pril medication and not her PPI therapy. After discussion, patient would like to try repeating therapy with Pepcid 40mg BID and Pylera.  -Stool kit given to check for eradication 3 weeks after completing therapy.   -Keep appointment for luanne yen.    Akiko was seen today for discuss h pylori treatment.    Diagnoses and all orders for this visit:    H. pylori infection  -     bismuth-metronidazole-tetracycline (PYLERA) 140-125-125 mg per capsule; Take 3 capsules by mouth 4 (four) times daily before meals and nightly. for 14 days  -     famotidine (PEPCID) 40 MG tablet; Take 1 tablet (40 mg total) by mouth 2 (two) times daily. for 14 days  -     H. pylori antigen, stool; Future    Rectal pain        No follow-ups on file.    Thank you for the opportunity to participate in the  care of this patient.   Elyssa Gutierres PA-C.

## 2022-07-27 ENCOUNTER — LAB VISIT (OUTPATIENT)
Dept: LAB | Facility: HOSPITAL | Age: 47
End: 2022-07-27
Attending: FAMILY MEDICINE
Payer: COMMERCIAL

## 2022-07-27 ENCOUNTER — OFFICE VISIT (OUTPATIENT)
Dept: FAMILY MEDICINE | Facility: CLINIC | Age: 47
End: 2022-07-27
Payer: COMMERCIAL

## 2022-07-27 VITALS
BODY MASS INDEX: 45.5 KG/M2 | SYSTOLIC BLOOD PRESSURE: 142 MMHG | HEIGHT: 63 IN | HEART RATE: 80 BPM | DIASTOLIC BLOOD PRESSURE: 80 MMHG | TEMPERATURE: 98 F | WEIGHT: 256.81 LBS

## 2022-07-27 DIAGNOSIS — D86.9 SARCOIDOSIS: ICD-10-CM

## 2022-07-27 DIAGNOSIS — I89.0 LYMPHEDEMA OF UPPER EXTREMITY FOLLOWING LYMPHADENECTOMY: ICD-10-CM

## 2022-07-27 DIAGNOSIS — E89.0 POST-OPERATIVE HYPOTHYROIDISM: ICD-10-CM

## 2022-07-27 DIAGNOSIS — G47.33 OSA ON CPAP: ICD-10-CM

## 2022-07-27 DIAGNOSIS — I10 ESSENTIAL HYPERTENSION: ICD-10-CM

## 2022-07-27 DIAGNOSIS — Z00.00 ROUTINE HISTORY AND PHYSICAL EXAMINATION OF ADULT: ICD-10-CM

## 2022-07-27 DIAGNOSIS — E66.01 MORBID OBESITY: ICD-10-CM

## 2022-07-27 DIAGNOSIS — Z00.00 ROUTINE HISTORY AND PHYSICAL EXAMINATION OF ADULT: Primary | ICD-10-CM

## 2022-07-27 DIAGNOSIS — R76.8 POSITIVE ANA (ANTINUCLEAR ANTIBODY): ICD-10-CM

## 2022-07-27 DIAGNOSIS — R73.03 PREDIABETES: ICD-10-CM

## 2022-07-27 DIAGNOSIS — E87.6 HYPOKALEMIA: ICD-10-CM

## 2022-07-27 DIAGNOSIS — D72.19 OTHER EOSINOPHILIA: ICD-10-CM

## 2022-07-27 DIAGNOSIS — E89.89 LYMPHEDEMA OF UPPER EXTREMITY FOLLOWING LYMPHADENECTOMY: ICD-10-CM

## 2022-07-27 DIAGNOSIS — D63.8 CHRONIC DISEASE ANEMIA: ICD-10-CM

## 2022-07-27 LAB
ALBUMIN SERPL BCP-MCNC: 3.7 G/DL (ref 3.5–5.2)
ALP SERPL-CCNC: 89 U/L (ref 55–135)
ALT SERPL W/O P-5'-P-CCNC: 14 U/L (ref 10–44)
ANION GAP SERPL CALC-SCNC: 10 MMOL/L (ref 8–16)
AST SERPL-CCNC: 22 U/L (ref 10–40)
BASOPHILS # BLD AUTO: 0.04 K/UL (ref 0–0.2)
BASOPHILS NFR BLD: 1 % (ref 0–1.9)
BILIRUB SERPL-MCNC: 0.4 MG/DL (ref 0.1–1)
BUN SERPL-MCNC: 16 MG/DL (ref 6–20)
CALCIUM SERPL-MCNC: 8 MG/DL (ref 8.7–10.5)
CHLORIDE SERPL-SCNC: 101 MMOL/L (ref 95–110)
CHOLEST SERPL-MCNC: 178 MG/DL (ref 120–199)
CHOLEST/HDLC SERPL: 3 {RATIO} (ref 2–5)
CO2 SERPL-SCNC: 31 MMOL/L (ref 23–29)
CREAT SERPL-MCNC: 1.5 MG/DL (ref 0.5–1.4)
DIFFERENTIAL METHOD: ABNORMAL
EOSINOPHIL # BLD AUTO: 0.3 K/UL (ref 0–0.5)
EOSINOPHIL NFR BLD: 7 % (ref 0–8)
ERYTHROCYTE [DISTWIDTH] IN BLOOD BY AUTOMATED COUNT: 15.6 % (ref 11.5–14.5)
EST. GFR  (AFRICAN AMERICAN): 46.8 ML/MIN/1.73 M^2
EST. GFR  (NON AFRICAN AMERICAN): 40.6 ML/MIN/1.73 M^2
ESTIMATED AVG GLUCOSE: 117 MG/DL (ref 68–131)
GLUCOSE SERPL-MCNC: 89 MG/DL (ref 70–110)
HBA1C MFR BLD: 5.7 % (ref 4–5.6)
HCT VFR BLD AUTO: 33.9 % (ref 37–48.5)
HDLC SERPL-MCNC: 59 MG/DL (ref 40–75)
HDLC SERPL: 33.1 % (ref 20–50)
HGB BLD-MCNC: 10.7 G/DL (ref 12–16)
IMM GRANULOCYTES # BLD AUTO: 0.01 K/UL (ref 0–0.04)
IMM GRANULOCYTES NFR BLD AUTO: 0.2 % (ref 0–0.5)
LDLC SERPL CALC-MCNC: 106.6 MG/DL (ref 63–159)
LYMPHOCYTES # BLD AUTO: 1.5 K/UL (ref 1–4.8)
LYMPHOCYTES NFR BLD: 37.1 % (ref 18–48)
MCH RBC QN AUTO: 25.4 PG (ref 27–31)
MCHC RBC AUTO-ENTMCNC: 31.6 G/DL (ref 32–36)
MCV RBC AUTO: 81 FL (ref 82–98)
MONOCYTES # BLD AUTO: 0.5 K/UL (ref 0.3–1)
MONOCYTES NFR BLD: 11.2 % (ref 4–15)
NEUTROPHILS # BLD AUTO: 1.8 K/UL (ref 1.8–7.7)
NEUTROPHILS NFR BLD: 43.5 % (ref 38–73)
NONHDLC SERPL-MCNC: 119 MG/DL
NRBC BLD-RTO: 0 /100 WBC
PLATELET # BLD AUTO: 249 K/UL (ref 150–450)
PMV BLD AUTO: 11.4 FL (ref 9.2–12.9)
POTASSIUM SERPL-SCNC: 3.6 MMOL/L (ref 3.5–5.1)
PROT SERPL-MCNC: 7.1 G/DL (ref 6–8.4)
RBC # BLD AUTO: 4.21 M/UL (ref 4–5.4)
SODIUM SERPL-SCNC: 142 MMOL/L (ref 136–145)
T4 FREE SERPL-MCNC: 0.78 NG/DL (ref 0.71–1.51)
TRIGL SERPL-MCNC: 62 MG/DL (ref 30–150)
TSH SERPL DL<=0.005 MIU/L-ACNC: 18.75 UIU/ML (ref 0.4–4)
WBC # BLD AUTO: 4.12 K/UL (ref 3.9–12.7)

## 2022-07-27 PROCEDURE — 3077F PR MOST RECENT SYSTOLIC BLOOD PRESSURE >= 140 MM HG: ICD-10-PCS | Mod: CPTII,S$GLB,, | Performed by: FAMILY MEDICINE

## 2022-07-27 PROCEDURE — 99999 PR PBB SHADOW E&M-EST. PATIENT-LVL IV: CPT | Mod: PBBFAC,,, | Performed by: FAMILY MEDICINE

## 2022-07-27 PROCEDURE — 80053 COMPREHEN METABOLIC PANEL: CPT | Performed by: NURSE PRACTITIONER

## 2022-07-27 PROCEDURE — 3008F BODY MASS INDEX DOCD: CPT | Mod: CPTII,S$GLB,, | Performed by: FAMILY MEDICINE

## 2022-07-27 PROCEDURE — 36415 COLL VENOUS BLD VENIPUNCTURE: CPT | Mod: PO | Performed by: NURSE PRACTITIONER

## 2022-07-27 PROCEDURE — 84439 ASSAY OF FREE THYROXINE: CPT | Performed by: FAMILY MEDICINE

## 2022-07-27 PROCEDURE — 85025 COMPLETE CBC W/AUTO DIFF WBC: CPT | Performed by: NURSE PRACTITIONER

## 2022-07-27 PROCEDURE — 3079F PR MOST RECENT DIASTOLIC BLOOD PRESSURE 80-89 MM HG: ICD-10-PCS | Mod: CPTII,S$GLB,, | Performed by: FAMILY MEDICINE

## 2022-07-27 PROCEDURE — 3008F PR BODY MASS INDEX (BMI) DOCUMENTED: ICD-10-PCS | Mod: CPTII,S$GLB,, | Performed by: FAMILY MEDICINE

## 2022-07-27 PROCEDURE — 99396 PREV VISIT EST AGE 40-64: CPT | Mod: S$GLB,,, | Performed by: FAMILY MEDICINE

## 2022-07-27 PROCEDURE — 83036 HEMOGLOBIN GLYCOSYLATED A1C: CPT | Performed by: FAMILY MEDICINE

## 2022-07-27 PROCEDURE — 99999 PR PBB SHADOW E&M-EST. PATIENT-LVL IV: ICD-10-PCS | Mod: PBBFAC,,, | Performed by: FAMILY MEDICINE

## 2022-07-27 PROCEDURE — 1159F PR MEDICATION LIST DOCUMENTED IN MEDICAL RECORD: ICD-10-PCS | Mod: CPTII,S$GLB,, | Performed by: FAMILY MEDICINE

## 2022-07-27 PROCEDURE — 80061 LIPID PANEL: CPT | Performed by: FAMILY MEDICINE

## 2022-07-27 PROCEDURE — 3079F DIAST BP 80-89 MM HG: CPT | Mod: CPTII,S$GLB,, | Performed by: FAMILY MEDICINE

## 2022-07-27 PROCEDURE — 3077F SYST BP >= 140 MM HG: CPT | Mod: CPTII,S$GLB,, | Performed by: FAMILY MEDICINE

## 2022-07-27 PROCEDURE — 84443 ASSAY THYROID STIM HORMONE: CPT | Performed by: FAMILY MEDICINE

## 2022-07-27 PROCEDURE — 1159F MED LIST DOCD IN RCRD: CPT | Mod: CPTII,S$GLB,, | Performed by: FAMILY MEDICINE

## 2022-07-27 PROCEDURE — 99396 PR PREVENTIVE VISIT,EST,40-64: ICD-10-PCS | Mod: S$GLB,,, | Performed by: FAMILY MEDICINE

## 2022-07-27 RX ORDER — POTASSIUM CHLORIDE 20 MEQ/1
20 TABLET, EXTENDED RELEASE ORAL 2 TIMES DAILY
Qty: 180 TABLET | Refills: 3 | Status: SHIPPED | OUTPATIENT
Start: 2022-07-27 | End: 2023-08-10 | Stop reason: SDUPTHER

## 2022-07-27 RX ORDER — MONTELUKAST SODIUM 10 MG/1
10 TABLET ORAL NIGHTLY
Qty: 90 TABLET | Refills: 3 | Status: SHIPPED | OUTPATIENT
Start: 2022-07-27 | End: 2023-08-10 | Stop reason: SDUPTHER

## 2022-07-27 NOTE — PROGRESS NOTES
Presents physical exam.  Seeing multiple specialist to include Pulmonary, Rheumatology, Cardiology due to multiple medical issues.  Blood pressure above goal today, she is increasing her Benicar HCT starting tomorrow at direction of her cardiologist.  Morbid obesity reviewed.  Prediabetes need follow-up laboratory.  Sarcoidosis stable.  Sleep apnea uses CPAP.  Hypothyroidism last TSH normal.  Previous positive CHARLENE.  Mammogram up-to-date.  Colon screening up-to-date.    Akiko was seen today for annual exam and medication refill.    Diagnoses and all orders for this visit:    Routine history and physical examination of adult  -     Lipid Panel; Future  -     TSH; Future  -     Hemoglobin A1C; Future    Prediabetes  -     Hemoglobin A1C; Future    Morbid obesity    Sarcoidosis    Essential hypertension  -     Lipid Panel; Future    KEIRY on CPAP    Post-operative hypothyroidism  -     TSH; Future    Positive CHARLENE (antinuclear antibody)    Hypokalemia  -     potassium chloride SA (K-DUR,KLOR-CON) 20 MEQ tablet; Take 1 tablet (20 mEq total) by mouth 2 (two) times daily.    Other orders  -     montelukast (SINGULAIR) 10 mg tablet; Take 1 tablet (10 mg total) by mouth nightly.  -     Pneumococcal Conjugate Vaccine (20 Valent) (IM); Future  -     Pneumococcal Conjugate Vaccine (20 Valent) (IM)    Continue current medications.  Laboratory today.  In addition she has CBC, CMP pending from other specialists.  She is increasing her Benicar HC T. Will have her recheck blood pressure in 1 month when she returns for her Prevnar 20 vaccine    Anticipatory guidance: Don't smoke.  Healthy diet and regular exercise recommended.          Past Medical History:  Past Medical History:   Diagnosis Date    ALLERGIC RHINITIS     Arthritis     Cellulitis     Constipation due to opioid therapy     Eosinophilia     mild    GERD (gastroesophageal reflux disease)     Granular cell tumor     H. pylori infection 2018    History of 2019  novel coronavirus disease (COVID-19) 10/04/2020    Hypertension     Lymphedema     Mild anemia     KEIRY on CPAP     does not regularly    Positive CHARLENE (antinuclear antibody) 7/27/2022    Prediabetes 8/6/2021    Sleep apnea     compliant with CPAP    Thyroid nodule     Urinary incontinence, mixed      Past Surgical History:   Procedure Laterality Date    24 HOUR IMPEDANCE PH MONITORING OF ESOPHAGUS IN PATIENT NOT TAKING ACID REDUCING MEDICATIONS N/A 1/6/2020    Procedure: IMPEDANCE PH STUDY, ESOPHAGEAL, 24 HOUR, IN PATIENT NOT TAKING ACID REDUCING MEDICATION;  Surgeon: First Available Tamika Panchal;  Location: [facility];  Service: Endoscopy;  Laterality: N/A;    AXILLARY NODE DISSECTION Right     granular cell tumor    BILATERAL SALPINGO-OOPHORECTOMY (BSO)  07/21/2020    BREAST CYST ASPIRATION      left    CHOLECYSTECTOMY      COLONOSCOPY N/A 5/10/2019    Procedure: COLONOSCOPY;  Surgeon: Edgar Gamble Jr., MD;  Location: [facility];  Service: Endoscopy;  Laterality: N/A;    ENDOBRONCHIAL ULTRASOUND Bilateral 4/27/2021    Procedure: ENDOBRONCHIAL ULTRASOUND (EBUS);  Surgeon: Armando Kirby MD;  Location: [facility];  Service: Pulmonary;  Laterality: Bilateral;  need fluoroscopy    ENDOMETRIAL ABLATION      ESOPHAGEAL MANOMETRY WITH MEASUREMENT OF IMPEDANCE N/A 1/6/2020    Procedure: MANOMETRY, ESOPHAGUS, WITH IMPEDANCE MEASUREMENT;  Surgeon: First Available [place];  Location: [facility];  Service: Endoscopy;  Laterality: N/A;    ESOPHAGOGASTRODUODENOSCOPY N/A 7/5/2018    Procedure: EGD (ESOPHAGOGASTRODUODENOSCOPY);  Surgeon: Edgar Gamble Jr., MD;  Location: [facility];  Service: Endoscopy;  Laterality: N/A;    ESOPHAGOGASTRODUODENOSCOPY N/A 11/23/2020    Procedure: ESOPHAGOGASTRODUODENOSCOPY (EGD);  Surgeon: Jina Kaufman MD;  Location: [facility];  Service: General;  Laterality: N/A;    EXCISION OF MASS OF ABDOMEN Left 6/18/2018    Procedure: EXCISION, MASS, ABDOMEN - flank left;   Surgeon: Armando Tate MD;  Location: [facility] OR;  Service: General;  Laterality: Left;  left flank removal of mass    FINE NEEDLE ASPIRATION      thyroid    HYSTERECTOMY  07/21/2020    INJECTION OF ANESTHETIC AGENT AROUND MEDIAL BRANCH NERVES INNERVATING LUMBAR FACET JOINT Right 2/24/2022    Procedure: Right L3, 4, 5 MBB;  Surgeon: Anam Montero MD;  Location: [facility];  Service: Pain Management;  Laterality: Right;    INJECTION OF ANESTHETIC AGENT AROUND MEDIAL BRANCH NERVES INNERVATING LUMBAR FACET JOINT Right 3/29/2022    Procedure: Right L3, 4, 5 MBB  (2nd block);  Surgeon: Anam Montero MD;  Location: [facility];  Service: Pain Management;  Laterality: Right;    KNEE ARTHROSCOPY W/ MENISCECTOMY Right     NASAL SEPTUM SURGERY      RADIOFREQUENCY THERMOCOAGULATION Right 4/5/2022    Procedure: Right L3-5 Lumbar RFA;  Surgeon: Anam Montero MD;  Location: [facility];  Service: Pain Management;  Laterality: Right;    ROBOT-ASSISTED NISSEN FUNDOPLICATION USING DA TAMAR XI N/A 2/28/2020    Procedure: XI ROBOTIC FUNDOPLICATION, NISSEN;  Surgeon: Jina Kaufman MD;  Location: [facility] OR;  Service: General;  Laterality: N/A;    THYROIDECTOMY Bilateral 3/11/2021    Procedure: THYROIDECTOMY;  Surgeon: Nixon Moe MD;  Location[facility] OR;  Service: ENT;  Laterality: Bilateral;    TUBAL LIGATION      UPPER GASTROINTESTINAL ENDOSCOPY  07/05/2018    Dr. Gamble     Review of patient's allergies indicates:   Allergen Reactions    Biaxin [clarithromycin] Swelling    Omeprazole magnesium Swelling and Other (See Comments)    Prevacid [lansoprazole] Swelling     Lip swelling- was taking this along with blood pressure medication: benazepril; not sure which caused it. Reports she has taken OTC prilosec without any problems.    Ace inhibitors Swelling     Facial swelling    Benazepril Swelling     Lip swelling     Current Outpatient Medications on File Prior to Visit   Medication Sig  Dispense Refill    bismuth-metronidazole-tetracycline (PYLERA) 140-125-125 mg per capsule Take 3 capsules by mouth 4 (four) times daily before meals and nightly. for 14 days 168 capsule 0    calcium carbonate (OS-CARLA) 600 mg calcium (1,500 mg) Tab Take 600 mg by mouth 2 (two) times daily with meals.      carvediloL (COREG) 12.5 MG tablet Take 1 tablet (12.5 mg total) by mouth 2 (two) times daily. 180 tablet 1    docusate sodium (COLACE) 100 MG capsule Take 100 mg by mouth every other day.       famotidine (PEPCID) 40 MG tablet Take 1 tablet (40 mg total) by mouth 2 (two) times daily. for 14 days 28 tablet 0    hydrOXYchloroQUINE (PLAQUENIL) 200 mg tablet Take 1 tablet (200 mg total) by mouth 2 (two) times daily. 180 tablet 3    levothyroxine (SYNTHROID) 150 MCG tablet TAKE 1 TABLET BEFORE BREAKFAST 90 tablet 3    multivitamin (THERAGRAN) per tablet Take 1 tablet by mouth once daily. Every day      olmesartan-hydrochlorothiazide (BENICAR HCT) 20-12.5 mg per tablet Take 1 tablet by mouth once daily. 90 tablet 1    [DISCONTINUED] montelukast (SINGULAIR) 10 mg tablet Take 1 tablet (10 mg total) by mouth nightly. 90 tablet 3    [DISCONTINUED] potassium chloride SA (K-DUR,KLOR-CON) 20 MEQ tablet Take 1 tablet (20 mEq total) by mouth 2 (two) times daily. 180 tablet 3    cetirizine (ZYRTEC) 10 MG tablet Take 1 tablet (10 mg total) by mouth once daily. (Patient taking differently: Take 10 mg by mouth once daily.)  0     No current facility-administered medications on file prior to visit.     Social History     Socioeconomic History    Marital status:     Number of children: 2   Occupational History     Employer: [employer]   Tobacco Use    Smoking status: Never Smoker    Smokeless tobacco: Never Used   Substance and Sexual Activity    Alcohol use: No    Drug use: No    Sexual activity: Yes     Partners: Male     Social Determinants of Health     Financial Resource Strain: Low Risk     Difficulty of  Paying Living Expenses: Not hard at all   Food Insecurity: No Food Insecurity    Worried About Running Out of Food in the Last Year: Never true    Ran Out of Food in the Last Year: Never true   Transportation Needs: No Transportation Needs    Lack of Transportation (Medical): No    Lack of Transportation (Non-Medical): No   Physical Activity: Insufficiently Active    Days of Exercise per Week: 2 days    Minutes of Exercise per Session: 30 min   Stress: No Stress Concern Present    Feeling of Stress : Only a little   Social Connections: Unknown    Frequency of Communication with Friends and Family: More than three times a week    Frequency of Social Gatherings with Friends and Family: Twice a week    Active Member of Clubs or Organizations: Yes    Attends Club or Organization Meetings: More than 4 times per year    Marital Status:    Housing Stability: Low Risk     Unable to Pay for Housing in the Last Year: No    Number of Places Lived in the Last Year: 1    Unstable Housing in the Last Year: No     Family History   Problem Relation Age of Onset    Diabetes Mother     Kidney failure Mother     Heart attack Mother     Breast cancer Maternal Grandmother     Breast cancer Maternal Aunt     Ovarian cancer Cousin     Breast cancer Cousin     Blindness Father     Cirrhosis Neg Hx     Colon polyps Neg Hx     Colon cancer Neg Hx     Crohn's disease Neg Hx     Esophageal cancer Neg Hx     Stomach cancer Neg Hx     Ulcerative colitis Neg Hx     Amblyopia Neg Hx     Cataracts Neg Hx     Glaucoma Neg Hx     Macular degeneration Neg Hx     Retinal detachment Neg Hx     Strabismus Neg Hx     Allergic rhinitis Neg Hx     Allergies Neg Hx     Angioedema Neg Hx     Asthma Neg Hx     Atopy Neg Hx     Eczema Neg Hx     Immunodeficiency Neg Hx     Rhinitis Neg Hx     Urticaria Neg Hx      Answers for HPI/ROS submitted by the patient on 7/25/2022  activity change: No  unexpected weight  "change: No  neck pain: No  hearing loss: No  rhinorrhea: No  trouble swallowing: No  eye discharge: No  visual disturbance: No  chest tightness: No  wheezing: No  chest pain: No  palpitations: No  blood in stool: No  constipation: Yes  vomiting: No  diarrhea: Yes  polydipsia: No  polyuria: No  difficulty urinating: No  hematuria: No  menstrual problem: No  dysuria: No  joint swelling: No  arthralgias: No  headaches: No  weakness: No  confusion: No  dysphoric mood: No        Vitals:    07/27/22 0805   BP: (!) 142/80   Pulse: 80   Temp: 97.5 °F (36.4 °C)   TempSrc: Temporal   Weight: 116.5 kg (256 lb 12.8 oz)   Height: 5' 3" (1.6 m)     Wt Readings from Last 3 Encounters:   07/27/22 116.5 kg (256 lb 12.8 oz)   07/21/22 116.7 kg (257 lb 4.4 oz)   07/18/22 116 kg (255 lb 11.7 oz)       OBJECTIVE:   APPEARANCE: Well nourished, well developed, in no acute distress.    HEAD: Normocephalic.  Atraumatic.  No sinus tenderness.  EYES:   Right eye: Pupil reactive.  Conjunctiva clear.    Left eye: Pupil reactive.  Conjunctiva clear.  EOMI.    EARS: TM's intact. Light reflex normal. No retraction or perforation.    NOSE:  clear.  MOUTH & THROAT:  No pharyngeal erythema or exudate. No lesions.  NECK: Supple. No bruits.  No JVD.  No cervical lymphadenopathy.  No thyromegaly.    CHEST: Breath sounds clear bilaterally.  Normal respiratory effort  CARDIOVASCULAR: Normal rate.  Regular rhythm.  No murmurs.  No rub.  No gallops.  ABDOMEN: Bowel sounds normal.  Soft.  No tenderness.  No organomegaly.  PERIPHERAL VASCULAR: No cyanosis.  No clubbing.  No edema.  NEUROLOGIC: No focal findings.  MENTAL STATUS: Alert.  Oriented x 3.        "

## 2022-07-28 ENCOUNTER — PATIENT MESSAGE (OUTPATIENT)
Dept: FAMILY MEDICINE | Facility: CLINIC | Age: 47
End: 2022-07-28
Payer: COMMERCIAL

## 2022-07-28 ENCOUNTER — PATIENT MESSAGE (OUTPATIENT)
Dept: ENDOCRINOLOGY | Facility: CLINIC | Age: 47
End: 2022-07-28
Payer: COMMERCIAL

## 2022-07-28 DIAGNOSIS — E89.0 POST-OPERATIVE HYPOTHYROIDISM: ICD-10-CM

## 2022-07-28 RX ORDER — LEVOTHYROXINE SODIUM 175 UG/1
175 TABLET ORAL
Qty: 30 TABLET | Refills: 3 | Status: SHIPPED | OUTPATIENT
Start: 2022-07-28 | End: 2023-09-08 | Stop reason: SDUPTHER

## 2022-07-28 NOTE — TELEPHONE ENCOUNTER
Increase Synthroid to 175 mcg of brand Synthroid  Check TSH in 6 weeks  Make sure taking thyroid medication completely by itself with water.  Wait 30 minutes before having any coffee, vitamins, or food    Let her know Dr. Sandifer is no longer here and she will need to get established with a new provider

## 2022-07-29 ENCOUNTER — PATIENT MESSAGE (OUTPATIENT)
Dept: ENDOCRINOLOGY | Facility: CLINIC | Age: 47
End: 2022-07-29
Payer: COMMERCIAL

## 2022-08-01 ENCOUNTER — TELEPHONE (OUTPATIENT)
Dept: HEMATOLOGY/ONCOLOGY | Facility: CLINIC | Age: 47
End: 2022-08-01
Payer: COMMERCIAL

## 2022-08-01 ENCOUNTER — PATIENT MESSAGE (OUTPATIENT)
Dept: GASTROENTEROLOGY | Facility: CLINIC | Age: 47
End: 2022-08-01
Payer: COMMERCIAL

## 2022-08-01 NOTE — TELEPHONE ENCOUNTER
Spoke with pt and informed her the times available and pt stated she will try to make the 130 appt work. I informed pt if her other appt runs over and she feel she may be a few mins late to give the office a call to let them know and go from there and she verbalized acceptance

## 2022-08-01 NOTE — TELEPHONE ENCOUNTER
----- Message from Ewelina Bar sent at 8/1/2022 10:32 AM CDT -----  Regarding: Case Pal  Type:Needs Medical Advice    Who Called:PT  Best call back number:#[phone]  Additional Info: Requesting a call back regarding#Can PT come at 3:00 instead of 2;30, she has another appt @1000.  Please Advise- Thank you    ## please send message on GetIntent..

## 2022-08-10 ENCOUNTER — LAB VISIT (OUTPATIENT)
Dept: LAB | Facility: HOSPITAL | Age: 47
End: 2022-08-10
Attending: INTERNAL MEDICINE
Payer: COMMERCIAL

## 2022-08-10 ENCOUNTER — OFFICE VISIT (OUTPATIENT)
Dept: INFECTIOUS DISEASES | Facility: CLINIC | Age: 47
End: 2022-08-10
Payer: COMMERCIAL

## 2022-08-10 VITALS
BODY MASS INDEX: 45.5 KG/M2 | DIASTOLIC BLOOD PRESSURE: 96 MMHG | SYSTOLIC BLOOD PRESSURE: 158 MMHG | HEIGHT: 63 IN | WEIGHT: 256.81 LBS | HEART RATE: 80 BPM

## 2022-08-10 DIAGNOSIS — I10 ESSENTIAL HYPERTENSION: ICD-10-CM

## 2022-08-10 DIAGNOSIS — L03.90 RECURRENT CELLULITIS: ICD-10-CM

## 2022-08-10 DIAGNOSIS — E66.01 MORBID OBESITY: ICD-10-CM

## 2022-08-10 DIAGNOSIS — B96.81 HELICOBACTER POSITIVE GASTRITIS: ICD-10-CM

## 2022-08-10 DIAGNOSIS — L03.113 CELLULITIS OF RIGHT HAND: ICD-10-CM

## 2022-08-10 DIAGNOSIS — K29.70 HELICOBACTER POSITIVE GASTRITIS: ICD-10-CM

## 2022-08-10 PROCEDURE — 99204 PR OFFICE/OUTPT VISIT, NEW, LEVL IV, 45-59 MIN: ICD-10-PCS | Mod: S$GLB,,, | Performed by: INTERNAL MEDICINE

## 2022-08-10 PROCEDURE — 86060 ANTISTREPTOLYSIN O TITER: CPT | Performed by: INTERNAL MEDICINE

## 2022-08-10 PROCEDURE — 3044F PR MOST RECENT HEMOGLOBIN A1C LEVEL <7.0%: ICD-10-PCS | Mod: CPTII,S$GLB,, | Performed by: INTERNAL MEDICINE

## 2022-08-10 PROCEDURE — 1159F MED LIST DOCD IN RCRD: CPT | Mod: CPTII,S$GLB,, | Performed by: INTERNAL MEDICINE

## 2022-08-10 PROCEDURE — 3080F DIAST BP >= 90 MM HG: CPT | Mod: CPTII,S$GLB,, | Performed by: INTERNAL MEDICINE

## 2022-08-10 PROCEDURE — 86215 DEOXYRIBONUCLEASE ANTIBODY: CPT | Performed by: INTERNAL MEDICINE

## 2022-08-10 PROCEDURE — 3008F BODY MASS INDEX DOCD: CPT | Mod: CPTII,S$GLB,, | Performed by: INTERNAL MEDICINE

## 2022-08-10 PROCEDURE — 99999 PR PBB SHADOW E&M-EST. PATIENT-LVL III: ICD-10-PCS | Mod: PBBFAC,,, | Performed by: INTERNAL MEDICINE

## 2022-08-10 PROCEDURE — 3044F HG A1C LEVEL LT 7.0%: CPT | Mod: CPTII,S$GLB,, | Performed by: INTERNAL MEDICINE

## 2022-08-10 PROCEDURE — 3077F SYST BP >= 140 MM HG: CPT | Mod: CPTII,S$GLB,, | Performed by: INTERNAL MEDICINE

## 2022-08-10 PROCEDURE — 1159F PR MEDICATION LIST DOCUMENTED IN MEDICAL RECORD: ICD-10-PCS | Mod: CPTII,S$GLB,, | Performed by: INTERNAL MEDICINE

## 2022-08-10 PROCEDURE — 99999 PR PBB SHADOW E&M-EST. PATIENT-LVL III: CPT | Mod: PBBFAC,,, | Performed by: INTERNAL MEDICINE

## 2022-08-10 PROCEDURE — 3080F PR MOST RECENT DIASTOLIC BLOOD PRESSURE >= 90 MM HG: ICD-10-PCS | Mod: CPTII,S$GLB,, | Performed by: INTERNAL MEDICINE

## 2022-08-10 PROCEDURE — 36415 COLL VENOUS BLD VENIPUNCTURE: CPT | Performed by: INTERNAL MEDICINE

## 2022-08-10 PROCEDURE — 99204 OFFICE O/P NEW MOD 45 MIN: CPT | Mod: S$GLB,,, | Performed by: INTERNAL MEDICINE

## 2022-08-10 PROCEDURE — 3077F PR MOST RECENT SYSTOLIC BLOOD PRESSURE >= 140 MM HG: ICD-10-PCS | Mod: CPTII,S$GLB,, | Performed by: INTERNAL MEDICINE

## 2022-08-10 PROCEDURE — 3008F PR BODY MASS INDEX (BMI) DOCUMENTED: ICD-10-PCS | Mod: CPTII,S$GLB,, | Performed by: INTERNAL MEDICINE

## 2022-08-10 RX ORDER — BISMUTH SUBCITRATE POTASSIUM, METRONIDAZOLE, TETRACYCLINE HYDROCHLORIDE 140; 125; 125 MG/1; MG/1; MG/1
3 CAPSULE ORAL 4 TIMES DAILY
Status: ON HOLD | COMMUNITY
Start: 2022-08-04 | End: 2022-08-24 | Stop reason: ALTCHOICE

## 2022-08-10 NOTE — PROGRESS NOTES
Subjective:       Patient ID: Akiko Jameson is a 47 y.o. female.    Chief Complaint: Recurrent Skin Infections     HPI   47 year old with PMH as listed below. She had lymph node removal about 20 years ago on the right upper extremity and since then, she has right arm lymphedema  .  She has now developed 2 episodes of cellulitis this year .  She was at [facility] -01/2019 for right upper extremity cellulitis .  Past Medical History:   Diagnosis Date    ALLERGIC RHINITIS     Arthritis     Cellulitis     Constipation due to opioid therapy     Eosinophilia     mild    GERD (gastroesophageal reflux disease)     Granular cell tumor     H. pylori infection 2018    History of 2019 novel coronavirus disease (COVID-19) 10/04/2020    Hypertension     Lymphedema     Mild anemia     KEIRY on CPAP     does not regularly    Positive CHARLENE (antinuclear antibody) 7/27/2022    Prediabetes 8/6/2021    Sleep apnea     compliant with CPAP    Thyroid nodule     Urinary incontinence, mixed      Past Surgical History:   Procedure Laterality Date    24 HOUR IMPEDANCE PH MONITORING OF ESOPHAGUS IN PATIENT NOT TAKING ACID REDUCING MEDICATIONS N/A 1/6/2020    Procedure: IMPEDANCE PH STUDY, ESOPHAGEAL, 24 HOUR, IN PATIENT NOT TAKING ACID REDUCING MEDICATION;  Surgeon: First Available [place];  Location: [facility];  Service: Endoscopy;  Laterality: N/A;    AXILLARY NODE DISSECTION Right     granular cell tumor    BILATERAL SALPINGO-OOPHORECTOMY (BSO)  07/21/2020    BREAST CYST ASPIRATION      left    CHOLECYSTECTOMY      COLONOSCOPY N/A 5/10/2019    Procedure: COLONOSCOPY;  Surgeon: Edgar Gamble Jr., MD;  Location: [facility];  Service: Endoscopy;  Laterality: N/A;    ENDOBRONCHIAL ULTRASOUND Bilateral 4/27/2021    Procedure: ENDOBRONCHIAL ULTRASOUND (EBUS);  Surgeon: Armando Kirby MD;  Location: [facility];  Service: Pulmonary;  Laterality: Bilateral;  need fluoroscopy    ENDOMETRIAL ABLATION       ESOPHAGEAL MANOMETRY WITH MEASUREMENT OF IMPEDANCE N/A 1/6/2020    Procedure: MANOMETRY, ESOPHAGUS, WITH IMPEDANCE MEASUREMENT;  Surgeon: First Available [place];  [facility]: [facility];  Service: Endoscopy;  Laterality: N/A;    ESOPHAGOGASTRODUODENOSCOPY N/A 7/5/2018    Procedure: EGD (ESOPHAGOGASTRODUODENOSCOPY);  Surgeon: Edgar Gamble Jr., MD;  Location: [facility];  Service: Endoscopy;  Laterality: N/A;    ESOPHAGOGASTRODUODENOSCOPY N/A 11/23/2020    Procedure: ESOPHAGOGASTRODUODENOSCOPY (EGD);  Surgeon: Jina Kaufman MD;  Location: [facility] ENDO;  Service: General;  Laterality: N/A;    EXCISION OF MASS OF ABDOMEN Left 6/18/2018    Procedure: EXCISION, MASS, ABDOMEN - flank left;  Surgeon: Armando Tate MD;  Location: [facility] OR;  Service: General;  Laterality: Left;  left flank removal of mass    FINE NEEDLE ASPIRATION      thyroid    HYSTERECTOMY  07/21/2020    INJECTION OF ANESTHETIC AGENT AROUND MEDIAL BRANCH NERVES INNERVATING LUMBAR FACET JOINT Right 2/24/2022    Procedure: Right L3, 4, 5 MBB;  Surgeon: Anam Montero MD;  Location: [facility];  Service: Pain Management;  Laterality: Right;    INJECTION OF ANESTHETIC AGENT AROUND MEDIAL BRANCH NERVES INNERVATING LUMBAR FACET JOINT Right 3/29/2022    Procedure: Right L3, 4, 5 MBB  (2nd block);  Surgeon: Anam Montero MD;  Location: [facility];  Service: Pain Management;  Laterality: Right;    KNEE ARTHROSCOPY W/ MENISCECTOMY Right     NASAL SEPTUM SURGERY      RADIOFREQUENCY THERMOCOAGULATION Right 4/5/2022    Procedure: Right L3-5 Lumbar RFA;  Surgeon: Anam Montero MD;  Location: [facility];  Service: Pain Management;  Laterality: Right;    ROBOT-ASSISTED NISSEN FUNDOPLICATION USING DA TAMAR XI N/A 2/28/2020    Procedure: XI ROBOTIC FUNDOPLICATION, NISSEN;  Surgeon: Jina Kaufman MD;  Location: [facility] OR;  Service: General;  Laterality: N/A;    THYROIDECTOMY Bilateral 3/11/2021    Procedure: THYROIDECTOMY;   Surgeon: Nixon Moe MD;  Location: [facility];  Service: ENT;  Laterality: Bilateral;    TUBAL LIGATION      UPPER GASTROINTESTINAL ENDOSCOPY  07/05/2018    Dr. Gamble     Family History   Problem Relation Age of Onset    Diabetes Mother     Kidney failure Mother     Heart attack Mother     Breast cancer Maternal Grandmother     Breast cancer Maternal Aunt     Ovarian cancer Cousin     Breast cancer Cousin     Blindness Father     Cirrhosis Neg Hx     Colon polyps Neg Hx     Colon cancer Neg Hx     Crohn's disease Neg Hx     Esophageal cancer Neg Hx     Stomach cancer Neg Hx     Ulcerative colitis Neg Hx     Amblyopia Neg Hx     Cataracts Neg Hx     Glaucoma Neg Hx     Macular degeneration Neg Hx     Retinal detachment Neg Hx     Strabismus Neg Hx     Allergic rhinitis Neg Hx     Allergies Neg Hx     Angioedema Neg Hx     Asthma Neg Hx     Atopy Neg Hx     Eczema Neg Hx     Immunodeficiency Neg Hx     Rhinitis Neg Hx     Urticaria Neg Hx      Social History     Socioeconomic History    Marital status:     Number of children: 2   Occupational History     Employer: [email or web address]   Tobacco Use    Smoking status: Never Smoker    Smokeless tobacco: Never Used   Substance and Sexual Activity    Alcohol use: No    Drug use: No    Sexual activity: Yes     Partners: Male     Social Determinants of Health     Financial Resource Strain: Low Risk     Difficulty of Paying Living Expenses: Not hard at all   Food Insecurity: No Food Insecurity    Worried About Running Out of Food in the Last Year: Never true    Ran Out of Food in the Last Year: Never true   Transportation Needs: No Transportation Needs    Lack of Transportation (Medical): No    Lack of Transportation (Non-Medical): No   Physical Activity: Insufficiently Active    Days of Exercise per Week: 2 days    Minutes of Exercise per Session: 30 min   Stress: No Stress Concern Present    Feeling of Stress : Only a little    Social Connections: Unknown    Frequency of Communication with Friends and Family: More than three times a week    Frequency of Social Gatherings with Friends and Family: Twice a week    Active Member of Clubs or Organizations: Yes    Attends Club or Organization Meetings: More than 4 times per year    Marital Status:    Housing Stability: Low Risk     Unable to Pay for Housing in the Last Year: No    Number of Places Lived in the Last Year: 1    Unstable Housing in the Last Year: No     Review of Systems   Constitutional: Negative for chills and fatigue.   HENT: Negative for congestion, ear pain, facial swelling, sinus pressure and sore throat.    Eyes: Negative for pain.   Respiratory: Negative for apnea, chest tightness, shortness of breath and stridor.    Cardiovascular: Negative for chest pain, palpitations and leg swelling.   Gastrointestinal: Negative for abdominal distention, abdominal pain, diarrhea and nausea.   Endocrine: Negative for polydipsia and polyphagia.   Genitourinary: Negative for decreased urine volume, difficulty urinating, frequency and genital sores.   Musculoskeletal: Negative for arthralgias and gait problem.        Right upper extremity swelling    Neurological: Negative for light-headedness and headaches.   Hematological: Negative for adenopathy.   Psychiatric/Behavioral: Negative for agitation, confusion and decreased concentration.       Objective:      Physical Exam  Vitals and nursing note reviewed.   Constitutional:       Appearance: She is well-developed.   HENT:      Head: Normocephalic and atraumatic.   Eyes:      Conjunctiva/sclera: Conjunctivae normal.      Pupils: Pupils are equal, round, and reactive to light.   Neck:      Thyroid: No thyroid mass or thyromegaly.   Cardiovascular:      Rate and Rhythm: Normal rate.      Heart sounds: Normal heart sounds.   Pulmonary:      Effort: Pulmonary effort is normal. No accessory muscle usage or respiratory distress.       Breath sounds: Normal breath sounds.   Abdominal:      General: Bowel sounds are normal.      Palpations: Abdomen is soft. There is no mass.      Tenderness: There is no abdominal tenderness.   Musculoskeletal:         General: Swelling present. Normal range of motion.      Cervical back: Normal range of motion and neck supple.   Skin:     Findings: No rash.   Neurological:      Mental Status: She is alert and oriented to person, place, and time.         Assessment:         1. Recurrent cellulitis  Ambulatory referral/consult to Infectious Disease    STREPTOCOCCAL ANTIBODIES (ASO)    DNase B Antibody, Serum   2. Essential hypertension     3. Cellulitis of right hand     4. Morbid obesity     5. Helicobacter positive gastritis         Plan:       Problem List Items Addressed This Visit     Essential hypertension     Will continue [place] , follow PCP            Morbid obesity     Will need out patient follow up for weight loss regime           Cellulitis of right hand     Discussed with her about the natural history of recurrent cellulitis -she will need lifelong prophylaxis -will send strep ASO and Dnase to plan choice of agent for prophylasix.  She wants to wait for now on starting prophylasix           Helicobacter positive gastritis     S/p therapy with Pylera - follow GI             Other Visit Diagnoses     Recurrent cellulitis        Relevant Orders    STREPTOCOCCAL ANTIBODIES (ASO)    DNase B Antibody, Serum

## 2022-08-10 NOTE — ASSESSMENT & PLAN NOTE
Discussed with her about the natural history of recurrent cellulitis -she will need lifelong prophylaxis -will send strep ASO and Dnase to plan choice of agent for prophylasix.  She wants to wait for now on starting prophylasix

## 2022-08-14 LAB — STREP DNASE B SER-ACNC: 107 U/ML (ref 0–260)

## 2022-08-15 LAB — ASO AB SERPL-ACNC: 314 IU/ML

## 2022-08-16 ENCOUNTER — TELEPHONE (OUTPATIENT)
Dept: HEMATOLOGY/ONCOLOGY | Facility: CLINIC | Age: 47
End: 2022-08-16
Payer: COMMERCIAL

## 2022-08-17 ENCOUNTER — LAB VISIT (OUTPATIENT)
Dept: LAB | Facility: HOSPITAL | Age: 47
End: 2022-08-17
Attending: OPHTHALMOLOGY
Payer: COMMERCIAL

## 2022-08-17 ENCOUNTER — OFFICE VISIT (OUTPATIENT)
Dept: HEMATOLOGY/ONCOLOGY | Facility: CLINIC | Age: 47
End: 2022-08-17
Payer: COMMERCIAL

## 2022-08-17 VITALS
SYSTOLIC BLOOD PRESSURE: 172 MMHG | BODY MASS INDEX: 45.22 KG/M2 | OXYGEN SATURATION: 98 % | TEMPERATURE: 98 F | RESPIRATION RATE: 18 BRPM | DIASTOLIC BLOOD PRESSURE: 89 MMHG | HEART RATE: 72 BPM | WEIGHT: 255.31 LBS

## 2022-08-17 DIAGNOSIS — D63.8 CHRONIC DISEASE ANEMIA: Primary | ICD-10-CM

## 2022-08-17 DIAGNOSIS — D72.10 IDIOPATHIC EOSINOPHILIA: ICD-10-CM

## 2022-08-17 DIAGNOSIS — D63.8 CHRONIC DISEASE ANEMIA: ICD-10-CM

## 2022-08-17 DIAGNOSIS — D64.9 ANEMIA, UNSPECIFIED TYPE: Primary | ICD-10-CM

## 2022-08-17 DIAGNOSIS — D64.9 ANEMIA, UNSPECIFIED TYPE: ICD-10-CM

## 2022-08-17 LAB
ALBUMIN SERPL BCP-MCNC: 4 G/DL (ref 3.5–5.2)
ALP SERPL-CCNC: 65 U/L (ref 55–135)
ALT SERPL W/O P-5'-P-CCNC: 13 U/L (ref 10–44)
ANION GAP SERPL CALC-SCNC: 11 MMOL/L (ref 8–16)
AST SERPL-CCNC: 20 U/L (ref 10–40)
BILIRUB SERPL-MCNC: 0.4 MG/DL (ref 0.1–1)
BILIRUB UR QL STRIP: NEGATIVE
BUN SERPL-MCNC: 12 MG/DL (ref 6–20)
CALCIUM SERPL-MCNC: 9.1 MG/DL (ref 8.7–10.5)
CHLORIDE SERPL-SCNC: 104 MMOL/L (ref 95–110)
CLARITY UR: CLEAR
CO2 SERPL-SCNC: 27 MMOL/L (ref 23–29)
COLOR UR: YELLOW
CREAT SERPL-MCNC: 1.1 MG/DL (ref 0.5–1.4)
ERYTHROCYTE [DISTWIDTH] IN BLOOD BY AUTOMATED COUNT: 15.6 % (ref 11.5–14.5)
EST. GFR  (NO RACE VARIABLE): >60 ML/MIN/1.73 M^2
FERRITIN SERPL-MCNC: 95 NG/ML (ref 20–300)
GLUCOSE SERPL-MCNC: 95 MG/DL (ref 70–110)
GLUCOSE UR QL STRIP: NEGATIVE
HCT VFR BLD AUTO: 35.1 % (ref 37–48.5)
HGB BLD-MCNC: 10.9 G/DL (ref 12–16)
HGB UR QL STRIP: NEGATIVE
IMM GRANULOCYTES # BLD AUTO: 0.01 K/UL (ref 0–0.04)
IRON SERPL-MCNC: 35 UG/DL (ref 30–160)
KETONES UR QL STRIP: NEGATIVE
LEUKOCYTE ESTERASE UR QL STRIP: NEGATIVE
MCH RBC QN AUTO: 25.2 PG (ref 27–31)
MCHC RBC AUTO-ENTMCNC: 31.1 G/DL (ref 32–36)
MCV RBC AUTO: 81 FL (ref 82–98)
NEUTROPHILS # BLD AUTO: 1.9 K/UL (ref 1.8–7.7)
NITRITE UR QL STRIP: NEGATIVE
PH UR STRIP: 6 [PH] (ref 5–8)
PLATELET # BLD AUTO: 268 K/UL (ref 150–450)
PMV BLD AUTO: 10.6 FL (ref 9.2–12.9)
POTASSIUM SERPL-SCNC: 3.9 MMOL/L (ref 3.5–5.1)
PROT SERPL-MCNC: 8.2 G/DL (ref 6–8.4)
PROT UR QL STRIP: NEGATIVE
RBC # BLD AUTO: 4.33 M/UL (ref 4–5.4)
RETICS/RBC NFR AUTO: 1.3 % (ref 0.5–2.5)
SATURATED IRON: 11 % (ref 20–50)
SODIUM SERPL-SCNC: 142 MMOL/L (ref 136–145)
SP GR UR STRIP: 1.02 (ref 1–1.03)
TOTAL IRON BINDING CAPACITY: 317 UG/DL (ref 250–450)
TRANSFERRIN SERPL-MCNC: 214 MG/DL (ref 200–375)
URN SPEC COLLECT METH UR: NORMAL
WBC # BLD AUTO: 4.62 K/UL (ref 3.9–12.7)

## 2022-08-17 PROCEDURE — 3008F PR BODY MASS INDEX (BMI) DOCUMENTED: ICD-10-PCS | Mod: CPTII,S$GLB,, | Performed by: NURSE PRACTITIONER

## 2022-08-17 PROCEDURE — 82728 ASSAY OF FERRITIN: CPT | Performed by: NURSE PRACTITIONER

## 2022-08-17 PROCEDURE — 84466 ASSAY OF TRANSFERRIN: CPT | Performed by: NURSE PRACTITIONER

## 2022-08-17 PROCEDURE — 85045 AUTOMATED RETICULOCYTE COUNT: CPT | Mod: PN | Performed by: NURSE PRACTITIONER

## 2022-08-17 PROCEDURE — 3079F DIAST BP 80-89 MM HG: CPT | Mod: CPTII,S$GLB,, | Performed by: NURSE PRACTITIONER

## 2022-08-17 PROCEDURE — 1159F MED LIST DOCD IN RCRD: CPT | Mod: CPTII,S$GLB,, | Performed by: NURSE PRACTITIONER

## 2022-08-17 PROCEDURE — 3077F SYST BP >= 140 MM HG: CPT | Mod: CPTII,S$GLB,, | Performed by: NURSE PRACTITIONER

## 2022-08-17 PROCEDURE — 99213 OFFICE O/P EST LOW 20 MIN: CPT | Mod: S$GLB,,, | Performed by: NURSE PRACTITIONER

## 2022-08-17 PROCEDURE — 80053 COMPREHEN METABOLIC PANEL: CPT | Mod: PN | Performed by: NURSE PRACTITIONER

## 2022-08-17 PROCEDURE — 99999 PR PBB SHADOW E&M-EST. PATIENT-LVL IV: ICD-10-PCS | Mod: PBBFAC,,, | Performed by: NURSE PRACTITIONER

## 2022-08-17 PROCEDURE — 3079F PR MOST RECENT DIASTOLIC BLOOD PRESSURE 80-89 MM HG: ICD-10-PCS | Mod: CPTII,S$GLB,, | Performed by: NURSE PRACTITIONER

## 2022-08-17 PROCEDURE — 85027 COMPLETE CBC AUTOMATED: CPT | Mod: PN | Performed by: NURSE PRACTITIONER

## 2022-08-17 PROCEDURE — 3044F HG A1C LEVEL LT 7.0%: CPT | Mod: CPTII,S$GLB,, | Performed by: NURSE PRACTITIONER

## 2022-08-17 PROCEDURE — 99999 PR PBB SHADOW E&M-EST. PATIENT-LVL IV: CPT | Mod: PBBFAC,,, | Performed by: NURSE PRACTITIONER

## 2022-08-17 PROCEDURE — 84238 ASSAY NONENDOCRINE RECEPTOR: CPT | Performed by: NURSE PRACTITIONER

## 2022-08-17 PROCEDURE — 81003 URINALYSIS AUTO W/O SCOPE: CPT | Mod: PN | Performed by: NURSE PRACTITIONER

## 2022-08-17 PROCEDURE — 1160F RVW MEDS BY RX/DR IN RCRD: CPT | Mod: CPTII,S$GLB,, | Performed by: NURSE PRACTITIONER

## 2022-08-17 PROCEDURE — 3044F PR MOST RECENT HEMOGLOBIN A1C LEVEL <7.0%: ICD-10-PCS | Mod: CPTII,S$GLB,, | Performed by: NURSE PRACTITIONER

## 2022-08-17 PROCEDURE — 36415 COLL VENOUS BLD VENIPUNCTURE: CPT | Mod: PN | Performed by: NURSE PRACTITIONER

## 2022-08-17 PROCEDURE — 3077F PR MOST RECENT SYSTOLIC BLOOD PRESSURE >= 140 MM HG: ICD-10-PCS | Mod: CPTII,S$GLB,, | Performed by: NURSE PRACTITIONER

## 2022-08-17 PROCEDURE — 1160F PR REVIEW ALL MEDS BY PRESCRIBER/CLIN PHARMACIST DOCUMENTED: ICD-10-PCS | Mod: CPTII,S$GLB,, | Performed by: NURSE PRACTITIONER

## 2022-08-17 PROCEDURE — 1159F PR MEDICATION LIST DOCUMENTED IN MEDICAL RECORD: ICD-10-PCS | Mod: CPTII,S$GLB,, | Performed by: NURSE PRACTITIONER

## 2022-08-17 PROCEDURE — 3008F BODY MASS INDEX DOCD: CPT | Mod: CPTII,S$GLB,, | Performed by: NURSE PRACTITIONER

## 2022-08-17 PROCEDURE — 99213 PR OFFICE/OUTPT VISIT, EST, LEVL III, 20-29 MIN: ICD-10-PCS | Mod: S$GLB,,, | Performed by: NURSE PRACTITIONER

## 2022-08-17 NOTE — PROGRESS NOTES
Subjective:      Name: Akiko Jameson  : 1975  MRN: [id number]    CC:  ANNUAL follow up    HPI:   Akiko Jameson is a 47 y.o. female presents for annual lab review for anemia & idiopathic eosinophilia.    This is a 47-year-old black female known to Dr. Caicedo for anemia of chronic disease as well as idiopathic eosinophilia.    Unilateral bone marrow aspiration/biopsy in , which did not reveal any clonal population of cells on flow and no evidence of dysplasia within the marrow.    Surveilled annually.    She also suffers with chronic lymphedema to RUE from lymphadenectomy.  EBUS in  due to mediastinal lymphadenopathy/nodule that was later diagnosed as Sarcoidosis, followed by Dr. Kirby.     TODAY, 22:  She presents to the clinic today for annual evaluation.    She reports abdominal pain with off and on diarrhea.  She has a sigmoid scope planned for 22.  Recent H. Pylori and treated.  No active illnesses at present.    Denies any fevers, chills, drenching night sweats, painful lymphadenopathy, unexplained weight loss, weakness, fatigue, chest pain, skin rashes, pruritus, SOB, headaches, pica, etc. No other new complaints or pertinent findings on a 14-point review of systems.    Past Medical History:   Diagnosis Date    ALLERGIC RHINITIS     Arthritis     Cellulitis     Constipation due to opioid therapy     Eosinophilia     mild    GERD (gastroesophageal reflux disease)     Granular cell tumor     H. pylori infection 2018    History of 2019 novel coronavirus disease (COVID-19) 10/04/2020    Hypertension     Lymphedema     Mild anemia     KEIRY on CPAP     does not regularly    Positive CHARLENE (antinuclear antibody) 2022    Prediabetes 2021    Sleep apnea     compliant with CPAP    Thyroid nodule     Urinary incontinence, mixed        Past Surgical History:   Procedure Laterality Date    24 HOUR IMPEDANCE PH MONITORING OF ESOPHAGUS IN PATIENT NOT TAKING  ACID REDUCING MEDICATIONS N/A 1/6/2020    Procedure: IMPEDANCE PH STUDY, ESOPHAGEAL, 24 HOUR, IN PATIENT NOT TAKING ACID REDUCING MEDICATION;  Surgeon: First Available [place];  Location: [facility] ENDO;  Service: Endoscopy;  Laterality: N/A;    AXILLARY NODE DISSECTION Right     granular cell tumor    BILATERAL SALPINGO-OOPHORECTOMY (BSO)  07/21/2020    BREAST CYST ASPIRATION      left    CHOLECYSTECTOMY      COLONOSCOPY N/A 5/10/2019    Procedure: COLONOSCOPY;  Surgeon: Edgar Gamble Jr., MD;  Location: [facility];  Service: Endoscopy;  Laterality: N/A;    ENDOBRONCHIAL ULTRASOUND Bilateral 4/27/2021    Procedure: ENDOBRONCHIAL ULTRASOUND (EBUS);  Surgeon: Armando Kirby MD;  Location: [facility];  Service: Pulmonary;  Laterality: Bilateral;  need fluoroscopy    ENDOMETRIAL ABLATION      ESOPHAGEAL MANOMETRY WITH MEASUREMENT OF IMPEDANCE N/A 1/6/2020    Procedure: MANOMETRY, ESOPHAGUS, WITH IMPEDANCE MEASUREMENT;  Surgeon: First Available Tamika Panchal;  Location: [facility] ENDO;  Service: Endoscopy;  Laterality: N/A;    ESOPHAGOGASTRODUODENOSCOPY N/A 7/5/2018    Procedure: EGD (ESOPHAGOGASTRODUODENOSCOPY);  Surgeon: Edgar Gamble Jr., MD;  Location: [facility];  Service: Endoscopy;  Laterality: N/A;    ESOPHAGOGASTRODUODENOSCOPY N/A 11/23/2020    Procedure: ESOPHAGOGASTRODUODENOSCOPY (EGD);  Surgeon: Jina Kaufman MD;  Location: [facility];  Service: General;  Laterality: N/A;    EXCISION OF MASS OF ABDOMEN Left 6/18/2018    Procedure: EXCISION, MASS, ABDOMEN - flank left;  Surgeon: Armando Tate MD;  Location: [facility];  Service: General;  Laterality: Left;  left flank removal of mass    FINE NEEDLE ASPIRATION      thyroid    HYSTERECTOMY  07/21/2020    INJECTION OF ANESTHETIC AGENT AROUND MEDIAL BRANCH NERVES INNERVATING LUMBAR FACET JOINT Right 2/24/2022    Procedure: Right L3, 4, 5 MBB;  Surgeon: Anam Montero MD;  Location: [facility] PAIN MGT;  Service: Pain Management;  Laterality: Right;     INJECTION OF ANESTHETIC AGENT AROUND MEDIAL BRANCH NERVES INNERVATING LUMBAR FACET JOINT Right 3/29/2022    Procedure: Right L3, 4, 5 MBB  (2nd block);  Surgeon: Anam Montero MD;  Location: [facility] PAIN MGT;  Service: Pain Management;  Laterality: Right;    KNEE ARTHROSCOPY W/ MENISCECTOMY Right     NASAL SEPTUM SURGERY      RADIOFREQUENCY THERMOCOAGULATION Right 4/5/2022    Procedure: Right L3-5 Lumbar RFA;  Surgeon: Anam Montero MD;  Location: [facility] PAIN MGT;  Service: Pain Management;  Laterality: Right;    ROBOT-ASSISTED NISSEN FUNDOPLICATION USING DA TAMAR XI N/A 2/28/2020    Procedure: XI ROBOTIC FUNDOPLICATION, NISSEN;  Surgeon: Jina Kaufman MD;  Location: [facility] OR;  Service: General;  Laterality: N/A;    THYROIDECTOMY Bilateral 3/11/2021    Procedure: THYROIDECTOMY;  Surgeon: Nixno Moe MD;  Location: [facility] OR;  Service: ENT;  Laterality: Bilateral;    TUBAL LIGATION      UPPER GASTROINTESTINAL ENDOSCOPY  07/05/2018    Dr. Gamble       Family History   Problem Relation Age of Onset    Diabetes Mother     Kidney failure Mother     Heart attack Mother     Breast cancer Maternal Grandmother     Breast cancer Maternal Aunt     Ovarian cancer Cousin     Breast cancer Cousin     Blindness Father     Cirrhosis Neg Hx     Colon polyps Neg Hx     Colon cancer Neg Hx     Crohn's disease Neg Hx     Esophageal cancer Neg Hx     Stomach cancer Neg Hx     Ulcerative colitis Neg Hx     Amblyopia Neg Hx     Cataracts Neg Hx     Glaucoma Neg Hx     Macular degeneration Neg Hx     Retinal detachment Neg Hx     Strabismus Neg Hx     Allergic rhinitis Neg Hx     Allergies Neg Hx     Angioedema Neg Hx     Asthma Neg Hx     Atopy Neg Hx     Eczema Neg Hx     Immunodeficiency Neg Hx     Rhinitis Neg Hx     Urticaria Neg Hx        Social History     Socioeconomic History    Marital status:     Number of children: 2   Occupational History     Employer: [employer]    Tobacco Use    Smoking status: Never Smoker    Smokeless tobacco: Never Used   Substance and Sexual Activity    Alcohol use: No    Drug use: No    Sexual activity: Yes     Partners: Male     Social Determinants of Health     Financial Resource Strain: Low Risk     Difficulty of Paying Living Expenses: Not hard at all   Food Insecurity: No Food Insecurity    Worried About Running Out of Food in the Last Year: Never true    Ran Out of Food in the Last Year: Never true   Transportation Needs: No Transportation Needs    Lack of Transportation (Medical): No    Lack of Transportation (Non-Medical): No   Physical Activity: Insufficiently Active    Days of Exercise per Week: 2 days    Minutes of Exercise per Session: 30 min   Stress: No Stress Concern Present    Feeling of Stress : Not at all   Social Connections: Unknown    Frequency of Communication with Friends and Family: Three times a week    Frequency of Social Gatherings with Friends and Family: Three times a week    Active Member of Clubs or Organizations: Yes    Attends Club or Organization Meetings: More than 4 times per year    Marital Status:    Housing Stability: Low Risk     Unable to Pay for Housing in the Last Year: No    Number of Places Lived in the Last Year: 1    Unstable Housing in the Last Year: No       Review of patient's allergies indicates:   Allergen Reactions    Biaxin [clarithromycin] Swelling    Omeprazole magnesium Swelling and Other (See Comments)    Prevacid [lansoprazole] Swelling     Lip swelling- was taking this along with blood pressure medication: benazepril; not sure which caused it. Reports she has taken OTC prilosec without any problems.    Ace inhibitors Swelling     Facial swelling    Benazepril Swelling     Lip swelling       Review of Systems   Eyes: Negative for visual disturbance.   Cardiovascular: Negative for chest pain.   Respiratory: Negative for cough and shortness of breath.     Hematologic/Lymphatic: Negative for adenopathy.   Skin: Negative for rash.   Musculoskeletal: Negative for back pain.   Gastrointestinal: Positive for abdominal pain. Negative for diarrhea.   Genitourinary: Negative for frequency.   Neurological: Negative for headaches.   Psychiatric/Behavioral: The patient is not nervous/anxious.             Objective:   Weight:  Gain of 15 pounds in 1 year  Vitals:    08/17/22 1431   BP: (!) 172/89   BP Location: Left arm   Patient Position: Sitting   BP Method: Medium (Automatic)   Pulse: 72   Resp: 18   Temp: 97.9 °F (36.6 °C)   TempSrc: Temporal   SpO2: 98%   Weight: 115.8 kg (255 lb 4.7 oz)        Physical Exam  Vitals reviewed.   Constitutional:       Appearance: She is obese.   HENT:      Head: Normocephalic and atraumatic.   Eyes:      Conjunctiva/sclera: Conjunctivae normal.   Cardiovascular:      Rate and Rhythm: Normal rate and regular rhythm.      Pulses: Normal pulses.      Heart sounds: Normal heart sounds.   Pulmonary:      Effort: Pulmonary effort is normal.      Breath sounds: Normal breath sounds.   Chest:   Breasts:      Right: No axillary adenopathy or supraclavicular adenopathy.      Left: No axillary adenopathy or supraclavicular adenopathy.       Abdominal:      General: Bowel sounds are normal.      Palpations: Abdomen is soft.   Musculoskeletal:      Cervical back: Neck supple.      Right lower leg: Edema present.      Left lower leg: Edema present.   Lymphadenopathy:      Cervical: No cervical adenopathy.      Upper Body:      Right upper body: No supraclavicular or axillary adenopathy.      Left upper body: No supraclavicular or axillary adenopathy.      Lower Body: No right inguinal adenopathy. No left inguinal adenopathy.   Skin:     General: Skin is warm and dry.      Capillary Refill: Capillary refill takes less than 2 seconds.   Neurological:      Mental Status: She is alert and oriented to person, place, and time.   Psychiatric:         Behavior:  Behavior normal.         Thought Content: Thought content normal.             Current Outpatient Medications on File Prior to Visit   Medication Sig    calcium carbonate (OS-CARLA) 600 mg calcium (1,500 mg) Tab Take 600 mg by mouth 2 (two) times daily with meals.    carvediloL (COREG) 12.5 MG tablet Take 1 tablet (12.5 mg total) by mouth 2 (two) times daily.    cetirizine (ZYRTEC) 10 MG tablet Take 1 tablet (10 mg total) by mouth once daily. (Patient taking differently: Take 10 mg by mouth once daily.)    docusate sodium (COLACE) 100 MG capsule Take 100 mg by mouth every other day.     levothyroxine (SYNTHROID, LEVOTHROID) 175 MCG tablet Take 1 tablet (175 mcg total) by mouth before breakfast. Brand name Synthroid    montelukast (SINGULAIR) 10 mg tablet Take 1 tablet (10 mg total) by mouth nightly.    multivitamin (THERAGRAN) per tablet Take 1 tablet by mouth once daily. Every day    olmesartan-hydrochlorothiazide (BENICAR HCT) 20-12.5 mg per tablet Take 1 tablet by mouth once daily.    potassium chloride SA (K-DUR,KLOR-CON) 20 MEQ tablet Take 1 tablet (20 mEq total) by mouth 2 (two) times daily.    PYLERA 140-125-125 mg per capsule Take 3 capsules by mouth 4 (four) times daily.    famotidine (PEPCID) 40 MG tablet Take 1 tablet (40 mg total) by mouth 2 (two) times daily. for 14 days     No current facility-administered medications on file prior to visit.       Lab Results   Component Value Date    WBC 4.62 08/17/2022    RBC 4.33 08/17/2022    HGB 10.9 (L) 08/17/2022    HCT 35.1 (L) 08/17/2022    MCV 81 (L) 08/17/2022    MCH 25.2 (L) 08/17/2022    MCHC 31.1 (L) 08/17/2022    RDW 15.6 (H) 08/17/2022     08/17/2022    MPV 10.6 08/17/2022    GRAN 1.9 08/17/2022    LYMPH 1.5 07/27/2022    LYMPH 37.1 07/27/2022    MONO 0.5 07/27/2022    MONO 11.2 07/27/2022    EOS 0.3 07/27/2022    BASO 0.04 07/27/2022    EOSINOPHIL 7.0 07/27/2022    BASOPHIL 1.0 07/27/2022         CMP:  Sodium   Date Value Ref Range  Status   07/27/2022 142 136 - 145 mmol/L Final     Potassium   Date Value Ref Range Status   07/27/2022 3.6 3.5 - 5.1 mmol/L Final     Chloride   Date Value Ref Range Status   07/27/2022 101 95 - 110 mmol/L Final     CO2   Date Value Ref Range Status   07/27/2022 31 (H) 23 - 29 mmol/L Final     Glucose   Date Value Ref Range Status   07/27/2022 89 70 - 110 mg/dL Final     BUN   Date Value Ref Range Status   07/27/2022 16 6 - 20 mg/dL Final     Creatinine   Date Value Ref Range Status   07/27/2022 1.5 (H) 0.5 - 1.4 mg/dL Final     Calcium   Date Value Ref Range Status   07/27/2022 8.0 (L) 8.7 - 10.5 mg/dL Final     Total Protein   Date Value Ref Range Status   07/27/2022 7.1 6.0 - 8.4 g/dL Final     Albumin   Date Value Ref Range Status   07/27/2022 3.7 3.5 - 5.2 g/dL Final     Total Bilirubin   Date Value Ref Range Status   07/27/2022 0.4 0.1 - 1.0 mg/dL Final     Comment:     For infants and newborns, interpretation of results should be based  on gestational age, weight and in agreement with clinical  observations.    Premature Infant recommended reference ranges:  Up to 24 hours.............<8.0 mg/dL  Up to 48 hours............<12.0 mg/dL  3-5 days..................<15.0 mg/dL  6-29 days.................<15.0 mg/dL       Alkaline Phosphatase   Date Value Ref Range Status   07/27/2022 89 55 - 135 U/L Final     AST   Date Value Ref Range Status   07/27/2022 22 10 - 40 U/L Final     ALT   Date Value Ref Range Status   07/27/2022 14 10 - 44 U/L Final     Anion Gap   Date Value Ref Range Status   07/27/2022 10 8 - 16 mmol/L Final     eGFR if    Date Value Ref Range Status   07/27/2022 46.8 (A) >60 mL/min/1.73 m^2 Final     eGFR if non    Date Value Ref Range Status   07/27/2022 40.6 (A) >60 mL/min/1.73 m^2 Final     Comment:     Calculation used to obtain the estimated glomerular filtration  rate (eGFR) is the CKD-EPI equation.          CT Chest Without Contrast  Narrative:  EXAMINATION:  CT CHEST WITHOUT CONTRAST    CLINICAL HISTORY:  Interstitial lung disease;Sarcoidosis biopsy-proven, monitor disease progression after steroid therapy; Interstitial pulmonary disease, unspecified    TECHNIQUE:  Low dose axial images, sagittal and coronal reformations were obtained from the thoracic inlet to the lung bases. Contrast was not administered.    COMPARISON:  04/13/2021    FINDINGS:  Surgical clips are noted in the expected location of the thyroid gland, please correlate for thyroidectomy.    Cholecystectomy clips are noted.    A moderate quantity of stool is noted in the region the colon, please correlate for constipation.    A hiatal hernia appears to be present with an unusual gastric pattern most likely relating to a fundoplication.  Please clinically correlate if necessary endoscopy could be performed to exclude underlying lesion.    A region of pleural thickening is noted along the left lung fissure image 244 sequence 4 stable since 04/13/2021 of 4 mm or less.  This actually appears less prominent than on the prior.    A peripheral band like consolidation is noted as well that appears slightly less evident than on the prior of 5 mm versus 8 mm image 256 sequence 4.  A small band like nodule is noted within the lingula periphery of 4 mm or less stable since the prior.  Some band like changes are noted at the left lung base of approximately 4 mm stable since 04/13/2021.    Some stable reticulonodular changes noted at the right lung apex image 119 sequence 4 stable since the prior of less than 4 mm in thickness.    Some band like changes noted within the right middle lobe of 5 mm in thickness and 7 mm in length unchanged since the prior image 62 sequence 2    A stable nodule is noted in the left lower lobe image 212 sequence 4 of less than 4 mm    A stable 3 mm nodule is noted at the right lung apex image 142 sequence 4 and image 159 sequence 4.  A nodule is noted image 198 sequence 4 that  appears smaller than on the prior of 3 mm within the right upper lobe    Within the right upper lobe anteriorly image 214 a 3 mm nodule is noted stable since the prior a 3 mm nodule is noted image 236 sequence 4 along the major fissure on the right stable since the prior    Emphysematous changes appear to be present bilaterally    Anterior bridging osteophytes are noted at multiple thoracic levels as can be seen with a diffuse idiopathic skeletal hyperostosis    The hilar nodes seen and measured on the prior 04/13/2021 are not as well evaluated without the utilization of contrast.  No obvious worrisome detrimental changes are noted in the expected locations  Impression: 1. Multiple pulmonary nodules many of which appear smaller than on 04/13/2021.  No worrisome detrimental consolidations are noted.  2. Hiatal hernia with an unusual pattern to the stomach favored relate to a fundoplication.  Please clinically correlate if necessary correlation with endoscopy or history of endoscopy could be performed.  3. Emphysematous changes  4. Bridging osteophytes at multiple thoracic levels as can be seen with a diffuse idiopathic skeletal hyperostosis    Electronically signed by: Bonilla Mathis MD  Date:    07/18/2022  Time:    12:51       All pertinent labs and imaging reviewed.    Assessment:       1. Anemia, unspecified type    2. Idiopathic eosinophilia         Plan:     Anemia, unspecified type  -     CBC Auto Differential; Future; Expected date: 08/17/2022  -     Iron and TIBC; Future; Expected date: 08/17/2022  -     FERRITIN; Future; Expected date: 08/17/2022  -     STFR; Future; Expected date: 08/17/2022  -     STOOL CARDS  -     Urinalysis    Idiopathic eosinophilia    IE:  Stable  Anemia - worsening; stool cards; labs today with VV in 2 weeks for results.  Call for any worsening s/s of anemia.    Route Chart for Scheduling    Med Onc Chart Routing      Follow up with physician    Follow up with AMAN 2 weeks. F/u in 2  weeks with results; may be VV   Infusion scheduling note    Injection scheduling note    Labs    Lab interval:  UA today; stool cards for collection; repeat CBC, CMP, Iron studies/ferritin/sTFR/retic TODAY   Imaging    Pharmacy appointment    Other referrals              Answers for HPI/ROS submitted by the patient on 8/15/2022  appetite change : No  unexpected weight change: No  mouth sores: No    Lab Results   Component Value Date    WBC 4.62 08/17/2022    HGB 10.9 (L) 08/17/2022    HCT 35.1 (L) 08/17/2022    MCV 81 (L) 08/17/2022     08/17/2022       CMP  Sodium   Date Value Ref Range Status   08/17/2022 142 136 - 145 mmol/L Final     Potassium   Date Value Ref Range Status   08/17/2022 3.9 3.5 - 5.1 mmol/L Final     Chloride   Date Value Ref Range Status   08/17/2022 104 95 - 110 mmol/L Final     CO2   Date Value Ref Range Status   08/17/2022 27 23 - 29 mmol/L Final     Glucose   Date Value Ref Range Status   08/17/2022 95 70 - 110 mg/dL Final     BUN   Date Value Ref Range Status   08/17/2022 12 6 - 20 mg/dL Final     Creatinine   Date Value Ref Range Status   08/17/2022 1.1 0.5 - 1.4 mg/dL Final     Calcium   Date Value Ref Range Status   08/17/2022 9.1 8.7 - 10.5 mg/dL Final     Total Protein   Date Value Ref Range Status   08/17/2022 8.2 6.0 - 8.4 g/dL Final     Albumin   Date Value Ref Range Status   08/17/2022 4.0 3.5 - 5.2 g/dL Final     Total Bilirubin   Date Value Ref Range Status   08/17/2022 0.4 0.1 - 1.0 mg/dL Final     Comment:     For infants and newborns, interpretation of results should be based  on gestational age, weight and in agreement with clinical  observations.    Premature Infant recommended reference ranges:  Up to 24 hours.............<8.0 mg/dL  Up to 48 hours............<12.0 mg/dL  3-5 days..................<15.0 mg/dL  6-29 days.................<15.0 mg/dL       Alkaline Phosphatase   Date Value Ref Range Status   08/17/2022 65 55 - 135 U/L Final     AST   Date Value Ref  Range Status   08/17/2022 20 10 - 40 U/L Final     ALT   Date Value Ref Range Status   08/17/2022 13 10 - 44 U/L Final     Anion Gap   Date Value Ref Range Status   08/17/2022 11 8 - 16 mmol/L Final     eGFR if    Date Value Ref Range Status   07/27/2022 46.8 (A) >60 mL/min/1.73 m^2 Final     eGFR if non    Date Value Ref Range Status   07/27/2022 40.6 (A) >60 mL/min/1.73 m^2 Final     Comment:     Calculation used to obtain the estimated glomerular filtration  rate (eGFR) is the CKD-EPI equation.        Retic 0.5 - 2.5 % 1.3  0.96      15:18   (8/17/22) 1 yr ago   (7/20/21) 2 yr ago   (6/8/20) 7 yr ago   (3/4/15) 7 yr ago   (2/18/15) 11 yr ago   (1/17/11) 13 yr ago   (9/17/08)     Specimen UA  Urine, Clean Catch  Urine, Clean Catch  Urine, Clean Catch  Urine, Clean Catch  Urine, Clean Catch  Voided  Voided    Color, UA Yellow, Straw, Antonina Yellow  Yellow  Yellow  Yellow  Yellow  Yellow R, CM  Yellow R, CM    Appearance, UA Clear Clear  Clear  Clear  Clear  Clear  Hazy Abnormal   Hazy Abnormal     pH, UA 5.0 - 8.0 6.0  6.0  6.0  6.0  6.0  5.0 R  6.0 R    Specific Gravity, UA 1.005 - 1.030 1.025  1.030  1.015  1.015  1.030  1.020  1.020    Protein, UA Negative Negative  Negative CM  Negative CM  Negative CM  Trace Abnormal  CM  Negative R  Negative R    Comment: Recommend a 24 hour urine protein or a urine   protein/creatinine ratio if globulin induced proteinuria is   clinically suspected.    Glucose, UA Negative Negative  Negative  Negative  Negative  Negative  Negative R  Negative R    Ketones, UA Negative Negative  Negative  Negative  Negative  Negative  Negative R  Negative R    Bilirubin (UA) Negative Negative  Negative  Negative  Negative  Negative  Negative  Negative    Occult Blood UA Negative Negative  Negative  Trace Abnormal   Trace Abnormal   Negative  Negative  Trace Abnormal     Nitrite, UA Negative Negative  Negative  Negative  Negative  Negative  Negative  Negative     Leukocytes, UA Negative Negative  Negative  Negative  Negative  Negative  Negative  3+ Abnormal     RBC, UA

## 2022-08-19 LAB — STFR SERPL-MCNC: 5.3 MG/L (ref 1.8–4.6)

## 2022-08-24 ENCOUNTER — ANESTHESIA EVENT (OUTPATIENT)
Dept: ENDOSCOPY | Facility: HOSPITAL | Age: 47
End: 2022-08-24
Payer: COMMERCIAL

## 2022-08-24 ENCOUNTER — HOSPITAL ENCOUNTER (OUTPATIENT)
Facility: HOSPITAL | Age: 47
Discharge: HOME | End: 2022-08-24
Attending: INTERNAL MEDICINE | Admitting: INTERNAL MEDICINE
Payer: COMMERCIAL

## 2022-08-24 ENCOUNTER — ANESTHESIA (OUTPATIENT)
Dept: ENDOSCOPY | Facility: HOSPITAL | Age: 47
End: 2022-08-24
Payer: COMMERCIAL

## 2022-08-24 DIAGNOSIS — K62.89 RECTAL PAIN: Primary | ICD-10-CM

## 2022-08-24 PROCEDURE — 37000008 HC ANESTHESIA 1ST 15 MINUTES: Performed by: INTERNAL MEDICINE

## 2022-08-24 PROCEDURE — 25000003 PHARM REV CODE 250: Performed by: NURSE ANESTHETIST, CERTIFIED REGISTERED

## 2022-08-24 PROCEDURE — 63600175 PHARM REV CODE 636 W HCPCS: Performed by: NURSE ANESTHETIST, CERTIFIED REGISTERED

## 2022-08-24 PROCEDURE — 45330 DIAGNOSTIC SIGMOIDOSCOPY: CPT | Mod: ,,, | Performed by: INTERNAL MEDICINE

## 2022-08-24 PROCEDURE — 45330 DIAGNOSTIC SIGMOIDOSCOPY: CPT | Performed by: INTERNAL MEDICINE

## 2022-08-24 PROCEDURE — 45330 PR SIGMOIDOSCOPY,DIAG2STIC: ICD-10-PCS | Mod: ,,, | Performed by: INTERNAL MEDICINE

## 2022-08-24 RX ORDER — PROPOFOL 10 MG/ML
VIAL (ML) INTRAVENOUS
Status: DISCONTINUED | OUTPATIENT
Start: 2022-08-24 | End: 2022-08-24

## 2022-08-24 RX ORDER — LIDOCAINE HYDROCHLORIDE 10 MG/ML
INJECTION, SOLUTION EPIDURAL; INFILTRATION; INTRACAUDAL; PERINEURAL
Status: DISCONTINUED | OUTPATIENT
Start: 2022-08-24 | End: 2022-08-24

## 2022-08-24 RX ORDER — SODIUM CHLORIDE, SODIUM LACTATE, POTASSIUM CHLORIDE, CALCIUM CHLORIDE 600; 310; 30; 20 MG/100ML; MG/100ML; MG/100ML; MG/100ML
INJECTION, SOLUTION INTRAVENOUS CONTINUOUS PRN
Status: DISCONTINUED | OUTPATIENT
Start: 2022-08-24 | End: 2022-08-24

## 2022-08-24 RX ADMIN — SODIUM CHLORIDE, SODIUM LACTATE, POTASSIUM CHLORIDE, AND CALCIUM CHLORIDE: 600; 310; 30; 20 INJECTION, SOLUTION INTRAVENOUS at 08:08

## 2022-08-24 RX ADMIN — PROPOFOL 70 MG: 10 INJECTION, EMULSION INTRAVENOUS at 08:08

## 2022-08-24 RX ADMIN — LIDOCAINE HYDROCHLORIDE 50 MG: 10 INJECTION, SOLUTION EPIDURAL; INFILTRATION; INTRACAUDAL; PERINEURAL at 08:08

## 2022-08-24 NOTE — H&P
Short Stay Endoscopy History and Physical    PCP - Dwayne Booth MD    Procedure - Flexible Sigmoidoscopy  ASA - 2  Mallampati - per anesthesia  History of Anesthesia problems - no  Family history Anesthesia problems -  no     HPI:  This is a 47 y.o. female here for evaluation of :   Active Hospital Problems    Diagnosis  POA    *Rectal pain [K62.89]  Yes      Resolved Hospital Problems   No resolved problems to display.         Health Maintenance       Date Due Completion Date    COVID-19 Vaccine (4 - Booster for Moderna series) 02/23/2022 11/23/2021    Pneumococcal Vaccines (Age 0-64) (2 - PCV) 08/06/2022 8/6/2021    Influenza Vaccine (1) 09/01/2022 9/23/2021    Override on 9/3/2019: Done    Mammogram 11/18/2022 11/18/2021    Lipid Panel 07/27/2027 7/27/2022    TETANUS VACCINE 12/14/2027 12/14/2017    Colorectal Cancer Screening 05/10/2029 5/10/2019          ROS:  CONSTITUTIONAL: Denies weight change,  fatigue, fevers, chills, night sweats.  CARDIOVASCULAR: Denies chest pain, shortness of breath, orthopnea and edema.  RESPIRATORY: Denies cough, hemoptysis, dyspnea, and wheezing.  GI: See HPI.    Medical History:   Past Medical History:   Diagnosis Date    ALLERGIC RHINITIS     Arthritis     Cellulitis     Constipation due to opioid therapy     Eosinophilia     mild    GERD (gastroesophageal reflux disease)     Granular cell tumor     H. pylori infection 2018    History of 2019 novel coronavirus disease (COVID-19) 10/04/2020    Hypertension     Lymphedema     Mild anemia     KEIRY on CPAP     does not regularly    Positive CHARLENE (antinuclear antibody) 7/27/2022    Prediabetes 8/6/2021    Sleep apnea     compliant with CPAP    Thyroid nodule     Urinary incontinence, mixed        Surgical History:   Past Surgical History:   Procedure Laterality Date    24 HOUR IMPEDANCE PH MONITORING OF ESOPHAGUS IN PATIENT NOT TAKING ACID REDUCING MEDICATIONS N/A 1/6/2020    Procedure: IMPEDANCE PH STUDY,  ESOPHAGEAL, 24 HOUR, IN PATIENT NOT TAKING ACID REDUCING MEDICATION;  Surgeon: First Available Tamika Panchal;  Location: [facility] ENDO;  Service: Endoscopy;  Laterality: N/A;    AXILLARY NODE DISSECTION Right     granular cell tumor    BILATERAL SALPINGO-OOPHORECTOMY (BSO)  07/21/2020    BREAST CYST ASPIRATION      left    CHOLECYSTECTOMY      COLONOSCOPY N/A 5/10/2019    Procedure: COLONOSCOPY;  Surgeon: Edgar Gamble Jr., MD;  Location: [facility];  Service: Endoscopy;  Laterality: N/A;    ENDOBRONCHIAL ULTRASOUND Bilateral 4/27/2021    Procedure: ENDOBRONCHIAL ULTRASOUND (EBUS);  Surgeon: Armando Kirby MD;  Location: [facility];  Service: Pulmonary;  Laterality: Bilateral;  need fluoroscopy    ENDOMETRIAL ABLATION      ESOPHAGEAL MANOMETRY WITH MEASUREMENT OF IMPEDANCE N/A 1/6/2020    Procedure: MANOMETRY, ESOPHAGUS, WITH IMPEDANCE MEASUREMENT;  Surgeon: First Available Tamika Panchal;  Location: [facility] ENDO;  Service: Endoscopy;  Laterality: N/A;    ESOPHAGOGASTRODUODENOSCOPY N/A 7/5/2018    Procedure: EGD (ESOPHAGOGASTRODUODENOSCOPY);  Surgeon: Edgar Gamble Jr., MD;  Location: [facility];  Service: Endoscopy;  Laterality: N/A;    ESOPHAGOGASTRODUODENOSCOPY N/A 11/23/2020    Procedure: ESOPHAGOGASTRODUODENOSCOPY (EGD);  Surgeon: Jina Kaufman MD;  Location: [facility];  Service: General;  Laterality: N/A;    EXCISION OF MASS OF ABDOMEN Left 6/18/2018    Procedure: EXCISION, MASS, ABDOMEN - flank left;  Surgeon: Armando Tate MD;  Location: [facility] OR;  Service: General;  Laterality: Left;  left flank removal of mass    FINE NEEDLE ASPIRATION      thyroid    HYSTERECTOMY  07/21/2020    INJECTION OF ANESTHETIC AGENT AROUND MEDIAL BRANCH NERVES INNERVATING LUMBAR FACET JOINT Right 2/24/2022    Procedure: Right L3, 4, 5 MBB;  Surgeon: Anam Montero MD;  Location: [facility] PAIN MGT;  Service: Pain Management;  Laterality: Right;    INJECTION OF ANESTHETIC AGENT AROUND MEDIAL BRANCH NERVES  INNERVATING LUMBAR FACET JOINT Right 3/29/2022    Procedure: Right L3, 4, 5 MBB  (2nd block);  Surgeon: Anam Montero MD;  Location: [facility] PAIN MGT;  Service: Pain Management;  Laterality: Right;    KNEE ARTHROSCOPY W/ MENISCECTOMY Right     NASAL SEPTUM SURGERY      RADIOFREQUENCY THERMOCOAGULATION Right 4/5/2022    Procedure: Right L3-5 Lumbar RFA;  Surgeon: Anam Montero MD;  Location: [facility] PAIN MGT;  Service: Pain Management;  Laterality: Right;    ROBOT-ASSISTED NISSEN FUNDOPLICATION USING DA TAMAR XI N/A 2/28/2020    Procedure: XI ROBOTIC FUNDOPLICATION, NISSEN;  Surgeon: Jina Kaufman MD;  Location: [facility] OR;  Service: General;  Laterality: N/A;    THYROIDECTOMY Bilateral 3/11/2021    Procedure: THYROIDECTOMY;  Surgeon: Nixon Moe MD;  Location: [facility] OR;  Service: ENT;  Laterality: Bilateral;    TUBAL LIGATION      UPPER GASTROINTESTINAL ENDOSCOPY  07/05/2018    Dr. Gamble       Family History:   Family History   Problem Relation Age of Onset    Diabetes Mother     Kidney failure Mother     Heart attack Mother     Breast cancer Maternal Grandmother     Breast cancer Maternal Aunt     Ovarian cancer Cousin     Breast cancer Cousin     Blindness Father     Cirrhosis Neg Hx     Colon polyps Neg Hx     Colon cancer Neg Hx     Crohn's disease Neg Hx     Esophageal cancer Neg Hx     Stomach cancer Neg Hx     Ulcerative colitis Neg Hx     Amblyopia Neg Hx     Cataracts Neg Hx     Glaucoma Neg Hx     Macular degeneration Neg Hx     Retinal detachment Neg Hx     Strabismus Neg Hx     Allergic rhinitis Neg Hx     Allergies Neg Hx     Angioedema Neg Hx     Asthma Neg Hx     Atopy Neg Hx     Eczema Neg Hx     Immunodeficiency Neg Hx     Rhinitis Neg Hx     Urticaria Neg Hx        Social History:   Social History     Tobacco Use    Smoking status: Never Smoker    Smokeless tobacco: Never Used   Substance Use Topics    Alcohol use: No    Drug use: No        Allergies:   Review of patient's allergies indicates:   Allergen Reactions    Biaxin [clarithromycin] Swelling    Omeprazole magnesium Swelling and Other (See Comments)    Prevacid [lansoprazole] Swelling     Lip swelling- was taking this along with blood pressure medication: benazepril; not sure which caused it. Reports she has taken OTC prilosec without any problems.    Ace inhibitors Swelling     Facial swelling    Benazepril Swelling     Lip swelling       Medications:   No current facility-administered medications on file prior to encounter.     Current Outpatient Medications on File Prior to Encounter   Medication Sig Dispense Refill    calcium carbonate (OS-CARLA) 600 mg calcium (1,500 mg) Tab Take 600 mg by mouth 2 (two) times daily with meals.      carvediloL (COREG) 12.5 MG tablet Take 1 tablet (12.5 mg total) by mouth 2 (two) times daily. 180 tablet 1    cetirizine (ZYRTEC) 10 MG tablet Take 1 tablet (10 mg total) by mouth once daily. (Patient taking differently: Take 10 mg by mouth once daily.)  0    docusate sodium (COLACE) 100 MG capsule Take 100 mg by mouth every other day.       multivitamin (THERAGRAN) per tablet Take 1 tablet by mouth once daily. Every day         Physical Exam:  Vital Signs: There were no vitals filed for this visit.  General Appearance: Well appearing in no acute distress  ENT: OP clear  Chest: CTA B  CV: RRR, no m/r/g  Abd: s/nt/nd/nabs  Ext: no edema    Labs:Reviewed    IMP:  Active Hospital Problems    Diagnosis  POA    *Rectal pain [K62.89]  Yes      Resolved Hospital Problems   No resolved problems to display.         Plan:   I have explained the risks and benefits of colonoscopy to the patient including but not limited to bleeding, perforation, infection, and death. The patient wishes to proceed.

## 2022-08-24 NOTE — DISCHARGE SUMMARY
[facility] - Endoscopy (Hospital)  Discharge Note  Short Stay    Procedure(s) (LRB):  SIGMOIDOSCOPY, FLEXIBLE (N/A)    OUTCOME: Patient tolerated treatment/procedure well without complication and is now ready for discharge.    DISPOSITION: Home or Self Care    FINAL DIAGNOSIS:  Rectal pain    FOLLOWUP: With primary care provider    DISCHARGE INSTRUCTIONS:  No discharge procedures on file.

## 2022-08-24 NOTE — ANESTHESIA POSTPROCEDURE EVALUATION
Anesthesia Post Evaluation    Patient: Akiko Jameson    Procedure(s) Performed: Procedure(s) (LRB):  SIGMOIDOSCOPY, FLEXIBLE (N/A)    Final Anesthesia Type: MAC      Patient location during evaluation: GI PACU  Patient participation: Yes- Able to Participate  Level of consciousness: awake and alert and oriented  Post-procedure vital signs: reviewed and stable  Pain management: adequate  Airway patency: patent  KEIRY mitigation strategies: Multimodal analgesia  PONV status at discharge: No PONV  Anesthetic complications: no      Cardiovascular status: hemodynamically stable  Respiratory status: unassisted, spontaneous ventilation and room air  Hydration status: euvolemic  Follow-up not needed.          Vitals Value Taken Time   /55 08/24/22 0857   Temp 36.7 °C (98.1 °F) 08/24/22 0848   Pulse 77 08/24/22 0857   Resp 16 08/24/22 0857   SpO2 98 % 08/24/22 0857         No case tracking events are documented in the log.      Pain/Cele Score: Cele Score: 10 (8/24/2022  8:59 AM)

## 2022-08-24 NOTE — [employer] PATIENT INSTRUCTIONS
Discharge Summary/Instructions after an Endoscopic Procedure  Patient Name: Akiko Jameson  Patient MRN: [id number]  Patient YOB: 1975 Wednesday, August 24, 2022 Amber West MD  Dear patient,  As a result of recent federal legislation (The Federal Cures Act), you may   receive lab or pathology results from your procedure in your [facility]   account before your physician is able to contact you. Your physician or   their representative will relay the results to you with their   recommendations at their soonest availability.  Thank you,  RESTRICTIONS:  During your procedure today, you received medications for sedation.  These   medications may affect your judgment, balance and coordination.  Therefore,   for 24 hours, you have the following restrictions:   - DO NOT drive a car, operate machinery, make legal/financial decisions,   sign important papers or drink alcohol.    ACTIVITY:  Today: no heavy lifting, straining or running due to procedural   sedation/anesthesia.  The following day: return to full activity including work.  DIET:  Eat and drink normally unless instructed otherwise.     TREATMENT FOR COMMON SIDE EFFECTS:  - Mild abdominal pain, nausea, belching, bloating or excessive gas:  rest,   eat lightly and use a heating pad.  - Sore Throat: treat with throat lozenges and/or gargle with warm salt   water.  - Because air was used during the procedure, expelling large amounts of air   from your rectum or belching is normal.  - If a bowel prep was taken, you may not have a bowel movement for 1-3 days.    This is normal.  SYMPTOMS TO WATCH FOR AND REPORT TO YOUR PHYSICIAN:  1. Abdominal pain or bloating, other than gas cramps.  2. Chest pain.  3. Back pain.  4. Signs of infection such as: chills or fever occurring within 24 hours   after the procedure.  5. Rectal bleeding, which would show as bright red, maroon, or black stools.   (A tablespoon of blood from the rectum is not serious, especially  if   hemorrhoids are present.)  6. Vomiting.  7. Weakness or dizziness.  GO DIRECTLY TO THE NEAREST EMERGENCY ROOM IF YOU HAVE ANY OF THE FOLLOWING:      Difficulty breathing              Chills and/or fever over 101 F   Persistent vomiting and/or vomiting blood   Severe abdominal pain   Severe chest pain   Black, tarry stools   Bleeding- more than one tablespoon   Any other symptom or condition that you feel may need urgent attention  Your doctor recommends these additional instructions:  If any biopsies were taken, your doctors clinic will contact you in 1 to 2   weeks with any results.  - Discharge patient to home (via wheelchair).  For questions, problems or results please call your physician Amber West MD at Work:  [phone]  If you have any questions about the above instructions, call the GI   department at [phone] or call the endoscopy unit at [phone]   from 7am until 3 pm.  [facility] [place], EMERGENCY ROOM PHONE NUMBER:   [phone]  IF A COMPLICATION OR EMERGENCY SITUATION ARISES AND YOU ARE UNABLE TO REACH   YOUR PHYSICIAN - GO DIRECTLY TO THE EMERGENCY ROOM.  I have read or have had read to me these discharge instructions for my   procedure and have received a written copy.  I understand these   instructions and will follow-up with my physician if I have any questions.     __________________________________       _____________________________________  Nurse Signature                                          Patient/Designated   Responsible Party Signature  MD Amber Vizcaino MD  8/24/2022 8:48:39 AM  [employer]

## 2022-08-24 NOTE — TRANSFER OF CARE
"Anesthesia Transfer of Care Note    Patient: Akiko Jameson    Procedure(s) Performed: Procedure(s) (LRB):  SIGMOIDOSCOPY, FLEXIBLE (N/A)    Patient location: GI    Anesthesia Type: MAC    Transport from OR: Transported from OR on room air with adequate spontaneous ventilation    Post pain: adequate analgesia    Post assessment: no apparent anesthetic complications and tolerated procedure well    Post vital signs: stable    Level of consciousness: awake, alert and oriented    Nausea/Vomiting: no nausea/vomiting    Complications: none    Transfer of care protocol was followed      Last vitals:   Visit Vitals  BP (!) 152/81 (BP Location: Left arm, Patient Position: Lying)   Pulse 75   Temp 36.8 °C (98.2 °F)   Resp 18   Ht 5' 3" (1.6 m)   Wt 115.7 kg (255 lb)   SpO2 99%   Breastfeeding No   BMI 45.17 kg/m²     "

## 2022-08-24 NOTE — ANESTHESIA PREPROCEDURE EVALUATION
08/24/2022  Akiko Jameson is a 47 y.o., female.      Pre-op Assessment    I have reviewed the Patient Summary Reports.    I have reviewed the NPO Status.   I have reviewed the Medications.     Review of Systems  Anesthesia Hx:  No problems with previous Anesthesia    Social:  Non-Smoker, No Alcohol Use    Hematology/Oncology:     Oncology Normal    -- Anemia:   EENT/Dental:EENT/Dental Normal   Cardiovascular:   Hypertension, well controlled    Pulmonary:   Sleep Apnea, CPAP    Education provided regarding risk of obstructive sleep apnea     Renal/:  Renal/ Normal     Hepatic/GI:   GERD, well controlled    Musculoskeletal:   Arthritis     Neurological:  Neurology Normal    Endocrine:   Hypothyroidism Prediabetes  Obesity / BMI > 30  Dermatological:  Skin Normal    Psych:  Psychiatric Normal           Physical Exam  General: Well nourished, Cooperative, Alert and Oriented    Airway:  Mallampati: II   Mouth Opening: Normal  TM Distance: Normal  Tongue: Normal  Neck ROM: Normal ROM    Dental:  Intact        Anesthesia Plan  Type of Anesthesia, risks & benefits discussed:    Anesthesia Type: MAC  Intra-op Monitoring Plan: Standard ASA Monitors  Post Op Pain Control Plan: multimodal analgesia  Informed Consent: Informed consent signed with the Patient and all parties understand the risks and agree with anesthesia plan.  All questions answered.   ASA Score: 3  Day of Surgery Review of History & Physical: H&P Update referred to the surgeon/provider.    Ready For Surgery From Anesthesia Perspective.     .

## 2022-08-24 NOTE — ANESTHESIA RELEASE NOTE
"Anesthesia Release from PACU Note    Patient: Akiko Jameson    Procedure(s) Performed: Procedure(s) (LRB):  SIGMOIDOSCOPY, FLEXIBLE (N/A)    Anesthesia type: MAC    Post pain: Adequate analgesia    Post assessment: no apparent anesthetic complications and tolerated procedure well    Last Vitals:   Visit Vitals  BP (!) 116/55 (BP Location: Right arm, Patient Position: Lying)   Pulse 77   Temp 36.7 °C (98.1 °F) (Temporal)   Resp 16   Ht 5' 3" (1.6 m)   Wt 115.7 kg (255 lb)   SpO2 98%   Breastfeeding No   BMI 45.17 kg/m²       Post vital signs: stable    Level of consciousness: awake, alert  and oriented    Nausea/Vomiting: no nausea/no vomiting    Complications: none    Airway Patency: patent    Respiratory: unassisted, spontaneous ventilation, room air    Cardiovascular: stable and blood pressure at baseline    Hydration: euvolemic  "

## 2022-08-25 VITALS
RESPIRATION RATE: 18 BRPM | DIASTOLIC BLOOD PRESSURE: 95 MMHG | HEIGHT: 63 IN | BODY MASS INDEX: 45.18 KG/M2 | TEMPERATURE: 98 F | WEIGHT: 255 LBS | HEART RATE: 80 BPM | SYSTOLIC BLOOD PRESSURE: 148 MMHG | OXYGEN SATURATION: 96 %

## 2022-08-26 ENCOUNTER — TELEPHONE (OUTPATIENT)
Dept: HEMATOLOGY/ONCOLOGY | Facility: CLINIC | Age: 47
End: 2022-08-26
Payer: COMMERCIAL

## 2022-08-26 ENCOUNTER — CLINICAL SUPPORT (OUTPATIENT)
Dept: FAMILY MEDICINE | Facility: CLINIC | Age: 47
End: 2022-08-26
Payer: COMMERCIAL

## 2022-08-26 ENCOUNTER — LAB VISIT (OUTPATIENT)
Dept: LAB | Facility: HOSPITAL | Age: 47
End: 2022-08-26
Attending: NURSE PRACTITIONER
Payer: COMMERCIAL

## 2022-08-26 VITALS — DIASTOLIC BLOOD PRESSURE: 81 MMHG | HEART RATE: 76 BPM | SYSTOLIC BLOOD PRESSURE: 129 MMHG

## 2022-08-26 DIAGNOSIS — D63.8 CHRONIC DISEASE ANEMIA: ICD-10-CM

## 2022-08-26 DIAGNOSIS — D63.8 CHRONIC DISEASE ANEMIA: Primary | ICD-10-CM

## 2022-08-26 DIAGNOSIS — D64.9 ANEMIA, UNSPECIFIED TYPE: ICD-10-CM

## 2022-08-26 PROCEDURE — 99999 PR PBB SHADOW E&M-EST. PATIENT-LVL II: ICD-10-PCS | Mod: PBBFAC,,,

## 2022-08-26 PROCEDURE — 90471 IMMUNIZATION ADMIN: CPT | Mod: S$GLB,,, | Performed by: FAMILY MEDICINE

## 2022-08-26 PROCEDURE — 90677 PNEUMOCOCCAL CONJUGATE VACCINE 20-VALENT: ICD-10-PCS | Mod: S$GLB,,, | Performed by: FAMILY MEDICINE

## 2022-08-26 PROCEDURE — 90471 PNEUMOCOCCAL CONJUGATE VACCINE 20-VALENT: ICD-10-PCS | Mod: S$GLB,,, | Performed by: FAMILY MEDICINE

## 2022-08-26 PROCEDURE — 82272 OCCULT BLD FECES 1-3 TESTS: CPT | Mod: 91 | Performed by: NURSE PRACTITIONER

## 2022-08-26 PROCEDURE — 99999 PR PBB SHADOW E&M-EST. PATIENT-LVL II: CPT | Mod: PBBFAC,,,

## 2022-08-26 PROCEDURE — 90677 PCV20 VACCINE IM: CPT | Mod: S$GLB,,, | Performed by: FAMILY MEDICINE

## 2022-08-26 NOTE — Clinical Note
Pt reports to the clinic for routine blood pressure check. Pt's blood pressure today was 76. Heart rate 129/81.

## 2022-08-26 NOTE — TELEPHONE ENCOUNTER
Order for stool cards entered as requested by lab----- Message from Yamilet Briscoe sent at 8/26/2022  9:53 AM CDT -----  Type: Needs Medical Advice  Who Called:   Brenda with [facility]  Symptoms (please be specific):    How long has patient had these symptoms:    Pharmacy name and phone #:    Best Call Back Number:   Additional Information: Brenda is requesting a call back for new orders for stool cards.

## 2022-08-26 NOTE — PROGRESS NOTES
Pt reports to the clinic for routine blood pressure check. Pt's blood pressure today was 129/81. Heart rate 76. Pt is asymptomatic. Message routed to Dwayne Booth MD

## 2022-08-27 LAB
OB PNL STL: NEGATIVE

## 2022-08-31 ENCOUNTER — OFFICE VISIT (OUTPATIENT)
Dept: HEMATOLOGY/ONCOLOGY | Facility: CLINIC | Age: 47
End: 2022-08-31
Payer: COMMERCIAL

## 2022-08-31 DIAGNOSIS — D50.9 IRON DEFICIENCY ANEMIA, UNSPECIFIED IRON DEFICIENCY ANEMIA TYPE: ICD-10-CM

## 2022-08-31 PROCEDURE — 99212 PR OFFICE/OUTPT VISIT, EST, LEVL II, 10-19 MIN: ICD-10-PCS | Mod: 95,,, | Performed by: NURSE PRACTITIONER

## 2022-08-31 PROCEDURE — 3044F PR MOST RECENT HEMOGLOBIN A1C LEVEL <7.0%: ICD-10-PCS | Mod: CPTII,95,, | Performed by: NURSE PRACTITIONER

## 2022-08-31 PROCEDURE — 99212 OFFICE O/P EST SF 10 MIN: CPT | Mod: 95,,, | Performed by: NURSE PRACTITIONER

## 2022-08-31 PROCEDURE — 3044F HG A1C LEVEL LT 7.0%: CPT | Mod: CPTII,95,, | Performed by: NURSE PRACTITIONER

## 2022-08-31 RX ORDER — FAMOTIDINE 20 MG/1
20 TABLET, FILM COATED ORAL
Status: CANCELLED
Start: 2022-09-06

## 2022-08-31 RX ORDER — DIPHENHYDRAMINE HYDROCHLORIDE 50 MG/ML
25 INJECTION INTRAMUSCULAR; INTRAVENOUS
Status: CANCELLED
Start: 2022-09-06

## 2022-08-31 RX ORDER — METHYLPREDNISOLONE SOD SUCC 125 MG
125 VIAL (EA) INJECTION ONCE AS NEEDED
Status: CANCELLED | OUTPATIENT
Start: 2022-09-06

## 2022-08-31 RX ORDER — DIPHENHYDRAMINE HYDROCHLORIDE 50 MG/ML
50 INJECTION INTRAMUSCULAR; INTRAVENOUS ONCE AS NEEDED
Status: CANCELLED | OUTPATIENT
Start: 2022-09-06

## 2022-08-31 RX ORDER — DEXAMETHASONE SODIUM PHOSPHATE 4 MG/ML
4 INJECTION, SOLUTION INTRA-ARTICULAR; INTRALESIONAL; INTRAMUSCULAR; INTRAVENOUS; SOFT TISSUE
Status: CANCELLED
Start: 2022-09-06

## 2022-08-31 RX ORDER — SODIUM CHLORIDE 0.9 % (FLUSH) 0.9 %
10 SYRINGE (ML) INJECTION
Status: CANCELLED | OUTPATIENT
Start: 2022-09-06

## 2022-08-31 RX ORDER — EPINEPHRINE 0.3 MG/.3ML
0.3 INJECTION SUBCUTANEOUS ONCE AS NEEDED
Status: CANCELLED | OUTPATIENT
Start: 2022-09-06

## 2022-08-31 RX ORDER — HEPARIN 100 UNIT/ML
500 SYRINGE INTRAVENOUS
Status: CANCELLED | OUTPATIENT
Start: 2022-09-06

## 2022-08-31 NOTE — PROGRESS NOTES
Subjective:    The patient location is: Home  The chief complaint leading to consultation is: Review of labs      Visit type: audiovisual    Face to Face time with patient: 15 minutes of total time spent on the encounter, which includes face to face time and non-face to face time preparing to see the patient (eg, review of tests), Obtaining and/or reviewing separately obtained history, Documenting clinical information in the electronic or other health record, Independently interpreting results (not separately reported) and communicating results to the patient/family/caregiver, or Care coordination (not separately reported).     Each patient to whom he or she provides medical services by telemedicine is:  (1) informed of the relationship between the physician and patient and the respective role of any other health care provider with respect to management of the patient; and (2) notified that he or she may decline to receive medical services by telemedicine and may withdraw from such care at any time.    Name: Akiko Jameson  : 1975  MRN: [id number]    CC:  Follow up on labs    HPI:   Akiko Jameson is a 47 y.o. female presents via Telemed for review of recent labs.    This is a 47-year-old black female known to Dr. Caicedo for anemia of chronic disease as well as idiopathic eosinophilia.    Unilateral bone marrow aspiration/biopsy in , which did not reveal any clonal population of cells on flow and no evidence of dysplasia within the marrow.    Surveilled annually.    She also suffers with chronic lymphedema to RUE from lymphadenectomy.  EBUS in  due to mediastinal lymphadenopathy/nodule that was later diagnosed as Sarcoidosis, followed by Dr. Kirby.     Last visit on 22 patient had c/o of fatigue & increased sleepiness.    Recent sigmoid scope on  did not reveal a bleeding source.  Occult blood x 3 = negative  Labs reviewed indicating MARITZA.  Denies CP, SOB, pica, palpitations,  etc.    Past Medical History:   Diagnosis Date    ALLERGIC RHINITIS     Arthritis     Cellulitis     Constipation due to opioid therapy     Eosinophilia     mild    GERD (gastroesophageal reflux disease)     Granular cell tumor     H. pylori infection 2018    History of 2019 novel coronavirus disease (COVID-19) 10/04/2020    Hypertension     Lymphedema     Mild anemia     KEIRY on CPAP     does not regularly    Positive CHARLENE (antinuclear antibody) 07/27/2022    Prediabetes 08/06/2021    Sarcoidosis, unspecified     Sleep apnea     compliant with CPAP    Thyroid nodule     Urinary incontinence, mixed        Past Surgical History:   Procedure Laterality Date    24 HOUR IMPEDANCE PH MONITORING OF ESOPHAGUS IN PATIENT NOT TAKING ACID REDUCING MEDICATIONS N/A 1/6/2020    Procedure: IMPEDANCE PH STUDY, ESOPHAGEAL, 24 HOUR, IN PATIENT NOT TAKING ACID REDUCING MEDICATION;  Surgeon: First Available Tamika Panchal;  Location: [facility];  Service: Endoscopy;  Laterality: N/A;    AXILLARY NODE DISSECTION Right     granular cell tumor    BILATERAL SALPINGO-OOPHORECTOMY (BSO)  07/21/2020    BREAST CYST ASPIRATION      left    CHOLECYSTECTOMY      COLONOSCOPY N/A 5/10/2019    Procedure: COLONOSCOPY;  Surgeon: Edgar Gamble Jr., MD;  Location: [facility];  Service: Endoscopy;  Laterality: N/A;    ENDOBRONCHIAL ULTRASOUND Bilateral 4/27/2021    Procedure: ENDOBRONCHIAL ULTRASOUND (EBUS);  Surgeon: Armando Kirby MD;  Location: [facility];  Service: Pulmonary;  Laterality: Bilateral;  need fluoroscopy    ENDOMETRIAL ABLATION      ESOPHAGEAL MANOMETRY WITH MEASUREMENT OF IMPEDANCE N/A 1/6/2020    Procedure: MANOMETRY, ESOPHAGUS, WITH IMPEDANCE MEASUREMENT;  Surgeon: First Available Jarreau;  Location: [facility];  Service: Endoscopy;  Laterality: N/A;    ESOPHAGOGASTRODUODENOSCOPY N/A 7/5/2018    Procedure: EGD (ESOPHAGOGASTRODUODENOSCOPY);  Surgeon: Edgar Gamble Jr., MD;  Location: [facility];  Service: Endoscopy;  Laterality: N/A;     ESOPHAGOGASTRODUODENOSCOPY N/A 11/23/2020    Procedure: ESOPHAGOGASTRODUODENOSCOPY (EGD);  Surgeon: Jina Kaufman MD;  Location: [facility] ENDO;  Service: General;  Laterality: N/A;    EXCISION OF MASS OF ABDOMEN Left 6/18/2018    Procedure: EXCISION, MASS, ABDOMEN - flank left;  Surgeon: Armando Tate MD;  Location: [facility] OR;  Service: General;  Laterality: Left;  left flank removal of mass    FINE NEEDLE ASPIRATION      thyroid    FLEXIBLE SIGMOIDOSCOPY N/A 8/24/2022    Procedure: SIGMOIDOSCOPY, FLEXIBLE;  Surgeon: Amber West MD;  Location: [facility] ENDO;  Service: Endoscopy;  Laterality: N/A;    HYSTERECTOMY  07/21/2020    INJECTION OF ANESTHETIC AGENT AROUND MEDIAL BRANCH NERVES INNERVATING LUMBAR FACET JOINT Right 2/24/2022    Procedure: Right L3, 4, 5 MBB;  Surgeon: Anam Montero MD;  Location: [facility] PAIN MGT;  Service: Pain Management;  Laterality: Right;    INJECTION OF ANESTHETIC AGENT AROUND MEDIAL BRANCH NERVES INNERVATING LUMBAR FACET JOINT Right 3/29/2022    Procedure: Right L3, 4, 5 MBB  (2nd block);  Surgeon: Anam Montero MD;  Location: [facility] PAIN MGT;  Service: Pain Management;  Laterality: Right;    KNEE ARTHROSCOPY W/ MENISCECTOMY Right     NASAL SEPTUM SURGERY      RADIOFREQUENCY THERMOCOAGULATION Right 4/5/2022    Procedure: Right L3-5 Lumbar RFA;  Surgeon: Anam Montero MD;  Location: [facility] PAIN MGT;  Service: Pain Management;  Laterality: Right;    ROBOT-ASSISTED NISSEN FUNDOPLICATION USING DA TAMAR XI N/A 2/28/2020    Procedure: XI ROBOTIC FUNDOPLICATION, NISSEN;  Surgeon: Jina Kaufman MD;  Location: [facility] OR;  Service: General;  Laterality: N/A;    THYROIDECTOMY Bilateral 3/11/2021    Procedure: THYROIDECTOMY;  Surgeon: Nixon Moe MD;  Location: [facility] OR;  Service: ENT;  Laterality: Bilateral;    TUBAL LIGATION      UPPER GASTROINTESTINAL ENDOSCOPY  07/05/2018    Dr. Gamble       Family History   Problem Relation Age of Onset    Diabetes Mother     Kidney  failure Mother     Heart attack Mother     Breast cancer Maternal Grandmother     Breast cancer Maternal Aunt     Ovarian cancer Cousin     Breast cancer Cousin     Blindness Father     Cirrhosis Neg Hx     Colon polyps Neg Hx     Colon cancer Neg Hx     Crohn's disease Neg Hx     Esophageal cancer Neg Hx     Stomach cancer Neg Hx     Ulcerative colitis Neg Hx     Amblyopia Neg Hx     Cataracts Neg Hx     Glaucoma Neg Hx     Macular degeneration Neg Hx     Retinal detachment Neg Hx     Strabismus Neg Hx     Allergic rhinitis Neg Hx     Allergies Neg Hx     Angioedema Neg Hx     Asthma Neg Hx     Atopy Neg Hx     Eczema Neg Hx     Immunodeficiency Neg Hx     Rhinitis Neg Hx     Urticaria Neg Hx        Social History     Socioeconomic History    Marital status:     Number of children: 2   Occupational History     Employer: [employer]   Tobacco Use    Smoking status: Never    Smokeless tobacco: Never   Substance and Sexual Activity    Alcohol use: No    Drug use: No    Sexual activity: Yes     Partners: Male     Social Determinants of Health     Financial Resource Strain: Low Risk     Difficulty of Paying Living Expenses: Not hard at all   Food Insecurity: No Food Insecurity    Worried About Running Out of Food in the Last Year: Never true    Ran Out of Food in the Last Year: Never true   Transportation Needs: No Transportation Needs    Lack of Transportation (Medical): No    Lack of Transportation (Non-Medical): No   Physical Activity: Insufficiently Active    Days of Exercise per Week: 2 days    Minutes of Exercise per Session: 30 min   Stress: No Stress Concern Present    Feeling of Stress : Not at all   Social Connections: Unknown    Frequency of Communication with Friends and Family: More than three times a week    Frequency of Social Gatherings with Friends and Family: Twice a week    Active Member of Clubs or Organizations: Yes    Attends Club or Organization Meetings: More than 4 times per year    Marital  Status:    Housing Stability: Low Risk     Unable to Pay for Housing in the Last Year: No    Number of Places Lived in the Last Year: 1    Unstable Housing in the Last Year: No       Review of patient's allergies indicates:   Allergen Reactions    Biaxin [clarithromycin] Swelling    Omeprazole magnesium Swelling and Other (See Comments)    Prevacid [lansoprazole] Swelling     Lip swelling- was taking this along with blood pressure medication: benazepril; not sure which caused it. Reports she has taken OTC prilosec without any problems.    Ace inhibitors Swelling     Facial swelling    Benazepril Swelling     Lip swelling       Review of Systems   Constitutional: Positive for malaise/fatigue.   Eyes:  Negative for visual disturbance.   Cardiovascular:  Negative for chest pain, dyspnea on exertion and irregular heartbeat.   Respiratory:  Negative for cough and shortness of breath.    Hematologic/Lymphatic: Negative for adenopathy.   Skin:  Negative for rash.   Musculoskeletal:  Negative for back pain.   Gastrointestinal:  Negative for abdominal pain and diarrhea.   Genitourinary:  Negative for frequency.   Neurological:  Negative for headaches.   Psychiatric/Behavioral:  The patient is not nervous/anxious.           Objective:     There were no vitals filed for this visit.       Physical Exam  HENT:      Head: Normocephalic and atraumatic.   Pulmonary:      Effort: Pulmonary effort is normal.   Neurological:      Mental Status: She is alert and oriented to person, place, and time.   Psychiatric:         Behavior: Behavior normal.         Thought Content: Thought content normal.           Current Outpatient Medications on File Prior to Visit   Medication Sig    calcium carbonate (OS-CARLA) 600 mg calcium (1,500 mg) Tab Take 600 mg by mouth 2 (two) times daily with meals.    carvediloL (COREG) 12.5 MG tablet Take 1 tablet (12.5 mg total) by mouth 2 (two) times daily.    cetirizine (ZYRTEC) 10 MG tablet Take 1  tablet (10 mg total) by mouth once daily. (Patient taking differently: Take 10 mg by mouth once daily.)    docusate sodium (COLACE) 100 MG capsule Take 100 mg by mouth every other day.     levothyroxine (SYNTHROID, LEVOTHROID) 175 MCG tablet Take 1 tablet (175 mcg total) by mouth before breakfast. Brand name Synthroid    montelukast (SINGULAIR) 10 mg tablet Take 1 tablet (10 mg total) by mouth nightly.    multivitamin (THERAGRAN) per tablet Take 1 tablet by mouth once daily. Every day    olmesartan-hydrochlorothiazide (BENICAR HCT) 20-12.5 mg per tablet Take 1 tablet by mouth once daily.    potassium chloride SA (K-DUR,KLOR-CON) 20 MEQ tablet Take 1 tablet (20 mEq total) by mouth 2 (two) times daily.     No current facility-administered medications on file prior to visit.       Lab Results   Component Value Date    WBC 4.62 08/17/2022    RBC 4.33 08/17/2022    HGB 10.9 (L) 08/17/2022    HCT 35.1 (L) 08/17/2022    MCV 81 (L) 08/17/2022    MCH 25.2 (L) 08/17/2022    MCHC 31.1 (L) 08/17/2022    RDW 15.6 (H) 08/17/2022     08/17/2022    MPV 10.6 08/17/2022    GRAN 1.9 08/17/2022    LYMPH 1.5 07/27/2022    LYMPH 37.1 07/27/2022    MONO 0.5 07/27/2022    MONO 11.2 07/27/2022    EOS 0.3 07/27/2022    BASO 0.04 07/27/2022    EOSINOPHIL 7.0 07/27/2022    BASOPHIL 1.0 07/27/2022         CMP:  Sodium   Date Value Ref Range Status   08/17/2022 142 136 - 145 mmol/L Final     Potassium   Date Value Ref Range Status   08/17/2022 3.9 3.5 - 5.1 mmol/L Final     Chloride   Date Value Ref Range Status   08/17/2022 104 95 - 110 mmol/L Final     CO2   Date Value Ref Range Status   08/17/2022 27 23 - 29 mmol/L Final     Glucose   Date Value Ref Range Status   08/17/2022 95 70 - 110 mg/dL Final     BUN   Date Value Ref Range Status   08/17/2022 12 6 - 20 mg/dL Final     Creatinine   Date Value Ref Range Status   08/17/2022 1.1 0.5 - 1.4 mg/dL Final     Calcium   Date Value Ref Range Status   08/17/2022 9.1 8.7 - 10.5 mg/dL Final      Total Protein   Date Value Ref Range Status   08/17/2022 8.2 6.0 - 8.4 g/dL Final     Albumin   Date Value Ref Range Status   08/17/2022 4.0 3.5 - 5.2 g/dL Final     Total Bilirubin   Date Value Ref Range Status   08/17/2022 0.4 0.1 - 1.0 mg/dL Final     Comment:     For infants and newborns, interpretation of results should be based  on gestational age, weight and in agreement with clinical  observations.    Premature Infant recommended reference ranges:  Up to 24 hours.............<8.0 mg/dL  Up to 48 hours............<12.0 mg/dL  3-5 days..................<15.0 mg/dL  6-29 days.................<15.0 mg/dL       Alkaline Phosphatase   Date Value Ref Range Status   08/17/2022 65 55 - 135 U/L Final     AST   Date Value Ref Range Status   08/17/2022 20 10 - 40 U/L Final     ALT   Date Value Ref Range Status   08/17/2022 13 10 - 44 U/L Final     Anion Gap   Date Value Ref Range Status   08/17/2022 11 8 - 16 mmol/L Final     eGFR if    Date Value Ref Range Status   07/27/2022 46.8 (A) >60 mL/min/1.73 m^2 Final     eGFR if non    Date Value Ref Range Status   07/27/2022 40.6 (A) >60 mL/min/1.73 m^2 Final     Comment:     Calculation used to obtain the estimated glomerular filtration  rate (eGFR) is the CKD-EPI equation.        Lab Results   Component Value Date    IRON 35 08/17/2022    TIBC 317 08/17/2022    FERRITIN 95 08/17/2022     Iron sats = 11%  sTFR = 5.3     UA:  WNL  Retic:  WNL    08/24/22:  Sigmoid scope:  Impression:            - The entire examined colon is normal.                          - No specimens collected.     All pertinent labs and imaging reviewed.    Assessment:       1. Iron deficiency anemia, unspecified iron deficiency anemia type         Plan:     Iron deficiency anemia, unspecified iron deficiency anemia type    Other orders  -     FERUMOXYTOL 510 MG/117 ML D5W (READY TO MIX SYSTEM) IVPB 510 mg  -     heparin, porcine (PF) 100 unit/mL injection flush 500  Units  -     sodium chloride 0.9% flush 10 mL  -     sodium chloride 0.9% 100 mL flush bag  -     EPINEPHrine (EPIPEN) 0.3 mg/0.3 mL pen injection 0.3 mg  -     diphenhydrAMINE injection 50 mg  -     methylPREDNISolone sodium succinate injection 125 mg  -     sodium chloride 0.9% bolus 1,000 mL  -     dexamethasone injection 4 mg  -     famotidine tablet 20 mg  -     diphenhydrAMINE injection 25 mg  IE:  Stable  MARITZA - arrange for Feraheme x 2; f/u with CBC in 3 weeks; can be VV  Call for any worsening s/s of anemia.    Route Chart for Scheduling    Med Onc Chart Routing      Follow up with physician    Follow up with AMAN . F/u 3 weeks from 2nd infusion with CBC; may be VV   Infusion scheduling note Feraheme x 2 (3 to 5 days apart)   Injection scheduling note    Labs    Imaging    Pharmacy appointment    Other referrals          Therapy Plan Information  Pre-Medications  dexamethasone injection 4 mg  4 mg, Intravenous, Every visit  famotidine tablet 20 mg  20 mg, Oral, Every visit  diphenhydrAMINE injection 25 mg  25 mg, Intravenous, Every visit  Medications  FERUMOXYTOL 510 MG/117 ML D5W (READY TO MIX SYSTEM) IVPB 510 mg  510 mg, Intravenous, Every visit  Flushes  heparin, porcine (PF) 100 unit/mL injection flush 500 Units  500 Units, Intravenous, PRN  sodium chloride 0.9% flush 10 mL  10 mL, Intravenous, Every visit  sodium chloride 0.9% 100 mL flush bag  Intravenous, Every visit  PRN Medications  EPINEPHrine (EPIPEN) 0.3 mg/0.3 mL pen injection 0.3 mg  0.3 mg, Intramuscular, PRN  diphenhydrAMINE injection 50 mg  50 mg, Intravenous, PRN  methylPREDNISolone sodium succinate injection 125 mg  125 mg, Intravenous, PRN  sodium chloride 0.9% bolus 1,000 mL  1,000 mL, Intravenous, PRN            Answers submitted by the patient for this visit:  Review of Systems Questionnaire (Submitted on 8/31/2022)  appetite change : No  unexpected weight change: No  mouth sores: No

## 2022-09-07 ENCOUNTER — PATIENT MESSAGE (OUTPATIENT)
Dept: INFUSION THERAPY | Facility: HOSPITAL | Age: 47
End: 2022-09-07
Payer: COMMERCIAL

## 2022-09-12 ENCOUNTER — LAB VISIT (OUTPATIENT)
Dept: LAB | Facility: HOSPITAL | Age: 47
End: 2022-09-12
Attending: PHYSICIAN ASSISTANT
Payer: COMMERCIAL

## 2022-09-12 DIAGNOSIS — E89.0 POST-OPERATIVE HYPOTHYROIDISM: ICD-10-CM

## 2022-09-12 LAB — TSH SERPL DL<=0.005 MIU/L-ACNC: 1.43 UIU/ML (ref 0.4–4)

## 2022-09-12 PROCEDURE — 36415 COLL VENOUS BLD VENIPUNCTURE: CPT | Mod: PO | Performed by: INTERNAL MEDICINE

## 2022-09-12 PROCEDURE — 84443 ASSAY THYROID STIM HORMONE: CPT | Performed by: INTERNAL MEDICINE

## 2022-09-19 ENCOUNTER — PATIENT MESSAGE (OUTPATIENT)
Dept: PAIN MEDICINE | Facility: CLINIC | Age: 47
End: 2022-09-19
Payer: COMMERCIAL

## 2022-09-19 NOTE — TELEPHONE ENCOUNTER
Good Afternoon,     Pt is requesting for a refill of: any type of pain medication   Last encounter: 5/13/22   Up coming apt: need an appt ( about to set one up now)   [facility] - [address] [facility]  Is this something you can do?      Amador Dent   Medical Assistant

## 2022-09-22 ENCOUNTER — INFUSION (OUTPATIENT)
Dept: INFUSION THERAPY | Facility: HOSPITAL | Age: 47
End: 2022-09-22
Attending: NURSE PRACTITIONER
Payer: COMMERCIAL

## 2022-09-22 VITALS
SYSTOLIC BLOOD PRESSURE: 136 MMHG | TEMPERATURE: 98 F | WEIGHT: 253.5 LBS | HEART RATE: 73 BPM | DIASTOLIC BLOOD PRESSURE: 80 MMHG | HEIGHT: 63 IN | BODY MASS INDEX: 44.92 KG/M2 | RESPIRATION RATE: 16 BRPM

## 2022-09-22 DIAGNOSIS — D50.9 IRON DEFICIENCY ANEMIA, UNSPECIFIED IRON DEFICIENCY ANEMIA TYPE: Primary | ICD-10-CM

## 2022-09-22 PROCEDURE — 96375 TX/PRO/DX INJ NEW DRUG ADDON: CPT | Mod: PN

## 2022-09-22 PROCEDURE — 25000003 PHARM REV CODE 250: Mod: PN | Performed by: NURSE PRACTITIONER

## 2022-09-22 PROCEDURE — 63600175 PHARM REV CODE 636 W HCPCS: Mod: PN | Performed by: NURSE PRACTITIONER

## 2022-09-22 PROCEDURE — 96365 THER/PROPH/DIAG IV INF INIT: CPT | Mod: PN

## 2022-09-22 RX ORDER — DEXAMETHASONE SODIUM PHOSPHATE 4 MG/ML
4 INJECTION, SOLUTION INTRA-ARTICULAR; INTRALESIONAL; INTRAMUSCULAR; INTRAVENOUS; SOFT TISSUE
Status: COMPLETED | OUTPATIENT
Start: 2022-09-22 | End: 2022-09-22

## 2022-09-22 RX ORDER — FAMOTIDINE 20 MG/1
20 TABLET, FILM COATED ORAL
Status: CANCELLED
Start: 2022-09-22

## 2022-09-22 RX ORDER — DIPHENHYDRAMINE HYDROCHLORIDE 50 MG/ML
25 INJECTION INTRAMUSCULAR; INTRAVENOUS
Status: CANCELLED
Start: 2022-09-22

## 2022-09-22 RX ORDER — HEPARIN 100 UNIT/ML
500 SYRINGE INTRAVENOUS
Status: CANCELLED | OUTPATIENT
Start: 2022-09-22

## 2022-09-22 RX ORDER — DICLOFENAC SODIUM 50 MG/1
50 TABLET, DELAYED RELEASE ORAL 2 TIMES DAILY
COMMUNITY
End: 2022-10-14

## 2022-09-22 RX ORDER — DIPHENHYDRAMINE HYDROCHLORIDE 50 MG/ML
25 INJECTION INTRAMUSCULAR; INTRAVENOUS
Status: COMPLETED | OUTPATIENT
Start: 2022-09-22 | End: 2022-09-22

## 2022-09-22 RX ORDER — EPINEPHRINE 0.3 MG/.3ML
0.3 INJECTION SUBCUTANEOUS ONCE AS NEEDED
Status: CANCELLED | OUTPATIENT
Start: 2022-09-22

## 2022-09-22 RX ORDER — SODIUM CHLORIDE 0.9 % (FLUSH) 0.9 %
10 SYRINGE (ML) INJECTION
Status: CANCELLED | OUTPATIENT
Start: 2022-09-22

## 2022-09-22 RX ORDER — FAMOTIDINE 20 MG/1
20 TABLET, FILM COATED ORAL
Status: COMPLETED | OUTPATIENT
Start: 2022-09-22 | End: 2022-09-22

## 2022-09-22 RX ORDER — METHYLPREDNISOLONE SOD SUCC 125 MG
125 VIAL (EA) INJECTION ONCE AS NEEDED
Status: CANCELLED | OUTPATIENT
Start: 2022-09-22

## 2022-09-22 RX ORDER — SODIUM CHLORIDE 0.9 % (FLUSH) 0.9 %
10 SYRINGE (ML) INJECTION
Status: DISCONTINUED | OUTPATIENT
Start: 2022-09-22 | End: 2022-09-22 | Stop reason: HOSPADM

## 2022-09-22 RX ORDER — DEXAMETHASONE SODIUM PHOSPHATE 4 MG/ML
4 INJECTION, SOLUTION INTRA-ARTICULAR; INTRALESIONAL; INTRAMUSCULAR; INTRAVENOUS; SOFT TISSUE
Status: CANCELLED
Start: 2022-09-22

## 2022-09-22 RX ORDER — DIPHENHYDRAMINE HYDROCHLORIDE 50 MG/ML
50 INJECTION INTRAMUSCULAR; INTRAVENOUS ONCE AS NEEDED
Status: CANCELLED | OUTPATIENT
Start: 2022-09-22

## 2022-09-22 RX ADMIN — FERUMOXYTOL 510 MG: 510 INJECTION INTRAVENOUS at 09:09

## 2022-09-22 RX ADMIN — DEXAMETHASONE SODIUM PHOSPHATE 4 MG: 4 INJECTION INTRA-ARTICULAR; INTRALESIONAL; INTRAMUSCULAR; INTRAVENOUS; SOFT TISSUE at 09:09

## 2022-09-22 RX ADMIN — FAMOTIDINE 20 MG: 20 TABLET ORAL at 09:09

## 2022-09-22 RX ADMIN — DIPHENHYDRAMINE HYDROCHLORIDE 25 MG: 50 INJECTION INTRAMUSCULAR; INTRAVENOUS at 09:09

## 2022-09-22 RX ADMIN — SODIUM CHLORIDE: 0.9 INJECTION, SOLUTION INTRAVENOUS at 09:09

## 2022-09-22 NOTE — PLAN OF CARE
Problem: Adult Inpatient Plan of Care  Goal: Plan of Care Review  9/22/2022 1011 by Bernadette Cotton RN  Outcome: Ongoing, Progressing  Flowsheets (Taken 9/22/2022 0930)  Plan of Care Reviewed With: patient  9/22/2022 0915 by Bernadette Cotton RN  Outcome: Ongoing, Progressing  Flowsheets (Taken 9/22/2022 0915)  Plan of Care Reviewed With: patient  Goal: Patient-Specific Goal (Individualized)  9/22/2022 1011 by Bernadette Cotton RN  Outcome: Ongoing, Progressing  Flowsheets (Taken 9/22/2022 0930)  Anxieties, Fears or Concerns: None  Individualized Care Needs: Recliner, warm blanket, tv, juice, education  Patient-Specific Goals (Include Timeframe): Free of S/S of reaction with infusion.  9/22/2022 0915 by Bernadette Cotton RN  Outcome: Ongoing, Progressing  Flowsheets (Taken 9/22/2022 0915)  Anxieties, Fears or Concerns: None  Individualized Care Needs: REcliner, warm blanket, tv, education  Patient-Specific Goals (Include Timeframe): Free of S/S of reaction with infusion.     Problem: Fatigue  Goal: Improved Activity Tolerance  9/22/2022 1011 by Bernadette Cotton RN  Outcome: Ongoing, Progressing  9/22/2022 0915 by Bernadette Cotton RN  Outcome: Ongoing, Progressing  Intervention: Promote Improved Energy  9/22/2022 1011 by Bernadette Cotton RN  Flowsheets (Taken 9/22/2022 0930)  Fatigue Management:   frequent rest breaks encouraged   paced activity encouraged  Sleep/Rest Enhancement: regular sleep/rest pattern promoted  Activity Management: Ambulated -L4  9/22/2022 0915 by Bernadette Cotton RN  Flowsheets (Taken 9/22/2022 0915)  Fatigue Management:   frequent rest breaks encouraged   paced activity encouraged  Sleep/Rest Enhancement: regular sleep/rest pattern promoted  Activity Management: Ambulated -L4     Patient to Infusion for Feraheme. Treatment plan reviewed with patient. VSS. Tolerated infusion. Provided with copy of upcoming appointment schedule. Escorted to the front lobby for discharge to home.

## 2022-09-26 ENCOUNTER — INFUSION (OUTPATIENT)
Dept: INFUSION THERAPY | Facility: HOSPITAL | Age: 47
End: 2022-09-26
Attending: NURSE PRACTITIONER
Payer: COMMERCIAL

## 2022-09-26 VITALS
DIASTOLIC BLOOD PRESSURE: 76 MMHG | WEIGHT: 253.5 LBS | TEMPERATURE: 98 F | HEART RATE: 81 BPM | SYSTOLIC BLOOD PRESSURE: 142 MMHG | BODY MASS INDEX: 44.91 KG/M2 | RESPIRATION RATE: 16 BRPM

## 2022-09-26 DIAGNOSIS — D50.9 IRON DEFICIENCY ANEMIA, UNSPECIFIED IRON DEFICIENCY ANEMIA TYPE: Primary | ICD-10-CM

## 2022-09-26 PROCEDURE — 25000003 PHARM REV CODE 250: Mod: PN | Performed by: NURSE PRACTITIONER

## 2022-09-26 PROCEDURE — 63600175 PHARM REV CODE 636 W HCPCS: Mod: JG,PN | Performed by: NURSE PRACTITIONER

## 2022-09-26 PROCEDURE — 96375 TX/PRO/DX INJ NEW DRUG ADDON: CPT | Mod: PN

## 2022-09-26 PROCEDURE — 96365 THER/PROPH/DIAG IV INF INIT: CPT | Mod: PN

## 2022-09-26 RX ORDER — DIPHENHYDRAMINE HYDROCHLORIDE 50 MG/ML
25 INJECTION INTRAMUSCULAR; INTRAVENOUS
Status: COMPLETED | OUTPATIENT
Start: 2022-09-26 | End: 2022-09-26

## 2022-09-26 RX ORDER — FAMOTIDINE 20 MG/1
20 TABLET, FILM COATED ORAL
Status: COMPLETED | OUTPATIENT
Start: 2022-09-26 | End: 2022-09-26

## 2022-09-26 RX ORDER — EPINEPHRINE 0.3 MG/.3ML
0.3 INJECTION SUBCUTANEOUS ONCE AS NEEDED
Status: CANCELLED | OUTPATIENT
Start: 2022-09-26

## 2022-09-26 RX ORDER — DIPHENHYDRAMINE HYDROCHLORIDE 50 MG/ML
50 INJECTION INTRAMUSCULAR; INTRAVENOUS ONCE AS NEEDED
Status: CANCELLED | OUTPATIENT
Start: 2022-09-26

## 2022-09-26 RX ORDER — METHYLPREDNISOLONE SOD SUCC 125 MG
125 VIAL (EA) INJECTION ONCE AS NEEDED
Status: CANCELLED | OUTPATIENT
Start: 2022-09-26

## 2022-09-26 RX ORDER — SODIUM CHLORIDE 0.9 % (FLUSH) 0.9 %
10 SYRINGE (ML) INJECTION
Status: CANCELLED | OUTPATIENT
Start: 2022-09-26

## 2022-09-26 RX ORDER — DEXAMETHASONE SODIUM PHOSPHATE 4 MG/ML
4 INJECTION, SOLUTION INTRA-ARTICULAR; INTRALESIONAL; INTRAMUSCULAR; INTRAVENOUS; SOFT TISSUE
Status: CANCELLED
Start: 2022-09-26

## 2022-09-26 RX ORDER — HEPARIN 100 UNIT/ML
500 SYRINGE INTRAVENOUS
Status: CANCELLED | OUTPATIENT
Start: 2022-09-26

## 2022-09-26 RX ORDER — FAMOTIDINE 20 MG/1
20 TABLET, FILM COATED ORAL
Status: CANCELLED
Start: 2022-09-26

## 2022-09-26 RX ORDER — DIPHENHYDRAMINE HYDROCHLORIDE 50 MG/ML
25 INJECTION INTRAMUSCULAR; INTRAVENOUS
Status: CANCELLED
Start: 2022-09-26

## 2022-09-26 RX ORDER — SODIUM CHLORIDE 0.9 % (FLUSH) 0.9 %
10 SYRINGE (ML) INJECTION
Status: DISCONTINUED | OUTPATIENT
Start: 2022-09-26 | End: 2022-09-26 | Stop reason: HOSPADM

## 2022-09-26 RX ORDER — DEXAMETHASONE SODIUM PHOSPHATE 4 MG/ML
4 INJECTION, SOLUTION INTRA-ARTICULAR; INTRALESIONAL; INTRAMUSCULAR; INTRAVENOUS; SOFT TISSUE
Status: COMPLETED | OUTPATIENT
Start: 2022-09-26 | End: 2022-09-26

## 2022-09-26 RX ADMIN — FAMOTIDINE 20 MG: 20 TABLET ORAL at 02:09

## 2022-09-26 RX ADMIN — DEXAMETHASONE SODIUM PHOSPHATE 4 MG: 4 INJECTION INTRA-ARTICULAR; INTRALESIONAL; INTRAMUSCULAR; INTRAVENOUS; SOFT TISSUE at 02:09

## 2022-09-26 RX ADMIN — FERUMOXYTOL 510 MG: 510 INJECTION INTRAVENOUS at 03:09

## 2022-09-26 RX ADMIN — SODIUM CHLORIDE: 0.9 INJECTION, SOLUTION INTRAVENOUS at 02:09

## 2022-09-26 RX ADMIN — DIPHENHYDRAMINE HYDROCHLORIDE 25 MG: 50 INJECTION INTRAMUSCULAR; INTRAVENOUS at 02:09

## 2022-09-28 ENCOUNTER — OFFICE VISIT (OUTPATIENT)
Dept: PAIN MEDICINE | Facility: CLINIC | Age: 47
End: 2022-09-28
Payer: COMMERCIAL

## 2022-09-28 VITALS — RESPIRATION RATE: 17 BRPM

## 2022-09-28 DIAGNOSIS — G89.29 CHRONIC MYOFASCIAL PAIN: ICD-10-CM

## 2022-09-28 DIAGNOSIS — M47.816 LUMBAR SPONDYLOSIS: Primary | ICD-10-CM

## 2022-09-28 DIAGNOSIS — M54.50 RIGHT LUMBAR PAIN: ICD-10-CM

## 2022-09-28 DIAGNOSIS — M54.50 ACUTE EXACERBATION OF CHRONIC LOW BACK PAIN: ICD-10-CM

## 2022-09-28 DIAGNOSIS — G89.29 ACUTE EXACERBATION OF CHRONIC LOW BACK PAIN: ICD-10-CM

## 2022-09-28 DIAGNOSIS — M79.18 CHRONIC MYOFASCIAL PAIN: ICD-10-CM

## 2022-09-28 PROCEDURE — 1160F RVW MEDS BY RX/DR IN RCRD: CPT | Mod: CPTII,95,, | Performed by: PHYSICIAN ASSISTANT

## 2022-09-28 PROCEDURE — 1159F PR MEDICATION LIST DOCUMENTED IN MEDICAL RECORD: ICD-10-PCS | Mod: CPTII,95,, | Performed by: PHYSICIAN ASSISTANT

## 2022-09-28 PROCEDURE — 99214 OFFICE O/P EST MOD 30 MIN: CPT | Mod: 95,,, | Performed by: PHYSICIAN ASSISTANT

## 2022-09-28 PROCEDURE — 99214 PR OFFICE/OUTPT VISIT, EST, LEVL IV, 30-39 MIN: ICD-10-PCS | Mod: 95,,, | Performed by: PHYSICIAN ASSISTANT

## 2022-09-28 PROCEDURE — 1159F MED LIST DOCD IN RCRD: CPT | Mod: CPTII,95,, | Performed by: PHYSICIAN ASSISTANT

## 2022-09-28 PROCEDURE — 3044F PR MOST RECENT HEMOGLOBIN A1C LEVEL <7.0%: ICD-10-PCS | Mod: CPTII,95,, | Performed by: PHYSICIAN ASSISTANT

## 2022-09-28 PROCEDURE — 1160F PR REVIEW ALL MEDS BY PRESCRIBER/CLIN PHARMACIST DOCUMENTED: ICD-10-PCS | Mod: CPTII,95,, | Performed by: PHYSICIAN ASSISTANT

## 2022-09-28 PROCEDURE — 3044F HG A1C LEVEL LT 7.0%: CPT | Mod: CPTII,95,, | Performed by: PHYSICIAN ASSISTANT

## 2022-09-28 RX ORDER — CYCLOBENZAPRINE HCL 10 MG
5-10 TABLET ORAL 2 TIMES DAILY PRN
Qty: 60 TABLET | Refills: 0 | Status: SHIPPED | OUTPATIENT
Start: 2022-09-28 | End: 2023-06-29

## 2022-09-28 RX ORDER — METHYLPREDNISOLONE 4 MG/1
TABLET ORAL
Qty: 21 EACH | Refills: 0 | Status: SHIPPED | OUTPATIENT
Start: 2022-09-28 | End: 2022-11-21 | Stop reason: ALTCHOICE

## 2022-09-28 RX ORDER — TRAMADOL HYDROCHLORIDE 50 MG/1
25-50 TABLET ORAL EVERY 12 HOURS PRN
Qty: 10 TABLET | Refills: 0 | Status: SHIPPED | OUTPATIENT
Start: 2022-09-28 | End: 2023-06-29

## 2022-09-28 NOTE — PROGRESS NOTES
"The patient location is: home  The chief complaint leading to consultation is: chronic pain     Visit type: audiovisual    Face to Face time with patient: 10-15 minutes  20 minutes of total time spent on the encounter, which includes face to face time and non-face to face time preparing to see the patient (eg, review of tests), Obtaining and/or reviewing separately obtained history, Documenting clinical information in the electronic or other health record, Independently interpreting results (not separately reported) and communicating results to the patient/family/caregiver, or Care coordination (not separately reported).     Each patient to whom he or she provides medical services by telemedicine is:  (1) informed of the relationship between the physician and patient and the respective role of any other health care provider with respect to management of the patient; and (2) notified that he or she may decline to receive medical services by telemedicine and may withdraw from such care at any time.        Established Patient Chronic Pain Note (Follow up visit)    Chief Complaint:   Chief Complaint   Patient presents with    Back Pain     right       SUBJECTIVE:    Interval History (9/28/2022):  Akiko Jameson presents today for follow-up visit.  Patient was last seen on 5/13/2022. At that visit, she was doing well since RFA. She had a pain flare a few weeks ago that resolved fairly quickly. Pain returned over the week requiring ER visit. She reports pain being debilitating at this time, which started in the neck initially (that resolved). Pain stayed localized in right lower back. She was given toradol and morphine shot at ER. Pain has since calmed down. She is utilizing muscle relaxer now, but at the time of pain flare, it wasn't helping. Patient reports pain as "0/10 today.    Interval History (5/13/2022): Akiko Jameson presents today for follow-up visit.  she underwent right L3-5 RFA on 4/5/22.  " "The patient reports that she is/was better following the procedure.  she reports 95% pain relief.  The changes lasted >4 weeks so far.  The changes have continued through this visit.  Patient reports pain as "0/10 today.    Interval HPI (2/11/22):  Akiko Jameson is a 47 y.o. who presents to the clinic for a follow-up appointment for mid back pain.  At the last visit, she was provided trigger point injections to the thoracic paraspinals , lower trapezius, and latissimus dorsi on the right.  She reports that the trigger point injections provided moderate relief.  She was maintained on Robaxin 750 mg up to 3 times daily as needed for muscle related pain.  Since the last visit, Akiko Jameson states the pain has been starting to return. Current pain intensity is 8/10.   Patient denies night fever/night sweats, urinary incontinence, bowel incontinence, significant weight loss, significant motor weakness and loss of sensations.    Pain Disability Index Review:  Last 3 PDI Scores 5/13/2022 2/11/2022 12/10/2021   Pain Disability Index (PDI) 19 45 56     Interval HPI 12/10/2021:  Akiko Jameson is a 47 y.o. female who presents to the clinic for a follow-up appointment for mid back pain.  At the last visit, she was provided trigger point injections to the thoracic paraspinals , lower trapezius, and latissimus dorsi on the right.  She reports that the trigger point injections provided moderate relief.  She was maintained on Robaxin 750 mg up to 3 times daily as needed for muscle related pain.  Since the last visit, Akiko Jameson states the pain has been starting to return. Current pain intensity is 5/10.     Interval HPI 11/19/2021:  Akiko Jameson is a 46 y.o. female who presents to the clinic for a follow-up appointment for mid back pain.  At the last visit, she was provided trigger point injections to the thoracic paraspinals , lower trapezius, and latissimus dorsi on the right.  " She reports that the trigger point injections provided moderate relief.  She was maintained on Robaxin 750 mg up to 3 times daily as needed for muscle related pain.  Since the last visit, Akiko Jameson states the pain has been starting to return. Current pain intensity is 5/10.  Unfortunately, the patient is currently on antibiotics for cellulitis of the right upper extremity.      Interval HPI 09/23/2021:  Akiko Jameson is a 46 y.o. female who presents to the clinic for a follow-up appointment for mid back pain.  At the last visit, she was provided trigger point injections to the thoracic paraspinals , lower trapezius, and latissimus dorsi on the right.  She reports that the trigger point injections provided limited relief, but they have been beneficial previously.  She was maintained on Robaxin 500 mg up to 3 times daily as needed for muscle related pain.  Since the last visit, Akiko Jameson states the pain has been starting to return. Current pain intensity is 8/10.      Interval HPI 07/09/2021:  Akiko Jameson is a 46 y.o. female who presents to the clinic for a follow-up appointment for mid back pain.  At the last visit, she was provided trigger point injections to the thoracic paraspinals , lower trapezius, and latissimus dorsi on the right.  She reports that the trigger point injections provided significant relief relief.  She was maintained on Robaxin 500 mg up to 3 times daily as needed for muscle related pain.  Since the last visit, Akiko Jameson states the pain has been starting to return. Current pain intensity is 8/10.  Of note, in the interim patient has had thyroidectomy and was recently diagnosed with sarcoidosis.    Interval HPI 11/13/2020:  Akiko Jameson is a 46 y.o. female who presents to the clinic for a follow-up appointment for mid back pain.  At the last visit, she was provided trigger point injections to the thoracic paraspinals , lower  trapezius, and latissimus dorsi on the right.  She reports that the trigger point injections provided significant relief relief.  She was maintained on Robaxin 500 mg up to 3 times daily as needed for muscle related pain.  Since the last visit, Akiko Jameson states the pain has been starting to return. Current pain intensity is 5/10.      Interval HPI 05/29/2020:  Akiko Jameson is a 45 y.o. female who presents to the clinic for a follow-up appointment for mid back pain.  At the last visit, she was provided trigger point injections to the thoracic paraspinals and upper/middle trapezius on the right.  She reports that the trigger point injections provided 100% relief at the areas injected, but is having pain in other areas that she would like to also get injected today.  She was also started on Robaxin 500 mg up to 3 times daily as needed for muscle related pain.  She reports that the Robaxin has been of minimal benefit.  Since the last visit, Akiko Jameson states the pain has been improving. Current pain intensity is 8/10.  We discussed lymphedema management, and she is interested in going to a physical therapy clinic to help address this issue.    Initial HPI 05/15/2020:  Akiko Jameson is a 45 y.o. female, right-hand dominant, who presents to the clinic for the evaluation of mid back pain. The pain started 25+ years ago following a lymph node removal on the right that resulted in lymphedema of the right upper extremity and symptoms have been worsening.The pain is located in the right mid back area and radiates to the along the length of the upper to lower back.  The pain is described as aching, burning and throbbing and is rated as 6/10. The pain is rated with a score of  0/10 on the BEST day and a score of 10/10 on the WORST day.  Symptoms interfere with daily activity, sleeping and work. The pain is exacerbated by increased activity or use of the right upper extremity.  The pain  is mitigated by rest. The patient reports spending 2-4 hours per day reclining. The patient reports 6-8 hours of uninterrupted sleep per night.  She reports that she works as a .         Non-Pharmacologic Treatments:  Physical Therapy/Home Exercise: yes  Ice/Heat:yes  TENS: yes  Acupuncture: no  Massage: yes  Chiropractic: yes    Other: yes, compression sleeves        Pain Medications:  - Opioids: Norco  - Adjuvant Medications: Relafen, Robaxin  - Anti-Coagulants: None           Pain Procedures:   -previously underwent thoracic epidurals x2 with first one being beneficial but the second one not so much  -05/15/2020:  Trigger point injections to the thoracic paraspinals and upper/middle trapezius on the right, 100% relief  -05/29/2020:  Trigger point injections to the thoracic paraspinals, lower trapezius, and latissimus dorsi on the right, significant relief   -11/13/2020:  Trigger point injections to the thoracic paraspinals, lower trapezius, and latissimus dorsi on the right, significant relief  -07/09/2021: Trigger point injections to the thoracic paraspinals, lower trapezius, and latissimus dorsi on the right, limited relief  - 09/23/2021: Trigger point injections to the thoracic paraspinals, lower trapezius, and latissimus dorsi on the right, moderate relief  - 12/10/2020: Trigger point injections to the thoracic paraspinals, lower trapezius, and latissimus dorsi on the right, moderate relief  - diagnostic right L3-5 MBB on 02/24/2022 and 03/29/2022 with reported % pain relief  - right L3-5 RFA on 4/5/22 with 95% pain relief        Imaging (Reviewed on 9/28/2022):       X-ray bilateral knees 03/16/2016:  AP and PA flexion standing views of both knees as well as lateral and Merchant views of both knees were obtained.  The joint spaces are grossly preserved.  Mild patellofemoral spurring and prominent patellar enthesophytes bilaterally.  No joint effusion.  No acute fracture or malalignment.      MRI right knee 03/21/2016:  The anterior cruciate ligament, posterior cruciate ligament, medial collateral ligament, lateral collateral ligament complex normal popliteus tendon, IT band, quadriceps tendon, and patellar tendon are normal in appearance.    There is a complex, primarily radial tear of the posterior horn of the medial meniscus which extends to the posterior root attachment of the medial meniscus.  The medial meniscus is intact.    The articular cartilage of the medial and lateral tibiofemoral joint compartments is intact.  There is a 14 mm width area of full-thickness abnormal chondral signal observed in the lateral patellar facet at the level of the patellar midpole.  No abnormal subcortical signal abnormality appreciated.    There is quadriceps tendon and patellar tendon insertion enthesophyte formation at their respective attachments on the patella.  There is a small knee joint effusion present.  No acute fracture or pathologic marrow replacement process.  There is hematopoietic  bone marrow present within the distal femur and proximal tibia.          REVIEW OF SYSTEMS:    GENERAL:  No weight loss, malaise or fevers.  HEENT:   No recent changes in vision or hearing  NECK:  Negative for lumps, no difficulty with swallowing.  RESPIRATORY:  Negative for cough, wheezing or shortness of breath, patient denies any recent URI.  CARDIOVASCULAR:  Negative for chest pain, leg swelling or palpitations.  GI:  Negative for abdominal discomfort, blood in stools or black stools or change in bowel habits.  MUSCULOSKELETAL:  See HPI.  SKIN:  Negative for lesions, rash, and itching.  PSYCH:  No mood disorder or recent psychosocial stressors.  Patients sleep is not disturbed secondary to pain.  HEMATOLOGY/LYMPHOLOGY:  Negative for prolonged bleeding, bruising easily or swollen nodes.  Patient is not currently taking any anti-coagulants  NEURO:   No history of headaches, syncope, paralysis, seizures or tremors.  All  other reviewed and negative other than HPI.      OBJECTIVE:    PHYSICAL EXAMINATION:  Vitals:    09/28/22 1038   Resp: 17      There is no height or weight on file to calculate BMI.   (reviewed on 9/28/2022)    GENERAL: Well appearing, in no acute distress, alert and oriented x3.  PSYCH:  Mood and affect appropriate.  SKIN: Skin color, texture, turgor normal, no rashes or lesions.  HEAD/FACE:  Normocephalic, atraumatic. Cranial nerves grossly intact.  NECK: No pain to palpation over the cervical paraspinous muscles. Spurling Negative. No pain with neck flexion, extension, or lateral flexion.   PULM: No evidence of respiratory difficulty, symmetric chest rise.  GI:  Soft and non-tender.  BACK:  TTP over the thoracic paraspinals, lower trapezius, and latissimus dorsi on the right.  SLR in the sitting and supine positions is negative to radicular pain.   mild-to-moderate pain to palpation over the facet joints of the lumbar spine and lumbar paraspinals - Present, mild, improved since procedure.  Fair range of motion secondary to pain reproduction.  Axial loading positive on the right - Present, mild, improved since procedure.  EXTREMITIES: Peripheral joint ROM is full and pain free without obvious instability or laxity in all four extremities.  There is significant lymphedema present throughout the right upper extremity.  No other deformities or skin discoloration. Good capillary refill.  MUSCULOSKELETAL: Shoulder, hip, and knee provocative maneuvers are negative.  BUE & BLE strength is normal and symmetric.  No atrophy or tone abnormalities are noted.  NEURO: BUE & BLE coordination and muscle stretch reflexes are physiologic and symmetric.  Plantar response are downgoing. No clonus.  No loss of sensation is noted.  GAIT: normal.            ASSESSMENT: 47 y.o. year old female with mid back pain, consistent with     1. Lumbar spondylosis        2. Right lumbar pain  cyclobenzaprine (FLEXERIL) 10 MG tablet      3.  Chronic myofascial pain        4. Acute exacerbation of chronic low back pain  methylPREDNISolone (MEDROL DOSEPACK) 4 mg tablet          PLAN:   1. Interventional: S/p right L3-5 RFA on 4/5/22 with 95% pain relief .     2. Pharmacologic:   - Will prescribe Medrol Dose pack with instructions (for potential pain flare):  Day 1: 2 tablets before breakfast, 1 tablet after lunch, 1 tablet after dinner, 2 tablets at bedtime   Day 2: 1 tablet before breakfast, 1 tablet after lunch, 1 tablet after dinner, 2 tablets at bedtime   Day 3: 1 tablet before breakfast, 1 tablet after lunch, 1 tablet after dinner, 1 tablet at bedtime   Day 4: 1 tablet before breakfast, 1 tablet after lunch, 1 tablet at bedtime   Day 5: 1 tablet before breakfast, 1 tablet at bedtime   Day 6: 1 tablet before breakfast.   - Will give bridge of tramadol 50mg (10 tabs - 5 day supply) to have on hand for pain flare up to take 1/2 to 1 tab for severe pain.    - Continue Robaxin 750mg PRN. Can Refill if needed in the future.   - Will send in Flexeril 10mg BID PRN - to take when she has pain flare.  - Anticoagulation use: None.   - LA  reviewed and appropriate.        3. Rehabilitative: Encouraged regular exercise. Therapy: advised patient continue with gradual compression for her lymphedema and activities as tolerated.     4. Diagnostic: None for now.    5. Follow up: 6-8 weeks to discuss repeating RFA if needed.     - This condition does not require this patient to take time off of work, and the primary goal of our Pain Management services is to improve the patient's functional capacity.   - I discussed the risks, benefits, and alternatives to potential treatment options. All questions and concerns were fully addressed today in clinic.           Disclaimer:  This note was prepared using voice recognition system and is likely to have sound alike errors that may have been overlooked even after proof reading.  Please call me with any questions.

## 2022-10-03 ENCOUNTER — PATIENT MESSAGE (OUTPATIENT)
Dept: PAIN MEDICINE | Facility: CLINIC | Age: 47
End: 2022-10-03
Payer: COMMERCIAL

## 2022-10-04 ENCOUNTER — E-VISIT (OUTPATIENT)
Dept: FAMILY MEDICINE | Facility: CLINIC | Age: 47
End: 2022-10-04
Payer: COMMERCIAL

## 2022-10-04 ENCOUNTER — PATIENT MESSAGE (OUTPATIENT)
Dept: FAMILY MEDICINE | Facility: CLINIC | Age: 47
End: 2022-10-04

## 2022-10-04 DIAGNOSIS — Z03.89 NO DIAGNOSIS ON AXIS I: Primary | ICD-10-CM

## 2022-10-04 PROCEDURE — 99499 NO LOS: ICD-10-PCS | Mod: S$GLB,,, | Performed by: NURSE PRACTITIONER

## 2022-10-04 PROCEDURE — 99499 UNLISTED E&M SERVICE: CPT | Mod: S$GLB,,, | Performed by: NURSE PRACTITIONER

## 2022-10-04 NOTE — PROGRESS NOTES
System down at [facility] today. Radiology unable to perform xrays today. Pt advised to report to urgent care or ER for further evaluation.

## 2022-10-13 ENCOUNTER — LAB VISIT (OUTPATIENT)
Dept: LAB | Facility: HOSPITAL | Age: 47
End: 2022-10-13
Payer: COMMERCIAL

## 2022-10-13 DIAGNOSIS — D50.9 IRON DEFICIENCY ANEMIA, UNSPECIFIED IRON DEFICIENCY ANEMIA TYPE: ICD-10-CM

## 2022-10-13 LAB
BASOPHILS # BLD AUTO: 0.05 K/UL (ref 0–0.2)
BASOPHILS NFR BLD: 1.3 % (ref 0–1.9)
DIFFERENTIAL METHOD: ABNORMAL
EOSINOPHIL # BLD AUTO: 0.4 K/UL (ref 0–0.5)
EOSINOPHIL NFR BLD: 8.9 % (ref 0–8)
ERYTHROCYTE [DISTWIDTH] IN BLOOD BY AUTOMATED COUNT: 16.4 % (ref 11.5–14.5)
HCT VFR BLD AUTO: 36.9 % (ref 37–48.5)
HGB BLD-MCNC: 11.8 G/DL (ref 12–16)
IMM GRANULOCYTES # BLD AUTO: 0.01 K/UL (ref 0–0.04)
IMM GRANULOCYTES NFR BLD AUTO: 0.3 % (ref 0–0.5)
LYMPHOCYTES # BLD AUTO: 1.6 K/UL (ref 1–4.8)
LYMPHOCYTES NFR BLD: 39.2 % (ref 18–48)
MCH RBC QN AUTO: 25.9 PG (ref 27–31)
MCHC RBC AUTO-ENTMCNC: 32 G/DL (ref 32–36)
MCV RBC AUTO: 81 FL (ref 82–98)
MONOCYTES # BLD AUTO: 0.4 K/UL (ref 0.3–1)
MONOCYTES NFR BLD: 10.6 % (ref 4–15)
NEUTROPHILS # BLD AUTO: 1.6 K/UL (ref 1.8–7.7)
NEUTROPHILS NFR BLD: 40 % (ref 38–73)
NRBC BLD-RTO: 0 /100 WBC
PLATELET # BLD AUTO: 214 K/UL (ref 150–450)
PMV BLD AUTO: 10.9 FL (ref 9.2–12.9)
RBC # BLD AUTO: 4.55 M/UL (ref 4–5.4)
WBC # BLD AUTO: 3.95 K/UL (ref 3.9–12.7)

## 2022-10-13 PROCEDURE — 85025 COMPLETE CBC W/AUTO DIFF WBC: CPT | Mod: PO | Performed by: NURSE PRACTITIONER

## 2022-10-13 PROCEDURE — 36415 COLL VENOUS BLD VENIPUNCTURE: CPT | Mod: PO | Performed by: NURSE PRACTITIONER

## 2022-10-14 ENCOUNTER — OFFICE VISIT (OUTPATIENT)
Dept: HEMATOLOGY/ONCOLOGY | Facility: CLINIC | Age: 47
End: 2022-10-14
Payer: COMMERCIAL

## 2022-10-14 DIAGNOSIS — D50.9 IRON DEFICIENCY ANEMIA, UNSPECIFIED IRON DEFICIENCY ANEMIA TYPE: Primary | ICD-10-CM

## 2022-10-14 PROCEDURE — 99212 PR OFFICE/OUTPT VISIT, EST, LEVL II, 10-19 MIN: ICD-10-PCS | Mod: 95,,, | Performed by: NURSE PRACTITIONER

## 2022-10-14 PROCEDURE — 99212 OFFICE O/P EST SF 10 MIN: CPT | Mod: 95,,, | Performed by: NURSE PRACTITIONER

## 2022-10-14 PROCEDURE — 3044F HG A1C LEVEL LT 7.0%: CPT | Mod: CPTII,95,, | Performed by: NURSE PRACTITIONER

## 2022-10-14 PROCEDURE — 1159F PR MEDICATION LIST DOCUMENTED IN MEDICAL RECORD: ICD-10-PCS | Mod: CPTII,95,, | Performed by: NURSE PRACTITIONER

## 2022-10-14 PROCEDURE — 3044F PR MOST RECENT HEMOGLOBIN A1C LEVEL <7.0%: ICD-10-PCS | Mod: CPTII,95,, | Performed by: NURSE PRACTITIONER

## 2022-10-14 PROCEDURE — 1159F MED LIST DOCD IN RCRD: CPT | Mod: CPTII,95,, | Performed by: NURSE PRACTITIONER

## 2022-10-14 PROCEDURE — 1160F PR REVIEW ALL MEDS BY PRESCRIBER/CLIN PHARMACIST DOCUMENTED: ICD-10-PCS | Mod: CPTII,95,, | Performed by: NURSE PRACTITIONER

## 2022-10-14 PROCEDURE — 1160F RVW MEDS BY RX/DR IN RCRD: CPT | Mod: CPTII,95,, | Performed by: NURSE PRACTITIONER

## 2022-10-14 NOTE — PROGRESS NOTES
Subjective:    The patient location is: Home  The chief complaint leading to consultation is: Review of labs    Visit type: audiovisual    Face to Face time with patient: 15 minutes of total time spent on the encounter, which includes face to face time and non-face to face time preparing to see the patient (eg, review of tests), Obtaining and/or reviewing separately obtained history, Documenting clinical information in the electronic or other health record, Independently interpreting results (not separately reported) and communicating results to the patient/family/caregiver, or Care coordination (not separately reported).     Each patient to whom he or she provides medical services by telemedicine is:  (1) informed of the relationship between the physician and patient and the respective role of any other health care provider with respect to management of the patient; and (2) notified that he or she may decline to receive medical services by telemedicine and may withdraw from such care at any time.    Name: Akiko Jameson  : 1975  MRN: [id number]    CC:  Follow up on labs post Feraheme  & 22    HPI:   Akiko Jameson is a 47 y.o. female presents via Telemed for review of recent labs.    This is a 47-year-old black female known to Dr. Caicedo for anemia of chronic disease as well as idiopathic eosinophilia.    Unilateral bone marrow aspiration/biopsy in , which did not reveal any clonal population of cells on flow and no evidence of dysplasia within the marrow.    Surveilled annually.    She also suffers with chronic lymphedema to RUE from lymphadenectomy.  EBUS in  due to mediastinal lymphadenopathy/nodule that was later diagnosed as Sarcoidosis, followed by Dr. Kirby.     Last visit on 22 c/o of fatigue & increased sleepiness.    Recent sigmoid scope on  did not reveal a bleeding source.  Occult blood x 3 = negative  Labs reviewed indicating MARITZA.  Patient was  "scheduled for Feraheme.    TODAY:  10/14/22:  Approximately 2 1/2 weeks post 2nd infusion patient presents via telemed for interval evaluation & review of lab.  Patient reports feeling much better; "big" improvement; no fatigue or sleepiness.  Denies CP, SOB, pica, palpitations, etc.    Past Medical History:   Diagnosis Date    ALLERGIC RHINITIS     Arthritis     Cellulitis     Constipation due to opioid therapy     Eosinophilia     mild    GERD (gastroesophageal reflux disease)     Granular cell tumor     H. pylori infection 2018    History of 2019 novel coronavirus disease (COVID-19) 10/04/2020    Hypertension     Lymphedema     Mild anemia     KEIRY on CPAP     does not regularly    Positive CHARLENE (antinuclear antibody) 07/27/2022    Prediabetes 08/06/2021    Sarcoidosis, unspecified     Sleep apnea     compliant with CPAP    Thyroid nodule     Urinary incontinence, mixed        Past Surgical History:   Procedure Laterality Date    24 HOUR IMPEDANCE PH MONITORING OF ESOPHAGUS IN PATIENT NOT TAKING ACID REDUCING MEDICATIONS N/A 1/6/2020    Procedure: IMPEDANCE PH STUDY, ESOPHAGEAL, 24 HOUR, IN PATIENT NOT TAKING ACID REDUCING MEDICATION;  Surgeon: First Available Tamika Panchal;  Location: [facility];  Service: Endoscopy;  Laterality: N/A;    AXILLARY NODE DISSECTION Right     granular cell tumor    BILATERAL SALPINGO-OOPHORECTOMY (BSO)  07/21/2020    BREAST CYST ASPIRATION      left    CHOLECYSTECTOMY      COLONOSCOPY N/A 5/10/2019    Procedure: COLONOSCOPY;  Surgeon: Edgar Gamble Jr., MD;  Location: [facility];  Service: Endoscopy;  Laterality: N/A;    ENDOBRONCHIAL ULTRASOUND Bilateral 4/27/2021    Procedure: ENDOBRONCHIAL ULTRASOUND (EBUS);  Surgeon: Armando Kirby MD;  Location: [facility];  Service: Pulmonary;  Laterality: Bilateral;  need fluoroscopy    ENDOMETRIAL ABLATION      ESOPHAGEAL MANOMETRY WITH MEASUREMENT OF IMPEDANCE N/A 1/6/2020    Procedure: MANOMETRY, ESOPHAGUS, WITH IMPEDANCE MEASUREMENT;  " Surgeon: First Available [place];  Location: [facility] ENDO;  Service: Endoscopy;  Laterality: N/A;    ESOPHAGOGASTRODUODENOSCOPY N/A 7/5/2018    Procedure: EGD (ESOPHAGOGASTRODUODENOSCOPY);  Surgeon: Edgar Gamble Jr., MD;  Location: [facility] ENDO;  Service: Endoscopy;  Laterality: N/A;    ESOPHAGOGASTRODUODENOSCOPY N/A 11/23/2020    Procedure: ESOPHAGOGASTRODUODENOSCOPY (EGD);  Surgeon: Jina Kaufman MD;  Location: [facility] ENDO;  Service: General;  Laterality: N/A;    EXCISION OF MASS OF ABDOMEN Left 6/18/2018    Procedure: EXCISION, MASS, ABDOMEN - flank left;  Surgeon: Armando Tate MD;  Location: [facility];  Service: General;  Laterality: Left;  left flank removal of mass    FINE NEEDLE ASPIRATION      thyroid    FLEXIBLE SIGMOIDOSCOPY N/A 8/24/2022    Procedure: SIGMOIDOSCOPY, FLEXIBLE;  Surgeon: Amber West MD;  Location: [facility];  Service: Endoscopy;  Laterality: N/A;    HYSTERECTOMY  07/21/2020    INJECTION OF ANESTHETIC AGENT AROUND MEDIAL BRANCH NERVES INNERVATING LUMBAR FACET JOINT Right 2/24/2022    Procedure: Right L3, 4, 5 MBB;  Surgeon: Anam Montero MD;  Location: [facility] PAIN MGT;  Service: Pain Management;  Laterality: Right;    INJECTION OF ANESTHETIC AGENT AROUND MEDIAL BRANCH NERVES INNERVATING LUMBAR FACET JOINT Right 3/29/2022    Procedure: Right L3, 4, 5 MBB  (2nd block);  Surgeon: Anam Montero MD;  Location: [facility]IN [facility];  Service: Pain Management;  Laterality: Right;    KNEE ARTHROSCOPY W/ MENISCECTOMY Right     NASAL SEPTUM SURGERY      RADIOFREQUENCY THERMOCOAGULATION Right 4/5/2022    Procedure: Right L3-5 Lumbar RFA;  Surgeon: Anam Montero MD;  Location: [facility] PAIN [facility]GT;  Service: Pain Management;  Laterality: Right;    ROBOT-ASSISTED NISSEN FUNDOPLICATION USING DA TAMAR XI N/A 2/28/2020    Procedure: XI ROBOTIC FUNDOPLICATION, NISSEN;  Surgeon: Jina Kaufman MD;  Location: [facility] OR;  Service: General;  Laterality: N/A;    THYROIDECTOMY Bilateral 3/11/2021     Procedure: THYROIDECTOMY;  Surgeon: Nixon Moe MD;  Location: [facility];  Service: ENT;  Laterality: Bilateral;    TUBAL LIGATION      UPPER GASTROINTESTINAL ENDOSCOPY  07/05/2018    Dr. Gamble       Family History   Problem Relation Age of Onset    Diabetes Mother     Kidney failure Mother     Heart attack Mother     Breast cancer Maternal Grandmother     Breast cancer Maternal Aunt     Ovarian cancer Cousin     Breast cancer Cousin     Blindness Father     Cirrhosis Neg Hx     Colon polyps Neg Hx     Colon cancer Neg Hx     Crohn's disease Neg Hx     Esophageal cancer Neg Hx     Stomach cancer Neg Hx     Ulcerative colitis Neg Hx     Amblyopia Neg Hx     Cataracts Neg Hx     Glaucoma Neg Hx     Macular degeneration Neg Hx     Retinal detachment Neg Hx     Strabismus Neg Hx     Allergic rhinitis Neg Hx     Allergies Neg Hx     Angioedema Neg Hx     Asthma Neg Hx     Atopy Neg Hx     Eczema Neg Hx     Immunodeficiency Neg Hx     Rhinitis Neg Hx     Urticaria Neg Hx        Social History     Socioeconomic History    Marital status:     Number of children: 2   Occupational History     Employer: [employer]   Tobacco Use    Smoking status: Never    Smokeless tobacco: Never   Substance and Sexual Activity    Alcohol use: No    Drug use: No    Sexual activity: Yes     Partners: Male     Social Determinants of Health     Financial Resource Strain: Low Risk     Difficulty of Paying Living Expenses: Not hard at all   Food Insecurity: No Food Insecurity    Worried About Running Out of Food in the Last Year: Never true    Ran Out of Food in the Last Year: Never true   Transportation Needs: No Transportation Needs    Lack of Transportation (Medical): No    Lack of Transportation (Non-Medical): No   Physical Activity: Insufficiently Active    Days of Exercise per Week: 2 days    Minutes of Exercise per Session: 30 min   Stress: No Stress Concern Present    Feeling of Stress : Not at all   Social Connections: Unknown     Frequency of Communication with Friends and Family: More than three times a week    Frequency of Social Gatherings with Friends and Family: Twice a week    Active Member of Clubs or Organizations: Yes    Attends Club or Organization Meetings: More than 4 times per year    Marital Status:    Housing Stability: Low Risk     Unable to Pay for Housing in the Last Year: No    Number of Places Lived in the Last Year: 1    Unstable Housing in the Last Year: No       Review of patient's allergies indicates:   Allergen Reactions    Biaxin [clarithromycin] Swelling    Omeprazole magnesium Swelling and Other (See Comments)    Prevacid [lansoprazole] Swelling     Lip swelling- was taking this along with blood pressure medication: benazepril; not sure which caused it. Reports she has taken OTC prilosec without any problems.    Ace inhibitors Swelling     Facial swelling    Benazepril Swelling     Lip swelling       Review of Systems   Eyes:  Negative for visual disturbance.   Cardiovascular:  Negative for chest pain, dyspnea on exertion and irregular heartbeat.   Respiratory:  Negative for cough and shortness of breath.    Hematologic/Lymphatic: Negative for adenopathy.   Skin:  Negative for rash.   Musculoskeletal:  Negative for back pain.   Gastrointestinal:  Negative for abdominal pain and diarrhea.   Genitourinary:  Negative for frequency.   Neurological:  Negative for headaches.   Psychiatric/Behavioral:  The patient is not nervous/anxious.    All other systems reviewed and are negative.         Objective:     There were no vitals filed for this visit.       Physical Exam  HENT:      Head: Normocephalic and atraumatic.   Pulmonary:      Effort: Pulmonary effort is normal.   Neurological:      Mental Status: She is alert and oriented to person, place, and time.   Psychiatric:         Behavior: Behavior normal.         Thought Content: Thought content normal.           Current Outpatient Medications on File Prior to  Visit   Medication Sig    calcium carbonate (OS-CARLA) 600 mg calcium (1,500 mg) Tab Take 600 mg by mouth 2 (two) times daily with meals.    carvediloL (COREG) 12.5 MG tablet TAKE ONE TABLET BY MOUTH TWICE DAILY    cetirizine (ZYRTEC) 10 MG tablet Take 1 tablet (10 mg total) by mouth once daily. (Patient taking differently: Take 10 mg by mouth once daily.)    cyclobenzaprine (FLEXERIL) 10 MG tablet Take 0.5-1 tablets (5-10 mg total) by mouth 2 (two) times daily as needed (for severe pain/ muscle spasms). May cause drowsiness. Do not mix with Robaxin.    diclofenac (VOLTAREN) 50 MG EC tablet Take 50 mg by mouth 2 (two) times daily.    docusate sodium (COLACE) 100 MG capsule Take 100 mg by mouth every other day.     levothyroxine (SYNTHROID, LEVOTHROID) 175 MCG tablet Take 1 tablet (175 mcg total) by mouth before breakfast. Brand name Synthroid    methylPREDNISolone (MEDROL DOSEPACK) 4 mg tablet use as directed    montelukast (SINGULAIR) 10 mg tablet Take 1 tablet (10 mg total) by mouth nightly.    multivitamin (THERAGRAN) per tablet Take 1 tablet by mouth once daily. Every day    olmesartan-hydrochlorothiazide (BENICAR HCT) 20-12.5 mg per tablet Take 1 tablet by mouth once daily.    potassium chloride SA (K-DUR,KLOR-CON) 20 MEQ tablet Take 1 tablet (20 mEq total) by mouth 2 (two) times daily.    traMADoL (ULTRAM) 50 mg tablet Take 0.5-1 tablets (25-50 mg total) by mouth every 12 (twelve) hours as needed for Pain.     No current facility-administered medications on file prior to visit.       Lab Results   Component Value Date    WBC 3.95 10/13/2022    RBC 4.55 10/13/2022    HGB 11.8 (L) 10/13/2022    HCT 36.9 (L) 10/13/2022    MCV 81 (L) 10/13/2022    MCH 25.9 (L) 10/13/2022    MCHC 32.0 10/13/2022    RDW 16.4 (H) 10/13/2022     10/13/2022    MPV 10.9 10/13/2022    GRAN 1.6 (L) 10/13/2022    GRAN 40.0 10/13/2022    LYMPH 1.6 10/13/2022    LYMPH 39.2 10/13/2022    MONO 0.4 10/13/2022    MONO 10.6 10/13/2022    EOS  0.4 10/13/2022    BASO 0.05 10/13/2022    EOSINOPHIL 8.9 (H) 10/13/2022    BASOPHIL 1.3 10/13/2022     Hgb improved from 10.9 to 11.8  MCV remains at 81      Lab Results   Component Value Date    IRON 35 08/17/2022    TIBC 317 08/17/2022    FERRITIN 95 08/17/2022     Iron sats = 11%  sTFR = 5.3     UA:  WNL  Retic:  WNL    08/24/22:  Sigmoid scope:  Impression:            - The entire examined colon is normal.                          - No specimens collected.     All pertinent labs and imaging reviewed.    Assessment:       1. Iron deficiency anemia, unspecified iron deficiency anemia type         Plan:     Iron deficiency anemia, unspecified iron deficiency anemia type  IE:  slightly elevated  MARITZA - improved s/p IV iron; reevaluate in 3 months with CBC, iron studies/ferritin/sTFR prior; may be VV  Call for any worsening s/s of anemia.    Route Chart for Scheduling    Med Onc Chart Routing      Follow up with physician    Follow up with AMAN 3 months. f/u in 3 months with CBC, Iron studies/ferritin/sTFR prior; may be VV   Infusion scheduling note    Injection scheduling note    Labs    Imaging    Pharmacy appointment No pharmacy appointment needed      Other referrals No additional referrals needed           Therapy Plan Information  Flushes  heparin, porcine (PF) 100 unit/mL injection flush 500 Units  500 Units, Intravenous, PRN  PRN Medications  EPINEPHrine (EPIPEN) 0.3 mg/0.3 mL pen injection 0.3 mg  0.3 mg, Intramuscular, PRN  diphenhydrAMINE injection 50 mg  50 mg, Intravenous, PRN  methylPREDNISolone sodium succinate injection 125 mg  125 mg, Intravenous, PRN  sodium chloride 0.9% bolus 1,000 mL  1,000 mL, Intravenous, PRN               Answers submitted by the patient for this visit:  Review of Systems Questionnaire (Submitted on 10/14/2022)  appetite change : No  unexpected weight change: No  mouth sores: No

## 2022-11-02 ENCOUNTER — LAB VISIT (OUTPATIENT)
Dept: LAB | Facility: HOSPITAL | Age: 47
End: 2022-11-02
Attending: INTERNAL MEDICINE
Payer: COMMERCIAL

## 2022-11-02 DIAGNOSIS — R74.8 ELEVATED CK: ICD-10-CM

## 2022-11-02 DIAGNOSIS — D86.0 SARCOIDOSIS OF LUNG: ICD-10-CM

## 2022-11-02 DIAGNOSIS — R76.8 ANA POSITIVE: ICD-10-CM

## 2022-11-02 LAB
ALBUMIN SERPL BCP-MCNC: 3.8 G/DL (ref 3.5–5.2)
ALP SERPL-CCNC: 109 U/L (ref 55–135)
ALT SERPL W/O P-5'-P-CCNC: 25 U/L (ref 10–44)
ANION GAP SERPL CALC-SCNC: 10 MMOL/L (ref 8–16)
AST SERPL-CCNC: 23 U/L (ref 10–40)
BASOPHILS # BLD AUTO: 0.04 K/UL (ref 0–0.2)
BASOPHILS NFR BLD: 0.8 % (ref 0–1.9)
BILIRUB SERPL-MCNC: 0.3 MG/DL (ref 0.1–1)
BUN SERPL-MCNC: 11 MG/DL (ref 6–20)
C3 SERPL-MCNC: 167 MG/DL (ref 50–180)
C4 SERPL-MCNC: 48 MG/DL (ref 11–44)
CALCIUM SERPL-MCNC: 8.5 MG/DL (ref 8.7–10.5)
CHLORIDE SERPL-SCNC: 101 MMOL/L (ref 95–110)
CK SERPL-CCNC: 483 U/L (ref 20–180)
CO2 SERPL-SCNC: 30 MMOL/L (ref 23–29)
CREAT SERPL-MCNC: 1.1 MG/DL (ref 0.5–1.4)
CREAT UR-MCNC: 224 MG/DL (ref 15–325)
CRP SERPL-MCNC: 25.9 MG/L (ref 0–8.2)
DIFFERENTIAL METHOD: ABNORMAL
EOSINOPHIL # BLD AUTO: 0.2 K/UL (ref 0–0.5)
EOSINOPHIL NFR BLD: 4.6 % (ref 0–8)
ERYTHROCYTE [DISTWIDTH] IN BLOOD BY AUTOMATED COUNT: 15.5 % (ref 11.5–14.5)
ERYTHROCYTE [SEDIMENTATION RATE] IN BLOOD BY WESTERGREN METHOD: 31 MM/HR (ref 0–20)
EST. GFR  (NO RACE VARIABLE): >60 ML/MIN/1.73 M^2
GLUCOSE SERPL-MCNC: 80 MG/DL (ref 70–110)
HCT VFR BLD AUTO: 38.5 % (ref 37–48.5)
HGB BLD-MCNC: 12.4 G/DL (ref 12–16)
IMM GRANULOCYTES # BLD AUTO: 0.03 K/UL (ref 0–0.04)
IMM GRANULOCYTES NFR BLD AUTO: 0.6 % (ref 0–0.5)
LYMPHOCYTES # BLD AUTO: 1.6 K/UL (ref 1–4.8)
LYMPHOCYTES NFR BLD: 34.3 % (ref 18–48)
MCH RBC QN AUTO: 25.9 PG (ref 27–31)
MCHC RBC AUTO-ENTMCNC: 32.2 G/DL (ref 32–36)
MCV RBC AUTO: 80 FL (ref 82–98)
MONOCYTES # BLD AUTO: 0.4 K/UL (ref 0.3–1)
MONOCYTES NFR BLD: 8.4 % (ref 4–15)
NEUTROPHILS # BLD AUTO: 2.5 K/UL (ref 1.8–7.7)
NEUTROPHILS NFR BLD: 51.9 % (ref 38–73)
NRBC BLD-RTO: 0 /100 WBC
PLATELET # BLD AUTO: 263 K/UL (ref 150–450)
PMV BLD AUTO: 10.4 FL (ref 9.2–12.9)
POTASSIUM SERPL-SCNC: 3.3 MMOL/L (ref 3.5–5.1)
PROT SERPL-MCNC: 8 G/DL (ref 6–8.4)
PROT UR-MCNC: 12 MG/DL (ref 0–15)
PROT/CREAT UR: 0.05 MG/G{CREAT} (ref 0–0.2)
RBC # BLD AUTO: 4.79 M/UL (ref 4–5.4)
SODIUM SERPL-SCNC: 141 MMOL/L (ref 136–145)
WBC # BLD AUTO: 4.75 K/UL (ref 3.9–12.7)

## 2022-11-02 PROCEDURE — 86160 COMPLEMENT ANTIGEN: CPT | Performed by: INTERNAL MEDICINE

## 2022-11-02 PROCEDURE — 86140 C-REACTIVE PROTEIN: CPT | Performed by: INTERNAL MEDICINE

## 2022-11-02 PROCEDURE — 86160 COMPLEMENT ANTIGEN: CPT | Mod: 59 | Performed by: INTERNAL MEDICINE

## 2022-11-02 PROCEDURE — 85651 RBC SED RATE NONAUTOMATED: CPT | Mod: PO | Performed by: INTERNAL MEDICINE

## 2022-11-02 PROCEDURE — 36415 COLL VENOUS BLD VENIPUNCTURE: CPT | Mod: PO | Performed by: INTERNAL MEDICINE

## 2022-11-02 PROCEDURE — 82550 ASSAY OF CK (CPK): CPT | Performed by: INTERNAL MEDICINE

## 2022-11-02 PROCEDURE — 85025 COMPLETE CBC W/AUTO DIFF WBC: CPT | Mod: PO | Performed by: INTERNAL MEDICINE

## 2022-11-02 PROCEDURE — 80053 COMPREHEN METABOLIC PANEL: CPT | Performed by: INTERNAL MEDICINE

## 2022-11-02 PROCEDURE — 86225 DNA ANTIBODY NATIVE: CPT | Performed by: INTERNAL MEDICINE

## 2022-11-02 PROCEDURE — 84156 ASSAY OF PROTEIN URINE: CPT | Performed by: INTERNAL MEDICINE

## 2022-11-02 PROCEDURE — 82164 ANGIOTENSIN I ENZYME TEST: CPT | Performed by: INTERNAL MEDICINE

## 2022-11-03 DIAGNOSIS — E83.51 HYPOCALCEMIA: ICD-10-CM

## 2022-11-03 DIAGNOSIS — E55.9 VITAMIN D INSUFFICIENCY: Primary | ICD-10-CM

## 2022-11-03 LAB
ACE SERPL-CCNC: 50 U/L (ref 16–85)
DSDNA AB SER-ACNC: NORMAL [IU]/ML

## 2022-11-03 RX ORDER — MULTIVITAMIN
1 TABLET ORAL 2 TIMES DAILY
Qty: 60 TABLET | Refills: 3 | Status: SHIPPED | OUTPATIENT
Start: 2022-11-03 | End: 2022-11-21

## 2022-11-03 NOTE — PROGRESS NOTES
"Contacted patient to discuss lab results and recommendations with her per Dr. Marion. Patient verbalized understanding. All questions answered.     Elly Muhammad (Allye), [facility] [address]  Rheumatology Department    "

## 2022-11-09 ENCOUNTER — OFFICE VISIT (OUTPATIENT)
Dept: RHEUMATOLOGY | Facility: CLINIC | Age: 47
End: 2022-11-09
Payer: COMMERCIAL

## 2022-11-09 ENCOUNTER — OFFICE VISIT (OUTPATIENT)
Dept: OPHTHALMOLOGY | Facility: CLINIC | Age: 47
End: 2022-11-09
Payer: COMMERCIAL

## 2022-11-09 ENCOUNTER — PATIENT MESSAGE (OUTPATIENT)
Dept: FAMILY MEDICINE | Facility: CLINIC | Age: 47
End: 2022-11-09
Payer: COMMERCIAL

## 2022-11-09 VITALS
HEART RATE: 79 BPM | DIASTOLIC BLOOD PRESSURE: 86 MMHG | WEIGHT: 249 LBS | BODY MASS INDEX: 44.12 KG/M2 | SYSTOLIC BLOOD PRESSURE: 134 MMHG | HEIGHT: 63 IN

## 2022-11-09 DIAGNOSIS — R74.8 ELEVATED CK: ICD-10-CM

## 2022-11-09 DIAGNOSIS — Z79.899 LONG-TERM USE OF PLAQUENIL: Primary | ICD-10-CM

## 2022-11-09 DIAGNOSIS — H43.393 VITREOUS FLOATERS OF BOTH EYES: ICD-10-CM

## 2022-11-09 DIAGNOSIS — E83.51 HYPOCALCEMIA: ICD-10-CM

## 2022-11-09 DIAGNOSIS — Z86.2 HISTORY OF SARCOIDOSIS: Primary | ICD-10-CM

## 2022-11-09 DIAGNOSIS — Z12.31 ENCOUNTER FOR SCREENING MAMMOGRAM FOR BREAST CANCER: Primary | ICD-10-CM

## 2022-11-09 DIAGNOSIS — D86.9 SARCOIDOSIS: ICD-10-CM

## 2022-11-09 DIAGNOSIS — R76.8 POSITIVE ANA (ANTINUCLEAR ANTIBODY): ICD-10-CM

## 2022-11-09 DIAGNOSIS — M48.10 DISH (DIFFUSE IDIOPATHIC SKELETAL HYPEROSTOSIS): ICD-10-CM

## 2022-11-09 PROCEDURE — 1160F RVW MEDS BY RX/DR IN RCRD: CPT | Mod: CPTII,S$GLB,, | Performed by: OPHTHALMOLOGY

## 2022-11-09 PROCEDURE — 3044F HG A1C LEVEL LT 7.0%: CPT | Mod: CPTII,S$GLB,, | Performed by: OPHTHALMOLOGY

## 2022-11-09 PROCEDURE — 99999 PR PBB SHADOW E&M-EST. PATIENT-LVL III: CPT | Mod: PBBFAC,,, | Performed by: OPHTHALMOLOGY

## 2022-11-09 PROCEDURE — 92004 PR EYE EXAM, NEW PATIENT,COMPREHESV: ICD-10-PCS | Mod: S$GLB,,, | Performed by: OPHTHALMOLOGY

## 2022-11-09 PROCEDURE — 3044F HG A1C LEVEL LT 7.0%: CPT | Mod: CPTII,S$GLB,, | Performed by: INTERNAL MEDICINE

## 2022-11-09 PROCEDURE — 3008F PR BODY MASS INDEX (BMI) DOCUMENTED: ICD-10-PCS | Mod: CPTII,S$GLB,, | Performed by: INTERNAL MEDICINE

## 2022-11-09 PROCEDURE — 3008F BODY MASS INDEX DOCD: CPT | Mod: CPTII,S$GLB,, | Performed by: INTERNAL MEDICINE

## 2022-11-09 PROCEDURE — 99999 PR PBB SHADOW E&M-EST. PATIENT-LVL III: ICD-10-PCS | Mod: PBBFAC,,, | Performed by: OPHTHALMOLOGY

## 2022-11-09 PROCEDURE — 1160F PR REVIEW ALL MEDS BY PRESCRIBER/CLIN PHARMACIST DOCUMENTED: ICD-10-PCS | Mod: CPTII,S$GLB,, | Performed by: OPHTHALMOLOGY

## 2022-11-09 PROCEDURE — 1159F PR MEDICATION LIST DOCUMENTED IN MEDICAL RECORD: ICD-10-PCS | Mod: CPTII,S$GLB,, | Performed by: INTERNAL MEDICINE

## 2022-11-09 PROCEDURE — 1159F MED LIST DOCD IN RCRD: CPT | Mod: CPTII,S$GLB,, | Performed by: INTERNAL MEDICINE

## 2022-11-09 PROCEDURE — 3075F PR MOST RECENT SYSTOLIC BLOOD PRESS GE 130-139MM HG: ICD-10-PCS | Mod: CPTII,S$GLB,, | Performed by: INTERNAL MEDICINE

## 2022-11-09 PROCEDURE — 3044F PR MOST RECENT HEMOGLOBIN A1C LEVEL <7.0%: ICD-10-PCS | Mod: CPTII,S$GLB,, | Performed by: INTERNAL MEDICINE

## 2022-11-09 PROCEDURE — 99214 PR OFFICE/OUTPT VISIT, EST, LEVL IV, 30-39 MIN: ICD-10-PCS | Mod: S$GLB,,, | Performed by: INTERNAL MEDICINE

## 2022-11-09 PROCEDURE — 1159F PR MEDICATION LIST DOCUMENTED IN MEDICAL RECORD: ICD-10-PCS | Mod: CPTII,S$GLB,, | Performed by: OPHTHALMOLOGY

## 2022-11-09 PROCEDURE — 92004 COMPRE OPH EXAM NEW PT 1/>: CPT | Mod: S$GLB,,, | Performed by: OPHTHALMOLOGY

## 2022-11-09 PROCEDURE — 99999 PR PBB SHADOW E&M-EST. PATIENT-LVL III: CPT | Mod: PBBFAC,,, | Performed by: INTERNAL MEDICINE

## 2022-11-09 PROCEDURE — 3079F DIAST BP 80-89 MM HG: CPT | Mod: CPTII,S$GLB,, | Performed by: INTERNAL MEDICINE

## 2022-11-09 PROCEDURE — 3044F PR MOST RECENT HEMOGLOBIN A1C LEVEL <7.0%: ICD-10-PCS | Mod: CPTII,S$GLB,, | Performed by: OPHTHALMOLOGY

## 2022-11-09 PROCEDURE — 99214 OFFICE O/P EST MOD 30 MIN: CPT | Mod: S$GLB,,, | Performed by: INTERNAL MEDICINE

## 2022-11-09 PROCEDURE — 3075F SYST BP GE 130 - 139MM HG: CPT | Mod: CPTII,S$GLB,, | Performed by: INTERNAL MEDICINE

## 2022-11-09 PROCEDURE — 99999 PR PBB SHADOW E&M-EST. PATIENT-LVL III: ICD-10-PCS | Mod: PBBFAC,,, | Performed by: INTERNAL MEDICINE

## 2022-11-09 PROCEDURE — 1159F MED LIST DOCD IN RCRD: CPT | Mod: CPTII,S$GLB,, | Performed by: OPHTHALMOLOGY

## 2022-11-09 PROCEDURE — 3079F PR MOST RECENT DIASTOLIC BLOOD PRESSURE 80-89 MM HG: ICD-10-PCS | Mod: CPTII,S$GLB,, | Performed by: INTERNAL MEDICINE

## 2022-11-09 NOTE — PROGRESS NOTES
HPI    Pt states here for yrly exam.  + floaters OU  - flashes.States she has   been on Plaquenil for 3-4 months and was told to mention to the eye dr.    It is not showing on her meds list. Pt just left his office and he was to   call in  continued use.  Denies samples given.  Recently dx with Sarcoid   Dr. Marion  No drops or ointment used.  -eye pain  Last edited by Yenni Marcelo on 11/9/2022  2:57 PM.            Assessment /Plan     For exam results, see Encounter Report.    Long-term use of Plaquenil    Sarcoidosis    Positive CHARLENE (antinuclear antibody)    Vitreous floaters of both eyes    No sign of active inflammation  Schedule OCT MAC and HVF 10-2 for plaquenil monitoring  No snowbanking or vitreous cell ou  Patient satisfied with current glass rx  No follow-ups on file.

## 2022-11-09 NOTE — PROGRESS NOTES
RHEUMATOLOGY OUTPATIENT CLINIC NOTE    11/9/2022    Attending Rheumatologist: Urban Marion  Primary Care Provider/Physician Requesting Consultation: Dwayne Booth MD   Chief Complaint/Reason For Consultation:  Sarcoidosis, Positive CHARLENE, and Elevated CK      Subjective:     Akiko Jameson is a 47 y.o. Black or  female with Sarcoidosis with multiorgan involvement for follow up.    Voices no acute complaints. Intermittent long lasting cramps on most prominent on lower body radiating from lower back.    Addendum 12/6: persistent hypocalcemia in context of vitamin D insufficiency.  No evidence of active Sarcoidosis.  Will provide Vitamin D replacement along with Ca supp. Repeat CMP in 3 months.    Review of Systems   Constitutional:  Negative for fever.   HENT:  Negative for nosebleeds.         Denies sicca sx   Eyes:  Negative for blurred vision, photophobia and pain.   Respiratory:  Negative for cough, hemoptysis and shortness of breath.    Gastrointestinal:  Negative for blood in stool and melena.        Denies dysphagia   Genitourinary:  Negative for hematuria.   Musculoskeletal:  Positive for back pain and myalgias. Negative for joint pain.   Skin:  Negative for rash.        Denies RP   Neurological:  Negative for tingling, focal weakness and weakness.     Chronic comorbid conditions affecting medical decision making today:  Past Medical History:   Diagnosis Date    ALLERGIC RHINITIS     Arthritis     Cellulitis     Constipation due to opioid therapy     Eosinophilia     mild    GERD (gastroesophageal reflux disease)     Granular cell tumor     H. pylori infection 2018    History of 2019 novel coronavirus disease (COVID-19) 10/04/2020    Hypertension     Lymphedema     Mild anemia     KEIRY on CPAP     does not regularly    Positive CHARLENE (antinuclear antibody) 07/27/2022    Prediabetes 08/06/2021    Sarcoidosis, unspecified     Sleep apnea     compliant with CPAP    Thyroid nodule      Urinary incontinence, mixed      Past Surgical History:   Procedure Laterality Date    24 HOUR IMPEDANCE PH MONITORING OF ESOPHAGUS IN PATIENT NOT TAKING ACID REDUCING MEDICATIONS N/A 1/6/2020    Procedure: IMPEDANCE PH STUDY, ESOPHAGEAL, 24 HOUR, IN PATIENT NOT TAKING ACID REDUCING MEDICATION;  Surgeon: First Available Tamika Panchal;  Location: [facility];  Service: Endoscopy;  Laterality: N/A;    AXILLARY NODE DISSECTION Right     granular cell tumor    BILATERAL SALPINGO-OOPHORECTOMY (BSO)  07/21/2020    BREAST CYST ASPIRATION      left    CHOLECYSTECTOMY      COLONOSCOPY N/A 5/10/2019    Procedure: COLONOSCOPY;  Surgeon: Edgar Gamble Jr., MD;  Location: [facility];  Service: Endoscopy;  Laterality: N/A;    ENDOBRONCHIAL ULTRASOUND Bilateral 4/27/2021    Procedure: ENDOBRONCHIAL ULTRASOUND (EBUS);  Surgeon: Armando Kirby MD;  Location: [facility];  Service: Pulmonary;  Laterality: Bilateral;  need fluoroscopy    ENDOMETRIAL ABLATION      ESOPHAGEAL MANOMETRY WITH MEASUREMENT OF IMPEDANCE N/A 1/6/2020    Procedure: MANOMETRY, ESOPHAGUS, WITH IMPEDANCE MEASUREMENT;  Surgeon: First Puma Panchal;  Location: [facility];  Service: Endoscopy;  Laterality: N/A;    ESOPHAGOGASTRODUODENOSCOPY N/A 7/5/2018    Procedure: EGD (ESOPHAGOGASTRODUODENOSCOPY);  Surgeon: Edgar Gamble Jr., MD;  Location: [facility];  Service: Endoscopy;  Laterality: N/A;    ESOPHAGOGASTRODUODENOSCOPY N/A 11/23/2020    Procedure: ESOPHAGOGASTRODUODENOSCOPY (EGD);  Surgeon: Jina Kaufman MD;  Location: [facility];  Service: General;  Laterality: N/A;    EXCISION OF MASS OF ABDOMEN Left 6/18/2018    Procedure: EXCISION, MASS, ABDOMEN - flank left;  Surgeon: Armando Tate MD;  Location: [facility];  Service: General;  Laterality: Left;  left flank removal of mass    FINE NEEDLE ASPIRATION      thyroid    FLEXIBLE SIGMOIDOSCOPY N/A 8/24/2022    Procedure: SIGMOIDOSCOPY, FLEXIBLE;  Surgeon: Amber West MD;  Location: [facility];   Service: Endoscopy;  Laterality: N/A;    HYSTERECTOMY  07/21/2020    INJECTION OF ANESTHETIC AGENT AROUND MEDIAL BRANCH NERVES INNERVATING LUMBAR FACET JOINT Right 2/24/2022    Procedure: Right L3, 4, 5 MBB;  Surgeon: Anam Montero MD;  Location: [facility];  Service: Pain Management;  Laterality: Right;    INJECTION OF ANESTHETIC AGENT AROUND MEDIAL BRANCH NERVES INNERVATING LUMBAR FACET JOINT Right 3/29/2022    Procedure: Right L3, 4, 5 MBB  (2nd block);  Surgeon: Anam Montero MD;  Location: [facility];  Service: Pain Management;  Laterality: Right;    KNEE ARTHROSCOPY W/ MENISCECTOMY Right     NASAL SEPTUM SURGERY      RADIOFREQUENCY THERMOCOAGULATION Right 4/5/2022    Procedure: Right L3-5 Lumbar RFA;  Surgeon: Anam Montero MD;  Location: [facility];  Service: Pain Management;  Laterality: Right;    ROBOT-ASSISTED NISSEN FUNDOPLICATION USING DA TAMAR XI N/A 2/28/2020    Procedure: XI ROBOTIC FUNDOPLICATION, NISSEN;  Surgeon: Jina Kaufman MD;  Location: [facility] OR;  Service: General;  Laterality: N/A;    THYROIDECTOMY Bilateral 3/11/2021    Procedure: THYROIDECTOMY;  Surgeon: Nixon Moe MD;  Location: [facility] OR;  Service: ENT;  Laterality: Bilateral;    TUBAL LIGATION      UPPER GASTROINTESTINAL ENDOSCOPY  07/05/2018    Dr. Gamble     Family History   Problem Relation Age of Onset    Diabetes Mother     Kidney failure Mother     Heart attack Mother     Breast cancer Maternal Grandmother     Breast cancer Maternal Aunt     Ovarian cancer Cousin     Breast cancer Cousin     Blindness Father     Cirrhosis Neg Hx     Colon polyps Neg Hx     Colon cancer Neg Hx     Crohn's disease Neg Hx     Esophageal cancer Neg Hx     Stomach cancer Neg Hx     Ulcerative colitis Neg Hx     Amblyopia Neg Hx     Cataracts Neg Hx     Glaucoma Neg Hx     Macular degeneration Neg Hx     Retinal detachment Neg Hx     Strabismus Neg Hx     Allergic rhinitis Neg Hx     Allergies Neg Hx     Angioedema Neg Hx      Asthma Neg Hx     Atopy Neg Hx     Eczema Neg Hx     Immunodeficiency Neg Hx     Rhinitis Neg Hx     Urticaria Neg Hx      Social History     Tobacco Use   Smoking Status Never   Smokeless Tobacco Never       Current Outpatient Medications:     calcium-vitamin D (CALCIUM 600 + D,3,) 600 mg-10 mcg (400 unit) Tab, Take 1 tablet by mouth 2 (two) times daily., Disp: 60 tablet, Rfl: 3    carvediloL (COREG) 12.5 MG tablet, TAKE ONE TABLET BY MOUTH TWICE DAILY, Disp: 180 tablet, Rfl: 1    cetirizine (ZYRTEC) 10 MG tablet, Take 1 tablet (10 mg total) by mouth once daily. (Patient taking differently: Take 10 mg by mouth once daily.), Disp: , Rfl: 0    cyclobenzaprine (FLEXERIL) 10 MG tablet, Take 0.5-1 tablets (5-10 mg total) by mouth 2 (two) times daily as needed (for severe pain/ muscle spasms). May cause drowsiness. Do not mix with Robaxin., Disp: 60 tablet, Rfl: 0    docusate sodium (COLACE) 100 MG capsule, Take 100 mg by mouth every other day. , Disp: , Rfl:     levothyroxine (SYNTHROID, LEVOTHROID) 175 MCG tablet, Take 1 tablet (175 mcg total) by mouth before breakfast. Brand name Synthroid, Disp: 30 tablet, Rfl: 3    montelukast (SINGULAIR) 10 mg tablet, Take 1 tablet (10 mg total) by mouth nightly., Disp: 90 tablet, Rfl: 3    multivitamin (THERAGRAN) per tablet, Take 1 tablet by mouth once daily. Every day, Disp: , Rfl:     olmesartan-hydrochlorothiazide (BENICAR HCT) 20-12.5 mg per tablet, Take 1 tablet by mouth once daily., Disp: 90 tablet, Rfl: 1    potassium chloride SA (K-DUR,KLOR-CON) 20 MEQ tablet, Take 1 tablet (20 mEq total) by mouth 2 (two) times daily., Disp: 180 tablet, Rfl: 3    methylPREDNISolone (MEDROL DOSEPACK) 4 mg tablet, use as directed (Patient not taking: Reported on 11/9/2022), Disp: 21 each, Rfl: 0    traMADoL (ULTRAM) 50 mg tablet, Take 0.5-1 tablets (25-50 mg total) by mouth every 12 (twelve) hours as needed for Pain. (Patient not taking: Reported on 11/9/2022), Disp: 10 tablet, Rfl:  0     Objective:     Vitals:    11/09/22 0731   BP: 134/86   Pulse: 79     Physical Exam   Constitutional: She appears obese.   Eyes: Conjunctivae are normal.   Pulmonary/Chest: Effort normal. No respiratory distress.   Musculoskeletal:         General: No swelling or tenderness. Normal range of motion.   Neurological: She displays no weakness.   Skin: No rash noted.     Reviewed available old and all outside pertinent medical records available.    All lab results personally reviewed and interpreted by me.       ASSESSMENT:     Sarcoidosis with lung, LN, and probable MSK involvement    HyperCKmia    Muscle cramps    DISH    Positive CHARLENE    PLAN:     Sarcoidosis with multiorgan involvement. Main concern of chronic back pain with radiculopathy features in context of diffuse idiopathic hyperostosis syndrome.  Associated lower body cramps and fatigue reported by patient.  ROS negative for photosensitive rashes, sicca, joint swelling, respiratory, or visual complaints.  Exam w/o reproducible muscle weakness or squeeze tenderness.  Las w/o cytopenias or progressive kidney or liver dysfunction.  Chest CT from July with stable findings.  No serositis reported.  Anterior bridging osteophytes are noted at multiple thoracic levels support DISH.  Recommend EMG to determine presence of active myopathy.  Continue HCQ for arthralgias.  Vitamin D supplementation not recommended in setting of sarcoidosis.  May continue adequate dietary intake and Ca supplementation only on PRN basis.      Disclaimer: This note is prepared using voice recognition software and as such is likely to have errors and has not been proof read. Please contact me for questions.         Urban Marion M.D.

## 2022-11-20 ENCOUNTER — PATIENT MESSAGE (OUTPATIENT)
Dept: RHEUMATOLOGY | Facility: CLINIC | Age: 47
End: 2022-11-20
Payer: COMMERCIAL

## 2022-11-21 ENCOUNTER — OFFICE VISIT (OUTPATIENT)
Dept: PAIN MEDICINE | Facility: CLINIC | Age: 47
End: 2022-11-21
Payer: COMMERCIAL

## 2022-11-21 VITALS — RESPIRATION RATE: 17 BRPM

## 2022-11-21 DIAGNOSIS — M79.18 CHRONIC MYOFASCIAL PAIN: ICD-10-CM

## 2022-11-21 DIAGNOSIS — M54.59 LUMBAR FACET JOINT PAIN: ICD-10-CM

## 2022-11-21 DIAGNOSIS — G89.29 CHRONIC MYOFASCIAL PAIN: ICD-10-CM

## 2022-11-21 DIAGNOSIS — M47.816 LUMBAR SPONDYLOSIS: Primary | ICD-10-CM

## 2022-11-21 PROCEDURE — 1160F RVW MEDS BY RX/DR IN RCRD: CPT | Mod: CPTII,95,, | Performed by: PHYSICIAN ASSISTANT

## 2022-11-21 PROCEDURE — 1160F PR REVIEW ALL MEDS BY PRESCRIBER/CLIN PHARMACIST DOCUMENTED: ICD-10-PCS | Mod: CPTII,95,, | Performed by: PHYSICIAN ASSISTANT

## 2022-11-21 PROCEDURE — 1159F PR MEDICATION LIST DOCUMENTED IN MEDICAL RECORD: ICD-10-PCS | Mod: CPTII,95,, | Performed by: PHYSICIAN ASSISTANT

## 2022-11-21 PROCEDURE — 3044F HG A1C LEVEL LT 7.0%: CPT | Mod: CPTII,95,, | Performed by: PHYSICIAN ASSISTANT

## 2022-11-21 PROCEDURE — 99214 PR OFFICE/OUTPT VISIT, EST, LEVL IV, 30-39 MIN: ICD-10-PCS | Mod: 95,,, | Performed by: PHYSICIAN ASSISTANT

## 2022-11-21 PROCEDURE — 3044F PR MOST RECENT HEMOGLOBIN A1C LEVEL <7.0%: ICD-10-PCS | Mod: CPTII,95,, | Performed by: PHYSICIAN ASSISTANT

## 2022-11-21 PROCEDURE — 1159F MED LIST DOCD IN RCRD: CPT | Mod: CPTII,95,, | Performed by: PHYSICIAN ASSISTANT

## 2022-11-21 PROCEDURE — 99214 OFFICE O/P EST MOD 30 MIN: CPT | Mod: 95,,, | Performed by: PHYSICIAN ASSISTANT

## 2022-11-21 NOTE — PROGRESS NOTES
The patient location is: home  The chief complaint leading to consultation is: chronic pain     Visit type: audiovisual    Face to Face time with patient: 10-15 minutes  20 minutes of total time spent on the encounter, which includes face to face time and non-face to face time preparing to see the patient (eg, review of tests), Obtaining and/or reviewing separately obtained history, Documenting clinical information in the electronic or other health record, Independently interpreting results (not separately reported) and communicating results to the patient/family/caregiver, or Care coordination (not separately reported).     Each patient to whom he or she provides medical services by telemedicine is:  (1) informed of the relationship between the physician and patient and the respective role of any other health care provider with respect to management of the patient; and (2) notified that he or she may decline to receive medical services by telemedicine and may withdraw from such care at any time.        Established Patient Chronic Pain Note (Follow up visit)    Chief Complaint:   Chief Complaint   Patient presents with    Back Pain         SUBJECTIVE:    Interval History (11/21/2022):  Akiko Jameson presents today for follow-up on virtual visit.  Patient was last seen about 2 months ago. At that visit, the plan was to start medrol dose warren and consider RFA - due to pain flared, but eventually that pain resolved. She has had one more episode over the last couple months of Increased pain, which ultimately resolved with muscle relaxer. She is stable overall right now.     Interval History (9/28/2022):  Akiko Jameson presents today for follow-up visit.  Patient was last seen on 5/13/2022. At that visit, she was doing well since RFA. She had a pain flare a few weeks ago that resolved fairly quickly. Pain returned over the week requiring ER visit. She reports pain being debilitating at this time, which  "started in the neck initially (that resolved). Pain stayed localized in right lower back. She was given toradol and morphine shot at ER. Pain has since calmed down. She is utilizing muscle relaxer now, but at the time of pain flare, it wasn't helping. Patient reports pain as "0/10 today.    Interval History (5/13/2022): Akiko Jameson presents today for follow-up visit.  she underwent right L3-5 RFA on 4/5/22.  The patient reports that she is/was better following the procedure.  she reports 95% pain relief.  The changes lasted >4 weeks so far.  The changes have continued through this visit.  Patient reports pain as "0/10 today.    Interval HPI (2/11/22):  Akiko Jameson is a 47 y.o. who presents to the clinic for a follow-up appointment for mid back pain.  At the last visit, she was provided trigger point injections to the thoracic paraspinals , lower trapezius, and latissimus dorsi on the right.  She reports that the trigger point injections provided moderate relief.  She was maintained on Robaxin 750 mg up to 3 times daily as needed for muscle related pain.  Since the last visit, Akiko Jameson states the pain has been starting to return. Current pain intensity is 8/10.   Patient denies night fever/night sweats, urinary incontinence, bowel incontinence, significant weight loss, significant motor weakness and loss of sensations.    Pain Disability Index Review:  Last 3 PDI Scores 5/13/2022 2/11/2022 12/10/2021   Pain Disability Index (PDI) 19 45 56     Interval HPI 12/10/2021:  Akiko Jameson is a 47 y.o. female who presents to the clinic for a follow-up appointment for mid back pain.  At the last visit, she was provided trigger point injections to the thoracic paraspinals , lower trapezius, and latissimus dorsi on the right.  She reports that the trigger point injections provided moderate relief.  She was maintained on Robaxin 750 mg up to 3 times daily as needed for muscle related " pain.  Since the last visit, Akiko Jameson states the pain has been starting to return. Current pain intensity is 5/10.     Interval HPI 11/19/2021:  Akiko Jameson is a 46 y.o. female who presents to the clinic for a follow-up appointment for mid back pain.  At the last visit, she was provided trigger point injections to the thoracic paraspinals , lower trapezius, and latissimus dorsi on the right.  She reports that the trigger point injections provided moderate relief.  She was maintained on Robaxin 750 mg up to 3 times daily as needed for muscle related pain.  Since the last visit, Akiko Jameson states the pain has been starting to return. Current pain intensity is 5/10.  Unfortunately, the patient is currently on antibiotics for cellulitis of the right upper extremity.      Interval HPI 09/23/2021:  Akiko Jameson is a 46 y.o. female who presents to the clinic for a follow-up appointment for mid back pain.  At the last visit, she was provided trigger point injections to the thoracic paraspinals , lower trapezius, and latissimus dorsi on the right.  She reports that the trigger point injections provided limited relief, but they have been beneficial previously.  She was maintained on Robaxin 500 mg up to 3 times daily as needed for muscle related pain.  Since the last visit, Akiko Jameson states the pain has been starting to return. Current pain intensity is 8/10.      Interval HPI 07/09/2021:  Akiko Jameson is a 46 y.o. female who presents to the clinic for a follow-up appointment for mid back pain.  At the last visit, she was provided trigger point injections to the thoracic paraspinals , lower trapezius, and latissimus dorsi on the right.  She reports that the trigger point injections provided significant relief relief.  She was maintained on Robaxin 500 mg up to 3 times daily as needed for muscle related pain.  Since the last visit, Akiko Jameson  states the pain has been starting to return. Current pain intensity is 8/10.  Of note, in the interim patient has had thyroidectomy and was recently diagnosed with sarcoidosis.    Interval HPI 11/13/2020:  Akiko Jameson is a 46 y.o. female who presents to the clinic for a follow-up appointment for mid back pain.  At the last visit, she was provided trigger point injections to the thoracic paraspinals , lower trapezius, and latissimus dorsi on the right.  She reports that the trigger point injections provided significant relief relief.  She was maintained on Robaxin 500 mg up to 3 times daily as needed for muscle related pain.  Since the last visit, Akiko Jameson states the pain has been starting to return. Current pain intensity is 5/10.      Interval HPI 05/29/2020:  Akiko Jameson is a 45 y.o. female who presents to the clinic for a follow-up appointment for mid back pain.  At the last visit, she was provided trigger point injections to the thoracic paraspinals and upper/middle trapezius on the right.  She reports that the trigger point injections provided 100% relief at the areas injected, but is having pain in other areas that she would like to also get injected today.  She was also started on Robaxin 500 mg up to 3 times daily as needed for muscle related pain.  She reports that the Robaxin has been of minimal benefit.  Since the last visit, Akiko Jameson states the pain has been improving. Current pain intensity is 8/10.  We discussed lymphedema management, and she is interested in going to a physical therapy clinic to help address this issue.    Initial HPI 05/15/2020:  Akiko Jameson is a 45 y.o. female, right-hand dominant, who presents to the clinic for the evaluation of mid back pain. The pain started 25+ years ago following a lymph node removal on the right that resulted in lymphedema of the right upper extremity and symptoms have been worsening.The pain is  located in the right mid back area and radiates to the along the length of the upper to lower back.  The pain is described as aching, burning and throbbing and is rated as 6/10. The pain is rated with a score of  0/10 on the BEST day and a score of 10/10 on the WORST day.  Symptoms interfere with daily activity, sleeping and work. The pain is exacerbated by increased activity or use of the right upper extremity.  The pain is mitigated by rest. The patient reports spending 2-4 hours per day reclining. The patient reports 6-8 hours of uninterrupted sleep per night.  She reports that she works as a .         Non-Pharmacologic Treatments:  Physical Therapy/Home Exercise: yes  Ice/Heat:yes  TENS: yes  Acupuncture: no  Massage: yes  Chiropractic: yes    Other: yes, compression sleeves        Pain Medications:  - Opioids: Norco  - Adjuvant Medications: Relafen, Robaxin  - Anti-Coagulants: None           Pain Procedures:   -previously underwent thoracic epidurals x2 with first one being beneficial but the second one not so much  -05/15/2020:  Trigger point injections to the thoracic paraspinals and upper/middle trapezius on the right, 100% relief  -05/29/2020:  Trigger point injections to the thoracic paraspinals, lower trapezius, and latissimus dorsi on the right, significant relief   -11/13/2020:  Trigger point injections to the thoracic paraspinals, lower trapezius, and latissimus dorsi on the right, significant relief  -07/09/2021: Trigger point injections to the thoracic paraspinals, lower trapezius, and latissimus dorsi on the right, limited relief  - 09/23/2021: Trigger point injections to the thoracic paraspinals, lower trapezius, and latissimus dorsi on the right, moderate relief  - 12/10/2020: Trigger point injections to the thoracic paraspinals, lower trapezius, and latissimus dorsi on the right, moderate relief  - diagnostic right L3-5 MBB on 02/24/2022 and 03/29/2022 with reported % pain  relief  - right L3-5 RFA on 4/5/22 with 95% pain relief        Imaging (Reviewed on 11/21/2022):       X-ray bilateral knees 03/16/2016:  AP and PA flexion standing views of both knees as well as lateral and Merchant views of both knees were obtained.  The joint spaces are grossly preserved.  Mild patellofemoral spurring and prominent patellar enthesophytes bilaterally.  No joint effusion.  No acute fracture or malalignment.     MRI right knee 03/21/2016:  The anterior cruciate ligament, posterior cruciate ligament, medial collateral ligament, lateral collateral ligament complex normal popliteus tendon, IT band, quadriceps tendon, and patellar tendon are normal in appearance.    There is a complex, primarily radial tear of the posterior horn of the medial meniscus which extends to the posterior root attachment of the medial meniscus.  The medial meniscus is intact.    The articular cartilage of the medial and lateral tibiofemoral joint compartments is intact.  There is a 14 mm width area of full-thickness abnormal chondral signal observed in the lateral patellar facet at the level of the patellar midpole.  No abnormal subcortical signal abnormality appreciated.    There is quadriceps tendon and patellar tendon insertion enthesophyte formation at their respective attachments on the patella.  There is a small knee joint effusion present.  No acute fracture or pathologic marrow replacement process.  There is hematopoietic  bone marrow present within the distal femur and proximal tibia.          REVIEW OF SYSTEMS:    GENERAL:  No weight loss, malaise or fevers.  HEENT:   No recent changes in vision or hearing  NECK:  Negative for lumps, no difficulty with swallowing.  RESPIRATORY:  Negative for cough, wheezing or shortness of breath, patient denies any recent URI.  CARDIOVASCULAR:  Negative for chest pain, leg swelling or palpitations.  GI:  Negative for abdominal discomfort, blood in stools or black stools or change in  bowel habits.  MUSCULOSKELETAL:  See HPI.  SKIN:  Negative for lesions, rash, and itching.  PSYCH:  No mood disorder or recent psychosocial stressors.  Patients sleep is not disturbed secondary to pain.  HEMATOLOGY/LYMPHOLOGY:  Negative for prolonged bleeding, bruising easily or swollen nodes.  Patient is not currently taking any anti-coagulants  NEURO:   No history of headaches, syncope, paralysis, seizures or tremors.  All other reviewed and negative other than HPI.      OBJECTIVE:    PHYSICAL EXAMINATION:  Telemedicine Exam  GENERAL: Well appearing, in no acute distress, alert and oriented x3.  Cooperative.  PSYCH:  Mood and affect appropriate.  SKIN: Skin color & texture with no obvious abnormalities.    HEAD/FACE:  Normocephalic, atraumatic.    PULM:  No difficulty breathing. No nasal flaring.   EXTREMITIES: No obvious deformities. Moving all extremities well, appears to have symmetric strength throughout.  MUSCULOSKELETAL: No obvious atrophy abnormalities are noted.   NEURO: No obvious neurologic deficit.   GAIT: sitting.     Physical Exam: last in clinic visit:  GENERAL: Well appearing, in no acute distress, alert and oriented x3.  PSYCH:  Mood and affect appropriate.  SKIN: Skin color, texture, turgor normal, no rashes or lesions.  HEAD/FACE:  Normocephalic, atraumatic. Cranial nerves grossly intact.  NECK: No pain to palpation over the cervical paraspinous muscles. Spurling Negative. No pain with neck flexion, extension, or lateral flexion.   PULM: No evidence of respiratory difficulty, symmetric chest rise.  GI:  Soft and non-tender.  BACK:  TTP over the thoracic paraspinals, lower trapezius, and latissimus dorsi on the right.  SLR in the sitting and supine positions is negative to radicular pain.   mild-to-moderate pain to palpation over the facet joints of the lumbar spine and lumbar paraspinals - Present, mild, improved since procedure.  Fair range of motion secondary to pain reproduction.  Axial  loading positive on the right - Present, mild, improved since procedure.  EXTREMITIES: Peripheral joint ROM is full and pain free without obvious instability or laxity in all four extremities.  There is significant lymphedema present throughout the right upper extremity.  No other deformities or skin discoloration. Good capillary refill.  MUSCULOSKELETAL: Shoulder, hip, and knee provocative maneuvers are negative.  BUE & BLE strength is normal and symmetric.  No atrophy or tone abnormalities are noted.  NEURO: BUE & BLE coordination and muscle stretch reflexes are physiologic and symmetric.  Plantar response are downgoing. No clonus.  No loss of sensation is noted.  GAIT: normal.            ASSESSMENT: 47 y.o. year old female with mid back pain, consistent with     1. Lumbar spondylosis        2. Lumbar facet joint pain        3. Chronic myofascial pain              PLAN:   1. Interventional: S/p right L3-5 RFA on 4/5/22 with 95% pain relief. Will hold off on repeating since pain is stable at this time; patient can call to schedule repeat RFA if needed.    2. Pharmacologic:   - Refill Flexeril 10mg BID PRN - to take when she has pain flare.  - Previously given bridge of tramadol 50mg (10 tabs - 5 day supply) to have on hand for pain flare up to take 1/2 to 1 tab for severe pain.    - Anticoagulation use: None.   - LA  reviewed and appropriate.      3. Rehabilitative: Encouraged regular exercise. Therapy: advised patient continue with gradual compression for her lymphedema and activities as tolerated.     4. Diagnostic: None for now.    5. Follow up: PRN     - This condition does not require this patient to take time off of work, and the primary goal of our Pain Management services is to improve the patient's functional capacity.   - I discussed the risks, benefits, and alternatives to potential treatment options. All questions and concerns were fully addressed today in clinic.           Disclaimer:  This note was  prepared using voice recognition system and is likely to have sound alike errors that may have been overlooked even after proof reading.  Please call me with any questions.

## 2022-11-22 ENCOUNTER — PATIENT MESSAGE (OUTPATIENT)
Dept: RHEUMATOLOGY | Facility: CLINIC | Age: 47
End: 2022-11-22
Payer: COMMERCIAL

## 2022-11-22 ENCOUNTER — TELEPHONE (OUTPATIENT)
Dept: FAMILY MEDICINE | Facility: CLINIC | Age: 47
End: 2022-11-22
Payer: COMMERCIAL

## 2022-11-22 RX ORDER — HYDROXYCHLOROQUINE SULFATE 200 MG/1
200 TABLET, FILM COATED ORAL 2 TIMES DAILY
COMMUNITY

## 2022-11-23 ENCOUNTER — HOSPITAL ENCOUNTER (OUTPATIENT)
Dept: RADIOLOGY | Facility: HOSPITAL | Age: 47
Discharge: HOME | End: 2022-11-23
Attending: FAMILY MEDICINE
Payer: COMMERCIAL

## 2022-11-23 VITALS — BODY MASS INDEX: 44.1 KG/M2 | WEIGHT: 248.88 LBS | HEIGHT: 63 IN

## 2022-11-23 DIAGNOSIS — Z12.31 ENCOUNTER FOR SCREENING MAMMOGRAM FOR BREAST CANCER: ICD-10-CM

## 2022-11-23 PROCEDURE — 77067 MAMMO DIGITAL SCREENING BILAT WITH TOMO: ICD-10-PCS | Mod: 26,,, | Performed by: RADIOLOGY

## 2022-11-23 PROCEDURE — 77063 BREAST TOMOSYNTHESIS BI: CPT | Mod: TC,PO

## 2022-11-23 PROCEDURE — 77063 MAMMO DIGITAL SCREENING BILAT WITH TOMO: ICD-10-PCS | Mod: 26,,, | Performed by: RADIOLOGY

## 2022-11-23 PROCEDURE — 77067 SCR MAMMO BI INCL CAD: CPT | Mod: 26,,, | Performed by: RADIOLOGY

## 2022-11-23 PROCEDURE — 77063 BREAST TOMOSYNTHESIS BI: CPT | Mod: 26,,, | Performed by: RADIOLOGY

## 2022-12-15 ENCOUNTER — LAB VISIT (OUTPATIENT)
Dept: LAB | Facility: HOSPITAL | Age: 47
End: 2022-12-15
Attending: INTERNAL MEDICINE
Payer: COMMERCIAL

## 2022-12-15 DIAGNOSIS — E83.51 HYPOCALCEMIA: ICD-10-CM

## 2022-12-15 DIAGNOSIS — E55.9 VITAMIN D INSUFFICIENCY: ICD-10-CM

## 2022-12-15 LAB
25(OH)D3+25(OH)D2 SERPL-MCNC: 11 NG/ML (ref 30–96)
ALBUMIN SERPL BCP-MCNC: 3.8 G/DL (ref 3.5–5.2)
ALP SERPL-CCNC: 112 U/L (ref 55–135)
ALT SERPL W/O P-5'-P-CCNC: 19 U/L (ref 10–44)
ANION GAP SERPL CALC-SCNC: 13 MMOL/L (ref 8–16)
AST SERPL-CCNC: 23 U/L (ref 10–40)
BILIRUB SERPL-MCNC: 0.6 MG/DL (ref 0.1–1)
BUN SERPL-MCNC: 10 MG/DL (ref 6–20)
CA-I BLDV-SCNC: 1 MMOL/L (ref 1.06–1.42)
CALCIUM SERPL-MCNC: 8 MG/DL (ref 8.7–10.5)
CHLORIDE SERPL-SCNC: 101 MMOL/L (ref 95–110)
CO2 SERPL-SCNC: 27 MMOL/L (ref 23–29)
CREAT SERPL-MCNC: 1.1 MG/DL (ref 0.5–1.4)
EST. GFR  (NO RACE VARIABLE): >60 ML/MIN/1.73 M^2
GLUCOSE SERPL-MCNC: 88 MG/DL (ref 70–110)
POTASSIUM SERPL-SCNC: 3.4 MMOL/L (ref 3.5–5.1)
PROT SERPL-MCNC: 7.8 G/DL (ref 6–8.4)
SODIUM SERPL-SCNC: 141 MMOL/L (ref 136–145)

## 2022-12-15 PROCEDURE — 82330 ASSAY OF CALCIUM: CPT | Performed by: INTERNAL MEDICINE

## 2022-12-15 PROCEDURE — 80053 COMPREHEN METABOLIC PANEL: CPT | Performed by: INTERNAL MEDICINE

## 2022-12-15 PROCEDURE — 36415 COLL VENOUS BLD VENIPUNCTURE: CPT | Mod: PO | Performed by: INTERNAL MEDICINE

## 2022-12-15 PROCEDURE — 82306 VITAMIN D 25 HYDROXY: CPT | Performed by: INTERNAL MEDICINE

## 2022-12-16 ENCOUNTER — PATIENT MESSAGE (OUTPATIENT)
Dept: RHEUMATOLOGY | Facility: CLINIC | Age: 47
End: 2022-12-16
Payer: COMMERCIAL

## 2022-12-16 RX ORDER — CALCIUM CARBONATE 500(1250)
1 TABLET ORAL DAILY
Qty: 60 TABLET | Refills: 3 | Status: SHIPPED | OUTPATIENT
Start: 2022-12-16 | End: 2023-12-16

## 2022-12-16 RX ORDER — ERGOCALCIFEROL 1.25 MG/1
50000 CAPSULE ORAL
Qty: 4 CAPSULE | Refills: 0 | Status: SHIPPED | OUTPATIENT
Start: 2022-12-16 | End: 2023-01-07

## 2022-12-16 NOTE — TELEPHONE ENCOUNTER
"Please adviseSami Norton" Jb, [facility]  Rheumatology Department  "

## 2023-01-12 ENCOUNTER — LAB VISIT (OUTPATIENT)
Dept: LAB | Facility: HOSPITAL | Age: 48
End: 2023-01-12
Attending: NURSE PRACTITIONER
Payer: COMMERCIAL

## 2023-01-12 DIAGNOSIS — D50.9 IRON DEFICIENCY ANEMIA, UNSPECIFIED IRON DEFICIENCY ANEMIA TYPE: ICD-10-CM

## 2023-01-12 LAB
BASOPHILS # BLD AUTO: 0.06 K/UL (ref 0–0.2)
BASOPHILS NFR BLD: 0.6 % (ref 0–1.9)
DIFFERENTIAL METHOD: ABNORMAL
EOSINOPHIL # BLD AUTO: 0.1 K/UL (ref 0–0.5)
EOSINOPHIL NFR BLD: 0.5 % (ref 0–8)
ERYTHROCYTE [DISTWIDTH] IN BLOOD BY AUTOMATED COUNT: 15.9 % (ref 11.5–14.5)
FERRITIN SERPL-MCNC: 308 NG/ML (ref 20–300)
HCT VFR BLD AUTO: 37.2 % (ref 37–48.5)
HGB BLD-MCNC: 12 G/DL (ref 12–16)
IMM GRANULOCYTES # BLD AUTO: 0.14 K/UL (ref 0–0.04)
IMM GRANULOCYTES NFR BLD AUTO: 1.4 % (ref 0–0.5)
IRON SERPL-MCNC: 61 UG/DL (ref 30–160)
LYMPHOCYTES # BLD AUTO: 2 K/UL (ref 1–4.8)
LYMPHOCYTES NFR BLD: 20.1 % (ref 18–48)
MCH RBC QN AUTO: 26.8 PG (ref 27–31)
MCHC RBC AUTO-ENTMCNC: 32.3 G/DL (ref 32–36)
MCV RBC AUTO: 83 FL (ref 82–98)
MONOCYTES # BLD AUTO: 0.7 K/UL (ref 0.3–1)
MONOCYTES NFR BLD: 7.2 % (ref 4–15)
NEUTROPHILS # BLD AUTO: 7.1 K/UL (ref 1.8–7.7)
NEUTROPHILS NFR BLD: 71.6 % (ref 38–73)
NRBC BLD-RTO: 0 /100 WBC
PLATELET # BLD AUTO: 291 K/UL (ref 150–450)
PMV BLD AUTO: 11.1 FL (ref 9.2–12.9)
RBC # BLD AUTO: 4.47 M/UL (ref 4–5.4)
SATURATED IRON: 19 % (ref 20–50)
TOTAL IRON BINDING CAPACITY: 315 UG/DL (ref 250–450)
TRANSFERRIN SERPL-MCNC: 213 MG/DL (ref 200–375)
WBC # BLD AUTO: 9.92 K/UL (ref 3.9–12.7)

## 2023-01-12 PROCEDURE — 82728 ASSAY OF FERRITIN: CPT | Performed by: NURSE PRACTITIONER

## 2023-01-12 PROCEDURE — 84238 ASSAY NONENDOCRINE RECEPTOR: CPT | Performed by: NURSE PRACTITIONER

## 2023-01-12 PROCEDURE — 85025 COMPLETE CBC W/AUTO DIFF WBC: CPT | Mod: PO | Performed by: NURSE PRACTITIONER

## 2023-01-12 PROCEDURE — 36415 COLL VENOUS BLD VENIPUNCTURE: CPT | Mod: PO | Performed by: NURSE PRACTITIONER

## 2023-01-12 PROCEDURE — 84466 ASSAY OF TRANSFERRIN: CPT | Performed by: NURSE PRACTITIONER

## 2023-01-13 LAB — STFR SERPL-MCNC: 3.5 MG/L (ref 1.8–4.6)

## 2023-01-17 ENCOUNTER — OFFICE VISIT (OUTPATIENT)
Dept: HEMATOLOGY/ONCOLOGY | Facility: CLINIC | Age: 48
End: 2023-01-17
Payer: COMMERCIAL

## 2023-01-17 DIAGNOSIS — D50.9 IRON DEFICIENCY ANEMIA, UNSPECIFIED IRON DEFICIENCY ANEMIA TYPE: Primary | ICD-10-CM

## 2023-01-17 PROCEDURE — 99212 PR OFFICE/OUTPT VISIT, EST, LEVL II, 10-19 MIN: ICD-10-PCS | Mod: 95,,, | Performed by: NURSE PRACTITIONER

## 2023-01-17 PROCEDURE — 99212 OFFICE O/P EST SF 10 MIN: CPT | Mod: 95,,, | Performed by: NURSE PRACTITIONER

## 2023-01-17 NOTE — PROGRESS NOTES
Subjective:    The patient location is: Home  The chief complaint leading to consultation is: Review of labs    Visit type: audiovisual    Face to Face time with patient: 15 minutes of total time spent on the encounter, which includes face to face time and non-face to face time preparing to see the patient (eg, review of tests), Obtaining and/or reviewing separately obtained history, Documenting clinical information in the electronic or other health record, Independently interpreting results (not separately reported) and communicating results to the patient/family/caregiver, or Care coordination (not separately reported).     Each patient to whom he or she provides medical services by telemedicine is:  (1) informed of the relationship between the physician and patient and the respective role of any other health care provider with respect to management of the patient; and (2) notified that he or she may decline to receive medical services by telemedicine and may withdraw from such care at any time.    Name: Akiko Jameson  : 1975  MRN: [id number]    CC:  Follow up on labs post Feraheme  & 22    HPI:   Akiko Jameson is a 47 y.o. female presents via Telemed for review of recent labs.    This is a 47-year-old black female known to Dr. Caicedo for anemia of chronic disease as well as idiopathic eosinophilia.    Unilateral bone marrow aspiration/biopsy in , which did not reveal any clonal population of cells on flow and no evidence of dysplasia within the marrow.    Surveilled annually.    She also suffers with chronic lymphedema to RUE from lymphadenectomy.  EBUS in  due to mediastinal lymphadenopathy/nodule that was later diagnosed as Sarcoidosis, followed by Dr. Kirby.     Last visit on 22 c/o of fatigue & increased sleepiness.    Recent sigmoid scope on  did not reveal a bleeding source.  Occult blood x 3 = negative  Labs reviewed indicating MARITZA.  Patient was  "scheduled for Feraheme.    10/14/22:  Approximately 2 1/2 weeks post 2nd infusion patient presents via telemed for interval evaluation & review of lab.  Patient reports feeling much better; "big" improvement; no fatigue or sleepiness.  Denies CP, SOB, pica, palpitations, etc.    Today:  01/17/2023  Sitting up in a chair looking at her computer; respirations easy & unlabored.  Reports that she feels good.  More energy; no fatigue.  Denies any CP, SOB, pica, bleeding, dark stools, palpitations, etc.    Past Medical History:   Diagnosis Date    ALLERGIC RHINITIS     Arthritis     Cellulitis     Constipation due to opioid therapy     Eosinophilia     mild    GERD (gastroesophageal reflux disease)     Granular cell tumor     H. pylori infection 2018    History of 2019 novel coronavirus disease (COVID-19) 10/04/2020    Hypertension     Lymphedema     Mild anemia     KEIRY on CPAP     does not regularly    Positive CHARLENE (antinuclear antibody) 07/27/2022    Prediabetes 08/06/2021    Sarcoidosis, unspecified     Sleep apnea     compliant with CPAP    Thyroid nodule     Urinary incontinence, mixed        Past Surgical History:   Procedure Laterality Date    24 HOUR IMPEDANCE PH MONITORING OF ESOPHAGUS IN PATIENT NOT TAKING ACID REDUCING MEDICATIONS N/A 01/06/2020    Procedure: IMPEDANCE PH STUDY, ESOPHAGEAL, 24 HOUR, IN PATIENT NOT TAKING ACID REDUCING MEDICATION;  Surgeon: First Available Tamika Panchal;  Location: [facility];  Service: Endoscopy;  Laterality: N/A;    AXILLARY NODE DISSECTION Right     granular cell tumor    BILATERAL SALPINGO-OOPHORECTOMY (BSO)  07/21/2020    BREAST CYST ASPIRATION      left    CHOLECYSTECTOMY      COLONOSCOPY N/A 05/10/2019    Procedure: COLONOSCOPY;  Surgeon: Edgar Gamble Jr., MD;  Location: [facility];  Service: Endoscopy;  Laterality: N/A;    ENDOBRONCHIAL ULTRASOUND Bilateral 04/27/2021    Procedure: ENDOBRONCHIAL ULTRASOUND (EBUS);  Surgeon: Armando Kirby MD;  Location: [facility];  " Service: Pulmonary;  Laterality: Bilateral;  need fluoroscopy    ENDOMETRIAL ABLATION      ESOPHAGEAL MANOMETRY WITH MEASUREMENT OF IMPEDANCE N/A 01/06/2020    Procedure: MANOMETRY, ESOPHAGUS, WITH IMPEDANCE MEASUREMENT;  Surgeon: First Available [place];  Location: [facility] ENDO;  Service: Endoscopy;  Laterality: N/A;    ESOPHAGOGASTRODUODENOSCOPY N/A 07/05/2018    Procedure: EGD (ESOPHAGOGASTRODUODENOSCOPY);  Surgeon: Edgar Gamble Jr., MD;  Location: [facility] ENDO;  Service: Endoscopy;  Laterality: N/A;    ESOPHAGOGASTRODUODENOSCOPY N/A 11/23/2020    Procedure: ESOPHAGOGASTRODUODENOSCOPY (EGD);  Surgeon: Jina Kaufman MD;  Location: [facility] ENDO;  Service: General;  Laterality: N/A;    EXCISION OF MASS OF ABDOMEN Left 06/18/2018    Procedure: EXCISION, MASS, ABDOMEN - flank left;  Surgeon: Armando Tate MD;  Location: [facility] OR;  Service: General;  Laterality: Left;  left flank removal of mass    FINE NEEDLE ASPIRATION      thyroid    FLEXIBLE SIGMOIDOSCOPY N/A 08/24/2022    Procedure: SIGMOIDOSCOPY, FLEXIBLE;  Surgeon: Amber West MD;  Location: [facility];  Service: Endoscopy;  Laterality: N/A;    HYSTERECTOMY  07/21/2020    INJECTION OF ANESTHETIC AGENT AROUND MEDIAL BRANCH NERVES INNERVATING LUMBAR FACET JOINT Right 02/24/2022    Procedure: Right L3, 4, 5 MBB;  Surgeon: Anam Montero MD;  Location: [facility] PAIN MGT;  Service: Pain Management;  Laterality: Right;    INJECTION OF ANESTHETIC AGENT AROUND MEDIAL BRANCH NERVES INNERVATING LUMBAR FACET JOINT Right 03/29/2022    Procedure: Right L3, 4, 5 MBB  (2nd block);  Surgeon: Anam Montero MD;  Location: [facility] PAIN MGT;  Service: Pain Management;  Laterality: Right;    KNEE ARTHROSCOPY W/ MENISCECTOMY Right     NASAL SEPTUM SURGERY      OOPHORECTOMY      RADIOFREQUENCY THERMOCOAGULATION Right 04/05/2022    Procedure: Right L3-5 Lumbar RFA;  Surgeon: Anam Montero MD;  Location: [facility] PAIN MGT;  Service: Pain Management;  Laterality: Right;     ROBOT-ASSISTED NISSEN FUNDOPLICATION USING DA TAMAR XI N/A 02/28/2020    Procedure: XI ROBOTIC FUNDOPLICATION, NISSEN;  Surgeon: Jina Kaufman MD;  Location: [facility] OR;  Service: General;  Laterality: N/A;    THYROIDECTOMY Bilateral 03/11/2021    Procedure: THYROIDECTOMY;  Surgeon: Nixon Moe MD;  Location: [facility] OR;  Service: ENT;  Laterality: Bilateral;    TUBAL LIGATION      UPPER GASTROINTESTINAL ENDOSCOPY  07/05/2018    Dr. Gamble       Family History   Problem Relation Age of Onset    Diabetes Mother     Kidney failure Mother     Heart attack Mother     Breast cancer Maternal Grandmother     Breast cancer Maternal Aunt     Ovarian cancer Cousin     Breast cancer Cousin     Blindness Father     Cirrhosis Neg Hx     Colon polyps Neg Hx     Colon cancer Neg Hx     Crohn's disease Neg Hx     Esophageal cancer Neg Hx     Stomach cancer Neg Hx     Ulcerative colitis Neg Hx     Amblyopia Neg Hx     Cataracts Neg Hx     Glaucoma Neg Hx     Macular degeneration Neg Hx     Retinal detachment Neg Hx     Strabismus Neg Hx     Allergic rhinitis Neg Hx     Allergies Neg Hx     Angioedema Neg Hx     Asthma Neg Hx     Atopy Neg Hx     Eczema Neg Hx     Immunodeficiency Neg Hx     Rhinitis Neg Hx     Urticaria Neg Hx        Social History     Socioeconomic History    Marital status:     Number of children: 2   Occupational History     Employer: [employer]   Tobacco Use    Smoking status: Never    Smokeless tobacco: Never   Substance and Sexual Activity    Alcohol use: No    Drug use: No    Sexual activity: Yes     Partners: Male     Social Determinants of Health     Financial Resource Strain: Low Risk     Difficulty of Paying Living Expenses: Not hard at all   Food Insecurity: No Food Insecurity    Worried About Running Out of Food in the Last Year: Never true    Ran Out of Food in the Last Year: Never true   Transportation Needs: No Transportation Needs    Lack of Transportation (Medical): No    Lack of  Transportation (Non-Medical): No   Physical Activity: Insufficiently Active    Days of Exercise per Week: 2 days    Minutes of Exercise per Session: 30 min   Stress: No Stress Concern Present    Feeling of Stress : Not at all   Social Connections: Unknown    Frequency of Communication with Friends and Family: More than three times a week    Frequency of Social Gatherings with Friends and Family: Twice a week    Active Member of Clubs or Organizations: Yes    Attends Club or Organization Meetings: More than 4 times per year    Marital Status:    Housing Stability: Low Risk     Unable to Pay for Housing in the Last Year: No    Number of Places Lived in the Last Year: 1    Unstable Housing in the Last Year: No       Review of patient's allergies indicates:   Allergen Reactions    Biaxin [clarithromycin] Swelling    Omeprazole magnesium Swelling and Other (See Comments)    Prevacid [lansoprazole] Swelling     Lip swelling- was taking this along with blood pressure medication: benazepril; not sure which caused it. Reports she has taken OTC prilosec without any problems.    Ace inhibitors Swelling     Facial swelling    Benazepril Swelling     Lip swelling       Review of Systems   Eyes:  Negative for visual disturbance.   Cardiovascular:  Negative for chest pain, dyspnea on exertion and irregular heartbeat.   Respiratory:  Negative for cough and shortness of breath.    Hematologic/Lymphatic: Negative for adenopathy.   Skin:  Negative for rash.   Musculoskeletal:  Negative for back pain.   Gastrointestinal:  Negative for abdominal pain and diarrhea.   Genitourinary:  Negative for frequency.   Neurological:  Negative for headaches.   Psychiatric/Behavioral:  The patient is not nervous/anxious.    All other systems reviewed and are negative.         Objective:     There were no vitals filed for this visit.       Physical Exam  HENT:      Head: Normocephalic and atraumatic.   Pulmonary:      Effort: Pulmonary effort  is normal.   Neurological:      Mental Status: She is alert and oriented to person, place, and time.   Psychiatric:         Behavior: Behavior normal.         Thought Content: Thought content normal.           Current Outpatient Medications on File Prior to Visit   Medication Sig    amoxicillin-clavulanate 500-125mg (AUGMENTIN) 500-125 mg Tab Take 1 tablet by mouth 2 (two) times daily.    calcium carbonate (OS-CARLA) 500 mg calcium (1,250 mg) tablet Take 1 tablet (500 mg total) by mouth once daily.    carvediloL (COREG) 12.5 MG tablet TAKE ONE TABLET BY MOUTH TWICE DAILY    cetirizine (ZYRTEC) 10 MG tablet Take 1 tablet (10 mg total) by mouth once daily. (Patient taking differently: Take 10 mg by mouth once daily.)    cyclobenzaprine (FLEXERIL) 10 MG tablet Take 0.5-1 tablets (5-10 mg total) by mouth 2 (two) times daily as needed (for severe pain/ muscle spasms). May cause drowsiness. Do not mix with Robaxin.    docusate sodium (COLACE) 100 MG capsule Take 100 mg by mouth every other day.     hydrOXYchloroQUINE (PLAQUENIL) 200 mg tablet Take 200 mg by mouth 2 (two) times a day.    levothyroxine (SYNTHROID, LEVOTHROID) 175 MCG tablet Take 1 tablet (175 mcg total) by mouth before breakfast. Brand name Synthroid    montelukast (SINGULAIR) 10 mg tablet Take 1 tablet (10 mg total) by mouth nightly.    multivitamin (THERAGRAN) per tablet Take 1 tablet by mouth once daily. Every day    olmesartan-hydrochlorothiazide (BENICAR HCT) 20-12.5 mg per tablet Take 1 tablet by mouth once daily.    potassium chloride SA (K-DUR,KLOR-CON) 20 MEQ tablet Take 1 tablet (20 mEq total) by mouth 2 (two) times daily.    predniSONE (DELTASONE) 20 MG tablet Take 2 tablets (40 mg total) by mouth once daily for 2 days, THEN 1.5 tablets (30 mg total) once daily for 2 days, THEN 1 tablet (20 mg total) once daily for 2 days, THEN 0.5 tablets (10 mg total) once daily for 2 days.    traMADoL (ULTRAM) 50 mg tablet Take 0.5-1 tablets (25-50 mg total) by  mouth every 12 (twelve) hours as needed for Pain.     No current facility-administered medications on file prior to visit.       Lab Results   Component Value Date    WBC 9.92 01/12/2023    RBC 4.47 01/12/2023    HGB 12.0 01/12/2023    HCT 37.2 01/12/2023    MCV 83 01/12/2023    MCH 26.8 (L) 01/12/2023    MCHC 32.3 01/12/2023    RDW 15.9 (H) 01/12/2023     01/12/2023    MPV 11.1 01/12/2023    GRAN 7.1 01/12/2023    GRAN 71.6 01/12/2023    LYMPH 2.0 01/12/2023    LYMPH 20.1 01/12/2023    MONO 0.7 01/12/2023    MONO 7.2 01/12/2023    EOS 0.1 01/12/2023    BASO 0.06 01/12/2023    EOSINOPHIL 0.5 01/12/2023    BASOPHIL 0.6 01/12/2023     Hgb improved from 10.9 to 11.8 to 12.0  MCV remains at 81, now 83      Lab Results   Component Value Date    IRON 61 01/12/2023    TIBC 315 01/12/2023    FERRITIN 308 (H) 01/12/2023     Iron sats = 19 from 11%  sTFR = 3.5 from 5.3     UA:  WNL  Retic:  WNL    08/24/22:  Sigmoid scope:  Impression:            - The entire examined colon is normal.                          - No specimens collected.     All pertinent labs and imaging reviewed.    Assessment:       1. Iron deficiency anemia, unspecified iron deficiency anemia type         Plan:     Iron deficiency anemia, unspecified iron deficiency anemia type    MARITZA - stablized; f/u in clinic as needed.  Follow up with PCP & Specialists as indicated.  Call for any worsening s/s of anemia.    Route Chart for Scheduling    Med Onc Chart Routing      Follow up with physician    Follow up with AMAN No follow up needed.   Infusion scheduling note    Injection scheduling note    Labs    Imaging    Pharmacy appointment    Other referrals          Therapy Plan Information  Flushes  heparin, porcine (PF) 100 unit/mL injection flush 500 Units  500 Units, Intravenous, PRN  PRN Medications  EPINEPHrine (EPIPEN) 0.3 mg/0.3 mL pen injection 0.3 mg  0.3 mg, Intramuscular, PRN  diphenhydrAMINE injection 50 mg  50 mg, Intravenous,  PRN  methylPREDNISolone sodium succinate injection 125 mg  125 mg, Intravenous, PRN  sodium chloride 0.9% bolus 1,000 mL  1,000 mL, Intravenous, PRN       Answers submitted by the patient for this visit:  Review of Systems Questionnaire (Submitted on 1/17/2023)  appetite change : No  unexpected weight change: No  mouth sores: No

## 2023-01-20 ENCOUNTER — OFFICE VISIT (OUTPATIENT)
Dept: CARDIOLOGY | Facility: CLINIC | Age: 48
End: 2023-01-20
Payer: COMMERCIAL

## 2023-01-20 ENCOUNTER — LAB VISIT (OUTPATIENT)
Dept: LAB | Facility: HOSPITAL | Age: 48
End: 2023-01-20
Attending: INTERNAL MEDICINE
Payer: COMMERCIAL

## 2023-01-20 VITALS
BODY MASS INDEX: 46.82 KG/M2 | WEIGHT: 254.44 LBS | SYSTOLIC BLOOD PRESSURE: 138 MMHG | DIASTOLIC BLOOD PRESSURE: 80 MMHG | HEIGHT: 62 IN | HEART RATE: 70 BPM

## 2023-01-20 DIAGNOSIS — I35.8 AORTIC VALVE SCLEROSIS: ICD-10-CM

## 2023-01-20 DIAGNOSIS — E87.6 HYPOKALEMIA: ICD-10-CM

## 2023-01-20 DIAGNOSIS — E66.01 MORBID OBESITY WITH BMI OF 45.0-49.9, ADULT: Chronic | ICD-10-CM

## 2023-01-20 DIAGNOSIS — I10 ESSENTIAL HYPERTENSION: Primary | Chronic | ICD-10-CM

## 2023-01-20 DIAGNOSIS — I10 ESSENTIAL HYPERTENSION: ICD-10-CM

## 2023-01-20 DIAGNOSIS — G47.33 OSA ON CPAP: Chronic | ICD-10-CM

## 2023-01-20 PROCEDURE — 3075F SYST BP GE 130 - 139MM HG: CPT | Mod: CPTII,S$GLB,, | Performed by: INTERNAL MEDICINE

## 2023-01-20 PROCEDURE — 99999 PR PBB SHADOW E&M-EST. PATIENT-LVL III: ICD-10-PCS | Mod: PBBFAC,,, | Performed by: INTERNAL MEDICINE

## 2023-01-20 PROCEDURE — 3079F DIAST BP 80-89 MM HG: CPT | Mod: CPTII,S$GLB,, | Performed by: INTERNAL MEDICINE

## 2023-01-20 PROCEDURE — 99214 PR OFFICE/OUTPT VISIT, EST, LEVL IV, 30-39 MIN: ICD-10-PCS | Mod: S$GLB,,, | Performed by: INTERNAL MEDICINE

## 2023-01-20 PROCEDURE — 99214 OFFICE O/P EST MOD 30 MIN: CPT | Mod: S$GLB,,, | Performed by: INTERNAL MEDICINE

## 2023-01-20 PROCEDURE — 99999 PR PBB SHADOW E&M-EST. PATIENT-LVL III: CPT | Mod: PBBFAC,,, | Performed by: INTERNAL MEDICINE

## 2023-01-20 PROCEDURE — 36415 COLL VENOUS BLD VENIPUNCTURE: CPT | Mod: PO | Performed by: INTERNAL MEDICINE

## 2023-01-20 PROCEDURE — 82088 ASSAY OF ALDOSTERONE: CPT | Performed by: INTERNAL MEDICINE

## 2023-01-20 PROCEDURE — 3079F PR MOST RECENT DIASTOLIC BLOOD PRESSURE 80-89 MM HG: ICD-10-PCS | Mod: CPTII,S$GLB,, | Performed by: INTERNAL MEDICINE

## 2023-01-20 PROCEDURE — 3075F PR MOST RECENT SYSTOLIC BLOOD PRESS GE 130-139MM HG: ICD-10-PCS | Mod: CPTII,S$GLB,, | Performed by: INTERNAL MEDICINE

## 2023-01-20 PROCEDURE — 3008F PR BODY MASS INDEX (BMI) DOCUMENTED: ICD-10-PCS | Mod: CPTII,S$GLB,, | Performed by: INTERNAL MEDICINE

## 2023-01-20 PROCEDURE — 3008F BODY MASS INDEX DOCD: CPT | Mod: CPTII,S$GLB,, | Performed by: INTERNAL MEDICINE

## 2023-01-20 RX ORDER — SPIRONOLACTONE 25 MG/1
12.5 TABLET ORAL DAILY
Qty: 45 TABLET | Refills: 1 | Status: SHIPPED | OUTPATIENT
Start: 2023-01-20 | End: 2023-10-25 | Stop reason: SDUPTHER

## 2023-01-20 NOTE — PROGRESS NOTES
Subjective:    Patient ID:  Akiko Jameson is a 47 y.o. female who presents for Hypertension        Hypertension  Pertinent negatives include no chest pain, headaches, malaise/fatigue, orthopnea, palpitations, PND or shortness of breath.   RECENT SARCOID FLAREU UP COUGH WAS ON STEROIDS UP TO 4 DAYS AGO, BP UP, RECENT CMP K 3.4, SEE ROS    Past Medical History:   Diagnosis Date    ALLERGIC RHINITIS     Arthritis     Cellulitis     Constipation due to opioid therapy     Eosinophilia     mild    GERD (gastroesophageal reflux disease)     Granular cell tumor     H. pylori infection 2018    History of 2019 novel coronavirus disease (COVID-19) 10/04/2020    Hypertension     Lymphedema     Mild anemia     KEIRY on CPAP     does not regularly    Positive CHARLENE (antinuclear antibody) 07/27/2022    Prediabetes 08/06/2021    Sarcoidosis, unspecified     Sleep apnea     compliant with CPAP    Thyroid nodule     Urinary incontinence, mixed      Past Surgical History:   Procedure Laterality Date    24 HOUR IMPEDANCE PH MONITORING OF ESOPHAGUS IN PATIENT NOT TAKING ACID REDUCING MEDICATIONS N/A 01/06/2020    Procedure: IMPEDANCE PH STUDY, ESOPHAGEAL, 24 HOUR, IN PATIENT NOT TAKING ACID REDUCING MEDICATION;  Surgeon: First Available Tamika Panchal;  Location: [facility];  Service: Endoscopy;  Laterality: N/A;    AXILLARY NODE DISSECTION Right     granular cell tumor    BILATERAL SALPINGO-OOPHORECTOMY (BSO)  07/21/2020    BREAST CYST ASPIRATION      left    CHOLECYSTECTOMY      COLONOSCOPY N/A 05/10/2019    Procedure: COLONOSCOPY;  Surgeon: Edgar Gamble Jr., MD;  Location: [facility];  Service: Endoscopy;  Laterality: N/A;    ENDOBRONCHIAL ULTRASOUND Bilateral 04/27/2021    Procedure: ENDOBRONCHIAL ULTRASOUND (EBUS);  Surgeon: Armando Kirby MD;  Location: [facility];  Service: Pulmonary;  Laterality: Bilateral;  need fluoroscopy    ENDOMETRIAL ABLATION      ESOPHAGEAL MANOMETRY WITH MEASUREMENT OF IMPEDANCE N/A 01/06/2020     Procedure: MANOMETRY, ESOPHAGUS, WITH IMPEDANCE MEASUREMENT;  Surgeon: First Available [place];  Location: [facility] ENDO;  Service: Endoscopy;  Laterality: N/A;    ESOPHAGOGASTRODUODENOSCOPY N/A 07/05/2018    Procedure: EGD (ESOPHAGOGASTRODUODENOSCOPY);  Surgeon: Edgar Gamble Jr., MD;  Location: [facility] ENDO;  Service: Endoscopy;  Laterality: N/A;    ESOPHAGOGASTRODUODENOSCOPY N/A 11/23/2020    Procedure: ESOPHAGOGASTRODUODENOSCOPY (EGD);  Surgeon: Jina Kaufman MD;  Location: [facility] ENDO;  Service: General;  Laterality: N/A;    EXCISION OF MASS OF ABDOMEN Left 06/18/2018    Procedure: EXCISION, MASS, ABDOMEN - flank left;  Surgeon: Armando Tate MD;  Location: [facility] OR;  Service: General;  Laterality: Left;  left flank removal of mass    FINE NEEDLE ASPIRATION      thyroid    FLEXIBLE SIGMOIDOSCOPY N/A 08/24/2022    Procedure: SIGMOIDOSCOPY, FLEXIBLE;  Surgeon: Amber West MD;  Location: [facility];  Service: Endoscopy;  Laterality: N/A;    HYSTERECTOMY  07/21/2020    INJECTION OF ANESTHETIC AGENT AROUND MEDIAL BRANCH NERVES INNERVATING LUMBAR FACET JOINT Right 02/24/2022    Procedure: Right L3, 4, 5 MBB;  Surgeon: Anam Montero MD;  Location: [facility] PAIN MGT;  Service: Pain Management;  Laterality: Right;    INJECTION OF ANESTHETIC AGENT AROUND MEDIAL BRANCH NERVES INNERVATING LUMBAR FACET JOINT Right 03/29/2022    Procedure: Right L3, 4, 5 MBB  (2nd block);  Surgeon: Anam Montero MD;  Location: [facility] PAIN MGT;  Service: Pain Management;  Laterality: Right;    KNEE ARTHROSCOPY W/ MENISCECTOMY Right     NASAL SEPTUM SURGERY      OOPHORECTOMY      RADIOFREQUENCY THERMOCOAGULATION Right 04/05/2022    Procedure: Right L3-5 Lumbar RFA;  Surgeon: Anam Montero MD;  Location: [facility] PAIN MGT;  Service: Pain Management;  Laterality: Right;    ROBOT-ASSISTED NISSEN FUNDOPLICATION USING DA TAMAR XI N/A 02/28/2020    Procedure: XI ROBOTIC FUNDOPLICATION, NISSEN;  Surgeon: Jina Kaufman MD;   Location: [facility] OR;  Service: General;  Laterality: N/A;    THYROIDECTOMY Bilateral 03/11/2021    Procedure: THYROIDECTOMY;  Surgeon: Nixon Moe MD;  Location: [facility] OR;  Service: ENT;  Laterality: Bilateral;    TUBAL LIGATION      UPPER GASTROINTESTINAL ENDOSCOPY  07/05/2018    Dr. Gamble     Family History   Problem Relation Age of Onset    Diabetes Mother     Kidney failure Mother     Heart attack Mother     Breast cancer Maternal Grandmother     Breast cancer Maternal Aunt     Ovarian cancer Cousin     Breast cancer Cousin     Blindness Father     Cirrhosis Neg Hx     Colon polyps Neg Hx     Colon cancer Neg Hx     Crohn's disease Neg Hx     Esophageal cancer Neg Hx     Stomach cancer Neg Hx     Ulcerative colitis Neg Hx     Amblyopia Neg Hx     Cataracts Neg Hx     Glaucoma Neg Hx     Macular degeneration Neg Hx     Retinal detachment Neg Hx     Strabismus Neg Hx     Allergic rhinitis Neg Hx     Allergies Neg Hx     Angioedema Neg Hx     Asthma Neg Hx     Atopy Neg Hx     Eczema Neg Hx     Immunodeficiency Neg Hx     Rhinitis Neg Hx     Urticaria Neg Hx      Social History     Socioeconomic History    Marital status:     Number of children: 2   Occupational History     Employer: [employer]   Tobacco Use    Smoking status: Never    Smokeless tobacco: Never   Substance and Sexual Activity    Alcohol use: No    Drug use: No    Sexual activity: Yes     Partners: Male     Social Determinants of Health     Financial Resource Strain: Low Risk     Difficulty of Paying Living Expenses: Not hard at all   Food Insecurity: No Food Insecurity    Worried About Running Out of Food in the Last Year: Never true    Ran Out of Food in the Last Year: Never true   Transportation Needs: No Transportation Needs    Lack of Transportation (Medical): No    Lack of Transportation (Non-Medical): No   Physical Activity: Inactive    Days of Exercise per Week: 0 days    Minutes of Exercise per Session: 30 min   Stress: No Stress  Concern Present    Feeling of Stress : Not at all   Social Connections: Unknown    Frequency of Communication with Friends and Family: More than three times a week    Frequency of Social Gatherings with Friends and Family: Once a week    Active Member of Clubs or Organizations: Yes    Attends Club or Organization Meetings: More than 4 times per year    Marital Status:    Housing Stability: Low Risk     Unable to Pay for Housing in the Last Year: No    Number of Places Lived in the Last Year: 1    Unstable Housing in the Last Year: No       Review of patient's allergies indicates:   Allergen Reactions    Biaxin [clarithromycin] Swelling    Omeprazole magnesium Swelling and Other (See Comments)    Prevacid [lansoprazole] Swelling     Lip swelling- was taking this along with blood pressure medication: benazepril; not sure which caused it. Reports she has taken OTC prilosec without any problems.    Ace inhibitors Swelling     Facial swelling    Benazepril Swelling     Lip swelling       Current Outpatient Medications:     calcium carbonate (OS-CARLA) 500 mg calcium (1,250 mg) tablet, Take 1 tablet (500 mg total) by mouth once daily., Disp: 60 tablet, Rfl: 3    carvediloL (COREG) 12.5 MG tablet, TAKE ONE TABLET BY MOUTH TWICE DAILY, Disp: 180 tablet, Rfl: 1    cyclobenzaprine (FLEXERIL) 10 MG tablet, Take 0.5-1 tablets (5-10 mg total) by mouth 2 (two) times daily as needed (for severe pain/ muscle spasms). May cause drowsiness. Do not mix with Robaxin., Disp: 60 tablet, Rfl: 0    docusate sodium (COLACE) 100 MG capsule, Take 100 mg by mouth every other day. , Disp: , Rfl:     hydrOXYchloroQUINE (PLAQUENIL) 200 mg tablet, Take 200 mg by mouth 2 (two) times a day., Disp: , Rfl:     levothyroxine (SYNTHROID, LEVOTHROID) 175 MCG tablet, Take 1 tablet (175 mcg total) by mouth before breakfast. Brand name Synthroid, Disp: 30 tablet, Rfl: 3    montelukast (SINGULAIR) 10 mg tablet, Take 1 tablet (10 mg total) by mouth  nightly., Disp: 90 tablet, Rfl: 3    multivitamin (THERAGRAN) per tablet, Take 1 tablet by mouth once daily. Every day, Disp: , Rfl:     olmesartan-hydrochlorothiazide (BENICAR HCT) 20-12.5 mg per tablet, Take 1 tablet by mouth once daily., Disp: 90 tablet, Rfl: 1    potassium chloride SA (K-DUR,KLOR-CON) 20 MEQ tablet, Take 1 tablet (20 mEq total) by mouth 2 (two) times daily., Disp: 180 tablet, Rfl: 3    traMADoL (ULTRAM) 50 mg tablet, Take 0.5-1 tablets (25-50 mg total) by mouth every 12 (twelve) hours as needed for Pain., Disp: 10 tablet, Rfl: 0    cetirizine (ZYRTEC) 10 MG tablet, Take 1 tablet (10 mg total) by mouth once daily. (Patient taking differently: Take 10 mg by mouth once daily.), Disp: , Rfl: 0    spironolactone (ALDACTONE) 25 MG tablet, Take 0.5 tablets (12.5 mg total) by mouth once daily., Disp: 45 tablet, Rfl: 1    Review of Systems   Constitutional: Negative for chills, diaphoresis, fever, malaise/fatigue and night sweats.   HENT:  Negative for congestion and nosebleeds.    Eyes: Negative.    Cardiovascular:  Positive for leg swelling (OCC). Negative for chest pain, claudication, cyanosis, dyspnea on exertion, irregular heartbeat, near-syncope, orthopnea, palpitations, paroxysmal nocturnal dyspnea and syncope.   Respiratory:  Positive for cough. Negative for hemoptysis, shortness of breath and wheezing.    Endocrine: Negative.    Hematologic/Lymphatic: Positive for adenopathy (LYMPHEDEDEMA RUE). Does not bruise/bleed easily.   Skin:  Negative for color change and rash.   Musculoskeletal:  Negative for back pain (HAD RFA) and falls.   Gastrointestinal:  Negative for abdominal pain, change in bowel habit, heartburn, melena, nausea and vomiting.   Genitourinary:  Negative for dysuria and flank pain.   Neurological:  Negative for brief paralysis, focal weakness, headaches, light-headedness, loss of balance, paresthesias and weakness.   Psychiatric/Behavioral:  Negative for altered mental status and  "depression.    Allergic/Immunologic: Negative for hives and persistent infections.      Objective:      Vitals:    01/20/23 0818 01/20/23 0849   BP: (!) 151/83 138/80   Pulse: 70    Weight: 115.4 kg (254 lb 6.6 oz)    Height: 5' 2" (1.575 m)    PainSc: 0-No pain      Body mass index is 46.53 kg/m².    Physical Exam  Constitutional:       Appearance: She is obese.   HENT:      Head: Normocephalic and atraumatic.   Eyes:      Extraocular Movements: Extraocular movements intact.      Conjunctiva/sclera: Conjunctivae normal.   Neck:      Vascular: No carotid bruit.   Cardiovascular:      Rate and Rhythm: Normal rate and regular rhythm. No extrasystoles are present.     Pulses: Normal pulses.           Carotid pulses are 2+ on the right side and 2+ on the left side.       Radial pulses are 2+ on the right side and 2+ on the left side.        Posterior tibial pulses are 2+ on the right side and 2+ on the left side.      Heart sounds:     No friction rub. No gallop. No S4 sounds.   Pulmonary:      Effort: Pulmonary effort is normal.      Breath sounds: Normal breath sounds. No rales.   Abdominal:      Palpations: Abdomen is soft.      Tenderness: There is no abdominal tenderness.   Musculoskeletal:      Cervical back: Neck supple.      Right lower leg: Edema (TRACE) present.      Left lower leg: Edema (TRACE) present.      Comments: RUE LYMPHEDEMA   Skin:     General: Skin is warm and dry.      Capillary Refill: Capillary refill takes less than 2 seconds.   Neurological:      General: No focal deficit present.      Mental Status: She is alert and oriented to person, place, and time.      Cranial Nerves: No cranial nerve deficit.   Psychiatric:         Mood and Affect: Mood normal.         Behavior: Behavior normal.             ..    Chemistry        Component Value Date/Time     12/15/2022 0753    K 3.4 (L) 12/15/2022 0753     12/15/2022 0753    CO2 27 12/15/2022 0753    BUN 10 12/15/2022 0753    CREATININE 1.1 " 12/15/2022 0753    GLU 88 12/15/2022 0753        Component Value Date/Time    CALCIUM 8.0 (L) 12/15/2022 0753    ALKPHOS 112 12/15/2022 0753    AST 23 12/15/2022 0753    ALT 19 12/15/2022 0753    BILITOT 0.6 12/15/2022 0753    ESTGFRAFRICA 46.8 (A) 07/27/2022 0846    EGFRNONAA 40.6 (A) 07/27/2022 0846            ..  Lab Results   Component Value Date    CHOL 178 07/27/2022    CHOL 181 07/09/2021    CHOL 153 09/03/2019     Lab Results   Component Value Date    HDL 59 07/27/2022    HDL 77 (H) 07/09/2021    HDL 55 09/03/2019     Lab Results   Component Value Date    LDLCALC 106.6 07/27/2022    LDLCALC 90.4 07/09/2021    LDLCALC 82.0 09/03/2019     Lab Results   Component Value Date    TRIG 62 07/27/2022    TRIG 68 07/09/2021    TRIG 80 09/03/2019     Lab Results   Component Value Date    CHOLHDL 33.1 07/27/2022    CHOLHDL 42.5 07/09/2021    CHOLHDL 35.9 09/03/2019     ..  Lab Results   Component Value Date    WBC 9.92 01/12/2023    HGB 12.0 01/12/2023    HCT 37.2 01/12/2023    MCV 83 01/12/2023     01/12/2023       Test(s) Reviewed  I have reviewed the following in detail:  [] Stress test   [] Angiography   [] Echocardiogram   [x] Labs   [] Other:       Assessment:         ICD-10-CM ICD-9-CM   1. Essential hypertension  I10 401.9   2. Hypokalemia  E87.6 276.8   3. Aortic valve sclerosis  I35.8 424.1   4. KEIRY on CPAP  G47.33 327.23    Z99.89 V46.8   5. Morbid obesity with BMI of 45.0-49.9, adult  E66.01 278.01    Z68.42 V85.42     Problem List Items Addressed This Visit          Cardiac/Vascular    Essential hypertension - Primary    Relevant Orders    ALDOSTERONE    Aortic valve sclerosis       Renal/    Hypokalemia    Relevant Orders    ALDOSTERONE       Endocrine    Morbid obesity with BMI of 45.0-49.9, adult       Other    KEIRY on CPAP        Plan:         ADD SPIRONOLACTONE, FOR BLOOD PRESSURE AND ALSO FOR HYPOKALEMIA IN THIS PATIENT WITH RECURRENT HYPOKALEMIA DESPITE BEING ON POTASSIUM SUPPLEMENT CHECK  ALDOSTERONE LEVEL BEFORE STARTING SPIRONOLACTONE, ALL OTHER CV CLINICALLY STABLE, NO ANGINA, NO HF, NO TIA, NO CLINICAL ARRHYTHMIA,CONTINUE CURRENT MEDS, EDUCATION, DIET, EXERCISE ,NEEDS SIGNIFICANT WEIGHT LOSS, RTC IN  6 MO  Essential hypertension  Comments:  NEEDS BETTER CONTROL  Orders:  -     ALDOSTERONE; Future; Expected date: 01/20/2023    Hypokalemia  -     ALDOSTERONE; Future; Expected date: 01/20/2023    Aortic valve sclerosis  Comments:  MINOR    KEIRY on CPAP  Comments:  CPAP    Morbid obesity with BMI of 45.0-49.9, adult    Other orders  -     spironolactone (ALDACTONE) 25 MG tablet; Take 0.5 tablets (12.5 mg total) by mouth once daily.  Dispense: 45 tablet; Refill: 1    RTC Low level/low impact aerobic exercise 5x's/wk. Heart healthy diet and risk factor modification.    See labs and med orders.    Aerobic exercise 5x's/wk. Heart healthy diet and risk factor modification.    See labs and med orders.

## 2023-01-23 LAB — ALDOST SERPL-MCNC: 12.4 NG/DL

## 2023-01-25 ENCOUNTER — OFFICE VISIT (OUTPATIENT)
Dept: OPTOMETRY | Facility: CLINIC | Age: 48
End: 2023-01-25
Payer: COMMERCIAL

## 2023-01-25 DIAGNOSIS — H52.13 MYOPIA WITH ASTIGMATISM AND PRESBYOPIA, BILATERAL: Primary | ICD-10-CM

## 2023-01-25 DIAGNOSIS — H52.4 MYOPIA WITH ASTIGMATISM AND PRESBYOPIA, BILATERAL: Primary | ICD-10-CM

## 2023-01-25 DIAGNOSIS — Z46.0 CONTACT LENS/GLASSES FITTING: ICD-10-CM

## 2023-01-25 DIAGNOSIS — Z46.0 CONTACT LENS/GLASSES FITTING: Primary | ICD-10-CM

## 2023-01-25 DIAGNOSIS — H52.203 MYOPIA WITH ASTIGMATISM AND PRESBYOPIA, BILATERAL: Primary | ICD-10-CM

## 2023-01-25 PROCEDURE — 99499 UNLISTED E&M SERVICE: CPT | Mod: S$GLB,,, | Performed by: OPTOMETRIST

## 2023-01-25 PROCEDURE — 1159F MED LIST DOCD IN RCRD: CPT | Mod: CPTII,S$GLB,, | Performed by: OPTOMETRIST

## 2023-01-25 PROCEDURE — 99999 PR PBB SHADOW E&M-EST. PATIENT-LVL III: CPT | Mod: PBBFAC,,, | Performed by: OPTOMETRIST

## 2023-01-25 PROCEDURE — 92015 PR REFRACTION: ICD-10-PCS | Mod: S$GLB,,, | Performed by: OPTOMETRIST

## 2023-01-25 PROCEDURE — 99499 NO LOS: ICD-10-PCS | Mod: S$GLB,,, | Performed by: OPTOMETRIST

## 2023-01-25 PROCEDURE — 92310 PR CONTACT LENS FITTING (NO CHANGE): ICD-10-PCS | Mod: CSM,S$GLB,, | Performed by: OPTOMETRIST

## 2023-01-25 PROCEDURE — 1159F PR MEDICATION LIST DOCUMENTED IN MEDICAL RECORD: ICD-10-PCS | Mod: CPTII,S$GLB,, | Performed by: OPTOMETRIST

## 2023-01-25 PROCEDURE — 99999 PR PBB SHADOW E&M-EST. PATIENT-LVL III: ICD-10-PCS | Mod: PBBFAC,,, | Performed by: OPTOMETRIST

## 2023-01-25 PROCEDURE — 92310 CONTACT LENS FITTING OU: CPT | Mod: CSM,S$GLB,, | Performed by: OPTOMETRIST

## 2023-01-25 PROCEDURE — 92015 DETERMINE REFRACTIVE STATE: CPT | Mod: S$GLB,,, | Performed by: OPTOMETRIST

## 2023-01-25 NOTE — PATIENT INSTRUCTIONS
"DRY EYES -- BURNING OR BROOKS SYMPTOMS:  Use Over The Counter artificial tears as needed for dry eye symptoms.   Some common brands include:  Systane, Optive, Refresh, and Thera-Tears.  These drops can be used as frequently as desired, but may be most helpful use during long periods of concentrated work.  For example, reading / working at the computer. Start with 3-4x per day.     Nighttime Ophthalmic gel or ointments are available: Refresh PM, Genteal, and Lacrilube.    Avoid drops that "get redness out" (Visine, Murine, Clear Eyes), as these may contain medication that could further irritate the eyes, especially with chronic use.    ALLERGY EYES -- ITCHING SYMPTOMS:  Over the counter medications include--Pataday, Zaditor, and Alaway.  Use as directed 1-2 drops daily for symptoms of itching / watering eyes.  These drops will not help for dry eye or exposure symptoms.    REDNESS RELIEF:  Lumify---is a good redness reliever that will not cause irritation if used chronically.          "

## 2023-01-25 NOTE — PROGRESS NOTES
HPI    Pt here for glasses and CL dls- 11/09/2022 by Dr. Palomo    Pt states her vision is stable with specs, wants to try CL.   Occasional floaters no new.   Pt is not using any GTTS.   Last edited by Lennie Mackey on 1/25/2023  2:30 PM.        ROS    Positive for: Eyes  Negative for: Constitutional, Gastrointestinal, Neurological, Skin,   Genitourinary, Musculoskeletal, HENT, Endocrine, Cardiovascular,   Respiratory, Psychiatric, Allergic/Imm, Heme/Lymph  Last edited by KIRA Mccoy, OD on 1/25/2023  2:55 PM.        Assessment /Plan     For exam results, see Encounter Report.    Myopia with astigmatism and presbyopia, bilateral    Contact lens/glasses fitting      Recheck refraction and gave copy  ---slight changes from previous  Update---dispense new trials and revisit optical for repeat I/R   Gave Biofinity toric and Ac Oasys toric // Air optix toric       Message with report then can finalize or f/u in office     DAILY WEAR CONTACT LENSES  Continue with Daily Wear of contact lenses, up to all waking hours.  Continue with approved contact lens disinfection / rewetting drops as discussed.  Dispose of lenses monthly.  Stop wearing your lenses and call our office if redness, blurred vision, or pain persists more than 12 hours.  We recommend an annual eye exam for contact lens patients.        Discussed and educated patient on current findings /plan.  RTC 1 year, prn if any changes / issues

## 2023-02-20 NOTE — PROGRESS NOTES
"Established Patient Chronic Pain Note (Follow up visit)    Chief Complaint:   Chief Complaint   Patient presents with    Back Pain   Mid back pain, right       SUBJECTIVE:    Interval History (2/21/2023):  Akiko Jameson presents today for follow-up visit.  Patient was last seen on 9/28/2022.  She presents today complaining of midback pain on the right, and pain has been more bothersome lately. Patient reports pain as 6/10 today.    Interval History (11/21/2022):  Akiko Jameson presents today for follow-up on virtual visit.  Patient was last seen about 2 months ago. At that visit, the plan was to start medrol dose warren and consider RFA - due to pain flared, but eventually that pain resolved. She has had one more episode over the last couple months of Increased pain, which ultimately resolved with muscle relaxer. She is stable overall right now.     Interval History (9/28/2022):  Akiko Jameson presents today for follow-up visit.  Patient was last seen on 5/13/2022. At that visit, she was doing well since RFA. She had a pain flare a few weeks ago that resolved fairly quickly. Pain returned over the week requiring ER visit. She reports pain being debilitating at this time, which started in the neck initially (that resolved). Pain stayed localized in right lower back. She was given toradol and morphine shot at ER. Pain has since calmed down. She is utilizing muscle relaxer now, but at the time of pain flare, it wasn't helping. Patient reports pain as "0/10 today.    Interval History (5/13/2022): Akiko Jameson presents today for follow-up visit.  she underwent right L3-5 RFA on 4/5/22.  The patient reports that she is/was better following the procedure.  she reports 95% pain relief.  The changes lasted >4 weeks so far.  The changes have continued through this visit.  Patient reports pain as "0/10 today.    Interval HPI (2/11/22):  Akiko Jameson is a 47 y.o. who presents to the " clinic for a follow-up appointment for mid back pain.  At the last visit, she was provided trigger point injections to the thoracic paraspinals , lower trapezius, and latissimus dorsi on the right.  She reports that the trigger point injections provided moderate relief.  She was maintained on Robaxin 750 mg up to 3 times daily as needed for muscle related pain.  Since the last visit, Akiko Jameson states the pain has been starting to return. Current pain intensity is 8/10.   Patient denies night fever/night sweats, urinary incontinence, bowel incontinence, significant weight loss, significant motor weakness and loss of sensations.    Pain Disability Index Review:  Last 3 PDI Scores 5/13/2022 2/11/2022 12/10/2021   Pain Disability Index (PDI) 19 45 56     Interval HPI 12/10/2021:  Akiko Jameson is a 47 y.o. female who presents to the clinic for a follow-up appointment for mid back pain.  At the last visit, she was provided trigger point injections to the thoracic paraspinals , lower trapezius, and latissimus dorsi on the right.  She reports that the trigger point injections provided moderate relief.  She was maintained on Robaxin 750 mg up to 3 times daily as needed for muscle related pain.  Since the last visit, Akiko Jameson states the pain has been starting to return. Current pain intensity is 5/10.     Interval HPI 11/19/2021:  Akiko Jameson is a 46 y.o. female who presents to the clinic for a follow-up appointment for mid back pain.  At the last visit, she was provided trigger point injections to the thoracic paraspinals , lower trapezius, and latissimus dorsi on the right.  She reports that the trigger point injections provided moderate relief.  She was maintained on Robaxin 750 mg up to 3 times daily as needed for muscle related pain.  Since the last visit, Akiko Jameson states the pain has been starting to return. Current pain intensity is 5/10.  Unfortunately, the  patient is currently on antibiotics for cellulitis of the right upper extremity.      Interval HPI 09/23/2021:  Akiko Jameson is a 46 y.o. female who presents to the clinic for a follow-up appointment for mid back pain.  At the last visit, she was provided trigger point injections to the thoracic paraspinals , lower trapezius, and latissimus dorsi on the right.  She reports that the trigger point injections provided limited relief, but they have been beneficial previously.  She was maintained on Robaxin 500 mg up to 3 times daily as needed for muscle related pain.  Since the last visit, Akiko Jameson states the pain has been starting to return. Current pain intensity is 8/10.      Interval HPI 07/09/2021:  Akiko Jameson is a 46 y.o. female who presents to the clinic for a follow-up appointment for mid back pain.  At the last visit, she was provided trigger point injections to the thoracic paraspinals , lower trapezius, and latissimus dorsi on the right.  She reports that the trigger point injections provided significant relief relief.  She was maintained on Robaxin 500 mg up to 3 times daily as needed for muscle related pain.  Since the last visit, Akiko Jameson states the pain has been starting to return. Current pain intensity is 8/10.  Of note, in the interim patient has had thyroidectomy and was recently diagnosed with sarcoidosis.    Interval HPI 11/13/2020:  Akiko Jameson is a 46 y.o. female who presents to the clinic for a follow-up appointment for mid back pain.  At the last visit, she was provided trigger point injections to the thoracic paraspinals , lower trapezius, and latissimus dorsi on the right.  She reports that the trigger point injections provided significant relief relief.  She was maintained on Robaxin 500 mg up to 3 times daily as needed for muscle related pain.  Since the last visit, Akiko Jameson states the pain has been starting to  return. Current pain intensity is 5/10.      Interval HPI 05/29/2020:  Akiko Jameson is a 45 y.o. female who presents to the clinic for a follow-up appointment for mid back pain.  At the last visit, she was provided trigger point injections to the thoracic paraspinals and upper/middle trapezius on the right.  She reports that the trigger point injections provided 100% relief at the areas injected, but is having pain in other areas that she would like to also get injected today.  She was also started on Robaxin 500 mg up to 3 times daily as needed for muscle related pain.  She reports that the Robaxin has been of minimal benefit.  Since the last visit, Akiko Jameson states the pain has been improving. Current pain intensity is 8/10.  We discussed lymphedema management, and she is interested in going to a physical therapy clinic to help address this issue.    Initial HPI 05/15/2020:  Akiko Jameson is a 45 y.o. female, right-hand dominant, who presents to the clinic for the evaluation of mid back pain. The pain started 25+ years ago following a lymph node removal on the right that resulted in lymphedema of the right upper extremity and symptoms have been worsening.The pain is located in the right mid back area and radiates to the along the length of the upper to lower back.  The pain is described as aching, burning and throbbing and is rated as 6/10. The pain is rated with a score of  0/10 on the BEST day and a score of 10/10 on the WORST day.  Symptoms interfere with daily activity, sleeping and work. The pain is exacerbated by increased activity or use of the right upper extremity.  The pain is mitigated by rest. The patient reports spending 2-4 hours per day reclining. The patient reports 6-8 hours of uninterrupted sleep per night.  She reports that she works as a .         Non-Pharmacologic Treatments:  Physical Therapy/Home Exercise: yes  Ice/Heat:yes  TENS: yes  Acupuncture:  no  Massage: yes  Chiropractic: yes    Other: yes, compression sleeves        Pain Medications:  - Opioids: Norco  - Adjuvant Medications: Relafen, Robaxin  - Anti-Coagulants: None           Pain Procedures:   -previously underwent thoracic epidurals x2 with first one being beneficial but the second one not so much  -05/15/2020:  Trigger point injections to the thoracic paraspinals and upper/middle trapezius on the right, 100% relief  -05/29/2020:  Trigger point injections to the thoracic paraspinals, lower trapezius, and latissimus dorsi on the right, significant relief   -11/13/2020:  Trigger point injections to the thoracic paraspinals, lower trapezius, and latissimus dorsi on the right, significant relief  -07/09/2021: Trigger point injections to the thoracic paraspinals, lower trapezius, and latissimus dorsi on the right, limited relief  - 09/23/2021: Trigger point injections to the thoracic paraspinals, lower trapezius, and latissimus dorsi on the right, moderate relief  - 12/10/2020: Trigger point injections to the thoracic paraspinals, lower trapezius, and latissimus dorsi on the right, moderate relief  - diagnostic right L3-5 MBB on 02/24/2022 and 03/29/2022 with reported % pain relief  - right L3-5 RFA on 4/5/22 with 95% pain relief        Imaging (Reviewed on 2/21/2023):     2/21/2023 X-Ray Thoracic Spine 4 or more views  COMPARISON:  None  FINDINGS:  Vertebral body heights maintained.  No spondylolisthesis.  Multilevel degenerative osteophyte changes present.  Soft tissues unremarkable.  Impression: Degenerative findings           X-ray bilateral knees 03/16/2016:  AP and PA flexion standing views of both knees as well as lateral and Merchant views of both knees were obtained.  The joint spaces are grossly preserved.  Mild patellofemoral spurring and prominent patellar enthesophytes bilaterally.  No joint effusion.  No acute fracture or malalignment.     MRI right knee 03/21/2016:  The anterior  cruciate ligament, posterior cruciate ligament, medial collateral ligament, lateral collateral ligament complex normal popliteus tendon, IT band, quadriceps tendon, and patellar tendon are normal in appearance.    There is a complex, primarily radial tear of the posterior horn of the medial meniscus which extends to the posterior root attachment of the medial meniscus.  The medial meniscus is intact.    The articular cartilage of the medial and lateral tibiofemoral joint compartments is intact.  There is a 14 mm width area of full-thickness abnormal chondral signal observed in the lateral patellar facet at the level of the patellar midpole.  No abnormal subcortical signal abnormality appreciated.    There is quadriceps tendon and patellar tendon insertion enthesophyte formation at their respective attachments on the patella.  There is a small knee joint effusion present.  No acute fracture or pathologic marrow replacement process.  There is hematopoietic  bone marrow present within the distal femur and proximal tibia.          REVIEW OF SYSTEMS:    GENERAL:  No weight loss, malaise or fevers.  HEENT:   No recent changes in vision or hearing  NECK:  Negative for lumps, no difficulty with swallowing.  RESPIRATORY:  Negative for cough, wheezing or shortness of breath, patient denies any recent URI.  CARDIOVASCULAR:  Negative for chest pain, leg swelling or palpitations.  GI:  Negative for abdominal discomfort, blood in stools or black stools or change in bowel habits.  MUSCULOSKELETAL:  See HPI.  SKIN:  Negative for lesions, rash, and itching.  PSYCH:  No mood disorder or recent psychosocial stressors.  Patients sleep is not disturbed secondary to pain.  HEMATOLOGY/LYMPHOLOGY:  Negative for prolonged bleeding, bruising easily or swollen nodes.  Patient is not currently taking any anti-coagulants  NEURO:   No history of headaches, syncope, paralysis, seizures or tremors.  All other reviewed and negative other than  "HPI.      OBJECTIVE:    PHYSICAL EXAMINATION:  Vitals:    02/21/23 0748   BP: 135/80   Pulse: 77   Weight: 114.1 kg (251 lb 8.7 oz)   Height: 5' 2" (1.575 m)   PainSc:   6   PainLoc: Back    Body mass index is 46.01 kg/m².   (reviewed on 2/21/2023)    GENERAL: Well appearing, in no acute distress, alert and oriented x3.  PSYCH:  Mood and affect appropriate.  SKIN: Skin color, texture, turgor normal, no rashes or lesions.  HEAD/FACE:  Normocephalic, atraumatic. Cranial nerves grossly intact.  NECK: No pain to palpation over the cervical paraspinous muscles. Spurling Negative. No pain with neck flexion, extension, or lateral flexion.   PULM: No evidence of respiratory difficulty, symmetric chest rise.  GI:  Soft and non-tender.  BACK:  TTP over the thoracic paraspinals, lower trapezius, and latissimus dorsi on the right.  SLR in the sitting and supine positions is negative to radicular pain.   mild-to-moderate pain to palpation over the facet joints of the lumbar spine and lumbar paraspinals - Present, mild, improved since procedure.  Fair range of motion secondary to pain reproduction.  Axial loading positive on the right - Present, mild, improved since procedure.  EXTREMITIES: Peripheral joint ROM is full and pain free without obvious instability or laxity in all four extremities.  There is significant lymphedema present throughout the right upper extremity.  No other deformities or skin discoloration. Good capillary refill.  MUSCULOSKELETAL: Shoulder, hip, and knee provocative maneuvers are negative.  BUE & BLE strength is normal and symmetric.  No atrophy or tone abnormalities are noted.  NEURO: BUE & BLE coordination and muscle stretch reflexes are physiologic and symmetric.  Plantar response are downgoing. No clonus.  No loss of sensation is noted.  GAIT: normal.            ASSESSMENT: 48 y.o. year old female with mid back pain, consistent with     1. Chronic myofascial pain  ketorolac injection 30 mg      2. " Thoracic spondylosis  X-Ray Thoracic Spine 4 or more views    IR Facet Inj Cerv Thoracic 3rd Vert Uni    IR Facet Inj Cerv Thoracic 2nd Vert Uni    IR Facet Inj Cervical Thoracic 1st Vert Uni    Case Request-RAD/Other Procedure Area: Diagnostic Right T7-9 MBB #1      3. Mid back pain on right side            PLAN:   1. Interventional:   - Procedure note: An IM injection of (ketolorac 30mg/1mL) was administered during clinic visit.  This was well tolerated.   - Schedule Diagnostic Right T7-9 MBB #1.  Will call for % relief to determine RFA eligibility. Patient is not taking prescription blood thinners or ASA.  If adequate pain relief received, move forward with 2nd diagnostic block, then RFA.  - Schedule in clinic right thoracic TPI next week if needed.  - S/p right L3-5 RFA on 4/5/22 with 95% pain relief.     2. Pharmacologic:   - Refill Flexeril 10mg BID PRN - to take when she has pain flare.  - Previously given bridge of tramadol 50mg (10 tabs - 5 day supply) to have on hand for pain flare up to take 1/2 to 1 tab for severe pain.    - Anticoagulation use: None.   - LA  reviewed and appropriate.      3. Rehabilitative: Encouraged regular exercise. Therapy: advised patient continue with gradual compression for her lymphedema and activities as tolerated.     4. Diagnostic: Order thoracic x-ray w/ flexion & extension. Addendum: Radiology results reviewed & added to the chart.      5. Follow up:   - Will call for % relief of MBB to determine RFA eligibility.  - In clinic thoracic TPI.    - This condition does not require this patient to take time off of work, and the primary goal of our Pain Management services is to improve the patient's functional capacity.   - I discussed the risks, benefits, and alternatives to potential treatment options. All questions and concerns were fully addressed today in clinic.           Disclaimer:  This note was prepared using voice recognition system and is likely to have sound alike  errors that may have been overlooked even after proof reading.  Please call me with any questions.

## 2023-02-21 ENCOUNTER — OFFICE VISIT (OUTPATIENT)
Dept: PAIN MEDICINE | Facility: CLINIC | Age: 48
End: 2023-02-21
Payer: COMMERCIAL

## 2023-02-21 ENCOUNTER — HOSPITAL ENCOUNTER (OUTPATIENT)
Dept: RADIOLOGY | Facility: HOSPITAL | Age: 48
Discharge: HOME | End: 2023-02-21
Attending: PHYSICIAN ASSISTANT
Payer: COMMERCIAL

## 2023-02-21 VITALS
BODY MASS INDEX: 46.29 KG/M2 | WEIGHT: 251.56 LBS | HEIGHT: 62 IN | DIASTOLIC BLOOD PRESSURE: 80 MMHG | HEART RATE: 77 BPM | SYSTOLIC BLOOD PRESSURE: 135 MMHG

## 2023-02-21 DIAGNOSIS — M79.18 CHRONIC MYOFASCIAL PAIN: Primary | ICD-10-CM

## 2023-02-21 DIAGNOSIS — M47.814 THORACIC SPONDYLOSIS: ICD-10-CM

## 2023-02-21 DIAGNOSIS — M54.9 MID BACK PAIN ON RIGHT SIDE: ICD-10-CM

## 2023-02-21 DIAGNOSIS — G89.29 CHRONIC MYOFASCIAL PAIN: Primary | ICD-10-CM

## 2023-02-21 PROCEDURE — 99214 PR OFFICE/OUTPT VISIT, EST, LEVL IV, 30-39 MIN: ICD-10-PCS | Mod: 25,S$GLB,, | Performed by: PHYSICIAN ASSISTANT

## 2023-02-21 PROCEDURE — 96372 PR INJECTION,THERAP/PROPH/DIAG2ST, IM OR SUBCUT: ICD-10-PCS | Mod: S$GLB,,, | Performed by: PHYSICIAN ASSISTANT

## 2023-02-21 PROCEDURE — 1159F MED LIST DOCD IN RCRD: CPT | Mod: CPTII,S$GLB,, | Performed by: PHYSICIAN ASSISTANT

## 2023-02-21 PROCEDURE — 3075F SYST BP GE 130 - 139MM HG: CPT | Mod: CPTII,S$GLB,, | Performed by: PHYSICIAN ASSISTANT

## 2023-02-21 PROCEDURE — 1160F PR REVIEW ALL MEDS BY PRESCRIBER/CLIN PHARMACIST DOCUMENTED: ICD-10-PCS | Mod: CPTII,S$GLB,, | Performed by: PHYSICIAN ASSISTANT

## 2023-02-21 PROCEDURE — 96372 THER/PROPH/DIAG INJ SC/IM: CPT | Mod: S$GLB,,, | Performed by: PHYSICIAN ASSISTANT

## 2023-02-21 PROCEDURE — 3008F BODY MASS INDEX DOCD: CPT | Mod: CPTII,S$GLB,, | Performed by: PHYSICIAN ASSISTANT

## 2023-02-21 PROCEDURE — 99999 PR PBB SHADOW E&M-EST. PATIENT-LVL V: ICD-10-PCS | Mod: PBBFAC,,, | Performed by: PHYSICIAN ASSISTANT

## 2023-02-21 PROCEDURE — 72074 XR THORACIC SPINE 4 OR MORE VIEWS: ICD-10-PCS | Mod: 26,,, | Performed by: RADIOLOGY

## 2023-02-21 PROCEDURE — 3079F DIAST BP 80-89 MM HG: CPT | Mod: CPTII,S$GLB,, | Performed by: PHYSICIAN ASSISTANT

## 2023-02-21 PROCEDURE — 72074 X-RAY EXAM THORAC SPINE4/>VW: CPT | Mod: TC

## 2023-02-21 PROCEDURE — 99214 OFFICE O/P EST MOD 30 MIN: CPT | Mod: 25,S$GLB,, | Performed by: PHYSICIAN ASSISTANT

## 2023-02-21 PROCEDURE — 3075F PR MOST RECENT SYSTOLIC BLOOD PRESS GE 130-139MM HG: ICD-10-PCS | Mod: CPTII,S$GLB,, | Performed by: PHYSICIAN ASSISTANT

## 2023-02-21 PROCEDURE — 72074 X-RAY EXAM THORAC SPINE4/>VW: CPT | Mod: 26,,, | Performed by: RADIOLOGY

## 2023-02-21 PROCEDURE — 1159F PR MEDICATION LIST DOCUMENTED IN MEDICAL RECORD: ICD-10-PCS | Mod: CPTII,S$GLB,, | Performed by: PHYSICIAN ASSISTANT

## 2023-02-21 PROCEDURE — 99999 PR PBB SHADOW E&M-EST. PATIENT-LVL V: CPT | Mod: PBBFAC,,, | Performed by: PHYSICIAN ASSISTANT

## 2023-02-21 PROCEDURE — 3008F PR BODY MASS INDEX (BMI) DOCUMENTED: ICD-10-PCS | Mod: CPTII,S$GLB,, | Performed by: PHYSICIAN ASSISTANT

## 2023-02-21 PROCEDURE — 3079F PR MOST RECENT DIASTOLIC BLOOD PRESSURE 80-89 MM HG: ICD-10-PCS | Mod: CPTII,S$GLB,, | Performed by: PHYSICIAN ASSISTANT

## 2023-02-21 PROCEDURE — 1160F RVW MEDS BY RX/DR IN RCRD: CPT | Mod: CPTII,S$GLB,, | Performed by: PHYSICIAN ASSISTANT

## 2023-02-21 RX ORDER — KETOROLAC TROMETHAMINE 30 MG/ML
30 INJECTION, SOLUTION INTRAMUSCULAR; INTRAVENOUS
Status: COMPLETED | OUTPATIENT
Start: 2023-02-21 | End: 2023-02-21

## 2023-02-21 RX ADMIN — KETOROLAC TROMETHAMINE 30 MG: 30 INJECTION, SOLUTION INTRAMUSCULAR; INTRAVENOUS at 08:02

## 2023-02-27 ENCOUNTER — TELEPHONE (OUTPATIENT)
Dept: PAIN MEDICINE | Facility: CLINIC | Age: 48
End: 2023-02-27
Payer: COMMERCIAL

## 2023-02-28 ENCOUNTER — PATIENT MESSAGE (OUTPATIENT)
Dept: PAIN MEDICINE | Facility: HOSPITAL | Age: 48
End: 2023-02-28
Payer: COMMERCIAL

## 2023-03-07 ENCOUNTER — HOSPITAL ENCOUNTER (OUTPATIENT)
Dept: RADIOLOGY | Facility: HOSPITAL | Age: 48
Discharge: HOME | End: 2023-03-07
Attending: PHYSICIAN ASSISTANT
Payer: COMMERCIAL

## 2023-03-07 ENCOUNTER — OFFICE VISIT (OUTPATIENT)
Dept: GASTROENTEROLOGY | Facility: CLINIC | Age: 48
End: 2023-03-07
Payer: COMMERCIAL

## 2023-03-07 VITALS
SYSTOLIC BLOOD PRESSURE: 130 MMHG | DIASTOLIC BLOOD PRESSURE: 90 MMHG | OXYGEN SATURATION: 96 % | BODY MASS INDEX: 46.65 KG/M2 | HEART RATE: 83 BPM | WEIGHT: 253.5 LBS | HEIGHT: 62 IN

## 2023-03-07 DIAGNOSIS — K59.00 CONSTIPATION, UNSPECIFIED CONSTIPATION TYPE: ICD-10-CM

## 2023-03-07 DIAGNOSIS — K62.89 RECTAL PAIN: Primary | ICD-10-CM

## 2023-03-07 DIAGNOSIS — K62.89 RECTAL PAIN: ICD-10-CM

## 2023-03-07 DIAGNOSIS — R19.7 DIARRHEA, UNSPECIFIED TYPE: ICD-10-CM

## 2023-03-07 PROCEDURE — 3075F PR MOST RECENT SYSTOLIC BLOOD PRESS GE 130-139MM HG: ICD-10-PCS | Mod: CPTII,S$GLB,, | Performed by: PHYSICIAN ASSISTANT

## 2023-03-07 PROCEDURE — 99214 OFFICE O/P EST MOD 30 MIN: CPT | Mod: S$GLB,,, | Performed by: PHYSICIAN ASSISTANT

## 2023-03-07 PROCEDURE — 74019 RADEX ABDOMEN 2 VIEWS: CPT | Mod: TC

## 2023-03-07 PROCEDURE — 74019 RADEX ABDOMEN 2 VIEWS: CPT | Mod: 26,,, | Performed by: RADIOLOGY

## 2023-03-07 PROCEDURE — 99999 PR PBB SHADOW E&M-EST. PATIENT-LVL IV: ICD-10-PCS | Mod: PBBFAC,,, | Performed by: PHYSICIAN ASSISTANT

## 2023-03-07 PROCEDURE — 1159F MED LIST DOCD IN RCRD: CPT | Mod: CPTII,S$GLB,, | Performed by: PHYSICIAN ASSISTANT

## 2023-03-07 PROCEDURE — 3075F SYST BP GE 130 - 139MM HG: CPT | Mod: CPTII,S$GLB,, | Performed by: PHYSICIAN ASSISTANT

## 2023-03-07 PROCEDURE — 3008F BODY MASS INDEX DOCD: CPT | Mod: CPTII,S$GLB,, | Performed by: PHYSICIAN ASSISTANT

## 2023-03-07 PROCEDURE — 1159F PR MEDICATION LIST DOCUMENTED IN MEDICAL RECORD: ICD-10-PCS | Mod: CPTII,S$GLB,, | Performed by: PHYSICIAN ASSISTANT

## 2023-03-07 PROCEDURE — 3080F DIAST BP >= 90 MM HG: CPT | Mod: CPTII,S$GLB,, | Performed by: PHYSICIAN ASSISTANT

## 2023-03-07 PROCEDURE — 99999 PR PBB SHADOW E&M-EST. PATIENT-LVL IV: CPT | Mod: PBBFAC,,, | Performed by: PHYSICIAN ASSISTANT

## 2023-03-07 PROCEDURE — 74019 XR ABDOMEN FLAT AND ERECT: ICD-10-PCS | Mod: 26,,, | Performed by: RADIOLOGY

## 2023-03-07 PROCEDURE — 99214 PR OFFICE/OUTPT VISIT, EST, LEVL IV, 30-39 MIN: ICD-10-PCS | Mod: S$GLB,,, | Performed by: PHYSICIAN ASSISTANT

## 2023-03-07 PROCEDURE — 3080F PR MOST RECENT DIASTOLIC BLOOD PRESSURE >= 90 MM HG: ICD-10-PCS | Mod: CPTII,S$GLB,, | Performed by: PHYSICIAN ASSISTANT

## 2023-03-07 PROCEDURE — 3008F PR BODY MASS INDEX (BMI) DOCUMENTED: ICD-10-PCS | Mod: CPTII,S$GLB,, | Performed by: PHYSICIAN ASSISTANT

## 2023-03-07 NOTE — PROGRESS NOTES
Subjective:      Patient ID: Akiko Jameson is a 48 y.o. female.    Chief Complaint: Constipation, Diarrhea, and Gas    HPI:  Patient reports to clinic today for follow up of the above. I have seen patient in the past for rectal spasm and reflux disease. Had colonoscopy 2019 with 10 yr recall. Also had flex sig just recently which was normal. Today she is here with ongoing abdominal cramping, worse 30-45 mins after eating. She is alternating stools - constipation and diarrhea. Will be constipated for about 4 days at a time and then have a large, diarrheal bowel movement. Denies blood in the stool. Reports that after a large BM is when she feels an increase in her rectal spasms.     Review of Systems   Constitutional:  Negative for activity change, appetite change, chills, diaphoresis, fatigue, fever and unexpected weight change.   HENT:  Negative for trouble swallowing.    Respiratory:  Negative for shortness of breath.    Cardiovascular:  Negative for chest pain.   Gastrointestinal:  Positive for abdominal pain, constipation, diarrhea and rectal pain. Negative for abdominal distention, anal bleeding, blood in stool, nausea and vomiting.   Skin:  Negative for color change and pallor.   Neurological:  Negative for dizziness, weakness and light-headedness.   Psychiatric/Behavioral:  Negative for dysphoric mood. The patient is not nervous/anxious.      Medical History: Reviewed    Social History: Reviewed    Allergies: Reviewed    Objective:     Physical Exam  Constitutional:       General: She is not in acute distress.     Appearance: Normal appearance. She is obese. She is not ill-appearing, toxic-appearing or diaphoretic.   HENT:      Head: Normocephalic and atraumatic.   Eyes:      General: No scleral icterus.     Extraocular Movements: Extraocular movements intact.   Cardiovascular:      Rate and Rhythm: Normal rate and regular rhythm.   Pulmonary:      Effort: Pulmonary effort is normal. No respiratory  distress.      Breath sounds: Normal breath sounds.   Abdominal:      General: Bowel sounds are normal. There is no distension.      Palpations: Abdomen is soft. There is no mass.      Tenderness: There is abdominal tenderness. There is no guarding.   Musculoskeletal:         General: Normal range of motion.      Cervical back: Normal range of motion.   Skin:     General: Skin is warm and dry.      Coloration: Skin is not jaundiced or pale.   Neurological:      Mental Status: She is alert and oriented to person, place, and time.       Assessment:     1. Rectal pain    2. Constipation, unspecified constipation type    3. Diarrhea, unspecified type        Plan:     -Schedule Xray for further evaluation of alternating bowel habits, possibly overflow?  -Instructed daily probiotic.   -Recent flex sig normal. Follow up with colorectal for continued rectal spasms.   -Will reach out with next steps after Xray reviewed.    Akiko was seen today for constipation, diarrhea and gas.    Diagnoses and all orders for this visit:    Rectal pain  -     X-Ray Abdomen Flat And Erect; Future    Constipation, unspecified constipation type  -     X-Ray Abdomen Flat And Erect; Future    Diarrhea, unspecified type  -     X-Ray Abdomen Flat And Erect; Future        No follow-ups on file.    Thank you for the opportunity to participate in the care of this patient.   Elyssa Gutierres PA-C.

## 2023-03-10 ENCOUNTER — PATIENT MESSAGE (OUTPATIENT)
Dept: GASTROENTEROLOGY | Facility: CLINIC | Age: 48
End: 2023-03-10
Payer: COMMERCIAL

## 2023-03-21 ENCOUNTER — OFFICE VISIT (OUTPATIENT)
Dept: FAMILY MEDICINE | Facility: CLINIC | Age: 48
End: 2023-03-21
Payer: COMMERCIAL

## 2023-03-21 VITALS
TEMPERATURE: 98 F | HEIGHT: 63 IN | HEART RATE: 67 BPM | DIASTOLIC BLOOD PRESSURE: 80 MMHG | WEIGHT: 255.88 LBS | BODY MASS INDEX: 45.34 KG/M2 | SYSTOLIC BLOOD PRESSURE: 136 MMHG

## 2023-03-21 DIAGNOSIS — I77.810 ASCENDING AORTA DILATION: ICD-10-CM

## 2023-03-21 DIAGNOSIS — L03.90 RECURRENT CELLULITIS: ICD-10-CM

## 2023-03-21 DIAGNOSIS — I05.8 MITRAL VALVE SCLEROSIS: ICD-10-CM

## 2023-03-21 DIAGNOSIS — I89.0 LYMPHEDEMA OF UPPER EXTREMITY FOLLOWING LYMPHADENECTOMY: ICD-10-CM

## 2023-03-21 DIAGNOSIS — I35.8 AORTIC VALVE SCLEROSIS: ICD-10-CM

## 2023-03-21 DIAGNOSIS — E89.89 LYMPHEDEMA OF UPPER EXTREMITY FOLLOWING LYMPHADENECTOMY: ICD-10-CM

## 2023-03-21 DIAGNOSIS — L03.113 CELLULITIS OF RIGHT HAND: Primary | ICD-10-CM

## 2023-03-21 PROBLEM — Z86.16 HISTORY OF 2019 NOVEL CORONAVIRUS DISEASE (COVID-19): Status: RESOLVED | Noted: 2020-10-04 | Resolved: 2023-03-21

## 2023-03-21 PROBLEM — R01.1 UNDIAGNOSED CARDIAC MURMURS: Status: RESOLVED | Noted: 2021-11-19 | Resolved: 2023-03-21

## 2023-03-21 PROCEDURE — 3075F SYST BP GE 130 - 139MM HG: CPT | Mod: CPTII,S$GLB,, | Performed by: FAMILY MEDICINE

## 2023-03-21 PROCEDURE — 1159F PR MEDICATION LIST DOCUMENTED IN MEDICAL RECORD: ICD-10-PCS | Mod: CPTII,S$GLB,, | Performed by: FAMILY MEDICINE

## 2023-03-21 PROCEDURE — 99999 PR PBB SHADOW E&M-EST. PATIENT-LVL IV: ICD-10-PCS | Mod: PBBFAC,,, | Performed by: FAMILY MEDICINE

## 2023-03-21 PROCEDURE — 99999 PR PBB SHADOW E&M-EST. PATIENT-LVL IV: CPT | Mod: PBBFAC,,, | Performed by: FAMILY MEDICINE

## 2023-03-21 PROCEDURE — 3079F PR MOST RECENT DIASTOLIC BLOOD PRESSURE 80-89 MM HG: ICD-10-PCS | Mod: CPTII,S$GLB,, | Performed by: FAMILY MEDICINE

## 2023-03-21 PROCEDURE — 1159F MED LIST DOCD IN RCRD: CPT | Mod: CPTII,S$GLB,, | Performed by: FAMILY MEDICINE

## 2023-03-21 PROCEDURE — 3079F DIAST BP 80-89 MM HG: CPT | Mod: CPTII,S$GLB,, | Performed by: FAMILY MEDICINE

## 2023-03-21 PROCEDURE — 3008F PR BODY MASS INDEX (BMI) DOCUMENTED: ICD-10-PCS | Mod: CPTII,S$GLB,, | Performed by: FAMILY MEDICINE

## 2023-03-21 PROCEDURE — 99214 PR OFFICE/OUTPT VISIT, EST, LEVL IV, 30-39 MIN: ICD-10-PCS | Mod: S$GLB,,, | Performed by: FAMILY MEDICINE

## 2023-03-21 PROCEDURE — 3008F BODY MASS INDEX DOCD: CPT | Mod: CPTII,S$GLB,, | Performed by: FAMILY MEDICINE

## 2023-03-21 PROCEDURE — 3075F PR MOST RECENT SYSTOLIC BLOOD PRESS GE 130-139MM HG: ICD-10-PCS | Mod: CPTII,S$GLB,, | Performed by: FAMILY MEDICINE

## 2023-03-21 PROCEDURE — 99214 OFFICE O/P EST MOD 30 MIN: CPT | Mod: S$GLB,,, | Performed by: FAMILY MEDICINE

## 2023-03-21 RX ORDER — AMOXICILLIN AND CLAVULANATE POTASSIUM 875; 125 MG/1; MG/1
1 TABLET, FILM COATED ORAL 2 TIMES DAILY
Qty: 20 TABLET | Refills: 1 | Status: SHIPPED | OUTPATIENT
Start: 2023-03-21 | End: 2023-06-29

## 2023-03-21 RX ORDER — HYDROCODONE BITARTRATE AND ACETAMINOPHEN 10; 325 MG/1; MG/1
1 TABLET ORAL EVERY 6 HOURS PRN
COMMUNITY
Start: 2023-03-16 | End: 2023-06-29

## 2023-03-21 RX ORDER — SULFAMETHOXAZOLE AND TRIMETHOPRIM 800; 160 MG/1; MG/1
1 TABLET ORAL 2 TIMES DAILY
Qty: 20 TABLET | Refills: 1 | Status: SHIPPED | OUTPATIENT
Start: 2023-03-21 | End: 2023-06-29

## 2023-03-21 RX ORDER — DOXYCYCLINE 100 MG/1
100 CAPSULE ORAL 2 TIMES DAILY
COMMUNITY
Start: 2023-03-16 | End: 2023-03-21 | Stop reason: ALTCHOICE

## 2023-03-21 NOTE — PROGRESS NOTES
Patient presents for follow-up of cellulitis at the right hand starting approximately 5 days ago.  She initially had a fever.  She was seen with ER visit treated with 1 IV dose of Zosyn, vancomycin and then started on oral doxycycline.  She is not had any recurrent fever.  Symptoms have not worsened, but she does not feel that it is improving as quickly as it has in the past.  She has had multiple prior episodes of cellulitis in the right hand and arm due to chronic lymphedema related to prior remote surgery.  Prior hospitalizations for same, but none recently.  She has had successful treatment in the past using Augmentin and Bactrim.  She did see infectious disease last summer regarding recurrences and apparently there was some discussion of prophylactic antibiotic, but has not followed up since then.  Blood pressure is controlled.  Previous ascending aortic dilation incidentally noted on stress echocardiogram asymptomatic.    Akiko was seen today for follow-up and recurrent skin infections.    Diagnoses and all orders for this visit:    Cellulitis of right hand    Recurrent cellulitis    Lymphedema of upper extremity following lymphadenectomy    Ascending aorta dilation    Aortic valve sclerosis    Mitral valve sclerosis    Other orders  -     amoxicillin-clavulanate 875-125mg (AUGMENTIN) 875-125 mg per tablet; Take 1 tablet by mouth 2 (two) times daily. Recurrent cellulitis  -     sulfamethoxazole-trimethoprim 800-160mg (BACTRIM DS) 800-160 mg Tab; Take 1 tablet by mouth 2 (two) times daily. Recurrent cellulitis    She will discontinue the doxycycline.  She does have follow-up appointment scheduled with Infectious Disease.  If symptoms worsen or not improving follow-up ER.  She can elevate the arm.              Past Medical History:  Past Medical History:   Diagnosis Date    ALLERGIC RHINITIS     Aortic valve sclerosis 1/20/2023    Arthritis     Cellulitis     Constipation due to opioid therapy     Eosinophilia      mild    GERD (gastroesophageal reflux disease)     Granular cell tumor     H. pylori infection 2018    History of 2019 novel coronavirus disease (COVID-19) 10/04/2020    Hypertension     Lymphedema     Mild anemia     Mitral valve sclerosis 3/21/2023    KEIRY on CPAP     does not regularly    Positive CHARLENE (antinuclear antibody) 07/27/2022    Prediabetes 08/06/2021    Sarcoidosis, unspecified     Sleep apnea     compliant with CPAP    Thyroid nodule     Urinary incontinence, mixed      Past Surgical History:   Procedure Laterality Date    24 HOUR IMPEDANCE PH MONITORING OF ESOPHAGUS IN PATIENT NOT TAKING ACID REDUCING MEDICATIONS N/A 01/06/2020    Procedure: IMPEDANCE PH STUDY, ESOPHAGEAL, 24 HOUR, IN PATIENT NOT TAKING ACID REDUCING MEDICATION;  Surgeon: First Available Tamika Panchal;  Location: [facility];  Service: Endoscopy;  Laterality: N/A;    AXILLARY NODE DISSECTION Right     granular cell tumor    BILATERAL SALPINGO-OOPHORECTOMY (BSO)  07/21/2020    BREAST CYST ASPIRATION      left    CHOLECYSTECTOMY      COLONOSCOPY N/A 05/10/2019    Procedure: COLONOSCOPY;  Surgeon: Edgar Gamble Jr., MD;  Location: [facility];  Service: Endoscopy;  Laterality: N/A;    ENDOBRONCHIAL ULTRASOUND Bilateral 04/27/2021    Procedure: ENDOBRONCHIAL ULTRASOUND (EBUS);  Surgeon: Armando Kirby MD;  Location: [facility];  Service: Pulmonary;  Laterality: Bilateral;  need fluoroscopy    ENDOMETRIAL ABLATION      ESOPHAGEAL MANOMETRY WITH MEASUREMENT OF IMPEDANCE N/A 01/06/2020    Procedure: MANOMETRY, ESOPHAGUS, WITH IMPEDANCE MEASUREMENT;  Surgeon: First Available Salcha;  Location: [facility];  Service: Endoscopy;  Laterality: N/A;    ESOPHAGOGASTRODUODENOSCOPY N/A 07/05/2018    Procedure: EGD (ESOPHAGOGASTRODUODENOSCOPY);  Surgeon: Edgar Gamble Jr., MD;  Location: [facility];  Service: Endoscopy;  Laterality: N/A;    ESOPHAGOGASTRODUODENOSCOPY N/A 11/23/2020    Procedure: ESOPHAGOGASTRODUODENOSCOPY (EGD);  Surgeon: Jina  LATOSHA Kaufman MD;  Location: [facility] ENDO;  Service: General;  Laterality: N/A;    EXCISION OF MASS OF ABDOMEN Left 06/18/2018    Procedure: EXCISION, MASS, ABDOMEN - flank left;  Surgeon: Armando Tate MD;  Location: [facility] OR;  Service: General;  Laterality: Left;  left flank removal of mass    FINE NEEDLE ASPIRATION      thyroid    FLEXIBLE SIGMOIDOSCOPY N/A 08/24/2022    Procedure: SIGMOIDOSCOPY, FLEXIBLE;  Surgeon: Amber West MD;  Location: [facility] ENDO;  Service: Endoscopy;  Laterality: N/A;    HYSTERECTOMY  07/21/2020    INJECTION OF ANESTHETIC AGENT AROUND MEDIAL BRANCH NERVES INNERVATING LUMBAR FACET JOINT Right 02/24/2022    Procedure: Right L3, 4, 5 MBB;  Surgeon: Anam Montero MD;  Location: [facility];  Service: Pain Management;  Laterality: Right;    INJECTION OF ANESTHETIC AGENT AROUND MEDIAL BRANCH NERVES INNERVATING LUMBAR FACET JOINT Right 03/29/2022    Procedure: Right L3, 4, 5 MBB  (2nd block);  Surgeon: Anam Montero MD;  Location: [facility];  Service: Pain Management;  Laterality: Right;    KNEE ARTHROSCOPY W/ MENISCECTOMY Right     NASAL SEPTUM SURGERY      OOPHORECTOMY      RADIOFREQUENCY THERMOCOAGULATION Right 04/05/2022    Procedure: Right L3-5 Lumbar RFA;  Surgeon: Anam Montero MD;  Location: [facility];  Service: Pain Management;  Laterality: Right;    ROBOT-ASSISTED NISSEN FUNDOPLICATION USING DA TAMAR XI N/A 02/28/2020    Procedure: XI ROBOTIC FUNDOPLICATION, NISSEN;  Surgeon: Jina Kaufman MD;  Location: [facility] OR;  Service: General;  Laterality: N/A;    THYROIDECTOMY Bilateral 03/11/2021    Procedure: THYROIDECTOMY;  Surgeon: Nixon Moe MD;  Location: [facility] OR;  Service: ENT;  Laterality: Bilateral;    TUBAL LIGATION      UPPER GASTROINTESTINAL ENDOSCOPY  07/05/2018    Dr. Gamble     Review of patient's allergies indicates:   Allergen Reactions    Biaxin [clarithromycin] Swelling    Omeprazole magnesium Swelling and Other (See Comments)     Prevacid [lansoprazole] Swelling     Lip swelling- was taking this along with blood pressure medication: benazepril; not sure which caused it. Reports she has taken OTC prilosec without any problems.    Ace inhibitors Swelling     Facial swelling    Benazepril Swelling     Lip swelling     Current Outpatient Medications on File Prior to Visit   Medication Sig Dispense Refill    calcium carbonate (OS-CARLA) 500 mg calcium (1,250 mg) tablet Take 1 tablet (500 mg total) by mouth once daily. 60 tablet 3    carvediloL (COREG) 12.5 MG tablet TAKE ONE TABLET BY MOUTH TWICE DAILY 180 tablet 1    cyclobenzaprine (FLEXERIL) 10 MG tablet Take 0.5-1 tablets (5-10 mg total) by mouth 2 (two) times daily as needed (for severe pain/ muscle spasms). May cause drowsiness. Do not mix with Robaxin. 60 tablet 0    docusate sodium (COLACE) 100 MG capsule Take 100 mg by mouth every other day.       fluticasone propion-salmeterol 115-21 mcg/dose (ADVAIR HFA) 115-21 mcg/actuation HFAA inhaler Inhale 2 puffs into the lungs every 12 (twelve) hours. Controller 12 g 11    HYDROcodone-acetaminophen (NORCO)  mg per tablet Take 1 tablet by mouth every 6 (six) hours as needed.      hydrOXYchloroQUINE (PLAQUENIL) 200 mg tablet Take 200 mg by mouth 2 (two) times a day.      levothyroxine (SYNTHROID, LEVOTHROID) 175 MCG tablet Take 1 tablet (175 mcg total) by mouth before breakfast. Brand name Synthroid 30 tablet 3    montelukast (SINGULAIR) 10 mg tablet Take 1 tablet (10 mg total) by mouth nightly. 90 tablet 3    multivitamin (THERAGRAN) per tablet Take 1 tablet by mouth once daily. Every day      olmesartan-hydrochlorothiazide (BENICAR HCT) 20-12.5 mg per tablet TAKE 1 TABLET BY MOUTH ONCE DAILY 90 tablet 1    potassium chloride SA (K-DUR,KLOR-CON) 20 MEQ tablet Take 1 tablet (20 mEq total) by mouth 2 (two) times daily. 180 tablet 3    spironolactone (ALDACTONE) 25 MG tablet Take 0.5 tablets (12.5 mg total) by mouth once daily. 45 tablet 1     traMADoL (ULTRAM) 50 mg tablet Take 0.5-1 tablets (25-50 mg total) by mouth every 12 (twelve) hours as needed for Pain. 10 tablet 0    [DISCONTINUED] doxycycline (VIBRAMYCIN) 100 MG Cap Take 100 mg by mouth 2 (two) times daily.      cetirizine (ZYRTEC) 10 MG tablet Take 1 tablet (10 mg total) by mouth once daily. (Patient taking differently: Take 10 mg by mouth once daily.)  0     No current facility-administered medications on file prior to visit.     Social History     Socioeconomic History    Marital status:     Number of children: 2   Occupational History     Employer: [employer]   Tobacco Use    Smoking status: Never    Smokeless tobacco: Never   Substance and Sexual Activity    Alcohol use: No    Drug use: No    Sexual activity: Yes     Partners: Male     Social Determinants of Health     Financial Resource Strain: Low Risk     Difficulty of Paying Living Expenses: Not hard at all   Food Insecurity: No Food Insecurity    Worried About Running Out of Food in the Last Year: Never true    Ran Out of Food in the Last Year: Never true   Transportation Needs: No Transportation Needs    Lack of Transportation (Medical): No    Lack of Transportation (Non-Medical): No   Physical Activity: Insufficiently Active    Days of Exercise per Week: 2 days    Minutes of Exercise per Session: 30 min   Stress: No Stress Concern Present    Feeling of Stress : Only a little   Social Connections: Unknown    Frequency of Communication with Friends and Family: More than three times a week    Frequency of Social Gatherings with Friends and Family: Twice a week    Active Member of Clubs or Organizations: Yes    Attends Club or Organization Meetings: More than 4 times per year    Marital Status:    Housing Stability: Low Risk     Unable to Pay for Housing in the Last Year: No    Number of Places Lived in the Last Year: 1    Unstable Housing in the Last Year: No     Family History   Problem Relation Age of Onset    Diabetes  "Mother     Kidney failure Mother     Heart attack Mother     Breast cancer Maternal Grandmother     Breast cancer Maternal Aunt     Ovarian cancer Cousin     Breast cancer Cousin     Blindness Father     Cirrhosis Neg Hx     Colon polyps Neg Hx     Colon cancer Neg Hx     Crohn's disease Neg Hx     Esophageal cancer Neg Hx     Stomach cancer Neg Hx     Ulcerative colitis Neg Hx     Amblyopia Neg Hx     Cataracts Neg Hx     Glaucoma Neg Hx     Macular degeneration Neg Hx     Retinal detachment Neg Hx     Strabismus Neg Hx     Allergic rhinitis Neg Hx     Allergies Neg Hx     Angioedema Neg Hx     Asthma Neg Hx     Atopy Neg Hx     Eczema Neg Hx     Immunodeficiency Neg Hx     Rhinitis Neg Hx     Urticaria Neg Hx            ROS:  GENERAL: No fever, chills,  or significant weight changes.   CARDIOVASCULAR: Denies chest pain, PND, orthopnea or reduced exercise tolerance.  ABDOMEN: Appetite fine. Denies diarrhea, abdominal pain, hematemesis or blood in stool.  URINARY: No flank pain, dysuria or hematuria.    Vitals:    03/21/23 1513   BP: 136/80   Pulse: 67   Temp: 97.7 °F (36.5 °C)   TempSrc: Temporal   Weight: 116.1 kg (255 lb 14.4 oz)   Height: 5' 3" (1.6 m)       Wt Readings from Last 3 Encounters:   03/21/23 116.1 kg (255 lb 14.4 oz)   03/07/23 115 kg (253 lb 8.5 oz)   02/21/23 114.1 kg (251 lb 8.7 oz)       APPEARANCE: Well nourished, well developed, in no acute distress.    HEAD: Normocephalic.  Atraumatic.  EYES:   Right eye: Pupil reactive.  Conjunctiva clear.    Left eye: Pupil reactive.  Conjunctiva clear.    NECK: Supple. No bruits.  No JVD.  No cervical lymphadenopathy.  No thyromegaly.    CHEST: Breath sounds clear bilaterally.  Normal respiratory effort  CARDIOVASCULAR: Normal rate.  Regular rhythm.  No murmurs.  No rub.  No gallops.  MENTAL STATUS: Alert.  Oriented x 3.  She has some swelling and tenderness of the right hand.  She has some swelling at the forearm as well.  There is no fluctuance or " induration.

## 2023-04-18 ENCOUNTER — LAB VISIT (OUTPATIENT)
Dept: LAB | Facility: HOSPITAL | Age: 48
End: 2023-04-18
Attending: INTERNAL MEDICINE
Payer: COMMERCIAL

## 2023-04-18 DIAGNOSIS — Z86.2 HISTORY OF SARCOIDOSIS: ICD-10-CM

## 2023-04-18 LAB
ALBUMIN SERPL BCP-MCNC: 3.8 G/DL (ref 3.5–5.2)
ALP SERPL-CCNC: 107 U/L (ref 55–135)
ALT SERPL W/O P-5'-P-CCNC: 24 U/L (ref 10–44)
ANION GAP SERPL CALC-SCNC: 9 MMOL/L (ref 8–16)
AST SERPL-CCNC: 29 U/L (ref 10–40)
BASOPHILS # BLD AUTO: 0.05 K/UL (ref 0–0.2)
BASOPHILS NFR BLD: 1 % (ref 0–1.9)
BILIRUB SERPL-MCNC: 0.4 MG/DL (ref 0.1–1)
BUN SERPL-MCNC: 21 MG/DL (ref 6–20)
CALCIUM SERPL-MCNC: 8.3 MG/DL (ref 8.7–10.5)
CHLORIDE SERPL-SCNC: 103 MMOL/L (ref 95–110)
CK SERPL-CCNC: 443 U/L (ref 20–180)
CO2 SERPL-SCNC: 29 MMOL/L (ref 23–29)
CREAT SERPL-MCNC: 1.3 MG/DL (ref 0.5–1.4)
CREAT UR-MCNC: 131 MG/DL (ref 15–325)
DIFFERENTIAL METHOD: ABNORMAL
EOSINOPHIL # BLD AUTO: 0.3 K/UL (ref 0–0.5)
EOSINOPHIL NFR BLD: 5.6 % (ref 0–8)
ERYTHROCYTE [DISTWIDTH] IN BLOOD BY AUTOMATED COUNT: 14.5 % (ref 11.5–14.5)
EST. GFR  (NO RACE VARIABLE): 50.1 ML/MIN/1.73 M^2
GLUCOSE SERPL-MCNC: 82 MG/DL (ref 70–110)
HCT VFR BLD AUTO: 36.7 % (ref 37–48.5)
HGB BLD-MCNC: 11.6 G/DL (ref 12–16)
IMM GRANULOCYTES # BLD AUTO: 0.03 K/UL (ref 0–0.04)
IMM GRANULOCYTES NFR BLD AUTO: 0.6 % (ref 0–0.5)
LYMPHOCYTES # BLD AUTO: 1.9 K/UL (ref 1–4.8)
LYMPHOCYTES NFR BLD: 37.1 % (ref 18–48)
MCH RBC QN AUTO: 26.7 PG (ref 27–31)
MCHC RBC AUTO-ENTMCNC: 31.6 G/DL (ref 32–36)
MCV RBC AUTO: 85 FL (ref 82–98)
MONOCYTES # BLD AUTO: 0.6 K/UL (ref 0.3–1)
MONOCYTES NFR BLD: 10.7 % (ref 4–15)
NEUTROPHILS # BLD AUTO: 2.4 K/UL (ref 1.8–7.7)
NEUTROPHILS NFR BLD: 45.6 % (ref 38–73)
NRBC BLD-RTO: 0 /100 WBC
PLATELET # BLD AUTO: 235 K/UL (ref 150–450)
PMV BLD AUTO: 11.1 FL (ref 9.2–12.9)
POTASSIUM SERPL-SCNC: 3.8 MMOL/L (ref 3.5–5.1)
PROT SERPL-MCNC: 7.7 G/DL (ref 6–8.4)
PROT UR-MCNC: <7 MG/DL (ref 0–15)
PROT/CREAT UR: NORMAL MG/G{CREAT} (ref 0–0.2)
RBC # BLD AUTO: 4.34 M/UL (ref 4–5.4)
SODIUM SERPL-SCNC: 141 MMOL/L (ref 136–145)
WBC # BLD AUTO: 5.15 K/UL (ref 3.9–12.7)

## 2023-04-18 PROCEDURE — 36415 COLL VENOUS BLD VENIPUNCTURE: CPT | Mod: PO | Performed by: INTERNAL MEDICINE

## 2023-04-18 PROCEDURE — 82550 ASSAY OF CK (CPK): CPT | Performed by: INTERNAL MEDICINE

## 2023-04-18 PROCEDURE — 84156 ASSAY OF PROTEIN URINE: CPT | Performed by: INTERNAL MEDICINE

## 2023-04-18 PROCEDURE — 85025 COMPLETE CBC W/AUTO DIFF WBC: CPT | Mod: PO | Performed by: INTERNAL MEDICINE

## 2023-04-18 PROCEDURE — 80053 COMPREHEN METABOLIC PANEL: CPT | Performed by: INTERNAL MEDICINE

## 2023-05-26 ENCOUNTER — PATIENT MESSAGE (OUTPATIENT)
Dept: GASTROENTEROLOGY | Facility: CLINIC | Age: 48
End: 2023-05-26
Payer: COMMERCIAL

## 2023-06-01 ENCOUNTER — HOSPITAL ENCOUNTER (OUTPATIENT)
Dept: RADIOLOGY | Facility: HOSPITAL | Age: 48
Discharge: HOME | End: 2023-06-01
Attending: PHYSICIAN ASSISTANT
Payer: COMMERCIAL

## 2023-06-01 ENCOUNTER — OFFICE VISIT (OUTPATIENT)
Dept: GASTROENTEROLOGY | Facility: CLINIC | Age: 48
End: 2023-06-01
Payer: COMMERCIAL

## 2023-06-01 VITALS
HEART RATE: 80 BPM | BODY MASS INDEX: 45.49 KG/M2 | HEIGHT: 63 IN | WEIGHT: 256.75 LBS | DIASTOLIC BLOOD PRESSURE: 81 MMHG | SYSTOLIC BLOOD PRESSURE: 124 MMHG

## 2023-06-01 DIAGNOSIS — R19.7 DIARRHEA, UNSPECIFIED TYPE: ICD-10-CM

## 2023-06-01 DIAGNOSIS — R14.0 BLOATING: ICD-10-CM

## 2023-06-01 DIAGNOSIS — K59.00 CONSTIPATION, UNSPECIFIED CONSTIPATION TYPE: ICD-10-CM

## 2023-06-01 DIAGNOSIS — K59.00 CONSTIPATION, UNSPECIFIED CONSTIPATION TYPE: Primary | ICD-10-CM

## 2023-06-01 PROCEDURE — 3074F PR MOST RECENT SYSTOLIC BLOOD PRESSURE < 130 MM HG: ICD-10-PCS | Mod: CPTII,S$GLB,, | Performed by: PHYSICIAN ASSISTANT

## 2023-06-01 PROCEDURE — 74019 XR ABDOMEN FLAT AND ERECT: ICD-10-PCS | Mod: 26,,, | Performed by: RADIOLOGY

## 2023-06-01 PROCEDURE — 3008F PR BODY MASS INDEX (BMI) DOCUMENTED: ICD-10-PCS | Mod: CPTII,S$GLB,, | Performed by: PHYSICIAN ASSISTANT

## 2023-06-01 PROCEDURE — 1159F MED LIST DOCD IN RCRD: CPT | Mod: CPTII,S$GLB,, | Performed by: PHYSICIAN ASSISTANT

## 2023-06-01 PROCEDURE — 99214 PR OFFICE/OUTPT VISIT, EST, LEVL IV, 30-39 MIN: ICD-10-PCS | Mod: S$GLB,,, | Performed by: PHYSICIAN ASSISTANT

## 2023-06-01 PROCEDURE — 74019 RADEX ABDOMEN 2 VIEWS: CPT | Mod: 26,,, | Performed by: RADIOLOGY

## 2023-06-01 PROCEDURE — 3008F BODY MASS INDEX DOCD: CPT | Mod: CPTII,S$GLB,, | Performed by: PHYSICIAN ASSISTANT

## 2023-06-01 PROCEDURE — 99999 PR PBB SHADOW E&M-EST. PATIENT-LVL IV: CPT | Mod: PBBFAC,,, | Performed by: PHYSICIAN ASSISTANT

## 2023-06-01 PROCEDURE — 3074F SYST BP LT 130 MM HG: CPT | Mod: CPTII,S$GLB,, | Performed by: PHYSICIAN ASSISTANT

## 2023-06-01 PROCEDURE — 99214 OFFICE O/P EST MOD 30 MIN: CPT | Mod: S$GLB,,, | Performed by: PHYSICIAN ASSISTANT

## 2023-06-01 PROCEDURE — 74019 RADEX ABDOMEN 2 VIEWS: CPT | Mod: TC

## 2023-06-01 PROCEDURE — 99999 PR PBB SHADOW E&M-EST. PATIENT-LVL IV: ICD-10-PCS | Mod: PBBFAC,,, | Performed by: PHYSICIAN ASSISTANT

## 2023-06-01 PROCEDURE — 3079F PR MOST RECENT DIASTOLIC BLOOD PRESSURE 80-89 MM HG: ICD-10-PCS | Mod: CPTII,S$GLB,, | Performed by: PHYSICIAN ASSISTANT

## 2023-06-01 PROCEDURE — 3079F DIAST BP 80-89 MM HG: CPT | Mod: CPTII,S$GLB,, | Performed by: PHYSICIAN ASSISTANT

## 2023-06-01 PROCEDURE — 1159F PR MEDICATION LIST DOCUMENTED IN MEDICAL RECORD: ICD-10-PCS | Mod: CPTII,S$GLB,, | Performed by: PHYSICIAN ASSISTANT

## 2023-06-01 RX ORDER — SODIUM, POTASSIUM,MAG SULFATES 17.5-3.13G
1 SOLUTION, RECONSTITUTED, ORAL ORAL DAILY
Qty: 1 KIT | Refills: 0 | Status: SHIPPED | OUTPATIENT
Start: 2023-06-01 | End: 2023-06-03

## 2023-06-01 NOTE — PROGRESS NOTES
Subjective:      Patient ID: Akiko Jameson is a 48 y.o. female.    Chief Complaint: Constipation    HPI:  Patient reports to clinic today for follow up of the above. Last seen for rectal discomfort and alternating diarrhea/constipation. Colonoscopy completed 2019 for similar symptoms - recall 10 years. Flex sig completed 2022 with no findings. Xray completed 3 months ago with mention of large stool to the right colon. She was instructed to complete a Miralax prep and follow up daily with Miralax. She states that she felt improved after the Miralax prep, but symptoms returned quickly. Continues to alternate with constipation for a few days followed by diarrhea. Worse after eating. Denies blood int he stool. Endorses bloating which is also worse after eating. 2 weeks ago had nausea and vomiting that lasted about 4 days. This is now resolved. Continues with intermittent abdominal discomfort to the middle of her abdomen.        Review of Systems   Constitutional:  Negative for activity change, appetite change, chills, diaphoresis, fatigue, fever and unexpected weight change.   Respiratory:  Negative for shortness of breath.    Cardiovascular:  Negative for chest pain.   Gastrointestinal:  Positive for abdominal distention, abdominal pain, constipation and diarrhea. Negative for anal bleeding, blood in stool, nausea, rectal pain and vomiting.   Neurological:  Negative for dizziness and weakness.   Psychiatric/Behavioral:  Negative for dysphoric mood. The patient is not nervous/anxious.      Medical History: Reviewed    Social History: Reviewed    Allergies: Reviewed    Objective:     Physical Exam  Constitutional:       General: She is not in acute distress.     Appearance: Normal appearance. She is obese. She is not ill-appearing, toxic-appearing or diaphoretic.   HENT:      Head: Normocephalic and atraumatic.   Eyes:      General: No scleral icterus.     Extraocular Movements: Extraocular movements intact.    Cardiovascular:      Rate and Rhythm: Normal rate and regular rhythm.   Pulmonary:      Effort: Pulmonary effort is normal. No respiratory distress.      Breath sounds: Normal breath sounds.   Abdominal:      General: Bowel sounds are normal. There is no distension.      Palpations: Abdomen is soft. There is no mass.      Tenderness: There is no abdominal tenderness. There is no guarding.   Musculoskeletal:         General: Normal range of motion.      Cervical back: Normal range of motion.   Skin:     General: Skin is warm and dry.      Comments: Swollen R hand and wrist. No erythema or induration.   Neurological:      Mental Status: She is alert and oriented to person, place, and time.       Assessment:     1. Constipation, unspecified constipation type    2. Diarrhea, unspecified type    3. Bloating        Plan:     -Diarrhea and constipation are chronic. Last Xray showed stool to the right colon. Symptoms improved with bowel clean out. Can repeat Xray today for comparison, but suspect this is still due to overflow. Consider bowel prep followed by trial of Linzess 145.   -Follow up PCP if hand swelling does not subside.    Akiko was seen today for constipation.    Diagnoses and all orders for this visit:    Constipation, unspecified constipation type  -     X-Ray Abdomen Flat And Erect; Future    Diarrhea, unspecified type  -     X-Ray Abdomen Flat And Erect; Future    Bloating  -     X-Ray Abdomen Flat And Erect; Future        No follow-ups on file.    Thank you for the opportunity to participate in the care of this patient.   Elyssa Gutierres PA-C.

## 2023-06-16 ENCOUNTER — TELEPHONE (OUTPATIENT)
Dept: GASTROENTEROLOGY | Facility: CLINIC | Age: 48
End: 2023-06-16
Payer: COMMERCIAL

## 2023-06-16 NOTE — TELEPHONE ENCOUNTER
Got incoming fax form [facility] [employer], Linzess 145 mcg approved from 06/01/23-05/31/2024.

## 2023-06-28 ENCOUNTER — OFFICE VISIT (OUTPATIENT)
Dept: GASTROENTEROLOGY | Facility: CLINIC | Age: 48
End: 2023-06-28
Payer: COMMERCIAL

## 2023-06-28 VITALS
HEART RATE: 78 BPM | WEIGHT: 251.31 LBS | SYSTOLIC BLOOD PRESSURE: 122 MMHG | BODY MASS INDEX: 44.53 KG/M2 | HEIGHT: 63 IN | DIASTOLIC BLOOD PRESSURE: 78 MMHG

## 2023-06-28 DIAGNOSIS — K59.00 CONSTIPATION, UNSPECIFIED CONSTIPATION TYPE: Primary | ICD-10-CM

## 2023-06-28 PROCEDURE — 1160F PR REVIEW ALL MEDS BY PRESCRIBER/CLIN PHARMACIST DOCUMENTED: ICD-10-PCS | Mod: CPTII,S$GLB,, | Performed by: INTERNAL MEDICINE

## 2023-06-28 PROCEDURE — 99214 OFFICE O/P EST MOD 30 MIN: CPT | Mod: S$GLB,,, | Performed by: INTERNAL MEDICINE

## 2023-06-28 PROCEDURE — 3008F PR BODY MASS INDEX (BMI) DOCUMENTED: ICD-10-PCS | Mod: CPTII,S$GLB,, | Performed by: INTERNAL MEDICINE

## 2023-06-28 PROCEDURE — 1159F PR MEDICATION LIST DOCUMENTED IN MEDICAL RECORD: ICD-10-PCS | Mod: CPTII,S$GLB,, | Performed by: INTERNAL MEDICINE

## 2023-06-28 PROCEDURE — 99214 PR OFFICE/OUTPT VISIT, EST, LEVL IV, 30-39 MIN: ICD-10-PCS | Mod: S$GLB,,, | Performed by: INTERNAL MEDICINE

## 2023-06-28 PROCEDURE — 1159F MED LIST DOCD IN RCRD: CPT | Mod: CPTII,S$GLB,, | Performed by: INTERNAL MEDICINE

## 2023-06-28 PROCEDURE — 99999 PR PBB SHADOW E&M-EST. PATIENT-LVL IV: ICD-10-PCS | Mod: PBBFAC,,, | Performed by: INTERNAL MEDICINE

## 2023-06-28 PROCEDURE — 3078F PR MOST RECENT DIASTOLIC BLOOD PRESSURE < 80 MM HG: ICD-10-PCS | Mod: CPTII,S$GLB,, | Performed by: INTERNAL MEDICINE

## 2023-06-28 PROCEDURE — 3074F PR MOST RECENT SYSTOLIC BLOOD PRESSURE < 130 MM HG: ICD-10-PCS | Mod: CPTII,S$GLB,, | Performed by: INTERNAL MEDICINE

## 2023-06-28 PROCEDURE — 1160F RVW MEDS BY RX/DR IN RCRD: CPT | Mod: CPTII,S$GLB,, | Performed by: INTERNAL MEDICINE

## 2023-06-28 PROCEDURE — 3078F DIAST BP <80 MM HG: CPT | Mod: CPTII,S$GLB,, | Performed by: INTERNAL MEDICINE

## 2023-06-28 PROCEDURE — 3008F BODY MASS INDEX DOCD: CPT | Mod: CPTII,S$GLB,, | Performed by: INTERNAL MEDICINE

## 2023-06-28 PROCEDURE — 3074F SYST BP LT 130 MM HG: CPT | Mod: CPTII,S$GLB,, | Performed by: INTERNAL MEDICINE

## 2023-06-28 PROCEDURE — 99999 PR PBB SHADOW E&M-EST. PATIENT-LVL IV: CPT | Mod: PBBFAC,,, | Performed by: INTERNAL MEDICINE

## 2023-06-28 RX ORDER — POLYETHYLENE GLYCOL 3350 17 G/17G
17 POWDER, FOR SOLUTION ORAL DAILY
Qty: 1530 G | Refills: 0 | Status: SHIPPED | OUTPATIENT
Start: 2023-06-28 | End: 2023-09-26

## 2023-06-28 NOTE — PROGRESS NOTES
Clinic Progress Note:  [facility] Progress Note    Reason for Visit:  The encounter diagnosis was Constipation, unspecified constipation type.    PCP: Dwayne Booth       HPI:  This is a 48 y.o. female here for evaluation of constipation with overflow diarrhea.   Last seen by Elyssa Gutierres at the beginning of this month.  Colonoscopy completed 2019 for similar symptoms - recall 10 years.   Flex sig completed 2022 with no findings.   Xray completed 3 months ago with mention of large stool to the right colon.   She was instructed to complete a Miralax prep and follow up daily with Miralax.   She states that she felt improved after the Miralax prep, but symptoms returned quickly.   Stool was noted on abdominal xray this month.   She was started on Linzess but it made diarrhea worst.        ROS:  As above HPI        Allergies: Reviewed    Home Medications:   Medication List with Changes/Refills   New Medications    POLYETHYLENE GLYCOL (GLYCOLAX) 17 GRAM/DOSE POWDER    Take 17 g by mouth once daily.   Current Medications    AMOXICILLIN-CLAVULANATE 875-125MG (AUGMENTIN) 875-125 MG PER TABLET    Take 1 tablet by mouth 2 (two) times daily. Recurrent cellulitis    CALCIUM CARBONATE (OS-CARLA) 500 MG CALCIUM (1,250 MG) TABLET    Take 1 tablet (500 mg total) by mouth once daily.    CARVEDILOL (COREG) 12.5 MG TABLET    TAKE ONE TABLET BY MOUTH TWICE DAILY    CETIRIZINE (ZYRTEC) 10 MG TABLET    Take 1 tablet (10 mg total) by mouth once daily.    CYCLOBENZAPRINE (FLEXERIL) 10 MG TABLET    Take 0.5-1 tablets (5-10 mg total) by mouth 2 (two) times daily as needed (for severe pain/ muscle spasms). May cause drowsiness. Do not mix with Robaxin.    DOCUSATE SODIUM (COLACE) 100 MG CAPSULE    Take 100 mg by mouth every other day.     FLUTICASONE PROPION-SALMETEROL 115-21 MCG/DOSE (ADVAIR HFA) 115-21 MCG/ACTUATION HFAA INHALER    Inhale 2 puffs into the lungs every 12 (twelve) hours. Controller    HYDROCODONE-ACETAMINOPHEN  "(NORCO)  MG PER TABLET    Take 1 tablet by mouth every 6 (six) hours as needed.    HYDROXYCHLOROQUINE (PLAQUENIL) 200 MG TABLET    Take 200 mg by mouth 2 (two) times a day.    LEVOTHYROXINE (SYNTHROID, LEVOTHROID) 175 MCG TABLET    Take 1 tablet (175 mcg total) by mouth before breakfast. Brand name Synthroid    LINACLOTIDE (LINZESS) 145 MCG CAP CAPSULE    Take 1 capsule (145 mcg total) by mouth before breakfast.    MONTELUKAST (SINGULAIR) 10 MG TABLET    Take 1 tablet (10 mg total) by mouth nightly.    MULTIVITAMIN (THERAGRAN) PER TABLET    Take 1 tablet by mouth once daily. Every day    OLMESARTAN-HYDROCHLOROTHIAZIDE (BENICAR HCT) 20-12.5 MG PER TABLET    TAKE 1 TABLET BY MOUTH ONCE DAILY    POTASSIUM CHLORIDE SA (K-DUR,KLOR-CON) 20 MEQ TABLET    Take 1 tablet (20 mEq total) by mouth 2 (two) times daily.    SPIRONOLACTONE (ALDACTONE) 25 MG TABLET    Take 0.5 tablets (12.5 mg total) by mouth once daily.    SULFAMETHOXAZOLE-TRIMETHOPRIM 800-160MG (BACTRIM DS) 800-160 MG TAB    Take 1 tablet by mouth 2 (two) times daily. Recurrent cellulitis    TRAMADOL (ULTRAM) 50 MG TABLET    Take 0.5-1 tablets (25-50 mg total) by mouth every 12 (twelve) hours as needed for Pain.         Physical Exam:  Vital Signs:  /78   Pulse 78   Ht 5' 3" (1.6 m)   Wt 114 kg (251 lb 5.2 oz)   BMI 44.52 kg/m²   Body mass index is 44.52 kg/m².    Physical Exam  Constitutional:       Appearance: Normal appearance.   HENT:      Head: Normocephalic.   Eyes:      Conjunctiva/sclera: Conjunctivae normal.   Pulmonary:      Effort: Pulmonary effort is normal.   Abdominal:      General: There is distension.      Palpations: Abdomen is soft.      Tenderness: There is no abdominal tenderness. There is no guarding.        Labs: Pertinent labs reviewed.      Assessment:    Assessment:      1. Constipation, unspecified constipation type    2. Diarrhea, unspecified type    3. Bloating          Plan:      -Constipation with overflow diarrhea is " chronic.   -Last Xray showed stool in the right colon.   -Symptoms improved with bowel clean out.   -Linzess was too harsh. Will discontinue that medication at this time   -At least 20g of fiber a day   -At least 64 ounces of water a day   -Miralax PO daily       Constipation, unspecified constipation type  -     polyethylene glycol (GLYCOLAX) 17 gram/dose powder; Take 17 g by mouth once daily.  Dispense: 1530 g; Refill: 0                Follow up in about 3 months (around 9/28/2023).    Order summary:  No orders of the defined types were placed in this encounter.      Thank you so much for allowing me to participate in the care of Frankygisele Collazo Trino Bergman MD  Gastroenterology and Hepatology  [facility]

## 2023-07-13 DIAGNOSIS — H91.90 HEARING LOSS, UNSPECIFIED HEARING LOSS TYPE, UNSPECIFIED LATERALITY: Primary | ICD-10-CM

## 2023-07-19 ENCOUNTER — CLINICAL SUPPORT (OUTPATIENT)
Dept: AUDIOLOGY | Facility: CLINIC | Age: 48
End: 2023-07-19
Payer: COMMERCIAL

## 2023-07-19 ENCOUNTER — OFFICE VISIT (OUTPATIENT)
Dept: OTOLARYNGOLOGY | Facility: CLINIC | Age: 48
End: 2023-07-19
Payer: COMMERCIAL

## 2023-07-19 VITALS — WEIGHT: 254 LBS | BODY MASS INDEX: 45 KG/M2 | HEIGHT: 63 IN

## 2023-07-19 DIAGNOSIS — H90.3 SENSORINEURAL HEARING LOSS (SNHL) OF BOTH EARS: Primary | ICD-10-CM

## 2023-07-19 DIAGNOSIS — H91.90 HEARING LOSS, UNSPECIFIED HEARING LOSS TYPE, UNSPECIFIED LATERALITY: ICD-10-CM

## 2023-07-19 DIAGNOSIS — Z01.10 ENCOUNTER FOR HEARING EXAMINATION, UNSPECIFIED WHETHER ABNORMAL FINDINGS: Primary | ICD-10-CM

## 2023-07-19 PROCEDURE — 1159F MED LIST DOCD IN RCRD: CPT | Mod: CPTII,S$GLB,, | Performed by: OTOLARYNGOLOGY

## 2023-07-19 PROCEDURE — 99214 PR OFFICE/OUTPT VISIT, EST, LEVL IV, 30-39 MIN: ICD-10-PCS | Mod: S$GLB,,, | Performed by: OTOLARYNGOLOGY

## 2023-07-19 PROCEDURE — 92557 PR COMPREHENSIVE HEARING TEST: ICD-10-PCS | Mod: S$GLB,,, | Performed by: AUDIOLOGIST

## 2023-07-19 PROCEDURE — 1160F RVW MEDS BY RX/DR IN RCRD: CPT | Mod: CPTII,S$GLB,, | Performed by: OTOLARYNGOLOGY

## 2023-07-19 PROCEDURE — 99999 PR PBB SHADOW E&M-EST. PATIENT-LVL II: ICD-10-PCS | Mod: PBBFAC,,, | Performed by: AUDIOLOGIST

## 2023-07-19 PROCEDURE — 3008F PR BODY MASS INDEX (BMI) DOCUMENTED: ICD-10-PCS | Mod: CPTII,S$GLB,, | Performed by: OTOLARYNGOLOGY

## 2023-07-19 PROCEDURE — 92567 TYMPANOMETRY: CPT | Mod: S$GLB,,, | Performed by: AUDIOLOGIST

## 2023-07-19 PROCEDURE — 92567 PR TYMPA2METRY: ICD-10-PCS | Mod: S$GLB,,, | Performed by: AUDIOLOGIST

## 2023-07-19 PROCEDURE — 92557 COMPREHENSIVE HEARING TEST: CPT | Mod: S$GLB,,, | Performed by: AUDIOLOGIST

## 2023-07-19 PROCEDURE — 99999 PR PBB SHADOW E&M-EST. PATIENT-LVL II: CPT | Mod: PBBFAC,,, | Performed by: AUDIOLOGIST

## 2023-07-19 PROCEDURE — 1160F PR REVIEW ALL MEDS BY PRESCRIBER/CLIN PHARMACIST DOCUMENTED: ICD-10-PCS | Mod: CPTII,S$GLB,, | Performed by: OTOLARYNGOLOGY

## 2023-07-19 PROCEDURE — 99214 OFFICE O/P EST MOD 30 MIN: CPT | Mod: S$GLB,,, | Performed by: OTOLARYNGOLOGY

## 2023-07-19 PROCEDURE — 3008F BODY MASS INDEX DOCD: CPT | Mod: CPTII,S$GLB,, | Performed by: OTOLARYNGOLOGY

## 2023-07-19 PROCEDURE — 99999 PR PBB SHADOW E&M-EST. PATIENT-LVL III: ICD-10-PCS | Mod: PBBFAC,,, | Performed by: OTOLARYNGOLOGY

## 2023-07-19 PROCEDURE — 99999 PR PBB SHADOW E&M-EST. PATIENT-LVL III: CPT | Mod: PBBFAC,,, | Performed by: OTOLARYNGOLOGY

## 2023-07-19 PROCEDURE — 1159F PR MEDICATION LIST DOCUMENTED IN MEDICAL RECORD: ICD-10-PCS | Mod: CPTII,S$GLB,, | Performed by: OTOLARYNGOLOGY

## 2023-07-19 NOTE — PROGRESS NOTES
The patient was referred by Dr. Nixon Moe for a hearing evaluation.    Report from the patient was as follows:    Difficulty Hearing/Understanding - gradual onset of hearing difficulty, difficulty understanding in noise, no previous hearing evaluation  Tinnitus -negative  History of Loud Noise Exposure -  negative  Family History of Hearing Loss - negative     Otoscopic screening revealed a clear view of TM AU    A hearing evaluation was performed today. Test results indicated slight hearing loss at 6K and 8K Hz in the right ear and hearing within normal limits in the left ear. Impedance testing showed a Type A tympanogram in each ear, consistent with normal middle ear function.    The recommendations were as follows:    (1)  Ear protection in loud noise   (2)  Hearing evaluation in one year or sooner if hearing decrease is noted    (3)  The possibility of auditory processing issues was discussed. The patient will contact us if she wishes to  pursue auditory processing testing.    Today's test results and recommendations were discussed with the patient.

## 2023-07-19 NOTE — PROGRESS NOTES
Subjective:       Patient ID: Akiko Jameson is a 48 y.o. female.    Chief Complaint: Hearing Loss    Akiko is here for follow-up.  Here for hearing concerns that began a few months ago.  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 Doesn't happen as much at home. No tinnitus. No pain. No history of ear infections.     Previous total thyroidectomy by me which revealed Sarcoidosis    Patient validated questionnaires (if applicable):      %       No flowsheet data found.  No flowsheet data found.  No flowsheet data found.         Review of Systems   Constitutional: Negative for activity change and appetite change.   Respiratory: Negative for difficulty breathing and wheezing   Cardiovascular: Negative for chest pain.      Objective:        Constitutional:   Vital signs are normal. She appears well-developed and well-nourished.     Head:  Normocephalic and atraumatic.     Ears:  Hearing normal to normal and whispered voice; external ear normal without scars, lesions, or masses; ear canal, tympanic membrane, and middle ear normal..     Nose:  Nose normal including turbinates, nasal mucosa, sinuses and nasal septum.     Mouth/Throat  Oropharynx clear and moist without lesions or asymmetry.     Neck:  Neck normal without thyromegaly masses, asymmetry, normal tracheal structure, crepitus, and tenderness.   Well healed mildly hypertrophic scar midline neck       Tests / Results:  I personally reviewed the audiogram from 7/2023 and my findings reveal:   Normal with high frequency mild changes at 6 and 8 kHz         Assessment:       1. Sensorineural hearing loss (SNHL) of both ears          Plan:         We discussed mild HF hearing changes which can contribute to issues hearing in noise .  Discussed noise canceling devices as an option to reduce surrounding noise  Reassurance provided otherwise

## 2023-08-10 ENCOUNTER — HOSPITAL ENCOUNTER (OUTPATIENT)
Dept: RADIOLOGY | Facility: HOSPITAL | Age: 48
Discharge: HOME | End: 2023-08-10
Attending: FAMILY MEDICINE
Payer: COMMERCIAL

## 2023-08-10 ENCOUNTER — OFFICE VISIT (OUTPATIENT)
Dept: FAMILY MEDICINE | Facility: CLINIC | Age: 48
End: 2023-08-10
Payer: COMMERCIAL

## 2023-08-10 VITALS
BODY MASS INDEX: 43.51 KG/M2 | HEIGHT: 64 IN | HEART RATE: 80 BPM | TEMPERATURE: 98 F | DIASTOLIC BLOOD PRESSURE: 75 MMHG | WEIGHT: 254.88 LBS | SYSTOLIC BLOOD PRESSURE: 122 MMHG

## 2023-08-10 DIAGNOSIS — E83.51 HYPOCALCEMIA: ICD-10-CM

## 2023-08-10 DIAGNOSIS — E87.6 HYPOKALEMIA: ICD-10-CM

## 2023-08-10 DIAGNOSIS — D86.9 SARCOIDOSIS: ICD-10-CM

## 2023-08-10 DIAGNOSIS — E66.1 CLASS 3 DRUG-INDUCED OBESITY WITH SERIOUS COMORBIDITY AND BODY MASS INDEX (BMI) OF 45.0 TO 49.9 IN ADULT: ICD-10-CM

## 2023-08-10 DIAGNOSIS — I10 ESSENTIAL HYPERTENSION: ICD-10-CM

## 2023-08-10 DIAGNOSIS — E89.0 POST-OPERATIVE HYPOTHYROIDISM: ICD-10-CM

## 2023-08-10 DIAGNOSIS — R73.03 PREDIABETES: ICD-10-CM

## 2023-08-10 DIAGNOSIS — E55.9 VITAMIN D DEFICIENCY: ICD-10-CM

## 2023-08-10 DIAGNOSIS — G47.33 OSA ON CPAP: ICD-10-CM

## 2023-08-10 DIAGNOSIS — Z00.00 ROUTINE HISTORY AND PHYSICAL EXAMINATION OF ADULT: Primary | ICD-10-CM

## 2023-08-10 PROCEDURE — 3008F BODY MASS INDEX DOCD: CPT | Mod: CPTII,S$GLB,, | Performed by: FAMILY MEDICINE

## 2023-08-10 PROCEDURE — 90750 HZV VACC RECOMBINANT IM: CPT | Mod: S$GLB,,, | Performed by: FAMILY MEDICINE

## 2023-08-10 PROCEDURE — 1159F PR MEDICATION LIST DOCUMENTED IN MEDICAL RECORD: ICD-10-PCS | Mod: CPTII,S$GLB,, | Performed by: FAMILY MEDICINE

## 2023-08-10 PROCEDURE — 3078F PR MOST RECENT DIASTOLIC BLOOD PRESSURE < 80 MM HG: ICD-10-PCS | Mod: CPTII,S$GLB,, | Performed by: FAMILY MEDICINE

## 2023-08-10 PROCEDURE — 99999 PR PBB SHADOW E&M-EST. PATIENT-LVL V: CPT | Mod: PBBFAC,,, | Performed by: FAMILY MEDICINE

## 2023-08-10 PROCEDURE — 3008F PR BODY MASS INDEX (BMI) DOCUMENTED: ICD-10-PCS | Mod: CPTII,S$GLB,, | Performed by: FAMILY MEDICINE

## 2023-08-10 PROCEDURE — 99396 PR PREVENTIVE VISIT,EST,40-64: ICD-10-PCS | Mod: 25,S$GLB,, | Performed by: FAMILY MEDICINE

## 2023-08-10 PROCEDURE — 90471 IMMUNIZATION ADMIN: CPT | Mod: S$GLB,,, | Performed by: FAMILY MEDICINE

## 2023-08-10 PROCEDURE — 77080 DXA BONE DENSITY AXIAL: CPT | Mod: 26,,, | Performed by: RADIOLOGY

## 2023-08-10 PROCEDURE — 77080 DXA BONE DENSITY AXIAL: CPT | Mod: TC,PO

## 2023-08-10 PROCEDURE — 99999 PR PBB SHADOW E&M-EST. PATIENT-LVL V: ICD-10-PCS | Mod: PBBFAC,,, | Performed by: FAMILY MEDICINE

## 2023-08-10 PROCEDURE — 1159F MED LIST DOCD IN RCRD: CPT | Mod: CPTII,S$GLB,, | Performed by: FAMILY MEDICINE

## 2023-08-10 PROCEDURE — 3074F PR MOST RECENT SYSTOLIC BLOOD PRESSURE < 130 MM HG: ICD-10-PCS | Mod: CPTII,S$GLB,, | Performed by: FAMILY MEDICINE

## 2023-08-10 PROCEDURE — 3074F SYST BP LT 130 MM HG: CPT | Mod: CPTII,S$GLB,, | Performed by: FAMILY MEDICINE

## 2023-08-10 PROCEDURE — 99396 PREV VISIT EST AGE 40-64: CPT | Mod: 25,S$GLB,, | Performed by: FAMILY MEDICINE

## 2023-08-10 PROCEDURE — 3078F DIAST BP <80 MM HG: CPT | Mod: CPTII,S$GLB,, | Performed by: FAMILY MEDICINE

## 2023-08-10 PROCEDURE — 90750 ZOSTER RECOMBINANT VACCINE: ICD-10-PCS | Mod: S$GLB,,, | Performed by: FAMILY MEDICINE

## 2023-08-10 PROCEDURE — 90471 ZOSTER RECOMBINANT VACCINE: ICD-10-PCS | Mod: S$GLB,,, | Performed by: FAMILY MEDICINE

## 2023-08-10 PROCEDURE — 77080 DXA BONE DENSITY AXIAL SKELETON 1 OR MORE SITES: ICD-10-PCS | Mod: 26,,, | Performed by: RADIOLOGY

## 2023-08-10 RX ORDER — POTASSIUM CHLORIDE 20 MEQ/1
20 TABLET, EXTENDED RELEASE ORAL 2 TIMES DAILY
Qty: 180 TABLET | Refills: 3 | Status: SHIPPED | OUTPATIENT
Start: 2023-08-10

## 2023-08-10 RX ORDER — MONTELUKAST SODIUM 10 MG/1
10 TABLET ORAL NIGHTLY
Qty: 90 TABLET | Refills: 3 | Status: SHIPPED | OUTPATIENT
Start: 2023-08-10

## 2023-08-10 NOTE — PROGRESS NOTES
Presents physical exam.  Seeing multiple specialist to include endocrinology, Pulmonary, Rheumatology, Cardiology due to multiple medical issues.  Blood pressure ok.  Morbid obesity stable.  Prediabetes asymptomatic.  Sarcoidosis stable.  Sleep apnea uses CPAP.  Hypothyroidism status post thyroidectomy.    She has some chronic hypocalcemia.  She has vitamin-D insufficiency.  She is not taking vitamin-D as there were concerns regarding taking this with her sarcoid by her rheumatologist.  She does use a calcium supplement.  She is had issues with hypokalemia as well.      Akiko was seen today for annual exam.    Diagnoses and all orders for this visit:    Routine history and physical examination of adult  -     Lipid Panel; Future  -     Comprehensive Metabolic Panel; Future  -     Hemoglobin A1C; Future    Essential hypertension  -     Lipid Panel; Future  -     Comprehensive Metabolic Panel; Future    KEIRY on CPAP    Sarcoidosis  -     Vitamin D; Future  -     Calcium, Timed Urine [facility]; 24 Hours; Future  -     PTH, intact; Future  -     DXA Bone Density Axial Skeleton 1 or more sites; Future  -     Calcitriol; Future    Class 3 drug-induced obesity with serious comorbidity and body mass index (BMI) of 45.0 to 49.9 in adult    Post-operative hypothyroidism    Prediabetes  -     Hemoglobin A1C; Future    Hypokalemia  -     potassium chloride SA (K-DUR,KLOR-CON) 20 MEQ tablet; Take 1 tablet (20 mEq total) by mouth 2 (two) times daily.    Hypocalcemia  -     Vitamin D; Future  -     Calcium, Timed Urine [facility]; 24 Hours; Future  -     PTH, intact; Future  -     DXA Bone Density Axial Skeleton 1 or more sites; Future  -     Calcitriol; Future    Vitamin D deficiency  -     Vitamin D; Future  -     Calcium, Timed Urine [facility]; 24 Hours; Future  -     PTH, intact; Future  -     DXA Bone Density Axial Skeleton 1 or more sites; Future  -     Calcitriol; Future    Other orders  -     montelukast (SINGULAIR) 10 mg  tablet; Take 1 tablet (10 mg total) by mouth nightly.  -     Zoster Recombinant Vaccine  -     Zoster Recombinant Vaccine; Future  -     Zoster Recombinant Vaccine    Continue current medications.  Will obtain some additional evaluation as above so that something is available for the endocrinologist look at she has an appointment with them soon.  The primarily dealing with her thyroid, but I recommended that she have them look at her calcium as well.  Anticipatory guidance: Don't smoke.  Healthy diet and regular exercise recommended.          Past Medical History:  Past Medical History:   Diagnosis Date    ALLERGIC RHINITIS     Aortic valve sclerosis 1/20/2023    Arthritis     Cellulitis     Constipation due to opioid therapy     Eosinophilia     mild    GERD (gastroesophageal reflux disease)     Granular cell tumor     H. pylori infection 2018    History of 2019 novel coronavirus disease (COVID-19) 10/04/2020    Hypertension     Lymphedema     Mild anemia     Mitral valve sclerosis 3/21/2023    KEIRY on CPAP     does not regularly    Positive CHARLENE (antinuclear antibody) 07/27/2022    Prediabetes 08/06/2021    Sarcoidosis, unspecified     Sleep apnea     compliant with CPAP    Thyroid nodule     Urinary incontinence, mixed      Past Surgical History:   Procedure Laterality Date    24 HOUR IMPEDANCE PH MONITORING OF ESOPHAGUS IN PATIENT NOT TAKING ACID REDUCING MEDICATIONS N/A 01/06/2020    Procedure: IMPEDANCE PH STUDY, ESOPHAGEAL, 24 HOUR, IN PATIENT NOT TAKING ACID REDUCING MEDICATION;  Surgeon: First Available Tamika Panchal;  Location: [facility] ENDO;  Service: Endoscopy;  Laterality: N/A;    AXILLARY NODE DISSECTION Right     granular cell tumor    BILATERAL SALPINGO-OOPHORECTOMY (BSO)  07/21/2020    BREAST CYST ASPIRATION      left    CHOLECYSTECTOMY      COLONOSCOPY N/A 05/10/2019    Procedure: COLONOSCOPY;  Surgeon: Edgar Gamble Jr., MD;  Location: [facility] ENDO;  Service: Endoscopy;  Laterality: N/A;    ENDOBRONCHIAL  ULTRASOUND Bilateral 04/27/2021    Procedure: ENDOBRONCHIAL ULTRASOUND (EBUS);  Surgeon: Armando Kirby MD;  Location: [facility];  Service: Pulmonary;  Laterality: Bilateral;  need fluoroscopy    ENDOMETRIAL ABLATION      ESOPHAGEAL MANOMETRY WITH MEASUREMENT OF IMPEDANCE N/A 01/06/2020    Procedure: MANOMETRY, ESOPHAGUS, WITH IMPEDANCE MEASUREMENT;  Surgeon: First Available Manter;  Location: [facility];  Service: Endoscopy;  Laterality: N/A;    ESOPHAGOGASTRODUODENOSCOPY N/A 07/05/2018    Procedure: EGD (ESOPHAGOGASTRODUODENOSCOPY);  Surgeon: Edgar Gamble Jr., MD;  [facility];  Service: Endoscopy;  Laterality: N/A;    ESOPHAGOGASTRODUODENOSCOPY N/A 11/23/2020    Procedure: ESOPHAGOGASTRODUODENOSCOPY (EGD);  Surgeon: Jina Kaufman MD;  Location: [facility];  Service: General;  Laterality: N/A;    EXCISION OF MASS OF ABDOMEN Left 06/18/2018    Procedure: EXCISION, MASS, ABDOMEN - flank left;  Surgeon: Armando Tate MD;  Location: [facility] OR;  Service: General;  Laterality: Left;  left flank removal of mass    FINE NEEDLE ASPIRATION      thyroid    FLEXIBLE SIGMOIDOSCOPY N/A 08/24/2022    Procedure: SIGMOIDOSCOPY, FLEXIBLE;  Surgeon: Amber West MD;  Location: [facility];  Service: Endoscopy;  Laterality: N/A;    HYSTERECTOMY  07/21/2020    INJECTION OF ANESTHETIC AGENT AROUND MEDIAL BRANCH NERVES INNERVATING LUMBAR FACET JOINT Right 02/24/2022    Procedure: Right L3, 4, 5 MBB;  Surgeon: Anam Montero MD;  Location: [facility] PAIN MGT;  Service: Pain Management;  Laterality: Right;    INJECTION OF ANESTHETIC AGENT AROUND MEDIAL BRANCH NERVES INNERVATING LUMBAR FACET JOINT Right 03/29/2022    Procedure: Right L3, 4, 5 MBB  (2nd block);  Surgeon: Anam Montero MD;  Location: [facility] PAIN MGT;  Service: Pain Management;  Laterality: Right;    KNEE ARTHROSCOPY W/ MENISCECTOMY Right     NASAL SEPTUM SURGERY      OOPHORECTOMY      RADIOFREQUENCY THERMOCOAGULATION Right 04/05/2022     Procedure: Right L3-5 Lumbar RFA;  Surgeon: Anam Montero MD;  Location: [facility] PAIN MGT;  Service: Pain Management;  Laterality: Right;    ROBOT-ASSISTED NISSEN FUNDOPLICATION USING DA TAMAR XI N/A 02/28/2020    Procedure: XI ROBOTIC FUNDOPLICATION, NISSEN;  Surgeon: Jina Kaufman MD;  Location: [facility] OR;  Service: General;  Laterality: N/A;    THYROIDECTOMY Bilateral 03/11/2021    Procedure: THYROIDECTOMY;  Surgeon: Nixon Moe MD;  Location: [facility] OR;  Service: ENT;  Laterality: Bilateral;    TUBAL LIGATION      UPPER GASTROINTESTINAL ENDOSCOPY  07/05/2018    Dr. Gamble     Review of patient's allergies indicates:   Allergen Reactions    Biaxin [clarithromycin] Swelling    Omeprazole magnesium Swelling and Other (See Comments)    Prevacid [lansoprazole] Swelling     Lip swelling- was taking this along with blood pressure medication: benazepril; not sure which caused it. Reports she has taken OTC prilosec without any problems.    Ace inhibitors Swelling     Facial swelling    Benazepril Swelling     Lip swelling     Current Outpatient Medications on File Prior to Visit   Medication Sig Dispense Refill    calcium carbonate (OS-CARLA) 500 mg calcium (1,250 mg) tablet Take 1 tablet (500 mg total) by mouth once daily. 60 tablet 3    carvediloL (COREG) 12.5 MG tablet TAKE ONE TABLET BY MOUTH TWICE DAILY 180 tablet 1    docusate sodium (COLACE) 100 MG capsule Take 100 mg by mouth every other day.       fluticasone propion-salmeterol 115-21 mcg/dose (ADVAIR HFA) 115-21 mcg/actuation HFAA inhaler Inhale 2 puffs into the lungs every 12 (twelve) hours. Controller 12 g 11    hydrOXYchloroQUINE (PLAQUENIL) 200 mg tablet Take 200 mg by mouth 2 (two) times a day.      levothyroxine (SYNTHROID, LEVOTHROID) 175 MCG tablet Take 1 tablet (175 mcg total) by mouth before breakfast. Brand name Synthroid 30 tablet 3    multivitamin (THERAGRAN) per tablet Take 1 tablet by mouth once daily. Every day       olmesartan-hydrochlorothiazide (BENICAR HCT) 20-12.5 mg per tablet TAKE 1 TABLET BY MOUTH ONCE DAILY 90 tablet 1    polyethylene glycol (GLYCOLAX) 17 gram/dose powder Take 17 g by mouth once daily. 1530 g 0    spironolactone (ALDACTONE) 25 MG tablet Take 0.5 tablets (12.5 mg total) by mouth once daily. 45 tablet 1    [DISCONTINUED] montelukast (SINGULAIR) 10 mg tablet Take 1 tablet (10 mg total) by mouth nightly. 90 tablet 3    [DISCONTINUED] potassium chloride SA (K-DUR,KLOR-CON) 20 MEQ tablet Take 1 tablet (20 mEq total) by mouth 2 (two) times daily. 180 tablet 3    cetirizine (ZYRTEC) 10 MG tablet Take 1 tablet (10 mg total) by mouth once daily. (Patient taking differently: Take 10 mg by mouth once daily.)  0    [DISCONTINUED] linaCLOtide (LINZESS) 145 mcg Cap capsule Take 1 capsule (145 mcg total) by mouth before breakfast. 30 capsule 2     No current facility-administered medications on file prior to visit.     Social History     Socioeconomic History    Marital status:     Number of children: 2   Occupational History     Employer: [employer]   Tobacco Use    Smoking status: Never    Smokeless tobacco: Never   Substance and Sexual Activity    Alcohol use: No    Drug use: No    Sexual activity: Yes     Partners: Male     Social Determinants of Health     Financial Resource Strain: Low Risk  (8/8/2023)    Overall Financial Resource Strain (CARDIA)     Difficulty of Paying Living Expenses: Not hard at all   Food Insecurity: No Food Insecurity (8/8/2023)    Hunger Vital Sign     Worried About Running Out of Food in the Last Year: Never true     Ran Out of Food in the Last Year: Never true   Transportation Needs: No Transportation Needs (8/8/2023)    PRAPARE - Transportation     Lack of Transportation (Medical): No     Lack of Transportation (Non-Medical): No   Physical Activity: Insufficiently Active (8/8/2023)    Exercise Vital Sign     Days of Exercise per Week: 2 days     Minutes of Exercise per Session:  30 min   Stress: No Stress Concern Present (8/8/2023)    [employer] - Occupational Stress Questionnaire     Feeling of Stress : Only a little   Social Connections: Unknown (8/8/2023)    Social Connection and Isolation Panel [NHANES]     Frequency of Communication with Friends and Family: More than three times a week     Frequency of Social Gatherings with Friends and Family: Once a week     Active Member of Clubs or Organizations: Yes     Attends Club or Organization Meetings: More than 4 times per year     Marital Status:    Housing Stability: Low Risk  (8/8/2023)    Housing Stability Vital Sign     Unable to Pay for Housing in the Last Year: No     Number of Places Lived in the Last Year: 1     Unstable Housing in the Last Year: No     Family History   Problem Relation Age of Onset    Diabetes Mother     Kidney failure Mother     Heart attack Mother     Breast cancer Maternal Grandmother     Breast cancer Maternal Aunt     Ovarian cancer Cousin     Breast cancer Cousin     Blindness Father     Cirrhosis Neg Hx     Colon polyps Neg Hx     Colon cancer Neg Hx     Crohn's disease Neg Hx     Esophageal cancer Neg Hx     Stomach cancer Neg Hx     Ulcerative colitis Neg Hx     Amblyopia Neg Hx     Cataracts Neg Hx     Glaucoma Neg Hx     Macular degeneration Neg Hx     Retinal detachment Neg Hx     Strabismus Neg Hx     Allergic rhinitis Neg Hx     Allergies Neg Hx     Angioedema Neg Hx     Asthma Neg Hx     Atopy Neg Hx     Eczema Neg Hx     Immunodeficiency Neg Hx     Rhinitis Neg Hx     Urticaria Neg Hx        Answers submitted by the patient for this visit:  Review of Systems Questionnaire (Submitted on 8/8/2023)  activity change: No  unexpected weight change: No  neck pain: No  hearing loss: No  rhinorrhea: No  trouble swallowing: No  eye discharge: No  visual disturbance: No  chest tightness: No  wheezing: No  chest pain: No  palpitations: No  blood in stool: No  constipation:  "Yes  vomiting: No  diarrhea: Yes  polydipsia: No  polyuria: No  difficulty urinating: No  hematuria: No  menstrual problem: No  dysuria: No  joint swelling: No  arthralgias: No  headaches: No  weakness: No  confusion: No  dysphoric mood: No      Vitals:    08/10/23 0808   BP: 122/75   Pulse: 80   Temp: 97.5 °F (36.4 °C)   TempSrc: Temporal   Weight: 115.6 kg (254 lb 14.4 oz)   Height: 5' 4" (1.626 m)     Wt Readings from Last 3 Encounters:   08/10/23 115.6 kg (254 lb 14.4 oz)   07/19/23 115.2 kg (253 lb 15.5 oz)   06/29/23 115.2 kg (254 lb)       OBJECTIVE:   APPEARANCE: Well nourished, well developed, in no acute distress.    HEAD: Normocephalic.  Atraumatic.  No sinus tenderness.  EYES:   Right eye: Pupil reactive.  Conjunctiva clear.    Left eye: Pupil reactive.  Conjunctiva clear.  EOMI.    EARS: TM's intact. Light reflex normal. No retraction or perforation.    NOSE:  clear.  MOUTH & THROAT:  No pharyngeal erythema or exudate. No lesions.  NECK: Supple. No bruits.  No JVD.  No cervical lymphadenopathy.  No thyromegaly.    CHEST: Breath sounds clear bilaterally.  Normal respiratory effort  CARDIOVASCULAR: Normal rate.  Regular rhythm.  No murmurs.  No rub.  No gallops.  ABDOMEN: Bowel sounds normal.  Soft.  No tenderness.  No organomegaly.  PERIPHERAL VASCULAR: No cyanosis.  No clubbing.  No edema.  NEUROLOGIC: No focal findings.  MENTAL STATUS: Alert.  Oriented x 3.      "

## 2023-08-31 ENCOUNTER — PATIENT MESSAGE (OUTPATIENT)
Dept: ADMINISTRATIVE | Facility: OTHER | Age: 48
End: 2023-08-31
Payer: COMMERCIAL

## 2023-09-01 ENCOUNTER — TELEPHONE (OUTPATIENT)
Dept: FAMILY MEDICINE | Facility: CLINIC | Age: 48
End: 2023-09-01
Payer: COMMERCIAL

## 2023-09-01 ENCOUNTER — RESEARCH ENCOUNTER (OUTPATIENT)
Dept: RESEARCH | Facility: CLINIC | Age: 48
End: 2023-09-01
Payer: COMMERCIAL

## 2023-09-01 ENCOUNTER — DOCUMENTATION ONLY (OUTPATIENT)
Dept: RESEARCH | Facility: CLINIC | Age: 48
End: 2023-09-01
Payer: COMMERCIAL

## 2023-09-01 DIAGNOSIS — Z00.6 RESEARCH SUBJECT: Primary | ICD-10-CM

## 2023-09-01 DIAGNOSIS — Z00.6 RESEARCH SUBJECT: ICD-10-CM

## 2023-09-01 DIAGNOSIS — R74.8 ELEVATED ALKALINE PHOSPHATASE LEVEL: Primary | ICD-10-CM

## 2023-09-01 NOTE — PROGRESS NOTES
PROPEL IT Weight Loss: Eligibility Confirmation  Remember to add Participant ID in Research Study  Age as of Today: 48 y.o.    Is patient age 40 to 70 years Yes    Is patient race Black/: Epic: Black or      Primary care provider at [facility]: Dwayne Booth MD     Does the patient have an active [facility] portal account?  Yes    Does the patient have prediabetes or Type 2 diabetes? Yes  If yes, Based on: Prediabetes ICD10 code    LAST HBA1C   Lab Results   Component Value Date    HGBA1C 5.6 08/10/2023    HGBA1C 5.7 (H) 07/27/2022    HGBA1C 5.8 (H) 08/06/2021          LAST BMI:   BMI Readings from Last 1 Encounters:   08/10/23 43.75 kg/m²      Was the patient's most recent BMI (within the past 12 months) between 30 and 50  yes    PCP REFERRAL  Did provider acknowledge or deny patient's participation? Need to Pend Order to PCP (after Consent)  _____________________________________________  LAST WEIGHT  (verify if this was an outpatient):   Wt Readings from Last 4 Encounters:   08/10/23 115.6 kg (254 lb 14.4 oz)   07/19/23 115.2 kg (253 lb 15.5 oz)   06/29/23 115.2 kg (254 lb)   06/28/23 114 kg (251 lb 5.2 oz)        PROPEL-IT Screening Date (enter from [employer]): 8-  Does the patient have a baseline clinic weight collected within 4 weeks (34 days) of Screening Date?  Yes, has baseline clinical wt                    When is the patient's next scheduled clinic appointment (potential wt)? 9- Endo    Status Updated: 09/01/2023          PROPEL IT CONSENT DOCUMENTATION  [employer] Protocol 2022013: PROPEL IT  PROmoting Successful Weight Loss in Primary CarE in [place] (PROPEL) using Information Technology  : Rios Elam, PhD.  IRB of Record: [facility] Research [place] ([facility]) ID# [facility] [id number]  [facility] Investigator: Juli Soliman MD      Informed Consent Process   Name of Study Team member Present for discussion: N/A   Prior to the  "Informed Consent being signed or any protocol required testing, procedure or intervention being performed, the following was completed or discussed:   Purpose of the study, qualifications to participate:  Study design, schedule and procedure:  Risks, benefits, alternative treatments, compensation and costs:  Confidentiality and HIPAA authorization for Release of Medical Records for the research trial/subjects right/study related injury:  Study related contact information:  Voluntary participation and withdrawal from the research trial at any time:  Patient has been offered the opportunity to ask questions regarding the study    and PI contact information given to subject:  Signed copy of consent form was provided to the subject via eMail:  Original consent or copy of electronic consent in subject's chart and uploaded in EPIC:        Akiko Jameson electronically signed the informed consent form.     Was the consent done electronically: Yes  Consent Signature Date (add to  note) ("Today's Date [employer]Cap):  8-  The consent form as reviewed by Karlene Ocasio  Name: ([facility] personnel reviewing consent form):  Ciera Resendez, Associate Clinical Research Coordinator   Is the potential subject currently enrolled in any other research trials (per Epic)? No  Participant ID ([employer] ID) added to Research Study   Yes    Comments: See  for Consent Forms        I acknowledge that I have reviewed the informed consent form and viewed the volunteer's electronically signed and dated the signature section of the consent forms.    Yes       "

## 2023-09-01 NOTE — TELEPHONE ENCOUNTER
----- Message from Dwayne Booth MD sent at 8/29/2023 12:59 PM CDT -----  25-hydroxy vitamin-D was low however calcitriol which is another form of vitamin-D is in the normal range.  Calcium and kidney function normal..  Cholesterol looks good.  Mild elevation alkaline phosphatase.  Recommend continue current treatment for now.  Keep scheduled appointment with endocrinology.  Recheck CMP 3 months. My nurse will contact you to arrange.  Thanks,  Dr. Booth

## 2023-09-08 DIAGNOSIS — E89.0 POST-OPERATIVE HYPOTHYROIDISM: ICD-10-CM

## 2023-09-08 RX ORDER — LEVOTHYROXINE SODIUM 175 UG/1
175 TABLET ORAL
Qty: 30 TABLET | Refills: 3 | Status: SHIPPED | OUTPATIENT
Start: 2023-09-08 | End: 2024-02-19 | Stop reason: SDUPTHER

## 2023-09-11 ENCOUNTER — PATIENT MESSAGE (OUTPATIENT)
Dept: ADMINISTRATIVE | Facility: HOSPITAL | Age: 48
End: 2023-09-11
Payer: COMMERCIAL

## 2023-09-11 ENCOUNTER — PATIENT MESSAGE (OUTPATIENT)
Dept: FAMILY MEDICINE | Facility: CLINIC | Age: 48
End: 2023-09-11
Payer: COMMERCIAL

## 2023-09-11 DIAGNOSIS — Z12.31 ENCOUNTER FOR SCREENING MAMMOGRAM FOR MALIGNANT NEOPLASM OF BREAST: Primary | ICD-10-CM

## 2023-09-13 ENCOUNTER — PATIENT MESSAGE (OUTPATIENT)
Dept: FAMILY MEDICINE | Facility: CLINIC | Age: 48
End: 2023-09-13
Payer: COMMERCIAL

## 2023-09-22 ENCOUNTER — LAB VISIT (OUTPATIENT)
Dept: LAB | Facility: HOSPITAL | Age: 48
End: 2023-09-22
Attending: PHYSICIAN ASSISTANT
Payer: COMMERCIAL

## 2023-09-22 ENCOUNTER — OFFICE VISIT (OUTPATIENT)
Dept: ENDOCRINOLOGY | Facility: CLINIC | Age: 48
End: 2023-09-22
Payer: COMMERCIAL

## 2023-09-22 VITALS
DIASTOLIC BLOOD PRESSURE: 80 MMHG | BODY MASS INDEX: 43.42 KG/M2 | HEIGHT: 64 IN | SYSTOLIC BLOOD PRESSURE: 118 MMHG | OXYGEN SATURATION: 98 % | HEART RATE: 93 BPM | WEIGHT: 254.31 LBS | TEMPERATURE: 98 F

## 2023-09-22 DIAGNOSIS — E55.9 VITAMIN D DEFICIENCY: Primary | ICD-10-CM

## 2023-09-22 DIAGNOSIS — E89.0 POST-OPERATIVE HYPOTHYROIDISM: ICD-10-CM

## 2023-09-22 DIAGNOSIS — E83.51 HYPOCALCEMIA: ICD-10-CM

## 2023-09-22 LAB
T4 FREE SERPL-MCNC: 1.06 NG/DL (ref 0.71–1.51)
TSH SERPL DL<=0.005 MIU/L-ACNC: 4.69 UIU/ML (ref 0.4–4)

## 2023-09-22 PROCEDURE — 3074F SYST BP LT 130 MM HG: CPT | Mod: CPTII,S$GLB,, | Performed by: PHYSICIAN ASSISTANT

## 2023-09-22 PROCEDURE — 99999 PR PBB SHADOW E&M-EST. PATIENT-LVL IV: ICD-10-PCS | Mod: PBBFAC,,, | Performed by: PHYSICIAN ASSISTANT

## 2023-09-22 PROCEDURE — 3079F DIAST BP 80-89 MM HG: CPT | Mod: CPTII,S$GLB,, | Performed by: PHYSICIAN ASSISTANT

## 2023-09-22 PROCEDURE — 84443 ASSAY THYROID STIM HORMONE: CPT | Performed by: PHYSICIAN ASSISTANT

## 2023-09-22 PROCEDURE — 3044F PR MOST RECENT HEMOGLOBIN A1C LEVEL <7.0%: ICD-10-PCS | Mod: CPTII,S$GLB,, | Performed by: PHYSICIAN ASSISTANT

## 2023-09-22 PROCEDURE — 3008F BODY MASS INDEX DOCD: CPT | Mod: CPTII,S$GLB,, | Performed by: PHYSICIAN ASSISTANT

## 2023-09-22 PROCEDURE — 1159F PR MEDICATION LIST DOCUMENTED IN MEDICAL RECORD: ICD-10-PCS | Mod: CPTII,S$GLB,, | Performed by: PHYSICIAN ASSISTANT

## 2023-09-22 PROCEDURE — 3074F PR MOST RECENT SYSTOLIC BLOOD PRESSURE < 130 MM HG: ICD-10-PCS | Mod: CPTII,S$GLB,, | Performed by: PHYSICIAN ASSISTANT

## 2023-09-22 PROCEDURE — 99214 PR OFFICE/OUTPT VISIT, EST, LEVL IV, 30-39 MIN: ICD-10-PCS | Mod: S$GLB,,, | Performed by: PHYSICIAN ASSISTANT

## 2023-09-22 PROCEDURE — 1160F RVW MEDS BY RX/DR IN RCRD: CPT | Mod: CPTII,S$GLB,, | Performed by: PHYSICIAN ASSISTANT

## 2023-09-22 PROCEDURE — 1160F PR REVIEW ALL MEDS BY PRESCRIBER/CLIN PHARMACIST DOCUMENTED: ICD-10-PCS | Mod: CPTII,S$GLB,, | Performed by: PHYSICIAN ASSISTANT

## 2023-09-22 PROCEDURE — 3008F PR BODY MASS INDEX (BMI) DOCUMENTED: ICD-10-PCS | Mod: CPTII,S$GLB,, | Performed by: PHYSICIAN ASSISTANT

## 2023-09-22 PROCEDURE — 84439 ASSAY OF FREE THYROXINE: CPT | Performed by: PHYSICIAN ASSISTANT

## 2023-09-22 PROCEDURE — 1159F MED LIST DOCD IN RCRD: CPT | Mod: CPTII,S$GLB,, | Performed by: PHYSICIAN ASSISTANT

## 2023-09-22 PROCEDURE — 3079F PR MOST RECENT DIASTOLIC BLOOD PRESSURE 80-89 MM HG: ICD-10-PCS | Mod: CPTII,S$GLB,, | Performed by: PHYSICIAN ASSISTANT

## 2023-09-22 PROCEDURE — 99214 OFFICE O/P EST MOD 30 MIN: CPT | Mod: S$GLB,,, | Performed by: PHYSICIAN ASSISTANT

## 2023-09-22 PROCEDURE — 3044F HG A1C LEVEL LT 7.0%: CPT | Mod: CPTII,S$GLB,, | Performed by: PHYSICIAN ASSISTANT

## 2023-09-22 PROCEDURE — 99999 PR PBB SHADOW E&M-EST. PATIENT-LVL IV: CPT | Mod: PBBFAC,,, | Performed by: PHYSICIAN ASSISTANT

## 2023-09-22 PROCEDURE — 36415 COLL VENOUS BLD VENIPUNCTURE: CPT | Mod: PO | Performed by: PHYSICIAN ASSISTANT

## 2023-09-22 RX ORDER — ERGOCALCIFEROL 1.25 MG/1
50000 CAPSULE ORAL
Qty: 12 CAPSULE | Refills: 3 | Status: SHIPPED | OUTPATIENT
Start: 2023-09-22 | End: 2024-03-20 | Stop reason: SDUPTHER

## 2023-09-22 NOTE — PROGRESS NOTES
Subjective:      Patient ID: Akiko Jameson is a 48 y.o. female.    Chief Complaint:  Post-surgical Hypothyroidism    History of Present Illness  Initial visit for thyroid nodules     Pt had initial thyroid u/s in 2013, and got left thyroid nodule FNA that was benign in 7/2013. Last seen by Dr. Sandifer in 11/21.      U/S in 11/20 shows increase in left thyroid nodule from 5.2 cm to 6.7 cm (which was previously bx) and also increase in left lateral isthmus nodule from 2.0 to 2.6 cm. Upper ll nodule also increased in size from 2.0 to 2.6 cm.  She denies compressive sx. No FH thyroid cancer. No radiation exposure to head/neck. Had chronic cough. Had total thyroidectomy in 3/21 which was benign. Cough improved. Taking ca 500 mg qd since sx. Pt has sarcoidosis.     On LT4 175 mcg qd.  + fatigue, constipation/diarrhea, cold/heat intolerance, brittle hair.  No mental fog or tremors.     Is morbidly obese. +FH DM. Has HTN, not previously screened for DM.   Denies polyuria, polydipsia, blurry vision      Wt Readings from Last 10 Encounters:   09/22/23 115.4 kg (254 lb 4.8 oz)   08/10/23 115.6 kg (254 lb 14.4 oz)   07/19/23 115.2 kg (253 lb 15.5 oz)   06/29/23 115.2 kg (254 lb)   06/28/23 114 kg (251 lb 5.2 oz)   06/01/23 116.4 kg (256 lb 11.6 oz)   03/21/23 116.1 kg (255 lb 14.4 oz)   03/07/23 115 kg (253 lb 8.5 oz)   02/21/23 114.1 kg (251 lb 8.7 oz)   01/25/23 115.2 kg (254 lb)      ROS:   Constitutional: No recent significant weight change  Eyes: No recent visual changes  Cardiovascular: Denies current anginal symptoms  Respiratory: Denies current respiratory difficulty  Gastrointestinal: Denies recent bowel disturbances  GenitoUrinary - No dysuria  Skin: No new skin rash  Neurologic: No focal neurologic complaints  Remainder ROS negative     Objective:     PE:  GENERAL: Well developed, well nourished  NECK: Supple neck, normal thyroid. No bruit  LYMPHATIC: No cervical or supraclavicular  lymphadenopathy  CARDIOVASCULAR: Normal heart sounds, no pedal edema  RESPIRATORY: Normal effort, clear to auscultation    DIAGNOSIS:   03/21/2021 JWH/jr/[id number],pjl      1.  THYROID GLAND, LEFT LOBE, LOBECTOMY:        --NODULAR HYPERPLASIA.      2.  THYROID GLAND, RIGHT LOBE, LOBECTOMY:        --NODULAR HYPERPLASIA.   --FEW FOCI OF NON-CASEATING GRANULOMATA (SEE COMMENT #1); AFB AND PAS    STAINS ARE NEGATIVE FOR   ACID-FAST BACILLI AND FUNGAL ORGANISMS, RESPECTIVELY (CONTROLS STAIN    APPROPRIATELY).        --ONE BENIGN PERITHYROIDAL PARATHYROID GLAND.   Lab Review:   Lab Results   Component Value Date    TSH 1.426 09/12/2022     Assessment:     1. Vitamin D deficiency  ergocalciferol (ERGOCALCIFEROL) 50,000 unit Cap    Vitamin D      2. Post-operative hypothyroidism  TSH    T4, Free    TSH    T4, Free      3. Hypocalcemia  Comprehensive Metabolic Panel    Renal Function Panel        Post-operative hypothyroidism-TFTs today. Continue 175 mcg qd.  Vitamin d deficiency-very low-start ergo weekly.   Hypocalcemia-last ca was normal. Can stop ca to prevent hypercalcemia w/ vd. Restart if she experiences muscle cramping or twitching. Recheck in one month.      TFTs today  Cmp in one months  F/u in 6 mths w/ labs prior-TFTs, vd rp

## 2023-09-25 ENCOUNTER — OFFICE VISIT (OUTPATIENT)
Dept: SPORTS MEDICINE | Facility: CLINIC | Age: 48
End: 2023-09-25
Payer: COMMERCIAL

## 2023-09-25 ENCOUNTER — HOSPITAL ENCOUNTER (OUTPATIENT)
Dept: RADIOLOGY | Facility: HOSPITAL | Age: 48
Discharge: HOME | End: 2023-09-25
Attending: STUDENT IN AN ORGANIZED HEALTH CARE EDUCATION/TRAINING PROGRAM
Payer: COMMERCIAL

## 2023-09-25 VITALS — HEIGHT: 64 IN | BODY MASS INDEX: 43.36 KG/M2 | WEIGHT: 254 LBS

## 2023-09-25 DIAGNOSIS — M25.562 LEFT KNEE PAIN, UNSPECIFIED CHRONICITY: Primary | ICD-10-CM

## 2023-09-25 DIAGNOSIS — S83.282A ACUTE LATERAL MENISCUS TEAR OF LEFT KNEE, INITIAL ENCOUNTER: Primary | ICD-10-CM

## 2023-09-25 DIAGNOSIS — M17.0 PRIMARY OSTEOARTHRITIS OF BOTH KNEES: ICD-10-CM

## 2023-09-25 DIAGNOSIS — M25.562 LEFT KNEE PAIN, UNSPECIFIED CHRONICITY: ICD-10-CM

## 2023-09-25 PROCEDURE — 99999 PR PBB SHADOW E&M-EST. PATIENT-LVL III: ICD-10-PCS | Mod: PBBFAC,,, | Performed by: STUDENT IN AN ORGANIZED HEALTH CARE EDUCATION/TRAINING PROGRAM

## 2023-09-25 PROCEDURE — 99204 PR OFFICE/OUTPT VISIT, NEW, LEVL IV, 45-59 MIN: ICD-10-PCS | Mod: S$GLB,,, | Performed by: STUDENT IN AN ORGANIZED HEALTH CARE EDUCATION/TRAINING PROGRAM

## 2023-09-25 PROCEDURE — 1159F PR MEDICATION LIST DOCUMENTED IN MEDICAL RECORD: ICD-10-PCS | Mod: CPTII,S$GLB,, | Performed by: STUDENT IN AN ORGANIZED HEALTH CARE EDUCATION/TRAINING PROGRAM

## 2023-09-25 PROCEDURE — 3044F HG A1C LEVEL LT 7.0%: CPT | Mod: CPTII,S$GLB,, | Performed by: STUDENT IN AN ORGANIZED HEALTH CARE EDUCATION/TRAINING PROGRAM

## 2023-09-25 PROCEDURE — 3008F BODY MASS INDEX DOCD: CPT | Mod: CPTII,S$GLB,, | Performed by: STUDENT IN AN ORGANIZED HEALTH CARE EDUCATION/TRAINING PROGRAM

## 2023-09-25 PROCEDURE — 73564 X-RAY EXAM KNEE 4 OR MORE: CPT | Mod: 26,LT,, | Performed by: RADIOLOGY

## 2023-09-25 PROCEDURE — 99204 OFFICE O/P NEW MOD 45 MIN: CPT | Mod: S$GLB,,, | Performed by: STUDENT IN AN ORGANIZED HEALTH CARE EDUCATION/TRAINING PROGRAM

## 2023-09-25 PROCEDURE — 99999 PR PBB SHADOW E&M-EST. PATIENT-LVL III: CPT | Mod: PBBFAC,,, | Performed by: STUDENT IN AN ORGANIZED HEALTH CARE EDUCATION/TRAINING PROGRAM

## 2023-09-25 PROCEDURE — 73562 X-RAY EXAM OF KNEE 3: CPT | Mod: 59,TC,RT

## 2023-09-25 PROCEDURE — 73564 XR KNEE ORTHO LEFT WITH FLEXION: ICD-10-PCS | Mod: 26,LT,, | Performed by: RADIOLOGY

## 2023-09-25 PROCEDURE — 3044F PR MOST RECENT HEMOGLOBIN A1C LEVEL <7.0%: ICD-10-PCS | Mod: CPTII,S$GLB,, | Performed by: STUDENT IN AN ORGANIZED HEALTH CARE EDUCATION/TRAINING PROGRAM

## 2023-09-25 PROCEDURE — 73562 X-RAY EXAM OF KNEE 3: CPT | Mod: 26,RT,, | Performed by: RADIOLOGY

## 2023-09-25 PROCEDURE — 3008F PR BODY MASS INDEX (BMI) DOCUMENTED: ICD-10-PCS | Mod: CPTII,S$GLB,, | Performed by: STUDENT IN AN ORGANIZED HEALTH CARE EDUCATION/TRAINING PROGRAM

## 2023-09-25 PROCEDURE — 1159F MED LIST DOCD IN RCRD: CPT | Mod: CPTII,S$GLB,, | Performed by: STUDENT IN AN ORGANIZED HEALTH CARE EDUCATION/TRAINING PROGRAM

## 2023-09-25 PROCEDURE — 73562 XR KNEE ORTHO LEFT WITH FLEXION: ICD-10-PCS | Mod: 26,RT,, | Performed by: RADIOLOGY

## 2023-09-25 RX ORDER — IBUPROFEN 800 MG/1
800 TABLET ORAL 3 TIMES DAILY
Qty: 60 TABLET | Refills: 1 | Status: SHIPPED | OUTPATIENT
Start: 2023-09-25

## 2023-09-25 NOTE — PROGRESS NOTES
Patient ID: Akiko Jameson  YOB: 1975  MRN: [id number]    Chief Complaint: Pain of the Left Knee    Referred By: Self for left knee    History of Present Illness: Akiko Jameson is a  48 y.o. female who presents today with left knee pain.     The patient is active in none.  Occupation:     Akiko Jameson states it is Acute in nature and there was a specific mechanism. Had a misstep 2 weeks ago where she possibly twisted and feeling a pop.  Akiko Jameson describes the pain as a continuous lateral joint line. Treatment to date includes ice, 600mg IBU. They believe that they are slightly better with this treatment. Current pain level at rest is 6/10(Numeric Pain Rating Scale).  Associated symptoms include: Swelling Yes, Instability Yes, Pain that affects your sleep No, Mechanical Yes, locking/catching No, Neurological No, limited range of motion Yes. Aggravating activities include walking, lying supine. They denies formal physical therapy for this. Had previous menisectomy on right side.     Hemoglobin A1C   Date Value Ref Range Status   08/10/2023 5.6 4.0 - 5.6 % Final     Comment:     ADA Screening Guidelines:  5.7-6.4%  Consistent with prediabetes  >or=6.5%  Consistent with diabetes    High levels of fetal hemoglobin interfere with the HbA1C  assay. Heterozygous hemoglobin variants (HbS, HgC, etc)do  not significantly interfere with this assay.   However, presence of multiple variants may affect accuracy.     07/27/2022 5.7 (H) 4.0 - 5.6 % Final     Comment:     ADA Screening Guidelines:  5.7-6.4%  Consistent with prediabetes  >or=6.5%  Consistent with diabetes    High levels of fetal hemoglobin interfere with the HbA1C  assay. Heterozygous hemoglobin variants (HbS, HgC, etc)do  not significantly interfere with this assay.   However, presence of multiple variants may affect accuracy.     08/06/2021 5.8 (H) 4.0 - 5.6 % Final     Comment:     ADA Screening  Guidelines:  5.7-6.4%  Consistent with prediabetes  >or=6.5%  Consistent with diabetes    High levels of fetal hemoglobin interfere with the HbA1C  assay. Heterozygous hemoglobin variants (HbS, HgC, etc)do  not significantly interfere with this assay.   However, presence of multiple variants may affect accuracy.       Past Medical History:   Past Medical History:   Diagnosis Date    ALLERGIC RHINITIS     Aortic valve sclerosis 1/20/2023    Arthritis     Cellulitis     Constipation due to opioid therapy     Eosinophilia     mild    GERD (gastroesophageal reflux disease)     Granular cell tumor     H. pylori infection 2018    History of 2019 novel coronavirus disease (COVID-19) 10/04/2020    Hypertension     Lymphedema     Mild anemia     Mitral valve sclerosis 3/21/2023    KEIRY on CPAP     does not regularly    Positive CHARLENE (antinuclear antibody) 07/27/2022    Prediabetes 08/06/2021    Sarcoidosis, unspecified     Sleep apnea     compliant with CPAP    Thyroid nodule     Urinary incontinence, mixed      Past Surgical History:   Procedure Laterality Date    24 HOUR IMPEDANCE PH MONITORING OF ESOPHAGUS IN PATIENT NOT TAKING ACID REDUCING MEDICATIONS N/A 01/06/2020    Procedure: IMPEDANCE PH STUDY, ESOPHAGEAL, 24 HOUR, IN PATIENT NOT TAKING ACID REDUCING MEDICATION;  Surgeon: First Available [place];  Location: [facility];  Service: Endoscopy;  Laterality: N/A;    AXILLARY NODE DISSECTION Right     granular cell tumor    BILATERAL SALPINGO-OOPHORECTOMY (BSO)  07/21/2020    BREAST CYST ASPIRATION      left    CHOLECYSTECTOMY      COLONOSCOPY N/A 05/10/2019    Procedure: COLONOSCOPY;  Surgeon: Edgar Gamble Jr., MD;  Location: [facility];  Service: Endoscopy;  Laterality: N/A;    ENDOBRONCHIAL ULTRASOUND Bilateral 04/27/2021    Procedure: ENDOBRONCHIAL ULTRASOUND (EBUS);  Surgeon: Armando Kirby MD;  Location[facility];  Service: Pulmonary;  Laterality: Bilateral;  need fluoroscopy    ENDOMETRIAL ABLATION       ESOPHAGEAL MANOMETRY WITH MEASUREMENT OF IMPEDANCE N/A 01/06/2020    Procedure: MANOMETRY, ESOPHAGUS, WITH IMPEDANCE MEASUREMENT;  Surgeon: First Available [place];  Location: [facility] ENDO;  Service: Endoscopy;  Laterality: N/A;    ESOPHAGOGASTRODUODENOSCOPY N/A 07/05/2018    Procedure: EGD (ESOPHAGOGASTRODUODENOSCOPY);  Surgeon: Edgar Gamble Jr., MD;  Location: [facility] ENDO;  Service: Endoscopy;  Laterality: N/A;    ESOPHAGOGASTRODUODENOSCOPY N/A 11/23/2020    Procedure: ESOPHAGOGASTRODUODENOSCOPY (EGD);  Surgeon: Jina Kaufman MD;  Location: [facility] ENDO;  Service: General;  Laterality: N/A;    EXCISION OF MASS OF ABDOMEN Left 06/18/2018    Procedure: EXCISION, MASS, ABDOMEN - flank left;  Surgeon: Armando Tate MD;  Location: [facility] OR;  Service: General;  Laterality: Left;  left flank removal of mass    FINE NEEDLE ASPIRATION      thyroid    FLEXIBLE SIGMOIDOSCOPY N/A 08/24/2022    Procedure: SIGMOIDOSCOPY, FLEXIBLE;  Surgeon: Amber West MD;  Location[facility];  Service: Endoscopy;  Laterality: N/A;    HYSTERECTOMY  07/21/2020    INJECTION OF ANESTHETIC AGENT AROUND MEDIAL BRANCH NERVES INNERVATING LUMBAR FACET JOINT Right 02/24/2022    Procedure: Right L3, 4, 5 MBB;  Surgeon: Anam Montero MD;  Location: [facility] PAIN MGT;  Service: Pain Management;  Laterality: Right;    INJECTION OF ANESTHETIC AGENT AROUND MEDIAL BRANCH NERVES INNERVATING LUMBAR FACET JOINT Right 03/29/2022    Procedure: Right L3, 4, 5 MBB  (2nd block);  Surgeon: Anam Montero MD;  Location: [facility] PAIN [facility]GT;  Service: Pain Management;  Laterality: Right;    KNEE ARTHROSCOPY W/ MENISCECTOMY Right     NASAL SEPTUM SURGERY      OOPHORECTOMY      RADIOFREQUENCY THERMOCOAGULATION Right 04/05/2022    Procedure: Right L3-5 Lumbar RFA;  Surgeon: Anam Montero MD;  Location: [facility] PAIN MGT;  Service: Pain Management;  Laterality: Right;    ROBOT-ASSISTED NISSEN FUNDOPLICATION USING DA TAMAR XI N/A 02/28/2020    Procedure: XI  ROBOTIC FUNDOPLICATION, NISSEN;  Surgeon: Jina Kaufman MD;  Location: [facility] OR;  Service: General;  Laterality: N/A;    THYROIDECTOMY Bilateral 03/11/2021    Procedure: THYROIDECTOMY;  Surgeon: Nixon Moe MD;  Location: [facility] OR;  Service: ENT;  Laterality: Bilateral;    TUBAL LIGATION      UPPER GASTROINTESTINAL ENDOSCOPY  07/05/2018    Dr. Gamble     Family History   Problem Relation Age of Onset    Diabetes Mother     Kidney failure Mother     Heart attack Mother     Breast cancer Maternal Grandmother     Breast cancer Maternal Aunt     Ovarian cancer Cousin     Breast cancer Cousin     Blindness Father     Cirrhosis Neg Hx     Colon polyps Neg Hx     Colon cancer Neg Hx     Crohn's disease Neg Hx     Esophageal cancer Neg Hx     Stomach cancer Neg Hx     Ulcerative colitis Neg Hx     Amblyopia Neg Hx     Cataracts Neg Hx     Glaucoma Neg Hx     Macular degeneration Neg Hx     Retinal detachment Neg Hx     Strabismus Neg Hx     Allergic rhinitis Neg Hx     Allergies Neg Hx     Angioedema Neg Hx     Asthma Neg Hx     Atopy Neg Hx     Eczema Neg Hx     Immunodeficiency Neg Hx     Rhinitis Neg Hx     Urticaria Neg Hx      Social History     Socioeconomic History    Marital status:     Number of children: 2   Occupational History     Employer: [employer]   Tobacco Use    Smoking status: Never    Smokeless tobacco: Never   Substance and Sexual Activity    Alcohol use: No    Drug use: No    Sexual activity: Yes     Partners: Male     Social Determinants of Health     Financial Resource Strain: Low Risk  (9/20/2023)    Overall Financial Resource Strain (CARDIA)     Difficulty of Paying Living Expenses: Not hard at all   Food Insecurity: No Food Insecurity (9/20/2023)    Hunger Vital Sign     Worried About Running Out of Food in the Last Year: Never true     Ran Out of Food in the Last Year: Never true   Transportation Needs: No Transportation Needs (9/20/2023)    PRAPARE - Transportation     Lack of  Transportation (Medical): No     Lack of Transportation (Non-Medical): No   Physical Activity: Inactive (9/20/2023)    Exercise Vital Sign     Days of Exercise per Week: 0 days     Minutes of Exercise per Session: 0 min   Stress: No Stress Concern Present (9/20/2023)    [employer] [place] [employer] - Occupational Stress Questionnaire     Feeling of Stress : Not at all   Social Connections: Unknown (9/20/2023)    Social Connection and Isolation Panel [NHANES]     Frequency of Communication with Friends and Family: More than three times a week     Frequency of Social Gatherings with Friends and Family: Twice a week     Active Member of Clubs or Organizations: Yes     Attends Club or Organization Meetings: More than 4 times per year     Marital Status:    Housing Stability: Low Risk  (9/20/2023)    Housing Stability Vital Sign     Unable to Pay for Housing in the Last Year: No     Number of Places Lived in the Last Year: 1     Unstable Housing in the Last Year: No     Medication List with Changes/Refills   Current Medications    CALCIUM CARBONATE (OS-CARLA) 500 MG CALCIUM (1,250 MG) TABLET    Take 1 tablet (500 mg total) by mouth once daily.    CARVEDILOL (COREG) 12.5 MG TABLET    TAKE ONE TABLET BY MOUTH TWICE DAILY    CETIRIZINE (ZYRTEC) 10 MG TABLET    Take 1 tablet (10 mg total) by mouth once daily.    DOCUSATE SODIUM (COLACE) 100 MG CAPSULE    Take 100 mg by mouth every other day.     ERGOCALCIFEROL (ERGOCALCIFEROL) 50,000 UNIT CAP    Take 1 capsule (50,000 Units total) by mouth every 7 days.    FLUTICASONE PROPION-SALMETEROL 115-21 MCG/DOSE (ADVAIR HFA) 115-21 MCG/ACTUATION HFAA INHALER    Inhale 2 puffs into the lungs every 12 (twelve) hours. Controller    HYDROXYCHLOROQUINE (PLAQUENIL) 200 MG TABLET    Take 200 mg by mouth 2 (two) times a day.    LEVOTHYROXINE (SYNTHROID, LEVOTHROID) 175 MCG TABLET    Take 1 tablet (175 mcg total) by mouth before breakfast. Brand name Synthroid    MONTELUKAST  (SINGULAIR) 10 MG TABLET    Take 1 tablet (10 mg total) by mouth nightly.    MULTIVITAMIN (THERAGRAN) PER TABLET    Take 1 tablet by mouth once daily. Every day    OLMESARTAN-HYDROCHLOROTHIAZIDE (BENICAR HCT) 20-12.5 MG PER TABLET    TAKE 1 TABLET BY MOUTH ONCE DAILY    POLYETHYLENE GLYCOL (GLYCOLAX) 17 GRAM/DOSE POWDER    Take 17 g by mouth once daily.    POTASSIUM CHLORIDE SA (K-DUR,KLOR-CON) 20 MEQ TABLET    Take 1 tablet (20 mEq total) by mouth 2 (two) times daily.    SPIRONOLACTONE (ALDACTONE) 25 MG TABLET    Take 0.5 tablets (12.5 mg total) by mouth once daily.     Review of patient's allergies indicates:   Allergen Reactions    Biaxin [clarithromycin] Swelling    Omeprazole magnesium Swelling and Other (See Comments)    Prevacid [lansoprazole] Swelling     Lip swelling- was taking this along with blood pressure medication: benazepril; not sure which caused it. Reports she has taken OTC prilosec without any problems.    Ace inhibitors Swelling     Facial swelling    Benazepril Swelling     Lip swelling       Physical Exam:   Body mass index is 43.6 kg/m².    GENERAL: Well appearing, in no acute distress.  HEAD: Normocephalic and atraumatic.  ENT: External ears and nose grossly normal.  EYES: EOMI bilaterally  PULMONARY: Respirations are grossly even and non-labored.  NEURO: Awake, alert, and oriented x 3.  SKIN: No obvious rashes appreciated.  PSYCH: Mood & affect are appropriate.    Detailed MSK exam:     Left knee exam   Good patellar mobility mild effusion no pain with hyperextension slight loss of flexion compared to contralateral   Tenderness over lateral joint line posterior lateral joint line.  No Baker cyst appreciated on palpation   Ligaments grossly intact.  Some pain with varus stress.    Imaging:  X-ray Knee Ortho Left with Flexion  Narrative: EXAMINATION:  XR KNEE ORTHO LEFT WITH FLEXION    CLINICAL HISTORY:  Pain in left knee    TECHNIQUE:  AP standing views of both knees, AP flexion views of  both knees, lateral view of the left knee and Merchant views of both knees    COMPARISON:  03/16/2016    FINDINGS:  There is mild-to-moderate joint space narrowing and Mild marginal osteophyte formation seen involving the medial compartment of either knee right greater than the left.  Mild degenerative change at the left patellofemoral compartment.  No joint effusion.  No acute fracture or dislocation.  Enthesophytes seen projecting off the superior and inferior pole of the patella on the left.  Impression: 1.  As above    Electronically signed by: Robert Alvarado DO  Date:    09/25/2023  Time:    14:36      Relevant imaging results were reviewed and interpreted by me and per my read as above.  This was discussed with the patient and / or family today.     Assessment:  Akiko Jameson is a 48 y.o. female presents today for left lateral knee pain with a twisting mechanism.  Possible bowl underlying lateral meniscus injury.  Having some relief with ibuprofen discussed prescription grade ibuprofen at this time formal therapy to start in [place].  And will follow up with 1 of my colleagues Dr. Bradley in [place] in 6-8 weeks.  Continue conservative management.  Discussed the plan with Dr. Bradley.     Acute lateral meniscus tear of left knee, initial encounter  -     ibuprofen (ADVIL,MOTRIN) 800 MG tablet; Take 1 tablet (800 mg total) by mouth 3 (three) times daily.  Dispense: 60 tablet; Refill: 1  -     Ambulatory referral/consult to Physical/Occupational Therapy; Future; Expected date: 10/02/2023    Primary osteoarthritis of both knees  -     Ambulatory referral/consult to Physical/Occupational Therapy; Future; Expected date: 10/02/2023         A copy of today's visit note has been sent to the referring provider.       Erickson Hull MD    Disclaimer: This note was prepared using a voice recognition system and is likely to have sound alike errors within the text.

## 2023-10-11 ENCOUNTER — TELEPHONE (OUTPATIENT)
Dept: GASTROENTEROLOGY | Facility: CLINIC | Age: 48
End: 2023-10-11

## 2023-10-11 ENCOUNTER — OFFICE VISIT (OUTPATIENT)
Dept: GASTROENTEROLOGY | Facility: CLINIC | Age: 48
End: 2023-10-11
Payer: COMMERCIAL

## 2023-10-11 ENCOUNTER — PATIENT MESSAGE (OUTPATIENT)
Dept: GASTROENTEROLOGY | Facility: CLINIC | Age: 48
End: 2023-10-11

## 2023-10-11 DIAGNOSIS — K59.04 CHRONIC IDIOPATHIC CONSTIPATION: ICD-10-CM

## 2023-10-11 DIAGNOSIS — R10.30 LOWER ABDOMINAL PAIN: Primary | ICD-10-CM

## 2023-10-11 PROCEDURE — 99214 OFFICE O/P EST MOD 30 MIN: CPT | Mod: 95,,, | Performed by: INTERNAL MEDICINE

## 2023-10-11 PROCEDURE — 3044F HG A1C LEVEL LT 7.0%: CPT | Mod: CPTII,95,, | Performed by: INTERNAL MEDICINE

## 2023-10-11 PROCEDURE — 99214 PR OFFICE/OUTPT VISIT, EST, LEVL IV, 30-39 MIN: ICD-10-PCS | Mod: 95,,, | Performed by: INTERNAL MEDICINE

## 2023-10-11 PROCEDURE — 1159F MED LIST DOCD IN RCRD: CPT | Mod: CPTII,95,, | Performed by: INTERNAL MEDICINE

## 2023-10-11 PROCEDURE — 1159F PR MEDICATION LIST DOCUMENTED IN MEDICAL RECORD: ICD-10-PCS | Mod: CPTII,95,, | Performed by: INTERNAL MEDICINE

## 2023-10-11 PROCEDURE — 1160F RVW MEDS BY RX/DR IN RCRD: CPT | Mod: CPTII,95,, | Performed by: INTERNAL MEDICINE

## 2023-10-11 PROCEDURE — 3044F PR MOST RECENT HEMOGLOBIN A1C LEVEL <7.0%: ICD-10-PCS | Mod: CPTII,95,, | Performed by: INTERNAL MEDICINE

## 2023-10-11 PROCEDURE — 1160F PR REVIEW ALL MEDS BY PRESCRIBER/CLIN PHARMACIST DOCUMENTED: ICD-10-PCS | Mod: CPTII,95,, | Performed by: INTERNAL MEDICINE

## 2023-10-11 RX ORDER — DICYCLOMINE HYDROCHLORIDE 20 MG/1
20 TABLET ORAL EVERY 6 HOURS
Qty: 120 TABLET | Refills: 0 | Status: SHIPPED | OUTPATIENT
Start: 2023-10-11 | End: 2023-11-10

## 2023-10-11 RX ORDER — LUBIPROSTONE 24 UG/1
24 CAPSULE ORAL 2 TIMES DAILY
Qty: 180 CAPSULE | Refills: 0 | Status: SHIPPED | OUTPATIENT
Start: 2023-10-11 | End: 2023-11-14

## 2023-10-11 NOTE — PROGRESS NOTES
Clinic Progress Note:  [facility] Progress Note    Reason for Visit:  The primary encounter diagnosis was Lower abdominal pain. A diagnosis of Chronic idiopathic constipation was also pertinent to this visit.    PCP: Dwayne Booth       Visit type: audiovisual      20 minutes of total time spent on the encounter, which includes face to face time and non-face to face time preparing to see the patient (eg, review of tests), Obtaining and/or reviewing separately obtained history, Documenting clinical information in the electronic or other health record, Independently interpreting results (not separately reported) and communicating results to the patient/family/caregiver, or Care coordination (not separately reported).   Each patient to whom he or she provides medical services by telemedicine is: (1) informed of the relationship between the physician and patient and the respective role of any other health care provider with respect to management of the patient; and (2) notified that he or she may decline to receive medical services by telemedicine and may withdraw from such care at any time.         HPI:  This is a 48 y.o. female here for evaluation of constipation with overflow diarrhea.   Colonoscopy completed 2019 for similar symptoms - recall 10 years.   Flex sig completed 2022 with no findings.   Xray completed with mention of large stool to the right colon.   She was instructed to complete a Miralax prep and follow up daily with Miralax.   She states that she felt improved after the Miralax prep, but symptoms returned quickly.   Stool was noted on abdominal xray again.   She was started on Linzess but it made diarrhea worst.      At last visit, she was instructed to take Miralax and consume high fiber diet.   If she takes the Miralax daily, she has diarrhea.   She also is having lower abdominal pain.          ROS:  As above HPI       Allergies: Reviewed    Home Medications:   Medication List with  Changes/Refills   New Medications    DICYCLOMINE (BENTYL) 20 MG TABLET    Take 1 tablet (20 mg total) by mouth every 6 (six) hours.    LUBIPROSTONE (AMITIZA) 24 MCG CAP    Take 1 capsule (24 mcg total) by mouth 2 (two) times daily.   Current Medications    CALCIUM CARBONATE (OS-CARLA) 500 MG CALCIUM (1,250 MG) TABLET    Take 1 tablet (500 mg total) by mouth once daily.    CARVEDILOL (COREG) 12.5 MG TABLET    TAKE ONE TABLET BY MOUTH TWICE DAILY    CETIRIZINE (ZYRTEC) 10 MG TABLET    Take 1 tablet (10 mg total) by mouth once daily.    DOCUSATE SODIUM (COLACE) 100 MG CAPSULE    Take 100 mg by mouth every other day.     ERGOCALCIFEROL (ERGOCALCIFEROL) 50,000 UNIT CAP    Take 1 capsule (50,000 Units total) by mouth every 7 days.    FLUTICASONE PROPION-SALMETEROL 115-21 MCG/DOSE (ADVAIR HFA) 115-21 MCG/ACTUATION HFAA INHALER    Inhale 2 puffs into the lungs every 12 (twelve) hours. Controller    HYDROXYCHLOROQUINE (PLAQUENIL) 200 MG TABLET    Take 200 mg by mouth 2 (two) times a day.    IBUPROFEN (ADVIL,MOTRIN) 800 MG TABLET    Take 1 tablet (800 mg total) by mouth 3 (three) times daily.    LEVOTHYROXINE (SYNTHROID, LEVOTHROID) 175 MCG TABLET    Take 1 tablet (175 mcg total) by mouth before breakfast. Brand name Synthroid    MONTELUKAST (SINGULAIR) 10 MG TABLET    Take 1 tablet (10 mg total) by mouth nightly.    MULTIVITAMIN (THERAGRAN) PER TABLET    Take 1 tablet by mouth once daily. Every day    OLMESARTAN-HYDROCHLOROTHIAZIDE (BENICAR HCT) 20-12.5 MG PER TABLET    TAKE 1 TABLET BY MOUTH ONCE DAILY    POTASSIUM CHLORIDE SA (K-DUR,KLOR-CON) 20 MEQ TABLET    Take 1 tablet (20 mEq total) by mouth 2 (two) times daily.    SPIRONOLACTONE (ALDACTONE) 25 MG TABLET    Take 0.5 tablets (12.5 mg total) by mouth once daily.         Physical Exam:  Vital Signs:  There were no vitals taken for this visit.  There is no height or weight on file to calculate BMI.    Physical Exam  Vitals reviewed: virtual visit.          Labs: Pertinent  labs reviewed.      Assessment:    Assessment:      1. Constipation, unspecified constipation type    2. Diarrhea, unspecified type    3. Bloating/Abdominal pain          Plan:      -Constipation with overflow diarrhea is chronic.   -Last Xray showed stool in the right colon.   -Symptoms improved with bowel clean out.   -Linzess was too harsh so it was discontinued   -Miralax causes diarrhea as well   -Can do a trial of Amitiza   -Abdominal xray and US abdomen ordered for abdominal pain   -Can use Bentyl as needed.         Lower abdominal pain  -     X-Ray Abdomen Flat And Erect; Future; Expected date: 10/11/2023  -     US Abdomen Complete; Future; Expected date: 10/11/2023    Chronic idiopathic constipation  -     lubiprostone (AMITIZA) 24 MCG Cap; Take 1 capsule (24 mcg total) by mouth 2 (two) times daily.  Dispense: 180 capsule; Refill: 0    Other orders  -     dicyclomine (BENTYL) 20 mg tablet; Take 1 tablet (20 mg total) by mouth every 6 (six) hours.  Dispense: 120 tablet; Refill: 0                Follow up in about 6 weeks (around 11/22/2023).    Order summary:  Orders Placed This Encounter   Procedures    X-Ray Abdomen Flat And Erect    US Abdomen Complete       Thank you so much for allowing me to participate in the care of Akiko Collazo Trino Bergman MD  Gastroenterology and Hepatology  [facility]

## 2023-10-13 ENCOUNTER — HOSPITAL ENCOUNTER (OUTPATIENT)
Dept: RADIOLOGY | Facility: HOSPITAL | Age: 48
Discharge: HOME | End: 2023-10-13
Attending: INTERNAL MEDICINE
Payer: COMMERCIAL

## 2023-10-13 ENCOUNTER — CLINICAL SUPPORT (OUTPATIENT)
Dept: FAMILY MEDICINE | Facility: CLINIC | Age: 48
End: 2023-10-13
Payer: COMMERCIAL

## 2023-10-13 DIAGNOSIS — Z23 IMMUNIZATION DUE: Primary | ICD-10-CM

## 2023-10-13 DIAGNOSIS — R10.30 LOWER ABDOMINAL PAIN: ICD-10-CM

## 2023-10-13 PROCEDURE — 90471 ZOSTER RECOMBINANT VACCINE: ICD-10-PCS | Mod: S$GLB,,, | Performed by: FAMILY MEDICINE

## 2023-10-13 PROCEDURE — 74019 RADEX ABDOMEN 2 VIEWS: CPT | Mod: 26,,, | Performed by: RADIOLOGY

## 2023-10-13 PROCEDURE — 76700 US EXAM ABDOM COMPLETE: CPT | Mod: 26,,, | Performed by: STUDENT IN AN ORGANIZED HEALTH CARE EDUCATION/TRAINING PROGRAM

## 2023-10-13 PROCEDURE — 99999 PR PBB SHADOW E&M-EST. PATIENT-LVL III: CPT | Mod: PBBFAC,,,

## 2023-10-13 PROCEDURE — 76700 US EXAM ABDOM COMPLETE: CPT | Mod: TC,PO

## 2023-10-13 PROCEDURE — 90750 HZV VACC RECOMBINANT IM: CPT | Mod: S$GLB,,, | Performed by: FAMILY MEDICINE

## 2023-10-13 PROCEDURE — 90750 ZOSTER RECOMBINANT VACCINE: ICD-10-PCS | Mod: S$GLB,,, | Performed by: FAMILY MEDICINE

## 2023-10-13 PROCEDURE — 74019 RADEX ABDOMEN 2 VIEWS: CPT | Mod: TC,PO

## 2023-10-13 PROCEDURE — 99999 PR PBB SHADOW E&M-EST. PATIENT-LVL III: ICD-10-PCS | Mod: PBBFAC,,,

## 2023-10-13 PROCEDURE — 76700 US ABDOMEN COMPLETE: ICD-10-PCS | Mod: 26,,, | Performed by: STUDENT IN AN ORGANIZED HEALTH CARE EDUCATION/TRAINING PROGRAM

## 2023-10-13 PROCEDURE — 90471 IMMUNIZATION ADMIN: CPT | Mod: S$GLB,,, | Performed by: FAMILY MEDICINE

## 2023-10-13 PROCEDURE — 74019 XR ABDOMEN FLAT AND ERECT: ICD-10-PCS | Mod: 26,,, | Performed by: RADIOLOGY

## 2023-10-25 DIAGNOSIS — I10 ESSENTIAL HYPERTENSION: Primary | ICD-10-CM

## 2023-10-25 DIAGNOSIS — I35.8 AORTIC VALVE SCLEROSIS: ICD-10-CM

## 2023-10-25 DIAGNOSIS — I05.8 MITRAL VALVE SCLEROSIS: ICD-10-CM

## 2023-10-25 RX ORDER — SPIRONOLACTONE 25 MG/1
12.5 TABLET ORAL DAILY
Qty: 45 TABLET | Refills: 1 | Status: SHIPPED | OUTPATIENT
Start: 2023-10-25 | End: 2024-10-24

## 2023-10-25 RX ORDER — CARVEDILOL 12.5 MG/1
12.5 TABLET ORAL 2 TIMES DAILY
Qty: 180 TABLET | Refills: 1 | Status: SHIPPED | OUTPATIENT
Start: 2023-10-25 | End: 2023-10-30 | Stop reason: SDUPTHER

## 2023-10-30 ENCOUNTER — OFFICE VISIT (OUTPATIENT)
Dept: CARDIOLOGY | Facility: CLINIC | Age: 48
End: 2023-10-30
Payer: COMMERCIAL

## 2023-10-30 VITALS
HEIGHT: 63 IN | SYSTOLIC BLOOD PRESSURE: 132 MMHG | DIASTOLIC BLOOD PRESSURE: 82 MMHG | HEART RATE: 83 BPM | BODY MASS INDEX: 45.08 KG/M2 | WEIGHT: 254.44 LBS

## 2023-10-30 DIAGNOSIS — I35.8 AORTIC VALVE SCLEROSIS: Chronic | ICD-10-CM

## 2023-10-30 DIAGNOSIS — K76.0 HEPATIC STEATOSIS: ICD-10-CM

## 2023-10-30 DIAGNOSIS — I10 ESSENTIAL HYPERTENSION: Primary | Chronic | ICD-10-CM

## 2023-10-30 DIAGNOSIS — I77.810 ASCENDING AORTA DILATION: Chronic | ICD-10-CM

## 2023-10-30 DIAGNOSIS — E66.01 MORBID OBESITY WITH BMI OF 45.0-49.9, ADULT: Chronic | ICD-10-CM

## 2023-10-30 PROCEDURE — 99214 PR OFFICE/OUTPT VISIT, EST, LEVL IV, 30-39 MIN: ICD-10-PCS | Mod: S$GLB,,, | Performed by: INTERNAL MEDICINE

## 2023-10-30 PROCEDURE — 3044F PR MOST RECENT HEMOGLOBIN A1C LEVEL <7.0%: ICD-10-PCS | Mod: CPTII,S$GLB,, | Performed by: INTERNAL MEDICINE

## 2023-10-30 PROCEDURE — 3075F PR MOST RECENT SYSTOLIC BLOOD PRESS GE 130-139MM HG: ICD-10-PCS | Mod: CPTII,S$GLB,, | Performed by: INTERNAL MEDICINE

## 2023-10-30 PROCEDURE — 3075F SYST BP GE 130 - 139MM HG: CPT | Mod: CPTII,S$GLB,, | Performed by: INTERNAL MEDICINE

## 2023-10-30 PROCEDURE — 99999 PR PBB SHADOW E&M-EST. PATIENT-LVL III: ICD-10-PCS | Mod: PBBFAC,,, | Performed by: INTERNAL MEDICINE

## 2023-10-30 PROCEDURE — 3008F PR BODY MASS INDEX (BMI) DOCUMENTED: ICD-10-PCS | Mod: CPTII,S$GLB,, | Performed by: INTERNAL MEDICINE

## 2023-10-30 PROCEDURE — 99214 OFFICE O/P EST MOD 30 MIN: CPT | Mod: S$GLB,,, | Performed by: INTERNAL MEDICINE

## 2023-10-30 PROCEDURE — 3044F HG A1C LEVEL LT 7.0%: CPT | Mod: CPTII,S$GLB,, | Performed by: INTERNAL MEDICINE

## 2023-10-30 PROCEDURE — 1159F MED LIST DOCD IN RCRD: CPT | Mod: CPTII,S$GLB,, | Performed by: INTERNAL MEDICINE

## 2023-10-30 PROCEDURE — 1159F PR MEDICATION LIST DOCUMENTED IN MEDICAL RECORD: ICD-10-PCS | Mod: CPTII,S$GLB,, | Performed by: INTERNAL MEDICINE

## 2023-10-30 PROCEDURE — 99999 PR PBB SHADOW E&M-EST. PATIENT-LVL III: CPT | Mod: PBBFAC,,, | Performed by: INTERNAL MEDICINE

## 2023-10-30 PROCEDURE — 3079F PR MOST RECENT DIASTOLIC BLOOD PRESSURE 80-89 MM HG: ICD-10-PCS | Mod: CPTII,S$GLB,, | Performed by: INTERNAL MEDICINE

## 2023-10-30 PROCEDURE — 3008F BODY MASS INDEX DOCD: CPT | Mod: CPTII,S$GLB,, | Performed by: INTERNAL MEDICINE

## 2023-10-30 PROCEDURE — 3079F DIAST BP 80-89 MM HG: CPT | Mod: CPTII,S$GLB,, | Performed by: INTERNAL MEDICINE

## 2023-10-30 RX ORDER — CARVEDILOL 12.5 MG/1
12.5 TABLET ORAL 2 TIMES DAILY
Qty: 180 TABLET | Refills: 2 | Status: SHIPPED | OUTPATIENT
Start: 2023-10-30

## 2023-10-30 NOTE — LETTER
October 30, 2023      [facility]  [address] [place] [address]  Phone: [phone]       Patient: Akiko Jameson   YOB: 1975  Date of Visit: 10/30/2023    To Whom It May Concern:    Claudio Jameson  was at [facility] on 10/30/2023. If you have any questions or concerns, or if I can be of further assistance, please do not hesitate to contact me.    Sincerely,    Tyree Webster LPN

## 2023-10-30 NOTE — PROGRESS NOTES
Subjective:    Patient ID:  Akiko Jameson is a 48 y.o. female who presents for Hypertension        Hypertension  Pertinent negatives include no chest pain, headaches, malaise/fatigue, orthopnea, palpitations, PND or shortness of breath.     LABS FROM AUGUST, LDL 99, LFT'S SLIGHTLY UP, FOLLOWED BY GI, FATTY LIVER, FREE T4 OK, BP , L KNEE MENISCUS ISSUES, IN PT, SEE ROS    Past Medical History:   Diagnosis Date    ALLERGIC RHINITIS     Aortic valve sclerosis 1/20/2023    Arthritis     Cellulitis     Constipation due to opioid therapy     Eosinophilia     mild    GERD (gastroesophageal reflux disease)     Granular cell tumor     H. pylori infection 2018    History of 2019 novel coronavirus disease (COVID-19) 10/04/2020    Hypertension     Lymphedema     Mild anemia     Mitral valve sclerosis 3/21/2023    KEIRY on CPAP     does not regularly    Positive CHARLENE (antinuclear antibody) 07/27/2022    Prediabetes 08/06/2021    Sarcoidosis, unspecified     Sleep apnea     compliant with CPAP    Thyroid nodule     Urinary incontinence, mixed      Past Surgical History:   Procedure Laterality Date    24 HOUR IMPEDANCE PH MONITORING OF ESOPHAGUS IN PATIENT NOT TAKING ACID REDUCING MEDICATIONS N/A 01/06/2020    Procedure: IMPEDANCE PH STUDY, ESOPHAGEAL, 24 HOUR, IN PATIENT NOT TAKING ACID REDUCING MEDICATION;  Surgeon: First Available Tamika Panchal;  Location: [facility];  Service: Endoscopy;  Laterality: N/A;    AXILLARY NODE DISSECTION Right     granular cell tumor    BILATERAL SALPINGO-OOPHORECTOMY (BSO)  07/21/2020    BREAST CYST ASPIRATION      left    CHOLECYSTECTOMY      COLONOSCOPY N/A 05/10/2019    Procedure: COLONOSCOPY;  Surgeon: Edgar Gamble Jr., MD;  Location: [facility];  Service: Endoscopy;  Laterality: N/A;    ENDOBRONCHIAL ULTRASOUND Bilateral 04/27/2021    Procedure: ENDOBRONCHIAL ULTRASOUND (EBUS);  Surgeon: Armando Kirby MD;  Location: [facility];  Service: Pulmonary;  Laterality: Bilateral;  need  fluoroscopy    ENDOMETRIAL ABLATION      ESOPHAGEAL MANOMETRY WITH MEASUREMENT OF IMPEDANCE N/A 01/06/2020    Procedure: MANOMETRY, ESOPHAGUS, WITH IMPEDANCE MEASUREMENT;  Surgeon: First Available [place];  Location: [facility] ENDO;  Service: Endoscopy;  Laterality: N/A;    ESOPHAGOGASTRODUODENOSCOPY N/A 07/05/2018    Procedure: EGD (ESOPHAGOGASTRODUODENOSCOPY);  Surgeon: Edgar Gamble Jr., MD;  Location: [facility] ENDO;  Service: Endoscopy;  Laterality: N/A;    ESOPHAGOGASTRODUODENOSCOPY N/A 11/23/2020    Procedure: ESOPHAGOGASTRODUODENOSCOPY (EGD);  Surgeon: Jina Kaufman MD;  Location: [facility] ENDO;  Service: General;  Laterality: N/A;    EXCISION OF MASS OF ABDOMEN Left 06/18/2018    Procedure: EXCISION, MASS, ABDOMEN - flank left;  Surgeon: Armando Tate MD;  Location: [facility] OR;  Service: General;  Laterality: Left;  left flank removal of mass    FINE NEEDLE ASPIRATION      thyroid    FLEXIBLE SIGMOIDOSCOPY N/A 08/24/2022    Procedure: SIGMOIDOSCOPY, FLEXIBLE;  Surgeon: Amber West MD;  Location: [facility];  Service: Endoscopy;  Laterality: N/A;    HYSTERECTOMY  07/21/2020    INJECTION OF ANESTHETIC AGENT AROUND MEDIAL BRANCH NERVES INNERVATING LUMBAR FACET JOINT Right 02/24/2022    Procedure: Right L3, 4, 5 MBB;  Surgeon: Anam Montero MD;  Location: [facility] PAIN MGT;  Service: Pain Management;  Laterality: Right;    INJECTION OF ANESTHETIC AGENT AROUND MEDIAL BRANCH NERVES INNERVATING LUMBAR FACET JOINT Right 03/29/2022    Procedure: Right L3, 4, 5 MBB  (2nd block);  Surgeon: Anam Montero MD;  Location: [facility] PAIN MGT;  Service: Pain Management;  Laterality: Right;    KNEE ARTHROSCOPY W/ MENISCECTOMY Right     NASAL SEPTUM SURGERY      OOPHORECTOMY      RADIOFREQUENCY THERMOCOAGULATION Right 04/05/2022    Procedure: Right L3-5 Lumbar RFA;  Surgeon: Anam Montero MD;  Location: [facility] PAIN MGT;  Service: Pain Management;  Laterality: Right;    ROBOT-ASSISTED NISSEN FUNDOPLICATION USING DA  TAMAR YE N/A 02/28/2020    Procedure: XI ROBOTIC FUNDOPLICATION, NISSEN;  Surgeon: Jina Kaufman MD;  Location: [facility] OR;  Service: General;  Laterality: N/A;    THYROIDECTOMY Bilateral 03/11/2021    Procedure: THYROIDECTOMY;  Surgeon: Nixon oMe MD;  Location: [facility] OR;  Service: ENT;  Laterality: Bilateral;    TUBAL LIGATION      UPPER GASTROINTESTINAL ENDOSCOPY  07/05/2018    Dr. Gamble     Family History   Problem Relation Age of Onset    Diabetes Mother     Kidney failure Mother     Heart attack Mother     Breast cancer Maternal Grandmother     Breast cancer Maternal Aunt     Ovarian cancer Cousin     Breast cancer Cousin     Blindness Father     Cirrhosis Neg Hx     Colon polyps Neg Hx     Colon cancer Neg Hx     Crohn's disease Neg Hx     Esophageal cancer Neg Hx     Stomach cancer Neg Hx     Ulcerative colitis Neg Hx     Amblyopia Neg Hx     Cataracts Neg Hx     Glaucoma Neg Hx     Macular degeneration Neg Hx     Retinal detachment Neg Hx     Strabismus Neg Hx     Allergic rhinitis Neg Hx     Allergies Neg Hx     Angioedema Neg Hx     Asthma Neg Hx     Atopy Neg Hx     Eczema Neg Hx     Immunodeficiency Neg Hx     Rhinitis Neg Hx     Urticaria Neg Hx      Social History     Socioeconomic History    Marital status:     Number of children: 2   Occupational History     Employer: [employer]   Tobacco Use    Smoking status: Never    Smokeless tobacco: Never   Substance and Sexual Activity    Alcohol use: No    Drug use: No    Sexual activity: Yes     Partners: Male     Social Determinants of Health     Financial Resource Strain: Low Risk  (9/20/2023)    Overall Financial Resource Strain (CARDIA)     Difficulty of Paying Living Expenses: Not hard at all   Food Insecurity: No Food Insecurity (9/20/2023)    Hunger Vital Sign     Worried About Running Out of Food in the Last Year: Never true     Ran Out of Food in the Last Year: Never true   Transportation Needs: No Transportation Needs (9/20/2023)     PRAPARE - Transportation     Lack of Transportation (Medical): No     Lack of Transportation (Non-Medical): No   Physical Activity: Inactive (9/20/2023)    Exercise Vital Sign     Days of Exercise per Week: 0 days     Minutes of Exercise per Session: 0 min   Stress: No Stress Concern Present (9/20/2023)    [employer] [place] [employer] - Occupational Stress Questionnaire     Feeling of Stress : Not at all   Social Connections: Unknown (9/20/2023)    Social Connection and Isolation Panel [NHANES]     Frequency of Communication with Friends and Family: More than three times a week     Frequency of Social Gatherings with Friends and Family: Twice a week     Active Member of Clubs or Organizations: Yes     Attends Club or Organization Meetings: More than 4 times per year     Marital Status:    Housing Stability: Low Risk  (9/20/2023)    Housing Stability Vital Sign     Unable to Pay for Housing in the Last Year: No     Number of Places Lived in the Last Year: 1     Unstable Housing in the Last Year: No       Review of patient's allergies indicates:   Allergen Reactions    Biaxin [clarithromycin] Swelling    Omeprazole magnesium Swelling and Other (See Comments)    Prevacid [lansoprazole] Swelling     Lip swelling- was taking this along with blood pressure medication: benazepril; not sure which caused it. Reports she has taken OTC prilosec without any problems.    Ace inhibitors Swelling     Facial swelling    Benazepril Swelling     Lip swelling       Current Outpatient Medications:     cetirizine (ZYRTEC) 10 MG tablet, Take 1 tablet (10 mg total) by mouth once daily. (Patient taking differently: Take 10 mg by mouth once daily.), Disp: , Rfl: 0    dicyclomine (BENTYL) 20 mg tablet, Take 1 tablet (20 mg total) by mouth every 6 (six) hours., Disp: 120 tablet, Rfl: 0    docusate sodium (COLACE) 100 MG capsule, Take 100 mg by mouth every other day. , Disp: , Rfl:     ergocalciferol (ERGOCALCIFEROL) 50,000  unit Cap, Take 1 capsule (50,000 Units total) by mouth every 7 days., Disp: 12 capsule, Rfl: 3    fluticasone propion-salmeterol 115-21 mcg/dose (ADVAIR HFA) 115-21 mcg/actuation HFAA inhaler, Inhale 2 puffs into the lungs every 12 (twelve) hours. Controller, Disp: 12 g, Rfl: 11    hydrOXYchloroQUINE (PLAQUENIL) 200 mg tablet, Take 200 mg by mouth 2 (two) times a day., Disp: , Rfl:     ibuprofen (ADVIL,MOTRIN) 800 MG tablet, Take 1 tablet (800 mg total) by mouth 3 (three) times daily., Disp: 60 tablet, Rfl: 1    levothyroxine (SYNTHROID, LEVOTHROID) 175 MCG tablet, Take 1 tablet (175 mcg total) by mouth before breakfast. Brand name Synthroid, Disp: 30 tablet, Rfl: 3    lubiprostone (AMITIZA) 24 MCG Cap, Take 1 capsule (24 mcg total) by mouth 2 (two) times daily., Disp: 180 capsule, Rfl: 0    montelukast (SINGULAIR) 10 mg tablet, Take 1 tablet (10 mg total) by mouth nightly., Disp: 90 tablet, Rfl: 3    multivitamin (THERAGRAN) per tablet, Take 1 tablet by mouth once daily. Every day, Disp: , Rfl:     olmesartan-hydrochlorothiazide (BENICAR HCT) 20-12.5 mg per tablet, TAKE 1 TABLET BY MOUTH ONCE DAILY, Disp: 90 tablet, Rfl: 1    potassium chloride SA (K-DUR,KLOR-CON) 20 MEQ tablet, Take 1 tablet (20 mEq total) by mouth 2 (two) times daily., Disp: 180 tablet, Rfl: 3    spironolactone (ALDACTONE) 25 MG tablet, Take 0.5 tablets (12.5 mg total) by mouth once daily., Disp: 45 tablet, Rfl: 1    calcium carbonate (OS-CARLA) 500 mg calcium (1,250 mg) tablet, Take 1 tablet (500 mg total) by mouth once daily., Disp: 60 tablet, Rfl: 3    carvediloL (COREG) 12.5 MG tablet, Take 1 tablet (12.5 mg total) by mouth 2 (two) times daily., Disp: 180 tablet, Rfl: 2    Review of Systems   Constitutional: Negative for chills, diaphoresis, fever, malaise/fatigue and night sweats.   HENT:  Negative for congestion and nosebleeds.    Eyes: Negative.    Cardiovascular:  Negative for chest pain, claudication, cyanosis, dyspnea on exertion,  "irregular heartbeat, leg swelling, near-syncope, orthopnea, palpitations, paroxysmal nocturnal dyspnea and syncope.   Respiratory:  Negative for cough, hemoptysis, shortness of breath and wheezing.    Endocrine: Negative.    Hematologic/Lymphatic: Negative for adenopathy. Does not bruise/bleed easily.   Skin:  Negative for color change and rash.   Musculoskeletal:  Positive for joint pain (KNEE). Negative for back pain and falls.   Gastrointestinal:  Negative for abdominal pain, change in bowel habit, heartburn, melena, nausea and vomiting.   Genitourinary:  Negative for dysuria and flank pain.   Neurological:  Negative for brief paralysis, focal weakness, headaches, light-headedness, loss of balance, paresthesias and weakness.   Psychiatric/Behavioral:  Negative for altered mental status and depression.    Allergic/Immunologic: Negative for hives and persistent infections.        Objective:      Vitals:    10/30/23 0828 10/30/23 0836   BP: (!) 145/91 132/82   Pulse: 83    Weight: 115.4 kg (254 lb 6.6 oz)    Height: 5' 3" (1.6 m)    PainSc: 0-No pain      Body mass index is 45.07 kg/m².    Physical Exam  Constitutional:       Appearance: She is obese.   HENT:      Head: Normocephalic and atraumatic.   Eyes:      Extraocular Movements: Extraocular movements intact.      Conjunctiva/sclera: Conjunctivae normal.   Neck:      Vascular: No carotid bruit.   Cardiovascular:      Rate and Rhythm: Normal rate and regular rhythm. No extrasystoles are present.     Pulses: Normal pulses.           Carotid pulses are 2+ on the right side and 2+ on the left side.       Radial pulses are 2+ on the right side and 2+ on the left side.        Posterior tibial pulses are 2+ on the right side and 2+ on the left side.      Heart sounds:      No friction rub. No gallop. No S4 sounds.   Pulmonary:      Effort: Pulmonary effort is normal. No respiratory distress.      Breath sounds: Normal breath sounds and air entry. No rales. "   Abdominal:      Palpations: Abdomen is soft.      Tenderness: There is no abdominal tenderness.   Musculoskeletal:      Cervical back: Neck supple.      Right lower leg: No edema.      Left lower leg: No edema.      Comments: RUE LYMPHEDEMA   Skin:     General: Skin is warm and dry.      Capillary Refill: Capillary refill takes less than 2 seconds.   Neurological:      General: No focal deficit present.      Mental Status: She is alert and oriented to person, place, and time.      Cranial Nerves: No cranial nerve deficit.   Psychiatric:         Mood and Affect: Mood normal.         Speech: Speech normal.         Behavior: Behavior normal.                 ..    Chemistry        Component Value Date/Time     08/10/2023 1117    K 4.0 08/10/2023 1117     08/10/2023 1117    CO2 28 08/10/2023 1117    BUN 15 08/10/2023 1117    CREATININE 1.1 08/10/2023 1117    GLU 77 08/10/2023 1117        Component Value Date/Time    CALCIUM 9.0 08/10/2023 1117    ALKPHOS 158 (H) 08/10/2023 1117    AST 44 (H) 08/10/2023 1117    ALT 45 (H) 08/10/2023 1117    BILITOT 0.4 08/10/2023 1117    ESTGFRAFRICA 46.8 (A) 07/27/2022 0846    EGFRNONAA 40.6 (A) 07/27/2022 0846            ..  Lab Results   Component Value Date    CHOL 174 08/10/2023    CHOL 178 07/27/2022    CHOL 181 07/09/2021     Lab Results   Component Value Date    HDL 63 08/10/2023    HDL 59 07/27/2022    HDL 77 (H) 07/09/2021     Lab Results   Component Value Date    LDLCALC 99.2 08/10/2023    LDLCALC 106.6 07/27/2022    LDLCALC 90.4 07/09/2021     Lab Results   Component Value Date    TRIG 59 08/10/2023    TRIG 62 07/27/2022    TRIG 68 07/09/2021     Lab Results   Component Value Date    CHOLHDL 36.2 08/10/2023    CHOLHDL 33.1 07/27/2022    CHOLHDL 42.5 07/09/2021     ..  Lab Results   Component Value Date    WBC 5.15 04/18/2023    HGB 11.6 (L) 04/18/2023    HCT 36.7 (L) 04/18/2023    MCV 85 04/18/2023     04/18/2023       Test(s) Reviewed  I have reviewed the  following in detail:  [] Stress test   [] Angiography   [] Echocardiogram   [x] Labs   [] Other:       Assessment:         ICD-10-CM ICD-9-CM   1. Essential hypertension  I10 401.9   2. Aortic valve sclerosis  I35.8 424.1   3. Ascending aorta dilation  I77.810 447.71   4. Morbid obesity with BMI of 45.0-49.9, adult  E66.01 278.01    Z68.42 V85.42   5. Hepatic steatosis  K76.0 571.8     Problem List Items Addressed This Visit          Cardiac/Vascular    Essential hypertension - Primary    Relevant Orders    Basic Metabolic Panel    Magnesium    Ascending aorta dilation    Aortic valve sclerosis       Endocrine    Morbid obesity with BMI of 45.0-49.9, adult       GI    Hepatic steatosis        Plan:     ALL CV CLINICALLY STABLE, NO ANGINA, NO HF, NO TIA, NO CLINICAL ARRHYTHMIA,CONTINUE CURRENT MEDS, EDUCATION, DIET, EXERCISE , WEIGHT LOSS, WATCH BP, BRING HOME MACHINE, RTC IN 9 MO WITH LABS      Essential hypertension  -     Basic Metabolic Panel; Future; Expected date: 04/30/2024  -     Magnesium; Future; Expected date: 04/30/2024    Aortic valve sclerosis    Ascending aorta dilation    Morbid obesity with BMI of 45.0-49.9, adult    Hepatic steatosis    Other orders  -     carvediloL (COREG) 12.5 MG tablet; Take 1 tablet (12.5 mg total) by mouth 2 (two) times daily.  Dispense: 180 tablet; Refill: 2    RTC Low level/low impact aerobic exercise 5x's/wk. Heart healthy diet and risk factor modification.    See labs and med orders.    Aerobic exercise 5x's/wk. Heart healthy diet and risk factor modification.    See labs and med orders.

## 2023-11-02 ENCOUNTER — OFFICE VISIT (OUTPATIENT)
Dept: INFECTIOUS DISEASES | Facility: CLINIC | Age: 48
End: 2023-11-02
Payer: COMMERCIAL

## 2023-11-02 ENCOUNTER — OFFICE VISIT (OUTPATIENT)
Dept: RHEUMATOLOGY | Facility: CLINIC | Age: 48
End: 2023-11-02
Payer: COMMERCIAL

## 2023-11-02 ENCOUNTER — IMMUNIZATION (OUTPATIENT)
Dept: INTERNAL MEDICINE | Facility: CLINIC | Age: 48
End: 2023-11-02
Payer: COMMERCIAL

## 2023-11-02 VITALS
OXYGEN SATURATION: 97 % | BODY MASS INDEX: 45.08 KG/M2 | DIASTOLIC BLOOD PRESSURE: 80 MMHG | SYSTOLIC BLOOD PRESSURE: 127 MMHG | TEMPERATURE: 98 F | HEIGHT: 63 IN | HEART RATE: 74 BPM | WEIGHT: 254.44 LBS

## 2023-11-02 VITALS
WEIGHT: 254.44 LBS | SYSTOLIC BLOOD PRESSURE: 145 MMHG | HEIGHT: 63 IN | BODY MASS INDEX: 45.08 KG/M2 | HEART RATE: 64 BPM | DIASTOLIC BLOOD PRESSURE: 90 MMHG

## 2023-11-02 DIAGNOSIS — G47.33 OSA ON CPAP: ICD-10-CM

## 2023-11-02 DIAGNOSIS — I89.0 LYMPHEDEMA OF UPPER EXTREMITY FOLLOWING LYMPHADENECTOMY: ICD-10-CM

## 2023-11-02 DIAGNOSIS — M22.41 CHONDROMALACIA OF RIGHT PATELLA: ICD-10-CM

## 2023-11-02 DIAGNOSIS — E66.1 CLASS 3 DRUG-INDUCED OBESITY WITH SERIOUS COMORBIDITY AND BODY MASS INDEX (BMI) OF 45.0 TO 49.9 IN ADULT: ICD-10-CM

## 2023-11-02 DIAGNOSIS — R73.03 PREDIABETES: ICD-10-CM

## 2023-11-02 DIAGNOSIS — L03.113 CELLULITIS OF RIGHT HAND: ICD-10-CM

## 2023-11-02 DIAGNOSIS — M48.10 DISH (DIFFUSE IDIOPATHIC SKELETAL HYPEROSTOSIS): ICD-10-CM

## 2023-11-02 DIAGNOSIS — R76.8 POSITIVE ANA (ANTINUCLEAR ANTIBODY): ICD-10-CM

## 2023-11-02 DIAGNOSIS — D86.0 SARCOIDOSIS OF LUNG: Primary | ICD-10-CM

## 2023-11-02 DIAGNOSIS — E89.89 LYMPHEDEMA OF UPPER EXTREMITY FOLLOWING LYMPHADENECTOMY: ICD-10-CM

## 2023-11-02 DIAGNOSIS — R59.0 HILAR ADENOPATHY: ICD-10-CM

## 2023-11-02 DIAGNOSIS — Z86.2 HISTORY OF SARCOIDOSIS: ICD-10-CM

## 2023-11-02 DIAGNOSIS — E89.0 POST-OPERATIVE HYPOTHYROIDISM: ICD-10-CM

## 2023-11-02 DIAGNOSIS — R91.8 PULMONARY NODULES/LESIONS, MULTIPLE: ICD-10-CM

## 2023-11-02 DIAGNOSIS — K76.0 HEPATIC STEATOSIS: ICD-10-CM

## 2023-11-02 DIAGNOSIS — Z98.890 S/P NISSEN FUNDOPLICATION (WITHOUT GASTROSTOMY TUBE) PROCEDURE: ICD-10-CM

## 2023-11-02 PROCEDURE — 3080F PR MOST RECENT DIASTOLIC BLOOD PRESSURE >= 90 MM HG: ICD-10-PCS | Mod: CPTII,S$GLB,, | Performed by: INTERNAL MEDICINE

## 2023-11-02 PROCEDURE — 3008F PR BODY MASS INDEX (BMI) DOCUMENTED: ICD-10-PCS | Mod: CPTII,S$GLB,, | Performed by: INTERNAL MEDICINE

## 2023-11-02 PROCEDURE — 90686 IIV4 VACC NO PRSV 0.5 ML IM: CPT | Mod: S$GLB,,, | Performed by: FAMILY MEDICINE

## 2023-11-02 PROCEDURE — 1159F PR MEDICATION LIST DOCUMENTED IN MEDICAL RECORD: ICD-10-PCS | Mod: CPTII,S$GLB,, | Performed by: INTERNAL MEDICINE

## 2023-11-02 PROCEDURE — 1159F MED LIST DOCD IN RCRD: CPT | Mod: CPTII,S$GLB,, | Performed by: INTERNAL MEDICINE

## 2023-11-02 PROCEDURE — 99999 PR PBB SHADOW E&M-EST. PATIENT-LVL IV: CPT | Mod: PBBFAC,,, | Performed by: INTERNAL MEDICINE

## 2023-11-02 PROCEDURE — 99214 OFFICE O/P EST MOD 30 MIN: CPT | Mod: S$GLB,,, | Performed by: INTERNAL MEDICINE

## 2023-11-02 PROCEDURE — 99999 PR PBB SHADOW E&M-EST. PATIENT-LVL IV: ICD-10-PCS | Mod: PBBFAC,,, | Performed by: INTERNAL MEDICINE

## 2023-11-02 PROCEDURE — 99214 PR OFFICE/OUTPT VISIT, EST, LEVL IV, 30-39 MIN: ICD-10-PCS | Mod: S$GLB,,, | Performed by: INTERNAL MEDICINE

## 2023-11-02 PROCEDURE — 3008F BODY MASS INDEX DOCD: CPT | Mod: CPTII,S$GLB,, | Performed by: INTERNAL MEDICINE

## 2023-11-02 PROCEDURE — 3074F PR MOST RECENT SYSTOLIC BLOOD PRESSURE < 130 MM HG: ICD-10-PCS | Mod: CPTII,S$GLB,, | Performed by: INTERNAL MEDICINE

## 2023-11-02 PROCEDURE — 3077F PR MOST RECENT SYSTOLIC BLOOD PRESSURE >= 140 MM HG: ICD-10-PCS | Mod: CPTII,S$GLB,, | Performed by: INTERNAL MEDICINE

## 2023-11-02 PROCEDURE — 3044F HG A1C LEVEL LT 7.0%: CPT | Mod: CPTII,S$GLB,, | Performed by: INTERNAL MEDICINE

## 2023-11-02 PROCEDURE — 99213 OFFICE O/P EST LOW 20 MIN: CPT | Mod: S$GLB,,, | Performed by: INTERNAL MEDICINE

## 2023-11-02 PROCEDURE — 90471 IMMUNIZATION ADMIN: CPT | Mod: S$GLB,,, | Performed by: FAMILY MEDICINE

## 2023-11-02 PROCEDURE — 3080F DIAST BP >= 90 MM HG: CPT | Mod: CPTII,S$GLB,, | Performed by: INTERNAL MEDICINE

## 2023-11-02 PROCEDURE — 3079F DIAST BP 80-89 MM HG: CPT | Mod: CPTII,S$GLB,, | Performed by: INTERNAL MEDICINE

## 2023-11-02 PROCEDURE — 3079F PR MOST RECENT DIASTOLIC BLOOD PRESSURE 80-89 MM HG: ICD-10-PCS | Mod: CPTII,S$GLB,, | Performed by: INTERNAL MEDICINE

## 2023-11-02 PROCEDURE — 99213 PR OFFICE/OUTPT VISIT, EST, LEVL III, 20-29 MIN: ICD-10-PCS | Mod: S$GLB,,, | Performed by: INTERNAL MEDICINE

## 2023-11-02 PROCEDURE — 3044F PR MOST RECENT HEMOGLOBIN A1C LEVEL <7.0%: ICD-10-PCS | Mod: CPTII,S$GLB,, | Performed by: INTERNAL MEDICINE

## 2023-11-02 PROCEDURE — 90686 FLU VACCINE (QUAD) GREATER THAN OR EQUAL TO 3YO PRESERVATIVE FREE IM: ICD-10-PCS | Mod: S$GLB,,, | Performed by: FAMILY MEDICINE

## 2023-11-02 PROCEDURE — 90471 FLU VACCINE (QUAD) GREATER THAN OR EQUAL TO 3YO PRESERVATIVE FREE IM: ICD-10-PCS | Mod: S$GLB,,, | Performed by: FAMILY MEDICINE

## 2023-11-02 PROCEDURE — 3077F SYST BP >= 140 MM HG: CPT | Mod: CPTII,S$GLB,, | Performed by: INTERNAL MEDICINE

## 2023-11-02 PROCEDURE — 3074F SYST BP LT 130 MM HG: CPT | Mod: CPTII,S$GLB,, | Performed by: INTERNAL MEDICINE

## 2023-11-02 NOTE — PROGRESS NOTES
Subjective     Patient ID: Akiko Jameson is a 48 y.o. female.    Chief Complaint: Follow-up    HPI  47 year old with PMH as listed below. She had lymph node removal about 20 years ago on the right upper extremity and since then, she has right arm lymphedema  .  She has now developed 2 episodes of cellulitis this year .  She was at [place] -01/2019 for right upper extremity cellulitis .    11/2- she comes for follow up and has not had any new episodes since the last visit .      Review of Systems   Constitutional:  Negative for activity change and appetite change.   Eyes:  Negative for discharge and itching.   Neurological:  Negative for dizziness and coordination difficulties.          Objective     Physical Exam  Vitals and nursing note reviewed.   HENT:      Head: Normocephalic.   Eyes:      Pupils: Pupils are equal, round, and reactive to light.   Pulmonary:      Effort: Pulmonary effort is normal.   Abdominal:      General: Abdomen is flat.   Musculoskeletal:      Cervical back: Normal range of motion.      Comments: Right upper extremity swelling    Skin:     General: Skin is warm.   Neurological:      General: No focal deficit present.            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  No need for prophylactic antibiotics

## 2023-11-02 NOTE — PROGRESS NOTES
RHEUMATOLOGY OUTPATIENT CLINIC NOTE    11/2/2023    Attending Rheumatologist: Urban Marion  Primary Care Provider/Physician Requesting Consultation: Dwayne Booth MD   Chief Complaint/Reason For Consultation:  Sarcoidosis and DISH      Subjective:     Akiko Jameson is a 48 y.o. Black or  female with sarcoidosis     No acute complaints.  Self limited arthralgias.  Not initiated HCQ reporting did not feel like need at this time.    Review of Systems   Constitutional:  Negative for fever and malaise/fatigue.   HENT:          Denies sicca sc   Eyes:  Negative for blurred vision, double vision, photophobia and pain.   Respiratory:  Negative for cough and shortness of breath.    Cardiovascular:  Negative for chest pain.   Gastrointestinal:         Denies dysphagia   Genitourinary:  Negative for dysuria, hematuria and urgency.   Musculoskeletal:  Positive for joint pain. Negative for back pain.   Skin:  Negative for rash.        Denies RP   Neurological:  Negative for tingling, focal weakness, seizures and weakness.       Chronic comorbid conditions affecting medical decision making today:  Past Medical History:   Diagnosis Date    ALLERGIC RHINITIS     Aortic valve sclerosis 1/20/2023    Arthritis     Cellulitis     Constipation due to opioid therapy     Eosinophilia     mild    GERD (gastroesophageal reflux disease)     Granular cell tumor     H. pylori infection 2018    History of 2019 novel coronavirus disease (COVID-19) 10/04/2020    Hypertension     Lymphedema     Mild anemia     Mitral valve sclerosis 3/21/2023    KEIRY on CPAP     does not regularly    Positive CHARLENE (antinuclear antibody) 07/27/2022    Prediabetes 08/06/2021    Sarcoidosis, unspecified     Sleep apnea     compliant with CPAP    Thyroid nodule     Urinary incontinence, mixed      Past Surgical History:   Procedure Laterality Date    24 HOUR IMPEDANCE PH MONITORING OF ESOPHAGUS IN PATIENT NOT TAKING ACID REDUCING  MEDICATIONS N/A 01/06/2020    Procedure: IMPEDANCE PH STUDY, ESOPHAGEAL, 24 HOUR, IN PATIENT NOT TAKING ACID REDUCING MEDICATION;  Surgeon: First Available [place];  Location: [facility];  Service: Endoscopy;  Laterality: N/A;    AXILLARY NODE DISSECTION Right     granular cell tumor    BILATERAL SALPINGO-OOPHORECTOMY (BSO)  07/21/2020    BREAST CYST ASPIRATION      left    CHOLECYSTECTOMY      COLONOSCOPY N/A 05/10/2019    Procedure: COLONOSCOPY;  Surgeon: Edgar Gamble Jr., MD;  Location: [facility];  Service: Endoscopy;  Laterality: N/A;    ENDOBRONCHIAL ULTRASOUND Bilateral 04/27/2021    Procedure: ENDOBRONCHIAL ULTRASOUND (EBUS);  Surgeon: Armando Kirby MD;  Location: [facility];  Service: Pulmonary;  Laterality: Bilateral;  need fluoroscopy    ENDOMETRIAL ABLATION      ESOPHAGEAL MANOMETRY WITH MEASUREMENT OF IMPEDANCE N/A 01/06/2020    Procedure: MANOMETRY, ESOPHAGUS, WITH IMPEDANCE MEASUREMENT;  Surgeon: First Available [place];  Location: [facility];  Service: Endoscopy;  Laterality: N/A;    ESOPHAGOGASTRODUODENOSCOPY N/A 07/05/2018    Procedure: EGD (ESOPHAGOGASTRODUODENOSCOPY);  Surgeon: Edgar Gamble Jr., MD;  Location: [facility];  Service: Endoscopy;  Laterality: N/A;    ESOPHAGOGASTRODUODENOSCOPY N/A 11/23/2020    Procedure: ESOPHAGOGASTRODUODENOSCOPY (EGD);  Surgeon: Jina Kaufman MD;  Location: [facility];  Service: General;  Laterality: N/A;    EXCISION OF MASS OF ABDOMEN Left 06/18/2018    Procedure: EXCISION, MASS, ABDOMEN - flank left;  Surgeon: Armando Tate MD;  Location: [facility];  Service: General;  Laterality: Left;  left flank removal of mass    FINE NEEDLE ASPIRATION      thyroid    FLEXIBLE SIGMOIDOSCOPY N/A 08/24/2022    Procedure: SIGMOIDOSCOPY, FLEXIBLE;  Surgeon: Amber West MD;  Location: [facility];  Service: Endoscopy;  Laterality: N/A;    HYSTERECTOMY  07/21/2020    INJECTION OF ANESTHETIC AGENT AROUND MEDIAL BRANCH NERVES INNERVATING LUMBAR FACET  JOINT Right 02/24/2022    Procedure: Right L3, 4, 5 MBB;  Surgeon: Anam Montero MD;  Location: [facility];  Service: Pain Management;  Laterality: Right;    INJECTION OF ANESTHETIC AGENT AROUND MEDIAL BRANCH NERVES INNERVATING LUMBAR FACET JOINT Right 03/29/2022    Procedure: Right L3, 4, 5 MBB  (2nd block);  Surgeon: Anam Montero MD;  Location: [facility];  Service: Pain Management;  Laterality: Right;    KNEE ARTHROSCOPY W/ MENISCECTOMY Right     NASAL SEPTUM SURGERY      OOPHORECTOMY      RADIOFREQUENCY THERMOCOAGULATION Right 04/05/2022    Procedure: Right L3-5 Lumbar RFA;  Surgeon: Anam Montero MD;  Location: [facility];  Service: Pain Management;  Laterality: Right;    ROBOT-ASSISTED NISSEN FUNDOPLICATION USING DA TAMAR XI N/A 02/28/2020    Procedure: XI ROBOTIC FUNDOPLICATION, NISSEN;  Surgeon: Jina Kaufman MD;  Location[facility];  Service: General;  Laterality: N/A;    THYROIDECTOMY Bilateral 03/11/2021    Procedure: THYROIDECTOMY;  Surgeon: Nixon Moe MD;  Location: [facility];  Service: ENT;  Laterality: Bilateral;    TUBAL LIGATION      UPPER GASTROINTESTINAL ENDOSCOPY  07/05/2018    Dr. Gamble     Family History   Problem Relation Age of Onset    Diabetes Mother     Kidney failure Mother     Heart attack Mother     Breast cancer Maternal Grandmother     Breast cancer Maternal Aunt     Ovarian cancer Cousin     Breast cancer Cousin     Blindness Father     Cirrhosis Neg Hx     Colon polyps Neg Hx     Colon cancer Neg Hx     Crohn's disease Neg Hx     Esophageal cancer Neg Hx     Stomach cancer Neg Hx     Ulcerative colitis Neg Hx     Amblyopia Neg Hx     Cataracts Neg Hx     Glaucoma Neg Hx     Macular degeneration Neg Hx     Retinal detachment Neg Hx     Strabismus Neg Hx     Allergic rhinitis Neg Hx     Allergies Neg Hx     Angioedema Neg Hx     Asthma Neg Hx     Atopy Neg Hx     Eczema Neg Hx     Immunodeficiency Neg Hx     Rhinitis Neg Hx     Urticaria Neg Hx       Social History     Tobacco Use   Smoking Status Never   Smokeless Tobacco Never       Current Outpatient Medications:     calcium carbonate (OS-CARLA) 500 mg calcium (1,250 mg) tablet, Take 1 tablet (500 mg total) by mouth once daily., Disp: 60 tablet, Rfl: 3    carvediloL (COREG) 12.5 MG tablet, Take 1 tablet (12.5 mg total) by mouth 2 (two) times daily., Disp: 180 tablet, Rfl: 2    cetirizine (ZYRTEC) 10 MG tablet, Take 1 tablet (10 mg total) by mouth once daily. (Patient taking differently: Take 10 mg by mouth once daily.), Disp: , Rfl: 0    dicyclomine (BENTYL) 20 mg tablet, Take 1 tablet (20 mg total) by mouth every 6 (six) hours., Disp: 120 tablet, Rfl: 0    docusate sodium (COLACE) 100 MG capsule, Take 100 mg by mouth every other day. , Disp: , Rfl:     ergocalciferol (ERGOCALCIFEROL) 50,000 unit Cap, Take 1 capsule (50,000 Units total) by mouth every 7 days., Disp: 12 capsule, Rfl: 3    fluticasone propion-salmeterol 115-21 mcg/dose (ADVAIR HFA) 115-21 mcg/actuation HFAA inhaler, Inhale 2 puffs into the lungs every 12 (twelve) hours. Controller, Disp: 12 g, Rfl: 11    hydrOXYchloroQUINE (PLAQUENIL) 200 mg tablet, Take 200 mg by mouth 2 (two) times a day., Disp: , Rfl:     ibuprofen (ADVIL,MOTRIN) 800 MG tablet, Take 1 tablet (800 mg total) by mouth 3 (three) times daily., Disp: 60 tablet, Rfl: 1    levothyroxine (SYNTHROID, LEVOTHROID) 175 MCG tablet, Take 1 tablet (175 mcg total) by mouth before breakfast. Brand name Synthroid, Disp: 30 tablet, Rfl: 3    lubiprostone (AMITIZA) 24 MCG Cap, Take 1 capsule (24 mcg total) by mouth 2 (two) times daily., Disp: 180 capsule, Rfl: 0    montelukast (SINGULAIR) 10 mg tablet, Take 1 tablet (10 mg total) by mouth nightly., Disp: 90 tablet, Rfl: 3    multivitamin (THERAGRAN) per tablet, Take 1 tablet by mouth once daily. Every day, Disp: , Rfl:     olmesartan-hydrochlorothiazide (BENICAR HCT) 20-12.5 mg per tablet, TAKE 1 TABLET BY MOUTH ONCE DAILY, Disp: 90 tablet,  Rfl: 1    potassium chloride SA (K-DUR,KLOR-CON) 20 MEQ tablet, Take 1 tablet (20 mEq total) by mouth 2 (two) times daily., Disp: 180 tablet, Rfl: 3    spironolactone (ALDACTONE) 25 MG tablet, Take 0.5 tablets (12.5 mg total) by mouth once daily., Disp: 45 tablet, Rfl: 1     Objective:     Vitals:    11/02/23 0850   BP: (!) 145/90   Pulse: 64     Physical Exam   Constitutional: She appears obese.   Eyes: Conjunctivae are normal.   Pulmonary/Chest: Effort normal.   Musculoskeletal:         General: No swelling or tenderness. Normal range of motion.   Neurological: She displays no weakness.   Skin: No rash noted.       Reviewed available old and all outside pertinent medical records available.    All lab results personally reviewed and interpreted by me.       ASSESSMENT      Encounter Diagnoses   Name Primary?    History of sarcoidosis     Sarcoidosis of lung Yes    Pulmonary nodules/lesions, multiple     Positive CHARLENE (antinuclear antibody)     Lymphedema of upper extremity following lymphadenectomy     Class 3 drug-induced obesity with serious comorbidity and body mass index (BMI) of 45.0 to 49.9 in adult     Prediabetes     S/P Nissen fundoplication (without gastrostomy tube) procedure     Post-operative hypothyroidism     Hepatic steatosis     Chondromalacia of right patella     KEIRY on CPAP     Hilar adenopathy     DISH (diffuse idiopathic skeletal hyperostosis)       PLAN     History of sarcoidosis w/ lung and probable LN involvement.  No t currently on immunomodulator therapy.  SElf limited arthralgias from OA.  Exam non focal w/o weakness, active inflammatory rashes, or synovitis.   No concerning cytopenias.  Liver and kidney function stable.  CT chest 7/2022 reported w/ stable findings / improvement.  Hypocalcemia (resolved) and vit D insufficiency managed by endocrinology.  No worrisome detrimental consolidations are noted.  No rheumatic indication for immunosuppression.  If arthralgias not responsive to OTC  analgesics or antiseizure therapy, consider trial of HCQ.  Repeat labs and XR close to f/u.    Urban Marion M.D.

## 2023-11-14 ENCOUNTER — OFFICE VISIT (OUTPATIENT)
Dept: HEPATOLOGY | Facility: CLINIC | Age: 48
End: 2023-11-14
Payer: COMMERCIAL

## 2023-11-14 DIAGNOSIS — R73.03 PRE-DIABETES: ICD-10-CM

## 2023-11-14 DIAGNOSIS — K76.0 NAFLD (NONALCOHOLIC FATTY LIVER DISEASE): Primary | ICD-10-CM

## 2023-11-14 DIAGNOSIS — E66.01 CLASS 3 SEVERE OBESITY DUE TO EXCESS CALORIES WITH BODY MASS INDEX (BMI) OF 45.0 TO 49.9 IN ADULT, UNSPECIFIED WHETHER SERIOUS COMORBIDITY PRESENT: ICD-10-CM

## 2023-11-14 DIAGNOSIS — R74.8 ELEVATED LIVER ENZYMES: ICD-10-CM

## 2023-11-14 PROCEDURE — 99204 OFFICE O/P NEW MOD 45 MIN: CPT | Mod: 95,,, | Performed by: NURSE PRACTITIONER

## 2023-11-14 PROCEDURE — 3044F PR MOST RECENT HEMOGLOBIN A1C LEVEL <7.0%: ICD-10-PCS | Mod: CPTII,95,, | Performed by: NURSE PRACTITIONER

## 2023-11-14 PROCEDURE — 3044F HG A1C LEVEL LT 7.0%: CPT | Mod: CPTII,95,, | Performed by: NURSE PRACTITIONER

## 2023-11-14 PROCEDURE — 99204 PR OFFICE/OUTPT VISIT, NEW, LEVL IV, 45-59 MIN: ICD-10-PCS | Mod: 95,,, | Performed by: NURSE PRACTITIONER

## 2023-11-14 NOTE — PROGRESS NOTES
[facility] - New Patient     REFERRING PROVIDER: No ref. provider found  PCP: Dwayne Booth MD    Chief Complaint: Fatty liver      HISTORY     This is a 48 y.o. female referred for evaluation of fatty liver. Previously seen in Hepatology by Susana Baez NP in 2015; new patient to me.     Noted h/o pulmonary sarcoidosis, not currently on treatment.     Her imaging back in 2015 showed hepatomegaly. More recent abd US 10/2023 showed liver to be normal size but with steatosis. No imaging findings to suggest advanced fibrosis.     Risk factors for fatty liver include:   Obesity with BMI >45  HTN, pre-diabetes  KEIRY  Does not drink alcohol.    For the most part, she has had normal liver enzymes over the years. A few mild elevations noted. Liver function and platelet count are normal.     Serologic workup has been negative for hemochromatosis and viral hepatitis. CHARLENE+ 1:160 and IgG >2100 years ago.     Fibrosis staging:   Fibrosure 6/2015 = F0, no fibrosis     Interval history:  Reports GI issues- alternating constipation, diarrhea, and abdominal pain. Recently started probiotic.      No symptoms of hepatic decompensation including jaundice, ascites, cognitive problems to suggest hepatic encephalopathy, or GI bleeding.     Updates on risk factors for fatty liver:  Weight -- BMI ~45                 Dyslipidemia -- no                                Insulin resistance / diabetes -- yes though HgbA1c 5.6         Alcohol use -- none    Health Maintenance:  - Most recent imaging: abd US 10/13/23 without focal hepatic lesion   - Hepatitis A & B vaccination: immunity unknown             Past medical history, surgical history, problem list, family history, social history, allergies: Reviewed and updated in the appropriate section of the electronic medical record.      Current Outpatient Medications   Medication Sig Dispense Refill    calcium carbonate (OS-CARLA) 500 mg calcium (1,250 mg) tablet Take 1 tablet  (500 mg total) by mouth once daily. 60 tablet 3    carvediloL (COREG) 12.5 MG tablet Take 1 tablet (12.5 mg total) by mouth 2 (two) times daily. 180 tablet 2    cetirizine (ZYRTEC) 10 MG tablet Take 1 tablet (10 mg total) by mouth once daily. (Patient taking differently: Take 10 mg by mouth once daily.)  0    docusate sodium (COLACE) 100 MG capsule Take 100 mg by mouth every other day.       ergocalciferol (ERGOCALCIFEROL) 50,000 unit Cap Take 1 capsule (50,000 Units total) by mouth every 7 days. 12 capsule 3    fluticasone propion-salmeterol 115-21 mcg/dose (ADVAIR HFA) 115-21 mcg/actuation HFAA inhaler Inhale 2 puffs into the lungs every 12 (twelve) hours. Controller 12 g 11    hydrOXYchloroQUINE (PLAQUENIL) 200 mg tablet Take 200 mg by mouth 2 (two) times a day.      ibuprofen (ADVIL,MOTRIN) 800 MG tablet Take 1 tablet (800 mg total) by mouth 3 (three) times daily. 60 tablet 1    levothyroxine (SYNTHROID, LEVOTHROID) 175 MCG tablet Take 1 tablet (175 mcg total) by mouth before breakfast. Brand name Synthroid 30 tablet 3    montelukast (SINGULAIR) 10 mg tablet Take 1 tablet (10 mg total) by mouth nightly. 90 tablet 3    multivitamin (THERAGRAN) per tablet Take 1 tablet by mouth once daily. Every day      olmesartan-hydrochlorothiazide (BENICAR HCT) 20-12.5 mg per tablet TAKE 1 TABLET BY MOUTH ONCE DAILY 90 tablet 1    potassium chloride SA (K-DUR,KLOR-CON) 20 MEQ tablet Take 1 tablet (20 mEq total) by mouth 2 (two) times daily. 180 tablet 3    spironolactone (ALDACTONE) 25 MG tablet Take 0.5 tablets (12.5 mg total) by mouth once daily. 45 tablet 1     No current facility-administered medications for this visit.     Medication list reviewed and updated.      Review of Systems - as per HPI  Constitutional: Negative for fatigue or unexpected weight change.   Respiratory: Negative for shortness of breath.    Cardiovascular: Negative for leg swelling.  Gastrointestinal: Negative for abdominal distention. Negative for  "melena or hematemesis. +abd pain  Musculoskeletal: Negative for myalgias.    Skin: Negative for jaundice or itching.  Neurological: Negative for confusion or slowed mentation. Negative for tremors.   Hematological: Does not bruise/bleed easily.   Psychiatric: Negative for sleep disturbance.      Physical Exam - limited by virtual visit  Constitutional: No distress. Alert and oriented to person, place, and time.  Pulmonary/Chest: No respiratory distress.   Skin: No jaundice.   Psychiatric: Normal mood and affect. Speech, behavior, and thought content normal. No depression or anxiety noted.         LABS & DIAGNOSTIC STUDIES     I have personally reviewed pertinent laboratory findings:    Lab Results   Component Value Date    ALT 45 (H) 08/10/2023    AST 44 (H) 08/10/2023    ALKPHOS 158 (H) 08/10/2023    BILITOT 0.4 08/10/2023    ALBUMIN 4.0 08/10/2023    INR 0.9 02/25/2015       Lab Results   Component Value Date    WBC 5.15 04/18/2023    HGB 11.6 (L) 04/18/2023    HCT 36.7 (L) 04/18/2023    MCV 85 04/18/2023     04/18/2023       Lab Results   Component Value Date     08/10/2023    K 4.0 08/10/2023    BUN 15 08/10/2023    CREATININE 1.1 08/10/2023    ESTGFRAFRICA 46.8 (A) 07/27/2022    EGFRNONAA 40.6 (A) 07/27/2022       Lab Results   Component Value Date    IGGSERUM 2,100 (H) 01/21/2008    ANASCREEN Positive (A) 10/14/2021    FERRITIN 308 (H) 01/12/2023    FESATURATED 19 (L) 01/12/2023    HEPBSAG Negative 02/25/2015    HEPCAB Negative 02/25/2015    [id number] Negative 07/09/2021       No results found for: "AFP"    I have personally reviewed the following result reports:  Abdominal US - 10/13/23        ASSESSMENT & PLAN     48 y.o. female with:    1. NAFLD, elevated liver enzymes  -- Obtain MRI elastography for fibrosis and steatosis staging  -- Elevated liver enzymes are likely due to fatty liver, anticipate improvement with weight loss. Will trend. I note her h/o sarcoid. Will also check liver " autoimmune markers with next labs.   -- Recommend vaccination for viral hepatitis if not immune     Fatty liver discussion:  - Reviewed risk of hepatic inflammation and subsequent fibrosis from fatty liver.  - At this time, the only treatment for fatty liver is weight loss and maintaining good control of risk factors (blood pressure, cholesterol, and blood sugar).   - Alcohol use is also a risk factor for fatty liver. If no advanced fibrosis, should limit alcohol to max 1 standard drinks/day. Do not recommend daily alcohol use or drinking alcohol most days per week.     FIB-4 Calculation: 1.4 at 11/14/2023  9:27 AM   FIB-4 below 1.30 is considered as low-risk for advanced fibrosis  FIB-4 over 2.67 is considered as high-risk for advanced fibrosis  FIB-4 values between 1.30 and 2.67 are considered as intermediate-risk of advanced fibrosis for ages 36-64.       2. Obesity with BMI 45, pre-diabetes  -- Reviewed weight loss strategies including dietary changes and physical activity. Recommend low carbohydrate/sugar, high protein/fiber diet. Recommend long-term weight loss goal of 10% (about 25 lb).   -- We discussed additional weight loss resources including dietician consult and bariatric medicine consult. She will let us know if interested.         Orders Placed This Encounter   Procedures    MR Elastography    Anti-Smooth Muscle Antibody    Antimitochondrial Antibody    Hepatitis A antibody, IgG    Hepatitis B Core Antibody, Total    Hepatitis B Surface Ab, Qualitative       *See AVS for patient education and instructions.       Return to clinic pending MRI results.      Thank you for allowing me to participate in the care of Akiko Collazo [facility]at[facility]          The patient location is: [place], [place]  The chief complaint leading to consultation is: Fatty liver    Visit type: audiovisual    Face to Face time with patient: 25 min  45 minutes of total time spent on the encounter, which  includes face to face time and non-face to face time preparing to see the patient (eg, review of tests), Obtaining and/or reviewing separately obtained history, Documenting clinical information in the electronic or other health record, Independently interpreting results (not separately reported) and communicating results to the patient/family/caregiver, or Care coordination (not separately reported).     Each patient to whom he or she provides medical services by telemedicine is:  (1) informed of the relationship between the physician and patient and the respective role of any other health care provider with respect to management of the patient; and (2) notified that he or she may decline to receive medical services by telemedicine and may withdraw from such care at any time.

## 2023-11-14 NOTE — PATIENT INSTRUCTIONS
Labs next month to monitor liver enzymes and check a few additional tests  MRI elastography to assess for liver scarring/damage from fatty liver        FATTY LIVER EDUCATION:  There is no FDA approved therapy for non-alcoholic fatty liver disease (NAFLD); therefore, lifestyle changes are important:  1. Weight loss goal of 25 lbs    *Weight loss of 5% has been shown to reduce fat in the liver  *Weight loss of 7-10% has been shown to improve fatty liver, inflammation from fatty liver, and liver fibrosis (scarring/damage)    2. Decreasing daily calories is recommended for weight loss. Try to limit carbohydrate intake to LESS than 30-45 grams of carbs with a meal and LESS than 5-10 grams of carbs with a snack. Avoid drinking beverages with sugar/carbohydrates. Meeting with a dietician or using one of the weight loss apps below can be helpful to determine individualized calorie goals and add up carbs throughout the day. Look for snacks high in protein, low in carbohydrates/sugar.    3. Exercise as tolerated. Studies have shown that exercise improves fatty liver even without weight loss.     4. Recommend good control of blood pressure, cholesterol, and blood sugar levels as these are risk factors for fatty liver. Cholesterol lowering medications including statins are typically safe and beneficial (even if liver enzymes are elevated).    5. Limit alcohol consumption, no more than 1 serving(s) of alcohol in any day (1 serving is 5 ounces of wine, 12 ounces of beer, or 1.5 ounces of liquor). Do not recommend daily alcohol use or drinking alcohol most days per week.    In some people, fatty liver can progress to steatohepatitis (inflamatory fatty liver). This can cause liver scarring or damage (fibrosis) and possibly to cirrhosis. Cirrhosis increases risk of liver cancer and liver failure. Lifestyle changes now can help to decrease this risk.     Ask about our fatty liver/IRENE clinical trials if you have fibrosis / scar  tissue related to fatty liver.        Additional Resources:    Websites with information about fatty liver and inflammation related to fatty liver (IRENE): www.nashtruth.com and www.nashactually.com   [facility]: Non-Alcoholic Fatty Liver Disease (NAFLD)    Facebook support group with tips and recipes:   Non-Alcoholic Fatty Liver Disease (NAFLD) Diet & Nutrition Support     Can download SeekPanda Pal or Lose It ralph to add up your carbohydrates throughout the day.      Try www.Kudarom for recipes.    If interested in seeing a dietician to create a weight loss plan, contact the dietician team at [facility]: [email or web address].  You can also call to schedule a consult with a dietician: [phone]  *Virtual visits are available with one of our dieticians.     If you have diabetes or high blood pressure, you can meet with a Health  through [email or web address] program. Let us know if you are interested in a referral.     Let us know if you are interested in a referral to [facility].      Phone numbers:  Bariatric medicine - [phone]  [facility] - [phone]

## 2023-11-29 DIAGNOSIS — I10 ESSENTIAL HYPERTENSION: ICD-10-CM

## 2023-11-30 RX ORDER — OLMESARTAN MEDOXOMIL AND HYDROCHLOROTHIAZIDE 20/12.5 20; 12.5 MG/1; MG/1
1 TABLET ORAL DAILY
Qty: 90 TABLET | Refills: 1 | Status: SHIPPED | OUTPATIENT
Start: 2023-11-30 | End: 2024-11-29

## 2023-12-01 ENCOUNTER — HOSPITAL ENCOUNTER (OUTPATIENT)
Dept: RADIOLOGY | Facility: HOSPITAL | Age: 48
Discharge: HOME | End: 2023-12-01
Attending: FAMILY MEDICINE
Payer: COMMERCIAL

## 2023-12-01 DIAGNOSIS — Z12.31 ENCOUNTER FOR SCREENING MAMMOGRAM FOR MALIGNANT NEOPLASM OF BREAST: ICD-10-CM

## 2023-12-01 PROCEDURE — 77067 SCR MAMMO BI INCL CAD: CPT | Mod: 26,,, | Performed by: RADIOLOGY

## 2023-12-01 PROCEDURE — 77067 MAMMO DIGITAL SCREENING BILAT WITH TOMO: ICD-10-PCS | Mod: 26,,, | Performed by: RADIOLOGY

## 2023-12-01 PROCEDURE — 77067 SCR MAMMO BI INCL CAD: CPT | Mod: TC,PO

## 2023-12-01 PROCEDURE — 77063 BREAST TOMOSYNTHESIS BI: CPT | Mod: 26,,, | Performed by: RADIOLOGY

## 2023-12-01 PROCEDURE — 77063 MAMMO DIGITAL SCREENING BILAT WITH TOMO: ICD-10-PCS | Mod: 26,,, | Performed by: RADIOLOGY

## 2023-12-11 ENCOUNTER — HOSPITAL ENCOUNTER (OUTPATIENT)
Dept: RADIOLOGY | Facility: HOSPITAL | Age: 48
Discharge: HOME | End: 2023-12-11
Attending: NURSE PRACTITIONER
Payer: COMMERCIAL

## 2023-12-11 DIAGNOSIS — E66.01 CLASS 3 SEVERE OBESITY DUE TO EXCESS CALORIES WITH BODY MASS INDEX (BMI) OF 45.0 TO 49.9 IN ADULT, UNSPECIFIED WHETHER SERIOUS COMORBIDITY PRESENT: ICD-10-CM

## 2023-12-11 DIAGNOSIS — R73.03 PRE-DIABETES: ICD-10-CM

## 2023-12-11 DIAGNOSIS — K76.0 NAFLD (NONALCOHOLIC FATTY LIVER DISEASE): ICD-10-CM

## 2023-12-11 PROCEDURE — 76391 MR ELASTOGRAPHY: CPT | Mod: 26,,, | Performed by: RADIOLOGY

## 2023-12-11 PROCEDURE — 76391 MR ELASTOGRAPHY: ICD-10-PCS | Mod: 26,,, | Performed by: RADIOLOGY

## 2023-12-11 PROCEDURE — 76391 MR ELASTOGRAPHY: CPT | Mod: TC

## 2023-12-13 ENCOUNTER — TELEPHONE (OUTPATIENT)
Dept: PAIN MEDICINE | Facility: CLINIC | Age: 48
End: 2023-12-13
Payer: COMMERCIAL

## 2023-12-13 NOTE — TELEPHONE ENCOUNTER
Called pt to reschedule 1/19.Provider will be out of clinic. Appointment rescheduled to 2/9. Pt verbalized understanding. All questions answered.

## 2024-02-04 ENCOUNTER — PATIENT MESSAGE (OUTPATIENT)
Dept: PAIN MEDICINE | Facility: CLINIC | Age: 49
End: 2024-02-04
Payer: COMMERCIAL

## 2024-02-07 ENCOUNTER — TELEPHONE (OUTPATIENT)
Dept: HEPATOLOGY | Facility: CLINIC | Age: 49
End: 2024-02-07
Payer: COMMERCIAL

## 2024-02-09 ENCOUNTER — OFFICE VISIT (OUTPATIENT)
Dept: PAIN MEDICINE | Facility: CLINIC | Age: 49
End: 2024-02-09
Payer: COMMERCIAL

## 2024-02-09 VITALS
HEIGHT: 63 IN | WEIGHT: 263 LBS | SYSTOLIC BLOOD PRESSURE: 166 MMHG | HEART RATE: 82 BPM | BODY MASS INDEX: 46.6 KG/M2 | DIASTOLIC BLOOD PRESSURE: 90 MMHG | RESPIRATION RATE: 28 BRPM

## 2024-02-09 DIAGNOSIS — M47.816 LUMBAR SPONDYLOSIS: Primary | ICD-10-CM

## 2024-02-09 DIAGNOSIS — I89.0 LYMPHEDEMA OF UPPER EXTREMITY FOLLOWING LYMPHADENECTOMY: ICD-10-CM

## 2024-02-09 DIAGNOSIS — E89.89 LYMPHEDEMA OF UPPER EXTREMITY FOLLOWING LYMPHADENECTOMY: ICD-10-CM

## 2024-02-09 DIAGNOSIS — M79.18 MYALGIA, OTHER SITE: ICD-10-CM

## 2024-02-09 DIAGNOSIS — M54.59 LUMBAR FACET JOINT PAIN: ICD-10-CM

## 2024-02-09 PROCEDURE — 99214 OFFICE O/P EST MOD 30 MIN: CPT | Mod: 25,S$GLB,, | Performed by: PHYSICAL MEDICINE & REHABILITATION

## 2024-02-09 PROCEDURE — 99999 PR PBB SHADOW E&M-EST. PATIENT-LVL IV: CPT | Mod: PBBFAC,,, | Performed by: PHYSICAL MEDICINE & REHABILITATION

## 2024-02-09 PROCEDURE — 3080F DIAST BP >= 90 MM HG: CPT | Mod: CPTII,S$GLB,, | Performed by: PHYSICAL MEDICINE & REHABILITATION

## 2024-02-09 PROCEDURE — 20553 NJX 1/MLT TRIGGER POINTS 3/>: CPT | Mod: S$GLB,,, | Performed by: PHYSICAL MEDICINE & REHABILITATION

## 2024-02-09 PROCEDURE — 3008F BODY MASS INDEX DOCD: CPT | Mod: CPTII,S$GLB,, | Performed by: PHYSICAL MEDICINE & REHABILITATION

## 2024-02-09 PROCEDURE — 1159F MED LIST DOCD IN RCRD: CPT | Mod: CPTII,S$GLB,, | Performed by: PHYSICAL MEDICINE & REHABILITATION

## 2024-02-09 PROCEDURE — 3077F SYST BP >= 140 MM HG: CPT | Mod: CPTII,S$GLB,, | Performed by: PHYSICAL MEDICINE & REHABILITATION

## 2024-02-09 RX ORDER — METHYLPREDNISOLONE ACETATE 40 MG/ML
40 INJECTION, SUSPENSION INTRA-ARTICULAR; INTRALESIONAL; INTRAMUSCULAR; SOFT TISSUE
Status: COMPLETED | OUTPATIENT
Start: 2024-02-09 | End: 2024-02-09

## 2024-02-09 RX ORDER — CYCLOBENZAPRINE HCL 10 MG
TABLET ORAL
Qty: 60 TABLET | Refills: 0 | Status: SHIPPED | OUTPATIENT
Start: 2024-02-09

## 2024-02-09 RX ADMIN — METHYLPREDNISOLONE ACETATE 40 MG: 40 INJECTION, SUSPENSION INTRA-ARTICULAR; INTRALESIONAL; INTRAMUSCULAR; SOFT TISSUE at 08:02

## 2024-02-09 NOTE — PROGRESS NOTES
Established Patient Chronic Pain Note (Follow up visit)    Chief Complaint:   Chief Complaint   Patient presents with    Back Pain     Right     Low-back Pain     Right    Mid back pain, right       SUBJECTIVE:    Akiko Jameson is a 49 y.o. female presents today for mid to lower back pain that is predominantly right-sided.  She reports that her pain has severely worsened over the past month.  Pain level today is 8/10.  She continues to utilize Flexeril as needed request a refill.  She reports that the pain is of the same type and quality in the right lower back and lower thoracic spine without any radiation into the extremities.        Patient denies night fever/night sweats, urinary incontinence, bowel incontinence, significant weight loss, significant motor weakness and loss of sensations.    Pain Disability Index Review:      2/9/2024     8:12 AM 5/13/2022     9:06 AM 2/11/2022    10:41 AM   Last 3 PDI Scores   Pain Disability Index (PDI) 48 19 45       Interval History (2/21/2023):    Akiko Jameson presents today for follow-up visit.  Patient was last seen on 9/28/2022.  She presents today complaining of midback pain on the right, and pain has been more bothersome lately. Patient reports pain as 6/10 today.    Interval History (11/21/2022):    Akiko Jameson presents today for follow-up on virtual visit.  Patient was last seen about 2 months ago. At that visit, the plan was to start medrol dose warren and consider RFA - due to pain flared, but eventually that pain resolved. She has had one more episode over the last couple months of Increased pain, which ultimately resolved with muscle relaxer. She is stable overall right now.     Interval History (9/28/2022):    Akiko Jameson presents today for follow-up visit.  Patient was last seen on 5/13/2022. At that visit, she was doing well since RFA. She had a pain flare a few weeks ago that resolved fairly quickly. Pain returned over the  "week requiring ER visit. She reports pain being debilitating at this time, which started in the neck initially (that resolved). Pain stayed localized in right lower back. She was given toradol and morphine shot at ER. Pain has since calmed down. She is utilizing muscle relaxer now, but at the time of pain flare, it wasn't helping. Patient reports pain as "0/10 today.    Interval History (5/13/2022):   Akiko Jameson presents today for follow-up visit.  she underwent right L3-5 RFA on 4/5/22.  The patient reports that she is/was better following the procedure.  she reports 95% pain relief.  The changes lasted >4 weeks so far.  The changes have continued through this visit.  Patient reports pain as "0/10 today.    Interval HPI (2/11/22):  Akiko Jameson is a 47 y.o. who presents to the clinic for a follow-up appointment for mid back pain.  At the last visit, she was provided trigger point injections to the thoracic paraspinals , lower trapezius, and latissimus dorsi on the right.  She reports that the trigger point injections provided moderate relief.  She was maintained on Robaxin 750 mg up to 3 times daily as needed for muscle related pain.  Since the last visit, Akiko Jameson states the pain has been starting to return. Current pain intensity is 8/10.       Interval HPI 12/10/2021:  Akiko Jameson is a 47 y.o. female who presents to the clinic for a follow-up appointment for mid back pain.  At the last visit, she was provided trigger point injections to the thoracic paraspinals , lower trapezius, and latissimus dorsi on the right.  She reports that the trigger point injections provided moderate relief.  She was maintained on Robaxin 750 mg up to 3 times daily as needed for muscle related pain.  Since the last visit, Akiko Jameson states the pain has been starting to return. Current pain intensity is 5/10.     Interval HPI 11/19/2021:  Akiko Jameson is a 46 y.o. " female who presents to the clinic for a follow-up appointment for mid back pain.  At the last visit, she was provided trigger point injections to the thoracic paraspinals , lower trapezius, and latissimus dorsi on the right.  She reports that the trigger point injections provided moderate relief.  She was maintained on Robaxin 750 mg up to 3 times daily as needed for muscle related pain.  Since the last visit, Akiko Jameson states the pain has been starting to return. Current pain intensity is 5/10.  Unfortunately, the patient is currently on antibiotics for cellulitis of the right upper extremity.      Interval HPI 09/23/2021:  Akiko Jameson is a 46 y.o. female who presents to the clinic for a follow-up appointment for mid back pain.  At the last visit, she was provided trigger point injections to the thoracic paraspinals , lower trapezius, and latissimus dorsi on the right.  She reports that the trigger point injections provided limited relief, but they have been beneficial previously.  She was maintained on Robaxin 500 mg up to 3 times daily as needed for muscle related pain.  Since the last visit, Akiko Jameson states the pain has been starting to return. Current pain intensity is 8/10.      Interval HPI 07/09/2021:  Akiko Jameson is a 46 y.o. female who presents to the clinic for a follow-up appointment for mid back pain.  At the last visit, she was provided trigger point injections to the thoracic paraspinals , lower trapezius, and latissimus dorsi on the right.  She reports that the trigger point injections provided significant relief relief.  She was maintained on Robaxin 500 mg up to 3 times daily as needed for muscle related pain.  Since the last visit, Akiko Jameson states the pain has been starting to return. Current pain intensity is 8/10.  Of note, in the interim patient has had thyroidectomy and was recently diagnosed with sarcoidosis.    Interval HPI  11/13/2020:  Akiko Jameson is a 46 y.o. female who presents to the clinic for a follow-up appointment for mid back pain.  At the last visit, she was provided trigger point injections to the thoracic paraspinals , lower trapezius, and latissimus dorsi on the right.  She reports that the trigger point injections provided significant relief relief.  She was maintained on Robaxin 500 mg up to 3 times daily as needed for muscle related pain.  Since the last visit, Akiko Jameson states the pain has been starting to return. Current pain intensity is 5/10.      Interval HPI 05/29/2020:  Akiko Jameson is a 45 y.o. female who presents to the clinic for a follow-up appointment for mid back pain.  At the last visit, she was provided trigger point injections to the thoracic paraspinals and upper/middle trapezius on the right.  She reports that the trigger point injections provided 100% relief at the areas injected, but is having pain in other areas that she would like to also get injected today.  She was also started on Robaxin 500 mg up to 3 times daily as needed for muscle related pain.  She reports that the Robaxin has been of minimal benefit.  Since the last visit, Akiko Jameson states the pain has been improving. Current pain intensity is 8/10.  We discussed lymphedema management, and she is interested in going to a physical therapy clinic to help address this issue.    Initial HPI 05/15/2020:  Akiko Jameson is a 45 y.o. female, right-hand dominant, who presents to the clinic for the evaluation of mid back pain. The pain started 25+ years ago following a lymph node removal on the right that resulted in lymphedema of the right upper extremity and symptoms have been worsening.The pain is located in the right mid back area and radiates to the along the length of the upper to lower back.  The pain is described as aching, burning and throbbing and is rated as 6/10. The pain is rated  with a score of  0/10 on the BEST day and a score of 10/10 on the WORST day.  Symptoms interfere with daily activity, sleeping and work. The pain is exacerbated by increased activity or use of the right upper extremity.  The pain is mitigated by rest. The patient reports spending 2-4 hours per day reclining. The patient reports 6-8 hours of uninterrupted sleep per night.  She reports that she works as a .         Non-Pharmacologic Treatments:  Physical Therapy/Home Exercise: yes  Ice/Heat:yes  TENS: yes  Acupuncture: no  Massage: yes  Chiropractic: yes    Other: yes, compression sleeves        Pain Medications:  - Opioids: Norco  - Adjuvant Medications: Relafen, Robaxin  - Anti-Coagulants: None           Pain Procedures:   -previously underwent thoracic epidurals x2 with first one being beneficial but the second one not so much  -05/15/2020:  Trigger point injections to the thoracic paraspinals and upper/middle trapezius on the right, 100% relief  -05/29/2020:  Trigger point injections to the thoracic paraspinals, lower trapezius, and latissimus dorsi on the right, significant relief   -11/13/2020:  Trigger point injections to the thoracic paraspinals, lower trapezius, and latissimus dorsi on the right, significant relief  -07/09/2021: Trigger point injections to the thoracic paraspinals, lower trapezius, and latissimus dorsi on the right, limited relief  - 09/23/2021: Trigger point injections to the thoracic paraspinals, lower trapezius, and latissimus dorsi on the right, moderate relief  - 12/10/2020: Trigger point injections to the thoracic paraspinals, lower trapezius, and latissimus dorsi on the right, moderate relief  - diagnostic right L3-5 MBB on 02/24/2022 and 03/29/2022 with reported % pain relief  - right L3-5 RFA on 4/5/22 with 95% pain relief        Imaging (Reviewed on 2/9/2024):     2/21/2023 X-Ray Thoracic Spine 4 or more views  COMPARISON:  None  FINDINGS:  Vertebral body heights  maintained.  No spondylolisthesis.  Multilevel degenerative osteophyte changes present.  Soft tissues unremarkable.  Impression: Degenerative findings           X-ray bilateral knees 03/16/2016:  AP and PA flexion standing views of both knees as well as lateral and Merchant views of both knees were obtained.  The joint spaces are grossly preserved.  Mild patellofemoral spurring and prominent patellar enthesophytes bilaterally.  No joint effusion.  No acute fracture or malalignment.     MRI right knee 03/21/2016:  The anterior cruciate ligament, posterior cruciate ligament, medial collateral ligament, lateral collateral ligament complex normal popliteus tendon, IT band, quadriceps tendon, and patellar tendon are normal in appearance.    There is a complex, primarily radial tear of the posterior horn of the medial meniscus which extends to the posterior root attachment of the medial meniscus.  The medial meniscus is intact.    The articular cartilage of the medial and lateral tibiofemoral joint compartments is intact.  There is a 14 mm width area of full-thickness abnormal chondral signal observed in the lateral patellar facet at the level of the patellar midpole.  No abnormal subcortical signal abnormality appreciated.    There is quadriceps tendon and patellar tendon insertion enthesophyte formation at their respective attachments on the patella.  There is a small knee joint effusion present.  No acute fracture or pathologic marrow replacement process.  There is hematopoietic  bone marrow present within the distal femur and proximal tibia.          REVIEW OF SYSTEMS:    GENERAL:  No weight loss, malaise or fevers.  HEENT:   No recent changes in vision or hearing  NECK:  Negative for lumps, no difficulty with swallowing.  RESPIRATORY:  Negative for cough, wheezing or shortness of breath, patient denies any recent URI.  CARDIOVASCULAR:  Negative for chest pain, leg swelling or palpitations.  GI:  Negative for  "abdominal discomfort, blood in stools or black stools or change in bowel habits.  MUSCULOSKELETAL:  See HPI.  SKIN:  Negative for lesions, rash, and itching.  PSYCH:  No mood disorder or recent psychosocial stressors.  Patients sleep is not disturbed secondary to pain.  HEMATOLOGY/LYMPHOLOGY:  Negative for prolonged bleeding, bruising easily or swollen nodes.  Patient is not currently taking any anti-coagulants  NEURO:   No history of headaches, syncope, paralysis, seizures or tremors.  All other reviewed and negative other than HPI.      OBJECTIVE:    PHYSICAL EXAMINATION:  Vitals:    02/09/24 0811   BP: (!) 166/90   Pulse: 82   Resp: (!) 28   Weight: 119.3 kg (263 lb)   Height: 5' 3" (1.6 m)   PainSc:   8      Body mass index is 46.59 kg/m².   (reviewed on 2/9/2024)    GENERAL: Well appearing, in no acute distress, alert and oriented x3.  PSYCH:  Mood and affect appropriate.  SKIN: Skin color, texture, turgor normal, no rashes or lesions.  HEAD/FACE:  Normocephalic, atraumatic. Cranial nerves grossly intact.  NECK: No pain to palpation over the cervical paraspinous muscles. Spurling Negative. No pain with neck flexion, extension, or lateral flexion.   PULM: No evidence of respiratory difficulty, symmetric chest rise.  GI:  Soft and non-tender.  BACK:  TTP over the thoracic paraspinals, lower trapezius, latissimus dorsi, and lumbar paraspinals on the right.  SLR in the sitting and supine positions is negative to radicular pain.   mild-to-moderate pain to palpation over the facet joints of the lumbar spine and lumbar paraspinals - Present, mild, improved since procedure.  Fair range of motion secondary to pain reproduction.  Axial loading positive on the right - Present, mild, improved since procedure.  EXTREMITIES: There is significant lymphedema present throughout the right upper extremity.  No other deformities or skin discoloration. Good capillary refill.  MUSCULOSKELETAL: Shoulder, hip, and knee provocative " maneuvers are negative.  BUE & BLE strength is normal and symmetric.  No atrophy or tone abnormalities are noted.  NEURO: BUE & BLE coordination and muscle stretch reflexes are physiologic and symmetric.  Plantar response are downgoing. No clonus.  No loss of sensation is noted.  GAIT: normal.            ASSESSMENT: 49 y.o. year old female with mid back pain, consistent with     1. Lumbar spondylosis  Case Request-RAD/Other Procedure Area: Right L3-5 Lumbar RFA      2. Myalgia, other site        3. Lumbar facet joint pain        4. Lymphedema of upper extremity following lymphadenectomy              PLAN:   - Interventional:   -provided trigger point injections to the right thoracic and lumbar paraspinals today for diagnostic and therapeutic purposes.  Explained procedure in detail, risks, benefits, alternatives today and the patient elected to pursue this intervention at this time.      -will set this patient up for repeat right-sided L3-5 lumbar RFA as she had significant relief of at least 50% reduction for nearly 2 years as she last had these performed April 2022.  Her pain remains to be predominantly axial without any radicular component.    - S/p right L3-5 RFA on 4/5/22 with 95% pain relief.     -Pharmacologic:   - Refill Flexeril 10mg BID PRN - to take when she has pain flare.  - Anticoagulation use: None.   - LA  reviewed and appropriate.      - Rehabilitative: Encouraged regular exercise. Therapy: advised patient continue with gradual compression for her lymphedema and activities as tolerated.     - Diagnostic:  Reviewed imaging available    - Follow up:   - Will call for % relief of MBB to determine RFA eligibility.  - In clinic thoracic TPI.    - This condition does not require this patient to take time off of work, and the primary goal of our Pain Management services is to improve the patient's functional capacity.     - I discussed the risks, benefits, and alternatives to potential treatment  options. All questions and concerns were fully addressed today in clinic.       PROCEDURE NOTE:  Trigger Point Injection:   The procedure was discussed with the patient including complications of nerve damage,  bleeding, infection, and failure of pain relief.   Trigger points were identified by palpation and marked. Alcohol swab prep of sites done. A mixture of 4mL 1% lidocaine + 40 mg Depo-Medrol was prepared (5 mL total).   A 25-gauge needle was advanced to the point of maximal tenderness, and  1 to 1.25mL  was injected after negative aspiration. All sites done in the same manner. Patient tolerated the procedure well and without complications. Patient was monitored for 15 min following the injection before discharged from the clinic.  Sites injected included:  Right thoracic and lumbar paraspinals      Anam Montero MD  Interventional Pain Medicine  [facility] [place]      Disclaimer:  This note was prepared using voice recognition system and is likely to have sound alike errors that may have been overlooked even after proof reading.  Please call me with any questions.

## 2024-02-13 NOTE — PRE-PROCEDURE INSTRUCTIONS
Spoke with patient regarding procedure scheduled on 2.22     Arrival time 0730     Has patient been sick with fever or on antibiotics within the last 7 days? No     Does the patient have any open wounds, sores or rashes? No     Does the patient have any recent fractures? no     Has patient received a vaccination within the last 7 days? No     Received the COVID vaccination?      Has the patient stopped all medications as directed? na     Does patient have a pacemaker, defibrillator, or implantable stimulator? No     Does the patient have a ride to and from procedure and someone reliable to remain with patient? [email or web address]     Is the patient diabetic? no     Does the patient have sleep apnea? Or use O2 at home? maria teresa cpap     Is the patient receiving sedation?      Is the patient instructed to remain NPO beginning at midnight the night before their procedure? yes     Procedure location confirmed with patient? Yes     Covid- Denies signs/symptoms. Instructed to notify PAT/MD if any changes.

## 2024-02-19 ENCOUNTER — OFFICE VISIT (OUTPATIENT)
Dept: ENDOCRINOLOGY | Facility: CLINIC | Age: 49
End: 2024-02-19
Payer: COMMERCIAL

## 2024-02-19 DIAGNOSIS — E89.0 POST-OPERATIVE HYPOTHYROIDISM: Primary | ICD-10-CM

## 2024-02-19 DIAGNOSIS — E83.51 HYPOCALCEMIA: ICD-10-CM

## 2024-02-19 DIAGNOSIS — E55.9 VITAMIN D DEFICIENCY: ICD-10-CM

## 2024-02-19 PROCEDURE — 99213 OFFICE O/P EST LOW 20 MIN: CPT | Mod: 95,,, | Performed by: PHYSICIAN ASSISTANT

## 2024-02-19 PROCEDURE — 1160F RVW MEDS BY RX/DR IN RCRD: CPT | Mod: CPTII,95,, | Performed by: PHYSICIAN ASSISTANT

## 2024-02-19 PROCEDURE — 1159F MED LIST DOCD IN RCRD: CPT | Mod: CPTII,95,, | Performed by: PHYSICIAN ASSISTANT

## 2024-02-19 RX ORDER — LEVOTHYROXINE SODIUM 175 UG/1
TABLET ORAL
Qty: 30 TABLET | Refills: 11 | Status: SHIPPED | OUTPATIENT
Start: 2024-02-19

## 2024-02-19 NOTE — PROGRESS NOTES
Subjective:      Patient ID: Akiko Jameson is a 49 y.o. female.    Chief Complaint:  Post-surgical Hypothyroidism    The patient location is: Home  The chief complaint leading to consultation is: Hypothyroidism    Visit type: audiovisual--finished w/ audio due to technical difficulty    Face to Face time with patient: 5 min  10 minutes of total time spent on the encounter, which includes face to face time and non-face to face time preparing to see the patient (eg, review of tests), Obtaining and/or reviewing separately obtained history, Documenting clinical information in the electronic or other health record, Independently interpreting results (not separately reported) and communicating results to the patient/family/caregiver, or Care coordination (not separately reported).     Each patient to whom he or she provides medical services by telemedicine is:  (1) informed of the relationship between the physician and patient and the respective role of any other health care provider with respect to management of the patient; and (2) notified that he or she may decline to receive medical services by telemedicine and may withdraw from such care at any time.    History of Present Illness  Initial visit for thyroid nodules     Pt had initial thyroid u/s in 2013, and got left thyroid nodule FNA that was benign in 7/2013. Last seen by Dr. Sandifer in 11/21.      U/S in 11/20 shows increase in left thyroid nodule from 5.2 cm to 6.7 cm (which was previously bx) and also increase in left lateral isthmus nodule from 2.0 to 2.6 cm. Upper ll nodule also increased in size from 2.0 to 2.6 cm.  She denies compressive sx. No FH thyroid cancer. No radiation exposure to head/neck. Had chronic cough. Had total thyroidectomy in 3/21 which was benign. Cough improved. Taking ca 500 mg qd since sx. Pt has sarcoidosis. On ergo weekly.    On LT4 175 mcg for six days of the week and 350 mcg on Sundays.  + fatigue, cold/heat  intolerance.  No mental fog, constipation/diarrhea, hair loss, wt changes or tremors.     Is morbidly obese. +FH DM. Has HTN, not previously screened for DM.   Denies polyuria, polydipsia, blurry vision      Wt Readings from Last 10 Encounters:   02/09/24 119.3 kg (263 lb)   11/02/23 115.4 kg (254 lb 6.6 oz)   11/02/23 115.4 kg (254 lb 6.6 oz)   10/30/23 115.4 kg (254 lb 6.6 oz)   09/25/23 115.2 kg (254 lb)   09/22/23 115.4 kg (254 lb 4.8 oz)   08/10/23 115.6 kg (254 lb 14.4 oz)   07/19/23 115.2 kg (253 lb 15.5 oz)   06/29/23 115.2 kg (254 lb)   06/28/23 114 kg (251 lb 5.2 oz)      ROS:   Constitutional: + wt gain 10 lbs  Eyes: No recent visual changes  Cardiovascular: Denies current anginal symptoms  Respiratory: Denies current respiratory difficulty  Gastrointestinal: Denies recent bowel disturbances  GenitoUrinary - No dysuria  Skin: No new skin rash  Neurologic: No focal neurologic complaints  Remainder ROS negative     Objective:     PE:  GENERAL: Well developed, well nourished  LYMPHATIC: No cervical or supraclavicular lymphadenopathy  CARDIOVASCULAR: Normal heart sounds, no pedal edema  RESPIRATORY: Normal effort, clear to auscultation    DIAGNOSIS:   03/21/2021 JWH/jr/kl,pjl      1.  THYROID GLAND, LEFT LOBE, LOBECTOMY:        --NODULAR HYPERPLASIA.      2.  THYROID GLAND, RIGHT LOBE, LOBECTOMY:        --NODULAR HYPERPLASIA.   --FEW FOCI OF NON-CASEATING GRANULOMATA (SEE COMMENT #1); [employer]B AND PAS    STAINS ARE NEGATIVE FOR   ACID-FAST BACILLI AND FUNGAL ORGANISMS, RESPECTIVELY (CONTROLS STAIN    APPROPRIATELY).        --ONE BENIGN PERITHYROIDAL PARATHYROID GLAND.     Lab Review:   Lab Results   Component Value Date    TSH 4.692 (H) 09/22/2023     Assessment:     1. Post-operative hypothyroidism  T4, Free    TSH      2. Vitamin D deficiency  Vitamin D      3. Hypocalcemia  Comprehensive Metabolic Panel        Post-operative hypothyroidism-TFTs today. Continue 175 mcg qd and 2 tab on sundays.  Vitamin d  [id number] ergo weekly.   Hypocalcemia-last ca was normal. Restart ca if she experiences muscle cramping or twitching.       TFTs, vd rp  F/u in 6 mths w/ labs prior- cmp, tfts, vd

## 2024-02-22 ENCOUNTER — HOSPITAL ENCOUNTER (OUTPATIENT)
Facility: HOSPITAL | Age: 49
Discharge: HOME | End: 2024-02-22
Attending: PHYSICAL MEDICINE & REHABILITATION | Admitting: PHYSICAL MEDICINE & REHABILITATION
Payer: COMMERCIAL

## 2024-02-22 VITALS
RESPIRATION RATE: 14 BRPM | SYSTOLIC BLOOD PRESSURE: 178 MMHG | WEIGHT: 260.69 LBS | HEIGHT: 63 IN | DIASTOLIC BLOOD PRESSURE: 93 MMHG | OXYGEN SATURATION: 95 % | TEMPERATURE: 97 F | BODY MASS INDEX: 46.19 KG/M2 | HEART RATE: 66 BPM

## 2024-02-22 DIAGNOSIS — M47.817 SPONDYLOSIS OF LUMBOSACRAL REGION WITHOUT MYELOPATHY OR RADICULOPATHY: Primary | ICD-10-CM

## 2024-02-22 DIAGNOSIS — M47.816 LUMBAR SPONDYLOSIS: ICD-10-CM

## 2024-02-22 PROCEDURE — 64635 DESTROY LUMB/SAC FACET JNT: CPT | Mod: RT | Performed by: PHYSICAL MEDICINE & REHABILITATION

## 2024-02-22 PROCEDURE — 64636 DESTROY L/S FACET JNT ADDL: CPT | Mod: RT,,, | Performed by: PHYSICAL MEDICINE & REHABILITATION

## 2024-02-22 PROCEDURE — 99152 MOD SED SAME PHYS/QHP 5/>YRS: CPT | Performed by: PHYSICAL MEDICINE & REHABILITATION

## 2024-02-22 PROCEDURE — 64635 DESTROY LUMB/SAC FACET JNT: CPT | Mod: RT,,, | Performed by: PHYSICAL MEDICINE & REHABILITATION

## 2024-02-22 PROCEDURE — 64636 DESTROY L/S FACET JNT ADDL: CPT | Mod: RT | Performed by: PHYSICAL MEDICINE & REHABILITATION

## 2024-02-22 PROCEDURE — 63600175 PHARM REV CODE 636 W HCPCS: Performed by: PHYSICAL MEDICINE & REHABILITATION

## 2024-02-22 RX ORDER — BUPIVACAINE HYDROCHLORIDE 2.5 MG/ML
INJECTION, SOLUTION EPIDURAL; INFILTRATION; INTRACAUDAL
Status: DISCONTINUED | OUTPATIENT
Start: 2024-02-22 | End: 2024-02-22 | Stop reason: HOSPADM

## 2024-02-22 RX ORDER — FENTANYL CITRATE 50 UG/ML
INJECTION, SOLUTION INTRAMUSCULAR; INTRAVENOUS
Status: DISCONTINUED | OUTPATIENT
Start: 2024-02-22 | End: 2024-02-22 | Stop reason: HOSPADM

## 2024-02-22 RX ORDER — MIDAZOLAM HYDROCHLORIDE 1 MG/ML
INJECTION, SOLUTION INTRAMUSCULAR; INTRAVENOUS
Status: DISCONTINUED | OUTPATIENT
Start: 2024-02-22 | End: 2024-02-22 | Stop reason: HOSPADM

## 2024-02-22 RX ORDER — ONDANSETRON HYDROCHLORIDE 2 MG/ML
4 INJECTION, SOLUTION INTRAVENOUS ONCE AS NEEDED
Status: DISCONTINUED | OUTPATIENT
Start: 2024-02-22 | End: 2024-02-22 | Stop reason: HOSPADM

## 2024-02-22 NOTE — H&P
HPI  Patient presenting for Procedure(s) (LRB):  Right L3-5 Lumbar RFA (Right)       No health changes since previous encounter    Past Medical History:   Diagnosis Date    ALLERGIC RHINITIS     Aortic valve sclerosis 1/20/2023    Arthritis     Cellulitis     Constipation due to opioid therapy     Eosinophilia     mild    GERD (gastroesophageal reflux disease)     Granular cell tumor     H. pylori infection 2018    History of 2019 novel coronavirus disease (COVID-19) 10/04/2020    Hypertension     Lymphedema     Mild anemia     Mitral valve sclerosis 3/21/2023    KEIRY on CPAP     does not regularly    Positive CHARLENE (antinuclear antibody) 07/27/2022    Prediabetes 08/06/2021    Sarcoidosis, unspecified     Sleep apnea     compliant with CPAP    Thyroid nodule     Urinary incontinence, mixed      Past Surgical History:   Procedure Laterality Date    24 HOUR IMPEDANCE PH MONITORING OF ESOPHAGUS IN PATIENT NOT TAKING ACID REDUCING MEDICATIONS N/A 01/06/2020    Procedure: IMPEDANCE PH STUDY, ESOPHAGEAL, 24 HOUR, IN PATIENT NOT TAKING ACID REDUCING MEDICATION;  Surgeon: First Available Tamika Panchal;  Location: [facility];  Service: Endoscopy;  Laterality: N/A;    AXILLARY NODE DISSECTION Right     granular cell tumor    BILATERAL SALPINGO-OOPHORECTOMY (BSO)  07/21/2020    BREAST CYST ASPIRATION      left    CHOLECYSTECTOMY      COLONOSCOPY N/A 05/10/2019    Procedure: COLONOSCOPY;  Surgeon: Edgar Gamble Jr., MD;  Location: [facility];  Service: Endoscopy;  Laterality: N/A;    ENDOBRONCHIAL ULTRASOUND Bilateral 04/27/2021    Procedure: ENDOBRONCHIAL ULTRASOUND (EBUS);  Surgeon: Armando Kirby MD;  Location: [facility];  Service: Pulmonary;  Laterality: Bilateral;  need fluoroscopy    ENDOMETRIAL ABLATION      ESOPHAGEAL MANOMETRY WITH MEASUREMENT OF IMPEDANCE N/A 01/06/2020    Procedure: MANOMETRY, ESOPHAGUS, WITH IMPEDANCE MEASUREMENT;  Surgeon: First Available [place];  Location: [facility];  Service: Endoscopy;   Laterality: N/A;    ESOPHAGOGASTRODUODENOSCOPY N/A 07/05/2018    Procedure: EGD (ESOPHAGOGASTRODUODENOSCOPY);  Surgeon: Edgar Gamble Jr., MD;  Location: [facility] ENDO;  Service: Endoscopy;  Laterality: N/A;    ESOPHAGOGASTRODUODENOSCOPY N/A 11/23/2020    Procedure: ESOPHAGOGASTRODUODENOSCOPY (EGD);  Surgeon: Jina Kaufman MD;  Location: [facility] ENDO;  Service: General;  Laterality: N/A;    EXCISION OF MASS OF ABDOMEN Left 06/18/2018    Procedure: EXCISION, MASS, ABDOMEN - flank left;  Surgeon: Armando Tate MD;  Location: [facility] OR;  Service: General;  Laterality: Left;  left flank removal of mass    FINE NEEDLE ASPIRATION      thyroid    FLEXIBLE SIGMOIDOSCOPY N/A 08/24/2022    Procedure: SIGMOIDOSCOPY, FLEXIBLE;  Surgeon: Amber West MD;  Location: [facility] ENDO;  Service: Endoscopy;  Laterality: N/A;    HYSTERECTOMY  07/21/2020    INJECTION OF ANESTHETIC AGENT AROUND MEDIAL BRANCH NERVES INNERVATING LUMBAR FACET JOINT Right 02/24/2022    Procedure: Right L3, 4, 5 MBB;  Surgeon: Anam Montero MD;  Location: [facility] PAIN MGT;  Service: Pain Management;  Laterality: Right;    INJECTION OF ANESTHETIC AGENT AROUND MEDIAL BRANCH NERVES INNERVATING LUMBAR FACET JOINT Right 03/29/2022    Procedure: Right L3, 4, 5 MBB  (2nd block);  Surgeon: Anam Montero MD;  Location: [facility]IN [facility]GT;  Service: Pain Management;  Laterality: Right;    KNEE ARTHROSCOPY W/ MENISCECTOMY Right     NASAL SEPTUM SURGERY      OOPHORECTOMY      RADIOFREQUENCY THERMOCOAGULATION Right 04/05/2022    Procedure: Right L3-5 Lumbar RFA;  Surgeon: Anam Montero MD;  Location: [facility]IN [facility]GT;  Service: Pain Management;  Laterality: Right;    ROBOT-ASSISTED NISSEN FUNDOPLICATION USING DA TAMAR XI N/A 02/28/2020    Procedure: XI ROBOTIC FUNDOPLICATION, NISSEN;  Surgeon: Jina Kaufman MD;  Location: [facility] OR;  Service: General;  Laterality: N/A;    THYROIDECTOMY Bilateral 03/11/2021    Procedure: THYROIDECTOMY;  Surgeon: Nixon CHARLES  MD Payal;  Location: [facility];  Service: ENT;  Laterality: Bilateral;    TUBAL LIGATION      UPPER GASTROINTESTINAL ENDOSCOPY  07/05/2018    Dr. Gamble     Review of patient's allergies indicates:   Allergen Reactions    Biaxin [clarithromycin] Swelling    Omeprazole magnesium Swelling and Other (See Comments)    Prevacid [lansoprazole] Swelling     Lip swelling- was taking this along with blood pressure medication: benazepril; not sure which caused it. Reports she has taken OTC prilosec without any problems.    Ace inhibitors Swelling     Facial swelling    Benazepril Swelling     Lip swelling        No current facility-administered medications on file prior to encounter.     Current Outpatient Medications on File Prior to Encounter   Medication Sig Dispense Refill    carvediloL (COREG) 12.5 MG tablet Take 1 tablet (12.5 mg total) by mouth 2 (two) times daily. 180 tablet 2    cyclobenzaprine (FLEXERIL) 10 MG tablet Take 1/2 to 1 tab BID PRN muscle spasms. May cause drowsiness. 60 tablet 0    docusate sodium (COLACE) 100 MG capsule Take 100 mg by mouth every other day.       ergocalciferol (ERGOCALCIFEROL) 50,000 unit Cap Take 1 capsule (50,000 Units total) by mouth every 7 days. 12 capsule 3    hydrOXYchloroQUINE (PLAQUENIL) 200 mg tablet Take 200 mg by mouth 2 (two) times a day.      ibuprofen (ADVIL,MOTRIN) 800 MG tablet Take 1 tablet (800 mg total) by mouth 3 (three) times daily. 60 tablet 1    montelukast (SINGULAIR) 10 mg tablet Take 1 tablet (10 mg total) by mouth nightly. 90 tablet 3    multivitamin (THERAGRAN) per tablet Take 1 tablet by mouth once daily. Every day      olmesartan-hydrochlorothiazide (BENICAR HCT) 20-12.5 mg per tablet TAKE 1 TABLET BY MOUTH ONCE DAILY 90 tablet 1    potassium chloride SA (K-DUR,KLOR-CON) 20 MEQ tablet Take 1 tablet (20 mEq total) by mouth 2 (two) times daily. 180 tablet 3    spironolactone (ALDACTONE) 25 MG tablet Take 0.5 tablets (12.5 mg total) by mouth once daily. 45  "tablet 1    calcium carbonate (OS-CARLA) 500 mg calcium (1,250 mg) tablet Take 1 tablet (500 mg total) by mouth once daily. 60 tablet 3    cetirizine (ZYRTEC) 10 MG tablet Take 1 tablet (10 mg total) by mouth once daily. (Patient taking differently: Take 10 mg by mouth once daily.)  0    fluticasone propion-salmeterol 115-21 mcg/dose (ADVAIR HFA) 115-21 mcg/actuation HFAA inhaler Inhale 2 puffs into the lungs every 12 (twelve) hours. Controller 12 g 11        PMHx, PSHx, Allergies, Medications reviewed in epic    ROS negative except pain complaints in HPI    OBJECTIVE:    BP (!) 167/83 (BP Location: Right arm, Patient Position: Sitting)   Pulse 72   Temp 97.1 °F (36.2 °C) (Temporal)   Resp 18   Ht 5' 3" (1.6 m)   Wt 118.2 kg (260 lb 11.1 oz)   SpO2 95%   Breastfeeding No   BMI 46.18 kg/m²     PHYSICAL EXAMINATION:    GENERAL: Well appearing, in no acute distress, alert and oriented x3.  PSYCH:  Mood and affect appropriate.  SKIN: Skin color, texture, turgor normal, no rashes or lesions which will impact the procedure.  CV: RRR with palpation of the radial artery.  PULM: No evidence of respiratory difficulty, symmetric chest rise. Clear to auscultation.  NEURO: Cranial nerves grossly intact.    Plan:    Proceed with procedure as planned Procedure(s) (LRB):  Right L3-5 Lumbar RFA (Right)    Anam Montero MD  02/22/2024            "

## 2024-02-22 NOTE — DISCHARGE SUMMARY
Discharge Note  Short Stay      SUMMARY     Admit Date: 2/22/2024    Attending Physician: Anam Montero MD        Discharge Physician: Anam Montero MD        Discharge Date: 2/22/2024 8:41 AM    Procedure(s) (LRB):  Right L3-5 Lumbar RFA (Right)    Final Diagnosis: Lumbar spondylosis [M47.816]    Disposition: Home or self care    Patient Instructions:   Current Discharge Medication List        CONTINUE these medications which have NOT CHANGED    Details   carvediloL (COREG) 12.5 MG tablet Take 1 tablet (12.5 mg total) by mouth 2 (two) times daily.  Qty: 180 tablet, Refills: 2    Comments: .      cyclobenzaprine (FLEXERIL) 10 MG tablet Take 1/2 to 1 tab BID PRN muscle spasms. May cause drowsiness.  Qty: 60 tablet, Refills: 0      docusate sodium (COLACE) 100 MG capsule Take 100 mg by mouth every other day.       ergocalciferol (ERGOCALCIFEROL) 50,000 unit Cap Take 1 capsule (50,000 Units total) by mouth every 7 days.  Qty: 12 capsule, Refills: 3    Associated Diagnoses: Vitamin D deficiency      hydrOXYchloroQUINE (PLAQUENIL) 200 mg tablet Take 200 mg by mouth 2 (two) times a day.      ibuprofen (ADVIL,MOTRIN) 800 MG tablet Take 1 tablet (800 mg total) by mouth 3 (three) times daily.  Qty: 60 tablet, Refills: 1    Associated Diagnoses: Acute lateral meniscus tear of left knee, initial encounter      levothyroxine (SYNTHROID, LEVOTHROID) 175 MCG tablet Take 1 tab for six days and 2 tabs on the seventh day. Brand name Synthroid  Qty: 30 tablet, Refills: 11    Associated Diagnoses: Post-operative hypothyroidism      montelukast (SINGULAIR) 10 mg tablet Take 1 tablet (10 mg total) by mouth nightly.  Qty: 90 tablet, Refills: 3      multivitamin (THERAGRAN) per tablet Take 1 tablet by mouth once daily. Every day      olmesartan-hydrochlorothiazide (BENICAR HCT) 20-12.5 mg per tablet TAKE 1 TABLET BY MOUTH ONCE DAILY  Qty: 90 tablet, Refills: 1    Comments: .  Associated Diagnoses: Essential hypertension       potassium chloride SA (K-DUR,KLOR-CON) 20 MEQ tablet Take 1 tablet (20 mEq total) by mouth 2 (two) times daily.  Qty: 180 tablet, Refills: 3    Associated Diagnoses: Hypokalemia      spironolactone (ALDACTONE) 25 MG tablet Take 0.5 tablets (12.5 mg total) by mouth once daily.  Qty: 45 tablet, Refills: 1    Comments: .  Associated Diagnoses: Essential hypertension; Aortic valve sclerosis; Mitral valve sclerosis      calcium carbonate (OS-CARLA) 500 mg calcium (1,250 mg) tablet Take 1 tablet (500 mg total) by mouth once daily.  Qty: 60 tablet, Refills: 3    Associated Diagnoses: Hypocalcemia      cetirizine (ZYRTEC) 10 MG tablet Take 1 tablet (10 mg total) by mouth once daily.  Refills: 0    Associated Diagnoses: Cough, persistent      fluticasone propion-salmeterol 115-21 mcg/dose (ADVAIR HFA) 115-21 mcg/actuation HFAA inhaler Inhale 2 puffs into the lungs every 12 (twelve) hours. Controller  Qty: 12 g, Refills: 11                 Discharge Diagnosis: Lumbar spondylosis [M47.816]  Condition on Discharge: Stable with no complications to procedure   Diet on Discharge: Same as before.  Activity: as per instruction sheet.  Discharge to: Home with a responsible adult.  Follow up: 2-4 weeks       Please call the office at [phone] if you experience any weakness or loss of sensation, fever > 101.5, pain uncontrolled with oral medications, persistent nausea/vomiting/or diarrhea, redness or drainage from the incisions, or any other worrisome concerns. If physician on call was not reached or could not communicate with our office for any reason please go to the nearest emergency department

## 2024-02-22 NOTE — OP NOTE
Lumbar Medial nerve branch block radiofrequency ablation Under Fluoroscopy     Time-out taken to identify patient and procedure side prior to starting the procedure.     02/22/2024    Patient has clinical and imaging findings suggestive of recurrent facet mediated pain. Patient has undergone previous RFAs at specified levels with at least 50% relief for at least 6 months. Successful diagnostic medial branch blocks have been completed within the past 2 years.    For supporting clinical documentation, please see most recent clinic and telephone notes.     PROCEDURE: Right radiofrequency ablation of the the medial branch nerves at the   transverse process of  L4, L5 and sacral ala    2)Conscious sedation provided by MD     REASON FOR PROCEDURE: Lumbar spondylosis [M47.816]     PHYSICIAN: Anam Montero MD    ASSISTANTS: None     SEDATION: Conscious sedation provided by M.D    The patient was monitored with continuous pulse oximetry, EKG, and intermittent blood pressure monitors, immediately prior to administration of sedation.  The patient was hemodynamically stable throughout the entire process was responsive to voice, and breathing spontaneously.  Supplemental O2 was provided at 2L/min via nasal cannula.  Patient was comfortable for the duration of the procedure.     There was a total of 2mg IV Midazolam and 100mcg Fentanyl titrated for the procedure    Total sedation time was >10minutes and <20minutes      MEDICATIONS INJECTED: 0.25% Bupivicaine total 6mL     LOCAL ANESTHETIC USED: Xylocaine 1% 1mL / site     ESTIMATED BLOOD LOSS: None.     COMPLICATIONS: None.     Interval history: Patient reports that he had complete relief of pain for the day of the procedure, we will proceed with the RFA     TECHNIQUE: Laying in a prone position, the patient was prepped and draped in the usual sterile fashion using ChloraPrep and fenestrated drape. The level was determined under fluoroscopic guidance. Local anesthetic was  given by going down to the hub of the 27-gauge 1.25in needle and raising a wheel. A 18-gauge 10mm curved active tip needle was introduced to the anatomic local of the medial branch at each of the above levels using fluoroscopy. Then sensory and motor testing was performed to confirm that the needle tips were in the correct location. Then after negative aspiration, 1 mL of 0.25% bupivacaine was injected into each level. This was followed by thermal lesioning at 80 degrees celsius for 90 seconds.     The patient tolerated the procedure well. Did not have any procedure related motor deficit at the conclusion of the procedure    The patient was monitored after the procedure. Patient was given post procedure and discharge instructions to follow at home. We will see the patient back in two weeks or the patient may call to inform of status. The patient was discharged in a stable condition

## 2024-02-22 NOTE — DISCHARGE INSTRUCTIONS

## 2024-02-25 ENCOUNTER — TELEPHONE (OUTPATIENT)
Dept: PHARMACY | Facility: CLINIC | Age: 49
End: 2024-02-25
Payer: COMMERCIAL

## 2024-02-27 ENCOUNTER — PATIENT MESSAGE (OUTPATIENT)
Dept: ENDOCRINOLOGY | Facility: CLINIC | Age: 49
End: 2024-02-27
Payer: COMMERCIAL

## 2024-03-07 ENCOUNTER — PATIENT MESSAGE (OUTPATIENT)
Dept: PAIN MEDICINE | Facility: CLINIC | Age: 49
End: 2024-03-07
Payer: COMMERCIAL

## 2024-03-13 ENCOUNTER — PATIENT MESSAGE (OUTPATIENT)
Dept: RESEARCH | Facility: CLINIC | Age: 49
End: 2024-03-13
Payer: COMMERCIAL

## 2024-03-13 NOTE — PROGRESS NOTES
Established Patient - TeleHealth Visit    The patient location is: [place]  The chief complaint leading to consultation is: chronic pain     Visit type: audiovisual    Face to Face time with patient: 10-15 minutes  20 minutes of total time spent on the encounter, which includes face to face time and non-face to face time preparing to see the patient (eg, review of tests), Obtaining and/or reviewing separately obtained history, Documenting clinical information in the electronic or other health record, Independently interpreting results (not separately reported) and communicating results to the patient/family/caregiver, or Care coordination (not separately reported).     Each patient to whom he or she provides medical services by telemedicine is:  (1) informed of the relationship between the physician and patient and the respective role of any other health care provider with respect to management of the patient; and (2) notified that he or she may decline to receive medical services by telemedicine and may withdraw from such care at any time.      Established Patient Chronic Pain Note (Follow up visit)    Chief Complaint:   No chief complaint on file.  Mid back pain, right       SUBJECTIVE:  Interval History (3/20/2024):  Patient Akiko Jameson presents today for follow-up visit.  Patient was last seen on 2/22/2024 for Right L3-5 RFA with reported 100% relief of pain.  She has not having any pain in the lower back or into the legs.  She rates her pain today a 0/10.  She does report that she continues to have some mid back/thoracic pain.  She had trigger point injections last month with Dr. Montero and she does feel like this helps temporarily.  She takes Flexeril as needed whenever she has a flare-up of her pain with good relief.  Patient denies night fever/night sweats, urinary incontinence, bowel incontinence, significant weight loss and significant motor weakness.   Patient denies any other complaints or concerns  at this time.        Interval history 2/9/2024  Akiko Jameson is a 49 y.o. female presents today for mid to lower back pain that is predominantly right-sided.  She reports that her pain has severely worsened over the past month.  Pain level today is 8/10.  She continues to utilize Flexeril as needed request a refill.  She reports that the pain is of the same type and quality in the right lower back and lower thoracic spine without any radiation into the extremities.        Patient denies night fever/night sweats, urinary incontinence, bowel incontinence, significant weight loss, significant motor weakness and loss of sensations.    Pain Disability Index Review:      2/9/2024     8:12 AM 5/13/2022     9:06 AM 2/11/2022    10:41 AM   Last 3 PDI Scores   Pain Disability Index (PDI) 48 19 45       Interval History (2/21/2023):    Akiko Jameson presents today for follow-up visit.  Patient was last seen on 9/28/2022.  She presents today complaining of midback pain on the right, and pain has been more bothersome lately. Patient reports pain as 6/10 today.    Interval History (11/21/2022):    Akiko Jameson presents today for follow-up on virtual visit.  Patient was last seen about 2 months ago. At that visit, the plan was to start medrol dose warren and consider RFA - due to pain flared, but eventually that pain resolved. She has had one more episode over the last couple months of Increased pain, which ultimately resolved with muscle relaxer. She is stable overall right now.     Interval History (9/28/2022):    Akiko Jameson presents today for follow-up visit.  Patient was last seen on 5/13/2022. At that visit, she was doing well since RFA. She had a pain flare a few weeks ago that resolved fairly quickly. Pain returned over the week requiring ER visit. She reports pain being debilitating at this time, which started in the neck initially (that resolved). Pain stayed localized in right lower  "back. She was given toradol and morphine shot at ER. Pain has since calmed down. She is utilizing muscle relaxer now, but at the time of pain flare, it wasn't helping. Patient reports pain as "0/10 today.    Interval History (5/13/2022):   Akiko Jameson presents today for follow-up visit.  she underwent right L3-5 RFA on 4/5/22.  The patient reports that she is/was better following the procedure.  she reports 95% pain relief.  The changes lasted >4 weeks so far.  The changes have continued through this visit.  Patient reports pain as "0/10 today.    Interval HPI (2/11/22):  Akiko Jameson is a 47 y.o. who presents to the clinic for a follow-up appointment for mid back pain.  At the last visit, she was provided trigger point injections to the thoracic paraspinals , lower trapezius, and latissimus dorsi on the right.  She reports that the trigger point injections provided moderate relief.  She was maintained on Robaxin 750 mg up to 3 times daily as needed for muscle related pain.  Since the last visit, Akiko Jameson states the pain has been starting to return. Current pain intensity is 8/10.       Interval HPI 12/10/2021:  Akiko Jameson is a 47 y.o. female who presents to the clinic for a follow-up appointment for mid back pain.  At the last visit, she was provided trigger point injections to the thoracic paraspinals , lower trapezius, and latissimus dorsi on the right.  She reports that the trigger point injections provided moderate relief.  She was maintained on Robaxin 750 mg up to 3 times daily as needed for muscle related pain.  Since the last visit, Akiko Jameson states the pain has been starting to return. Current pain intensity is 5/10.     Interval HPI 11/19/2021:  Akiko Jameson is a 46 y.o. female who presents to the clinic for a follow-up appointment for mid back pain.  At the last visit, she was provided trigger point injections to the thoracic " paraspinals , lower trapezius, and latissimus dorsi on the right.  She reports that the trigger point injections provided moderate relief.  She was maintained on Robaxin 750 mg up to 3 times daily as needed for muscle related pain.  Since the last visit, Akiko Jameson states the pain has been starting to return. Current pain intensity is 5/10.  Unfortunately, the patient is currently on antibiotics for cellulitis of the right upper extremity.      Interval HPI 09/23/2021:  Akiko Jameson is a 46 y.o. female who presents to the clinic for a follow-up appointment for mid back pain.  At the last visit, she was provided trigger point injections to the thoracic paraspinals , lower trapezius, and latissimus dorsi on the right.  She reports that the trigger point injections provided limited relief, but they have been beneficial previously.  She was maintained on Robaxin 500 mg up to 3 times daily as needed for muscle related pain.  Since the last visit, Akiko Jameson states the pain has been starting to return. Current pain intensity is 8/10.      Interval HPI 07/09/2021:  Akiko Jameson is a 46 y.o. female who presents to the clinic for a follow-up appointment for mid back pain.  At the last visit, she was provided trigger point injections to the thoracic paraspinals , lower trapezius, and latissimus dorsi on the right.  She reports that the trigger point injections provided significant relief relief.  She was maintained on Robaxin 500 mg up to 3 times daily as needed for muscle related pain.  Since the last visit, Akiko Jameson states the pain has been starting to return. Current pain intensity is 8/10.  Of note, in the interim patient has had thyroidectomy and was recently diagnosed with sarcoidosis.    Interval HPI 11/13/2020:  Akiko Jameson is a 46 y.o. female who presents to the clinic for a follow-up appointment for mid back pain.  At the last visit, she was  provided trigger point injections to the thoracic paraspinals , lower trapezius, and latissimus dorsi on the right.  She reports that the trigger point injections provided significant relief relief.  She was maintained on Robaxin 500 mg up to 3 times daily as needed for muscle related pain.  Since the last visit, Akiko Jameson states the pain has been starting to return. Current pain intensity is 5/10.      Interval HPI 05/29/2020:  Akiko Jameson is a 45 y.o. female who presents to the clinic for a follow-up appointment for mid back pain.  At the last visit, she was provided trigger point injections to the thoracic paraspinals and upper/middle trapezius on the right.  She reports that the trigger point injections provided 100% relief at the areas injected, but is having pain in other areas that she would like to also get injected today.  She was also started on Robaxin 500 mg up to 3 times daily as needed for muscle related pain.  She reports that the Robaxin has been of minimal benefit.  Since the last visit, Akiko Jameson states the pain has been improving. Current pain intensity is 8/10.  We discussed lymphedema management, and she is interested in going to a physical therapy clinic to help address this issue.    Initial HPI 05/15/2020:  Akiko Jameson is a 45 y.o. female, right-hand dominant, who presents to the clinic for the evaluation of mid back pain. The pain started 25+ years ago following a lymph node removal on the right that resulted in lymphedema of the right upper extremity and symptoms have been worsening.The pain is located in the right mid back area and radiates to the along the length of the upper to lower back.  The pain is described as aching, burning and throbbing and is rated as 6/10. The pain is rated with a score of  0/10 on the BEST day and a score of 10/10 on the WORST day.  Symptoms interfere with daily activity, sleeping and work. The pain is  exacerbated by increased activity or use of the right upper extremity.  The pain is mitigated by rest. The patient reports spending 2-4 hours per day reclining. The patient reports 6-8 hours of uninterrupted sleep per night.  She reports that she works as a .         Non-Pharmacologic Treatments:  Physical Therapy/Home Exercise: yes  Ice/Heat:yes  TENS: yes  Acupuncture: no  Massage: yes  Chiropractic: yes    Other: yes, compression sleeves        Pain Medications:  - Opioids: Norco  - Adjuvant Medications: Relafen, Robaxin  - Anti-Coagulants: None           Pain Procedures:   -previously underwent thoracic epidurals x2 with first one being beneficial but the second one not so much  -05/15/2020:  Trigger point injections to the thoracic paraspinals and upper/middle trapezius on the right, 100% relief  -05/29/2020:  Trigger point injections to the thoracic paraspinals, lower trapezius, and latissimus dorsi on the right, significant relief   -11/13/2020:  Trigger point injections to the thoracic paraspinals, lower trapezius, and latissimus dorsi on the right, significant relief  -07/09/2021: Trigger point injections to the thoracic paraspinals, lower trapezius, and latissimus dorsi on the right, limited relief  - 09/23/2021: Trigger point injections to the thoracic paraspinals, lower trapezius, and latissimus dorsi on the right, moderate relief  - 12/10/2020: Trigger point injections to the thoracic paraspinals, lower trapezius, and latissimus dorsi on the right, moderate relief  - diagnostic right L3-5 MBB on 02/24/2022 and 03/29/2022 with reported % pain relief  - right L3-5 RFA on 4/5/22 with 95% pain relief    -Right L3-5 RFA 2/22/24 with 100% relief    Imaging (Reviewed on 3/20/2024):     2/21/2023 X-Ray Thoracic Spine 4 or more views  COMPARISON:  None  FINDINGS:  Vertebral body heights maintained.  No spondylolisthesis.  Multilevel degenerative osteophyte changes present.  Soft tissues  unremarkable.  Impression: Degenerative findings           X-ray bilateral knees 03/16/2016:  AP and PA flexion standing views of both knees as well as lateral and Merchant views of both knees were obtained.  The joint spaces are grossly preserved.  Mild patellofemoral spurring and prominent patellar enthesophytes bilaterally.  No joint effusion.  No acute fracture or malalignment.     MRI right knee 03/21/2016:  The anterior cruciate ligament, posterior cruciate ligament, medial collateral ligament, lateral collateral ligament complex normal popliteus tendon, IT band, quadriceps tendon, and patellar tendon are normal in appearance.    There is a complex, primarily radial tear of the posterior horn of the medial meniscus which extends to the posterior root attachment of the medial meniscus.  The medial meniscus is intact.    The articular cartilage of the medial and lateral tibiofemoral joint compartments is intact.  There is a 14 mm width area of full-thickness abnormal chondral signal observed in the lateral patellar facet at the level of the patellar midpole.  No abnormal subcortical signal abnormality appreciated.    There is quadriceps tendon and patellar tendon insertion enthesophyte formation at their respective attachments on the patella.  There is a small knee joint effusion present.  No acute fracture or pathologic marrow replacement process.  There is hematopoietic  bone marrow present within the distal femur and proximal tibia.          REVIEW OF SYSTEMS:    GENERAL:  No weight loss, malaise or fevers.  HEENT:   No recent changes in vision or hearing  NECK:  Negative for lumps, no difficulty with swallowing.  RESPIRATORY:  Negative for cough, wheezing or shortness of breath, patient denies any recent URI.  CARDIOVASCULAR:  Negative for chest pain, leg swelling or palpitations.  GI:  Negative for abdominal discomfort, blood in stools or black stools or change in bowel habits.  MUSCULOSKELETAL:  See  HPI.  SKIN:  Negative for lesions, rash, and itching.  PSYCH:  No mood disorder or recent psychosocial stressors.  Patients sleep is not disturbed secondary to pain.  HEMATOLOGY/LYMPHOLOGY:  Negative for prolonged bleeding, bruising easily or swollen nodes.  Patient is not currently taking any anti-coagulants  NEURO:   No history of headaches, syncope, paralysis, seizures or tremors.  All other reviewed and negative other than HPI.      OBJECTIVE:    PHYSICAL EXAMINATION:  There were no vitals filed for this visit.     There is no height or weight on file to calculate BMI.   (reviewed on 3/20/2024)  Telemedicine Exam  There were no vitals filed for this visit.  There is no height or weight on file to calculate BMI.   (reviewed on 3/20/2024)        Physical Exam: last in clinic visit:    GENERAL: Well appearing, in no acute distress, alert and oriented x3.  PSYCH:  Mood and affect appropriate.  SKIN: Skin color, texture, turgor normal, no rashes or lesions.  HEAD/FACE:  Normocephalic, atraumatic. Cranial nerves grossly intact.  NECK: No pain to palpation over the cervical paraspinous muscles. Spurling Negative. No pain with neck flexion, extension, or lateral flexion.   PULM: No evidence of respiratory difficulty, symmetric chest rise.  GI:  Soft and non-tender.  BACK:  TTP over the thoracic paraspinals, lower trapezius, latissimus dorsi, and lumbar paraspinals on the right.  SLR in the sitting and supine positions is negative to radicular pain.   mild-to-moderate pain to palpation over the facet joints of the lumbar spine and lumbar paraspinals - Present, mild, improved since procedure.  Fair range of motion secondary to pain reproduction.  Axial loading positive on the right - Present, mild, improved since procedure.  EXTREMITIES: There is significant lymphedema present throughout the right upper extremity.  No other deformities or skin discoloration. Good capillary refill.  MUSCULOSKELETAL: Shoulder, hip, and knee  provocative maneuvers are negative.  BUE & BLE strength is normal and symmetric.  No atrophy or tone abnormalities are noted.  NEURO: BUE & BLE coordination and muscle stretch reflexes are physiologic and symmetric.  Plantar response are downgoing. No clonus.  No loss of sensation is noted.  GAIT: normal.            ASSESSMENT: 49 y.o. year old female with mid back pain, consistent with     1. Chronic myofascial pain        2. Lumbar spondylosis                PLAN:   - Interventional: none  -s/p right L3-5 RFA 2/22/24 100% relief    - S/p right L3-5 RFA on 4/5/22 with 95% pain relief.     -Pharmacologic:   - continue Flexeril 10mg BID PRN - to take when she has pain flare.  - Anticoagulation use: None.   - [place]  reviewed and appropriate.      - Rehabilitative: Encouraged regular exercise. Therapy: advised patient continue with gradual compression for her lymphedema and activities as tolerated.     - Diagnostic:  Reviewed imaging available    - Follow up:  In clinic thoracic TPI as needed- she would like to go ahead and schedule 3 months out    - This condition does not require this patient to take time off of work, and the primary goal of our Pain Management services is to improve the patient's functional capacity.     - I discussed the risks, benefits, and alternatives to potential treatment options. All questions and concerns were fully addressed today in clinic.       Darcie Zuluaga NP  Interventional Pain Medicine  [facility]      Disclaimer:  This note was prepared using voice recognition system and is likely to have sound alike errors that may have been overlooked even after proof reading.  Please call me with any questions.

## 2024-03-15 ENCOUNTER — PATIENT MESSAGE (OUTPATIENT)
Dept: ENDOCRINOLOGY | Facility: CLINIC | Age: 49
End: 2024-03-15
Payer: COMMERCIAL

## 2024-03-19 ENCOUNTER — LAB VISIT (OUTPATIENT)
Dept: LAB | Facility: HOSPITAL | Age: 49
End: 2024-03-19
Attending: PHYSICIAN ASSISTANT
Payer: COMMERCIAL

## 2024-03-19 DIAGNOSIS — E89.0 POST-OPERATIVE HYPOTHYROIDISM: ICD-10-CM

## 2024-03-19 DIAGNOSIS — E55.9 VITAMIN D DEFICIENCY: ICD-10-CM

## 2024-03-19 DIAGNOSIS — E83.51 HYPOCALCEMIA: ICD-10-CM

## 2024-03-19 LAB
25(OH)D3+25(OH)D2 SERPL-MCNC: 24 NG/ML (ref 30–96)
ALBUMIN SERPL BCP-MCNC: 3.9 G/DL (ref 3.5–5.2)
ANION GAP SERPL CALC-SCNC: 10 MMOL/L (ref 8–16)
BUN SERPL-MCNC: 17 MG/DL (ref 6–20)
CALCIUM SERPL-MCNC: 8.5 MG/DL (ref 8.7–10.5)
CHLORIDE SERPL-SCNC: 104 MMOL/L (ref 95–110)
CO2 SERPL-SCNC: 27 MMOL/L (ref 23–29)
CREAT SERPL-MCNC: 1.1 MG/DL (ref 0.5–1.4)
EST. GFR  (NO RACE VARIABLE): >60 ML/MIN/1.73 M^2
GLUCOSE SERPL-MCNC: 114 MG/DL (ref 70–110)
PHOSPHATE SERPL-MCNC: 4.3 MG/DL (ref 2.7–4.5)
POTASSIUM SERPL-SCNC: 3.7 MMOL/L (ref 3.5–5.1)
SODIUM SERPL-SCNC: 141 MMOL/L (ref 136–145)
T4 FREE SERPL-MCNC: 1.05 NG/DL (ref 0.71–1.51)
TSH SERPL DL<=0.005 MIU/L-ACNC: 1.64 UIU/ML (ref 0.4–4)

## 2024-03-19 PROCEDURE — 82306 VITAMIN D 25 HYDROXY: CPT | Performed by: PHYSICIAN ASSISTANT

## 2024-03-19 PROCEDURE — 36415 COLL VENOUS BLD VENIPUNCTURE: CPT | Mod: PO | Performed by: PHYSICIAN ASSISTANT

## 2024-03-19 PROCEDURE — 84443 ASSAY THYROID STIM HORMONE: CPT | Performed by: PHYSICIAN ASSISTANT

## 2024-03-19 PROCEDURE — 84439 ASSAY OF FREE THYROXINE: CPT | Performed by: PHYSICIAN ASSISTANT

## 2024-03-19 PROCEDURE — 80069 RENAL FUNCTION PANEL: CPT | Performed by: PHYSICIAN ASSISTANT

## 2024-03-20 ENCOUNTER — OFFICE VISIT (OUTPATIENT)
Dept: PAIN MEDICINE | Facility: CLINIC | Age: 49
End: 2024-03-20
Payer: COMMERCIAL

## 2024-03-20 DIAGNOSIS — M47.816 LUMBAR SPONDYLOSIS: ICD-10-CM

## 2024-03-20 DIAGNOSIS — M79.18 CHRONIC MYOFASCIAL PAIN: Primary | ICD-10-CM

## 2024-03-20 DIAGNOSIS — G89.29 CHRONIC MYOFASCIAL PAIN: Primary | ICD-10-CM

## 2024-03-20 PROCEDURE — 99213 OFFICE O/P EST LOW 20 MIN: CPT | Mod: 95,,, | Performed by: NURSE PRACTITIONER

## 2024-03-20 RX ORDER — ERGOCALCIFEROL 1.25 MG/1
CAPSULE ORAL
Qty: 24 CAPSULE | Refills: 3 | Status: SHIPPED | OUTPATIENT
Start: 2024-03-20

## 2024-03-27 NOTE — PATIENT INSTRUCTIONS
I reviewed and examined the patient. I was present for the key exam elements, and I fully participated in the patient's care. I discussed the management of the care with the resident. I have personally reviewed the pertinent labs and imaging, as well as recent notes, with the patient. I have reviewed the note above and agree with the resident's medical decision making as documented in the resident's note, in addition to the following comments / findings:     Agree with the rest of the plan outlined below by resident physician. No red flags.      The patient understands and agrees to the assessment and plan of care. Patient has also agreed to follow up and comply with the treatment and evaluation as recommended today. Patient was instructed to call the office at [phone] should questions arise regarding their treatment or care.     Brent Faulkner DO, FAOASM  Family Medicine   [address], [address]  [place] [address]     Brent Faulkner DO       Report to ER immediately if symptoms worsen

## 2024-04-12 ENCOUNTER — TELEPHONE (OUTPATIENT)
Dept: FAMILY MEDICINE | Facility: CLINIC | Age: 49
End: 2024-04-12

## 2024-04-12 ENCOUNTER — OFFICE VISIT (OUTPATIENT)
Dept: FAMILY MEDICINE | Facility: CLINIC | Age: 49
End: 2024-04-12
Payer: COMMERCIAL

## 2024-04-12 ENCOUNTER — LAB VISIT (OUTPATIENT)
Dept: LAB | Facility: HOSPITAL | Age: 49
End: 2024-04-12
Attending: NURSE PRACTITIONER
Payer: COMMERCIAL

## 2024-04-12 VITALS
WEIGHT: 247.31 LBS | TEMPERATURE: 97 F | SYSTOLIC BLOOD PRESSURE: 100 MMHG | HEART RATE: 105 BPM | BODY MASS INDEX: 43.82 KG/M2 | HEIGHT: 63 IN | DIASTOLIC BLOOD PRESSURE: 62 MMHG | OXYGEN SATURATION: 98 %

## 2024-04-12 DIAGNOSIS — R19.7 DIARRHEA, UNSPECIFIED TYPE: ICD-10-CM

## 2024-04-12 DIAGNOSIS — R11.0 NAUSEA: ICD-10-CM

## 2024-04-12 DIAGNOSIS — R10.31 RLQ ABDOMINAL PAIN: ICD-10-CM

## 2024-04-12 DIAGNOSIS — R07.9 CHEST PAIN, UNSPECIFIED TYPE: Primary | ICD-10-CM

## 2024-04-12 DIAGNOSIS — R10.31 RIGHT LOWER QUADRANT PAIN: ICD-10-CM

## 2024-04-12 DIAGNOSIS — R19.8 ALTERNATING CONSTIPATION AND DIARRHEA: ICD-10-CM

## 2024-04-12 LAB
ALBUMIN SERPL BCP-MCNC: 3.9 G/DL (ref 3.5–5.2)
ALP SERPL-CCNC: 159 U/L (ref 55–135)
ALT SERPL W/O P-5'-P-CCNC: 44 U/L (ref 10–44)
ANION GAP SERPL CALC-SCNC: 11 MMOL/L (ref 8–16)
AST SERPL-CCNC: 23 U/L (ref 10–40)
BILIRUB SERPL-MCNC: 0.4 MG/DL (ref 0.1–1)
BUN SERPL-MCNC: 19 MG/DL (ref 6–20)
CALCIUM SERPL-MCNC: 9.3 MG/DL (ref 8.7–10.5)
CHLORIDE SERPL-SCNC: 110 MMOL/L (ref 95–110)
CO2 SERPL-SCNC: 22 MMOL/L (ref 23–29)
CREAT SERPL-MCNC: 1.2 MG/DL (ref 0.5–1.4)
ERYTHROCYTE [DISTWIDTH] IN BLOOD BY AUTOMATED COUNT: 14.4 % (ref 11.5–14.5)
EST. GFR  (NO RACE VARIABLE): 54.8 ML/MIN/1.73 M^2
GLUCOSE SERPL-MCNC: 101 MG/DL (ref 70–110)
HCT VFR BLD AUTO: 42 % (ref 37–48.5)
HGB BLD-MCNC: 12.9 G/DL (ref 12–16)
MCH RBC QN AUTO: 26.7 PG (ref 27–31)
MCHC RBC AUTO-ENTMCNC: 30.7 G/DL (ref 32–36)
MCV RBC AUTO: 87 FL (ref 82–98)
OHS QRS DURATION: 76 MS
OHS QTC CALCULATION: 430 MS
PLATELET # BLD AUTO: 300 K/UL (ref 150–450)
PMV BLD AUTO: 11.3 FL (ref 9.2–12.9)
POTASSIUM SERPL-SCNC: 4.2 MMOL/L (ref 3.5–5.1)
PROT SERPL-MCNC: 7.8 G/DL (ref 6–8.4)
RBC # BLD AUTO: 4.83 M/UL (ref 4–5.4)
SODIUM SERPL-SCNC: 143 MMOL/L (ref 136–145)
T4 FREE SERPL-MCNC: 1.2 NG/DL (ref 0.71–1.51)
TSH SERPL DL<=0.005 MIU/L-ACNC: 0.08 UIU/ML (ref 0.4–4)
WBC # BLD AUTO: 7.21 K/UL (ref 3.9–12.7)

## 2024-04-12 PROCEDURE — 3078F DIAST BP <80 MM HG: CPT | Mod: CPTII,S$GLB,, | Performed by: NURSE PRACTITIONER

## 2024-04-12 PROCEDURE — 3008F BODY MASS INDEX DOCD: CPT | Mod: CPTII,S$GLB,, | Performed by: NURSE PRACTITIONER

## 2024-04-12 PROCEDURE — 1159F MED LIST DOCD IN RCRD: CPT | Mod: CPTII,S$GLB,, | Performed by: NURSE PRACTITIONER

## 2024-04-12 PROCEDURE — 84443 ASSAY THYROID STIM HORMONE: CPT | Performed by: NURSE PRACTITIONER

## 2024-04-12 PROCEDURE — 85027 COMPLETE CBC AUTOMATED: CPT | Performed by: NURSE PRACTITIONER

## 2024-04-12 PROCEDURE — 84439 ASSAY OF FREE THYROXINE: CPT | Performed by: NURSE PRACTITIONER

## 2024-04-12 PROCEDURE — 80053 COMPREHEN METABOLIC PANEL: CPT | Performed by: NURSE PRACTITIONER

## 2024-04-12 PROCEDURE — 93005 ELECTROCARDIOGRAM TRACING: CPT | Mod: S$GLB,,, | Performed by: NURSE PRACTITIONER

## 2024-04-12 PROCEDURE — 3074F SYST BP LT 130 MM HG: CPT | Mod: CPTII,S$GLB,, | Performed by: NURSE PRACTITIONER

## 2024-04-12 PROCEDURE — 36415 COLL VENOUS BLD VENIPUNCTURE: CPT | Mod: PO | Performed by: NURSE PRACTITIONER

## 2024-04-12 PROCEDURE — 99999 PR PBB SHADOW E&M-EST. PATIENT-LVL V: CPT | Mod: PBBFAC,,, | Performed by: NURSE PRACTITIONER

## 2024-04-12 PROCEDURE — 1160F RVW MEDS BY RX/DR IN RCRD: CPT | Mod: CPTII,S$GLB,, | Performed by: NURSE PRACTITIONER

## 2024-04-12 PROCEDURE — 99214 OFFICE O/P EST MOD 30 MIN: CPT | Mod: S$GLB,,, | Performed by: NURSE PRACTITIONER

## 2024-04-12 PROCEDURE — 93010 ELECTROCARDIOGRAM REPORT: CPT | Mod: S$GLB,,, | Performed by: INTERNAL MEDICINE

## 2024-04-12 NOTE — PATIENT INSTRUCTIONS
Hydrate well  Rest  Recommend GI f/u for possible IBD, Crohn's evaluation  F/U with cardiology ASAP  Report to ER immediately if symptoms worsen or persist    Hi Yelonda,     If you are due for any health screening(s) below please notify me so we can arrange them to be ordered and scheduled. Most healthy patients at your age complete them, but you are free to accept or refuse.     If you can't do it, I'll definitely understand. If you can, I'd certainly appreciate it!    All of your core healthy metrics are met.

## 2024-04-13 NOTE — PROGRESS NOTES
Subjective     Patient ID: Akiko Jameson is a 49 y.o. female.    Chief Complaint: Diarrhea, Nausea, and Chest Pain    Diarrhea   This is a recurrent problem. The current episode started more than 1 year ago (recent episode past 2d). The problem occurs 2 to 4 times per day. The problem has been gradually improving. The stool consistency is described as Watery. The patient states that diarrhea does not awaken her from sleep. Associated symptoms include abdominal pain (cramping; resolved). Pertinent negatives include no arthralgias, bloating, chills, coughing, fever, headaches, increased  flatus, myalgias, sweats, URI, vomiting or weight loss. Associated symptoms comments: Pt states has been having alternating constipation and diarrhea > 1 y; states has seen GI. Pt also sees hepatology for NAFLD, elevated liver enzymes. Nothing aggravates the symptoms. There are no known risk factors. She has tried nothing for the symptoms. The treatment provided mild relief. There is no history of bowel resection, inflammatory bowel disease, irritable bowel syndrome, malabsorption, a recent abdominal surgery or short gut syndrome. sarcoidosis   Chest Pain   This is a new problem. The current episode started yesterday. The onset quality is sudden. The problem occurs rarely. The problem has been resolved. The pain is present in the lateral region (left upper chest). The pain is mild. The quality of the pain is described as sharp. The pain does not radiate. Associated symptoms include abdominal pain (cramping; resolved) and nausea. Pertinent negatives include no back pain, claudication, cough, diaphoresis, dizziness, exertional chest pressure, fever, headaches, hemoptysis, irregular heartbeat, leg pain, lower extremity edema, malaise/fatigue, near-syncope, numbness, orthopnea, palpitations, PND, shortness of breath, sputum production, syncope, vomiting or weakness. Associated symptoms comments: Pt sees cardiology. The pain is  aggravated by nothing. She has tried nothing for the symptoms. The treatment provided significant relief. Risk factors include obesity.   Her past medical history is significant for hypertension, mitral valve prolapse, sleep apnea, thyroid problem and valve disorder (aoritc valve sclerosis).   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 Past medical history comments: sarcoidosis   Her family medical history is significant for diabetes.   Pertinent negatives for family medical history include: no aortic dissection, no CAD, no connective tissue disease, no heart disease, no hyperlipidemia, no hypertension, no Marfan's syndrome, no early MI, no PE, no PVD, no sickle cell disease, no stroke, no sudden death and no TIA. Prior diagnostic workup includes chest x-ray and echocardiogram.     Past Medical History:   Diagnosis Date    ALLERGIC RHINITIS     Aortic valve sclerosis 1/20/2023    Arthritis     Cellulitis     Constipation due to opioid therapy     Eosinophilia     mild    GERD (gastroesophageal reflux disease)     Granular cell tumor     H. pylori infection 2018    History of 2019 novel coronavirus disease (COVID-19) 10/04/2020    Hypertension     Lymphedema     Mild anemia     Mitral valve sclerosis 3/21/2023    KEIRY on CPAP     does not regularly    Positive CHARLENE (antinuclear antibody) 07/27/2022    Prediabetes 08/06/2021    Sarcoidosis, unspecified     Sleep apnea     compliant with CPAP     Thyroid nodule     Urinary incontinence, mixed      Social History     Socioeconomic History    Marital status:     Number of children: 2   Occupational History     Employer: [employer]   Tobacco Use    Smoking status: Never    Smokeless tobacco: Never   Substance and Sexual Activity    Alcohol use: No    Drug use: No    Sexual activity: Yes     Partners: Male     Social Determinants of Health     Financial Resource Strain: Low Risk  (2/19/2024)    Overall Financial Resource Strain (CARDIA)     Difficulty of Paying Living Expenses: Not hard at all   Food Insecurity: No Food Insecurity (2/19/2024)    Hunger Vital Sign     Worried About Running Out of Food in the Last Year: Never true     Ran Out of Food in the Last Year: Never true   Transportation Needs: No Transportation Needs (2/19/2024)    PRAPARE - Transportation     Lack of Transportation (Medical): No     Lack of Transportation (Non-Medical): No   Physical Activity: Inactive (2/19/2024)    Exercise Vital Sign     Days of Exercise per Week: 0 days     Minutes of Exercise per Session: 0 min   Stress: No Stress Concern Present (2/19/2024)    [employer] [place] [employer] - Occupational Stress Questionnaire     Feeling of Stress : Not at all   Social Connections: Unknown (2/19/2024)    Social Connection and Isolation Panel [NHANES]     Frequency of Communication with Friends and Family: More than three times a week     Frequency of Social Gatherings with Friends and Family: Once a week     Active Member of Clubs or Organizations: Yes     Attends Club or Organization Meetings: More than 4 times per year     Marital Status:    Housing Stability: Low Risk  (2/19/2024)    Housing Stability Vital Sign     Unable to Pay for Housing in the Last Year: No     Number of Places Lived in the Last Year: 1     Unstable Housing in the Last Year: No     Past Surgical History:   Procedure Laterality Date    24 HOUR IMPEDANCE PH MONITORING OF ESOPHAGUS IN  PATIENT NOT TAKING ACID REDUCING MEDICATIONS N/A 01/06/2020    Procedure: IMPEDANCE PH STUDY, ESOPHAGEAL, 24 HOUR, IN PATIENT NOT TAKING ACID REDUCING MEDICATION;  Surgeon: First Available Tamika Panchal;  Location: [facility];  Service: Endoscopy;  Laterality: N/A;    AXILLARY NODE DISSECTION Right     granular cell tumor    BILATERAL SALPINGO-OOPHORECTOMY (BSO)  07/21/2020    BREAST CYST ASPIRATION      left    CHOLECYSTECTOMY      COLONOSCOPY N/A 05/10/2019    Procedure: COLONOSCOPY;  Surgeon: Edgar Gamble Jr., MD;  Location: [facility];  Service: Endoscopy;  Laterality: N/A;    ENDOBRONCHIAL ULTRASOUND Bilateral 04/27/2021    Procedure: ENDOBRONCHIAL ULTRASOUND (EBUS);  Surgeon: Armando Kirby MD;  Location: [facility];  Service: Pulmonary;  Laterality: Bilateral;  need fluoroscopy    ENDOMETRIAL ABLATION      ESOPHAGEAL MANOMETRY WITH MEASUREMENT OF IMPEDANCE N/A 01/06/2020    Procedure: MANOMETRY, ESOPHAGUS, WITH IMPEDANCE MEASUREMENT;  Surgeon: First Available Tamika Panchal;  Location: [facility];  Service: Endoscopy;  Laterality: N/A;    ESOPHAGOGASTRODUODENOSCOPY N/A 07/05/2018    Procedure: EGD (ESOPHAGOGASTRODUODENOSCOPY);  Surgeon: Edgar Gamble Jr., MD;  Location: [facility];  Service: Endoscopy;  Laterality: N/A;    ESOPHAGOGASTRODUODENOSCOPY N/A 11/23/2020    Procedure: ESOPHAGOGASTRODUODENOSCOPY (EGD);  Surgeon: Jina Kaufman MD;  Location: [facility];  Service: General;  Laterality: N/A;    EXCISION OF MASS OF ABDOMEN Left 06/18/2018    Procedure: EXCISION, MASS, ABDOMEN - flank left;  Surgeon: Armando Tate MD;  Location: [facility];  Service: General;  Laterality: Left;  left flank removal of mass    FINE NEEDLE ASPIRATION      thyroid    FLEXIBLE SIGMOIDOSCOPY N/A 08/24/2022    Procedure: SIGMOIDOSCOPY, FLEXIBLE;  Surgeon: Amber West MD;  Location: [facility];  Service: Endoscopy;  Laterality: N/A;    HYSTERECTOMY  07/21/2020    INJECTION OF ANESTHETIC AGENT AROUND MEDIAL BRANCH  NERVES INNERVATING LUMBAR FACET JOINT Right 02/24/2022    Procedure: Right L3, 4, 5 MBB;  Surgeon: Anam Montero MD;  Location: [facility];  Service: Pain Management;  Laterality: Right;    INJECTION OF ANESTHETIC AGENT AROUND MEDIAL BRANCH NERVES INNERVATING LUMBAR FACET JOINT Right 03/29/2022    Procedure: Right L3, 4, 5 MBB  (2nd block);  Surgeon: Anam Montero MD;  Location: [facility];  Service: Pain Management;  Laterality: Right;    KNEE ARTHROSCOPY W/ MENISCECTOMY Right     NASAL SEPTUM SURGERY      OOPHORECTOMY      RADIOFREQUENCY THERMOCOAGULATION Right 04/05/2022    Procedure: Right L3-5 Lumbar RFA;  Surgeon: Anam Montero MD;  Location: [facility];  Service: Pain Management;  Laterality: Right;    RADIOFREQUENCY THERMOCOAGULATION Right 2/22/2024    Procedure: Right L3-5 Lumbar RFA;  Surgeon: Anam Montero MD;  Location: [facility];  Service: Pain Management;  Laterality: Right;    ROBOT-ASSISTED NISSEN FUNDOPLICATION USING DA TAMAR XI N/A 02/28/2020    Procedure: XI ROBOTIC FUNDOPLICATION, NISSEN;  Surgeon: Jina Kaufman MD;  Location: [facility];  Service: General;  Laterality: N/A;    THYROIDECTOMY Bilateral 03/11/2021    Procedure: THYROIDECTOMY;  Surgeon: Nixon Moe MD;  Location: [facility];  Service: ENT;  Laterality: Bilateral;    TUBAL LIGATION      UPPER GASTROINTESTINAL ENDOSCOPY  07/05/2018    Dr. Gamble       Review of Systems   Constitutional: Negative.  Negative for chills, diaphoresis, fever, malaise/fatigue and weight loss.   HENT: Negative.  Negative for hearing loss, rhinorrhea and trouble swallowing.    Eyes: Negative.  Negative for discharge and visual disturbance.   Respiratory:  Negative for cough, hemoptysis, sputum production, chest tightness, shortness of breath and wheezing.    Cardiovascular: Negative.  Negative for palpitations, orthopnea, claudication, syncope, PND and near-syncope.   Gastrointestinal:  Positive for abdominal pain (cramping;  resolved) and nausea. Negative for bloating, flatus and vomiting.   Endocrine: Negative.    Genitourinary: Negative.    Musculoskeletal: Negative.  Negative for arthralgias, back pain, leg pain and myalgias.   Integumentary:  Negative.   Allergic/Immunologic: Negative.    Neurological: Negative.  Negative for dizziness, seizures, weakness, numbness and headaches.   Psychiatric/Behavioral: Negative.  Negative for confusion and dysphoric mood.           Objective     Physical Exam  Vitals and nursing note reviewed.   Constitutional:       Appearance: Normal appearance.   HENT:      Head: Normocephalic.      Right Ear: Tympanic membrane, ear canal and external ear normal.      Left Ear: Tympanic membrane, ear canal and external ear normal.      Nose: Nose normal.      Mouth/Throat:      Mouth: Mucous membranes are moist.      Pharynx: Oropharynx is clear.   Eyes:      Conjunctiva/sclera: Conjunctivae normal.      Pupils: Pupils are equal, round, and reactive to light.   Cardiovascular:      Rate and Rhythm: Normal rate and regular rhythm.      Pulses: Normal pulses.      Heart sounds: Normal heart sounds.   Pulmonary:      Effort: Pulmonary effort is normal.      Breath sounds: Normal breath sounds.   Abdominal:      General: Bowel sounds are normal.      Palpations: Abdomen is soft.      Tenderness: There is no abdominal tenderness.   Musculoskeletal:         General: Normal range of motion.      Cervical back: Normal range of motion and neck supple.   Skin:     General: Skin is warm and dry.      Capillary Refill: Capillary refill takes 2 to 3 seconds.   Neurological:      Mental Status: She is alert and oriented to person, place, and time.   Psychiatric:         Mood and Affect: Mood normal.         Behavior: Behavior normal.         Thought Content: Thought content normal.         Judgment: Judgment normal.            Assessment and Plan     1. Chest pain, unspecified type  -     IN OFFICE EKG 12-LEAD (to Muse)  -      Ambulatory referral/consult to Cardiology; Future; Expected date: 04/19/2024  -     CK; Future; Expected date: 04/12/2024  -     TROPONIN I; Future; Expected date: 04/12/2024    2. RLQ abdominal pain  3. Diarrhea, unspecified type  4. Nausea  5. Right lower quadrant pain  6. Alternating constipation and diarrhea  -     CT Abdomen Pelvis  Without Contrast; STAT; Expected date: 04/12/2024  -     Comprehensive Metabolic Panel; Future; Expected date: 04/12/2024  -     TSH; Future; Expected date: 04/12/2024  -     CBC Without Differential; Future; Expected date: 04/12/2024        -     Stool Exam-Ova,Cysts,Parasites; Future; Expected date: 04/12/2024  -     WBC, Stool; Future; Expected date: 04/12/2024  -     Giardia / Cryptosporidum, EIA; Future; Expected date: 04/12/2024  -     Stool culture; Future; Expected date: 04/12/2024  -     Occult blood x 1, stool; Future; Expected date: 04/12/2024  -     Clostridium difficile EIA; Future; Expected date: 04/12/2024  -     Ambulatory referral/consult to Gastroenterology; Future; Expected date: 04/19/2024  -     Urinalysis  -     Urine culture; Future  Hydrate well  Rest  Recommend GI f/u for possible IBD, Crohn's evaluation  F/U with cardiology ASAP  Report to ER immediately if symptoms worsen or persist                 No follow-ups on file.

## 2024-04-15 ENCOUNTER — PATIENT MESSAGE (OUTPATIENT)
Dept: FAMILY MEDICINE | Facility: CLINIC | Age: 49
End: 2024-04-15
Payer: COMMERCIAL

## 2024-04-18 ENCOUNTER — LAB VISIT (OUTPATIENT)
Dept: LAB | Facility: HOSPITAL | Age: 49
End: 2024-04-18
Attending: NURSE PRACTITIONER
Payer: COMMERCIAL

## 2024-04-18 DIAGNOSIS — R19.7 DIARRHEA, UNSPECIFIED TYPE: ICD-10-CM

## 2024-04-18 DIAGNOSIS — R10.31 RLQ ABDOMINAL PAIN: ICD-10-CM

## 2024-04-18 LAB
OB PNL STL: NEGATIVE
WBC #/AREA STL HPF: NORMAL /[HPF]

## 2024-04-18 PROCEDURE — 87086 URINE CULTURE/COLONY COUNT: CPT | Performed by: NURSE PRACTITIONER

## 2024-04-18 PROCEDURE — 87209 SMEAR COMPLEX STAIN: CPT | Performed by: NURSE PRACTITIONER

## 2024-04-18 PROCEDURE — 82272 OCCULT BLD FECES 1-3 TESTS: CPT | Performed by: NURSE PRACTITIONER

## 2024-04-18 PROCEDURE — 87045 FECES CULTURE AEROBIC BACT: CPT | Performed by: NURSE PRACTITIONER

## 2024-04-18 PROCEDURE — 89055 LEUKOCYTE ASSESSMENT FECAL: CPT | Performed by: NURSE PRACTITIONER

## 2024-04-18 PROCEDURE — 87328 CRYPTOSPORIDIUM AG IA: CPT | Performed by: NURSE PRACTITIONER

## 2024-04-18 PROCEDURE — 87427 SHIGA-LIKE TOXIN AG IA: CPT | Performed by: NURSE PRACTITIONER

## 2024-04-18 PROCEDURE — 87046 STOOL CULTR AEROBIC BACT EA: CPT | Performed by: NURSE PRACTITIONER

## 2024-04-18 PROCEDURE — 87449 NOS EACH ORGANISM AG IA: CPT | Performed by: NURSE PRACTITIONER

## 2024-04-19 LAB
BACTERIA UR CULT: NORMAL
CRYPTOSP AG STL QL IA: NEGATIVE
E COLI SXT1 STL QL IA: NEGATIVE
E COLI SXT2 STL QL IA: NEGATIVE
G LAMBLIA AG STL QL IA: NEGATIVE

## 2024-04-20 LAB — BACTERIA STL CULT: NORMAL

## 2024-04-23 NOTE — PROGRESS NOTES
Subjective:       Patient ID: Akiko Jameson is a 49 y.o. female Body mass index is 44.13 kg/m².    Chief Complaint: Diarrhea (constipation)    This patient is new to me.  Referring Provider: Yeimi Abernathy for Diarrhea.     She reports weeks of diarrhea and constipation.     Usually has BM - daily formed BM and then out of blue diarrhea. No laxative use. Noticed that dairy does irritate her bowels so she has stopped this. She reports she continues to have rectal pain after BMs - especially diarrhea.      Does have 2 first cousins with crohns disease diagnosis    2019 - Dr Gamble with Colonoscopy   Impression:  - The anus is normal.                        - Non-bleeding internal hemorrhoids.                        - The rectum and recto-sigmoid colon are normal.                        - Rectal spasms?                        - Diverticulosis in the sigmoid colon.                        - The examination was otherwise normal.                        - The examined portion of the ileum was normal.                        - No specimens collected.         Review of Systems   Constitutional:  Negative for activity change, appetite change, fatigue, fever and unexpected weight change.   HENT:  Negative for sore throat and trouble swallowing.    Respiratory:  Negative for cough and shortness of breath.    Cardiovascular:  Negative for chest pain.   Gastrointestinal:  Positive for abdominal pain, constipation, diarrhea and rectal pain. Negative for abdominal distention, anal bleeding, blood in stool, nausea and vomiting.       No LMP recorded. Patient has had a hysterectomy.  Past Medical History:   Diagnosis Date    ALLERGIC RHINITIS     Aortic valve sclerosis 1/20/2023    Arthritis     Cellulitis     Constipation due to opioid therapy     Eosinophilia     mild    GERD (gastroesophageal reflux disease)     Granular cell tumor     H. pylori infection 2018    History of 2019 novel coronavirus disease (COVID-19)  10/04/2020    Hypertension     Lymphedema     Mild anemia     Mitral valve sclerosis 3/21/2023    KEIRY on CPAP     does not regularly    Positive CHARLENE (antinuclear antibody) 07/27/2022    Prediabetes 08/06/2021    Sarcoidosis, unspecified     Sleep apnea     compliant with CPAP    Thyroid nodule     Urinary incontinence, mixed      Past Surgical History:   Procedure Laterality Date    24 HOUR IMPEDANCE PH MONITORING OF ESOPHAGUS IN PATIENT NOT TAKING ACID REDUCING MEDICATIONS N/A 01/06/2020    Procedure: IMPEDANCE PH STUDY, ESOPHAGEAL, 24 HOUR, IN PATIENT NOT TAKING ACID REDUCING MEDICATION;  Surgeon: First Available Tamika Panchal;  Location: [facility];  Service: Endoscopy;  Laterality: N/A;    AXILLARY NODE DISSECTION Right     granular cell tumor    BILATERAL SALPINGO-OOPHORECTOMY (BSO)  07/21/2020    BREAST CYST ASPIRATION      left    CHOLECYSTECTOMY      COLONOSCOPY N/A 05/10/2019    Procedure: COLONOSCOPY;  Surgeon: Edgar Gamble Jr., MD;  Location: [facility];  Service: Endoscopy;  Laterality: N/A;    ENDOBRONCHIAL ULTRASOUND Bilateral 04/27/2021    Procedure: ENDOBRONCHIAL ULTRASOUND (EBUS);  Surgeon: Armando Kirby MD;  Location: [facility];  Service: Pulmonary;  Laterality: Bilateral;  need fluoroscopy    ENDOMETRIAL ABLATION      ESOPHAGEAL MANOMETRY WITH MEASUREMENT OF IMPEDANCE N/A 01/06/2020    Procedure: MANOMETRY, ESOPHAGUS, WITH IMPEDANCE MEASUREMENT;  Surgeon: First Available [place];  Location: [facility];  Service: Endoscopy;  Laterality: N/A;    ESOPHAGOGASTRODUODENOSCOPY N/A 07/05/2018    Procedure: EGD (ESOPHAGOGASTRODUODENOSCOPY);  Surgeon: Edgar Gamble Jr., MD;  Location: [facility];  Service: Endoscopy;  Laterality: N/A;    ESOPHAGOGASTRODUODENOSCOPY N/A 11/23/2020    Procedure: ESOPHAGOGASTRODUODENOSCOPY (EGD);  Surgeon: Jina Kaufman MD;  Location: [facility];  Service: General;  Laterality: N/A;    EXCISION OF MASS OF ABDOMEN Left 06/18/2018    Procedure: EXCISION, MASS, ABDOMEN  - flank left;  Surgeon: Armando Tate MD;  Location: [facility] OR;  Service: General;  Laterality: Left;  left flank removal of mass    FINE NEEDLE ASPIRATION      thyroid    FLEXIBLE SIGMOIDOSCOPY N/A 08/24/2022    Procedure: SIGMOIDOSCOPY, FLEXIBLE;  Surgeon: Amber West MD;  Location: [facility] ENDO;  Service: Endoscopy;  Laterality: N/A;    HYSTERECTOMY  07/21/2020    INJECTION OF ANESTHETIC AGENT AROUND MEDIAL BRANCH NERVES INNERVATING LUMBAR FACET JOINT Right 02/24/2022    Procedure: Right L3, 4, 5 MBB;  Surgeon: Anam Montero MD;  Location: [facility];  Service: Pain Management;  Laterality: Right;    INJECTION OF ANESTHETIC AGENT AROUND MEDIAL BRANCH NERVES INNERVATING LUMBAR FACET JOINT Right 03/29/2022    Procedure: Right L3, 4, 5 MBB  (2nd block);  Surgeon: Anam Montero MD;  Location: [facility];  Service: Pain Management;  Laterality: Right;    KNEE ARTHROSCOPY W/ MENISCECTOMY Right     NASAL SEPTUM SURGERY      OOPHORECTOMY      RADIOFREQUENCY THERMOCOAGULATION Right 04/05/2022    Procedure: Right L3-5 Lumbar RFA;  Surgeon: Anam Montero MD;  Location: [facility];  Service: Pain Management;  Laterality: Right;    RADIOFREQUENCY THERMOCOAGULATION Right 2/22/2024    Procedure: Right L3-5 Lumbar RFA;  Surgeon: Anam Montero MD;  Location: [facility];  Service: Pain Management;  Laterality: Right;    ROBOT-ASSISTED NISSEN FUNDOPLICATION USING DA TAMAR XI N/A 02/28/2020    Procedure: XI ROBOTIC FUNDOPLICATION, NISSEN;  Surgeon: Jina Kaufman MD;  Location: [facility] OR;  Service: General;  Laterality: N/A;    THYROIDECTOMY Bilateral 03/11/2021    Procedure: THYROIDECTOMY;  Surgeon: Nixon Moe MD;  Location: [facility] OR;  Service: ENT;  Laterality: Bilateral;    TUBAL LIGATION      UPPER GASTROINTESTINAL ENDOSCOPY  07/05/2018    Dr. Gamble     Family History   Problem Relation Name Age of Onset    Diabetes Mother      Kidney failure Mother      Heart attack Mother       Blindness Father      Breast cancer Maternal Aunt great aunt     Breast cancer Paternal Aunt      Breast cancer Maternal Grandmother      Ovarian cancer Cousin      Breast cancer Cousin      Cirrhosis Neg Hx      Colon polyps Neg Hx      Colon cancer Neg Hx      Crohn's disease Neg Hx      Esophageal cancer Neg Hx      Stomach cancer Neg Hx      Ulcerative colitis Neg Hx      Amblyopia Neg Hx      Cataracts Neg Hx      Glaucoma Neg Hx      Macular degeneration Neg Hx      Retinal detachment Neg Hx      Strabismus Neg Hx      Allergic rhinitis Neg Hx      Allergies Neg Hx      Angioedema Neg Hx      Asthma Neg Hx      Atopy Neg Hx      Eczema Neg Hx      Immunodeficiency Neg Hx      Rhinitis Neg Hx      Urticaria Neg Hx       Social History     Tobacco Use    Smoking status: Never    Smokeless tobacco: Never   Substance Use Topics    Alcohol use: No    Drug use: No     Wt Readings from Last 10 Encounters:   04/25/24 113 kg (249 lb 1.9 oz)   04/12/24 112.2 kg (247 lb 4.8 oz)   02/22/24 118.2 kg (260 lb 11.1 oz)   02/09/24 119.3 kg (263 lb)   11/02/23 115.4 kg (254 lb 6.6 oz)   11/02/23 115.4 kg (254 lb 6.6 oz)   10/30/23 115.4 kg (254 lb 6.6 oz)   09/25/23 115.2 kg (254 lb)   09/22/23 115.4 kg (254 lb 4.8 oz)   08/10/23 115.6 kg (254 lb 14.4 oz)     Lab Results   Component Value Date    WBC 7.21 04/12/2024    HGB 12.9 04/12/2024    HCT 42.0 04/12/2024    MCV 87 04/12/2024     04/12/2024     CMP  Sodium   Date Value Ref Range Status   04/12/2024 143 136 - 145 mmol/L Final     Potassium   Date Value Ref Range Status   04/12/2024 4.2 3.5 - 5.1 mmol/L Final     Chloride   Date Value Ref Range Status   04/12/2024 110 95 - 110 mmol/L Final     CO2   Date Value Ref Range Status   04/12/2024 22 (L) 23 - 29 mmol/L Final     Glucose   Date Value Ref Range Status   04/12/2024 101 70 - 110 mg/dL Final     BUN   Date Value Ref Range Status   04/12/2024 19 6 - 20 mg/dL Final     Creatinine   Date Value Ref Range Status  "  04/12/2024 1.2 0.5 - 1.4 mg/dL Final     Calcium   Date Value Ref Range Status   04/12/2024 9.3 8.7 - 10.5 mg/dL Final     Total Protein   Date Value Ref Range Status   04/12/2024 7.8 6.0 - 8.4 g/dL Final     Albumin   Date Value Ref Range Status   04/12/2024 3.9 3.5 - 5.2 g/dL Final     Total Bilirubin   Date Value Ref Range Status   04/12/2024 0.4 0.1 - 1.0 mg/dL Final     Comment:     For infants and newborns, interpretation of results should be based  on gestational age, weight and in agreement with clinical  observations.    Premature Infant recommended reference ranges:  Up to 24 hours.............<8.0 mg/dL  Up to 48 hours............<12.0 mg/dL  3-5 days..................<15.0 mg/dL  6-29 days.................<15.0 mg/dL       Alkaline Phosphatase   Date Value Ref Range Status   04/12/2024 159 (H) 55 - 135 U/L Final     AST   Date Value Ref Range Status   04/12/2024 23 10 - 40 U/L Final     ALT   Date Value Ref Range Status   04/12/2024 44 10 - 44 U/L Final     Anion Gap   Date Value Ref Range Status   04/12/2024 11 8 - 16 mmol/L Final     eGFR if    Date Value Ref Range Status   07/27/2022 46.8 (A) >60 mL/min/1.73 m^2 Final     eGFR if non    Date Value Ref Range Status   07/27/2022 40.6 (A) >60 mL/min/1.73 m^2 Final     Comment:     Calculation used to obtain the estimated glomerular filtration  rate (eGFR) is the CKD-EPI equation.        No results found for: "AMYLASE"  No results found for: "LIPASE"  No results found for: "LIPASERES"  Lab Results   Component Value Date    TSH 0.076 (L) 04/12/2024       Reviewed prior medical records including radiology report of x-ray abdomen 07/01/2022, x-ray abdomen 03/07/2023, x-ray abdomen 06/01/2023, ultrasound abdomen 10/13/2023, x-ray abdomen 10/13/2023, CT abdomen pelvis 04/24/2024 & endoscopy history (see surgical history).    Objective:      Physical Exam  Vitals and nursing note reviewed.   Constitutional:       General: She " is not in acute distress.     Appearance: She is obese. She is not ill-appearing.   HENT:      Head: Normocephalic and atraumatic.      Mouth/Throat:      Mouth: Mucous membranes are moist.      Pharynx: Oropharynx is clear.   Eyes:      Conjunctiva/sclera: Conjunctivae normal.   Cardiovascular:      Rate and Rhythm: Normal rate and regular rhythm.      Pulses: Normal pulses.      Heart sounds: No murmur heard.  Pulmonary:      Effort: Pulmonary effort is normal. No respiratory distress.   Abdominal:      General: Bowel sounds are normal. There is no distension.      Palpations: Abdomen is soft.      Tenderness: There is no abdominal tenderness.   Skin:     General: Skin is warm and dry.      Capillary Refill: Capillary refill takes 2 to 3 seconds.   Neurological:      Mental Status: She is alert and oriented to person, place, and time.         Assessment:       1. Rectal pain    2. Diarrhea, unspecified type    3. Nausea    4. Alternating constipation and diarrhea    5. History of H. pylori infection        Plan:       Rectal pain  Patient previously had a colonoscopy with Dr. Maguire for this complaint for which no findings were noted.  Patient reports she continues to have this pain and it is worse and occurs after having a bowel movement.  We will repeat colonoscopy to rule out any changes since 2019 colonoscopy.  - schedule Colonoscopy, discussed procedure with the patient, including risks and benefits, patient verbalized understanding  Sit in a tub with about an inch of warm water and about a 1/4 cup of epsom salts (available over-the-counter). Do this for 15-20 minutes up to 3 times per day.   Of note, patient did have internal hemorrhoids on exam during colonoscopy in 2019.  Consider treatment of internal hemorrhoids for rectal pain with Colorectal surgery.  -     Ambulatory referral/consult to Colorectal Surgery; Future; Expected date: 05/02/2024    Alternating constipation and diarrhea Nausea, & Diarrhea,  unspecified type  Recommend daily exercise as tolerated, adequate water intake (six 8-oz glasses of water daily), and high fiber diet. OTC fiber supplements are recommended if diet does not reach daily fiber goal (20-30 grams daily), such as Metamucil, Citrucel, or FiberCon (take as directed, separate from other oral medications by >2 hours).  -Recommend taking an OTC stool softener such as Colace as directed to avoid hard stools and straining with bowel movements PRN  -Recommend trying OTC MiraLax once daily (17g PO) as directed  - If no improvement with above recommendations, try intermittently dosed Dulcolax OTC as directed (every 3-4  days) PRN to facilitate bowel movements  -If still no improvement with these measures, call/follow-up   This patient presents with non bloody diarrhea consistent with likely viral enteritis. Doubt invasive bacteria causing diarrhea such as C diff (no recent antibiotics), shiga toxin (non bloody). No recent travel. Patient is not immunocompromised. Diarrhea is non bloody so less likely inflammatory bowel disease. Given history, I have low suspicion for giardia or other parasites.   Recommended patient increase fiber in her diet.  Recent CT abdomen pelvis shows diverticulosis without diverticulitis.  Also recommended patient start align probiotic for possible IBS with constipation and diarrhea.  Align has been shown to help with bowel symptoms of IBS.  I sent in a prescription for bentyl (antispasmodic of the gi tract) take as directed prn abdominal pain/cramping and can help slow down how fast food moves through gi tract (may help with diarrhea)   -     H. pylori antigen, stool; Future; Expected date: 04/25/2024  -     Pancreatic elastase, fecal; Future; Expected date: 04/25/2024  -     Clostridium difficile EIA; Future; Expected date: 04/25/2024  -     dicyclomine (BENTYL) 10 MG capsule; Take 1 capsule (10 mg total) by mouth 4 (four) times daily before meals and nightly.  Dispense:  120 capsule; Refill: 0    History of H. pylori infection  Patient reports a history of H pylori in the past for which she was treated.  We will rule out repeat infection with stool study.  -     H. pylori antigen, stool; Future; Expected date: 04/25/2024  No follow-ups on file.      If no improvement in symptoms or symptoms worsen, call/follow-up at clinic or go to ER.       TIFFANIE Galeano, BETO-C    Encounter includes face to face time and non-face to face time preparing to see the patient (eg, review of tests), obtaining and/or reviewing separately obtained history, documenting clinical information in the electronic or other health record, independently interpreting results (not separately reported) and communicating results to the patient/family/caregiver, or care coordination (not separately reported).     A dictation software program was used for this note. Please expect some simple typographical errors in this note.

## 2024-04-24 ENCOUNTER — HOSPITAL ENCOUNTER (OUTPATIENT)
Dept: RADIOLOGY | Facility: HOSPITAL | Age: 49
Discharge: HOME | End: 2024-04-24
Attending: NURSE PRACTITIONER
Payer: COMMERCIAL

## 2024-04-24 DIAGNOSIS — R10.31 RIGHT LOWER QUADRANT PAIN: ICD-10-CM

## 2024-04-24 LAB — O+P STL MICRO: NORMAL

## 2024-04-24 PROCEDURE — 74176 CT ABD & PELVIS W/O CONTRAST: CPT | Mod: 26,,, | Performed by: RADIOLOGY

## 2024-04-24 PROCEDURE — 74176 CT ABD & PELVIS W/O CONTRAST: CPT | Mod: TC,PO

## 2024-04-24 PROCEDURE — A9698 NON-RAD CONTRAST MATERIALNOC: HCPCS | Mod: PO | Performed by: NURSE PRACTITIONER

## 2024-04-24 PROCEDURE — 25500020 PHARM REV CODE 255: Mod: PO | Performed by: NURSE PRACTITIONER

## 2024-04-24 RX ADMIN — IOHEXOL 1000 ML: 12 SOLUTION ORAL at 04:04

## 2024-04-25 ENCOUNTER — LAB VISIT (OUTPATIENT)
Dept: LAB | Facility: HOSPITAL | Age: 49
End: 2024-04-25
Payer: COMMERCIAL

## 2024-04-25 ENCOUNTER — OFFICE VISIT (OUTPATIENT)
Dept: GASTROENTEROLOGY | Facility: CLINIC | Age: 49
End: 2024-04-25
Payer: COMMERCIAL

## 2024-04-25 VITALS
DIASTOLIC BLOOD PRESSURE: 82 MMHG | HEART RATE: 69 BPM | HEIGHT: 63 IN | SYSTOLIC BLOOD PRESSURE: 127 MMHG | BODY MASS INDEX: 44.14 KG/M2 | WEIGHT: 249.13 LBS

## 2024-04-25 DIAGNOSIS — R19.7 DIARRHEA, UNSPECIFIED TYPE: ICD-10-CM

## 2024-04-25 DIAGNOSIS — K62.89 RECTAL PAIN: ICD-10-CM

## 2024-04-25 DIAGNOSIS — R11.0 NAUSEA: ICD-10-CM

## 2024-04-25 DIAGNOSIS — K62.89 RECTAL PAIN: Primary | ICD-10-CM

## 2024-04-25 DIAGNOSIS — A04.8 H. PYLORI INFECTION: ICD-10-CM

## 2024-04-25 DIAGNOSIS — R19.8 ALTERNATING CONSTIPATION AND DIARRHEA: ICD-10-CM

## 2024-04-25 PROCEDURE — 82653 EL-1 FECAL QUANTITATIVE: CPT

## 2024-04-25 PROCEDURE — 99214 OFFICE O/P EST MOD 30 MIN: CPT | Mod: S$GLB,,,

## 2024-04-25 PROCEDURE — 87449 NOS EACH ORGANISM AG IA: CPT

## 2024-04-25 PROCEDURE — 87338 HPYLORI STOOL AG IA: CPT

## 2024-04-25 PROCEDURE — 3008F BODY MASS INDEX DOCD: CPT | Mod: CPTII,S$GLB,,

## 2024-04-25 PROCEDURE — 3079F DIAST BP 80-89 MM HG: CPT | Mod: CPTII,S$GLB,,

## 2024-04-25 PROCEDURE — 87324 CLOSTRIDIUM AG IA: CPT | Mod: 59

## 2024-04-25 PROCEDURE — 3074F SYST BP LT 130 MM HG: CPT | Mod: CPTII,S$GLB,,

## 2024-04-25 PROCEDURE — 87493 C DIFF AMPLIFIED PROBE: CPT

## 2024-04-25 PROCEDURE — 99999 PR PBB SHADOW E&M-EST. PATIENT-LVL IV: CPT | Mod: PBBFAC,,,

## 2024-04-25 RX ORDER — DICYCLOMINE HYDROCHLORIDE 10 MG/1
10 CAPSULE ORAL
Qty: 120 CAPSULE | Refills: 0 | Status: SHIPPED | OUTPATIENT
Start: 2024-04-25 | End: 2024-05-25

## 2024-04-25 NOTE — PATIENT INSTRUCTIONS
Recommend restarting Align OTC probiotic  Increase fiber in diet - fiber gummies or fiber capsules   Colonoscopy

## 2024-04-26 ENCOUNTER — PATIENT MESSAGE (OUTPATIENT)
Dept: GASTROENTEROLOGY | Facility: CLINIC | Age: 49
End: 2024-04-26
Payer: COMMERCIAL

## 2024-04-26 DIAGNOSIS — A04.72 C. DIFFICILE DIARRHEA: Primary | ICD-10-CM

## 2024-04-26 LAB
C DIFF GDH STL QL: POSITIVE
C DIFF TOX A+B STL QL IA: NEGATIVE
C DIFF TOX GENS STL QL NAA+PROBE: POSITIVE

## 2024-04-26 RX ORDER — VANCOMYCIN HYDROCHLORIDE 125 MG/1
125 CAPSULE ORAL 4 TIMES DAILY
Qty: 56 CAPSULE | Refills: 0 | Status: SHIPPED | OUTPATIENT
Start: 2024-04-26 | End: 2024-05-10

## 2024-04-29 LAB — ELASTASE 1, FECAL: 206 MCG/G

## 2024-05-04 LAB — H PYLORI AG STL QL IA: NOT DETECTED

## 2024-06-28 ENCOUNTER — ANESTHESIA (OUTPATIENT)
Dept: ENDOSCOPY | Facility: HOSPITAL | Age: 49
End: 2024-06-28
Payer: COMMERCIAL

## 2024-06-28 ENCOUNTER — ANESTHESIA EVENT (OUTPATIENT)
Dept: ENDOSCOPY | Facility: HOSPITAL | Age: 49
End: 2024-06-28
Payer: COMMERCIAL

## 2024-06-28 ENCOUNTER — HOSPITAL ENCOUNTER (OUTPATIENT)
Facility: HOSPITAL | Age: 49
Discharge: HOME | End: 2024-06-28
Attending: INTERNAL MEDICINE | Admitting: INTERNAL MEDICINE
Payer: COMMERCIAL

## 2024-06-28 VITALS
DIASTOLIC BLOOD PRESSURE: 84 MMHG | HEART RATE: 80 BPM | WEIGHT: 249 LBS | OXYGEN SATURATION: 100 % | SYSTOLIC BLOOD PRESSURE: 137 MMHG | RESPIRATION RATE: 16 BRPM | BODY MASS INDEX: 44.11 KG/M2 | TEMPERATURE: 98 F

## 2024-06-28 DIAGNOSIS — K62.89 RECTAL PAIN: ICD-10-CM

## 2024-06-28 PROCEDURE — 45380 COLONOSCOPY AND BIOPSY: CPT | Mod: ,,, | Performed by: INTERNAL MEDICINE

## 2024-06-28 PROCEDURE — 25000003 PHARM REV CODE 250: Performed by: INTERNAL MEDICINE

## 2024-06-28 PROCEDURE — 25000003 PHARM REV CODE 250: Performed by: NURSE ANESTHETIST, CERTIFIED REGISTERED

## 2024-06-28 PROCEDURE — 37000009 HC ANESTHESIA EA ADD 15 MINS: Performed by: INTERNAL MEDICINE

## 2024-06-28 PROCEDURE — 37000008 HC ANESTHESIA 1ST 15 MINUTES: Performed by: INTERNAL MEDICINE

## 2024-06-28 PROCEDURE — 27201012 HC FORCEPS, HOT/COLD, DISP: Performed by: INTERNAL MEDICINE

## 2024-06-28 PROCEDURE — 63600175 PHARM REV CODE 636 W HCPCS: Performed by: NURSE ANESTHETIST, CERTIFIED REGISTERED

## 2024-06-28 PROCEDURE — 45380 COLONOSCOPY AND BIOPSY: CPT | Performed by: INTERNAL MEDICINE

## 2024-06-28 RX ORDER — LIDOCAINE HYDROCHLORIDE 20 MG/ML
INJECTION INTRAVENOUS
Status: DISCONTINUED | OUTPATIENT
Start: 2024-06-28 | End: 2024-06-28

## 2024-06-28 RX ORDER — SODIUM CHLORIDE 9 MG/ML
INJECTION, SOLUTION INTRAVENOUS CONTINUOUS
Status: DISCONTINUED | OUTPATIENT
Start: 2024-06-28 | End: 2024-06-28 | Stop reason: HOSPADM

## 2024-06-28 RX ORDER — PROPOFOL 10 MG/ML
VIAL (ML) INTRAVENOUS
Status: DISCONTINUED | OUTPATIENT
Start: 2024-06-28 | End: 2024-06-28

## 2024-06-28 RX ADMIN — PROPOFOL 20 MG: 10 INJECTION, EMULSION INTRAVENOUS at 01:06

## 2024-06-28 RX ADMIN — SODIUM CHLORIDE: 9 INJECTION, SOLUTION INTRAVENOUS at 12:06

## 2024-06-28 RX ADMIN — PROPOFOL 40 MG: 10 INJECTION, EMULSION INTRAVENOUS at 01:06

## 2024-06-28 RX ADMIN — PROPOFOL 30 MG: 10 INJECTION, EMULSION INTRAVENOUS at 01:06

## 2024-06-28 RX ADMIN — PROPOFOL 100 MG: 10 INJECTION, EMULSION INTRAVENOUS at 01:06

## 2024-06-28 RX ADMIN — LIDOCAINE HYDROCHLORIDE 100 MG: 20 INJECTION, SOLUTION INTRAVENOUS at 01:06

## 2024-06-28 NOTE — ANESTHESIA POSTPROCEDURE EVALUATION
Anesthesia Post Evaluation    Patient: Akiko Jameson    Procedure(s) Performed: Procedure(s) (LRB):  COLONOSCOPY (N/A)    Final Anesthesia Type: general      Patient location during evaluation: PACU  Patient participation: Yes- Able to Participate  Level of consciousness: awake and alert and oriented  Post-procedure vital signs: reviewed and stable  Pain management: adequate  Airway patency: patent    PONV status at discharge: No PONV  Anesthetic complications: no      Cardiovascular status: blood pressure returned to baseline  Respiratory status: unassisted, spontaneous ventilation and room air  Hydration status: euvolemic  Follow-up not needed.              Vitals Value Taken Time   /90 06/28/24 1353   Temp 36.7 °C (98 °F) 06/28/24 1345   Pulse 74 06/28/24 1354   Resp 16 06/28/24 1345   SpO2 100 % 06/28/24 1354   Vitals shown include unfiled device data.      Event Time   Out of Recovery 13:57:47         Pain/Cele Score: Cele Score: 10 (6/28/2024  1:45 PM)

## 2024-06-28 NOTE — PROVATION PATIENT INSTRUCTIONS
Discharge Summary/Instructions after an Endoscopic Procedure  Patient Name: Akiko Jameson  Patient MRN: [id number]  Patient YOB: 1975 Friday, June 28, 2024  Carina Sahu MD  Dear patient,  As a result of recent federal legislation (The Federal Cures Act), you may   receive lab or pathology results from your procedure in your [facility]   account before your physician is able to contact you. Your physician or   their representative will relay the results to you with their   recommendations at their soonest availability.  Thank you,  RESTRICTIONS:  During your procedure today, you received medications for sedation.  These   medications may affect your judgment, balance and coordination.  Therefore,   for 24 hours, you have the following restrictions:   - DO NOT drive a car, operate machinery, make legal/financial decisions,   sign important papers or drink alcohol.    ACTIVITY:  Today: no heavy lifting, straining or running due to procedural   sedation/anesthesia.  The following day: return to full activity including work.  DIET:  Eat and drink normally unless instructed otherwise.     TREATMENT FOR COMMON SIDE EFFECTS:  - Mild abdominal pain, nausea, belching, bloating or excessive gas:  rest,   eat lightly and use a heating pad.  - Sore Throat: treat with throat lozenges and/or gargle with warm salt   water.  - Because air was used during the procedure, expelling large amounts of air   from your rectum or belching is normal.  - If a bowel prep was taken, you may not have a bowel movement for 1-3 days.    This is normal.  SYMPTOMS TO WATCH FOR AND REPORT TO YOUR PHYSICIAN:  1. Abdominal pain or bloating, other than gas cramps.  2. Chest pain.  3. Back pain.  4. Signs of infection such as: chills or fever occurring within 24 hours   after the procedure.  5. Rectal bleeding, which would show as bright red, maroon, or black stools.   (A tablespoon of blood from the rectum is not serious,  especially if   hemorrhoids are present.)  6. Vomiting.  7. Weakness or dizziness.  GO DIRECTLY TO THE NEAREST EMERGENCY ROOM IF YOU HAVE ANY OF THE FOLLOWING:      Difficulty breathing              Chills and/or fever over 101 F   Persistent vomiting and/or vomiting blood   Severe abdominal pain   Severe chest pain   Black, tarry stools   Bleeding- more than one tablespoon   Any other symptom or condition that you feel may need urgent attention  Your doctor recommends these additional instructions:  If any biopsies were taken, your doctors clinic will contact you in 1 to 2   weeks with any results.  - Discharge patient to home (with escort).   - Patient has a contact number available for emergencies.  The signs and   symptoms of potential delayed complications were discussed with the   patient.  Return to normal activities tomorrow.  Written discharge   instructions were provided to the patient.   - Resume previous diet.   - Continue present medications.   - Await pathology results.   - Repeat colonoscopy in 5-10 years for surveillance based on pathology   results.   - Return to my office PRN.  For questions, problems or results please call your physician - Carina Sahu MD at Work:  [phone].  [facility], EMERGENCY ROOM PHONE NUMBER: [phone]  IF A COMPLICATION OR EMERGENCY SITUATION ARISES AND YOU ARE UNABLE TO REACH   YOUR PHYSICIAN - GO DIRECTLY TO THE EMERGENCY ROOM.  Carina Sahu MD  6/28/2024 1:32:32 PM  This report has been verified and signed electronically.  Dear patient,  As a result of recent federal legislation (The Federal Cures Act), you may   receive lab or pathology results from your procedure in your [facility]   account before your physician is able to contact you. Your physician or   their representative will relay the results to you with their   recommendations at their soonest availability.  Thank you,  PROVATION

## 2024-06-28 NOTE — ANESTHESIA PREPROCEDURE EVALUATION
06/28/2024  Akiko Jameson is a 49 y.o., female.    Akiko Jameson is a 49 y.o. 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    Chronic disease anemia    Right knee pain    Swelling of right knee joint    Chondromalacia of right patella    S/P right knee arthroscopy    Epigastric pain    Rectal pain    Cough, persistent    Essential hypertension    S/P Nissen fundoplication (without gastrostomy tube) procedure    Pulmonary nodules/lesions, multiple    Hilar adenopathy    Mediastinal adenopathy    Sarcoidosis    Morbid obesity with BMI of 45.0-49.9, adult    Prediabetes    Post-operative hypothyroidism    Cellulitis of right hand    Precordial pain    Family history of premature CAD    Ascending aorta dilation    Positive CHARLENE (antinuclear antibody)    Helicobacter positive gastritis    Idiopathic eosinophilia    MARITZA (iron deficiency anemia)    Hypokalemia    Aortic valve sclerosis    Mitral valve sclerosis    Research subject    NAFLD (nonalcoholic fatty liver disease)             Chemistry        Component Value Date/Time     04/12/2024 0858    K 4.2 04/12/2024 0858     04/12/2024 0858    CO2 22 (L) 04/12/2024 0858    BUN 19 04/12/2024 0858    CREATININE 1.2 04/12/2024 0858     04/12/2024 0858        Component Value Date/Time    CALCIUM 9.3 04/12/2024 0858    ALKPHOS 159 (H) 04/12/2024 0858    AST 23 04/12/2024 0858    ALT 44 04/12/2024 0858    BILITOT 0.4  "04/12/2024 0858    ESTGFRAFRICA 46.8 (A) 07/27/2022 0846    EGFRNONAA 40.6 (A) 07/27/2022 0846            Lab Results   Component Value Date    WBC 7.21 04/12/2024    HGB 12.9 04/12/2024    HCT 42.0 04/12/2024    MCV 87 04/12/2024     04/12/2024       No results for input(s): "PT", "INR", "PROTIME", "APTT" in the last 72 hours.      U/S:  Impression:     Multinodular goiter with interval increase in size of the gland compared previous exam with total volume presently at 166.5 mL compared to 69.8 mL previously.     Increased size of the right midpole and 3 left lobe nodules as described above.     Further evaluation and/or follow-up imaging as warranted.        Electronically signed by: Blas Hernandez MD  Date:                                            11/24/2020  Time:                                           21:31      Anesthesia Evaluation    I have reviewed the Patient Summary Reports.    I have reviewed the NPO Status.   I have reviewed the Medications.     Review of Systems  Anesthesia Hx:  No problems with previous Anesthesia             Denies Family Hx of Anesthesia complications.    Denies Personal Hx of Anesthesia complications.                    Cardiovascular:  Exercise tolerance: good   Hypertension              ECG has been reviewed.                          Pulmonary:        Sleep Apnea, CPAP                Hepatic/GI:  Bowel Prep.   GERD (s/p Nissen), well controlled Liver Disease,            Musculoskeletal:  Arthritis               Endocrine:   Hypothyroidism        Morbid Obesity / BMI > 40      Physical Exam  General:  Obesity       Airway/Jaw/Neck:  Airway Findings: Mouth Opening: Normal     Tongue: Normal      General Airway Assessment: Adult      Mallampati: III  Improves to II with phonation.  TM Distance: Normal, at least 6 cm   Jaw/Neck Findings:     Neck ROM: Normal ROM   Neck Findings:  Girth Increased       Dental:  Dental Findings: In tact      Chest/Lungs:  Chest/Lungs " Findings:  Clear to auscultation, Normal Respiratory Rate       Heart/Vascular:  Heart Findings: Rate: Normal  Rhythm: Regular Rhythm                        Mental Status:  Mental Status Findings:  Cooperative, Alert and Oriented         Anesthesia Plan  Type of Anesthesia, risks & benefits discussed:  Anesthesia Type:  general    Patient's Preference:   Plan Factors:          Intra-op Monitoring Plan: standard ASA monitors  Intra-op Monitoring Plan Comments:   Post Op Pain Control Plan: IV/PO Opioids PRN  Post Op Pain Control Plan Comments:     Induction:   IV  Beta Blocker:  Patient is not currently on a Beta-Blocker (No further documentation required).       Informed Consent: Informed consent signed with the Patient and all parties understand the risks and agree with anesthesia plan.  All questions answered.  Anesthesia consent signed with patient.  ASA Score: 3     Day of Surgery Review of History & Physical: I have interviewed and examined the patient. I have reviewed the patient's H&P dated:  There are no significant changes.      Anesthesia Plan Notes: Discussed the risk of obstructive sleep apnea with the patient.        Ready For Surgery From Anesthesia Perspective.             Physical Exam  General: Obesity    Airway:  Mallampati: III / II  Mouth Opening: Normal  TM Distance: Normal, at least 6 cm  Tongue: Normal  Neck ROM: Normal ROM  Neck: Girth Increased    Dental:  In tact    Chest/Lungs:  Clear to auscultation, Normal Respiratory Rate    Heart:  Rate: Normal  Rhythm: Regular Rhythm        Anesthesia Plan  Type of Anesthesia, risks & benefits discussed:    Anesthesia Type: general  Intra-op Monitoring Plan: standard ASA monitors  Post Op Pain Control Plan: IV/PO Opioids PRN  Induction:  IV  Informed Consent: Informed consent signed with the Patient and all parties understand the risks and agree with anesthesia plan.  All questions answered.   ASA Score: 3  Day of Surgery Review of History & Physical:  I have interviewed and examined the patient. I have reviewed the patient's H&P dated:   Anesthesia Plan Notes: Discussed the risk of obstructive sleep apnea with the patient.    Ready For Surgery From Anesthesia Perspective.     .

## 2024-06-28 NOTE — TRANSFER OF CARE
Anesthesia Transfer of Care Note    Patient: Akiko Jameson    Procedure(s) Performed: Procedure(s) (LRB):  COLONOSCOPY (N/A)    Patient location: PACU    Anesthesia Type: general    Transport from OR: Transported from OR on room air with adequate spontaneous ventilation    Post pain: adequate analgesia    Post assessment: no apparent anesthetic complications    Post vital signs: stable    Level of consciousness: awake    Nausea/Vomiting: no nausea/vomiting    Complications: none    Transfer of care protocol was followed      Last vitals: Visit Vitals  /68 (BP Location: Left arm, Patient Position: Lying)   Pulse 78   Temp 36.6 °C (97.9 °F) (Skin)   Resp 18   Wt 112.9 kg (249 lb)   SpO2 99%   Breastfeeding No   BMI 44.11 kg/m²

## 2024-06-28 NOTE — H&P
[facility]    CC: Rectal pain    HPI 49 y.o. female presents for evaluation of rectal pain    Past Medical History:   Diagnosis Date    ALLERGIC RHINITIS     Aortic valve sclerosis 1/20/2023    Arthritis     Cellulitis     Constipation due to opioid therapy     Eosinophilia     mild    GERD (gastroesophageal reflux disease)     Granular cell tumor     H. pylori infection 2018    History of 2019 novel coronavirus disease (COVID-19) 10/04/2020    Hypertension     Lymphedema     Mild anemia     Mitral valve sclerosis 3/21/2023    KEIRY on CPAP     does not regularly    Positive CHARLENE (antinuclear antibody) 07/27/2022    Prediabetes 08/06/2021    Sarcoidosis, unspecified     Sleep apnea     compliant with CPAP    Thyroid nodule     Urinary incontinence, mixed        Allergies and Medications reviewed     Review of Systems  General ROS: negative for - chills, fever or weight loss  Cardiovascular ROS: no chest pain or dyspnea on exertion  Gastrointestinal ROS: + rectal pain    Physical Examination  There were no vitals taken for this visit.  General appearance: alert, cooperative, no distress  HENT: Normocephalic, atraumatic, neck symmetrical, no nasal discharge, sclera anicteric   Lungs: clear to auscultation bilaterally, symmetric chest wall expansion bilaterally  Heart: regular rate and rhythm without rub; no displacement of the PMI   Abdomen: soft  Extremities: extremities symmetric; no clubbing, cyanosis, or edema        Labs:  Lab Results   Component Value Date    WBC 7.21 04/12/2024    HGB 12.9 04/12/2024    HCT 42.0 04/12/2024    MCV 87 04/12/2024     04/12/2024             Assessment:   49 y.o. female presents for colonoscopy    Plan:  -proceed to colonoscopy     Carina Sahu MD  [facility]  [address], [address]  [place] [address]  Office: [phone]  Fax: [phone]

## 2024-06-28 NOTE — ANESTHESIA POSTPROCEDURE EVALUATION
Anesthesia Post Evaluation    Patient: Akiko Jameson    Procedure(s) Performed: Procedure(s) (LRB):  COLONOSCOPY (N/A)    [facility] Anesthesia Post Op Evaluation      Vitals Value Taken Time   /90 06/28/24 1353   Temp 36.7 °C (98 °F) 06/28/24 1345   Pulse 74 06/28/24 1354   Resp 16 06/28/24 1345   SpO2 100 % 06/28/24 1354   Vitals shown include unfiled device data.      Event Time   Out of Recovery 13:57:47         Pain/Cele Score: Cele Score: 10 (6/28/2024  1:45 PM)

## 2024-06-28 NOTE — PLAN OF CARE
Vss, patrick po fluids, denies pain, ambulates easily. IV removed, catheter intact. Discharge instructions provided and states understanding. States ready to go home.  Discharged from facility with family per wheelchair.

## 2024-07-19 ENCOUNTER — PATIENT MESSAGE (OUTPATIENT)
Dept: FAMILY MEDICINE | Facility: CLINIC | Age: 49
End: 2024-07-19
Payer: COMMERCIAL

## 2024-07-29 NOTE — PROGRESS NOTES
Subjective:    Patient ID:  Akiko Jameson is a 49 y.o. female who presents for Hypertension        HPI    RECENT LABS GFR MORE THAN 60, DOING WELL,HAD R ARM CELLULITIS,  HAD A CUT ON HAND, BP OK, CT UPPER EXT NOTED,, SEE ROS  Past Medical History:   Diagnosis Date    ALLERGIC RHINITIS     Aortic valve sclerosis 1/20/2023    Arthritis     Cellulitis     Constipation due to opioid therapy     Eosinophilia     mild    GERD (gastroesophageal reflux disease)     Granular cell tumor     H. pylori infection 2018    History of 2019 novel coronavirus disease (COVID-19) 10/04/2020    Hypertension     Lymphedema     Mild anemia     Mitral valve sclerosis 3/21/2023    KEIRY on CPAP     does not regularly    Positive CHARLENE (antinuclear antibody) 07/27/2022    Prediabetes 08/06/2021    Sarcoidosis, unspecified     Sleep apnea     compliant with CPAP    Thyroid nodule     Urinary incontinence, mixed      Past Surgical History:   Procedure Laterality Date    24 HOUR IMPEDANCE PH MONITORING OF ESOPHAGUS IN PATIENT NOT TAKING ACID REDUCING MEDICATIONS N/A 01/06/2020    Procedure: IMPEDANCE PH STUDY, ESOPHAGEAL, 24 HOUR, IN PATIENT NOT TAKING ACID REDUCING MEDICATION;  Surgeon: First Available [place];  Location: [facility];  Service: Endoscopy;  Laterality: N/A;    AXILLARY NODE DISSECTION Right     granular cell tumor    BILATERAL SALPINGO-OOPHORECTOMY (BSO)  07/21/2020    BREAST CYST ASPIRATION      left    CHOLECYSTECTOMY      COLONOSCOPY N/A 05/10/2019    Procedure: COLONOSCOPY;  Surgeon: Edgar Gamble Jr., MD;  Location: [facility];  Service: Endoscopy;  Laterality: N/A;    COLONOSCOPY N/A 06/28/2024    Procedure: COLONOSCOPY;  Surgeon: Carina Sahu MD;  Location: [facility];  Service: Endoscopy;  Laterality: N/A;    ENDOBRONCHIAL ULTRASOUND Bilateral 04/27/2021    Procedure: ENDOBRONCHIAL ULTRASOUND (EBUS);  Surgeon: Armando Kirby MD;  Location: [facility];  Service: Pulmonary;   Laterality: Bilateral;  need fluoroscopy    ENDOMETRIAL ABLATION      ESOPHAGEAL MANOMETRY WITH MEASUREMENT OF IMPEDANCE N/A 01/06/2020    Procedure: MANOMETRY, ESOPHAGUS, WITH IMPEDANCE MEASUREMENT;  Surgeon: First Available [place];  Location: [facility];  Service: Endoscopy;  Laterality: N/A;    ESOPHAGOGASTRODUODENOSCOPY N/A 07/05/2018    Procedure: EGD (ESOPHAGOGASTRODUODENOSCOPY);  Surgeon: Edgar Gamble Jr., MD;  Location: [facility];  Service: Endoscopy;  Laterality: N/A;    ESOPHAGOGASTRODUODENOSCOPY N/A 11/23/2020    Procedure: ESOPHAGOGASTRODUODENOSCOPY (EGD);  Surgeon: Jina Kaufman MD;  Location: [facility] ENDO;  Service: General;  Laterality: N/A;    EXCISION OF MASS OF ABDOMEN Left 06/18/2018    Procedure: EXCISION, MASS, ABDOMEN - flank left;  Surgeon: Armando Tate MD;  Location: [facility] OR;  Service: General;  Laterality: Left;  left flank removal of mass    FINE NEEDLE ASPIRATION      thyroid    FLEXIBLE SIGMOIDOSCOPY N/A 08/24/2022    Procedure: SIGMOIDOSCOPY, FLEXIBLE;  Surgeon: Amber West MD;  Location: [facility];  Service: Endoscopy;  Laterality: N/A;    HYSTERECTOMY  07/21/2020    INJECTION OF ANESTHETIC AGENT AROUND MEDIAL BRANCH NERVES INNERVATING LUMBAR FACET JOINT Right 02/24/2022    Procedure: Right L3, 4, 5 MBB;  Surgeon: Anam Montero MD;  Location: [facility]IN [facility]GT;  Service: Pain Management;  Laterality: Right;    INJECTION OF ANESTHETIC AGENT AROUND MEDIAL BRANCH NERVES INNERVATING LUMBAR FACET JOINT Right 03/29/2022    Procedure: Right L3, 4, 5 MBB  (2nd block);  Surgeon: Anam Montero MD;  Location: [facility];  Service: Pain Management;  Laterality: Right;    KNEE ARTHROSCOPY W/ MENISCECTOMY Right     NASAL SEPTUM SURGERY      OOPHORECTOMY      RADIOFREQUENCY THERMOCOAGULATION Right 04/05/2022    Procedure: Right L3-5 Lumbar RFA;  Surgeon: Anam Montero MD;  Location: [facility];  Service: Pain Management;  Laterality: Right;     RADIOFREQUENCY THERMOCOAGULATION Right 02/22/2024    Procedure: Right L3-5 Lumbar RFA;  Surgeon: Anam Montero MD;  Location: [facility];  Service: Pain Management;  Laterality: Right;    ROBOT-ASSISTED NISSEN FUNDOPLICATION USING DA TAMAR XI N/A 02/28/2020    Procedure: XI ROBOTIC FUNDOPLICATION, NISSEN;  Surgeon: Jina Kaufman MD;  Location: [facility];  Service: General;  Laterality: N/A;    THYROIDECTOMY Bilateral 03/11/2021    Procedure: THYROIDECTOMY;  Surgeon: Nixon Moe MD;  Location: [facility] OR;  Service: ENT;  Laterality: Bilateral;    TUBAL LIGATION      UPPER GASTROINTESTINAL ENDOSCOPY  07/05/2018    Dr. Gamble     Family History   Problem Relation Name Age of Onset    Diabetes Mother Deyonne     Kidney failure Mother Deyonne     Heart attack Mother Deyonne     Early death Mother Deyonne     Heart disease Mother Deyonne     Kidney disease Mother Deyonne     Blindness Father Chandu     Cancer Father Chandu     Vision loss Father Chandu     Breast cancer Maternal Aunt great aunt     Breast cancer Paternal Aunt      Breast cancer Maternal Grandmother      Ovarian cancer Cousin      Breast cancer Cousin      Cancer Paternal Grandfather Chandu     Vision loss Paternal Grandfather Chandu     Hypertension Daughter Marie     Stroke Paternal Aunt Valethia     Cirrhosis Neg Hx      Colon polyps Neg Hx      Colon cancer Neg Hx      Crohn's disease Neg Hx      Esophageal cancer Neg Hx      Stomach cancer Neg Hx      Ulcerative colitis Neg Hx      Amblyopia Neg Hx      Cataracts Neg Hx      Glaucoma Neg Hx      Macular degeneration Neg Hx      Retinal detachment Neg Hx      Strabismus Neg Hx      Allergic rhinitis Neg Hx      Allergies Neg Hx      Angioedema Neg Hx      Asthma Neg Hx      Atopy Neg Hx      Eczema Neg Hx      Immunodeficiency Neg Hx      Rhinitis Neg Hx      Urticaria Neg Hx       Social History     Socioeconomic History    Marital status:      Number of children: 2   Occupational History     Employer: [employer]   Tobacco Use    Smoking status: Never    Smokeless tobacco: Never   Substance and Sexual Activity    Alcohol use: No    Drug use: No    Sexual activity: Yes     Partners: Male     Birth control/protection: None     Social Determinants of Health     Financial Resource Strain: Low Risk  (7/29/2024)    Overall Financial Resource Strain (CARDIA)     Difficulty of Paying Living Expenses: Not hard at all   Food Insecurity: No Food Insecurity (7/29/2024)    Hunger Vital Sign     Worried About Running Out of Food in the Last Year: Never true     Ran Out of Food in the Last Year: Never true   Transportation Needs: Unknown (7/19/2024)    [facility]    PRAPARE - Transportation     Lack of Transportation (Medical): Patient declined   Physical Activity: Insufficiently Active (7/29/2024)    Exercise Vital Sign     Days of Exercise per Week: 2 days     Minutes of Exercise per Session: 30 min   Stress: No Stress Concern Present (7/29/2024)    [employer] - Occupational Stress Questionnaire     Feeling of Stress : Not at all   Housing Stability: Low Risk  (2/19/2024)    Housing Stability Vital Sign     Unable to Pay for Housing in the Last Year: No     Number of Places Lived in the Last Year: 1     Unstable Housing in the Last Year: No       Review of patient's allergies indicates:   Allergen Reactions    Biaxin [clarithromycin] Swelling    Omeprazole magnesium Swelling and Other (See Comments)    Prevacid [lansoprazole] Swelling     Lip swelling- was taking this along with blood pressure medication: benazepril; not sure which caused it. Reports she has taken OTC prilosec without any problems.    Ace inhibitors Swelling     Facial swelling    Benazepril Swelling     Lip swelling       Current Outpatient Medications:     amoxicillin-clavulanate 875-125mg (AUGMENTIN) 875-125 mg  per tablet, Take 1 tablet by mouth every 12 (twelve) hours., Disp: , Rfl:     cetirizine (ZYRTEC) 10 MG tablet, Take 1 tablet (10 mg total) by mouth once daily. (Patient taking differently: Take 10 mg by mouth once daily.), Disp: , Rfl: 0    cyclobenzaprine (FLEXERIL) 10 MG tablet, Take 1/2 to 1 tab BID PRN muscle spasms. May cause drowsiness., Disp: 60 tablet, Rfl: 0    docusate sodium (COLACE) 100 MG capsule, Take 100 mg by mouth every other day. , Disp: , Rfl:     doxycycline (VIBRA-TABS) 100 MG tablet, Take 100 mg by mouth every 12 (twelve) hours., Disp: , Rfl:     ergocalciferol (ERGOCALCIFEROL) 50,000 unit Cap, Take one cap 2x weekly., Disp: 24 capsule, Rfl: 3    fluticasone propion-salmeterol 115-21 mcg/dose (ADVAIR HFA) 115-21 mcg/actuation HFAA inhaler, Inhale 2 puffs into the lungs every 12 (twelve) hours. Controller, Disp: 12 g, Rfl: 11    Lactobacillus rhamnosus GG (CULTURELLE) 10 billion cell capsule, Take 1 capsule by mouth once daily., Disp: , Rfl:     levothyroxine (SYNTHROID, LEVOTHROID) 175 MCG tablet, Take 1 tab for six days and 2 tabs on the seventh day. Brand name Synthroid, Disp: 30 tablet, Rfl: 11    montelukast (SINGULAIR) 10 mg tablet, Take 10 mg by mouth every evening., Disp: , Rfl:     multivitamin (THERAGRAN) per tablet, Take 1 tablet by mouth once daily. Every day, Disp: , Rfl:     potassium chloride SA (K-DUR,KLOR-CON) 20 MEQ tablet, Take 1 tablet (20 mEq total) by mouth 2 (two) times daily., Disp: 180 tablet, Rfl: 3    traMADoL (ULTRAM) 50 mg tablet, Take 1 tablet by mouth every 6 (six) hours as needed., Disp: , Rfl:     amoxicillin-clavulanate 875-125mg (AUGMENTIN) 875-125 mg per tablet, Take 1 tablet by mouth 2 (two) times daily. Starting at first sign of cellulitis for 7 days, Disp: 14 tablet, Rfl: 0    carvediloL (COREG) 12.5 MG tablet, Take 1 tablet (12.5 mg total) by mouth 2 (two) times daily., Disp: 180 tablet, Rfl: 2    doxycycline (MONODOX) 100 MG capsule, Take  "1 capsule (100 mg total) by mouth 2 (two) times daily. Starting at first sign of cellulitis for 7 days, Disp: 14 capsule, Rfl: 0    olmesartan-hydrochlorothiazide (BENICAR HCT) 20-12.5 mg per tablet, Take 1 tablet by mouth once daily., Disp: 90 tablet, Rfl: 2    spironolactone (ALDACTONE) 25 MG tablet, Take 0.5 tablets (12.5 mg total) by mouth once daily., Disp: 45 tablet, Rfl: 2    Review of Systems   Constitutional: Negative for chills, diaphoresis, fever, malaise/fatigue and night sweats.   HENT:  Negative for congestion and nosebleeds.    Eyes: Negative.    Cardiovascular:  Negative for chest pain, claudication, cyanosis, dyspnea on exertion, irregular heartbeat, leg swelling, near-syncope, orthopnea, palpitations, paroxysmal nocturnal dyspnea and syncope.        LESS R HAND EDEMA   Respiratory:  Negative for cough, hemoptysis, shortness of breath and wheezing.    Endocrine: Negative for polyphagia and polyuria.   Hematologic/Lymphatic: Negative for adenopathy. Does not bruise/bleed easily.   Skin:  Negative for color change and rash.   Musculoskeletal:  Negative for back pain, falls and joint pain.   Gastrointestinal:  Negative for abdominal pain, change in bowel habit, heartburn, melena, nausea and vomiting.   Genitourinary:  Negative for dysuria and flank pain.   Neurological:  Negative for brief paralysis, focal weakness, headaches, light-headedness, loss of balance, paresthesias and weakness.   Psychiatric/Behavioral:  Negative for altered mental status and depression.    Allergic/Immunologic: Negative for hives and persistent infections.        Objective:      Vitals:    07/30/24 0953   BP: 129/78   Pulse: 60   Weight: 116.7 kg (257 lb 4.4 oz)   Height: 5' 3" (1.6 m)   PainSc: 0-No pain     Body mass index is 45.57 kg/m².    Physical Exam  Constitutional:       Appearance: She is obese.   HENT:      Head: Normocephalic and atraumatic.   Eyes:      Extraocular Movements: Extraocular movements intact.      " Conjunctiva/sclera: Conjunctivae normal.   Neck:      Vascular: No carotid bruit.   Cardiovascular:      Rate and Rhythm: Normal rate and regular rhythm. No extrasystoles are present.     Pulses: Normal pulses.           Carotid pulses are 2+ on the right side and 2+ on the left side.       Radial pulses are 2+ on the right side and 2+ on the left side.        Posterior tibial pulses are 2+ on the right side and 2+ on the left side.      Heart sounds:      No friction rub. No gallop. No S4 sounds.   Pulmonary:      Effort: Pulmonary effort is normal. No respiratory distress.      Breath sounds: Normal breath sounds and air entry. No rales.   Abdominal:      Palpations: Abdomen is soft.      Tenderness: There is no abdominal tenderness.   Musculoskeletal:      Cervical back: Neck supple.      Right lower leg: No edema.      Left lower leg: No edema.      Comments: RUE/ HAND  LYMPHEDEMA   Skin:     General: Skin is warm and dry.      Capillary Refill: Capillary refill takes less than 2 seconds.   Neurological:      General: No focal deficit present.      Mental Status: She is alert and oriented to person, place, and time.      Cranial Nerves: No cranial nerve deficit.   Psychiatric:         Mood and Affect: Mood normal.         Speech: Speech normal.         Behavior: Behavior normal.               ..    Chemistry        Component Value Date/Time     07/22/2024 0419     04/12/2024 0858    K 3.6 07/22/2024 0419    K 4.2 04/12/2024 0858     04/12/2024 0858    CO2 22 07/22/2024 0419    CO2 22 (L) 04/12/2024 0858    BUN 10 07/22/2024 0419    BUN 19 04/12/2024 0858    CREATININE 0.92 07/22/2024 0419    CREATININE 1.2 04/12/2024 0858     04/12/2024 0858        Component Value Date/Time    CALCIUM 8.8 (L) 07/22/2024 0419    CALCIUM 9.3 04/12/2024 0858    ALKPHOS 82 07/22/2024 0419    ALKPHOS 159 (H) 04/12/2024 0858    AST 18 07/22/2024 0419    AST 23 04/12/2024 0858    ALT 29 07/22/2024 0419    ALT  44 04/12/2024 0858    BILITOT 0.3 (L) 07/22/2024 0419    BILITOT 0.4 04/12/2024 0858    ESTGFRAFRICA 46.8 (A) 07/27/2022 0846    EGFRNONAA 40.6 (A) 07/27/2022 0846            ..  Lab Results   Component Value Date    CHOL 174 08/10/2023    CHOL 178 07/27/2022    CHOL 181 07/09/2021     Lab Results   Component Value Date    HDL 63 08/10/2023    HDL 59 07/27/2022    HDL 77 (H) 07/09/2021     Lab Results   Component Value Date    LDLCALC 99.2 08/10/2023    LDLCALC 106.6 07/27/2022    LDLCALC 90.4 07/09/2021     Lab Results   Component Value Date    TRIG 59 08/10/2023    TRIG 62 07/27/2022    TRIG 68 07/09/2021     Lab Results   Component Value Date    CHOLHDL 36.2 08/10/2023    CHOLHDL 33.1 07/27/2022    CHOLHDL 42.5 07/09/2021     ..  Lab Results   Component Value Date    WBC 7.21 04/12/2024    HGB 12.9 04/12/2024    HCT 42.0 04/12/2024    MCV 87 04/12/2024     04/12/2024       Test(s) Reviewed  I have reviewed the following in detail:  [] Stress test   [] Angiography   [] Echocardiogram   [x] Labs   [x] Other:       Assessment:         ICD-10-CM ICD-9-CM   1. Essential hypertension  I10 401.9   2. Ascending aorta dilation  I77.810 447.71   3. Aortic valve sclerosis  I35.8 424.1   4. Morbid obesity with BMI of 45.0-49.9, adult  E66.01 278.01    Z68.42 V85.42   5. Mitral valve sclerosis  I05.8 394.9     Problem List Items Addressed This Visit          Cardiac/Vascular    Essential hypertension - Primary    Relevant Medications    olmesartan-hydrochlorothiazide (BENICAR HCT) 20-12.5 mg per tablet    spironolactone (ALDACTONE) 25 MG tablet    Other Relevant Orders    Comprehensive Metabolic Panel    Ascending aorta dilation    Aortic valve sclerosis    Relevant Medications    spironolactone (ALDACTONE) 25 MG tablet    Other Relevant Orders    Comprehensive Metabolic Panel    Mitral valve sclerosis    Relevant Medications    spironolactone (ALDACTONE) 25 MG tablet       Endocrine    Morbid obesity with BMI of  45.0-49.9, adult        Plan:     ALL CV CLINICALLY STABLE, NO ANGINA, NO HF, NO TIA, NO CLINICAL ARRHYTHMIA,CONTINUE CURRENT MEDS, EDUCATION, DIET, EXERCISE , WEIGHT LOSS RETURN TO CLINIC IN 9 MONTHS WITH LABS      Essential hypertension  -     olmesartan-hydrochlorothiazide (BENICAR HCT) 20-12.5 mg per tablet; Take 1 tablet by mouth once daily.  Dispense: 90 tablet; Refill: 2  -     spironolactone (ALDACTONE) 25 MG tablet; Take 0.5 tablets (12.5 mg total) by mouth once daily.  Dispense: 45 tablet; Refill: 2  -     Comprehensive Metabolic Panel; Future; Expected date: 01/30/2025    Ascending aorta dilation    Aortic valve sclerosis  -     spironolactone (ALDACTONE) 25 MG tablet; Take 0.5 tablets (12.5 mg total) by mouth once daily.  Dispense: 45 tablet; Refill: 2  -     Comprehensive Metabolic Panel; Future; Expected date: 01/30/2025    Morbid obesity with BMI of 45.0-49.9, adult    Mitral valve sclerosis  -     spironolactone (ALDACTONE) 25 MG tablet; Take 0.5 tablets (12.5 mg total) by mouth once daily.  Dispense: 45 tablet; Refill: 2    Other orders  -     carvediloL (COREG) 12.5 MG tablet; Take 1 tablet (12.5 mg total) by mouth 2 (two) times daily.  Dispense: 180 tablet; Refill: 2    RTC Low level/low impact aerobic exercise 5x's/wk. Heart healthy diet and risk factor modification.    See labs and med orders.    Aerobic exercise 5x's/wk. Heart healthy diet and risk factor modification.    See labs and med orders.

## 2024-07-30 ENCOUNTER — OFFICE VISIT (OUTPATIENT)
Dept: CARDIOLOGY | Facility: CLINIC | Age: 49
End: 2024-07-30
Payer: COMMERCIAL

## 2024-07-30 ENCOUNTER — OFFICE VISIT (OUTPATIENT)
Dept: FAMILY MEDICINE | Facility: CLINIC | Age: 49
End: 2024-07-30
Payer: COMMERCIAL

## 2024-07-30 ENCOUNTER — OFFICE VISIT (OUTPATIENT)
Dept: OPTOMETRY | Facility: CLINIC | Age: 49
End: 2024-07-30
Payer: COMMERCIAL

## 2024-07-30 VITALS
SYSTOLIC BLOOD PRESSURE: 129 MMHG | BODY MASS INDEX: 45.58 KG/M2 | DIASTOLIC BLOOD PRESSURE: 78 MMHG | HEIGHT: 63 IN | WEIGHT: 257.25 LBS | HEART RATE: 60 BPM

## 2024-07-30 VITALS
SYSTOLIC BLOOD PRESSURE: 102 MMHG | OXYGEN SATURATION: 96 % | TEMPERATURE: 97 F | DIASTOLIC BLOOD PRESSURE: 68 MMHG | WEIGHT: 253.38 LBS | HEART RATE: 87 BPM | BODY MASS INDEX: 44.89 KG/M2 | HEIGHT: 63 IN

## 2024-07-30 DIAGNOSIS — D63.8 CHRONIC DISEASE ANEMIA: ICD-10-CM

## 2024-07-30 DIAGNOSIS — J43.9 PULMONARY EMPHYSEMA DETERMINED BY X-RAY: ICD-10-CM

## 2024-07-30 DIAGNOSIS — L03.90 RECURRENT CELLULITIS: ICD-10-CM

## 2024-07-30 DIAGNOSIS — I89.0 LYMPHEDEMA OF UPPER EXTREMITY FOLLOWING LYMPHADENECTOMY: ICD-10-CM

## 2024-07-30 DIAGNOSIS — D86.9 SARCOIDOSIS: ICD-10-CM

## 2024-07-30 DIAGNOSIS — H52.13 MYOPIA WITH ASTIGMATISM AND PRESBYOPIA, BILATERAL: ICD-10-CM

## 2024-07-30 DIAGNOSIS — I05.8 MITRAL VALVE SCLEROSIS: Chronic | ICD-10-CM

## 2024-07-30 DIAGNOSIS — L03.113 CELLULITIS OF RIGHT UPPER EXTREMITY: Primary | ICD-10-CM

## 2024-07-30 DIAGNOSIS — H43.393 VITREOUS FLOATERS, BILATERAL: Primary | ICD-10-CM

## 2024-07-30 DIAGNOSIS — I77.810 ASCENDING AORTA DILATION: Chronic | ICD-10-CM

## 2024-07-30 DIAGNOSIS — H52.203 MYOPIA WITH ASTIGMATISM AND PRESBYOPIA, BILATERAL: ICD-10-CM

## 2024-07-30 DIAGNOSIS — Z46.0 CONTACT LENS/GLASSES FITTING: ICD-10-CM

## 2024-07-30 DIAGNOSIS — H52.4 MYOPIA WITH ASTIGMATISM AND PRESBYOPIA, BILATERAL: ICD-10-CM

## 2024-07-30 DIAGNOSIS — I10 ESSENTIAL HYPERTENSION: Primary | Chronic | ICD-10-CM

## 2024-07-30 DIAGNOSIS — E89.89 LYMPHEDEMA OF UPPER EXTREMITY FOLLOWING LYMPHADENECTOMY: ICD-10-CM

## 2024-07-30 DIAGNOSIS — E66.01 MORBID OBESITY WITH BMI OF 45.0-49.9, ADULT: Chronic | ICD-10-CM

## 2024-07-30 DIAGNOSIS — Z13.5 GLAUCOMA SCREENING: ICD-10-CM

## 2024-07-30 DIAGNOSIS — I35.8 AORTIC VALVE SCLEROSIS: ICD-10-CM

## 2024-07-30 DIAGNOSIS — D50.9 IRON DEFICIENCY ANEMIA, UNSPECIFIED IRON DEFICIENCY ANEMIA TYPE: ICD-10-CM

## 2024-07-30 PROCEDURE — 99214 OFFICE O/P EST MOD 30 MIN: CPT | Mod: S$GLB,,, | Performed by: FAMILY MEDICINE

## 2024-07-30 PROCEDURE — 99214 OFFICE O/P EST MOD 30 MIN: CPT | Mod: S$GLB,,, | Performed by: INTERNAL MEDICINE

## 2024-07-30 PROCEDURE — 3074F SYST BP LT 130 MM HG: CPT | Mod: CPTII,S$GLB,, | Performed by: INTERNAL MEDICINE

## 2024-07-30 PROCEDURE — 99999 PR PBB SHADOW E&M-EST. PATIENT-LVL III: CPT | Mod: PBBFAC,,, | Performed by: INTERNAL MEDICINE

## 2024-07-30 PROCEDURE — 3078F DIAST BP <80 MM HG: CPT | Mod: CPTII,S$GLB,, | Performed by: INTERNAL MEDICINE

## 2024-07-30 PROCEDURE — 3078F DIAST BP <80 MM HG: CPT | Mod: CPTII,S$GLB,, | Performed by: FAMILY MEDICINE

## 2024-07-30 PROCEDURE — 92310 CONTACT LENS FITTING OU: CPT | Mod: CSM,S$GLB,, | Performed by: OPTOMETRIST

## 2024-07-30 PROCEDURE — 92014 COMPRE OPH EXAM EST PT 1/>: CPT | Mod: ,,, | Performed by: OPTOMETRIST

## 2024-07-30 PROCEDURE — 3008F BODY MASS INDEX DOCD: CPT | Mod: CPTII,S$GLB,, | Performed by: INTERNAL MEDICINE

## 2024-07-30 PROCEDURE — 1159F MED LIST DOCD IN RCRD: CPT | Mod: CPTII,S$GLB,, | Performed by: FAMILY MEDICINE

## 2024-07-30 PROCEDURE — 99999 PR PBB SHADOW E&M-EST. PATIENT-LVL V: CPT | Mod: PBBFAC,,, | Performed by: FAMILY MEDICINE

## 2024-07-30 PROCEDURE — 99999 PR PBB SHADOW E&M-EST. PATIENT-LVL III: CPT | Mod: PBBFAC,,, | Performed by: OPTOMETRIST

## 2024-07-30 PROCEDURE — 3074F SYST BP LT 130 MM HG: CPT | Mod: CPTII,S$GLB,, | Performed by: FAMILY MEDICINE

## 2024-07-30 PROCEDURE — 92015 DETERMINE REFRACTIVE STATE: CPT | Mod: ,,, | Performed by: OPTOMETRIST

## 2024-07-30 RX ORDER — AMOXICILLIN AND CLAVULANATE POTASSIUM 875; 125 MG/1; MG/1
1 TABLET, FILM COATED ORAL 2 TIMES DAILY
Qty: 14 TABLET | Refills: 0 | Status: SHIPPED | OUTPATIENT
Start: 2024-07-30 | End: 2024-08-06

## 2024-07-30 RX ORDER — DOXYCYCLINE 100 MG/1
100 CAPSULE ORAL 2 TIMES DAILY
Qty: 14 CAPSULE | Refills: 0 | Status: SHIPPED | OUTPATIENT
Start: 2024-07-30 | End: 2024-08-06

## 2024-07-30 RX ORDER — AMOXICILLIN AND CLAVULANATE POTASSIUM 875; 125 MG/1; MG/1
1 TABLET, FILM COATED ORAL EVERY 12 HOURS
COMMUNITY
Start: 2024-07-22

## 2024-07-30 RX ORDER — CARVEDILOL 12.5 MG/1
12.5 TABLET ORAL 2 TIMES DAILY
Qty: 180 TABLET | Refills: 2 | Status: SHIPPED | OUTPATIENT
Start: 2024-07-30

## 2024-07-30 RX ORDER — MONTELUKAST SODIUM 10 MG/1
10 TABLET ORAL NIGHTLY
COMMUNITY
Start: 2024-07-22

## 2024-07-30 RX ORDER — TRAMADOL HYDROCHLORIDE 50 MG/1
1 TABLET ORAL EVERY 6 HOURS PRN
COMMUNITY
Start: 2024-07-22

## 2024-07-30 RX ORDER — DOXYCYCLINE HYCLATE 100 MG
100 TABLET ORAL EVERY 12 HOURS
COMMUNITY
Start: 2024-07-22

## 2024-07-30 RX ORDER — OLMESARTAN MEDOXOMIL AND HYDROCHLOROTHIAZIDE 20/12.5 20; 12.5 MG/1; MG/1
1 TABLET ORAL DAILY
Qty: 90 TABLET | Refills: 2 | Status: SHIPPED | OUTPATIENT
Start: 2024-07-30 | End: 2025-07-30

## 2024-07-30 RX ORDER — SPIRONOLACTONE 25 MG/1
12.5 TABLET ORAL DAILY
Qty: 45 TABLET | Refills: 2 | Status: SHIPPED | OUTPATIENT
Start: 2024-07-30 | End: 2025-07-30

## 2024-07-30 NOTE — PATIENT INSTRUCTIONS
Obdulio Wharton,     If you are due for any health screening(s) below please notify me so we can arrange them to be ordered and scheduled. Most healthy patients at your age complete them, but you are free to accept or refuse.     If you can't do it, I'll definitely understand. If you can, I'd certainly appreciate it!    All of your core healthy metrics are met.

## 2024-07-30 NOTE — PROGRESS NOTES
Follow-up hospitalization for recurrent cellulitis in the right arm related to chronic lymphedema from remote surgery.  Symptoms have improved significantly with antibiotics which she is completing.  She has had intermittent episodes for years approximately about once a year  She does have follow-up scheduled with Infectious Disease specialist at the end of the year.  She denies any current fever chills.  Arm is better.  Prior history of C diff.  Denies current GI symptoms.  She did have some mild anemia noted in the hospital.  Previous history of chronic disease anemia as well as iron-deficiency.  She does have sarcoidosis managed through Pulmonary.  She had some incidental emphysema noted asymptomatic on imaging in the past.    DISCHARGE SUMMARY    Patient ID:  Akiko Jameson  [id number]  49 y.o.  1975    Admit Date:   7/19/2024 12:55 PM    Discharge Date:   07/22/24    Admitting Physician:   Bonilla Camacho DO     Discharge Physician:   RACHEL STEPHENS MD    Consultants/Treatment Team:  Consultants     Provider Service Role Specialty   Provider, Pharmacist   -- Consulting Physician Gen Prov Rx         Reason for Admission/Admission Diagnoses:   Present on Admission:  Cellulitis of right upper extremity  Sarcoidosis  Hypertension    Discharge Diagnoses:   Active Hospital Problems   Diagnosis Date Noted   Cellulitis of right upper extremity 07/19/2024   Sarcoidosis   Hypertension     Resolved Hospital Problems     History of Present Illness:   HPI   Akiko Jameson is a 49-year-old female with past medical history significant for HTN, lymphedema, sarcoidosis and sleep apnea. Pt presented to the ED today with chief complaint of swelling of the right forearm and hand. She reports that her symptoms began this morning. Her arm/hand were normal appearing yesterday, and she woke up with the right arm/hand being swollen, red, hot, and tender. She also had an episode of nausea with vomiting. She reports that she had a  blister on her right index finger that appeared to heal and go away, denies any other sort of trauma to the hand. She has had repeated episodes of cellulitis of the RUE since having a tumor removed from her right axilla many years ago resulting in chronic lymphedema of the right arm.     Hospital Course and Treatment:   Admission Information     Date & Time  7/19/2024 Provider  Jaxson West MD Department  [facility] General Surgery Dept. Phone  [phone]             LAST ASSESSMENT AND PLAN PRIOR TO DISCHARGE:    Pt stable    IN BRIEF:    Patient presenting with right upper extremity cellulitis. No visible abscesses on imaging. Patient did well with intravenous Rocephin as well as vancomycin. Will switch to Augmentin and doxycycline therapy for 7 days. Continue elevation and compression as required. Follow-up with PCP.    THINGS TO FOLLOW UP IN THE POST HOSPITAL DISCHARGE VISIT    PCP Follow up    I discussed with the patient about the disease process and treatment prior to discharge.     This discharge summary took >30 minutes to complete.         Akiko was seen today for follow-up.    Diagnoses and all orders for this visit:    Cellulitis of right upper extremity    Lymphedema of upper extremity following lymphadenectomy    Chronic disease anemia  -     CBC Auto Differential; Future  -     Ferritin; Future  -     Iron and TIBC; Future  -     Reticulocytes; Future  -     Vitamin B12; Future  -     Folate; Future    Iron deficiency anemia, unspecified iron deficiency anemia type  -     CBC Auto Differential; Future  -     Ferritin; Future  -     Iron and TIBC; Future  -     Reticulocytes; Future  -     Vitamin B12; Future  -     Folate; Future    Sarcoidosis  -     CBC Auto Differential; Future  -     Ferritin; Future  -     Iron and TIBC; Future  -     Reticulocytes; Future  -     Vitamin B12; Future  -     Folate; Future    Pulmonary emphysema determined by X-ray    Recurrent  cellulitis  -     doxycycline (MONODOX) 100 MG capsule; Take 1 capsule (100 mg total) by mouth 2 (two) times daily. Starting at first sign of cellulitis for 7 days  -     amoxicillin-clavulanate 875-125mg (AUGMENTIN) 875-125 mg per tablet; Take 1 tablet by mouth 2 (two) times daily. Starting at first sign of cellulitis for 7 days    Keep scheduled appointment with infectious disease specialist.  Check laboratory above.  I gave her some doxycycline and Augmentin to have on hand to start at 1st sign of any recurrent symptoms.  Would prefer to avoid prophylactic antibiotics.      Transitional Care Note    Family and/or Caretaker present at visit?  No.  Diagnostic tests reviewed/disposition: No diagnosic tests pending after this hospitalization.  Disease/illness education:  Yes  Home health/community services discussion/referrals: Patient does not have home health established from hospital visit.  They do not need home health.  If needed, we will set up home health for the patient.   Establishment or re-establishment of referral orders for community resources: No other necessary community resources.   Discussion with other health care providers: No discussion with other health care providers necessary.               Past Medical History:  Past Medical History:   Diagnosis Date    ALLERGIC RHINITIS     Aortic valve sclerosis 1/20/2023    Arthritis     Cellulitis     Constipation due to opioid therapy     Eosinophilia     mild    GERD (gastroesophageal reflux disease)     Granular cell tumor     H. pylori infection 2018    History of 2019 novel coronavirus disease (COVID-19) 10/04/2020    Hypertension     Lymphedema     Mild anemia     Mitral valve sclerosis 3/21/2023    KEIRY on CPAP     does not regularly    Positive CHARLENE (antinuclear antibody) 07/27/2022    Prediabetes 08/06/2021    Sarcoidosis, unspecified     Sleep apnea     compliant with CPAP    Thyroid nodule     Urinary incontinence, mixed      Past Surgical  History:   Procedure Laterality Date    24 HOUR IMPEDANCE PH MONITORING OF ESOPHAGUS IN PATIENT NOT TAKING ACID REDUCING MEDICATIONS N/A 01/06/2020    Procedure: IMPEDANCE PH STUDY, ESOPHAGEAL, 24 HOUR, IN PATIENT NOT TAKING ACID REDUCING MEDICATION;  Surgeon: First Available Tamika Panchal;  Location: [facility];  Service: Endoscopy;  Laterality: N/A;    AXILLARY NODE DISSECTION Right     granular cell tumor    BILATERAL SALPINGO-OOPHORECTOMY (BSO)  07/21/2020    BREAST CYST ASPIRATION      left    CHOLECYSTECTOMY      COLONOSCOPY N/A 05/10/2019    Procedure: COLONOSCOPY;  Surgeon: Edgar Gamble Jr., MD;  Location: [facility];  Service: Endoscopy;  Laterality: N/A;    COLONOSCOPY N/A 06/28/2024    Procedure: COLONOSCOPY;  Surgeon: Carina Sahu MD;  Location: [facility];  Service: Endoscopy;  Laterality: N/A;    ENDOBRONCHIAL ULTRASOUND Bilateral 04/27/2021    Procedure: ENDOBRONCHIAL ULTRASOUND (EBUS);  Surgeon: Armando Kirby MD;  Location: [facility];  Service: Pulmonary;  Laterality: Bilateral;  need fluoroscopy    ENDOMETRIAL ABLATION      ESOPHAGEAL MANOMETRY WITH MEASUREMENT OF IMPEDANCE N/A 01/06/2020    Procedure: MANOMETRY, ESOPHAGUS, WITH IMPEDANCE MEASUREMENT;  Surgeon: First Available [place];  Location: [facility];  Service: Endoscopy;  Laterality: N/A;    ESOPHAGOGASTRODUODENOSCOPY N/A 07/05/2018    Procedure: EGD (ESOPHAGOGASTRODUODENOSCOPY);  Surgeon: Edgar Gamble Jr., MD;  Location: [facility];  Service: Endoscopy;  Laterality: N/A;    ESOPHAGOGASTRODUODENOSCOPY N/A 11/23/2020    Procedure: ESOPHAGOGASTRODUODENOSCOPY (EGD);  Surgeon: Jina Kaufman MD;  Location: [facility];  Service: General;  Laterality: N/A;    EXCISION OF MASS OF ABDOMEN Left 06/18/2018    Procedure: EXCISION, MASS, ABDOMEN - flank left;  Surgeon: Armando Tate MD;  Location: [facility];  Service: General;  Laterality: Left;  left flank removal of mass    FINE NEEDLE ASPIRATION      thyroid    FLEXIBLE  SIGMOIDOSCOPY N/A 08/24/2022    Procedure: SIGMOIDOSCOPY, FLEXIBLE;  Surgeon: Amber West MD;  Location: [facility];  Service: Endoscopy;  Laterality: N/A;    HYSTERECTOMY  07/21/2020    INJECTION OF ANESTHETIC AGENT AROUND MEDIAL BRANCH NERVES INNERVATING LUMBAR FACET JOINT Right 02/24/2022    Procedure: Right L3, 4, 5 MBB;  Surgeon: Anam Montero MD;  Location: [facility];  Service: Pain Management;  Laterality: Right;    INJECTION OF ANESTHETIC AGENT AROUND MEDIAL BRANCH NERVES INNERVATING LUMBAR FACET JOINT Right 03/29/2022    Procedure: Right L3, 4, 5 MBB  (2nd block);  Surgeon: Anam Montero MD;  Location: [facility];  Service: Pain Management;  Laterality: Right;    KNEE ARTHROSCOPY W/ MENISCECTOMY Right     NASAL SEPTUM SURGERY      OOPHORECTOMY      RADIOFREQUENCY THERMOCOAGULATION Right 04/05/2022    Procedure: Right L3-5 Lumbar RFA;  Surgeon: Anam Montero MD;  Location: [facility];  Service: Pain Management;  Laterality: Right;    RADIOFREQUENCY THERMOCOAGULATION Right 02/22/2024    Procedure: Right L3-5 Lumbar RFA;  Surgeon: Anam Montero MD;  Location: [facility];  Service: Pain Management;  Laterality: Right;    ROBOT-ASSISTED NISSEN FUNDOPLICATION USING DA TAMAR XI N/A 02/28/2020    Procedure: XI ROBOTIC FUNDOPLICATION, NISSEN;  Surgeon: Jina Kaufman MD;  Location: [facility] OR;  Service: General;  Laterality: N/A;    THYROIDECTOMY Bilateral 03/11/2021    Procedure: THYROIDECTOMY;  Surgeon: Nixon Moe MD;  Location: [facility] OR;  Service: ENT;  Laterality: Bilateral;    TUBAL LIGATION      UPPER GASTROINTESTINAL ENDOSCOPY  07/05/2018    Dr. Gamble     Review of patient's allergies indicates:   Allergen Reactions    Biaxin [clarithromycin] Swelling    Omeprazole magnesium Swelling and Other (See Comments)    Prevacid [lansoprazole] Swelling     Lip swelling- was taking this along with blood pressure medication: benazepril; not sure which caused it. Reports she  has taken OTC prilosec without any problems.    Ace inhibitors Swelling     Facial swelling    Benazepril Swelling     Lip swelling     Current Outpatient Medications on File Prior to Visit   Medication Sig Dispense Refill    amoxicillin-clavulanate 875-125mg (AUGMENTIN) 875-125 mg per tablet Take 1 tablet by mouth every 12 (twelve) hours.      carvediloL (COREG) 12.5 MG tablet Take 1 tablet (12.5 mg total) by mouth 2 (two) times daily. 180 tablet 2    cetirizine (ZYRTEC) 10 MG tablet Take 1 tablet (10 mg total) by mouth once daily. (Patient taking differently: Take 10 mg by mouth once daily.)  0    cyclobenzaprine (FLEXERIL) 10 MG tablet Take 1/2 to 1 tab BID PRN muscle spasms. May cause drowsiness. 60 tablet 0    docusate sodium (COLACE) 100 MG capsule Take 100 mg by mouth every other day.       doxycycline (VIBRA-TABS) 100 MG tablet Take 100 mg by mouth every 12 (twelve) hours.      ergocalciferol (ERGOCALCIFEROL) 50,000 unit Cap Take one cap 2x weekly. 24 capsule 3    fluticasone propion-salmeterol 115-21 mcg/dose (ADVAIR HFA) 115-21 mcg/actuation HFAA inhaler Inhale 2 puffs into the lungs every 12 (twelve) hours. Controller 12 g 11    Lactobacillus rhamnosus GG (CULTURELLE) 10 billion cell capsule Take 1 capsule by mouth once daily.      levothyroxine (SYNTHROID, LEVOTHROID) 175 MCG tablet Take 1 tab for six days and 2 tabs on the seventh day. Brand name Synthroid 30 tablet 11    montelukast (SINGULAIR) 10 mg tablet Take 10 mg by mouth every evening.      multivitamin (THERAGRAN) per tablet Take 1 tablet by mouth once daily. Every day      olmesartan-hydrochlorothiazide (BENICAR HCT) 20-12.5 mg per tablet Take 1 tablet by mouth once daily. 90 tablet 2    potassium chloride SA (K-DUR,KLOR-CON) 20 MEQ tablet Take 1 tablet (20 mEq total) by mouth 2 (two) times daily. 180 tablet 3    spironolactone (ALDACTONE) 25 MG tablet Take 0.5 tablets (12.5 mg total) by mouth once daily. 45 tablet 2    traMADoL (ULTRAM) 50 mg  tablet Take 1 tablet by mouth every 6 (six) hours as needed.      [DISCONTINUED] calcium carbonate (OS-CARLA) 500 mg calcium (1,250 mg) tablet Take 1 tablet (500 mg total) by mouth once daily. 60 tablet 3    [DISCONTINUED] carvediloL (COREG) 12.5 MG tablet Take 1 tablet (12.5 mg total) by mouth 2 (two) times daily. 180 tablet 2    [DISCONTINUED] ibuprofen (ADVIL,MOTRIN) 800 MG tablet Take 1 tablet (800 mg total) by mouth 3 (three) times daily. 60 tablet 1    [DISCONTINUED] olmesartan-hydrochlorothiazide (BENICAR HCT) 20-12.5 mg per tablet TAKE 1 TABLET BY MOUTH ONCE DAILY 90 tablet 1    [DISCONTINUED] spironolactone (ALDACTONE) 25 MG tablet Take 0.5 tablets (12.5 mg total) by mouth once daily. 45 tablet 1     Current Facility-Administered Medications on File Prior to Visit   Medication Dose Route Frequency Provider Last Rate Last Admin    [DISCONTINUED] acetaminophen tablet  650 mg Oral  Provider, Generic External Data        [DISCONTINUED] albuterol nebulizer solution  2.5 mg Inhalation  Provider, Generic External Data        [DISCONTINUED] amoxicillin-clavulanate 875-125mg per tablet  1 tablet Oral  Provider, Generic External Data        [DISCONTINUED] carvediloL tablet  12.5 mg Oral  Provider, Generic External Data        [DISCONTINUED] diltiaZEM 24 hr capsule  120 mg Oral  Provider, Generic External Data        [DISCONTINUED] doxycycline tablet  100 mg Oral  Provider, Generic External Data        [DISCONTINUED] fluticasone propion-salmeterol 115-21 mcg/dose inhaler  2 puff Inhalation  Provider, Generic External Data        [DISCONTINUED] GENERIC EXTERNAL MEDICATION  175 mcg Oral  Provider, Generic External Data        [DISCONTINUED] GENERIC EXTERNAL MEDICATION  1,500 mg Intravenous  Provider, Generic External Data        [DISCONTINUED] Lactobacillus rhamnosus GG capsule  1 capsule Oral  Provider, Generic External Data        [DISCONTINUED] montelukast tablet  10 mg Oral  Provider, Generic External Data         [DISCONTINUED] olmesartan tablet  20 mg Oral  Provider, Generic External Data        [DISCONTINUED] ondansetron injection  4 mg Intravenous  Provider, Generic External Data        [DISCONTINUED] oxyCODONE-acetaminophen 5-325 mg per tablet  1 tablet Oral  Provider, Generic External Data        [DISCONTINUED] spironolactone tablet  12.5 mg Oral  Provider, Generic External Data         Social History     Socioeconomic History    Marital status:     Number of children: 2   Occupational History     Employer: [employer]   Tobacco Use    Smoking status: Never    Smokeless tobacco: Never   Substance and Sexual Activity    Alcohol use: No    Drug use: No    Sexual activity: Yes     Partners: Male     Birth control/protection: None     Social Determinants of Health     Financial Resource Strain: Low Risk  (7/29/2024)    Overall Financial Resource Strain (CARDIA)     Difficulty of Paying Living Expenses: Not hard at all   Food Insecurity: No Food Insecurity (7/29/2024)    Hunger Vital Sign     Worried About Running Out of Food in the Last Year: Never true     Ran Out of Food in the Last Year: Never true   Transportation Needs: Unknown (7/19/2024)    [facility]     Lack of Transportation (Medical): Patient declined   Physical Activity: Insufficiently Active (7/29/2024)    Exercise Vital Sign     Days of Exercise per Week: 2 days     Minutes of Exercise per Session: 30 min   Stress: No Stress Concern Present (7/29/2024)    [employer] [place] [employer] - Occupational Stress Questionnaire     Feeling of Stress : Not at all   Housing Stability: Low Risk  (2/19/2024)    Housing Stability Vital Sign     Unable to Pay for Housing in the Last Year: No     Number of Places Lived in the Last Year: 1     Unstable Housing in the Last Year: No     Family History   Problem Relation Name Age of Onset    Diabetes Mother Demarissa     Kidney failure Mother Demarissa     Heart  "attack Mother Deyonne     Early death Mother Deyonne     Heart disease Mother Deyonne     Kidney disease Mother Deyonne     Blindness Father Chandu     Cancer Father Chandu     Vision loss Father Chandu     Breast cancer Maternal Aunt great aunt     Breast cancer Paternal Aunt      Breast cancer Maternal Grandmother      Ovarian cancer Cousin      Breast cancer Cousin      Cancer Paternal Grandfather Chandu     Vision loss Paternal Grandfather Chandu     Hypertension Daughter Marie     Stroke Paternal Aunt Valethia     Cirrhosis Neg Hx      Colon polyps Neg Hx      Colon cancer Neg Hx      Crohn's disease Neg Hx      Esophageal cancer Neg Hx      Stomach cancer Neg Hx      Ulcerative colitis Neg Hx      Amblyopia Neg Hx      Cataracts Neg Hx      Glaucoma Neg Hx      Macular degeneration Neg Hx      Retinal detachment Neg Hx      Strabismus Neg Hx      Allergic rhinitis Neg Hx      Allergies Neg Hx      Angioedema Neg Hx      Asthma Neg Hx      Atopy Neg Hx      Eczema Neg Hx      Immunodeficiency Neg Hx      Rhinitis Neg Hx      Urticaria Neg Hx             ROS:  GENERAL: No fever, chills,  or significant weight changes.   CARDIOVASCULAR: Denies chest pain, PND, orthopnea or reduced exercise tolerance.  ABDOMEN: Appetite fine. Denies diarrhea, abdominal pain, hematemesis or blood in stool.  URINARY: No flank pain, dysuria or hematuria.    Vitals:    07/30/24 1417   BP: 102/68   Pulse: 87   Temp: 96.8 °F (36 °C)   SpO2: 96%   Weight: 114.9 kg (253 lb 6.4 oz)   Height: 5' 3" (1.6 m)     Wt Readings from Last 3 Encounters:   07/30/24 114.9 kg (253 lb 6.4 oz)   07/30/24 116.7 kg (257 lb 4.4 oz)   06/28/24 112.9 kg (249 lb)       OBJECTIVE:   APPEARANCE: Well nourished, well developed, in no acute distress.    HEAD: Normocephalic.  Atraumatic.  No sinus tenderness.  EYES:   Right eye: Pupil reactive.  Conjunctiva clear.    Left eye: Pupil reactive.  Conjunctiva clear.  EOMI.    EARS: TM's intact. Light reflex normal. " No retraction or perforation.    NOSE:  clear.  MOUTH & THROAT:  No pharyngeal erythema or exudate. No lesions.  NECK: Supple. No bruits.  No JVD.  No cervical lymphadenopathy.  No thyromegaly.    CHEST: Breath sounds clear bilaterally.  Normal respiratory effort  CARDIOVASCULAR: Normal rate.  Regular rhythm.  No murmurs.  No rub.  No gallops.  ABDOMEN: Bowel sounds normal.  Soft.  No tenderness.  No organomegaly.  PERIPHERAL VASCULAR: No cyanosis.  No clubbing.  She has chronic lymphedema noted at the right arm and hand.  No erythema or tenderness.  NEUROLOGIC: No focal findings.  MENTAL STATUS: Alert.  Oriented x 3.

## 2024-07-30 NOTE — PATIENT INSTRUCTIONS
"DRY EYES -- BURNING OR BROOKS SYMPTOMS:  Use Over The Counter artificial tears as needed for dry eye symptoms.   Some common brands include:  Systane, Optive, Refresh, and Thera-Tears.  These drops can be used as frequently as desired, but may be most helpful use during long periods of concentrated work.  For example, reading / working at the computer. Start with 3-4x per day.     Nighttime Ophthalmic gel or ointments are available: Refresh PM, Genteal, and Lacrilube.    Avoid drops that "get redness out" (Visine, Murine, Clear Eyes), as these may contain medication that could further irritate the eyes, especially with chronic use.    ALLERGY EYES -- ITCHING SYMPTOMS:  Over the counter medications include--Pataday, Zaditor, and Alaway.  Use as directed 1-2 drops daily for symptoms of itching / watering eyes.  These drops will not help for dry eye or exposure symptoms.    REDNESS RELIEF:  Lumify---is a good redness reliever that will not cause irritation if used chronically.        FLASHES / FLOATERS / POSTERIOR VITREOUS DETACHMENT    Call the clinic if you have any further changes in symptoms.  Including:  Increased numbers of floaters or flashing lights, dimness or darkness that moves through or stays constant in your vision, or any pain in the eye (s).    You may sometimes see small specks or clouds moving in your field of vision.  They are called FLOATERS.  You can often see them when looking at a plain background, like a blank wall or blue miguelangel.  Floaters are actually tiny clumps of gel or cells inside the VITREOUS, the clear jelly-like fluid that fills the inside of your eye.    While these objects look like they are in front of your eye, they are actually floating inside.  What you see are the shadows they cast on the RETINA, the nerve layer at the back of the eye that senses light and allows you to see.      POSTERIOR VITREOUS DETACHMENT    The appearance of new floaters may be alarming.  If you suddenly " develop new floaters, you should contact your eye care professional  right away.    The retina can tear if the shrinking vitreous pulls away from the wall of the eye.  This sometimes causes a small amount of bleeding in the eye that may appear as new floaters.    A torn retina is always a serious problem, since it can lead to a retinal detachment.  You should see your eye care professional as soon as possible if:    even one new floater appears suddenly;  you see sudden flashes of light;  you notice other symptoms, like the loss of side vision, or a curtain closes down in your vision        POSTERIOR VITREOUS DETACHMENT is more common for people who:    are nearsighted;  have had cataract surgery;  have had YAG laser surgery of the eye;  have had inflammation inside the eye;  are over age 60.      While some floaters may remain visible, many of them will fade over time and become less noticeable.  Even if you've had some floaters for years, you should have your eyes checked as soon as possible if you notice new ones.    FLASHING LIGHTS    When the vitreous gel rubs or pulls on the retina, you may see what look like flashing lights or lightning streaks.  These flashes can appear off and on for several weeks or months.      Some people experience flashes of light that appear as jagged lines or heat waves in both eyes, lasting 10-20 minutes.  These flashes are caused by a spasm of blood vessels in the brain, which is called a migraine.    If a headache follows these flashes, it's called a migraine headache.  If   no headache occurs, these flashes are called Ophthalmic or Ocular Migraine.           DAILY WEAR CONTACT LENSES  Continue with Daily Wear of contact lenses, up to all waking hours.  Continue with approved contact lens disinfection / rewetting drops as discussed.  Dispose of lenses monthly.  Stop wearing your lenses and call our office if redness, blurred vision, or pain persists more than 12 hours.  We  recommend an annual eye exam for contact lens patients.

## 2024-07-30 NOTE — PROGRESS NOTES
HPI    Pt here for annual eye exam. Last exam- 1 year    Pt sts specs work fine, needs new rx b/c of scratches. Pt denies   flashes/floaters/pain. Pt denies use of gtt.   Last edited by Mary Ellen Anaya on 7/30/2024  8:41 AM.            Assessment /Plan     For exam results, see Encounter Report.    Vitreous floaters, bilateral    Glaucoma screening    Myopia with astigmatism and presbyopia, bilateral    Contact lens/glasses fitting      RD precautions given and reviewed. Patient knows to call/ message if any further changes in symptoms occur.  Not suspect   Updated specs rx gave copy, discussed options // fill prn --ok continue with current   Order trials --will need re retrain I/R at dispense --then can finalize clrx     DAILY WEAR CONTACT LENSES  Continue with Daily Wear of contact lenses, up to all waking hours.  Continue with approved contact lens disinfection / rewetting drops as discussed.  Dispose of lenses monthly.  Stop wearing your lenses and call our office if redness, blurred vision, or pain persists more than 12 hours.  We recommend an annual eye exam for contact lens patients.    Discussed and educated patient on current findings /plan.  RTC 1 year, prn if any changes / issues

## 2024-08-21 ENCOUNTER — LAB VISIT (OUTPATIENT)
Dept: LAB | Facility: HOSPITAL | Age: 49
End: 2024-08-21
Attending: PHYSICIAN ASSISTANT
Payer: COMMERCIAL

## 2024-08-21 DIAGNOSIS — D50.9 IRON DEFICIENCY ANEMIA, UNSPECIFIED IRON DEFICIENCY ANEMIA TYPE: ICD-10-CM

## 2024-08-21 DIAGNOSIS — E55.9 VITAMIN D DEFICIENCY: ICD-10-CM

## 2024-08-21 DIAGNOSIS — D86.9 SARCOIDOSIS: ICD-10-CM

## 2024-08-21 DIAGNOSIS — D63.8 CHRONIC DISEASE ANEMIA: ICD-10-CM

## 2024-08-21 DIAGNOSIS — E89.0 POST-OPERATIVE HYPOTHYROIDISM: ICD-10-CM

## 2024-08-21 DIAGNOSIS — E83.51 HYPOCALCEMIA: ICD-10-CM

## 2024-08-21 LAB
25(OH)D3+25(OH)D2 SERPL-MCNC: 24 NG/ML (ref 30–96)
ALBUMIN SERPL BCP-MCNC: 3.8 G/DL (ref 3.5–5.2)
ALP SERPL-CCNC: 75 U/L (ref 55–135)
ALT SERPL W/O P-5'-P-CCNC: 12 U/L (ref 10–44)
ANION GAP SERPL CALC-SCNC: 8 MMOL/L (ref 8–16)
AST SERPL-CCNC: 17 U/L (ref 10–40)
BASOPHILS # BLD AUTO: 0.05 K/UL (ref 0–0.2)
BASOPHILS NFR BLD: 0.9 % (ref 0–1.9)
BILIRUB SERPL-MCNC: 0.4 MG/DL (ref 0.1–1)
BUN SERPL-MCNC: 16 MG/DL (ref 6–20)
CALCIUM SERPL-MCNC: 9 MG/DL (ref 8.7–10.5)
CHLORIDE SERPL-SCNC: 102 MMOL/L (ref 95–110)
CO2 SERPL-SCNC: 30 MMOL/L (ref 23–29)
CREAT SERPL-MCNC: 1.2 MG/DL (ref 0.5–1.4)
DIFFERENTIAL METHOD BLD: ABNORMAL
EOSINOPHIL # BLD AUTO: 0.3 K/UL (ref 0–0.5)
EOSINOPHIL NFR BLD: 6.1 % (ref 0–8)
ERYTHROCYTE [DISTWIDTH] IN BLOOD BY AUTOMATED COUNT: 14.8 % (ref 11.5–14.5)
EST. GFR  (NO RACE VARIABLE): 54.8 ML/MIN/1.73 M^2
FERRITIN SERPL-MCNC: 278 NG/ML (ref 20–300)
FOLATE SERPL-MCNC: 4.9 NG/ML (ref 4–24)
GLUCOSE SERPL-MCNC: 90 MG/DL (ref 70–110)
HCT VFR BLD AUTO: 36.9 % (ref 37–48.5)
HGB BLD-MCNC: 11.4 G/DL (ref 12–16)
IMM GRANULOCYTES # BLD AUTO: 0.02 K/UL (ref 0–0.04)
IMM GRANULOCYTES NFR BLD AUTO: 0.4 % (ref 0–0.5)
IRON SERPL-MCNC: 55 UG/DL (ref 30–160)
LYMPHOCYTES # BLD AUTO: 2.5 K/UL (ref 1–4.8)
LYMPHOCYTES NFR BLD: 45.1 % (ref 18–48)
MCH RBC QN AUTO: 26.3 PG (ref 27–31)
MCHC RBC AUTO-ENTMCNC: 30.9 G/DL (ref 32–36)
MCV RBC AUTO: 85 FL (ref 82–98)
MONOCYTES # BLD AUTO: 0.5 K/UL (ref 0.3–1)
MONOCYTES NFR BLD: 8.7 % (ref 4–15)
NEUTROPHILS # BLD AUTO: 2.2 K/UL (ref 1.8–7.7)
NEUTROPHILS NFR BLD: 38.8 % (ref 38–73)
NRBC BLD-RTO: 0 /100 WBC
PLATELET # BLD AUTO: 298 K/UL (ref 150–450)
PMV BLD AUTO: 10.7 FL (ref 9.2–12.9)
POTASSIUM SERPL-SCNC: 3.6 MMOL/L (ref 3.5–5.1)
PROT SERPL-MCNC: 7.7 G/DL (ref 6–8.4)
RBC # BLD AUTO: 4.33 M/UL (ref 4–5.4)
RETICS/RBC NFR AUTO: 1.6 % (ref 0.5–2.5)
SATURATED IRON: 18 % (ref 20–50)
SODIUM SERPL-SCNC: 140 MMOL/L (ref 136–145)
T4 FREE SERPL-MCNC: 0.78 NG/DL (ref 0.71–1.51)
TOTAL IRON BINDING CAPACITY: 311 UG/DL (ref 250–450)
TRANSFERRIN SERPL-MCNC: 210 MG/DL (ref 200–375)
TSH SERPL DL<=0.005 MIU/L-ACNC: 21.66 UIU/ML (ref 0.4–4)
VIT B12 SERPL-MCNC: 742 PG/ML (ref 210–950)
WBC # BLD AUTO: 5.61 K/UL (ref 3.9–12.7)

## 2024-08-21 PROCEDURE — 36415 COLL VENOUS BLD VENIPUNCTURE: CPT | Mod: PO | Performed by: PHYSICIAN ASSISTANT

## 2024-08-21 PROCEDURE — 82746 ASSAY OF FOLIC ACID SERUM: CPT | Performed by: FAMILY MEDICINE

## 2024-08-21 PROCEDURE — 83540 ASSAY OF IRON: CPT | Performed by: FAMILY MEDICINE

## 2024-08-21 PROCEDURE — 85045 AUTOMATED RETICULOCYTE COUNT: CPT | Performed by: FAMILY MEDICINE

## 2024-08-21 PROCEDURE — 82607 VITAMIN B-12: CPT | Performed by: FAMILY MEDICINE

## 2024-08-21 PROCEDURE — 85025 COMPLETE CBC W/AUTO DIFF WBC: CPT | Performed by: FAMILY MEDICINE

## 2024-08-21 PROCEDURE — 84443 ASSAY THYROID STIM HORMONE: CPT | Performed by: PHYSICIAN ASSISTANT

## 2024-08-21 PROCEDURE — 80053 COMPREHEN METABOLIC PANEL: CPT | Performed by: PHYSICIAN ASSISTANT

## 2024-08-21 PROCEDURE — 82306 VITAMIN D 25 HYDROXY: CPT | Performed by: PHYSICIAN ASSISTANT

## 2024-08-21 PROCEDURE — 84439 ASSAY OF FREE THYROXINE: CPT | Performed by: PHYSICIAN ASSISTANT

## 2024-08-21 PROCEDURE — 82728 ASSAY OF FERRITIN: CPT | Performed by: FAMILY MEDICINE

## 2024-08-26 ENCOUNTER — OFFICE VISIT (OUTPATIENT)
Dept: FAMILY MEDICINE | Facility: CLINIC | Age: 49
End: 2024-08-26
Payer: COMMERCIAL

## 2024-08-26 ENCOUNTER — LAB VISIT (OUTPATIENT)
Dept: LAB | Facility: HOSPITAL | Age: 49
End: 2024-08-26
Payer: COMMERCIAL

## 2024-08-26 VITALS
WEIGHT: 251.5 LBS | OXYGEN SATURATION: 98 % | HEIGHT: 63 IN | SYSTOLIC BLOOD PRESSURE: 125 MMHG | BODY MASS INDEX: 44.56 KG/M2 | TEMPERATURE: 97 F | HEART RATE: 77 BPM | DIASTOLIC BLOOD PRESSURE: 77 MMHG

## 2024-08-26 DIAGNOSIS — E66.01 MORBID OBESITY WITH BMI OF 45.0-49.9, ADULT: ICD-10-CM

## 2024-08-26 DIAGNOSIS — I10 ESSENTIAL HYPERTENSION: ICD-10-CM

## 2024-08-26 DIAGNOSIS — G47.33 OSA ON CPAP: ICD-10-CM

## 2024-08-26 DIAGNOSIS — I77.810 ASCENDING AORTA DILATION: ICD-10-CM

## 2024-08-26 DIAGNOSIS — E87.6 HYPOKALEMIA: ICD-10-CM

## 2024-08-26 DIAGNOSIS — Z00.00 ANNUAL PHYSICAL EXAM: ICD-10-CM

## 2024-08-26 DIAGNOSIS — D63.8 CHRONIC DISEASE ANEMIA: ICD-10-CM

## 2024-08-26 DIAGNOSIS — R73.03 PREDIABETES: ICD-10-CM

## 2024-08-26 DIAGNOSIS — Z00.00 ANNUAL PHYSICAL EXAM: Primary | ICD-10-CM

## 2024-08-26 DIAGNOSIS — I05.8 MITRAL VALVE SCLEROSIS: ICD-10-CM

## 2024-08-26 DIAGNOSIS — I89.0 LYMPHEDEMA OF UPPER EXTREMITY FOLLOWING LYMPHADENECTOMY: ICD-10-CM

## 2024-08-26 DIAGNOSIS — J43.9 PULMONARY EMPHYSEMA DETERMINED BY X-RAY: ICD-10-CM

## 2024-08-26 DIAGNOSIS — E89.89 LYMPHEDEMA OF UPPER EXTREMITY FOLLOWING LYMPHADENECTOMY: ICD-10-CM

## 2024-08-26 DIAGNOSIS — R91.8 PULMONARY NODULES/LESIONS, MULTIPLE: ICD-10-CM

## 2024-08-26 DIAGNOSIS — Z76.0 MEDICATION REFILL: ICD-10-CM

## 2024-08-26 DIAGNOSIS — E89.0 POST-OPERATIVE HYPOTHYROIDISM: ICD-10-CM

## 2024-08-26 DIAGNOSIS — I35.8 AORTIC VALVE SCLEROSIS: ICD-10-CM

## 2024-08-26 DIAGNOSIS — D86.9 SARCOIDOSIS: ICD-10-CM

## 2024-08-26 LAB
CHOLEST SERPL-MCNC: 159 MG/DL (ref 120–199)
CHOLEST/HDLC SERPL: 3.2 {RATIO} (ref 2–5)
ESTIMATED AVG GLUCOSE: 120 MG/DL (ref 68–131)
HBA1C MFR BLD: 5.8 % (ref 4–5.6)
HDLC SERPL-MCNC: 50 MG/DL (ref 40–75)
HDLC SERPL: 31.4 % (ref 20–50)
LDLC SERPL CALC-MCNC: 94 MG/DL (ref 63–159)
NONHDLC SERPL-MCNC: 109 MG/DL
TRIGL SERPL-MCNC: 75 MG/DL (ref 30–150)

## 2024-08-26 PROCEDURE — 3044F HG A1C LEVEL LT 7.0%: CPT | Mod: CPTII,S$GLB,, | Performed by: NURSE PRACTITIONER

## 2024-08-26 PROCEDURE — 3008F BODY MASS INDEX DOCD: CPT | Mod: CPTII,S$GLB,, | Performed by: NURSE PRACTITIONER

## 2024-08-26 PROCEDURE — 99396 PREV VISIT EST AGE 40-64: CPT | Mod: S$GLB,,, | Performed by: NURSE PRACTITIONER

## 2024-08-26 PROCEDURE — 99999 PR PBB SHADOW E&M-EST. PATIENT-LVL V: CPT | Mod: PBBFAC,,, | Performed by: NURSE PRACTITIONER

## 2024-08-26 PROCEDURE — 80061 LIPID PANEL: CPT | Performed by: NURSE PRACTITIONER

## 2024-08-26 PROCEDURE — 83036 HEMOGLOBIN GLYCOSYLATED A1C: CPT | Performed by: NURSE PRACTITIONER

## 2024-08-26 PROCEDURE — 1160F RVW MEDS BY RX/DR IN RCRD: CPT | Mod: CPTII,S$GLB,, | Performed by: NURSE PRACTITIONER

## 2024-08-26 PROCEDURE — 3078F DIAST BP <80 MM HG: CPT | Mod: CPTII,S$GLB,, | Performed by: NURSE PRACTITIONER

## 2024-08-26 PROCEDURE — 3074F SYST BP LT 130 MM HG: CPT | Mod: CPTII,S$GLB,, | Performed by: NURSE PRACTITIONER

## 2024-08-26 PROCEDURE — 1159F MED LIST DOCD IN RCRD: CPT | Mod: CPTII,S$GLB,, | Performed by: NURSE PRACTITIONER

## 2024-08-26 RX ORDER — POTASSIUM CHLORIDE 20 MEQ/1
20 TABLET, EXTENDED RELEASE ORAL 2 TIMES DAILY
Qty: 180 TABLET | Refills: 3 | Status: SHIPPED | OUTPATIENT
Start: 2024-08-26

## 2024-08-26 NOTE — PATIENT INSTRUCTIONS
RTC as needed  Report to ER immediately if symptoms worsen or persist    Obdulio Wharton,     If you are due for any health screening(s) below please notify me so we can arrange them to be ordered and scheduled. Most healthy patients at your age complete them, but you are free to accept or refuse.     If you can't do it, I'll definitely understand. If you can, I'd certainly appreciate it!    All of your core healthy metrics are met.

## 2024-08-27 ENCOUNTER — TELEPHONE (OUTPATIENT)
Dept: FAMILY MEDICINE | Facility: CLINIC | Age: 49
End: 2024-08-27
Payer: COMMERCIAL

## 2024-08-27 ENCOUNTER — OFFICE VISIT (OUTPATIENT)
Dept: ENDOCRINOLOGY | Facility: CLINIC | Age: 49
End: 2024-08-27
Payer: COMMERCIAL

## 2024-08-27 DIAGNOSIS — E83.51 HYPOCALCEMIA: ICD-10-CM

## 2024-08-27 DIAGNOSIS — E89.0 POST-OPERATIVE HYPOTHYROIDISM: Primary | ICD-10-CM

## 2024-08-27 DIAGNOSIS — E55.9 VITAMIN D DEFICIENCY: ICD-10-CM

## 2024-08-27 PROCEDURE — 3044F HG A1C LEVEL LT 7.0%: CPT | Mod: CPTII,95,, | Performed by: PHYSICIAN ASSISTANT

## 2024-08-27 PROCEDURE — 1159F MED LIST DOCD IN RCRD: CPT | Mod: CPTII,95,, | Performed by: PHYSICIAN ASSISTANT

## 2024-08-27 PROCEDURE — 1160F RVW MEDS BY RX/DR IN RCRD: CPT | Mod: CPTII,95,, | Performed by: PHYSICIAN ASSISTANT

## 2024-08-27 PROCEDURE — 99214 OFFICE O/P EST MOD 30 MIN: CPT | Mod: 95,,, | Performed by: PHYSICIAN ASSISTANT

## 2024-08-27 RX ORDER — ERGOCALCIFEROL 1.25 MG/1
CAPSULE ORAL
Qty: 36 CAPSULE | Refills: 3 | Status: SHIPPED | OUTPATIENT
Start: 2024-08-27

## 2024-08-27 RX ORDER — LEVOTHYROXINE SODIUM 175 UG/1
TABLET ORAL
Qty: 72 TABLET | Refills: 3 | Status: SHIPPED | OUTPATIENT
Start: 2024-08-27

## 2024-08-27 NOTE — PROGRESS NOTES
Subjective:      Patient ID: Akiko Jameson is a 49 y.o. female.    Chief Complaint:  Post-surgical Hypothyroidism    The patient location is: Home  The chief complaint leading to consultation is: Hypothyroidism    Visit type: audiovisual    Face to Face time with patient: 10 min  15 minutes of total time spent on the encounter, which includes face to face time and non-face to face time preparing to see the patient (eg, review of tests), Obtaining and/or reviewing separately obtained history, Documenting clinical information in the electronic or other health record, Independently interpreting results (not separately reported) and communicating results to the patient/family/caregiver, or Care coordination (not separately reported).     Each patient to whom he or she provides medical services by telemedicine is:  (1) informed of the relationship between the physician and patient and the respective role of any other health care provider with respect to management of the patient; and (2) notified that he or she may decline to receive medical services by telemedicine and may withdraw from such care at any time.    History of Present Illness  Initial visit for thyroid nodules     Pt had initial thyroid u/s in 2013, and got left thyroid nodule FNA that was benign in 7/2013. Seen by Dr. Sandifer in the past. Last seen by me in 2/24.      U/S in 11/20 shows increase in left thyroid nodule from 5.2 cm to 6.7 cm (which was previously bx) and also increase in left lateral isthmus nodule from 2.0 to 2.6 cm. Upper ll nodule also increased in size from 2.0 to 2.6 cm.  She denies compressive sx. No FH thyroid cancer. No radiation exposure to head/neck. Had chronic cough. Had total thyroidectomy in 3/21 which was benign. Cough improved. Taking ca 500 mg qd since sx. Pt has sarcoidosis. On ergo 2x weekly.    On LT4 175 mcg for six days of the week and 350 mcg on Sundays.  + fatigue, heat intolerance, constipation/diarrhea,    No mental fog, anxiety, hair loss, wt changes or tremors.     Is morbidly obese. +FH DM. Has HTN, not previously screened for DM.   Denies polyuria, polydipsia, blurry vision      Wt Readings from Last 10 Encounters:   08/26/24 114.1 kg (251 lb 8 oz)   07/30/24 114.9 kg (253 lb 6.4 oz)   07/30/24 116.7 kg (257 lb 4.4 oz)   06/28/24 112.9 kg (249 lb)   05/03/24 116.6 kg (257 lb)   04/25/24 113 kg (249 lb 1.9 oz)   04/12/24 112.2 kg (247 lb 4.8 oz)   02/22/24 118.2 kg (260 lb 11.1 oz)   02/09/24 119.3 kg (263 lb)   11/02/23 115.4 kg (254 lb 6.6 oz)      ROS:   Constitutional: + wt loss 9 lbs  Eyes: No recent visual changes  Cardiovascular: Denies current anginal symptoms  Respiratory: Denies current respiratory difficulty  Gastrointestinal: Denies recent bowel disturbances  GenitoUrinary - No dysuria  Skin: No new skin rash  Neurologic: No focal neurologic complaints  Remainder ROS negative     Objective:     PE:  GENERAL: Well developed, well nourished  RESPIRATORY: Normal effort,     DIAGNOSIS:   03/21/2021 NICK//michael,pjl      1.  THYROID GLAND, LEFT LOBE, LOBECTOMY:        --NODULAR HYPERPLASIA.      2.  THYROID GLAND, RIGHT LOBE, LOBECTOMY:        --NODULAR HYPERPLASIA.   --FEW FOCI OF NON-CASEATING GRANULOMATA (SEE COMMENT #1); AFB AND PAS    STAINS ARE NEGATIVE FOR   ACID-FAST BACILLI AND FUNGAL ORGANISMS, RESPECTIVELY (CONTROLS STAIN    APPROPRIATELY).        --ONE BENIGN PERITHYROIDAL PARATHYROID GLAND.     Lab Review:   Lab Results   Component Value Date    TSH 21.664 (H) 08/21/2024     Assessment:     1. Post-operative hypothyroidism  SYNTHROID 175 mcg tablet      2. Vitamin D deficiency  ergocalciferol (ERGOCALCIFEROL) 50,000 unit Cap      3. Hypocalcemia          Post-operative hypothyroidism-TSH is elevated. Change to Synthroid 175 mcg m-f and 2 tab on weekends.   Vitamin d deficiency-low-increase to 3x ergo weekly.   Hypocalcemia-last ca was normal. Restart ca if she experiences muscle cramping or  twitching.     FOLLOWUP  TFTs in six weeks  F/u in 6 mths w/ labs prior- cmp, tfts, vd

## 2024-08-27 NOTE — TELEPHONE ENCOUNTER
I have attempted to contact this patient by phone with the following results: no answer, left message to return my call on answering machine. Faxed and copy placed up front for .

## 2024-08-28 NOTE — PROGRESS NOTES
Subjective     Patient ID: Akiko Jameson is a 49 y.o. female.    Chief Complaint: Annual Exam  Pt in today for annual exam. HTN, hypokalemia managed with medication. Pt sees cardiology for ascending aortic dilation, aortic/mitral valve stenosis. Sarcoidosis managed by pulmonology. Hypothyroidism managed by endocrinology. KEIRY managed with CPAP; sees pulmonology. Also sees pulmonology for monitoring of pulmonary nodules/pulmonary emphysema. Chronic lymphedema from previous surgery. History of chronic anemia. Healthy diet/weight loss recommended; has pre-diabetes. Insurance for brought to visit today; to be completed upon review of lab results. Pt has no other complaints today.  Past Medical History:   Diagnosis Date    ALLERGIC RHINITIS     Aortic valve sclerosis 1/20/2023    Arthritis     Cellulitis     Constipation due to opioid therapy     Eosinophilia     mild    GERD (gastroesophageal reflux disease)     Granular cell tumor     H. pylori infection 2018    History of 2019 novel coronavirus disease (COVID-19) 10/04/2020    Hypertension     Lymphedema     Mild anemia     Mitral valve sclerosis 3/21/2023    KEIRY on CPAP     does not regularly    Positive CHARLENE (antinuclear antibody) 07/27/2022    Prediabetes 08/06/2021    Sarcoidosis, unspecified     Sleep apnea     compliant with CPAP    Thyroid nodule     Urinary incontinence, mixed      Social History     Socioeconomic History    Marital status:     Number of children: 2   Occupational History     Employer: [employer]   Tobacco Use    Smoking status: Never    Smokeless tobacco: Never   Substance and Sexual Activity    Alcohol use: No    Drug use: No    Sexual activity: Yes     Partners: Male     Birth control/protection: None     Social Determinants of Health     Financial Resource Strain: Low Risk  (7/29/2024)    Overall Financial Resource Strain (CARDIA)     Difficulty of Paying Living Expenses: Not hard at all   Food Insecurity: No Food Insecurity  (7/29/2024)    Hunger Vital Sign     Worried About Running Out of Food in the Last Year: Never true     Ran Out of Food in the Last Year: Never true   Transportation Needs: Unknown (7/19/2024)    Received from [facility]    PRAPARE - Transportation     Lack of Transportation (Medical): Patient declined   Physical Activity: Insufficiently Active (7/29/2024)    Exercise Vital Sign     Days of Exercise per Week: 2 days     Minutes of Exercise per Session: 30 min   Stress: No Stress Concern Present (7/29/2024)    [place] [employer] - Occupational Stress Questionnaire     Feeling of Stress : Not at all   Housing Stability: Low Risk  (2/19/2024)    Housing Stability Vital Sign     Unable to Pay for Housing in the Last Year: No     Number of Places Lived in the Last Year: 1     Unstable Housing in the Last Year: No     Past Surgical History:   Procedure Laterality Date    24 HOUR IMPEDANCE PH MONITORING OF ESOPHAGUS IN PATIENT NOT TAKING ACID REDUCING MEDICATIONS N/A 01/06/2020    Procedure: IMPEDANCE PH STUDY, ESOPHAGEAL, 24 HOUR, IN PATIENT NOT TAKING ACID REDUCING MEDICATION;  Surgeon: First Available [place];  Location: [facility];  Service: Endoscopy;  Laterality: N/A;    AXILLARY NODE DISSECTION Right     granular cell tumor    BILATERAL SALPINGO-OOPHORECTOMY (BSO)  07/21/2020    BREAST CYST ASPIRATION      left    CHOLECYSTECTOMY      COLONOSCOPY N/A 05/10/2019    Procedure: COLONOSCOPY;  Surgeon: Edgar Gamble Jr., MD;  Location: [facility];  Service: Endoscopy;  Laterality: N/A;    COLONOSCOPY N/A 06/28/2024    Procedure: COLONOSCOPY;  Surgeon: Carina aShu MD;  Location: [facility];  Service: Endoscopy;  Laterality: N/A;    ENDOBRONCHIAL ULTRASOUND Bilateral 04/27/2021    Procedure: ENDOBRONCHIAL ULTRASOUND (EBUS);  Surgeon: Armando Kirby MD;  Location: [facility];  Service: Pulmonary;  Laterality: Bilateral;  need fluoroscopy    ENDOMETRIAL ABLATION      ESOPHAGEAL  MANOMETRY WITH MEASUREMENT OF IMPEDANCE N/A 01/06/2020    Procedure: MANOMETRY, ESOPHAGUS, WITH IMPEDANCE MEASUREMENT;  Surgeon: First Available [place];  Location: [facility] ENDO;  Service: Endoscopy;  Laterality: N/A;    ESOPHAGOGASTRODUODENOSCOPY N/A 07/05/2018    Procedure: EGD (ESOPHAGOGASTRODUODENOSCOPY);  Surgeon: Edgar Gamble Jr., MD;  Location: [facility] ENDO;  Service: Endoscopy;  Laterality: N/A;    ESOPHAGOGASTRODUODENOSCOPY N/A 11/23/2020    Procedure: ESOPHAGOGASTRODUODENOSCOPY (EGD);  Surgeon: Jina Kaufman MD;  Location: [facility] ENDO;  Service: General;  Laterality: N/A;    EXCISION OF MASS OF ABDOMEN Left 06/18/2018    Procedure: EXCISION, MASS, ABDOMEN - flank left;  Surgeon: Armando Tate MD;  Location: [facility] OR;  Service: General;  Laterality: Left;  left flank removal of mass    FINE NEEDLE ASPIRATION      thyroid    FLEXIBLE SIGMOIDOSCOPY N/A 08/24/2022    Procedure: SIGMOIDOSCOPY, FLEXIBLE;  Surgeon: Amber West MD;  Location: [facility];  Service: Endoscopy;  Laterality: N/A;    HYSTERECTOMY  07/21/2020    INJECTION OF ANESTHETIC AGENT AROUND MEDIAL BRANCH NERVES INNERVATING LUMBAR FACET JOINT Right 02/24/2022    Procedure: Right L3, 4, 5 MBB;  Surgeon: Anam Montero MD;  Location: [facility];  Service: Pain Management;  Laterality: Right;    INJECTION OF ANESTHETIC AGENT AROUND MEDIAL BRANCH NERVES INNERVATING LUMBAR FACET JOINT Right 03/29/2022    Procedure: Right L3, 4, 5 MBB  (2nd block);  Surgeon: Anam Montero MD;  Location: [facility];  Service: Pain Management;  Laterality: Right;    KNEE ARTHROSCOPY W/ MENISCECTOMY Right     NASAL SEPTUM SURGERY      OOPHORECTOMY      RADIOFREQUENCY THERMOCOAGULATION Right 04/05/2022    Procedure: Right L3-5 Lumbar RFA;  Surgeon: Anam Montero MD;  Location: [facility];  Service: Pain Management;  Laterality: Right;    RADIOFREQUENCY THERMOCOAGULATION Right 02/22/2024    Procedure: Right L3-5 Lumbar RFA;  Surgeon:  Anam Montero MD;  Location: [facility] MGT;  Service: Pain Management;  Laterality: Right;    ROBOT-ASSISTED NISSEN FUNDOPLICATION USING DA TAMAR XI N/A 02/28/2020    Procedure: XI ROBOTIC FUNDOPLICATION, NISSEN;  Surgeon: Jina Kaufman MD;  Location: [facility] OR;  Service: General;  Laterality: N/A;    THYROIDECTOMY Bilateral 03/11/2021    Procedure: THYROIDECTOMY;  Surgeon: Nixon Moe MD;  Location: [facility] OR;  Service: ENT;  Laterality: Bilateral;    TUBAL LIGATION      UPPER GASTROINTESTINAL ENDOSCOPY  07/05/2018    Dr. Gamble       HPI  Review of Systems   Constitutional:  Negative for activity change and unexpected weight change.   HENT:  Negative for hearing loss, rhinorrhea and trouble swallowing.    Eyes:  Negative for discharge and visual disturbance.   Respiratory:  Negative for chest tightness and wheezing.    Cardiovascular:  Negative for chest pain and palpitations.   Gastrointestinal:  Negative for blood in stool, constipation, diarrhea and vomiting.   Endocrine: Negative for polydipsia and polyuria.   Genitourinary:  Negative for difficulty urinating, dysuria, hematuria and menstrual problem.   Musculoskeletal:  Negative for arthralgias, joint swelling and neck pain.   Neurological:  Negative for weakness and headaches.   Psychiatric/Behavioral:  Negative for confusion and dysphoric mood.         Objective     Physical Exam  Vitals and nursing note reviewed.   Constitutional:       Appearance: Normal appearance. She is obese.   HENT:      Head: Normocephalic.      Right Ear: Tympanic membrane, ear canal and external ear normal.      Left Ear: Tympanic membrane, ear canal and external ear normal.      Nose: Nose normal.      Mouth/Throat:      Mouth: Mucous membranes are moist.      Pharynx: Oropharynx is clear.   Eyes:      Conjunctiva/sclera: Conjunctivae normal.      Pupils: Pupils are equal, round, and reactive to light.   Cardiovascular:      Rate and Rhythm: Normal rate and regular  rhythm.      Pulses: Normal pulses.      Heart sounds: Normal heart sounds.   Pulmonary:      Effort: Pulmonary effort is normal.      Breath sounds: Normal breath sounds.   Abdominal:      General: Bowel sounds are normal.      Palpations: Abdomen is soft.   Musculoskeletal:         General: Normal range of motion.      Cervical back: Normal range of motion and neck supple.   Skin:     General: Skin is warm and dry.      Capillary Refill: Capillary refill takes 2 to 3 seconds.   Neurological:      Mental Status: She is alert and oriented to person, place, and time.   Psychiatric:         Mood and Affect: Mood normal.         Behavior: Behavior normal.         Thought Content: Thought content normal.         Judgment: Judgment normal.        Assessment and Plan     1. Annual physical exam  2. Essential hypertension  3. Ascending aorta dilation  4. Aortic valve sclerosis  5. Mitral valve sclerosis  6. Chronic disease anemia  7. Sarcoidosis  8. Post-operative hypothyroidism  9. Prediabetes  10. Hypokalemia  11. KEIRY on CPAP  12. Morbid obesity with BMI of 45.0-49.9, adult  13. Lymphedema of upper extremity following lymphadenectomy  14. Pulmonary emphysema determined by X-ray  15. Pulmonary nodules/lesions, multiple  16. Medication refill  -     Lipid Panel; Future; Expected date: 08/26/2024  -     HEMOGLOBIN A1C; Future; Expected date: 08/26/2024  -     potassium chloride SA (K-DUR,KLOR-CON) 20 MEQ tablet; Take 1 tablet (20 mEq total) by mouth 2 (two) times daily.  Dispense: 180 tablet; Refill: 3  CMP, TSH, CBC, vit D  RTC as needed  Report to ER immediately if symptoms worsen or persist             No follow-ups on file.

## 2024-08-30 ENCOUNTER — PATIENT MESSAGE (OUTPATIENT)
Dept: ENDOCRINOLOGY | Facility: CLINIC | Age: 49
End: 2024-08-30
Payer: COMMERCIAL

## 2024-08-30 DIAGNOSIS — E89.0 POST-OPERATIVE HYPOTHYROIDISM: ICD-10-CM

## 2024-09-02 RX ORDER — LEVOTHYROXINE SODIUM 175 UG/1
TABLET ORAL
Qty: 36 TABLET | Refills: 11 | Status: SHIPPED | OUTPATIENT
Start: 2024-09-02

## 2024-09-04 ENCOUNTER — TELEPHONE (OUTPATIENT)
Dept: PAIN MEDICINE | Facility: CLINIC | Age: 49
End: 2024-09-04
Payer: COMMERCIAL

## 2024-09-04 NOTE — TELEPHONE ENCOUNTER
Contacted pt. Pt requesting appt in [place] with Dr. Montero. Appt scheduled. All questions answered.//lp

## 2024-09-04 NOTE — TELEPHONE ENCOUNTER
----- Message from Marla Puri sent at 9/4/2024  7:59 AM CDT -----  Contact: Akiko Bass is calling in regards to getting an appt on 09/06.please call back at .[phone]      Thanks  LENCHO

## 2024-09-05 ENCOUNTER — TELEPHONE (OUTPATIENT)
Dept: FAMILY MEDICINE | Facility: CLINIC | Age: 49
End: 2024-09-05
Payer: COMMERCIAL

## 2024-09-05 DIAGNOSIS — D64.9 ANEMIA, UNSPECIFIED TYPE: Primary | ICD-10-CM

## 2024-09-05 NOTE — TELEPHONE ENCOUNTER
----- Message from Dwayne Booth MD sent at 9/4/2024 12:46 PM CDT -----  Borderline for anemia.  Iron borderline.  Recommend iron sulfate 325 mg daily.  Repeat CBC, iron, TIBC, ferritin 3 months. My nurse will contact you to arrange.  Thanks,  Dr. Booth

## 2024-09-12 DIAGNOSIS — I05.8 MITRAL VALVE SCLEROSIS: Chronic | ICD-10-CM

## 2024-09-12 DIAGNOSIS — I35.8 AORTIC VALVE SCLEROSIS: ICD-10-CM

## 2024-09-12 DIAGNOSIS — I10 ESSENTIAL HYPERTENSION: Chronic | ICD-10-CM

## 2024-09-12 RX ORDER — SPIRONOLACTONE 25 MG/1
12.5 TABLET ORAL DAILY
Qty: 45 TABLET | Refills: 2 | Status: SHIPPED | OUTPATIENT
Start: 2024-09-12 | End: 2025-09-12

## 2024-09-13 ENCOUNTER — OFFICE VISIT (OUTPATIENT)
Dept: PAIN MEDICINE | Facility: CLINIC | Age: 49
End: 2024-09-13
Payer: COMMERCIAL

## 2024-09-13 VITALS
BODY MASS INDEX: 44.12 KG/M2 | SYSTOLIC BLOOD PRESSURE: 132 MMHG | RESPIRATION RATE: 17 BRPM | DIASTOLIC BLOOD PRESSURE: 80 MMHG | HEIGHT: 63 IN | WEIGHT: 249 LBS

## 2024-09-13 DIAGNOSIS — M79.18 CHRONIC MYOFASCIAL PAIN: Primary | ICD-10-CM

## 2024-09-13 DIAGNOSIS — G89.29 CHRONIC MYOFASCIAL PAIN: Primary | ICD-10-CM

## 2024-09-13 DIAGNOSIS — M79.18 MYALGIA, OTHER SITE: ICD-10-CM

## 2024-09-13 DIAGNOSIS — M79.12 MYALGIA OF AUXILIARY MUSCLES, HEAD AND NECK: ICD-10-CM

## 2024-09-13 PROCEDURE — 99999 PR PBB SHADOW E&M-EST. PATIENT-LVL III: CPT | Mod: PBBFAC,,, | Performed by: PHYSICAL MEDICINE & REHABILITATION

## 2024-09-13 RX ORDER — CYCLOBENZAPRINE HCL 10 MG
TABLET ORAL
Qty: 60 TABLET | Refills: 2 | Status: SHIPPED | OUTPATIENT
Start: 2024-09-13

## 2024-09-13 RX ORDER — METHYLPREDNISOLONE ACETATE 40 MG/ML
40 INJECTION, SUSPENSION INTRA-ARTICULAR; INTRALESIONAL; INTRAMUSCULAR; SOFT TISSUE
Status: COMPLETED | OUTPATIENT
Start: 2024-09-13 | End: 2024-09-13

## 2024-09-13 RX ADMIN — METHYLPREDNISOLONE ACETATE 40 MG: 40 INJECTION, SUSPENSION INTRA-ARTICULAR; INTRALESIONAL; INTRAMUSCULAR; SOFT TISSUE at 08:09

## 2024-09-13 NOTE — PROGRESS NOTES
Established Patient - TeleHealth Visit    The patient location is: [place]  The chief complaint leading to consultation is: chronic pain     Visit type: audiovisual    Face to Face time with patient: 10-15 minutes  20 minutes of total time spent on the encounter, which includes face to face time and non-face to face time preparing to see the patient (eg, review of tests), Obtaining and/or reviewing separately obtained history, Documenting clinical information in the electronic or other health record, Independently interpreting results (not separately reported) and communicating results to the patient/family/caregiver, or Care coordination (not separately reported).     Each patient to whom he or she provides medical services by telemedicine is:  (1) informed of the relationship between the physician and patient and the respective role of any other health care provider with respect to management of the patient; and (2) notified that he or she may decline to receive medical services by telemedicine and may withdraw from such care at any time.      Established Patient Chronic Pain Note (Follow up visit)    Chief Complaint:   No chief complaint on file.  Mid back pain, right       SUBJECTIVE:  Interval History (9/13/2024):    Akiko Jameson is a 49 y.o. female presents today for follow-up right-sided neck and upper back pain predominantly on the right side.  Current pain intensity is 7/10.  She has received trigger point injections previously with significant relief.  She also uses Flexeril as needed when her pain is more severe.      Patient denies night fever/night sweats, urinary incontinence, bowel incontinence, significant weight loss, significant motor weakness and loss of sensations.    Pain Disability Index Review:      9/13/2024     8:08 AM 2/9/2024     8:12 AM 5/13/2022     9:06 AM   Last 3 PDI Scores   Pain Disability Index (PDI) 42 48 19         Interval HPI 03/20/2024:  Patient Akiko Jameson  presents today for follow-up visit.  Patient was last seen on 2/22/2024 for Right L3-5 RFA with reported 100% relief of pain.  She has not having any pain in the lower back or into the legs.  She rates her pain today a 0/10.  She does report that she continues to have some mid back/thoracic pain.  She had trigger point injections last month with Dr. Montero and she does feel like this helps temporarily.  She takes Flexeril as needed whenever she has a flare-up of her pain with good relief.    Interval history 2/9/2024  Akiko Jameson is a 49 y.o. female presents today for mid to lower back pain that is predominantly right-sided.  She reports that her pain has severely worsened over the past month.  Pain level today is 8/10.  She continues to utilize Flexeril as needed request a refill.  She reports that the pain is of the same type and quality in the right lower back and lower thoracic spine without any radiation into the extremities.    Interval History (2/21/2023):    Akiko Jameson presents today for follow-up visit.  Patient was last seen on 9/28/2022.  She presents today complaining of midback pain on the right, and pain has been more bothersome lately. Patient reports pain as 6/10 today.    Interval History (11/21/2022):    Akiko Jameson presents today for follow-up on virtual visit.  Patient was last seen about 2 months ago. At that visit, the plan was to start medrol dose warren and consider RFA - due to pain flared, but eventually that pain resolved. She has had one more episode over the last couple months of Increased pain, which ultimately resolved with muscle relaxer. She is stable overall right now.     Interval History (9/28/2022):    Akiko Jameson presents today for follow-up visit.  Patient was last seen on 5/13/2022. At that visit, she was doing well since RFA. She had a pain flare a few weeks ago that resolved fairly quickly. Pain returned over the week requiring ER  "visit. She reports pain being debilitating at this time, which started in the neck initially (that resolved). Pain stayed localized in right lower back. She was given toradol and morphine shot at ER. Pain has since calmed down. She is utilizing muscle relaxer now, but at the time of pain flare, it wasn't helping. Patient reports pain as "0/10 today.    Interval History (5/13/2022):   Akiko Jameson presents today for follow-up visit.  she underwent right L3-5 RFA on 4/5/22.  The patient reports that she is/was better following the procedure.  she reports 95% pain relief.  The changes lasted >4 weeks so far.  The changes have continued through this visit.  Patient reports pain as "0/10 today.    Interval HPI (2/11/22):  Akiko Jameson is a 47 y.o. who presents to the clinic for a follow-up appointment for mid back pain.  At the last visit, she was provided trigger point injections to the thoracic paraspinals , lower trapezius, and latissimus dorsi on the right.  She reports that the trigger point injections provided moderate relief.  She was maintained on Robaxin 750 mg up to 3 times daily as needed for muscle related pain.  Since the last visit, Akiko Jameson states the pain has been starting to return. Current pain intensity is 8/10.       Interval HPI 12/10/2021:  Akiko Jameson is a 47 y.o. female who presents to the clinic for a follow-up appointment for mid back pain.  At the last visit, she was provided trigger point injections to the thoracic paraspinals , lower trapezius, and latissimus dorsi on the right.  She reports that the trigger point injections provided moderate relief.  She was maintained on Robaxin 750 mg up to 3 times daily as needed for muscle related pain.  Since the last visit, Akiko Jameson states the pain has been starting to return. Current pain intensity is 5/10.     Interval HPI 11/19/2021:  Akiko Jameson is a 46 y.o. female who presents " to the clinic for a follow-up appointment for mid back pain.  At the last visit, she was provided trigger point injections to the thoracic paraspinals , lower trapezius, and latissimus dorsi on the right.  She reports that the trigger point injections provided moderate relief.  She was maintained on Robaxin 750 mg up to 3 times daily as needed for muscle related pain.  Since the last visit, Akiko Jameson states the pain has been starting to return. Current pain intensity is 5/10.  Unfortunately, the patient is currently on antibiotics for cellulitis of the right upper extremity.      Interval HPI 09/23/2021:  Akiko Jameson is a 46 y.o. female who presents to the clinic for a follow-up appointment for mid back pain.  At the last visit, she was provided trigger point injections to the thoracic paraspinals , lower trapezius, and latissimus dorsi on the right.  She reports that the trigger point injections provided limited relief, but they have been beneficial previously.  She was maintained on Robaxin 500 mg up to 3 times daily as needed for muscle related pain.  Since the last visit, Akiko Jameson states the pain has been starting to return. Current pain intensity is 8/10.      Interval HPI 07/09/2021:  Akiko Jameson is a 46 y.o. female who presents to the clinic for a follow-up appointment for mid back pain.  At the last visit, she was provided trigger point injections to the thoracic paraspinals , lower trapezius, and latissimus dorsi on the right.  She reports that the trigger point injections provided significant relief relief.  She was maintained on Robaxin 500 mg up to 3 times daily as needed for muscle related pain.  Since the last visit, Akiko Jameson states the pain has been starting to return. Current pain intensity is 8/10.  Of note, in the interim patient has had thyroidectomy and was recently diagnosed with sarcoidosis.    Interval HPI 11/13/2020:  Akiko  Zay Jameson is a 46 y.o. female who presents to the clinic for a follow-up appointment for mid back pain.  At the last visit, she was provided trigger point injections to the thoracic paraspinals , lower trapezius, and latissimus dorsi on the right.  She reports that the trigger point injections provided significant relief relief.  She was maintained on Robaxin 500 mg up to 3 times daily as needed for muscle related pain.  Since the last visit, Akiko Jameson states the pain has been starting to return. Current pain intensity is 5/10.      Interval HPI 05/29/2020:  Akiko Jameson is a 45 y.o. female who presents to the clinic for a follow-up appointment for mid back pain.  At the last visit, she was provided trigger point injections to the thoracic paraspinals and upper/middle trapezius on the right.  She reports that the trigger point injections provided 100% relief at the areas injected, but is having pain in other areas that she would like to also get injected today.  She was also started on Robaxin 500 mg up to 3 times daily as needed for muscle related pain.  She reports that the Robaxin has been of minimal benefit.  Since the last visit, Akiko Jameson states the pain has been improving. Current pain intensity is 8/10.  We discussed lymphedema management, and she is interested in going to a physical therapy clinic to help address this issue.    Initial HPI 05/15/2020:  Akiko Jameson is a 45 y.o. female, right-hand dominant, who presents to the clinic for the evaluation of mid back pain. The pain started 25+ years ago following a lymph node removal on the right that resulted in lymphedema of the right upper extremity and symptoms have been worsening.The pain is located in the right mid back area and radiates to the along the length of the upper to lower back.  The pain is described as aching, burning and throbbing and is rated as 6/10. The pain is rated with a score of  0/10  on the BEST day and a score of 10/10 on the WORST day.  Symptoms interfere with daily activity, sleeping and work. The pain is exacerbated by increased activity or use of the right upper extremity.  The pain is mitigated by rest. The patient reports spending 2-4 hours per day reclining. The patient reports 6-8 hours of uninterrupted sleep per night.  She reports that she works as a .         Non-Pharmacologic Treatments:  Physical Therapy/Home Exercise: yes  Ice/Heat:yes  TENS: yes  Acupuncture: no  Massage: yes  Chiropractic: yes    Other: yes, compression sleeves        Pain Medications:  - Opioids: Norco  - Adjuvant Medications: Relafen, Robaxin  - Anti-Coagulants: None           Pain Procedures:   -previously underwent thoracic epidurals x2 with first one being beneficial but the second one not so much  -05/15/2020:  Trigger point injections to the thoracic paraspinals and upper/middle trapezius on the right, 100% relief  -05/29/2020:  Trigger point injections to the thoracic paraspinals, lower trapezius, and latissimus dorsi on the right, significant relief   -11/13/2020:  Trigger point injections to the thoracic paraspinals, lower trapezius, and latissimus dorsi on the right, significant relief  -07/09/2021: Trigger point injections to the thoracic paraspinals, lower trapezius, and latissimus dorsi on the right, limited relief  - 09/23/2021: Trigger point injections to the thoracic paraspinals, lower trapezius, and latissimus dorsi on the right, moderate relief  - 12/10/2020: Trigger point injections to the thoracic paraspinals, lower trapezius, and latissimus dorsi on the right, moderate relief  - diagnostic right L3-5 MBB on 02/24/2022 and 03/29/2022 with reported % pain relief  - right L3-5 RFA on 4/5/22 with 95% pain relief    -Right L3-5 RFA 2/22/24 with 100% relief    Imaging (Reviewed on 9/13/2024):     2/21/2023 X-Ray Thoracic Spine 4 or more  views  COMPARISON:  None  FINDINGS:  Vertebral body heights maintained.  No spondylolisthesis.  Multilevel degenerative osteophyte changes present.  Soft tissues unremarkable.  Impression: Degenerative findings           X-ray bilateral knees 03/16/2016:  AP and PA flexion standing views of both knees as well as lateral and Merchant views of both knees were obtained.  The joint spaces are grossly preserved.  Mild patellofemoral spurring and prominent patellar enthesophytes bilaterally.  No joint effusion.  No acute fracture or malalignment.     MRI right knee 03/21/2016:  The anterior cruciate ligament, posterior cruciate ligament, medial collateral ligament, lateral collateral ligament complex normal popliteus tendon, IT band, quadriceps tendon, and patellar tendon are normal in appearance.    There is a complex, primarily radial tear of the posterior horn of the medial meniscus which extends to the posterior root attachment of the medial meniscus.  The medial meniscus is intact.    The articular cartilage of the medial and lateral tibiofemoral joint compartments is intact.  There is a 14 mm width area of full-thickness abnormal chondral signal observed in the lateral patellar facet at the level of the patellar midpole.  No abnormal subcortical signal abnormality appreciated.    There is quadriceps tendon and patellar tendon insertion enthesophyte formation at their respective attachments on the patella.  There is a small knee joint effusion present.  No acute fracture or pathologic marrow replacement process.  There is hematopoietic  bone marrow present within the distal femur and proximal tibia.          REVIEW OF SYSTEMS:    GENERAL:  No weight loss, malaise or fevers.  HEENT:   No recent changes in vision or hearing  NECK:  Negative for lumps, no difficulty with swallowing.  RESPIRATORY:  Negative for cough, wheezing or shortness of breath, patient denies any recent URI.  CARDIOVASCULAR:  Negative for chest  "pain, leg swelling or palpitations.  GI:  Negative for abdominal discomfort, blood in stools or black stools or change in bowel habits.  MUSCULOSKELETAL:  See HPI.  SKIN:  Negative for lesions, rash, and itching.  PSYCH:  No mood disorder or recent psychosocial stressors.  Patients sleep is not disturbed secondary to pain.  HEMATOLOGY/LYMPHOLOGY:  Negative for prolonged bleeding, bruising easily or swollen nodes.  Patient is not currently taking any anti-coagulants  NEURO:   No history of headaches, syncope, paralysis, seizures or tremors.  All other reviewed and negative other than HPI.      OBJECTIVE:  /80   Pulse (P) 70   Resp 17   Ht 5' 3" (1.6 m)   Wt 112.9 kg (249 lb)   BMI 44.11 kg/m²         Physical exam:    GENERAL: Well appearing, in no acute distress, alert and oriented x3.  PSYCH:  Mood and affect appropriate.  SKIN: Skin color, texture, turgor normal, no rashes or lesions.  HEAD/FACE:  Normocephalic, atraumatic. Cranial nerves grossly intact.  NECK:  Mild-to-moderate pain to palpation over the cervical paraspinous muscles. Spurling negative to radicular pain. No pain with neck flexion, extension, or lateral flexion.   PULM: No evidence of respiratory difficulty, symmetric chest rise.  GI:  Soft and non-tender.  BACK:  TTP over the cervicothoracic paraspinals, lower trapezius, latissimus dorsi, and lumbar paraspinals on the right.  SLR in the sitting and supine positions is negative to radicular pain.   mild-to-moderate pain to palpation over the facet joints of the lumbar spine and lumbar paraspinals - Present, mild, improved since procedure.  Fair range of motion secondary to pain reproduction.    EXTREMITIES: There is significant lymphedema present throughout the right upper extremity.  No other deformities or skin discoloration. Good capillary refill.  MUSCULOSKELETAL: Shoulder, hip, and knee provocative maneuvers are negative.  BUE & BLE strength is normal and symmetric.  No atrophy or " tone abnormalities are noted.  NEURO: BUE & BLE coordination and muscle stretch reflexes are physiologic and symmetric.  Plantar response are downgoing. No clonus.  No loss of sensation is noted.  GAIT: normal.            ASSESSMENT: 49 y.o. year old female with mid back pain, consistent with     1. Chronic myofascial pain        2. Myalgia, other site        3. Myalgia of auxiliary muscles, head and neck                  PLAN:   - Interventional:   Perform trigger point injections to the right-sided cervicothoracic paraspinals for therapeutic purposes.  Explained the procedure in detail, risks, benefits, alternatives the patient today and she elected to pursue this intervention.  Informed consent obtained, see procedure note below for more details.    -s/p right L3-5 RFA 2/22/24 100% relief  - S/p right L3-5 RFA on 4/5/22 with 95% pain relief.     -Pharmacologic:   - continue Flexeril 10mg BID PRN - refill provided today  - Anticoagulation use: None.   - [place]  reviewed and appropriate.          - Rehabilitative: Encouraged regular exercise. Therapy: advised patient continue with gradual compression for her lymphedema and activities as tolerated.     - Diagnostic:  Reviewed imaging available    - Follow up:  3-4 months or as needed    - This condition does not require this patient to take time off of work, and the primary goal of our Pain Management services is to improve the patient's functional capacity.     - I discussed the risks, benefits, and alternatives to potential treatment options. All questions and concerns were fully addressed today in clinic.       Anam Montero MD  Interventional Pain Medicine  [facility] [place]    PROCEDURE NOTE:  Trigger Point Injection:   The procedure was discussed with the patient including complications of nerve damage,  bleeding, infection, and failure of pain relief.   Trigger points were identified by palpation and marked. Alcohol swab prep of sites done. A mixture  of 4mL 1% lidocaine + 40 mg Depo-Medrol was prepared (5 mL total).   A 25-gauge needle was advanced to the point of maximal tenderness, and  1 to 1.25mL  was injected after negative aspiration. All sites done in the same manner. Patient tolerated the procedure well and without complications. Patient was monitored for 15 min following the injection before discharged from the clinic.  Sites injected included:  Right cervicothoracic paraspinals right upper trapezius    Disclaimer:  This note was prepared using voice recognition system and is likely to have sound alike errors that may have been overlooked even after proof reading.  Please call me with any questions.

## 2024-09-27 ENCOUNTER — PATIENT MESSAGE (OUTPATIENT)
Dept: PAIN MEDICINE | Facility: CLINIC | Age: 49
End: 2024-09-27
Payer: COMMERCIAL

## 2024-10-22 ENCOUNTER — TELEPHONE (OUTPATIENT)
Dept: PHARMACY | Facility: CLINIC | Age: 49
End: 2024-10-22
Payer: COMMERCIAL

## 2024-10-22 NOTE — TELEPHONE ENCOUNTER
[facility]/Population Health Chart Review & Patient Outreach Details For Medication Adherence Project    Reason for Outreach Encounter: 3rd Party payor non-compliance report ([employer], [employer], C, etc)  2.  Patient Outreach Method: Reviewed patient chart   3.   Medication in question:   Hypertension Medications               carvediloL (COREG) 12.5 MG tablet Take 1 tablet (12.5 mg total) by mouth 2 (two) times daily.    olmesartan-hydrochlorothiazide (BENICAR HCT) 20-12.5 mg per tablet Take 1 tablet by mouth once daily.    spironolactone (ALDACTONE) 25 MG tablet Take 0.5 tablets (12.5 mg total) by mouth once daily.                 Olmesartan/HCTZ  last filled  9/9/24 for 90 day supply      4.  Reviewed and or Updates Made To: Patient Chart  5. Outreach Outcomes and/or actions taken: Patient filled medication and is on track to be adherent  Additional Notes:

## 2024-10-30 ENCOUNTER — HOSPITAL ENCOUNTER (OUTPATIENT)
Dept: RADIOLOGY | Facility: HOSPITAL | Age: 49
Discharge: HOME | End: 2024-10-30
Attending: INTERNAL MEDICINE
Payer: COMMERCIAL

## 2024-10-30 ENCOUNTER — HOSPITAL ENCOUNTER (OUTPATIENT)
Dept: RADIOLOGY | Facility: HOSPITAL | Age: 49
Discharge: HOME | End: 2024-10-30
Attending: NURSE PRACTITIONER
Payer: COMMERCIAL

## 2024-10-30 ENCOUNTER — OFFICE VISIT (OUTPATIENT)
Dept: FAMILY MEDICINE | Facility: CLINIC | Age: 49
End: 2024-10-30
Payer: COMMERCIAL

## 2024-10-30 VITALS
TEMPERATURE: 98 F | SYSTOLIC BLOOD PRESSURE: 118 MMHG | WEIGHT: 253 LBS | OXYGEN SATURATION: 97 % | BODY MASS INDEX: 44.83 KG/M2 | DIASTOLIC BLOOD PRESSURE: 74 MMHG | HEIGHT: 63 IN | HEART RATE: 74 BPM

## 2024-10-30 DIAGNOSIS — M79.645 FINGER PAIN, LEFT: Primary | ICD-10-CM

## 2024-10-30 DIAGNOSIS — D86.0 SARCOIDOSIS OF LUNG: ICD-10-CM

## 2024-10-30 DIAGNOSIS — M79.645 FINGER PAIN, LEFT: ICD-10-CM

## 2024-10-30 PROCEDURE — 71046 X-RAY EXAM CHEST 2 VIEWS: CPT | Mod: 26,,, | Performed by: STUDENT IN AN ORGANIZED HEALTH CARE EDUCATION/TRAINING PROGRAM

## 2024-10-30 PROCEDURE — 1159F MED LIST DOCD IN RCRD: CPT | Mod: CPTII,S$GLB,, | Performed by: NURSE PRACTITIONER

## 2024-10-30 PROCEDURE — 73140 X-RAY EXAM OF FINGER(S): CPT | Mod: 26,LT,, | Performed by: RADIOLOGY

## 2024-10-30 PROCEDURE — 73140 X-RAY EXAM OF FINGER(S): CPT | Mod: TC,PO,LT

## 2024-10-30 PROCEDURE — 3078F DIAST BP <80 MM HG: CPT | Mod: CPTII,S$GLB,, | Performed by: NURSE PRACTITIONER

## 2024-10-30 PROCEDURE — 3008F BODY MASS INDEX DOCD: CPT | Mod: CPTII,S$GLB,, | Performed by: NURSE PRACTITIONER

## 2024-10-30 PROCEDURE — 71046 X-RAY EXAM CHEST 2 VIEWS: CPT | Mod: TC,PO

## 2024-10-30 PROCEDURE — 1160F RVW MEDS BY RX/DR IN RCRD: CPT | Mod: CPTII,S$GLB,, | Performed by: NURSE PRACTITIONER

## 2024-10-30 PROCEDURE — 3074F SYST BP LT 130 MM HG: CPT | Mod: CPTII,S$GLB,, | Performed by: NURSE PRACTITIONER

## 2024-10-30 PROCEDURE — 99213 OFFICE O/P EST LOW 20 MIN: CPT | Mod: S$GLB,,, | Performed by: NURSE PRACTITIONER

## 2024-10-30 PROCEDURE — 99999 PR PBB SHADOW E&M-EST. PATIENT-LVL V: CPT | Mod: PBBFAC,,, | Performed by: NURSE PRACTITIONER

## 2024-10-30 PROCEDURE — 3044F HG A1C LEVEL LT 7.0%: CPT | Mod: CPTII,S$GLB,, | Performed by: NURSE PRACTITIONER

## 2024-10-30 RX ORDER — MELOXICAM 7.5 MG/1
7.5 TABLET ORAL 2 TIMES DAILY PRN
COMMUNITY
Start: 2024-10-21

## 2024-11-06 ENCOUNTER — OFFICE VISIT (OUTPATIENT)
Dept: RHEUMATOLOGY | Facility: CLINIC | Age: 49
End: 2024-11-06
Payer: COMMERCIAL

## 2024-11-06 VITALS
HEIGHT: 63 IN | HEART RATE: 81 BPM | WEIGHT: 253.5 LBS | DIASTOLIC BLOOD PRESSURE: 75 MMHG | SYSTOLIC BLOOD PRESSURE: 120 MMHG | BODY MASS INDEX: 44.92 KG/M2

## 2024-11-06 DIAGNOSIS — Z51.81 MEDICATION MONITORING ENCOUNTER: ICD-10-CM

## 2024-11-06 DIAGNOSIS — D86.0 SARCOIDOSIS OF LUNG: Primary | ICD-10-CM

## 2024-11-06 DIAGNOSIS — E79.0 HYPERURICEMIA: ICD-10-CM

## 2024-11-06 DIAGNOSIS — R73.03 PREDIABETES: ICD-10-CM

## 2024-11-06 DIAGNOSIS — R76.8 POSITIVE ANA (ANTINUCLEAR ANTIBODY): ICD-10-CM

## 2024-11-06 PROBLEM — L03.113 CELLULITIS OF RIGHT HAND: Status: RESOLVED | Noted: 2021-11-16 | Resolved: 2024-11-06

## 2024-11-06 PROBLEM — D86.9 SARCOIDOSIS: Status: RESOLVED | Noted: 2021-04-30 | Resolved: 2024-11-06

## 2024-11-06 PROBLEM — R05.3 COUGH, PERSISTENT: Status: RESOLVED | Noted: 2019-12-07 | Resolved: 2024-11-06

## 2024-11-06 PROCEDURE — 99999 PR PBB SHADOW E&M-EST. PATIENT-LVL IV: CPT | Mod: PBBFAC,,, | Performed by: INTERNAL MEDICINE

## 2024-11-06 PROCEDURE — 3044F HG A1C LEVEL LT 7.0%: CPT | Mod: CPTII,S$GLB,, | Performed by: INTERNAL MEDICINE

## 2024-11-06 PROCEDURE — 3078F DIAST BP <80 MM HG: CPT | Mod: CPTII,S$GLB,, | Performed by: INTERNAL MEDICINE

## 2024-11-06 PROCEDURE — 99214 OFFICE O/P EST MOD 30 MIN: CPT | Mod: S$GLB,,, | Performed by: INTERNAL MEDICINE

## 2024-11-06 PROCEDURE — 1159F MED LIST DOCD IN RCRD: CPT | Mod: CPTII,S$GLB,, | Performed by: INTERNAL MEDICINE

## 2024-11-06 PROCEDURE — 3074F SYST BP LT 130 MM HG: CPT | Mod: CPTII,S$GLB,, | Performed by: INTERNAL MEDICINE

## 2024-11-06 PROCEDURE — 3008F BODY MASS INDEX DOCD: CPT | Mod: CPTII,S$GLB,, | Performed by: INTERNAL MEDICINE

## 2024-11-06 RX ORDER — COLCHICINE 0.6 MG/1
TABLET ORAL
Qty: 30 TABLET | Refills: 0 | Status: SHIPPED | OUTPATIENT
Start: 2024-11-06

## 2024-11-06 NOTE — PROGRESS NOTES
RHEUMATOLOGY OUTPATIENT CLINIC NOTE    11/6/2024    Attending Rheumatologist: Urban Marion  Primary Care Provider/Physician Requesting Consultation: Dwayne Booth MD   Chief Complaint/Reason For Consultation:  Positive CHARLENE, Sarcoidosis, and DISH      Subjective:     Akiko Jameson is a 49 y.o. Black or  female with sarcoidosis w/ lung and LN involvement    No acute complaints.  Adequate response to local CSI pre IPM for DISH.  Self limited arthralgias w/ predominant mechanical pain pattern.    Review of Systems   HENT:  Negative for nosebleeds.         Denies sicca sx   Eyes:  Negative for blurred vision, photophobia and pain.   Respiratory:  Negative for cough and shortness of breath.    Cardiovascular:  Negative for chest pain.   Gastrointestinal:  Negative for melena.   Genitourinary:  Negative for hematuria.        No hx of kidney stones.   Musculoskeletal:  Negative for back pain and joint pain.        Denies classic gout precipitants.  Denies FHx of gout   Skin:  Negative for rash.        Denies RP   Neurological:  Negative for focal weakness and weakness.       Chronic comorbid conditions affecting medical decision making today:  Past Medical History:   Diagnosis Date    ALLERGIC RHINITIS     Aortic valve sclerosis 1/20/2023    Arthritis     Cellulitis     Constipation due to opioid therapy     Eosinophilia     mild    GERD (gastroesophageal reflux disease)     Granular cell tumor     H. pylori infection 2018    History of 2019 novel coronavirus disease (COVID-19) 10/04/2020    Hypertension     Lymphedema     Mild anemia     Mitral valve sclerosis 3/21/2023    KEIRY on CPAP     does not regularly    Positive CHARLENE (antinuclear antibody) 07/27/2022    Prediabetes 08/06/2021    Sarcoidosis, unspecified     Sleep apnea     compliant with CPAP    Thyroid nodule     Urinary incontinence, mixed      Past Surgical History:   Procedure Laterality Date    24 HOUR IMPEDANCE PH MONITORING  OF ESOPHAGUS IN PATIENT NOT TAKING ACID REDUCING MEDICATIONS N/A 01/06/2020    Procedure: IMPEDANCE PH STUDY, ESOPHAGEAL, 24 HOUR, IN PATIENT NOT TAKING ACID REDUCING MEDICATION;  Surgeon: First Available Tamika Panchal;  Location: [facility];  Service: Endoscopy;  Laterality: N/A;    AXILLARY NODE DISSECTION Right     granular cell tumor    BILATERAL SALPINGO-OOPHORECTOMY (BSO)  07/21/2020    BREAST CYST ASPIRATION      left    CHOLECYSTECTOMY      COLONOSCOPY N/A 05/10/2019    Procedure: COLONOSCOPY;  Surgeon: Edgar Gamble Jr., MD;  Location: [facility];  Service: Endoscopy;  Laterality: N/A;    COLONOSCOPY N/A 06/28/2024    Procedure: COLONOSCOPY;  Surgeon: Carina Sahu MD;  Location: [facility];  Service: Endoscopy;  Laterality: N/A;    ENDOBRONCHIAL ULTRASOUND Bilateral 04/27/2021    Procedure: ENDOBRONCHIAL ULTRASOUND (EBUS);  Surgeon: Armando Kirby MD;  Location: [facility];  Service: Pulmonary;  Laterality: Bilateral;  need fluoroscopy    ENDOMETRIAL ABLATION      ESOPHAGEAL MANOMETRY WITH MEASUREMENT OF IMPEDANCE N/A 01/06/2020    Procedure: MANOMETRY, ESOPHAGUS, WITH IMPEDANCE MEASUREMENT;  Surgeon: First Available [place];  Location: [facility];  Service: Endoscopy;  Laterality: N/A;    ESOPHAGOGASTRODUODENOSCOPY N/A 07/05/2018    Procedure: EGD (ESOPHAGOGASTRODUODENOSCOPY);  Surgeon: Edgar Gamble Jr., MD;  Location: [facility];  Service: Endoscopy;  Laterality: N/A;    ESOPHAGOGASTRODUODENOSCOPY N/A 11/23/2020    Procedure: ESOPHAGOGASTRODUODENOSCOPY (EGD);  Surgeon: Jina Kaufman MD;  Location: [facility];  Service: General;  Laterality: N/A;    EXCISION OF MASS OF ABDOMEN Left 06/18/2018    Procedure: EXCISION, MASS, ABDOMEN - flank left;  Surgeon: Armando Tate MD;  Location: [facility];  Service: General;  Laterality: Left;  left flank removal of mass    FINE NEEDLE ASPIRATION      thyroid    FLEXIBLE SIGMOIDOSCOPY N/A 08/24/2022    Procedure: SIGMOIDOSCOPY, FLEXIBLE;  Surgeon:  Amber West MD;  Location: [facility];  Service: Endoscopy;  Laterality: N/A;    HYSTERECTOMY  07/21/2020    INJECTION OF ANESTHETIC AGENT AROUND MEDIAL BRANCH NERVES INNERVATING LUMBAR FACET JOINT Right 02/24/2022    Procedure: Right L3, 4, 5 MBB;  Surgeon: Anam Montero MD;  Location: [facility];  Service: Pain Management;  Laterality: Right;    INJECTION OF ANESTHETIC AGENT AROUND MEDIAL BRANCH NERVES INNERVATING LUMBAR FACET JOINT Right 03/29/2022    Procedure: Right L3, 4, 5 MBB  (2nd block);  Surgeon: Anam Montero MD;  Location: [facility];  Service: Pain Management;  Laterality: Right;    KNEE ARTHROSCOPY W/ MENISCECTOMY Right     NASAL SEPTUM SURGERY      OOPHORECTOMY      RADIOFREQUENCY THERMOCOAGULATION Right 04/05/2022    Procedure: Right L3-5 Lumbar RFA;  Surgeon: Anam Montero MD;  Location: [facility];  Service: Pain Management;  Laterality: Right;    RADIOFREQUENCY THERMOCOAGULATION Right 02/22/2024    Procedure: Right L3-5 Lumbar RFA;  Surgeon: Anam Montero MD;  Location: [facility];  Service: Pain Management;  Laterality: Right;    ROBOT-ASSISTED NISSEN FUNDOPLICATION USING DA TAMAR XI N/A 02/28/2020    Procedure: XI ROBOTIC FUNDOPLICATION, NISSEN;  Surgeon: Jina Kaufman MD;  Location: [facility] OR;  Service: General;  Laterality: N/A;    THYROIDECTOMY Bilateral 03/11/2021    Procedure: THYROIDECTOMY;  Surgeon: Nixon Moe MD;  Location: [facility] OR;  Service: ENT;  Laterality: Bilateral;    TUBAL LIGATION      UPPER GASTROINTESTINAL ENDOSCOPY  07/05/2018    Dr. Gamble     Family History   Problem Relation Name Age of Onset    Diabetes Mother Deyonne     Kidney failure Mother Deyonne     Heart attack Mother Deyonne     Early death Mother Deyonne     Heart disease Mother Deyonne     Kidney disease Mother Deyonne     Blindness Father Chandu     Cancer Father Chandu     Vision loss Father Chandu     Breast cancer Maternal Aunt great aunt     Breast cancer  Paternal Aunt      Breast cancer Maternal Grandmother      Ovarian cancer Cousin      Breast cancer Cousin      Cancer Paternal Grandfather Chandu     Vision loss Paternal Grandfather Chandu     Hypertension Daughter Marie     Stroke Paternal Aunt Valethia     Cirrhosis Neg Hx      Colon polyps Neg Hx      Colon cancer Neg Hx      Crohn's disease Neg Hx      Esophageal cancer Neg Hx      Stomach cancer Neg Hx      Ulcerative colitis Neg Hx      Amblyopia Neg Hx      Cataracts Neg Hx      Glaucoma Neg Hx      Macular degeneration Neg Hx      Retinal detachment Neg Hx      Strabismus Neg Hx      Allergic rhinitis Neg Hx      Allergies Neg Hx      Angioedema Neg Hx      Asthma Neg Hx      Atopy Neg Hx      Eczema Neg Hx      Immunodeficiency Neg Hx      Rhinitis Neg Hx      Urticaria Neg Hx       Social History     Tobacco Use   Smoking Status Never   Smokeless Tobacco Never       Current Outpatient Medications:     carvediloL (COREG) 12.5 MG tablet, Take 1 tablet (12.5 mg total) by mouth 2 (two) times daily., Disp: 180 tablet, Rfl: 2    cyclobenzaprine (FLEXERIL) 10 MG tablet, Take 1/2 to 1 tab BID PRN muscle spasms. May cause drowsiness., Disp: 60 tablet, Rfl: 2    docusate sodium (COLACE) 100 MG capsule, Take 100 mg by mouth every other day. , Disp: , Rfl:     doxycycline (VIBRA-TABS) 100 MG tablet, Take 100 mg by mouth every 12 (twelve) hours., Disp: , Rfl:     ergocalciferol (ERGOCALCIFEROL) 50,000 unit Cap, Take one cap 3x weekly., Disp: 36 capsule, Rfl: 3    meloxicam (MOBIC) 7.5 MG tablet, Take 7.5 mg by mouth 2 (two) times daily as needed., Disp: , Rfl:     montelukast (SINGULAIR) 10 mg tablet, Take 10 mg by mouth every evening., Disp: , Rfl:     multivitamin (THERAGRAN) per tablet, Take 1 tablet by mouth once daily. Every day, Disp: , Rfl:     olmesartan-hydrochlorothiazide (BENICAR HCT) 20-12.5 mg per tablet, Take 1 tablet by mouth once daily., Disp: 90 tablet, Rfl: 2    potassium chloride SA  (K-DUR,KLOR-CON) 20 MEQ tablet, Take 1 tablet (20 mEq total) by mouth 2 (two) times daily., Disp: 180 tablet, Rfl: 3    spironolactone (ALDACTONE) 25 MG tablet, Take 0.5 tablets (12.5 mg total) by mouth once daily., Disp: 45 tablet, Rfl: 2    SYNTHROID 175 mcg tablet, Take one tablet Monday-Friday and two tablets on the weekend., Disp: 36 tablet, Rfl: 11    traMADoL (ULTRAM) 50 mg tablet, Take 1 tablet by mouth every 6 (six) hours as needed., Disp: , Rfl:     amoxicillin-clavulanate 875-125mg (AUGMENTIN) 875-125 mg per tablet, Take 1 tablet by mouth every 12 (twelve) hours., Disp: , Rfl:     cetirizine (ZYRTEC) 10 MG tablet, Take 1 tablet (10 mg total) by mouth once daily. (Patient taking differently: Take 10 mg by mouth once daily.), Disp: , Rfl: 0    fluticasone propion-salmeterol 115-21 mcg/dose (ADVAIR HFA) 115-21 mcg/actuation HFAA inhaler, Inhale 2 puffs into the lungs every 12 (twelve) hours. Controller, Disp: 12 g, Rfl: 11     Objective:     Vitals:    11/06/24 0757   BP: 120/75   Pulse: 81     Physical Exam   Eyes: Conjunctivae are normal.   Pulmonary/Chest: Effort normal. No respiratory distress.   Musculoskeletal:         General: No swelling or tenderness. Normal range of motion.   Neurological: She displays no weakness.   Skin: No rash noted.       Reviewed available old and all outside pertinent medical records available.    All lab results personally reviewed and interpreted by me.       ASSESSMENT / PLAN     1. Sarcoidosis of lung  Angiotensin Converting Enzyme    INTERLEUKIN-2 RECEPTOR      2. Hyperuricemia  colchicine (COLCRYS) 0.6 mg tablet    Uric Acid      3. Positive CHARLENE (antinuclear antibody)  Negative SHANKAR.  Negative myomarker panel.  NO related end organ damage.      4. Prediabetes  Caution advised w/ systemic steroid use.      5. Medication monitoring encounter  Comprehensive Metabolic Panel                Urban Marion M.D.

## 2024-11-19 ENCOUNTER — OFFICE VISIT (OUTPATIENT)
Dept: INFECTIOUS DISEASES | Facility: CLINIC | Age: 49
End: 2024-11-19
Payer: COMMERCIAL

## 2024-11-19 VITALS
OXYGEN SATURATION: 96 % | SYSTOLIC BLOOD PRESSURE: 117 MMHG | TEMPERATURE: 98 F | WEIGHT: 253.5 LBS | HEART RATE: 80 BPM | DIASTOLIC BLOOD PRESSURE: 77 MMHG | BODY MASS INDEX: 44.92 KG/M2 | HEIGHT: 63 IN

## 2024-11-19 DIAGNOSIS — L03.113 CELLULITIS OF RIGHT UPPER EXTREMITY: ICD-10-CM

## 2024-11-19 DIAGNOSIS — I10 ESSENTIAL HYPERTENSION: Primary | ICD-10-CM

## 2024-11-19 PROCEDURE — 3008F BODY MASS INDEX DOCD: CPT | Mod: CPTII,S$GLB,, | Performed by: INTERNAL MEDICINE

## 2024-11-19 PROCEDURE — 3044F HG A1C LEVEL LT 7.0%: CPT | Mod: CPTII,S$GLB,, | Performed by: INTERNAL MEDICINE

## 2024-11-19 PROCEDURE — 99999 PR PBB SHADOW E&M-EST. PATIENT-LVL IV: CPT | Mod: PBBFAC,,, | Performed by: INTERNAL MEDICINE

## 2024-11-19 PROCEDURE — 1159F MED LIST DOCD IN RCRD: CPT | Mod: CPTII,S$GLB,, | Performed by: INTERNAL MEDICINE

## 2024-11-19 PROCEDURE — 99214 OFFICE O/P EST MOD 30 MIN: CPT | Mod: S$GLB,,, | Performed by: INTERNAL MEDICINE

## 2024-11-19 PROCEDURE — 3078F DIAST BP <80 MM HG: CPT | Mod: CPTII,S$GLB,, | Performed by: INTERNAL MEDICINE

## 2024-11-19 PROCEDURE — 3074F SYST BP LT 130 MM HG: CPT | Mod: CPTII,S$GLB,, | Performed by: INTERNAL MEDICINE

## 2024-11-19 NOTE — PROGRESS NOTES
Subjective     Patient ID: Akiko Jameson is a 49 y.o. female.    Chief Complaint: Follow-up    HPI    Last ID note  47 year old with PMH as listed below. She had lymph node removal about 20 years ago on the right upper extremity and since then, she has right arm lymphedema  .  She has now developed 2 episodes of cellulitis this year .  She was at [place] -01/2019 for right upper extremity cellulitis .     11/2/23- she comes for follow up and has not had any new episodes since the last visit .     11/19/24  She comes for follow up  She is also assisting another patient as the POA .    She had an episode of cellulitis -07/2024      Review of Systems   Constitutional:  Negative for activity change, appetite change, chills, diaphoresis and fatigue.   Respiratory:  Negative for apnea.    Neurological:  Negative for dizziness and coordination difficulties.          Objective     Physical Exam  Vitals and nursing note reviewed.   Constitutional:       Appearance: She is well-developed.   HENT:      Head: Normocephalic and atraumatic.   Eyes:      Pupils: Pupils are equal, round, and reactive to light.   Neck:      Thyroid: No thyromegaly.      Trachea: No tracheal deviation.   Cardiovascular:      Rate and Rhythm: Normal rate and regular rhythm.   Pulmonary:      Effort: No respiratory distress.      Breath sounds: No wheezing or rales.   Abdominal:      General: Bowel sounds are normal. There is no distension.      Palpations: Abdomen is soft.      Tenderness: There is no abdominal tenderness.   Musculoskeletal:      Cervical back: Normal range of motion and neck supple.   Skin:     General: Skin is warm and dry.      Coloration: Skin is not pale.   Neurological:      Mental Status: She is alert and oriented to person, place, and time.      Cranial Nerves: No cranial nerve deficit.      Coordination: Coordination normal.   Psychiatric:         Thought Content: Thought content normal.         Judgment: Judgment  normal.            Assessment and Plan     1. Essential hypertension  Assessment & Plan:  Will continue [place] , follow PCP       2. Cellulitis of right upper extremity  Assessment & Plan:  This has been treated -  No new infections at this time  No need for prophylaxis yet as she had a last episode- 07/24

## 2024-11-19 NOTE — ASSESSMENT & PLAN NOTE
This has been treated -  No new infections at this time  No need for prophylaxis yet as she had a last episode- 07/24

## 2024-11-27 NOTE — H&P
History & Physical - Short Stay  Gastroenterology      SUBJECTIVE:     Procedure: Gastroscopy    Chief Complaint/Indication for Procedure: Epig pain, prob GERD    History of Present Illness:  Office Visit     6/11/2018  [facility]   Gerda Gilliam, BETO   Gastroenterology   Heartburn +3 more   Dx   Gastroesophageal Reflux ; Referred by Yeimi Abernathy NP   Reason for Visit    Progress Notes        Subjective:       Patient ID: Akiko Jameson is a 43 y.o. female Body mass index is 45.3 kg/m².     Chief Complaint: Gastroesophageal Reflux ( painful BM)     This patient is new to me.  Referring Provider: MYA Montilla NP for pain with bowel movements and mucus in stool.  Established with KYLE Baez NP with Hepatology (last in 4/2015)     Gastroesophageal Reflux   She complains of abdominal pain (epigastric and periumbilical pain typically in the morning, worse if she skips breakfast, described as sharp pain; denies currently), belching, early satiety, globus sensation (occasional), heartburn and water brash. She reports no chest pain, no choking, no coughing, no dysphagia, no hoarse voice, no nausea or no sore throat. CHIEF COMPLAINT: MUCUS IN STOOL AND PAINFUL BOWEL MOVEMENTS (wakes her up from sleeping with bowel urgency)- started a couple of weeks ago, relieved once she has a bowel movement. This is a chronic problem. The current episode started more than 1 year ago (started several years ago). The problem occurs frequently (daily). The problem has been gradually worsening (since 4/2018). The heartburn changes with position. The symptoms are aggravated by lying down (ICEEs, snowballs; occurs with anything she eats & drinks). Pertinent negatives include no fatigue, melena or weight loss. Risk factors include obesity, NSAIDs and caffeine use (mobic use PRN occasional use taken daily for 2-3 days). She has tried a histamine-2 antagonist, an antibiotic, head elevation and a diet  change (currently taking amoxil, biaxin and zantac 150 mg BID for h pylori (history of lip swelling with prevacid); TUMS PRN) for the symptoms. The treatment provided mild relief. Past procedures include an abdominal ultrasound and an EGD.      Assessment:       1. Heartburn    2. History of Helicobacter pylori infection    3. Pain with bowel movements    4. Epigastric pain        Plan:       Heartburn  - schedule EGD, discussed procedure with patient, patient verbalized understanding  - continue zantac 150 mg bid as directed  - complete amoxil and biaxin as directed  -discussed about the different types of medications used to treat reflux and how to use them, antacids can be used PRN for breakthrough heartburn symptoms by reducing stomach acid that is already produced, H2 blockers (zantac) work by limiting the amount acid production, & PPI's work to block acid production and are taken daily (history of allergy to prevacid), patient verbalized understanding.  -Educated patient on lifestyle modifications to help control/reduce reflux/abdominal pain including: avoid large meals, avoid eating within 2-3 hours of bedtime (avoid late night eating & lying down soon after eating), elevate head of bed if nocturnal symptoms are present, smoking cessation (if current smoker), & weight loss (if overweight).   -Educated to avoid known foods which trigger reflux symptoms & to minimize/avoid high-fat foods, chocolate, caffeine, citrus, alcohol, & tomato products.  -Advised to avoid/limit use of NSAID's, since they can cause GI upset, bleeding, and/or ulcers. If needed, take with food.      History of Helicobacter pylori infection  - continue amoxil, biaxin and zantac as directed; discussed that typically h pylori treatment is with PPI but due to allergy to prevacid that this is likely why the prescribing provider avoid PPI use  - schedule EGD, discussed procedure with patient, patient verbalized understanding     Pain with bowel  movements  Recommended daily exercise as tolerated, adequate water intake (six 8-oz glasses of water daily), and high fiber diet. OTC fiber supplements are recommended if diet does not reach daily fiber goal (25 grams daily), such as Metamucil, Citrucel, or FiberCon (take as directed, separate from other oral medications by >2 hours).  -Recommend taking an OTC stool softener such as Colace as directed to avoid hard stools and straining with bowel movements PRN  -continue OTC MiraLax once daily (17g PO) as directed  - If no improvement with above recommendations, try intermittently dosed Dulcolax OTC as directed (every 3-4  days) PRN to facilitate bowel movements  -If still no improvement with these measures, call/follow-up  - Possible colonoscopy pending results of testing and if symptoms persist  - Possible trial of bentyl or levsin pending results of testing and if symptoms persist     Epigastric pain  - schedule EGD, discussed procedure with patient, patient verbalized understanding     Follow-up in about 1 month (around 7/11/2018), or if symptoms worsen or fail to improve.                  PTA Medications   Medication Sig    diltiaZEM (CARDIZEM CD) 120 MG Cp24 Take 1 capsule (120 mg total) by mouth once daily.    montelukast (SINGULAIR) 10 mg tablet Take 1 tablet (10 mg total) by mouth nightly.    multivitamin (THERAGRAN) per tablet Every day    polyethylene glycol (GLYCOLAX) 17 gram/dose powder Take 17 g by mouth once daily.    potassium chloride SA (K-DUR,KLOR-CON) 20 MEQ tablet Take 1 tablet (20 mEq total) by mouth 2 (two) times daily. Appointment needed for further refills after this one    ranitidine (ZANTAC) 150 MG tablet Take 1 tablet (150 mg total) by mouth 2 (two) times daily.    triamterene-hydrochlorothiazide 37.5-25 mg (MAXZIDE-25) 37.5-25 mg per tablet Take 1 tablet by mouth once daily.       Review of patient's allergies indicates:   Allergen Reactions    Prevacid  [lansoprazole] Swelling  "    Lip swelling    Ace inhibitors     Benazepril Other (See Comments)        Past Medical History:   Diagnosis Date    ALLERGIC RHINITIS     Cancer     Cellulitis     Eosinophilia     mild    GERD (gastroesophageal reflux disease)     Granular cell tumor     Hypertension     Lymphedema     Mild anemia     Sleep apnea     compliant with CPAP    Thyroid nodule     Urinary incontinence, mixed      Past Surgical History:   Procedure Laterality Date    AXILLARY NODE DISSECTION      granular cell tumor    BREAST CYST ASPIRATION      CHOLECYSTECTOMY      ENDOMETRIAL ABLATION      EXCISION OF MASS OF ABDOMEN Left 6/18/2018    Procedure: EXCISION, MASS, ABDOMEN - flank left;  Surgeon: Armando Tate MD;  Location: [facility];  Service: General;  Laterality: Left;  left flank removal of mass    FINE NEEDLE ASPIRATION      thyroid    KNEE ARTHROSCOPY W/ MENISCECTOMY Right     NASAL SEPTUM SURGERY      TUBAL LIGATION       Family History   Problem Relation Age of Onset    Diabetes Mother     Kidney failure Mother     Breast cancer Maternal Grandmother     Breast cancer Maternal Aunt     Cirrhosis Neg Hx     Colon polyps Neg Hx     Colon cancer Neg Hx     Crohn's disease Neg Hx     Esophageal cancer Neg Hx     Stomach cancer Neg Hx     Ulcerative colitis Neg Hx      Social History   Substance Use Topics    Smoking status: Never Smoker    Smokeless tobacco: Never Used    Alcohol use No         OBJECTIVE:     Vital Signs (Most Recent)  Temp: 97.5 °F (36.4 °C) (07/05/18 0745)  Pulse: 68 (07/05/18 0745)  Resp: 18 (07/05/18 0745)  BP: 126/67 (07/05/18 0745)  SpO2: 98 % (07/05/18 0745)    Physical Exam:         : Ht 5' 3" (1.6 m)   Wt 116 kg (255 lb 11.7 oz)   BMI 45.30 kg/m²                                              GENERAL:  Comfortable, in no acute distress.                                 HEENT EXAM:  Nonicteric.  No adenopathy.  Oropharynx is clear.               NECK:  Supple.           "                                                     LUNGS:  Clear.                                                               CARDIAC:  Regular rate and rhythm.  S1, S2.  No murmur.                      ABDOMEN:  OBESE.  Soft, positive bowel sounds, nontender.  No hepatosplenomegaly or masses.  No rebound or guarding.                                             EXTREMITIES:  No edema.     MENTAL STATUS:  Alert and oriented.    ASSESSMENT/PLAN:     Assessment: Epig pain, prob GERD    Plan: Gastroscopy    Anesthesia Plan:   MAC / General Anaesthesia    ASA Grade: ASA 2 - Patient with mild systemic disease with no functional limitations    MALLAMPATI SCORE: II (hard and soft palate, upper portion of tonsils anduvula visible)     [Negative] : Heme/Lymph

## 2024-12-02 ENCOUNTER — PATIENT MESSAGE (OUTPATIENT)
Dept: ENDOCRINOLOGY | Facility: CLINIC | Age: 49
End: 2024-12-02
Payer: COMMERCIAL

## 2024-12-02 ENCOUNTER — HOSPITAL ENCOUNTER (OUTPATIENT)
Dept: RADIOLOGY | Facility: HOSPITAL | Age: 49
Discharge: HOME | End: 2024-12-02
Attending: FAMILY MEDICINE
Payer: COMMERCIAL

## 2024-12-02 DIAGNOSIS — Z12.31 ENCOUNTER FOR SCREENING MAMMOGRAM FOR BREAST CANCER: ICD-10-CM

## 2024-12-02 PROCEDURE — 77063 BREAST TOMOSYNTHESIS BI: CPT | Mod: 26,,, | Performed by: RADIOLOGY

## 2024-12-02 PROCEDURE — 77063 BREAST TOMOSYNTHESIS BI: CPT | Mod: TC,PO

## 2024-12-02 PROCEDURE — 77067 SCR MAMMO BI INCL CAD: CPT | Mod: 26,,, | Performed by: RADIOLOGY

## 2024-12-20 ENCOUNTER — TELEPHONE (OUTPATIENT)
Dept: FAMILY MEDICINE | Facility: CLINIC | Age: 49
End: 2024-12-20
Payer: COMMERCIAL

## 2024-12-20 DIAGNOSIS — D50.9 IRON DEFICIENCY ANEMIA, UNSPECIFIED IRON DEFICIENCY ANEMIA TYPE: Primary | ICD-10-CM

## 2024-12-27 ENCOUNTER — OFFICE VISIT (OUTPATIENT)
Dept: PAIN MEDICINE | Facility: CLINIC | Age: 49
End: 2024-12-27
Payer: COMMERCIAL

## 2024-12-27 VITALS
HEART RATE: 76 BPM | WEIGHT: 260 LBS | RESPIRATION RATE: 17 BRPM | BODY MASS INDEX: 46.07 KG/M2 | HEIGHT: 63 IN | SYSTOLIC BLOOD PRESSURE: 133 MMHG | DIASTOLIC BLOOD PRESSURE: 86 MMHG

## 2024-12-27 DIAGNOSIS — M23.307 OTHER MENISCUS DERANGEMENTS, UNSPECIFIED MENISCUS, LEFT KNEE: ICD-10-CM

## 2024-12-27 DIAGNOSIS — M79.12 MYALGIA OF AUXILIARY MUSCLES, HEAD AND NECK: ICD-10-CM

## 2024-12-27 DIAGNOSIS — M79.18 MYALGIA, OTHER SITE: ICD-10-CM

## 2024-12-27 DIAGNOSIS — G89.29 CHRONIC MYOFASCIAL PAIN: Primary | ICD-10-CM

## 2024-12-27 DIAGNOSIS — M79.18 CHRONIC MYOFASCIAL PAIN: Primary | ICD-10-CM

## 2024-12-27 PROCEDURE — 99999 PR PBB SHADOW E&M-EST. PATIENT-LVL IV: CPT | Mod: PBBFAC,,, | Performed by: PHYSICAL MEDICINE & REHABILITATION

## 2024-12-27 RX ORDER — KETOROLAC TROMETHAMINE 30 MG/ML
30 INJECTION, SOLUTION INTRAMUSCULAR; INTRAVENOUS ONCE
Status: COMPLETED | OUTPATIENT
Start: 2024-12-27 | End: 2024-12-27

## 2024-12-27 RX ADMIN — KETOROLAC TROMETHAMINE 30 MG: 30 INJECTION, SOLUTION INTRAMUSCULAR; INTRAVENOUS at 10:12

## 2024-12-27 NOTE — PROGRESS NOTES
Established Patient Chronic Pain Note (Follow up visit)    Chief Complaint:   Chief Complaint   Patient presents with    Follow-up    Low-back Pain    Knee Pain     Left    Mid back pain, right       SUBJECTIVE:  Interval History (12/27/2024):    Akiko Jameson is a 49 y.o. female presents today for recurrent right-sided midback pain.  Current pain intensity is 7/10.  She tends to respond very well trigger point injections in the right midback area with at least 90% reduction for several weeks.  Current pain is located in the same areas without any significant change.  She also states that she has left knee pain for which she is set to see orthopedics on 01/06/2025.      Patient denies night fever/night sweats, urinary incontinence, bowel incontinence, significant weight loss, significant motor weakness and loss of sensations.    Pain Disability Index Review:      12/27/2024     9:21 AM 9/13/2024     8:08 AM 2/9/2024     8:12 AM   Last 3 PDI Scores   Pain Disability Index (PDI) 49 [phone]       Interval HPI 09/13/2024:  Akiko Jameson is a 49 y.o. female presents today for follow-up right-sided neck and upper back pain predominantly on the right side.  Current pain intensity is 7/10.  She has received trigger point injections previously with significant relief.  She also uses Flexeril as needed when her pain is more severe.    Interval HPI 03/20/2024:  Patient Akiko Jameson presents today for follow-up visit.  Patient was last seen on 2/22/2024 for Right L3-5 RFA with reported 100% relief of pain.  She has not having any pain in the lower back or into the legs.  She rates her pain today a 0/10.  She does report that she continues to have some mid back/thoracic pain.  She had trigger point injections last month with Dr. Montero and she does feel like this helps temporarily.  She takes Flexeril as needed whenever she has a flare-up of her pain with good relief.    Interval history  "2/9/2024  Akiko Jameson is a 49 y.o. female presents today for mid to lower back pain that is predominantly right-sided.  She reports that her pain has severely worsened over the past month.  Pain level today is 8/10.  She continues to utilize Flexeril as needed request a refill.  She reports that the pain is of the same type and quality in the right lower back and lower thoracic spine without any radiation into the extremities.    Interval History (2/21/2023):    Akiko Jameson presents today for follow-up visit.  Patient was last seen on 9/28/2022.  She presents today complaining of midback pain on the right, and pain has been more bothersome lately. Patient reports pain as 6/10 today.    Interval History (11/21/2022):    Akiko Jameson presents today for follow-up on virtual visit.  Patient was last seen about 2 months ago. At that visit, the plan was to start medrol dose warren and consider RFA - due to pain flared, but eventually that pain resolved. She has had one more episode over the last couple months of Increased pain, which ultimately resolved with muscle relaxer. She is stable overall right now.     Interval History (9/28/2022):    Akiko Jameson presents today for follow-up visit.  Patient was last seen on 5/13/2022. At that visit, she was doing well since RFA. She had a pain flare a few weeks ago that resolved fairly quickly. Pain returned over the week requiring ER visit. She reports pain being debilitating at this time, which started in the neck initially (that resolved). Pain stayed localized in right lower back. She was given toradol and morphine shot at ER. Pain has since calmed down. She is utilizing muscle relaxer now, but at the time of pain flare, it wasn't helping. Patient reports pain as "0/10 today.    Interval History (5/13/2022):   Akiko Jameson presents today for follow-up visit.  she underwent right L3-5 RFA on 4/5/22.  The patient reports that she " "is/was better following the procedure.  she reports 95% pain relief.  The changes lasted >4 weeks so far.  The changes have continued through this visit.  Patient reports pain as "0/10 today.    Interval HPI (2/11/22):  Akiko Jameson is a 47 y.o. who presents to the clinic for a follow-up appointment for mid back pain.  At the last visit, she was provided trigger point injections to the thoracic paraspinals , lower trapezius, and latissimus dorsi on the right.  She reports that the trigger point injections provided moderate relief.  She was maintained on Robaxin 750 mg up to 3 times daily as needed for muscle related pain.  Since the last visit, Akiko Jameson states the pain has been starting to return. Current pain intensity is 8/10.       Interval HPI 12/10/2021:  Akiko Jameson is a 47 y.o. female who presents to the clinic for a follow-up appointment for mid back pain.  At the last visit, she was provided trigger point injections to the thoracic paraspinals , lower trapezius, and latissimus dorsi on the right.  She reports that the trigger point injections provided moderate relief.  She was maintained on Robaxin 750 mg up to 3 times daily as needed for muscle related pain.  Since the last visit, Akiko Jameson states the pain has been starting to return. Current pain intensity is 5/10.     Interval HPI 11/19/2021:  Akiko Jameson is a 46 y.o. female who presents to the clinic for a follow-up appointment for mid back pain.  At the last visit, she was provided trigger point injections to the thoracic paraspinals , lower trapezius, and latissimus dorsi on the right.  She reports that the trigger point injections provided moderate relief.  She was maintained on Robaxin 750 mg up to 3 times daily as needed for muscle related pain.  Since the last visit, Akiko Jameson states the pain has been starting to return. Current pain intensity is 5/10.  Unfortunately, the " patient is currently on antibiotics for cellulitis of the right upper extremity.      Interval HPI 09/23/2021:  Akiko Jameson is a 46 y.o. female who presents to the clinic for a follow-up appointment for mid back pain.  At the last visit, she was provided trigger point injections to the thoracic paraspinals , lower trapezius, and latissimus dorsi on the right.  She reports that the trigger point injections provided limited relief, but they have been beneficial previously.  She was maintained on Robaxin 500 mg up to 3 times daily as needed for muscle related pain.  Since the last visit, Akiko Jameson states the pain has been starting to return. Current pain intensity is 8/10.      Interval HPI 07/09/2021:  Akiko Jaemson is a 46 y.o. female who presents to the clinic for a follow-up appointment for mid back pain.  At the last visit, she was provided trigger point injections to the thoracic paraspinals , lower trapezius, and latissimus dorsi on the right.  She reports that the trigger point injections provided significant relief relief.  She was maintained on Robaxin 500 mg up to 3 times daily as needed for muscle related pain.  Since the last visit, Akiko Jameson states the pain has been starting to return. Current pain intensity is 8/10.  Of note, in the interim patient has had thyroidectomy and was recently diagnosed with sarcoidosis.    Interval HPI 11/13/2020:  Akiko Jameson is a 46 y.o. female who presents to the clinic for a follow-up appointment for mid back pain.  At the last visit, she was provided trigger point injections to the thoracic paraspinals , lower trapezius, and latissimus dorsi on the right.  She reports that the trigger point injections provided significant relief relief.  She was maintained on Robaxin 500 mg up to 3 times daily as needed for muscle related pain.  Since the last visit, Akiko Jameson states the pain has been starting to  return. Current pain intensity is 5/10.      Interval HPI 05/29/2020:  Akiko Jameson is a 45 y.o. female who presents to the clinic for a follow-up appointment for mid back pain.  At the last visit, she was provided trigger point injections to the thoracic paraspinals and upper/middle trapezius on the right.  She reports that the trigger point injections provided 100% relief at the areas injected, but is having pain in other areas that she would like to also get injected today.  She was also started on Robaxin 500 mg up to 3 times daily as needed for muscle related pain.  She reports that the Robaxin has been of minimal benefit.  Since the last visit, Akiko Jameson states the pain has been improving. Current pain intensity is 8/10.  We discussed lymphedema management, and she is interested in going to a physical therapy clinic to help address this issue.    Initial HPI 05/15/2020:  Akiko Jameson is a 45 y.o. female, right-hand dominant, who presents to the clinic for the evaluation of mid back pain. The pain started 25+ years ago following a lymph node removal on the right that resulted in lymphedema of the right upper extremity and symptoms have been worsening.The pain is located in the right mid back area and radiates to the along the length of the upper to lower back.  The pain is described as aching, burning and throbbing and is rated as 6/10. The pain is rated with a score of  0/10 on the BEST day and a score of 10/10 on the WORST day.  Symptoms interfere with daily activity, sleeping and work. The pain is exacerbated by increased activity or use of the right upper extremity.  The pain is mitigated by rest. The patient reports spending 2-4 hours per day reclining. The patient reports 6-8 hours of uninterrupted sleep per night.  She reports that she works as a .         Non-Pharmacologic Treatments:  Physical Therapy/Home Exercise: yes  Ice/Heat:yes  TENS: yes  Acupuncture:  no  Massage: yes  Chiropractic: yes    Other: yes, compression sleeves        Pain Medications:  - Opioids: Norco  - Adjuvant Medications: Relafen, Robaxin  - Anti-Coagulants: None           Pain Procedures:   -previously underwent thoracic epidurals x2 with first one being beneficial but the second one not so much  -05/15/2020:  Trigger point injections to the thoracic paraspinals and upper/middle trapezius on the right, 100% relief  -05/29/2020:  Trigger point injections to the thoracic paraspinals, lower trapezius, and latissimus dorsi on the right, significant relief   -11/13/2020:  Trigger point injections to the thoracic paraspinals, lower trapezius, and latissimus dorsi on the right, significant relief  -07/09/2021: Trigger point injections to the thoracic paraspinals, lower trapezius, and latissimus dorsi on the right, limited relief  - 09/23/2021: Trigger point injections to the thoracic paraspinals, lower trapezius, and latissimus dorsi on the right, moderate relief  - 12/10/2020: Trigger point injections to the thoracic paraspinals, lower trapezius, and latissimus dorsi on the right, moderate relief  - diagnostic right L3-5 MBB on 02/24/2022 and 03/29/2022 with reported % pain relief  - right L3-5 RFA on 4/5/22 with 95% pain relief    -Right L3-5 RFA 2/22/24 with 100% relief    Imaging (Reviewed on 12/27/2024):     2/21/2023 X-Ray Thoracic Spine 4 or more views  COMPARISON:  None  FINDINGS:  Vertebral body heights maintained.  No spondylolisthesis.  Multilevel degenerative osteophyte changes present.  Soft tissues unremarkable.  Impression: Degenerative findings           X-ray bilateral knees 03/16/2016:  AP and PA flexion standing views of both knees as well as lateral and Merchant views of both knees were obtained.  The joint spaces are grossly preserved.  Mild patellofemoral spurring and prominent patellar enthesophytes bilaterally.  No joint effusion.  No acute fracture or malalignment.     MRI  right knee 03/21/2016:  The anterior cruciate ligament, posterior cruciate ligament, medial collateral ligament, lateral collateral ligament complex normal popliteus tendon, IT band, quadriceps tendon, and patellar tendon are normal in appearance.    There is a complex, primarily radial tear of the posterior horn of the medial meniscus which extends to the posterior root attachment of the medial meniscus.  The medial meniscus is intact.    The articular cartilage of the medial and lateral tibiofemoral joint compartments is intact.  There is a 14 mm width area of full-thickness abnormal chondral signal observed in the lateral patellar facet at the level of the patellar midpole.  No abnormal subcortical signal abnormality appreciated.    There is quadriceps tendon and patellar tendon insertion enthesophyte formation at their respective attachments on the patella.  There is a small knee joint effusion present.  No acute fracture or pathologic marrow replacement process.  There is hematopoietic  bone marrow present within the distal femur and proximal tibia.          REVIEW OF SYSTEMS:    GENERAL:  No weight loss, malaise or fevers.  HEENT:   No recent changes in vision or hearing  NECK:  Negative for lumps, no difficulty with swallowing.  RESPIRATORY:  Negative for cough, wheezing or shortness of breath, patient denies any recent URI.  CARDIOVASCULAR:  Negative for chest pain, leg swelling or palpitations.  GI:  Negative for abdominal discomfort, blood in stools or black stools or change in bowel habits.  MUSCULOSKELETAL:  See HPI.  SKIN:  Negative for lesions, rash, and itching.  PSYCH:  No mood disorder or recent psychosocial stressors.  Patients sleep is not disturbed secondary to pain.  HEMATOLOGY/LYMPHOLOGY:  Negative for prolonged bleeding, bruising easily or swollen nodes.  Patient is not currently taking any anti-coagulants  NEURO:   No history of headaches, syncope, paralysis, seizures or tremors.  All other  "reviewed and negative other than HPI.      OBJECTIVE:  /86   Pulse 76   Resp 17   Ht 5' 3" (1.6 m)   Wt 117.9 kg (260 lb)   BMI 46.06 kg/m²         Physical exam:    GENERAL: Well appearing, in no acute distress, alert and oriented x3.  PSYCH:  Mood and affect appropriate.  SKIN: Skin color, texture, turgor normal, no rashes or lesions.  HEAD/FACE:  Normocephalic, atraumatic. Cranial nerves grossly intact.  NECK:  Mild-to-moderate pain to palpation over the cervical paraspinous muscles. Spurling negative to radicular pain. No pain with neck flexion, extension, or lateral flexion.   PULM: No evidence of respiratory difficulty, symmetric chest rise.  GI:  Soft and non-tender.  BACK:  TTP over the cervicothoracic paraspinals, lower trapezius, latissimus dorsi, and lumbar paraspinals on the right.  SLR in the sitting and supine positions is negative to radicular pain.   mild-to-moderate pain to palpation over the facet joints of the lumbar spine and lumbar paraspinals - Present, mild, improved since procedure.  Fair range of motion secondary to pain reproduction.    EXTREMITIES: There is significant lymphedema present throughout the right upper extremity.  No other deformities or skin discoloration. Good capillary refill.  Positive Thessaly's maneuver on the left knee.  MUSCULOSKELETAL: Shoulder, hip, and knee provocative maneuvers are negative.  BUE & BLE strength is normal and symmetric.  No atrophy or tone abnormalities are noted.  NEURO: BUE & BLE coordination and muscle stretch reflexes are physiologic and symmetric.  Plantar response are downgoing. No clonus.  No loss of sensation is noted.  GAIT: normal.            ASSESSMENT: 49 y.o. year old female with mid back pain, consistent with     1. Chronic myofascial pain        2. Myalgia, other site        3. Myalgia of auxiliary muscles, head and neck        4. Other meniscus derangements, unspecified meniscus, left knee                    PLAN:   - " Interventional:   Performed trigger point injections to the right-sided cervicothoracic paraspinals for therapeutic purposes.  Explained the procedure in detail, risks, benefits, alternatives the patient today and she elected to pursue this intervention.  Informed consent obtained, see procedure note below for more details.    -s/p right L3-5 RFA 2/22/24 100% relief  - S/p right L3-5 RFA on 4/5/22 with 95% pain relief.     -Pharmacologic:   - continue Flexeril 10mg BID PRN - refill provided today  - Anticoagulation use: None.   - LA  reviewed and appropriate.          - Rehabilitative: Encouraged regular exercise. Therapy: advised patient continue with gradual compression for her lymphedema and activities as tolerated.     - Diagnostic:  Reviewed imaging available    - Follow up:  3 months or as needed    - This condition does not require this patient to take time off of work, and the primary goal of our Pain Management services is to improve the patient's functional capacity.     - I discussed the risks, benefits, and alternatives to potential treatment options. All questions and concerns were fully addressed today in clinic.       Anam Montero MD  Interventional Pain Medicine  [facility] [place]    PROCEDURE NOTE:  Trigger Point Injection:   The procedure was discussed with the patient including complications of nerve damage,  bleeding, infection, and failure of pain relief.   Trigger points were identified by palpation and marked. Alcohol swab prep of sites done. A mixture of 4mL 1% lidocaine + 40 mg Depo-Medrol was prepared (5 mL total).   A 25-gauge needle was advanced to the point of maximal tenderness, and  1 to 1.25mL  was injected after negative aspiration. All sites done in the same manner. Patient tolerated the procedure well and without complications. Patient was monitored for 15 min following the injection before discharged from the clinic.  Sites injected included:  Right cervicothoracic  paraspinals and right middle trapezius    Disclaimer:  This note was prepared using voice recognition system and is likely to have sound alike errors that may have been overlooked even after proof reading.  Please call me with any questions.

## 2025-01-02 DIAGNOSIS — M25.562 LEFT KNEE PAIN, UNSPECIFIED CHRONICITY: Primary | ICD-10-CM

## 2025-01-02 DIAGNOSIS — M25.521 RIGHT ELBOW PAIN: ICD-10-CM

## 2025-01-06 ENCOUNTER — OFFICE VISIT (OUTPATIENT)
Dept: ORTHOPEDICS | Facility: CLINIC | Age: 50
End: 2025-01-06
Payer: COMMERCIAL

## 2025-01-06 ENCOUNTER — HOSPITAL ENCOUNTER (OUTPATIENT)
Dept: RADIOLOGY | Facility: HOSPITAL | Age: 50
Discharge: HOME | End: 2025-01-06
Attending: STUDENT IN AN ORGANIZED HEALTH CARE EDUCATION/TRAINING PROGRAM
Payer: COMMERCIAL

## 2025-01-06 ENCOUNTER — PATIENT MESSAGE (OUTPATIENT)
Dept: SPORTS MEDICINE | Facility: CLINIC | Age: 50
End: 2025-01-06
Payer: COMMERCIAL

## 2025-01-06 VITALS — BODY MASS INDEX: 46.06 KG/M2 | HEIGHT: 63 IN | WEIGHT: 259.94 LBS

## 2025-01-06 DIAGNOSIS — M25.521 RIGHT ELBOW PAIN: ICD-10-CM

## 2025-01-06 DIAGNOSIS — S83.282D TEAR OF LATERAL MENISCUS OF LEFT KNEE, CURRENT, UNSPECIFIED TEAR TYPE, SUBSEQUENT ENCOUNTER: ICD-10-CM

## 2025-01-06 DIAGNOSIS — M25.562 LEFT KNEE PAIN, UNSPECIFIED CHRONICITY: ICD-10-CM

## 2025-01-06 DIAGNOSIS — M17.12 PRIMARY OSTEOARTHRITIS OF LEFT KNEE: Primary | ICD-10-CM

## 2025-01-06 DIAGNOSIS — M77.11 LATERAL EPICONDYLITIS OF RIGHT ELBOW: ICD-10-CM

## 2025-01-06 PROCEDURE — 99214 OFFICE O/P EST MOD 30 MIN: CPT | Mod: 25,S$GLB,, | Performed by: STUDENT IN AN ORGANIZED HEALTH CARE EDUCATION/TRAINING PROGRAM

## 2025-01-06 PROCEDURE — 73564 X-RAY EXAM KNEE 4 OR MORE: CPT | Mod: 26,LT,, | Performed by: STUDENT IN AN ORGANIZED HEALTH CARE EDUCATION/TRAINING PROGRAM

## 2025-01-06 PROCEDURE — 20611 DRAIN/INJ JOINT/BURSA W/US: CPT | Mod: LT,S$GLB,, | Performed by: STUDENT IN AN ORGANIZED HEALTH CARE EDUCATION/TRAINING PROGRAM

## 2025-01-06 PROCEDURE — 3008F BODY MASS INDEX DOCD: CPT | Mod: CPTII,S$GLB,, | Performed by: STUDENT IN AN ORGANIZED HEALTH CARE EDUCATION/TRAINING PROGRAM

## 2025-01-06 PROCEDURE — 73080 X-RAY EXAM OF ELBOW: CPT | Mod: 26,RT,, | Performed by: STUDENT IN AN ORGANIZED HEALTH CARE EDUCATION/TRAINING PROGRAM

## 2025-01-06 PROCEDURE — 1160F RVW MEDS BY RX/DR IN RCRD: CPT | Mod: CPTII,S$GLB,, | Performed by: STUDENT IN AN ORGANIZED HEALTH CARE EDUCATION/TRAINING PROGRAM

## 2025-01-06 PROCEDURE — 73562 X-RAY EXAM OF KNEE 3: CPT | Mod: 26,59,RT, | Performed by: STUDENT IN AN ORGANIZED HEALTH CARE EDUCATION/TRAINING PROGRAM

## 2025-01-06 PROCEDURE — 99999 PR PBB SHADOW E&M-EST. PATIENT-LVL III: CPT | Mod: PBBFAC,,, | Performed by: STUDENT IN AN ORGANIZED HEALTH CARE EDUCATION/TRAINING PROGRAM

## 2025-01-06 PROCEDURE — 1159F MED LIST DOCD IN RCRD: CPT | Mod: CPTII,S$GLB,, | Performed by: STUDENT IN AN ORGANIZED HEALTH CARE EDUCATION/TRAINING PROGRAM

## 2025-01-06 PROCEDURE — 73562 X-RAY EXAM OF KNEE 3: CPT | Mod: TC,PO,RT

## 2025-01-06 PROCEDURE — 73080 X-RAY EXAM OF ELBOW: CPT | Mod: TC,PO,RT

## 2025-01-06 PROCEDURE — 97760 ORTHOTIC MGMT&TRAING 1ST ENC: CPT | Mod: S$GLB,,, | Performed by: STUDENT IN AN ORGANIZED HEALTH CARE EDUCATION/TRAINING PROGRAM

## 2025-01-06 RX ORDER — TRIAMCINOLONE ACETONIDE 40 MG/ML
40 INJECTION, SUSPENSION INTRA-ARTICULAR; INTRAMUSCULAR
Status: DISCONTINUED | OUTPATIENT
Start: 2025-01-06 | End: 2025-01-06 | Stop reason: HOSPADM

## 2025-01-06 RX ADMIN — TRIAMCINOLONE ACETONIDE 40 MG: 40 INJECTION, SUSPENSION INTRA-ARTICULAR; INTRAMUSCULAR at 03:01

## 2025-01-06 NOTE — PROGRESS NOTES
Patient ID: Akiko Jameson  YOB: 1975  MRN: [id number]    Chief Complaint: Pain of the Left Knee and Pain of the Right Elbow      Referred By: Dwayne Booth MD    History of Present Illness: Akiko Jameson is a right-hand dominant 49 y.o. female who presents today with left knee and right elbow pain. Patient does not recall any known injury to either problem. She woke up one morning in November with left knee pain and pain has gotten worse sice then. She has tried ice/ heat and ibuprofen with no help. She states that the pain is constant and by the end of the day it's worse. Her elbow pain started before Thom, she doesn't really know what caused elbow pain, but reaching above and certain rotation to elbow hurts worse.    The patient is active in none.  Occupation:       Past Medical History:   Past Medical History:   Diagnosis Date    ALLERGIC RHINITIS     Aortic valve sclerosis 1/20/2023    Arthritis     Cellulitis     Constipation due to opioid therapy     Eosinophilia     mild    GERD (gastroesophageal reflux disease)     Granular cell tumor     H. pylori infection 2018    History of 2019 novel coronavirus disease (COVID-19) 10/04/2020    Hypertension     Lymphedema     Mild anemia     Mitral valve sclerosis 3/21/2023    KEIRY on CPAP     does not regularly    Positive CHARLENE (antinuclear antibody) 07/27/2022    Prediabetes 08/06/2021    Sarcoidosis, unspecified     Sleep apnea     compliant with CPAP    Thyroid nodule     Urinary incontinence, mixed      Past Surgical History:   Procedure Laterality Date    24 HOUR IMPEDANCE PH MONITORING OF ESOPHAGUS IN PATIENT NOT TAKING ACID REDUCING MEDICATIONS N/A 01/06/2020    Procedure: IMPEDANCE PH STUDY, ESOPHAGEAL, 24 HOUR, IN PATIENT NOT TAKING ACID REDUCING MEDICATION;  Surgeon: First Available Tamika Panchal;  Location: [facility];  Service: Endoscopy;  Laterality: N/A;    AXILLARY NODE DISSECTION Right     granular cell  tumor    BILATERAL SALPINGO-OOPHORECTOMY (BSO)  07/21/2020    BREAST CYST ASPIRATION      left    CHOLECYSTECTOMY      COLONOSCOPY N/A 05/10/2019    Procedure: COLONOSCOPY;  Surgeon: Edgar Gamble Jr., MD;  Location: [facility];  Service: Endoscopy;  Laterality: N/A;    COLONOSCOPY N/A 06/28/2024    Procedure: COLONOSCOPY;  Surgeon: Carina Sahu MD;  Location: [facility] ENDO;  Service: Endoscopy;  Laterality: N/A;    ENDOBRONCHIAL ULTRASOUND Bilateral 04/27/2021    Procedure: ENDOBRONCHIAL ULTRASOUND (EBUS);  Surgeon: Armando Kirby MD;  Location: [facility];  Service: Pulmonary;  Laterality: Bilateral;  need fluoroscopy    ENDOMETRIAL ABLATION      ESOPHAGEAL MANOMETRY WITH MEASUREMENT OF IMPEDANCE N/A 01/06/2020    Procedure: MANOMETRY, ESOPHAGUS, WITH IMPEDANCE MEASUREMENT;  Surgeon: First Available Warren;  Location: [facility];  Service: Endoscopy;  Laterality: N/A;    ESOPHAGOGASTRODUODENOSCOPY N/A 07/05/2018    Procedure: EGD (ESOPHAGOGASTRODUODENOSCOPY);  Surgeon: Edgar Gamble Jr., MD;  Location: [facility];  Service: Endoscopy;  Laterality: N/A;    ESOPHAGOGASTRODUODENOSCOPY N/A 11/23/2020    Procedure: ESOPHAGOGASTRODUODENOSCOPY (EGD);  Surgeon: Jina Kaufman MD;  Location: [facility];  Service: General;  Laterality: N/A;    EXCISION OF MASS OF ABDOMEN Left 06/18/2018    Procedure: EXCISION, MASS, ABDOMEN - flank left;  Surgeon: Armando Tate MD;  Location: [facility] OR;  Service: General;  Laterality: Left;  left flank removal of mass    FINE NEEDLE ASPIRATION      thyroid    FLEXIBLE SIGMOIDOSCOPY N/A 08/24/2022    Procedure: SIGMOIDOSCOPY, FLEXIBLE;  Surgeon: Amber West MD;  Location: [facility];  Service: Endoscopy;  Laterality: N/A;    HYSTERECTOMY  07/21/2020    INJECTION OF ANESTHETIC AGENT AROUND MEDIAL BRANCH NERVES INNERVATING LUMBAR FACET JOINT Right 02/24/2022    Procedure: Right L3, 4, 5 MBB;  Surgeon: Anam Montero MD;  Location: [facility] PAIN MGT;  Service: Pain  Management;  Laterality: Right;    INJECTION OF ANESTHETIC AGENT AROUND MEDIAL BRANCH NERVES INNERVATING LUMBAR FACET JOINT Right 03/29/2022    Procedure: Right L3, 4, 5 MBB  (2nd block);  Surgeon: Anam Montero MD;  Location: [facility] PAIN MGT;  Service: Pain Management;  Laterality: Right;    KNEE ARTHROSCOPY W/ MENISCECTOMY Right     NASAL SEPTUM SURGERY      OOPHORECTOMY      RADIOFREQUENCY THERMOCOAGULATION Right 04/05/2022    Procedure: Right L3-5 Lumbar RFA;  Surgeon: Anam Montero MD;  [facility];  Service: Pain Management;  Laterality: Right;    RADIOFREQUENCY THERMOCOAGULATION Right 02/22/2024    Procedure: Right L3-5 Lumbar RFA;  Surgeon: Anam Montero MD;  Location: [facility] PAIN [facility]GT;  Service: Pain Management;  Laterality: Right;    ROBOT-ASSISTED NISSEN FUNDOPLICATION USING DA TAMAR XI N/A 02/28/2020    Procedure: XI ROBOTIC FUNDOPLICATION, NISSEN;  Surgeon: Jina Kaufman MD;  Location: [facility] OR;  Service: General;  Laterality: N/A;    THYROIDECTOMY Bilateral 03/11/2021    Procedure: THYROIDECTOMY;  Surgeon: Nixon Moe MD;  Location: [facility] OR;  Service: ENT;  Laterality: Bilateral;    TUBAL LIGATION      UPPER GASTROINTESTINAL ENDOSCOPY  07/05/2018    Dr. Gamble     Family History   Problem Relation Name Age of Onset    Diabetes Mother Deyonne     Kidney failure Mother Deyonne     Heart attack Mother Deyonne     Early death Mother Deyonne     Heart disease Mother Deyonne     Kidney disease Mother Deyonne     Blindness Father Chandu     Cancer Father Chandu     Vision loss Father Chandu     Breast cancer Maternal Aunt great aunt     Breast cancer Paternal Aunt      Breast cancer Maternal Grandmother      Ovarian cancer Cousin      Breast cancer Cousin      Cancer Paternal Grandfather Chandu     Vision loss Paternal Grandfather Chandu     Hypertension Daughter Marie     Stroke Paternal Aunt Valethia     Cirrhosis Neg Hx      Colon polyps Neg Hx      Colon cancer Neg Hx       Crohn's disease Neg Hx      Esophageal cancer Neg Hx      Stomach cancer Neg Hx      Ulcerative colitis Neg Hx      Amblyopia Neg Hx      Cataracts Neg Hx      Glaucoma Neg Hx      Macular degeneration Neg Hx      Retinal detachment Neg Hx      Strabismus Neg Hx      Allergic rhinitis Neg Hx      Allergies Neg Hx      Angioedema Neg Hx      Asthma Neg Hx      Atopy Neg Hx      Eczema Neg Hx      Immunodeficiency Neg Hx      Rhinitis Neg Hx      Urticaria Neg Hx       Social History     Socioeconomic History    Marital status:     Number of children: 2   Occupational History     Employer: [employer]   Tobacco Use    Smoking status: Never    Smokeless tobacco: Never   Substance and Sexual Activity    Alcohol use: No    Drug use: No    Sexual activity: Yes     Partners: Male     Birth control/protection: None     Social Drivers of Health     Financial Resource Strain: Low Risk  (7/29/2024)    Overall Financial Resource Strain (CARDIA)     Difficulty of Paying Living Expenses: Not hard at all   Food Insecurity: No Food Insecurity (7/29/2024)    Hunger Vital Sign     Worried About Running Out of Food in the Last Year: Never true     Ran Out of Food in the Last Year: Never true   Transportation Needs: Unknown (7/19/2024)    Received from [facility]     Lack of Transportation (Medical): Patient declined   Physical Activity: Insufficiently Active (7/29/2024)    Exercise Vital Sign     Days of Exercise per Week: 2 days     Minutes of Exercise per Session: 30 min   Stress: No Stress Concern Present (7/29/2024)    [employer] - Occupational Stress Questionnaire     Feeling of Stress : Not at all   Housing Stability: Low Risk  (2/19/2024)    Housing Stability Vital Sign     Unable to Pay for Housing in the Last Year: No     Number of Places Lived in the Last Year: 1     Unstable Housing in the Last Year: No     Medication List with Changes/Refills    Current Medications    CARVEDILOL (COREG) 12.5 MG TABLET    Take 1 tablet (12.5 mg total) by mouth 2 (two) times daily.    CETIRIZINE (ZYRTEC) 10 MG TABLET    Take 1 tablet (10 mg total) by mouth once daily.    COLCHICINE (COLCRYS) 0.6 MG TABLET    Treatment should be initiated within 24 hours of onset.  For gout attacks: Day 1: Oral: 1.2 mg, followed in 1 hour with a single dose of 0.6 mg. Day 2 and thereafter: Oral: 0.6 mg daily until flare resolves    CYCLOBENZAPRINE (FLEXERIL) 10 MG TABLET    Take 1/2 to 1 tab BID PRN muscle spasms. May cause drowsiness.    DOCUSATE SODIUM (COLACE) 100 MG CAPSULE    Take 100 mg by mouth every other day.     ERGOCALCIFEROL (ERGOCALCIFEROL) 50,000 UNIT CAP    Take one cap 3x weekly.    FLUTICASONE PROPION-SALMETEROL 115-21 MCG/DOSE (ADVAIR HFA) 115-21 MCG/ACTUATION HFAA INHALER    Inhale 2 puffs into the lungs every 12 (twelve) hours. Controller    MELOXICAM (MOBIC) 7.5 MG TABLET    Take 7.5 mg by mouth 2 (two) times daily as needed.    MONTELUKAST (SINGULAIR) 10 MG TABLET    Take 10 mg by mouth every evening.    MULTIVITAMIN (THERAGRAN) PER TABLET    Take 1 tablet by mouth once daily. Every day    OLMESARTAN-HYDROCHLOROTHIAZIDE (BENICAR HCT) 20-12.5 MG PER TABLET    Take 1 tablet by mouth once daily.    POTASSIUM CHLORIDE SA (K-DUR,KLOR-CON) 20 MEQ TABLET    Take 1 tablet (20 mEq total) by mouth 2 (two) times daily.    SPIRONOLACTONE (ALDACTONE) 25 MG TABLET    Take 0.5 tablets (12.5 mg total) by mouth once daily.    SYNTHROID 175 MCG TABLET    Take one tablet Monday-Friday and two tablets on the weekend.    TRAMADOL (ULTRAM) 50 MG TABLET    Take 1 tablet by mouth every 6 (six) hours as needed.     Review of patient's allergies indicates:   Allergen Reactions    Biaxin [clarithromycin] Swelling    Omeprazole magnesium Swelling and Other (See Comments)    Prevacid [lansoprazole] Swelling     Lip swelling- was taking this along with blood pressure medication: benazepril; not sure  which caused it. Reports she has taken OTC prilosec without any problems.    Ace inhibitors Swelling     Facial swelling    Benazepril Swelling     Lip swelling       Physical Exam:   Body mass index is 46.04 kg/m².    GENERAL: Well appearing, in no acute distress.  HEAD: Normocephalic and atraumatic.  ENT: External ears and nose grossly normal.  EYES: EOMI bilaterally  PULMONARY: Respirations are grossly even and non-labored.  NEURO: Awake, alert, and oriented x 3.  SKIN: No obvious rashes appreciated.  PSYCH: Mood & affect are appropriate.    Detailed MSK exam:     Left knee exam:   -ROM: extension +5, flexion 120  -TTP: Medial joint line and Lateral joint line  -effusion: present  -Patellar apprehension negative  -Roseanne test positive -  lateral  -stable to varus and valgus stress tests  -Lachman test negative, anterior drawer test negative, posterior drawer test negative    Right elbow exam:   -TTP: Lateral epicondyle  -ROM: extension 0, flexion 130  -Resisted wrist extension: positive  -Resisted 3rd finger extension: positive  -Resisted wrist flexion: negative  -Resisted pronation: negative  -Resisted supination: negative  -Sensation intact  -Pulses 2+  - strength 5/5      Imaging:  X-ray Knee Ortho Left with Flexion  Narrative: EXAMINATION:  XR KNEE ORTHO LEFT WITH FLEXION    CLINICAL HISTORY:  . Pain in left knee    TECHNIQUE:  AP standing view of both knees, PA flexion standing views of both knees, and Merchant views of both knees were performed. A lateral view of the left knee was also performed.    COMPARISON:  09/25/2023    FINDINGS:  No evidence of acute fracture.  Tricompartmental joint space narrowing and hypertrophy worst in the medial compartment bilaterally.  Chronic deformity of the right fibular head.  Left patellar enthesophytes.  Impression: As above.    Electronically signed by: Bulmaro Owusu  Date:    01/06/2025  Time:    15:27  X-Ray Elbow Complete 3 views Right  Narrative:  EXAMINATION:  XR ELBOW COMPLETE 3 VIEW RIGHT    CLINICAL HISTORY:  Pain in right elbow    TECHNIQUE:  XR ELBOW COMPLETE 3 VIEW RIGHT    COMPARISON:  09/16/2005.    FINDINGS:  No evidence of acute fracture or dislocation.  Olecranon enthesophytes.  Well corticated ossicle along the lateral epicondyle may suggest prior trauma partially seen on the prior study.  No significant joint effusion.  Impression: As above.    Electronically signed by: Bulmaro Owusu  Date:    01/06/2025  Time:    15:26        Relevant imaging results were reviewed and interpreted by me and per my read shows moderate arthritic changes bilateral knees.  This was discussed with the patient and / or family today.     Assessment:  Akiko Jameson is a 49 y.o. female presenting with left knee pain and right elbow pain.   History, physical and radiographs are consistent with a likely diagnosis of OA, LMT, lateral epicondylitis.   Plan: Steroid injection given today (see separate procedure note for details). We discussed the proper protocols after the injection such as no submerging pools, baths tubs, or hot tubs for 24 hr.  Showering is okay today.  We also discussed that blood sugars can be elevated after an injection and asked patient to properly checked her sugars over the next few days and contact their PCP if there are any concerns.  We discussed red flags such as fevers, chills, red, warm, tender joint at the area of injection to please seek medical care immediately.   Wrist brace given. Continue conservative management for pain.   Follow up as needed. All questions answered.      Primary osteoarthritis of left knee  -     Sports Medicine US - Guidance for Needle Placement  -     Large Joint Aspiration/Injection: L knee    Lateral epicondylitis of right elbow  -     HME - OTHER    Tear of lateral meniscus of left knee, current, unspecified tear type, subsequent encounter  -     Large Joint Aspiration/Injection: L knee       At least 15  minutes were spent sizing, fitting, and educating for durable medical equipment application today. This service was performed under direction of Sherman Bradley MD. CPT 33023.    Ultrasound guidance was used for needle localization. Images were saved and stored for documentation. The appropriate structures were visualized. Dynamic visualization of the needle was continuous throughout the procedures and maintained good position.      MEDICAL NECESSITY FOR VISCOSUPPLEMENTATION: After thorough evaluation of the patient, I have determined that visco-supplementation is medically necessary. The patient has painful degenerative changes of the knee with failure of conservative treatments including lifestyle modifications and rehabilitation exercises.  Oral analgesis/NSAIDs have not adequately controlled symptoms and there is radiographic evidence of Kellgren Jerry grade 2 or greater osteoarthritic changes, or in lack of radiographic evidence, there is arthroscopic or other evidence of chondrosis.       A copy of today's visit note has been sent to the referring provider.     Electronically signed:  Sherman Bradley MD, MPH  01/06/2025  3:18 PM

## 2025-01-06 NOTE — PROCEDURES
Sports Medicine US - Guidance for Needle Placement    Date/Time: 1/6/2025 3:20 PM    Performed by: Sherman Bradley MD  Authorized by: Sherman Bradley MD  Preparation: Patient was prepped and draped in the usual sterile fashion.  Local anesthesia used: no    Anesthesia:  Local anesthesia used: no    Sedation:  Patient sedated: no    Patient tolerance: patient tolerated the procedure well with no immediate complications  Comments: Ultrasound guidance was used for needle localization. Images were saved and stored for documentation. The appropriate structures were visualized. Dynamic visualization of the needle was continuous throughout the procedures and maintained good position.

## 2025-01-06 NOTE — PATIENT INSTRUCTIONS
Assessment:  Akiko Jameson is a 49 y.o. female   Chief Complaint   Patient presents with    Left Knee - Pain    Right Elbow - Pain       No diagnosis found.     Plan:  Ultrasound guided cortisone injection to the left knee  We discussed the proper protocols after the injection such as no submerging pools, baths tubs, or hot tubs for 24 hr.  Showering is okay today.  We also discussed that blood sugars can be elevated after an injection and asked patient to properly checked her sugars over the next few days and contact their PCP if there are any concerns.  We discussed red flags such as fevers, chills, red, warm, tender joint at the area of injection to please seek medical care immediately.    Fitted and issued with right wrist brace for lateral epicondylitis  Under the direction of Dr. Sherman Bradley, 15 minutes were spent sizing, fitting, and educating for durable medical equipment application today.  CPT 65659.  Apply topical diclofenac (Voltaren) up to 4 times a day to the affected area.  It can be bought over the counter at any local pharmacy.    Patient may ice every 2 hours for 15 minutes as needed to control pain and swelling.   Referral to physical therapy for left knee and right elbow to [facility]   Follow up as needed        General Arthritis info:    -shiny white stuff at end of a chicken bones is cartilage    -arthritis is wearing away of the cartilage that lines the end of your bones    -osteoarthritis is thought to be a wear and tear phenomenon    -symptoms are due to inflammation of joint causing stiffness, aching, and sometimes swelling    -occasionally sharp pain will occur causing a give way sensation    -Risk factors: genetic, weight, female > male, age    Treatment options:    -maintain healthy weight (every pound is 4 pounds of pressure on the knee)    -daily moderate exercise (walk, bike, swim 30 minutes per day) to keep joints moving    -daily strengthening exercises (through  therapy or on own) to keep muscles supporting joint healthy and strong    -glucosamine 1500mg daily (look for USP label on bottle)    -tylenol as needed for pain (follow directions on the bottle)    -anti-inflammatory medication such as alleve may be helpful- take 1-2 tabs twice daily for 7 days. If it helps your pain, continue. If you do not feel any change, you may stop and then take it as needed.    (you may be given a once daily anti-inflammatory such as MOBIC. If given, avoid other anti-inflammatory medications such as advil, ibuprofen, motrin, naprosyn, alleve, etc)    -if swollen and painful, ice, decrease activity, and take anti-inflammatory daily for 5-7 days and if no relief call your doctor for further options    -consider cortisone injection (every 3-4 months at most)- anti- inflammatory steroid medication that can be injected directly into the joint to reduce inflammation    -consider hyaluronic acid injections (eufflexxa, hyalgan, synvisc, supartz) (every 6 months at most)- protein injection that helps decrease pain and irritability in the joint. It is best used to help prolong intervals between cortisone injections to minimize steroid injections. These are currently approved for knee injections. Discuss with your doctor if other joint involved. Call to seek approval prior to the injections.    -long-term treatment may include a total joint replacement (keep diary of good days and bad days, then evaluate as to when you are ready)      Follow-up: as needed.    Thank you for choosing [facility] and Dr. Sherman Bradley for your orthopedic & sports medicine care. It is our goal to provide you with exceptional care that will help keep you healthy, active, and get you back in the game.    Please do not hesitate to reach out to us via email, phone, or MyChart with any questions, concerns, or feedback.    If you felt that you received exemplary care today, please consider leaving us  feedback on [employer] at:  [email or web address]    If you are experiencing pain/discomfort ,or have questions after 5pm and would like to be connected to the [facility] on-call team, please call this number and specify which Sports Medicine provider is treating you: [phone]

## 2025-01-06 NOTE — PROCEDURES
Large Joint Aspiration/Injection: L knee    Date/Time: 1/6/2025 3:20 PM    Performed by: Sherman Bradley MD  Authorized by: Sherman Bradley MD    Consent Done?:  Yes (Verbal)  Indications:  Arthritis and pain  Site marked: the procedure site was marked    Timeout: prior to procedure the correct patient, procedure, and site was verified    Prep: patient was prepped and draped in usual sterile fashion    Local anesthetic:  Bupivacaine 0.5% without epinephrine and lidocaine 1% without epinephrine    Details:  Needle Size:  21 G  Ultrasonic Guidance for needle placement?: Yes    Images are saved and documented.  Approach:  Lateral (superior)  Location:  Knee  Site:  L knee  Medications:  40 mg triamcinolone acetonide 40 mg/mL  Patient tolerance:  Patient tolerated the procedure well with no immediate complications     Ultrasound guidance was used for needle localization. Images were saved and stored for documentation. The appropriate structures were visualized. Dynamic visualization of the needle was continuous throughout the procedures and maintained good position.

## 2025-01-08 ENCOUNTER — OFFICE VISIT (OUTPATIENT)
Dept: HEMATOLOGY/ONCOLOGY | Facility: CLINIC | Age: 50
End: 2025-01-08
Payer: COMMERCIAL

## 2025-01-08 DIAGNOSIS — D50.9 IRON DEFICIENCY ANEMIA, UNSPECIFIED IRON DEFICIENCY ANEMIA TYPE: ICD-10-CM

## 2025-01-08 NOTE — PROGRESS NOTES
Called patient to let her know that I was having technical difficulties and was unable to log in at her scheduled appointment time; however technical difficulties were resolved and could proceed with visit.  No answer on phone - left a message. Patient did not log back in after leaving message.  Will need to reschedule patient to first available f/u to discuss results and plans.

## 2025-01-08 NOTE — PROGRESS NOTES
Subjective:      Patient ID: Akiko Jameson is a 49 y.o. female.    Chief Complaint: No chief complaint on file.      HPI:    I have reviewed all of the patient's relevant lab work available in the medical record and have utilized this in my evaluation and management recommendations today.      Social History     Socioeconomic History    Marital status:     Number of children: 2   Occupational History     Employer: [employer]   Tobacco Use    Smoking status: Never    Smokeless tobacco: Never   Substance and Sexual Activity    Alcohol use: No    Drug use: No    Sexual activity: Yes     Partners: Male     Birth control/protection: None     Social Drivers of Health     Financial Resource Strain: Low Risk  (7/29/2024)    Overall Financial Resource Strain (CARDIA)     Difficulty of Paying Living Expenses: Not hard at all   Food Insecurity: No Food Insecurity (7/29/2024)    Hunger Vital Sign     Worried About Running Out of Food in the Last Year: Never true     Ran Out of Food in the Last Year: Never true   Transportation Needs: Unknown (7/19/2024)    Received from [facility]    PRAPARE - Transportation     Lack of Transportation (Medical): Patient declined   Physical Activity: Insufficiently Active (7/29/2024)    Exercise Vital Sign     Days of Exercise per Week: 2 days     Minutes of Exercise per Session: 30 min   Stress: No Stress Concern Present (7/29/2024)    [employer] [place] [employer] - Occupational Stress Questionnaire     Feeling of Stress : Not at all   Housing Stability: Low Risk  (2/19/2024)    Housing Stability Vital Sign     Unable to Pay for Housing in the Last Year: No     Number of Places Lived in the Last Year: 1     Unstable Housing in the Last Year: No       Family History   Problem Relation Name Age of Onset    Diabetes Mother Deyonne     Kidney failure Mother Deyonne     Heart attack Mother Deyonne     Early death Mother Deyonne     Heart disease Mother  Deyonne     Kidney disease Mother Deyonne     Blindness Father Chandu     Cancer Father Chandu     Vision loss Father Chandu     Breast cancer Maternal Aunt great aunt     Breast cancer Paternal Aunt      Breast cancer Maternal Grandmother      Ovarian cancer Cousin      Breast cancer Cousin      Cancer Paternal Grandfather Chandu     Vision loss Paternal Grandfather Chandu     Hypertension Daughter Marie     Stroke Paternal Aunt Valethia     Cirrhosis Neg Hx      Colon polyps Neg Hx      Colon cancer Neg Hx      Crohn's disease Neg Hx      Esophageal cancer Neg Hx      Stomach cancer Neg Hx      Ulcerative colitis Neg Hx      Amblyopia Neg Hx      Cataracts Neg Hx      Glaucoma Neg Hx      Macular degeneration Neg Hx      Retinal detachment Neg Hx      Strabismus Neg Hx      Allergic rhinitis Neg Hx      Allergies Neg Hx      Angioedema Neg Hx      Asthma Neg Hx      Atopy Neg Hx      Eczema Neg Hx      Immunodeficiency Neg Hx      Rhinitis Neg Hx      Urticaria Neg Hx         Past Surgical History:   Procedure Laterality Date    24 HOUR IMPEDANCE PH MONITORING OF ESOPHAGUS IN PATIENT NOT TAKING ACID REDUCING MEDICATIONS N/A 01/06/2020    Procedure: IMPEDANCE PH STUDY, ESOPHAGEAL, 24 HOUR, IN PATIENT NOT TAKING ACID REDUCING MEDICATION;  Surgeon: First Puma Panchal;  Location: [facility];  Service: Endoscopy;  Laterality: N/A;    AXILLARY NODE DISSECTION Right     granular cell tumor    BILATERAL SALPINGO-OOPHORECTOMY (BSO)  07/21/2020    BREAST CYST ASPIRATION      left    CHOLECYSTECTOMY      COLONOSCOPY N/A 05/10/2019    Procedure: COLONOSCOPY;  Surgeon: Edgar Gamble Jr., MD;  Location: [facility];  Service: Endoscopy;  Laterality: N/A;    COLONOSCOPY N/A 06/28/2024    Procedure: COLONOSCOPY;  Surgeon: Carina Sahu MD;  Location: [facility];  Service: Endoscopy;  Laterality: N/A;    ENDOBRONCHIAL ULTRASOUND Bilateral 04/27/2021    Procedure: ENDOBRONCHIAL ULTRASOUND (EBUS);  Surgeon: Armando ADAMS  MD Kofi;  Location: [facility];  Service: Pulmonary;  Laterality: Bilateral;  need fluoroscopy    ENDOMETRIAL ABLATION      ESOPHAGEAL MANOMETRY WITH MEASUREMENT OF IMPEDANCE N/A 01/06/2020    Procedure: MANOMETRY, ESOPHAGUS, WITH IMPEDANCE MEASUREMENT;  Surgeon: First Available Moline;  Location: [facility] ENDO;  Service: Endoscopy;  Laterality: N/A;    ESOPHAGOGASTRODUODENOSCOPY N/A 07/05/2018    Procedure: EGD (ESOPHAGOGASTRODUODENOSCOPY);  Surgeon: Edgar Gamble Jr., MD;  Location: [facility] ENDO;  Service: Endoscopy;  Laterality: N/A;    ESOPHAGOGASTRODUODENOSCOPY N/A 11/23/2020    Procedure: ESOPHAGOGASTRODUODENOSCOPY (EGD);  Surgeon: Jina Kaufman MD;  Location: [facility];  Service: General;  Laterality: N/A;    EXCISION OF MASS OF ABDOMEN Left 06/18/2018    Procedure: EXCISION, MASS, ABDOMEN - flank left;  Surgeon: Armando Tate MD;  Location: [facility] OR;  Service: General;  Laterality: Left;  left flank removal of mass    FINE NEEDLE ASPIRATION      thyroid    FLEXIBLE SIGMOIDOSCOPY N/A 08/24/2022    Procedure: SIGMOIDOSCOPY, FLEXIBLE;  Surgeon: Amber West MD;  Location: [facility];  Service: Endoscopy;  Laterality: N/A;    HYSTERECTOMY  07/21/2020    INJECTION OF ANESTHETIC AGENT AROUND MEDIAL BRANCH NERVES INNERVATING LUMBAR FACET JOINT Right 02/24/2022    Procedure: Right L3, 4, 5 MBB;  Surgeon: Anam Montero MD;  Location: [facility] PAIN MGT;  Service: Pain Management;  Laterality: Right;    INJECTION OF ANESTHETIC AGENT AROUND MEDIAL BRANCH NERVES INNERVATING LUMBAR FACET JOINT Right 03/29/2022    Procedure: Right L3, 4, 5 MBB  (2nd block);  Surgeon: Anam Montero MD;  Location: [facility]GT;  Service: Pain Management;  Laterality: Right;    KNEE ARTHROSCOPY W/ MENISCECTOMY Right     NASAL SEPTUM SURGERY      OOPHORECTOMY      RADIOFREQUENCY THERMOCOAGULATION Right 04/05/2022    Procedure: Right L3-5 Lumbar RFA;  Surgeon: Anam Montero MD;  Location: [facility] PAIN [facility]GT;  Service: Pain  Management;  Laterality: Right;    RADIOFREQUENCY THERMOCOAGULATION Right 02/22/2024    Procedure: Right L3-5 Lumbar RFA;  Surgeon: Aanm Montero MD;  [facility];  Service: Pain Management;  Laterality: Right;    ROBOT-ASSISTED NISSEN FUNDOPLICATION USING DA TAMAR XI N/A 02/28/2020    Procedure: XI ROBOTIC FUNDOPLICATION, NISSEN;  Surgeon: Jina Kaufman MD;  Location: [facility] OR;  Service: General;  Laterality: N/A;    THYROIDECTOMY Bilateral 03/11/2021    Procedure: THYROIDECTOMY;  Surgeon: Nixon Moe MD;  Location[facility] OR;  Service: ENT;  Laterality: Bilateral;    TUBAL LIGATION      UPPER GASTROINTESTINAL ENDOSCOPY  07/05/2018    Dr. Gamble       Past Medical History:   Diagnosis Date    ALLERGIC RHINITIS     Aortic valve sclerosis 1/20/2023    Arthritis     Cellulitis     Constipation due to opioid therapy     Eosinophilia     mild    GERD (gastroesophageal reflux disease)     Granular cell tumor     H. pylori infection 2018    History of 2019 novel coronavirus disease (COVID-19) 10/04/2020    Hypertension     Lymphedema     Mild anemia     Mitral valve sclerosis 3/21/2023    KEIRY on CPAP     does not regularly    Positive CHARLENE (antinuclear antibody) 07/27/2022    Prediabetes 08/06/2021    Sarcoidosis, unspecified     Sleep apnea     compliant with CPAP    Thyroid nodule     Urinary incontinence, mixed        Review of Systems   Constitutional:  Negative for appetite change and unexpected weight change.   HENT:  Negative for mouth sores.    Eyes:  Negative for visual disturbance.   Respiratory:  Negative for cough and shortness of breath.    Cardiovascular:  Negative for chest pain.   Gastrointestinal:  Negative for abdominal pain and diarrhea.   Genitourinary:  Negative for frequency.   Musculoskeletal:  Negative for back pain.   Skin:  Negative for rash.   Neurological:  Negative for headaches.   Hematological:  Negative for adenopathy.   Psychiatric/Behavioral:  The patient is not  nervous/anxious.           Medication List with Changes/Refills   Current Medications    CARVEDILOL (COREG) 12.5 MG TABLET    Take 1 tablet (12.5 mg total) by mouth 2 (two) times daily.    CETIRIZINE (ZYRTEC) 10 MG TABLET    Take 1 tablet (10 mg total) by mouth once daily.    COLCHICINE (COLCRYS) 0.6 MG TABLET    Treatment should be initiated within 24 hours of onset.  For gout attacks: Day 1: Oral: 1.2 mg, followed in 1 hour with a single dose of 0.6 mg. Day 2 and thereafter: Oral: 0.6 mg daily until flare resolves    CYCLOBENZAPRINE (FLEXERIL) 10 MG TABLET    Take 1/2 to 1 tab BID PRN muscle spasms. May cause drowsiness.    DOCUSATE SODIUM (COLACE) 100 MG CAPSULE    Take 100 mg by mouth every other day.     ERGOCALCIFEROL (ERGOCALCIFEROL) 50,000 UNIT CAP    Take one cap 3x weekly.    FLUTICASONE PROPION-SALMETEROL 115-21 MCG/DOSE (ADVAIR HFA) 115-21 MCG/ACTUATION HFAA INHALER    Inhale 2 puffs into the lungs every 12 (twelve) hours. Controller    MELOXICAM (MOBIC) 7.5 MG TABLET    Take 7.5 mg by mouth 2 (two) times daily as needed.    MONTELUKAST (SINGULAIR) 10 MG TABLET    Take 10 mg by mouth every evening.    MULTIVITAMIN (THERAGRAN) PER TABLET    Take 1 tablet by mouth once daily. Every day    OLMESARTAN-HYDROCHLOROTHIAZIDE (BENICAR HCT) 20-12.5 MG PER TABLET    Take 1 tablet by mouth once daily.    POTASSIUM CHLORIDE SA (K-DUR,KLOR-CON) 20 MEQ TABLET    Take 1 tablet (20 mEq total) by mouth 2 (two) times daily.    SPIRONOLACTONE (ALDACTONE) 25 MG TABLET    Take 0.5 tablets (12.5 mg total) by mouth once daily.    SYNTHROID 175 MCG TABLET    Take one tablet Monday-Friday and two tablets on the weekend.    TRAMADOL (ULTRAM) 50 MG TABLET    Take 1 tablet by mouth every 6 (six) hours as needed.        Objective:   There were no vitals filed for this visit.    Physical Exam    Assessment:     Problem List Items Addressed This Visit       MARITZA (iron deficiency anemia)       Lab Results   Component Value Date    WBC  5.86 12/02/2024    RBC 4.24 12/02/2024    HGB 11.3 (L) 12/02/2024    HCT 35.6 (L) 12/02/2024    MCV 84 12/02/2024    MCH 26.7 (L) 12/02/2024    MCHC 31.7 (L) 12/02/2024    RDW 14.7 (H) 12/02/2024     12/02/2024    MPV 11.1 12/02/2024    GRAN 2.8 10/30/2024    GRAN 48.5 10/30/2024    LYMPH 2.0 10/30/2024    LYMPH 35.9 10/30/2024    MONO 0.5 10/30/2024    MONO 9.0 10/30/2024    EOS 0.3 10/30/2024    BASO 0.06 10/30/2024    EOSINOPHIL 5.3 10/30/2024    BASOPHIL 1.1 10/30/2024      Lab Results   Component Value Date     10/30/2024    K 3.6 10/30/2024     10/30/2024    CO2 28 10/30/2024    BUN 15 10/30/2024    CREATININE 1.1 10/30/2024    CALCIUM 8.8 10/30/2024    ANIONGAP 10 10/30/2024    ESTGFRAFRICA 46.8 (A) 07/27/2022    EGFRNONAA 40.6 (A) 07/27/2022     Lab Results   Component Value Date    ALT 28 10/30/2024    AST 21 10/30/2024     (H) 06/03/2015    ALKPHOS 115 10/30/2024    BILITOT 0.4 10/30/2024     Lab Results   Component Value Date    IRON 60 12/02/2024    TRANSFERRIN 209 12/02/2024    TIBC 309 12/02/2024    FESATURATED 19 (L) 12/02/2024    FERRITIN 427 (H) 12/02/2024        Plan:   Iron deficiency anemia, unspecified iron deficiency anemia type  -     Ambulatory referral/consult to Hematology / Oncology        Collaborating Provider:  Dr. Monique Morales MD    Thank You,  Wendy Blevins NP  Benign Hematology

## 2025-01-08 NOTE — PROGRESS NOTES
Patient not seen today due to provider technical difficulties.  Reached out to patient at 1:30pm but was unable to reach by phone.  Message left.

## 2025-01-15 ENCOUNTER — OFFICE VISIT (OUTPATIENT)
Dept: HEMATOLOGY/ONCOLOGY | Facility: CLINIC | Age: 50
End: 2025-01-15
Payer: COMMERCIAL

## 2025-01-15 VITALS
HEART RATE: 77 BPM | BODY MASS INDEX: 46.9 KG/M2 | WEIGHT: 264.69 LBS | SYSTOLIC BLOOD PRESSURE: 140 MMHG | HEIGHT: 63 IN | DIASTOLIC BLOOD PRESSURE: 93 MMHG

## 2025-01-15 DIAGNOSIS — D50.9 IRON DEFICIENCY ANEMIA, UNSPECIFIED IRON DEFICIENCY ANEMIA TYPE: ICD-10-CM

## 2025-01-15 DIAGNOSIS — D64.9 NORMOCYTIC ANEMIA: ICD-10-CM

## 2025-01-15 DIAGNOSIS — Z86.19 HISTORY OF HELICOBACTER PYLORI INFECTION: ICD-10-CM

## 2025-01-15 DIAGNOSIS — D52.9 ANEMIA DUE TO FOLIC ACID DEFICIENCY, UNSPECIFIED DEFICIENCY TYPE: Primary | ICD-10-CM

## 2025-01-15 PROCEDURE — 1159F MED LIST DOCD IN RCRD: CPT | Mod: CPTII,S$GLB,, | Performed by: NURSE PRACTITIONER

## 2025-01-15 PROCEDURE — 99999 PR PBB SHADOW E&M-EST. PATIENT-LVL III: CPT | Mod: PBBFAC,,, | Performed by: NURSE PRACTITIONER

## 2025-01-15 PROCEDURE — 3080F DIAST BP >= 90 MM HG: CPT | Mod: CPTII,S$GLB,, | Performed by: NURSE PRACTITIONER

## 2025-01-15 PROCEDURE — 3077F SYST BP >= 140 MM HG: CPT | Mod: CPTII,S$GLB,, | Performed by: NURSE PRACTITIONER

## 2025-01-15 PROCEDURE — G2211 COMPLEX E/M VISIT ADD ON: HCPCS | Mod: S$GLB,,, | Performed by: NURSE PRACTITIONER

## 2025-01-15 PROCEDURE — 3008F BODY MASS INDEX DOCD: CPT | Mod: CPTII,S$GLB,, | Performed by: NURSE PRACTITIONER

## 2025-01-15 PROCEDURE — 99214 OFFICE O/P EST MOD 30 MIN: CPT | Mod: S$GLB,,, | Performed by: NURSE PRACTITIONER

## 2025-01-15 RX ORDER — METHYLPREDNISOLONE SOD SUCC 125 MG
60 VIAL (EA) INJECTION
Start: 2025-01-15

## 2025-01-15 RX ORDER — SODIUM CHLORIDE 0.9 % (FLUSH) 0.9 %
10 SYRINGE (ML) INJECTION
OUTPATIENT
Start: 2025-01-15

## 2025-01-15 RX ORDER — FOLIC ACID 1 MG/1
1 TABLET ORAL DAILY
Qty: 90 TABLET | Refills: 1 | Status: SHIPPED | OUTPATIENT
Start: 2025-01-15 | End: 2026-01-15

## 2025-01-15 RX ORDER — DIPHENHYDRAMINE HYDROCHLORIDE 50 MG/ML
50 INJECTION INTRAMUSCULAR; INTRAVENOUS ONCE AS NEEDED
OUTPATIENT
Start: 2025-01-15

## 2025-01-15 RX ORDER — EPINEPHRINE 0.3 MG/.3ML
0.3 INJECTION SUBCUTANEOUS ONCE AS NEEDED
OUTPATIENT
Start: 2025-01-15

## 2025-01-15 NOTE — PROGRESS NOTES
"Subjective:      Patient ID: Akiko Jameson is a 49 y.o. female.    Chief Complaint: fatigue    HPI:  48 yo female presents to the clinic due to low blood count.      e known to Dr. Caicedo for anemia of chronic disease as well as idiopathic eosinophilia.    Unilateral bone marrow aspiration/biopsy in 2007, which did not reveal any clonal population of cells on flow and no evidence of dysplasia within the marrow.    Surveilled annually.    She also suffers with chronic lymphedema to RUE from lymphadenectomy.  EBUS in 04/21 due to mediastinal lymphadenopathy/nodule that was later diagnosed as Sarcoidosis, followed by Dr. Kirby.     Last visit on 08/31/22 c/o of fatigue & increased sleepiness.    Recent sigmoid scope on 08/24 did not reveal a bleeding source.  Occult blood x 3 = negative  Labs reviewed indicating MARITZA.  Patient was scheduled for Feraheme.     10/14/22:  Approximately 2 1/2 weeks post 2nd infusion patient presents via telemed for interval evaluation & review of lab.  Patient reports feeling much better; "big" improvement; no fatigue or sleepiness.  Denies CP, SOB, pica, palpitations, etc.     Interval History:   01/17/2023  Sitting up in a chair looking at her computer; respirations easy & unlabored.  Reports that she feels good.  More energy; no fatigue.  Denies any CP, SOB, pica, bleeding, dark stools, palpitations, etc.    Interval history:  1/15/2024 Today is the first time I am evaluating/assessing the patient.  Currently with normocytic anemia.  Hgb 11.3 normocytic.  Folate low normal at 4.9. had a h/o total hysterectomy in the past.  Denies any known abnormal bleeding. Is up to date on colonoscopy and mammogram.    I have reviewed all of the patient's relevant lab work available in the medical record and have utilized this in my evaluation and management recommendations today.      Social History     Socioeconomic History    Marital status:     Number of children: 2   Occupational " History     Employer: [employer]   Tobacco Use    Smoking status: Never    Smokeless tobacco: Never   Substance and Sexual Activity    Alcohol use: No    Drug use: No    Sexual activity: Yes     Partners: Male     Birth control/protection: None     Social Drivers of Health     Financial Resource Strain: Low Risk  (7/29/2024)    Overall Financial Resource Strain (CARDIA)     Difficulty of Paying Living Expenses: Not hard at all   Food Insecurity: No Food Insecurity (7/29/2024)    Hunger Vital Sign     Worried About Running Out of Food in the Last Year: Never true     Ran Out of Food in the Last Year: Never true   Transportation Needs: Unknown (7/19/2024)    Received from [facility]    PRAPARE - Transportation     Lack of Transportation (Medical): Patient declined   Physical Activity: Insufficiently Active (7/29/2024)    Exercise Vital Sign     Days of Exercise per Week: 2 days     Minutes of Exercise per Session: 30 min   Stress: No Stress Concern Present (7/29/2024)    [employer] [place] [employer] - Occupational Stress Questionnaire     Feeling of Stress : Not at all   Housing Stability: Low Risk  (2/19/2024)    Housing Stability Vital Sign     Unable to Pay for Housing in the Last Year: No     Number of Places Lived in the Last Year: 1     Unstable Housing in the Last Year: No       Family History   Problem Relation Name Age of Onset    Diabetes Mother Deyonne     Kidney failure Mother Deyonne     Heart attack Mother Deyonne     Early death Mother Deyonne     Heart disease Mother Deyonne     Kidney disease Mother Deyonne     Blindness Father Chandu     Cancer Father Chandu     Vision loss Father Chandu     Breast cancer Maternal Aunt great aunt     Breast cancer Paternal Aunt      Breast cancer Maternal Grandmother      Ovarian cancer Cousin      Breast cancer Cousin      Cancer Paternal Grandfather Chandu     Vision loss Paternal Grandfather Chandu     Hypertension Daughter Marie      Stroke Paternal Aunt Valethia     Cirrhosis Neg Hx      Colon polyps Neg Hx      Colon cancer Neg Hx      Crohn's disease Neg Hx      Esophageal cancer Neg Hx      Stomach cancer Neg Hx      Ulcerative colitis Neg Hx      Amblyopia Neg Hx      Cataracts Neg Hx      Glaucoma Neg Hx      Macular degeneration Neg Hx      Retinal detachment Neg Hx      Strabismus Neg Hx      Allergic rhinitis Neg Hx      Allergies Neg Hx      Angioedema Neg Hx      Asthma Neg Hx      Atopy Neg Hx      Eczema Neg Hx      Immunodeficiency Neg Hx      Rhinitis Neg Hx      Urticaria Neg Hx         Past Surgical History:   Procedure Laterality Date    24 HOUR IMPEDANCE PH MONITORING OF ESOPHAGUS IN PATIENT NOT TAKING ACID REDUCING MEDICATIONS N/A 01/06/2020    Procedure: IMPEDANCE PH STUDY, ESOPHAGEAL, 24 HOUR, IN PATIENT NOT TAKING ACID REDUCING MEDICATION;  Surgeon: First Available Tamika Panchal;  Location: [facility];  Service: Endoscopy;  Laterality: N/A;    AXILLARY NODE DISSECTION Right     granular cell tumor    BILATERAL SALPINGO-OOPHORECTOMY (BSO)  07/21/2020    BREAST CYST ASPIRATION      left    CHOLECYSTECTOMY      COLONOSCOPY N/A 05/10/2019    Procedure: COLONOSCOPY;  Surgeon: Edgar Gamble Jr., MD;  Location: [facility];  Service: Endoscopy;  Laterality: N/A;    COLONOSCOPY N/A 06/28/2024    Procedure: COLONOSCOPY;  Surgeon: Carina Sahu MD;  Location: [facility];  Service: Endoscopy;  Laterality: N/A;    ENDOBRONCHIAL ULTRASOUND Bilateral 04/27/2021    Procedure: ENDOBRONCHIAL ULTRASOUND (EBUS);  Surgeon: Armando Kirby MD;  Location: [facility];  Service: Pulmonary;  Laterality: Bilateral;  need fluoroscopy    ENDOMETRIAL ABLATION      ESOPHAGEAL MANOMETRY WITH MEASUREMENT OF IMPEDANCE N/A 01/06/2020    Procedure: MANOMETRY, ESOPHAGUS, WITH IMPEDANCE MEASUREMENT;  Surgeon: First Available [place];  Location: [facility];  Service: Endoscopy;  Laterality: N/A;    ESOPHAGOGASTRODUODENOSCOPY N/A 07/05/2018    Procedure:  EGD (ESOPHAGOGASTRODUODENOSCOPY);  Surgeon: Edgar Gamble Jr., MD;  Location: [facility] ENDO;  Service: Endoscopy;  Laterality: N/A;    ESOPHAGOGASTRODUODENOSCOPY N/A 11/23/2020    Procedure: ESOPHAGOGASTRODUODENOSCOPY (EGD);  Surgeon: Jina Kaufman MD;  Location: [facility] ENDO;  Service: General;  Laterality: N/A;    EXCISION OF MASS OF ABDOMEN Left 06/18/2018    Procedure: EXCISION, MASS, ABDOMEN - flank left;  Surgeon: Armando Tate MD;  Location: [facility] OR;  Service: General;  Laterality: Left;  left flank removal of mass    FINE NEEDLE ASPIRATION      thyroid    FLEXIBLE SIGMOIDOSCOPY N/A 08/24/2022    Procedure: SIGMOIDOSCOPY, FLEXIBLE;  Surgeon: Amber West MD;  Location: [facility];  Service: Endoscopy;  Laterality: N/A;    HYSTERECTOMY  07/21/2020    INJECTION OF ANESTHETIC AGENT AROUND MEDIAL BRANCH NERVES INNERVATING LUMBAR FACET JOINT Right 02/24/2022    Procedure: Right L3, 4, 5 MBB;  Surgeon: Anam Montero MD;  Location: [facility] PAIN MGT;  Service: Pain Management;  Laterality: Right;    INJECTION OF ANESTHETIC AGENT AROUND MEDIAL BRANCH NERVES INNERVATING LUMBAR FACET JOINT Right 03/29/2022    Procedure: Right L3, 4, 5 MBB  (2nd block);  Surgeon: Anam Montero MD;  Location: [facility]IN [facility]GT;  Service: Pain Management;  Laterality: Right;    KNEE ARTHROSCOPY W/ MENISCECTOMY Right     NASAL SEPTUM SURGERY      OOPHORECTOMY      RADIOFREQUENCY THERMOCOAGULATION Right 04/05/2022    Procedure: Right L3-5 Lumbar RFA;  Surgeon: Anam Montero MD;  Location: [facility]IN [facility]GT;  Service: Pain Management;  Laterality: Right;    RADIOFREQUENCY THERMOCOAGULATION Right 02/22/2024    Procedure: Right L3-5 Lumbar RFA;  Surgeon: Anam Montero MD;  Location: [facility]IN [facility]GT;  Service: Pain Management;  Laterality: Right;    ROBOT-ASSISTED NISSEN FUNDOPLICATION USING DA TAMAR XI N/A 02/28/2020    Procedure: XI ROBOTIC FUNDOPLICATION, NISSEN;  Surgeon: Jina Kaufman MD;  Location: [facility];   Service: General;  Laterality: N/A;    THYROIDECTOMY Bilateral 03/11/2021    Procedure: THYROIDECTOMY;  Surgeon: Nixon Moe MD;  Location: [facility] OR;  Service: ENT;  Laterality: Bilateral;    TUBAL LIGATION      UPPER GASTROINTESTINAL ENDOSCOPY  07/05/2018    Dr. Gamble       Past Medical History:   Diagnosis Date    ALLERGIC RHINITIS     Aortic valve sclerosis 1/20/2023    Arthritis     Cellulitis     Constipation due to opioid therapy     Eosinophilia     mild    GERD (gastroesophageal reflux disease)     Granular cell tumor     H. pylori infection 2018    History of 2019 novel coronavirus disease (COVID-19) 10/04/2020    Hypertension     Lymphedema     Mild anemia     Mitral valve sclerosis 3/21/2023    KEIRY on CPAP     does not regularly    Positive CHARLENE (antinuclear antibody) 07/27/2022    Prediabetes 08/06/2021    Sarcoidosis, unspecified     Sleep apnea     compliant with CPAP    Thyroid nodule     Urinary incontinence, mixed        Review of Systems   Constitutional:  Positive for fatigue. Negative for appetite change and unexpected weight change.   HENT:  Negative for mouth sores and nosebleeds.    Eyes:  Negative for visual disturbance.   Respiratory:  Negative for cough and shortness of breath.    Cardiovascular:  Negative for chest pain.   Gastrointestinal:  Positive for constipation. Negative for abdominal pain, anal bleeding, blood in stool, diarrhea and nausea.        Burps a lot - gassy   Endocrine: Negative.    Genitourinary:  Negative for frequency, hematuria, menstrual problem and vaginal bleeding.   Musculoskeletal:  Positive for arthralgias, back pain and myalgias.   Skin:  Negative for rash.   Neurological:  Negative for headaches.   Hematological:  Negative for adenopathy.   Psychiatric/Behavioral:  The patient is not nervous/anxious.           Medication List with Changes/Refills   Current Medications    CARVEDILOL (COREG) 12.5 MG TABLET    Take 1 tablet (12.5 mg total) by mouth 2 (two)  times daily.    CETIRIZINE (ZYRTEC) 10 MG TABLET    Take 1 tablet (10 mg total) by mouth once daily.    COLCHICINE (COLCRYS) 0.6 MG TABLET    Treatment should be initiated within 24 hours of onset.  For gout attacks: Day 1: Oral: 1.2 mg, followed in 1 hour with a single dose of 0.6 mg. Day 2 and thereafter: Oral: 0.6 mg daily until flare resolves    CYCLOBENZAPRINE (FLEXERIL) 10 MG TABLET    Take 1/2 to 1 tab BID PRN muscle spasms. May cause drowsiness.    DOCUSATE SODIUM (COLACE) 100 MG CAPSULE    Take 100 mg by mouth every other day.     ERGOCALCIFEROL (ERGOCALCIFEROL) 50,000 UNIT CAP    Take one cap 3x weekly.    FLUTICASONE PROPION-SALMETEROL 115-21 MCG/DOSE (ADVAIR HFA) 115-21 MCG/ACTUATION HFAA INHALER    Inhale 2 puffs into the lungs every 12 (twelve) hours. Controller    MELOXICAM (MOBIC) 7.5 MG TABLET    Take 7.5 mg by mouth 2 (two) times daily as needed.    MONTELUKAST (SINGULAIR) 10 MG TABLET    Take 10 mg by mouth every evening.    MULTIVITAMIN (THERAGRAN) PER TABLET    Take 1 tablet by mouth once daily. Every day    OLMESARTAN-HYDROCHLOROTHIAZIDE (BENICAR HCT) 20-12.5 MG PER TABLET    Take 1 tablet by mouth once daily.    POTASSIUM CHLORIDE SA (K-DUR,KLOR-CON) 20 MEQ TABLET    Take 1 tablet (20 mEq total) by mouth 2 (two) times daily.    SPIRONOLACTONE (ALDACTONE) 25 MG TABLET    Take 0.5 tablets (12.5 mg total) by mouth once daily.    SYNTHROID 175 MCG TABLET    Take one tablet Monday-Friday and two tablets on the weekend.    TRAMADOL (ULTRAM) 50 MG TABLET    Take 1 tablet by mouth every 6 (six) hours as needed.        Objective:     Vitals:    01/15/25 1621   BP: (!) 140/93   Pulse: 77       Physical Exam  Vitals reviewed.   Constitutional:       Appearance: Normal appearance. She is obese.   HENT:      Head: Normocephalic and atraumatic.      Right Ear: External ear normal.      Left Ear: External ear normal.   Cardiovascular:      Rate and Rhythm: Normal rate and regular rhythm.      Heart sounds:  Normal heart sounds, S1 normal and S2 normal.   Pulmonary:      Effort: Pulmonary effort is normal.      Breath sounds: Normal breath sounds.   Abdominal:      General: There is no distension.   Musculoskeletal:         General: Normal range of motion.      Cervical back: Normal range of motion.   Skin:     General: Skin is warm and dry.   Neurological:      General: No focal deficit present.      Mental Status: She is alert and oriented to person, place, and time.   Psychiatric:         Attention and Perception: Attention and perception normal.         Mood and Affect: Mood and affect normal.         Speech: Speech normal.         Behavior: Behavior normal. Behavior is cooperative.         Thought Content: Thought content normal.         Cognition and Memory: Cognition and memory normal.         Judgment: Judgment normal.         Assessment:     Problem List Items Addressed This Visit    None      Lab Results   Component Value Date    WBC 5.86 12/02/2024    RBC 4.24 12/02/2024    HGB 11.3 (L) 12/02/2024    HCT 35.6 (L) 12/02/2024    MCV 84 12/02/2024    MCH 26.7 (L) 12/02/2024    MCHC 31.7 (L) 12/02/2024    RDW 14.7 (H) 12/02/2024     12/02/2024    MPV 11.1 12/02/2024    GRAN 2.8 10/30/2024    GRAN 48.5 10/30/2024    LYMPH 2.0 10/30/2024    LYMPH 35.9 10/30/2024    MONO 0.5 10/30/2024    MONO 9.0 10/30/2024    EOS 0.3 10/30/2024    BASO 0.06 10/30/2024    EOSINOPHIL 5.3 10/30/2024    BASOPHIL 1.1 10/30/2024      Lab Results   Component Value Date     10/30/2024    K 3.6 10/30/2024     10/30/2024    CO2 28 10/30/2024    BUN 15 10/30/2024    CREATININE 1.1 10/30/2024    CALCIUM 8.8 10/30/2024    ANIONGAP 10 10/30/2024    ESTGFRAFRICA 46.8 (A) 07/27/2022    EGFRNONAA 40.6 (A) 07/27/2022     Lab Results   Component Value Date    ALT 28 10/30/2024    AST 21 10/30/2024     (H) 06/03/2015    ALKPHOS 115 10/30/2024    BILITOT 0.4 10/30/2024     Lab Results   Component Value Date    IRON 60  12/02/2024    TRANSFERRIN 209 12/02/2024    TIBC 309 12/02/2024    FESATURATED 19 (L) 12/02/2024    FERRITIN 427 (H) 12/02/2024        Plan:   There are no diagnoses linked to this encounter.    Med Onc Chart Routing      Follow up with physician    Follow up with AMAN . F/u 6 weeks after infusion with labs prior - VV   Infusion scheduling note   schedule 1 dose of feraheme at [facility]   Injection scheduling note n/a   Labs   Scheduling:  Preferred lab: [facility]  Lab interval:  H. pylori stool antigen testing and hemoglobin electrophoresis now.  cbc, iron studies, folate   Imaging   N/a   Pharmacy appointment No pharmacy appointment needed      Other referrals       No additional referrals needed  n/a         Total time spent on encounter: 30 minutes     Collaborating Provider:  Dr. Monique Morales MD    Thank You,  Wendy Blevins NP  Benign Hematology

## 2025-02-05 ENCOUNTER — INFUSION (OUTPATIENT)
Dept: INFUSION THERAPY | Facility: HOSPITAL | Age: 50
End: 2025-02-05
Attending: NURSE PRACTITIONER
Payer: COMMERCIAL

## 2025-02-05 VITALS
OXYGEN SATURATION: 98 % | SYSTOLIC BLOOD PRESSURE: 147 MMHG | WEIGHT: 267 LBS | DIASTOLIC BLOOD PRESSURE: 82 MMHG | HEIGHT: 63 IN | RESPIRATION RATE: 16 BRPM | HEART RATE: 63 BPM | BODY MASS INDEX: 47.31 KG/M2 | TEMPERATURE: 98 F

## 2025-02-05 DIAGNOSIS — Z86.19 HISTORY OF HELICOBACTER PYLORI INFECTION: ICD-10-CM

## 2025-02-05 DIAGNOSIS — D50.9 IRON DEFICIENCY ANEMIA, UNSPECIFIED IRON DEFICIENCY ANEMIA TYPE: Primary | ICD-10-CM

## 2025-02-05 PROCEDURE — 96365 THER/PROPH/DIAG IV INF INIT: CPT | Mod: PN

## 2025-02-05 PROCEDURE — A4216 STERILE WATER/SALINE, 10 ML: HCPCS | Mod: PN | Performed by: NURSE PRACTITIONER

## 2025-02-05 PROCEDURE — 96375 TX/PRO/DX INJ NEW DRUG ADDON: CPT | Mod: PN

## 2025-02-05 PROCEDURE — 63600175 PHARM REV CODE 636 W HCPCS: Mod: JZ,TB,PN | Performed by: NURSE PRACTITIONER

## 2025-02-05 PROCEDURE — 25000003 PHARM REV CODE 250: Mod: PN | Performed by: NURSE PRACTITIONER

## 2025-02-05 RX ORDER — SODIUM CHLORIDE 0.9 % (FLUSH) 0.9 %
10 SYRINGE (ML) INJECTION
Status: DISCONTINUED | OUTPATIENT
Start: 2025-02-05 | End: 2025-02-05 | Stop reason: HOSPADM

## 2025-02-05 RX ORDER — METHYLPREDNISOLONE SOD SUCC 125 MG
60 VIAL (EA) INJECTION
Status: COMPLETED | OUTPATIENT
Start: 2025-02-05 | End: 2025-02-05

## 2025-02-05 RX ORDER — EPINEPHRINE 0.3 MG/.3ML
0.3 INJECTION SUBCUTANEOUS ONCE AS NEEDED
OUTPATIENT
Start: 2025-02-05

## 2025-02-05 RX ORDER — DIPHENHYDRAMINE HYDROCHLORIDE 50 MG/ML
50 INJECTION INTRAMUSCULAR; INTRAVENOUS ONCE AS NEEDED
OUTPATIENT
Start: 2025-02-05

## 2025-02-05 RX ORDER — SODIUM CHLORIDE 0.9 % (FLUSH) 0.9 %
10 SYRINGE (ML) INJECTION
OUTPATIENT
Start: 2025-02-05

## 2025-02-05 RX ORDER — METHYLPREDNISOLONE SOD SUCC 125 MG
60 VIAL (EA) INJECTION
Start: 2025-02-05

## 2025-02-05 RX ADMIN — SODIUM CHLORIDE, PRESERVATIVE FREE 10 ML: 5 INJECTION INTRAVENOUS at 02:02

## 2025-02-05 RX ADMIN — SODIUM CHLORIDE: 9 INJECTION, SOLUTION INTRAVENOUS at 02:02

## 2025-02-05 RX ADMIN — FERUMOXYTOL 510 MG: 510 INJECTION INTRAVENOUS at 02:02

## 2025-02-05 RX ADMIN — METHYLPREDNISOLONE SODIUM SUCCINATE 60 MG: 125 INJECTION, POWDER, FOR SOLUTION INTRAMUSCULAR; INTRAVENOUS at 02:02

## 2025-02-05 NOTE — PLAN OF CARE
Problem: Adult Inpatient Plan of Care  Goal: Plan of Care Review  Outcome: Met     Problem: Fatigue  Goal: Improved Activity Tolerance  Outcome: Progressing   Tolerated infusion well today Able to be d/c to home  Discharge instructions given with copy of avs and pt ambulated to private vehicle without difficulty NAD

## 2025-02-18 ENCOUNTER — LAB VISIT (OUTPATIENT)
Dept: LAB | Facility: HOSPITAL | Age: 50
End: 2025-02-18
Payer: COMMERCIAL

## 2025-02-18 DIAGNOSIS — E55.9 VITAMIN D DEFICIENCY: ICD-10-CM

## 2025-02-18 DIAGNOSIS — E89.0 POST-OPERATIVE HYPOTHYROIDISM: ICD-10-CM

## 2025-02-18 DIAGNOSIS — E83.51 HYPOCALCEMIA: ICD-10-CM

## 2025-02-18 LAB
25(OH)D3+25(OH)D2 SERPL-MCNC: 50 NG/ML (ref 30–96)
ALBUMIN SERPL BCP-MCNC: 4.1 G/DL (ref 3.5–5.2)
ALP SERPL-CCNC: 124 U/L (ref 40–150)
ALT SERPL W/O P-5'-P-CCNC: 55 U/L (ref 10–44)
ANION GAP SERPL CALC-SCNC: 11 MMOL/L (ref 8–16)
AST SERPL-CCNC: 46 U/L (ref 10–40)
BILIRUB SERPL-MCNC: 0.5 MG/DL (ref 0.1–1)
BUN SERPL-MCNC: 18 MG/DL (ref 6–20)
CALCIUM SERPL-MCNC: 8.7 MG/DL (ref 8.7–10.5)
CHLORIDE SERPL-SCNC: 103 MMOL/L (ref 95–110)
CO2 SERPL-SCNC: 27 MMOL/L (ref 23–29)
CREAT SERPL-MCNC: 1.2 MG/DL (ref 0.5–1.4)
EST. GFR  (NO RACE VARIABLE): 54.5 ML/MIN/1.73 M^2
GLUCOSE SERPL-MCNC: 137 MG/DL (ref 70–110)
POTASSIUM SERPL-SCNC: 4.2 MMOL/L (ref 3.5–5.1)
PROT SERPL-MCNC: 8 G/DL (ref 6–8.4)
SODIUM SERPL-SCNC: 141 MMOL/L (ref 136–145)
T4 FREE SERPL-MCNC: 1.86 NG/DL (ref 0.71–1.51)
TSH SERPL DL<=0.005 MIU/L-ACNC: 0.49 UIU/ML (ref 0.4–4)

## 2025-02-18 PROCEDURE — 84443 ASSAY THYROID STIM HORMONE: CPT | Performed by: PHYSICIAN ASSISTANT

## 2025-02-18 PROCEDURE — 36415 COLL VENOUS BLD VENIPUNCTURE: CPT | Mod: PO | Performed by: PHYSICIAN ASSISTANT

## 2025-02-18 PROCEDURE — 80053 COMPREHEN METABOLIC PANEL: CPT | Performed by: PHYSICIAN ASSISTANT

## 2025-02-18 PROCEDURE — 82306 VITAMIN D 25 HYDROXY: CPT | Performed by: PHYSICIAN ASSISTANT

## 2025-02-18 PROCEDURE — 84439 ASSAY OF FREE THYROXINE: CPT | Performed by: PHYSICIAN ASSISTANT

## 2025-02-28 ENCOUNTER — OFFICE VISIT (OUTPATIENT)
Dept: OBSTETRICS AND GYNECOLOGY | Facility: CLINIC | Age: 50
End: 2025-02-28
Payer: COMMERCIAL

## 2025-02-28 VITALS
BODY MASS INDEX: 46.29 KG/M2 | HEIGHT: 63 IN | WEIGHT: 261.25 LBS | DIASTOLIC BLOOD PRESSURE: 74 MMHG | SYSTOLIC BLOOD PRESSURE: 126 MMHG

## 2025-02-28 DIAGNOSIS — Z01.419 WOMEN'S ANNUAL ROUTINE GYNECOLOGICAL EXAMINATION: Primary | ICD-10-CM

## 2025-02-28 DIAGNOSIS — Z12.4 SCREENING FOR CERVICAL CANCER: ICD-10-CM

## 2025-02-28 PROCEDURE — 3008F BODY MASS INDEX DOCD: CPT | Mod: CPTII,S$GLB,, | Performed by: STUDENT IN AN ORGANIZED HEALTH CARE EDUCATION/TRAINING PROGRAM

## 2025-02-28 PROCEDURE — 88175 CYTOPATH C/V AUTO FLUID REDO: CPT | Performed by: STUDENT IN AN ORGANIZED HEALTH CARE EDUCATION/TRAINING PROGRAM

## 2025-02-28 PROCEDURE — 99386 PREV VISIT NEW AGE 40-64: CPT | Mod: S$GLB,,, | Performed by: STUDENT IN AN ORGANIZED HEALTH CARE EDUCATION/TRAINING PROGRAM

## 2025-02-28 PROCEDURE — 87624 HPV HI-RISK TYP POOLED RSLT: CPT | Performed by: STUDENT IN AN ORGANIZED HEALTH CARE EDUCATION/TRAINING PROGRAM

## 2025-02-28 PROCEDURE — 99999 PR PBB SHADOW E&M-EST. PATIENT-LVL III: CPT | Mod: PBBFAC,,, | Performed by: STUDENT IN AN ORGANIZED HEALTH CARE EDUCATION/TRAINING PROGRAM

## 2025-02-28 PROCEDURE — 1159F MED LIST DOCD IN RCRD: CPT | Mod: CPTII,S$GLB,, | Performed by: STUDENT IN AN ORGANIZED HEALTH CARE EDUCATION/TRAINING PROGRAM

## 2025-02-28 PROCEDURE — 3078F DIAST BP <80 MM HG: CPT | Mod: CPTII,S$GLB,, | Performed by: STUDENT IN AN ORGANIZED HEALTH CARE EDUCATION/TRAINING PROGRAM

## 2025-02-28 PROCEDURE — 3074F SYST BP LT 130 MM HG: CPT | Mod: CPTII,S$GLB,, | Performed by: STUDENT IN AN ORGANIZED HEALTH CARE EDUCATION/TRAINING PROGRAM

## 2025-02-28 NOTE — PROGRESS NOTES
Annual Gynecologic Visit    Patient ID: Akiko Jameson is a 50 y.o. female MRN: [id number]     Chief Complaint:  Annual Exam     Subjective   History of Present Illness  Akiko Jameson is a 50 y.o. female     Annual Exam-Premenopausal  Patient presents for annual exam. The patient has concerns about ***. She denies issues with breast, abnormal discharge, dysuria, bowel or bladder function.     The patient is sexually active. S/p hysterectomy  She denies  dyspareunia and denies bleeding with intercourse.   GYN screening history: patient denies prior history of abnormal gyn screening tests.  The patient wears seatbelts: yes. The patient participates in regular exercise:  just started, walking on treadmill . The patient reports that she feels {safe} at home.    Hotflashes, infrequent night sweats= tolerable  Never used hormone replacement therapy    HPI obtained from online patient questionnaire:  Gynecologic Exam  The patient's pertinent negatives include no genital itching, genital lesions, genital odor, genital rash, missed menses, pelvic pain, vaginal bleeding or vaginal discharge. She is not pregnant. Associated symptoms include back pain and constipation. Pertinent negatives include no abdominal pain, anorexia, chills, diarrhea, discolored urine, dysuria, fever, flank pain, frequency, headaches, hematuria, nausea, painful intercourse, rash, urgency or vomiting. There has been no bleeding. She has not been passing clots. She has not been passing tissue. She has tried nothing for the symptoms. She is sexually active. No, her partner does not have an STD. She uses nothing for contraception. She is postmenopausal. Her past medical history is significant for an abdominal surgery, a gynecological surgery and ovarian cysts. There is no history of a  section, an ectopic pregnancy, endometriosis, herpes simplex, menorrhagia, metrorrhagia, miscarriage, PID, an STD, a terminated pregnancy or vaginosis.        GYN & OB History  OB History   OB History    Para Term  AB Living   2 2 2      SAB IAB Ectopic Multiple Live Births             # Outcome Date GA Lbr Cristobal/2nd Weight Sex Type Anes PTL Lv   2 Term            1 Term                No LMP recorded. Patient has had a hysterectomy.   Last Pap Date: 2022  Age of Menarche:14  Past Medical History:   Diagnosis Date    ALLERGIC RHINITIS     Aortic valve sclerosis 2023    Arthritis     Cellulitis     Constipation due to opioid therapy     Eosinophilia     mild    GERD (gastroesophageal reflux disease)     Granular cell tumor     H. pylori infection 2018    History of 2019 novel coronavirus disease (COVID-19) 10/04/2020    Hypertension     Lymphedema     Mild anemia     Mitral valve sclerosis 3/21/2023    KEIRY on CPAP     does not regularly    Positive CHARLENE (antinuclear antibody) 2022    Prediabetes 2021    Sarcoidosis, unspecified     Sleep apnea     compliant with CPAP    Thyroid nodule     Urinary incontinence, mixed      Past Surgical History:   Procedure Laterality Date    24 HOUR IMPEDANCE PH MONITORING OF ESOPHAGUS IN PATIENT NOT TAKING ACID REDUCING MEDICATIONS N/A 2020    Procedure: IMPEDANCE PH STUDY, ESOPHAGEAL, 24 HOUR, IN PATIENT NOT TAKING ACID REDUCING MEDICATION;  Surgeon: First Puma Panchal;  Location: [facility];  Service: Endoscopy;  Laterality: N/A;    AXILLARY NODE DISSECTION Right     granular cell tumor    BILATERAL SALPINGO-OOPHORECTOMY (BSO)  2020    BREAST CYST ASPIRATION      left    CHOLECYSTECTOMY      COLONOSCOPY N/A 05/10/2019    Procedure: COLONOSCOPY;  Surgeon: Edgar Gamble Jr., MD;  Location: [facility];  Service: Endoscopy;  Laterality: N/A;    COLONOSCOPY N/A 2024    Procedure: COLONOSCOPY;  Surgeon: Carina Sahu MD;  Location: [facility];  Service: Endoscopy;  Laterality: N/A;    ENDOBRONCHIAL ULTRASOUND Bilateral 2021    Procedure: ENDOBRONCHIAL ULTRASOUND  (EBUS);  Surgeon: Armando Kirby MD;  [facility];  Service: Pulmonary;  Laterality: Bilateral;  need fluoroscopy    ENDOMETRIAL ABLATION      ESOPHAGEAL MANOMETRY WITH MEASUREMENT OF IMPEDANCE N/A 01/06/2020    Procedure: MANOMETRY, ESOPHAGUS, WITH IMPEDANCE MEASUREMENT;  Surgeon: First [place];  Location: [facility] ENDO;  Service: Endoscopy;  Laterality: N/A;    ESOPHAGOGASTRODUODENOSCOPY N/A 07/05/2018    Procedure: EGD (ESOPHAGOGASTRODUODENOSCOPY);  Surgeon: Edgar Gamble Jr., MD;  Location: [facility] ENDO;  Service: Endoscopy;  Laterality: N/A;    ESOPHAGOGASTRODUODENOSCOPY N/A 11/23/2020    Procedure: ESOPHAGOGASTRODUODENOSCOPY (EGD);  Surgeon: Jina Kaufman MD;  Location: [facility];  Service: General;  Laterality: N/A;    EXCISION OF MASS OF ABDOMEN Left 06/18/2018    Procedure: EXCISION, MASS, ABDOMEN - flank left;  Surgeon: Armando Tate MD;  Location: [facility] OR;  Service: General;  Laterality: Left;  left flank removal of mass    FINE NEEDLE ASPIRATION      thyroid    FLEXIBLE SIGMOIDOSCOPY N/A 08/24/2022    Procedure: SIGMOIDOSCOPY, FLEXIBLE;  Surgeon: Amber West MD;  Location: [facility];  Service: Endoscopy;  Laterality: N/A;    HYSTERECTOMY  07/21/2020    INJECTION OF ANESTHETIC AGENT AROUND MEDIAL BRANCH NERVES INNERVATING LUMBAR FACET JOINT Right 02/24/2022    Procedure: Right L3, 4, 5 MBB;  Surgeon: Anam Montero MD;  Location: [facility];  Service: Pain Management;  Laterality: Right;    INJECTION OF ANESTHETIC AGENT AROUND MEDIAL BRANCH NERVES INNERVATING LUMBAR FACET JOINT Right 03/29/2022    Procedure: Right L3, 4, 5 MBB  (2nd block);  Surgeon: Anam Montero MD;  Location: [facility];  Service: Pain Management;  Laterality: Right;    KNEE ARTHROSCOPY W/ MENISCECTOMY Right     NASAL SEPTUM SURGERY      OOPHORECTOMY      RADIOFREQUENCY THERMOCOAGULATION Right 04/05/2022    Procedure: Right L3-5 Lumbar RFA;  Surgeon: Anam Montero MD;  Location:  [facility];  Service: Pain Management;  Laterality: Right;    RADIOFREQUENCY THERMOCOAGULATION Right 02/22/2024    Procedure: Right L3-5 Lumbar RFA;  Surgeon: Anam Montero MD;  Location: [facility];  Service: Pain Management;  Laterality: Right;    ROBOT-ASSISTED NISSEN FUNDOPLICATION USING DA TAMAR XI N/A 02/28/2020    Procedure: XI ROBOTIC FUNDOPLICATION, NISSEN;  Surgeon: Jina Kaufman MD;  Location: [facility] OR;  Service: General;  Laterality: N/A;    THYROIDECTOMY Bilateral 03/11/2021    Procedure: THYROIDECTOMY;  Surgeon: Nixon Moe MD;  Location: [facility] OR;  Service: ENT;  Laterality: Bilateral;    TUBAL LIGATION      UPPER GASTROINTESTINAL ENDOSCOPY  07/05/2018    Dr. Gamble     Review of patient's allergies indicates:   Allergen Reactions    Biaxin [clarithromycin] Swelling    Omeprazole magnesium Swelling and Other (See Comments)    Prevacid [lansoprazole] Swelling     Lip swelling- was taking this along with blood pressure medication: benazepril; not sure which caused it. Reports she has taken OTC prilosec without any problems.    Ace inhibitors Swelling     Facial swelling    Benazepril Swelling     Lip swelling     Current Outpatient Medications   Medication Instructions    carvediloL (COREG) 12.5 mg, Oral, 2 times daily    cetirizine (ZYRTEC) 10 mg, Oral, Daily    colchicine (COLCRYS) 0.6 mg tablet Treatment should be initiated within 24 hours of onset.  For gout attacks: Day 1: Oral: 1.2 mg, followed in 1 hour with a single dose of 0.6 mg. Day 2 and thereafter: Oral: 0.6 mg daily until flare resolves    cyclobenzaprine (FLEXERIL) 10 MG tablet Take 1/2 to 1 tab BID PRN muscle spasms. May cause drowsiness.    docusate sodium (COLACE) 100 mg, Every other day    ergocalciferol (ERGOCALCIFEROL) 50,000 unit Cap Take one cap 3x weekly.    fluticasone propion-salmeterol 115-21 mcg/dose (ADVAIR HFA) 115-21 mcg/actuation HFAA inhaler 2 puffs, Inhalation, Every 12 hours, Controller    folic  acid (FOLVITE) 1 mg, Oral, Daily    meloxicam (MOBIC) 7.5 mg, 2 times daily PRN    montelukast (SINGULAIR) 10 mg, Nightly    multivitamin (THERAGRAN) per tablet 1 tablet, Daily    olmesartan-hydrochlorothiazide (BENICAR HCT) 20-12.5 mg per tablet 1 tablet, Oral, Daily    potassium chloride SA (K-DUR,KLOR-CON) 20 MEQ tablet 20 mEq, Oral, 2 times daily    spironolactone (ALDACTONE) 12.5 mg, Oral, Daily    SYNTHROID 175 mcg tablet Take one tablet Monday-Friday and two tablets on the weekend.    traMADoL (ULTRAM) 50 mg tablet 1 tablet, Every 6 hours PRN     Social History: She  reports that she has never smoked. She has never used smokeless tobacco. She reports that she does not drink alcohol and does not use drugs.  Family History: family history includes Blindness in her father; Breast cancer in her cousin, maternal aunt, maternal grandmother, and paternal aunt; Cancer in her father and paternal grandfather; Diabetes in her mother; Early death in her mother; Heart attack in her mother; Heart disease in her mother; Hypertension in her daughter; Kidney disease in her mother; Kidney failure in her mother; Ovarian cancer in her cousin; Stroke in her paternal aunt; Vision loss in her father and paternal grandfather.     Review of Systems  Review of Systems   Constitutional:  Negative for chills and fever.   Gastrointestinal:  Positive for constipation. Negative for abdominal pain, anorexia, diarrhea, nausea and vomiting.   Genitourinary:  Negative for dysuria, flank pain, frequency, hematuria, menorrhagia, missed menses, pelvic pain, urgency and vaginal discharge.   Musculoskeletal:  Positive for back pain.   Integumentary:  Negative for rash.   Neurological:  Negative for headaches.         Objective   Physical Exam:   Constitutional: She is oriented to person, place, and time. She appears well-developed and well-nourished. No distress.       Cardiovascular:  Normal rate, regular rhythm and normal heart sounds.              Pulmonary/Chest: Effort normal and breath sounds normal.        Abdominal: Soft. Bowel sounds are normal. She exhibits no distension. There is no abdominal tenderness.     Genitourinary:    Vagina normal.      Pelvic exam was performed with patient supine.   There is no rash, tenderness, lesion or injury on the right labia. There is no rash, tenderness, lesion or injury on the left labia. Right adnexum displays no mass, no tenderness and no fullness. Left adnexum displays no mass, no tenderness and no fullness. No erythema, vaginal discharge, tenderness or bleeding in the vagina.    No foreign body in the vagina.      No signs of injury in the vagina.   Cervix is absent.Uterus is absent.           Musculoskeletal: Normal range of motion and moves all extremeties. No tenderness or edema.       Neurological: She is alert and oriented to person, place, and time.    Skin: Skin is warm and dry.    Psychiatric: She has a normal mood and affect. Her behavior is normal. Thought content normal.         Assessment and Plan   Akiko Jameson is an 50 y.o. year old female here for Annual Exam         Health maintenance  Encouraged to exercise regularly and to update immunizations.  STD screening offered and declined  Cervical Cancer Screening: *** no history of abnormal pap smear.  *** last pap. {Collected vaginal pap and submitted today.  Breast Cancer Screening: discussed self breast examinations, no clinical breast exam today, Mammogram completed 12/2024  Colon Cancer Screening: recommended starting at 49 yo, next due ***  CV risk factors and lipids screening per PCP    Yearly Exam  Return in one year pelvic examination    Women's annual routine gynecological examination  -     Liquid-Based Pap Smear, Screening  -     HPV High Risk Genotypes, PCR    Screening for cervical cancer  -     Liquid-Based Pap Smear, Screening  -     HPV High Risk Genotypes, PCR       Evy Knott MD  Obstetrics and Gynecology  [facility]  [place], [place]

## 2025-03-06 LAB
FINAL PATHOLOGIC DIAGNOSIS: NORMAL
Lab: NORMAL

## 2025-03-07 ENCOUNTER — RESULTS FOLLOW-UP (OUTPATIENT)
Dept: OBSTETRICS AND GYNECOLOGY | Facility: CLINIC | Age: 50
End: 2025-03-07

## 2025-03-07 LAB
HPV HR 12 DNA SPEC QL NAA+PROBE: NEGATIVE
HPV16 AG SPEC QL: NEGATIVE
HPV18 DNA SPEC QL NAA+PROBE: NEGATIVE

## 2025-03-21 ENCOUNTER — LAB VISIT (OUTPATIENT)
Dept: LAB | Facility: HOSPITAL | Age: 50
End: 2025-03-21
Attending: NURSE PRACTITIONER
Payer: COMMERCIAL

## 2025-03-21 DIAGNOSIS — D50.9 IRON DEFICIENCY ANEMIA, UNSPECIFIED IRON DEFICIENCY ANEMIA TYPE: ICD-10-CM

## 2025-03-21 DIAGNOSIS — D52.9 ANEMIA DUE TO FOLIC ACID DEFICIENCY, UNSPECIFIED DEFICIENCY TYPE: ICD-10-CM

## 2025-03-21 DIAGNOSIS — Z86.19 HISTORY OF HELICOBACTER PYLORI INFECTION: ICD-10-CM

## 2025-03-21 DIAGNOSIS — D64.9 NORMOCYTIC ANEMIA: ICD-10-CM

## 2025-03-21 LAB
BASOPHILS # BLD AUTO: 0.03 K/UL (ref 0–0.2)
BASOPHILS NFR BLD: 0.5 % (ref 0–1.9)
DIFFERENTIAL METHOD BLD: ABNORMAL
EOSINOPHIL # BLD AUTO: 0.4 K/UL (ref 0–0.5)
EOSINOPHIL NFR BLD: 6.4 % (ref 0–8)
ERYTHROCYTE [DISTWIDTH] IN BLOOD BY AUTOMATED COUNT: 14.7 % (ref 11.5–14.5)
FERRITIN SERPL-MCNC: 814 NG/ML (ref 20–300)
FOLATE SERPL-MCNC: 15.6 NG/ML (ref 4–24)
HCT VFR BLD AUTO: 36.2 % (ref 37–48.5)
HGB BLD-MCNC: 11.7 G/DL (ref 12–16)
IMM GRANULOCYTES # BLD AUTO: 0.03 K/UL (ref 0–0.04)
IMM GRANULOCYTES NFR BLD AUTO: 0.5 % (ref 0–0.5)
IRON SERPL-MCNC: 66 UG/DL (ref 30–160)
LYMPHOCYTES # BLD AUTO: 2 K/UL (ref 1–4.8)
LYMPHOCYTES NFR BLD: 34.6 % (ref 18–48)
MCH RBC QN AUTO: 26.9 PG (ref 27–31)
MCHC RBC AUTO-ENTMCNC: 32.3 G/DL (ref 32–36)
MCV RBC AUTO: 83 FL (ref 82–98)
MONOCYTES # BLD AUTO: 0.5 K/UL (ref 0.3–1)
MONOCYTES NFR BLD: 8.9 % (ref 4–15)
NEUTROPHILS # BLD AUTO: 2.8 K/UL (ref 1.8–7.7)
NEUTROPHILS NFR BLD: 49.1 % (ref 38–73)
NRBC BLD-RTO: 0 /100 WBC
PLATELET # BLD AUTO: 242 K/UL (ref 150–450)
PMV BLD AUTO: 11.2 FL (ref 9.2–12.9)
RBC # BLD AUTO: 4.35 M/UL (ref 4–5.4)
SATURATED IRON: 22 % (ref 20–50)
TOTAL IRON BINDING CAPACITY: 303 UG/DL (ref 250–450)
TRANSFERRIN SERPL-MCNC: 205 MG/DL (ref 200–375)
WBC # BLD AUTO: 5.75 K/UL (ref 3.9–12.7)

## 2025-03-21 PROCEDURE — 83020 HEMOGLOBIN ELECTROPHORESIS: CPT | Performed by: NURSE PRACTITIONER

## 2025-03-21 PROCEDURE — 82728 ASSAY OF FERRITIN: CPT | Performed by: NURSE PRACTITIONER

## 2025-03-21 PROCEDURE — 84466 ASSAY OF TRANSFERRIN: CPT | Performed by: NURSE PRACTITIONER

## 2025-03-21 PROCEDURE — 82746 ASSAY OF FOLIC ACID SERUM: CPT | Performed by: NURSE PRACTITIONER

## 2025-03-21 PROCEDURE — 36415 COLL VENOUS BLD VENIPUNCTURE: CPT | Mod: PO | Performed by: NURSE PRACTITIONER

## 2025-03-21 PROCEDURE — 85025 COMPLETE CBC W/AUTO DIFF WBC: CPT | Mod: PO | Performed by: NURSE PRACTITIONER

## 2025-03-24 LAB
HGB A2 MFR BLD HPLC: 2.3 % (ref 2.2–3.2)
HGB FRACT BLD ELPH-IMP: NORMAL
HGB FRACT BLD ELPH-IMP: NORMAL

## 2025-03-26 ENCOUNTER — OFFICE VISIT (OUTPATIENT)
Dept: HEMATOLOGY/ONCOLOGY | Facility: CLINIC | Age: 50
End: 2025-03-26
Payer: COMMERCIAL

## 2025-03-26 DIAGNOSIS — D50.9 IRON DEFICIENCY ANEMIA, UNSPECIFIED IRON DEFICIENCY ANEMIA TYPE: ICD-10-CM

## 2025-03-26 DIAGNOSIS — D64.9 NORMOCYTIC ANEMIA: Primary | ICD-10-CM

## 2025-03-26 DIAGNOSIS — E53.8 FOLIC ACID DEFICIENCY: ICD-10-CM

## 2025-03-26 DIAGNOSIS — R74.01 TRANSAMINITIS: ICD-10-CM

## 2025-03-26 DIAGNOSIS — Z86.19 HISTORY OF HELICOBACTER PYLORI INFECTION: ICD-10-CM

## 2025-03-26 DIAGNOSIS — K21.9 GASTROESOPHAGEAL REFLUX DISEASE, UNSPECIFIED WHETHER ESOPHAGITIS PRESENT: ICD-10-CM

## 2025-03-26 NOTE — H&P (VIEW-ONLY)
The patient location is: work  The chief complaint leading to consultation is: stomach discomfort    Visit type: audiovisual    Face to Face time with patient: 25  45 minutes of total time spent on the encounter, which includes face to face time and non-face to face time preparing to see the patient (eg, review of tests), Obtaining and/or reviewing separately obtained history, Documenting clinical information in the electronic or other health record, Independently interpreting results (not separately reported) and communicating results to the patient/family/caregiver, or Care coordination (not separately reported).         Each patient to whom he or she provides medical services by telemedicine is:  (1) informed of the relationship between the physician and patient and the respective role of any other health care provider with respect to management of the patient; and (2) notified that he or she may decline to receive medical services by telemedicine and may withdraw from such care at any time.    Patient ID: Akiko Jameson is a 50 y.o. female.    Chief Complaint: stomach discomfort    HPI:  51 yo female presents to the clinic due to low blood count.      She is known to Dr. Caicedo for anemia of chronic disease as well as idiopathic eosinophilia.    Unilateral bone marrow aspiration/biopsy in 2007, which did not reveal any clonal population of cells on flow and no evidence of dysplasia within the marrow.    Surveilled annually.    She also suffers with chronic lymphedema to RUE from lymphadenectomy.  EBUS in 04/21 due to mediastinal lymphadenopathy/nodule that was later diagnosed as Sarcoidosis, followed by Dr. Kirby.     Last visit on 08/31/22 c/o of fatigue & increased sleepiness.    Recent sigmoid scope on 08/24 did not reveal a bleeding source.  Occult blood x 3 = negative  Labs reviewed indicating MARITZA.  Patient was scheduled for Feraheme.     10/14/22:  Approximately 2 1/2 weeks post 2nd infusion  "patient presents via telemed for interval evaluation & review of lab.  Patient reports feeling much better; "big" improvement; no fatigue or sleepiness.  Denies CP, SOB, pica, palpitations, etc.     Interval History:   01/17/2023  Sitting up in a chair looking at her computer; respirations easy & unlabored.  Reports that she feels good.  More energy; no fatigue.  Denies any CP, SOB, pica, bleeding, dark stools, palpitations, etc.    Interval history:  1/15/2024 Today is the first time I am evaluating/assessing the patient.  Currently with normocytic anemia.  Hgb 11.3 normocytic.  Folate low normal at 4.9. had a h/o total hysterectomy in the past.  Denies any known abnormal bleeding. Is up to date on colonoscopy and mammogram.      Interval History:  3/26/2025 Received feraheme infusion x 1 on 2/5/2025 and started on folvite 1 mg Po daily d/t very low normal folic acid levels.  Presents today to evaluate response. States that she continues to have reflux and stomach discomfort.   I have reviewed all of the patient's relevant lab work available in the medical record and have utilized this in my evaluation and management recommendations today.      Social History     Socioeconomic History    Marital status:     Number of children: 2   Occupational History     Employer: [employer]   Tobacco Use    Smoking status: Never    Smokeless tobacco: Never   Substance and Sexual Activity    Alcohol use: No    Drug use: No    Sexual activity: Yes     Partners: Male     Birth control/protection: None     Social Drivers of Health     Financial Resource Strain: Low Risk  (7/29/2024)    Overall Financial Resource Strain (CARDIA)     Difficulty of Paying Living Expenses: Not hard at all   Food Insecurity: No Food Insecurity (7/29/2024)    Hunger Vital Sign     Worried About Running Out of Food in the Last Year: Never true     Ran Out of Food in the Last Year: Never true   Transportation Needs: Unknown (7/19/2024)    Received from " [facility]    PRAPARE - Transportation     Lack of Transportation (Medical): Patient declined   Physical Activity: Insufficiently Active (7/29/2024)    Exercise Vital Sign     Days of Exercise per Week: 2 days     Minutes of Exercise per Session: 30 min   Stress: No Stress Concern Present (7/29/2024)    [employer] - Occupational Stress Questionnaire     Feeling of Stress : Not at all   Housing Stability: Low Risk  (2/19/2024)    Housing Stability Vital Sign     Unable to Pay for Housing in the Last Year: No     Number of Places Lived in the Last Year: 1     Unstable Housing in the Last Year: No       Family History   Problem Relation Name Age of Onset    Diabetes Mother Deyonne     Kidney failure Mother Deyonne     Heart attack Mother Deyonne     Early death Mother Deyonne     Heart disease Mother Deyonne     Kidney disease Mother Deyonne     Blindness Father Chandu     Cancer Father Chandu     Vision loss Father Chandu     Breast cancer Maternal Aunt great aunt     Breast cancer Paternal Aunt      Breast cancer Maternal Grandmother      Ovarian cancer Cousin      Breast cancer Cousin      Cancer Paternal Grandfather Chandu     Vision loss Paternal Grandfather Chandu     Hypertension Daughter Marie     Stroke Paternal Aunt Valethia     Cirrhosis Neg Hx      Colon polyps Neg Hx      Colon cancer Neg Hx      Crohn's disease Neg Hx      Esophageal cancer Neg Hx      Stomach cancer Neg Hx      Ulcerative colitis Neg Hx      Amblyopia Neg Hx      Cataracts Neg Hx      Glaucoma Neg Hx      Macular degeneration Neg Hx      Retinal detachment Neg Hx      Strabismus Neg Hx      Allergic rhinitis Neg Hx      Allergies Neg Hx      Angioedema Neg Hx      Asthma Neg Hx      Atopy Neg Hx      Eczema Neg Hx      Immunodeficiency Neg Hx      Rhinitis Neg Hx      Urticaria Neg Hx         Past Surgical History:   Procedure Laterality Date    24 HOUR IMPEDANCE PH MONITORING OF ESOPHAGUS IN  PATIENT NOT TAKING ACID REDUCING MEDICATIONS N/A 01/06/2020    Procedure: IMPEDANCE PH STUDY, ESOPHAGEAL, 24 HOUR, IN PATIENT NOT TAKING ACID REDUCING MEDICATION;  Surgeon: First Available Tamika Panchal;  Location: [facility];  Service: Endoscopy;  Laterality: N/A;    AXILLARY NODE DISSECTION Right     granular cell tumor    BILATERAL SALPINGO-OOPHORECTOMY (BSO)  07/21/2020    BREAST CYST ASPIRATION      left    CHOLECYSTECTOMY      COLONOSCOPY N/A 05/10/2019    Procedure: COLONOSCOPY;  Surgeon: Edgar Gamble Jr., MD;  Location: [facility];  Service: Endoscopy;  Laterality: N/A;    COLONOSCOPY N/A 06/28/2024    Procedure: COLONOSCOPY;  Surgeon: Carina Sahu MD;  Location: [facility];  Service: Endoscopy;  Laterality: N/A;    ENDOBRONCHIAL ULTRASOUND Bilateral 04/27/2021    Procedure: ENDOBRONCHIAL ULTRASOUND (EBUS);  Surgeon: Armando Kirby MD;  Location: [facility];  Service: Pulmonary;  Laterality: Bilateral;  need fluoroscopy    ENDOMETRIAL ABLATION      ESOPHAGEAL MANOMETRY WITH MEASUREMENT OF IMPEDANCE N/A 01/06/2020    Procedure: MANOMETRY, ESOPHAGUS, WITH IMPEDANCE MEASUREMENT;  Surgeon: First Available [place];  Location: [facility];  Service: Endoscopy;  Laterality: N/A;    ESOPHAGOGASTRODUODENOSCOPY N/A 07/05/2018    Procedure: EGD (ESOPHAGOGASTRODUODENOSCOPY);  Surgeon: Edgar Gamble Jr., MD;  Location: [facility];  Service: Endoscopy;  Laterality: N/A;    ESOPHAGOGASTRODUODENOSCOPY N/A 11/23/2020    Procedure: ESOPHAGOGASTRODUODENOSCOPY (EGD);  Surgeon: Jina Kaufman MD;  Location: [facility];  Service: General;  Laterality: N/A;    EXCISION OF MASS OF ABDOMEN Left 06/18/2018    Procedure: EXCISION, MASS, ABDOMEN - flank left;  Surgeon: Armando Tate MD;  Location: [facility];  Service: General;  Laterality: Left;  left flank removal of mass    FINE NEEDLE ASPIRATION      thyroid    FLEXIBLE SIGMOIDOSCOPY N/A 08/24/2022    Procedure: SIGMOIDOSCOPY, FLEXIBLE;  Surgeon: Amber VILLATORO  MD Brett;  Location: [facility];  Service: Endoscopy;  Laterality: N/A;    HYSTERECTOMY  07/21/2020    INJECTION OF ANESTHETIC AGENT AROUND MEDIAL BRANCH NERVES INNERVATING LUMBAR FACET JOINT Right 02/24/2022    Procedure: Right L3, 4, 5 MBB;  Surgeon: Anam Montero MD;  Location: [facility];  Service: Pain Management;  Laterality: Right;    INJECTION OF ANESTHETIC AGENT AROUND MEDIAL BRANCH NERVES INNERVATING LUMBAR FACET JOINT Right 03/29/2022    Procedure: Right L3, 4, 5 MBB  (2nd block);  Surgeon: Anam Montero MD;  Location: [facility];  Service: Pain Management;  Laterality: Right;    KNEE ARTHROSCOPY W/ MENISCECTOMY Right     NASAL SEPTUM SURGERY      OOPHORECTOMY      RADIOFREQUENCY THERMOCOAGULATION Right 04/05/2022    Procedure: Right L3-5 Lumbar RFA;  Surgeon: Anam Montero MD;  Location: [facility];  Service: Pain Management;  Laterality: Right;    RADIOFREQUENCY THERMOCOAGULATION Right 02/22/2024    Procedure: Right L3-5 Lumbar RFA;  Surgeon: Anam Montero MD;  Location: [facility];  Service: Pain Management;  Laterality: Right;    ROBOT-ASSISTED NISSEN FUNDOPLICATION USING DA TAMAR XI N/A 02/28/2020    Procedure: XI ROBOTIC FUNDOPLICATION, NISSEN;  Surgeon: Jina Kaufman MD;  Location: [facility] OR;  Service: General;  Laterality: N/A;    THYROIDECTOMY Bilateral 03/11/2021    Procedure: THYROIDECTOMY;  Surgeon: Nixon Moe MD;  Location: [facility] OR;  Service: ENT;  Laterality: Bilateral;    TUBAL LIGATION      UPPER GASTROINTESTINAL ENDOSCOPY  07/05/2018    Dr. Gamble       Past Medical History:   Diagnosis Date    ALLERGIC RHINITIS     Aortic valve sclerosis 1/20/2023    Arthritis     Cellulitis     Constipation due to opioid therapy     Eosinophilia     mild    GERD (gastroesophageal reflux disease)     Granular cell tumor     H. pylori infection 2018    History of 2019 novel coronavirus disease (COVID-19) 10/04/2020    Hypertension     Lymphedema     Mild anemia      Mitral valve sclerosis 3/21/2023    KEIRY on CPAP     does not regularly    Positive CHARLENE (antinuclear antibody) 07/27/2022    Prediabetes 08/06/2021    Sarcoidosis, unspecified     Sleep apnea     compliant with CPAP    Thyroid nodule     Urinary incontinence, mixed        Review of Systems   Constitutional:  Positive for fatigue. Negative for appetite change and unexpected weight change.   HENT:  Negative for mouth sores and nosebleeds.    Eyes:  Negative for visual disturbance.   Respiratory:  Negative for cough and shortness of breath.    Cardiovascular:  Negative for chest pain.   Gastrointestinal:  Positive for constipation. Negative for abdominal pain, anal bleeding, blood in stool, diarrhea and nausea.        Burps a lot - gassy   Endocrine: Negative.    Genitourinary:  Negative for frequency, hematuria, menstrual problem and vaginal bleeding.   Musculoskeletal:  Positive for arthralgias, back pain and myalgias.   Skin:  Negative for rash.   Neurological:  Negative for headaches.   Hematological:  Negative for adenopathy.   Psychiatric/Behavioral:  The patient is not nervous/anxious.           Medication List with Changes/Refills   Current Medications    CARVEDILOL (COREG) 12.5 MG TABLET    Take 1 tablet (12.5 mg total) by mouth 2 (two) times daily.    CETIRIZINE (ZYRTEC) 10 MG TABLET    Take 1 tablet (10 mg total) by mouth once daily.    COLCHICINE (COLCRYS) 0.6 MG TABLET    Treatment should be initiated within 24 hours of onset.  For gout attacks: Day 1: Oral: 1.2 mg, followed in 1 hour with a single dose of 0.6 mg. Day 2 and thereafter: Oral: 0.6 mg daily until flare resolves    CYCLOBENZAPRINE (FLEXERIL) 10 MG TABLET    Take 1/2 to 1 tab BID PRN muscle spasms. May cause drowsiness.    DOCUSATE SODIUM (COLACE) 100 MG CAPSULE    Take 100 mg by mouth every other day.     ERGOCALCIFEROL (ERGOCALCIFEROL) 50,000 UNIT CAP    Take one cap 3x weekly.    FAMOTIDINE (PEPCID) 20 MG TABLET    Take 1 tablet (20 mg total)  by mouth every evening.    FLUTICASONE PROPION-SALMETEROL 115-21 MCG/DOSE (ADVAIR HFA) 115-21 MCG/ACTUATION HFAA INHALER    Inhale 2 puffs into the lungs every 12 (twelve) hours. Controller    FOLIC ACID (FOLVITE) 1 MG TABLET    Take 1 tablet (1 mg total) by mouth once daily.    MELOXICAM (MOBIC) 7.5 MG TABLET    Take 7.5 mg by mouth 2 (two) times daily as needed.    MONTELUKAST (SINGULAIR) 10 MG TABLET    Take 10 mg by mouth every evening.    MULTIVITAMIN (THERAGRAN) PER TABLET    Take 1 tablet by mouth once daily. Every day    OLMESARTAN-HYDROCHLOROTHIAZIDE (BENICAR HCT) 20-12.5 MG PER TABLET    Take 1 tablet by mouth once daily.    POTASSIUM CHLORIDE SA (K-DUR,KLOR-CON) 20 MEQ TABLET    Take 1 tablet (20 mEq total) by mouth 2 (two) times daily.    SPIRONOLACTONE (ALDACTONE) 25 MG TABLET    Take 0.5 tablets (12.5 mg total) by mouth once daily.    SYNTHROID 175 MCG TABLET    Take one tablet Monday-Friday and two tablets on the weekend.    TRAMADOL (ULTRAM) 50 MG TABLET    Take 1 tablet by mouth every 6 (six) hours as needed.        Objective:     There were no vitals filed for this visit.      Physical Exam  Constitutional:       Appearance: Normal appearance.   Pulmonary:      Effort: Pulmonary effort is normal.   Neurological:      Mental Status: She is alert and oriented to person, place, and time.   Psychiatric:         Mood and Affect: Mood normal.         Behavior: Behavior normal.         Thought Content: Thought content normal.         Judgment: Judgment normal.         Assessment:     Problem List Items Addressed This Visit       MARITZA (iron deficiency anemia)    Relevant Orders    CBC Auto Differential    Comprehensive Metabolic Panel    Ferritin    Iron and TIBC    Ambulatory referral/consult to Endo Procedure     History of Helicobacter pylori infection    Relevant Orders    CBC Auto Differential    Comprehensive Metabolic Panel    Ferritin    Iron and TIBC    Ambulatory referral/consult to Endo  Procedure      Other Visit Diagnoses         Normocytic anemia    -  Primary    Relevant Orders    CBC Auto Differential    Comprehensive Metabolic Panel    Ferritin    Iron and TIBC      Transaminitis        Relevant Orders    US Abdomen Complete    Comprehensive Metabolic Panel      Gastroesophageal reflux disease, unspecified whether esophagitis present        Relevant Orders    Ambulatory referral/consult to Endo Procedure             Lab Results   Component Value Date    WBC 5.75 03/21/2025    RBC 4.35 03/21/2025    HGB 11.7 (L) 03/21/2025    HCT 36.2 (L) 03/21/2025    MCV 83 03/21/2025    MCH 26.9 (L) 03/21/2025    MCHC 32.3 03/21/2025    RDW 14.7 (H) 03/21/2025     03/21/2025    MPV 11.2 03/21/2025    GRAN 2.8 03/21/2025    GRAN 49.1 03/21/2025    LYMPH 2.0 03/21/2025    LYMPH 34.6 03/21/2025    MONO 0.5 03/21/2025    MONO 8.9 03/21/2025    EOS 0.4 03/21/2025    BASO 0.03 03/21/2025    EOSINOPHIL 6.4 03/21/2025    BASOPHIL 0.5 03/21/2025      Lab Results   Component Value Date     02/18/2025    K 4.2 02/18/2025     02/18/2025    CO2 27 02/18/2025    BUN 18 02/18/2025    CREATININE 1.2 02/18/2025    CALCIUM 8.7 02/18/2025    ANIONGAP 11 02/18/2025    ESTGFRAFRICA 46.8 (A) 07/27/2022    EGFRNONAA 40.6 (A) 07/27/2022     Lab Results   Component Value Date    ALT 55 (H) 02/18/2025    AST 46 (H) 02/18/2025     (H) 06/03/2015    ALKPHOS 124 02/18/2025    BILITOT 0.5 02/18/2025     Lab Results   Component Value Date    IRON 66 03/21/2025    TRANSFERRIN 205 03/21/2025    TIBC 303 03/21/2025    FESATURATED 22 03/21/2025    FERRITIN 814 (H) 03/21/2025      Lab Results   Component Value Date    YEXAMHAO39 742 08/21/2024     Lab Results   Component Value Date    FOLATE 15.6 03/21/2025     Lab Results   Component Value Date    TSH 0.492 02/18/2025     Eval elevated liver enzymes with abdominal ultrasound.  Avoid any liver toxic medications or substances including Tylenol,  alcohol, any over-the-counter medications which may contain Tylenol.  Eval recurrent iron-deficiency anemia along with reflux/burping symptoms with history of H pylori infection in the past with EGD.  Follow up in 2 months with CBC CMP iron studies prior in a virtual visit after to assess need for additional IV iron.  Plan:   Normocytic anemia  -     CBC Auto Differential; Future; Expected date: 03/26/2025  -     Comprehensive Metabolic Panel; Future; Expected date: 03/26/2025  -     Ferritin; Future; Expected date: 03/26/2025  -     Iron and TIBC; Future; Expected date: 03/26/2025    History of Helicobacter pylori infection  -     Cancel: H. pylori antigen, stool; Future; Expected date: 03/26/2025  -     CBC Auto Differential; Future; Expected date: 03/26/2025  -     Comprehensive Metabolic Panel; Future; Expected date: 03/26/2025  -     Ferritin; Future; Expected date: 03/26/2025  -     Iron and TIBC; Future; Expected date: 03/26/2025  -     Ambulatory referral/consult to Endo Procedure ; Future; Expected date: 03/27/2025    Iron deficiency anemia, unspecified iron deficiency anemia type  -     Cancel: H. pylori antigen, stool; Future; Expected date: 03/26/2025  -     CBC Auto Differential; Future; Expected date: 03/26/2025  -     Comprehensive Metabolic Panel; Future; Expected date: 03/26/2025  -     Ferritin; Future; Expected date: 03/26/2025  -     Iron and TIBC; Future; Expected date: 03/26/2025  -     Ambulatory referral/consult to Endo Procedure ; Future; Expected date: 03/27/2025    Transaminitis  -     US Abdomen Complete; Future; Expected date: 03/26/2025  -     Comprehensive Metabolic Panel; Future; Expected date: 03/26/2025    Gastroesophageal reflux disease, unspecified whether esophagitis present  -     Ambulatory referral/consult to Endo Procedure ; Future; Expected date: 03/27/2025        Med Onc Chart Routing      Follow up with physician    Follow up with AMAN 2 months.  VV- labs prior   Infusion scheduling note   n/a   Injection scheduling note n/a   Labs   Scheduling:  Preferred lab: [facility]  Lab interval:  cbc, cmp, iron studies prior to f/u   Imaging   Abdominal US to be done in [place]   Pharmacy appointment No pharmacy appointment needed      Other referrals       No additional referrals needed  n/a         Total time spent on encounter: 30 minutes     Collaborating Provider:  Dr. Monqiue Morales MD    Thank You,  Wendy Blevins NP  Benign Hematology

## 2025-03-26 NOTE — PROGRESS NOTES
The patient location is: work  The chief complaint leading to consultation is: stomach discomfort    Visit type: audiovisual    Face to Face time with patient: 25  45 minutes of total time spent on the encounter, which includes face to face time and non-face to face time preparing to see the patient (eg, review of tests), Obtaining and/or reviewing separately obtained history, Documenting clinical information in the electronic or other health record, Independently interpreting results (not separately reported) and communicating results to the patient/family/caregiver, or Care coordination (not separately reported).         Each patient to whom he or she provides medical services by telemedicine is:  (1) informed of the relationship between the physician and patient and the respective role of any other health care provider with respect to management of the patient; and (2) notified that he or she may decline to receive medical services by telemedicine and may withdraw from such care at any time.    Patient ID: Akiko Jameson is a 50 y.o. female.    Chief Complaint: stomach discomfort    HPI:  49 yo female presents to the clinic due to low blood count.      She is known to Dr. Caiceod for anemia of chronic disease as well as idiopathic eosinophilia.    Unilateral bone marrow aspiration/biopsy in 2007, which did not reveal any clonal population of cells on flow and no evidence of dysplasia within the marrow.    Surveilled annually.    She also suffers with chronic lymphedema to RUE from lymphadenectomy.  EBUS in 04/21 due to mediastinal lymphadenopathy/nodule that was later diagnosed as Sarcoidosis, followed by Dr. Kirby.     Last visit on 08/31/22 c/o of fatigue & increased sleepiness.    Recent sigmoid scope on 08/24 did not reveal a bleeding source.  Occult blood x 3 = negative  Labs reviewed indicating MARITZA.  Patient was scheduled for Feraheme.     10/14/22:  Approximately 2 1/2 weeks post 2nd infusion  "patient presents via telemed for interval evaluation & review of lab.  Patient reports feeling much better; "big" improvement; no fatigue or sleepiness.  Denies CP, SOB, pica, palpitations, etc.     Interval History:   01/17/2023  Sitting up in a chair looking at her computer; respirations easy & unlabored.  Reports that she feels good.  More energy; no fatigue.  Denies any CP, SOB, pica, bleeding, dark stools, palpitations, etc.    Interval history:  1/15/2024 Today is the first time I am evaluating/assessing the patient.  Currently with normocytic anemia.  Hgb 11.3 normocytic.  Folate low normal at 4.9. had a h/o total hysterectomy in the past.  Denies any known abnormal bleeding. Is up to date on colonoscopy and mammogram.      Interval History:  3/26/2025 Received feraheme infusion x 1 on 2/5/2025 and started on folvite 1 mg Po daily d/t very low normal folic acid levels.  Presents today to evaluate response. States that she continues to have reflux and stomach discomfort.   I have reviewed all of the patient's relevant lab work available in the medical record and have utilized this in my evaluation and management recommendations today.      Social History     Socioeconomic History    Marital status:     Number of children: 2   Occupational History     Employer: [employer]   Tobacco Use    Smoking status: Never    Smokeless tobacco: Never   Substance and Sexual Activity    Alcohol use: No    Drug use: No    Sexual activity: Yes     Partners: Male     Birth control/protection: None     Social Drivers of Health     Financial Resource Strain: Low Risk  (7/29/2024)    Overall Financial Resource Strain (CARDIA)     Difficulty of Paying Living Expenses: Not hard at all   Food Insecurity: No Food Insecurity (7/29/2024)    Hunger Vital Sign     Worried About Running Out of Food in the Last Year: Never true     Ran Out of Food in the Last Year: Never true   Transportation Needs: Unknown (7/19/2024)    Received from " [facility]    PRAPARE - Transportation     Lack of Transportation (Medical): Patient declined   Physical Activity: Insufficiently Active (7/29/2024)    Exercise Vital Sign     Days of Exercise per Week: 2 days     Minutes of Exercise per Session: 30 min   Stress: No Stress Concern Present (7/29/2024)    [place] [employer] - Occupational Stress Questionnaire     Feeling of Stress : Not at all   Housing Stability: Low Risk  (2/19/2024)    Housing Stability Vital Sign     Unable to Pay for Housing in the Last Year: No     Number of Places Lived in the Last Year: 1     Unstable Housing in the Last Year: No       Family History   Problem Relation Name Age of Onset    Diabetes Mother Deyonne     Kidney failure Mother Deyonne     Heart attack Mother Deyonne     Early death Mother Deyonne     Heart disease Mother Deyonne     Kidney disease Mother Deyonne     Blindness Father Chandu     Cancer Father Chandu     Vision loss Father Chandu     Breast cancer Maternal Aunt great aunt     Breast cancer Paternal Aunt      Breast cancer Maternal Grandmother      Ovarian cancer Cousin      Breast cancer Cousin      Cancer Paternal Grandfather Chandu     Vision loss Paternal Grandfather Chandu     Hypertension Daughter Marie     Stroke Paternal Aunt Valethia     Cirrhosis Neg Hx      Colon polyps Neg Hx      Colon cancer Neg Hx      Crohn's disease Neg Hx      Esophageal cancer Neg Hx      Stomach cancer Neg Hx      Ulcerative colitis Neg Hx      Amblyopia Neg Hx      Cataracts Neg Hx      Glaucoma Neg Hx      Macular degeneration Neg Hx      Retinal detachment Neg Hx      Strabismus Neg Hx      Allergic rhinitis Neg Hx      Allergies Neg Hx      Angioedema Neg Hx      Asthma Neg Hx      Atopy Neg Hx      Eczema Neg Hx      Immunodeficiency Neg Hx      Rhinitis Neg Hx      Urticaria Neg Hx         Past Surgical History:   Procedure Laterality Date    24 HOUR IMPEDANCE PH MONITORING OF ESOPHAGUS IN  PATIENT NOT TAKING ACID REDUCING MEDICATIONS N/A 01/06/2020    Procedure: IMPEDANCE PH STUDY, ESOPHAGEAL, 24 HOUR, IN PATIENT NOT TAKING ACID REDUCING MEDICATION;  Surgeon: First Available Tamika Panchal;  Location: [facility];  Service: Endoscopy;  Laterality: N/A;    AXILLARY NODE DISSECTION Right     granular cell tumor    BILATERAL SALPINGO-OOPHORECTOMY (BSO)  07/21/2020    BREAST CYST ASPIRATION      left    CHOLECYSTECTOMY      COLONOSCOPY N/A 05/10/2019    Procedure: COLONOSCOPY;  Surgeon: Edgar Gamble Jr., MD;  Location: [facility];  Service: Endoscopy;  Laterality: N/A;    COLONOSCOPY N/A 06/28/2024    Procedure: COLONOSCOPY;  Surgeon: Carina Sahu MD;  Location: [facility];  Service: Endoscopy;  Laterality: N/A;    ENDOBRONCHIAL ULTRASOUND Bilateral 04/27/2021    Procedure: ENDOBRONCHIAL ULTRASOUND (EBUS);  Surgeon: Armando Kirby MD;  Location: [facility];  Service: Pulmonary;  Laterality: Bilateral;  need fluoroscopy    ENDOMETRIAL ABLATION      ESOPHAGEAL MANOMETRY WITH MEASUREMENT OF IMPEDANCE N/A 01/06/2020    Procedure: MANOMETRY, ESOPHAGUS, WITH IMPEDANCE MEASUREMENT;  Surgeon: First Available [place];  Location: [facility];  Service: Endoscopy;  Laterality: N/A;    ESOPHAGOGASTRODUODENOSCOPY N/A 07/05/2018    Procedure: EGD (ESOPHAGOGASTRODUODENOSCOPY);  Surgeon: Edgar Gamble Jr., MD;  Location: [facility];  Service: Endoscopy;  Laterality: N/A;    ESOPHAGOGASTRODUODENOSCOPY N/A 11/23/2020    Procedure: ESOPHAGOGASTRODUODENOSCOPY (EGD);  Surgeon: Jina Kaufman MD;  Location: [facility];  Service: General;  Laterality: N/A;    EXCISION OF MASS OF ABDOMEN Left 06/18/2018    Procedure: EXCISION, MASS, ABDOMEN - flank left;  Surgeon: Armando Tate MD;  [facility];  Service: General;  Laterality: Left;  left flank removal of mass    FINE NEEDLE ASPIRATION      thyroid    FLEXIBLE SIGMOIDOSCOPY N/A 08/24/2022    Procedure: SIGMOIDOSCOPY, FLEXIBLE;  Surgeon: Amber VILLATORO  MD Brett;  Location: [facility];  Service: Endoscopy;  Laterality: N/A;    HYSTERECTOMY  07/21/2020    INJECTION OF ANESTHETIC AGENT AROUND MEDIAL BRANCH NERVES INNERVATING LUMBAR FACET JOINT Right 02/24/2022    Procedure: Right L3, 4, 5 MBB;  Surgeon: Anam Montero MD;  Location: [facility];  Service: Pain Management;  Laterality: Right;    INJECTION OF ANESTHETIC AGENT AROUND MEDIAL BRANCH NERVES INNERVATING LUMBAR FACET JOINT Right 03/29/2022    Procedure: Right L3, 4, 5 MBB  (2nd block);  Surgeon: Anam Montero MD;  Location: [facility];  Service: Pain Management;  Laterality: Right;    KNEE ARTHROSCOPY W/ MENISCECTOMY Right     NASAL SEPTUM SURGERY      OOPHORECTOMY      RADIOFREQUENCY THERMOCOAGULATION Right 04/05/2022    Procedure: Right L3-5 Lumbar RFA;  Surgeon: Anam Montero MD;  Location: [facility];  Service: Pain Management;  Laterality: Right;    RADIOFREQUENCY THERMOCOAGULATION Right 02/22/2024    Procedure: Right L3-5 Lumbar RFA;  Surgeon: Anam Montero MD;  Location: [facility];  Service: Pain Management;  Laterality: Right;    ROBOT-ASSISTED NISSEN FUNDOPLICATION USING DA TAMAR XI N/A 02/28/2020    Procedure: XI ROBOTIC FUNDOPLICATION, NISSEN;  Surgeon: Jina Kaufman MD;  Location: [facility];  Service: General;  Laterality: N/A;    THYROIDECTOMY Bilateral 03/11/2021    Procedure: THYROIDECTOMY;  Surgeon: Nixon Moe MD;  Location: [facility] OR;  Service: ENT;  Laterality: Bilateral;    TUBAL LIGATION      UPPER GASTROINTESTINAL ENDOSCOPY  07/05/2018    Dr. Gamble       Past Medical History:   Diagnosis Date    ALLERGIC RHINITIS     Aortic valve sclerosis 1/20/2023    Arthritis     Cellulitis     Constipation due to opioid therapy     Eosinophilia     mild    GERD (gastroesophageal reflux disease)     Granular cell tumor     H. pylori infection 2018    History of 2019 novel coronavirus disease (COVID-19) 10/04/2020    Hypertension     Lymphedema     Mild anemia      Mitral valve sclerosis 3/21/2023    KEIRY on CPAP     does not regularly    Positive CHARLENE (antinuclear antibody) 07/27/2022    Prediabetes 08/06/2021    Sarcoidosis, unspecified     Sleep apnea     compliant with CPAP    Thyroid nodule     Urinary incontinence, mixed        Review of Systems   Constitutional:  Positive for fatigue. Negative for appetite change and unexpected weight change.   HENT:  Negative for mouth sores and nosebleeds.    Eyes:  Negative for visual disturbance.   Respiratory:  Negative for cough and shortness of breath.    Cardiovascular:  Negative for chest pain.   Gastrointestinal:  Positive for constipation. Negative for abdominal pain, anal bleeding, blood in stool, diarrhea and nausea.        Burps a lot - gassy   Endocrine: Negative.    Genitourinary:  Negative for frequency, hematuria, menstrual problem and vaginal bleeding.   Musculoskeletal:  Positive for arthralgias, back pain and myalgias.   Skin:  Negative for rash.   Neurological:  Negative for headaches.   Hematological:  Negative for adenopathy.   Psychiatric/Behavioral:  The patient is not nervous/anxious.           Medication List with Changes/Refills   Current Medications    CARVEDILOL (COREG) 12.5 MG TABLET    Take 1 tablet (12.5 mg total) by mouth 2 (two) times daily.    CETIRIZINE (ZYRTEC) 10 MG TABLET    Take 1 tablet (10 mg total) by mouth once daily.    COLCHICINE (COLCRYS) 0.6 MG TABLET    Treatment should be initiated within 24 hours of onset.  For gout attacks: Day 1: Oral: 1.2 mg, followed in 1 hour with a single dose of 0.6 mg. Day 2 and thereafter: Oral: 0.6 mg daily until flare resolves    CYCLOBENZAPRINE (FLEXERIL) 10 MG TABLET    Take 1/2 to 1 tab BID PRN muscle spasms. May cause drowsiness.    DOCUSATE SODIUM (COLACE) 100 MG CAPSULE    Take 100 mg by mouth every other day.     ERGOCALCIFEROL (ERGOCALCIFEROL) 50,000 UNIT CAP    Take one cap 3x weekly.    FAMOTIDINE (PEPCID) 20 MG TABLET    Take 1 tablet (20 mg total)  by mouth every evening.    FLUTICASONE PROPION-SALMETEROL 115-21 MCG/DOSE (ADVAIR HFA) 115-21 MCG/ACTUATION HFAA INHALER    Inhale 2 puffs into the lungs every 12 (twelve) hours. Controller    FOLIC ACID (FOLVITE) 1 MG TABLET    Take 1 tablet (1 mg total) by mouth once daily.    MELOXICAM (MOBIC) 7.5 MG TABLET    Take 7.5 mg by mouth 2 (two) times daily as needed.    MONTELUKAST (SINGULAIR) 10 MG TABLET    Take 10 mg by mouth every evening.    MULTIVITAMIN (THERAGRAN) PER TABLET    Take 1 tablet by mouth once daily. Every day    OLMESARTAN-HYDROCHLOROTHIAZIDE (BENICAR HCT) 20-12.5 MG PER TABLET    Take 1 tablet by mouth once daily.    POTASSIUM CHLORIDE SA (K-DUR,KLOR-CON) 20 MEQ TABLET    Take 1 tablet (20 mEq total) by mouth 2 (two) times daily.    SPIRONOLACTONE (ALDACTONE) 25 MG TABLET    Take 0.5 tablets (12.5 mg total) by mouth once daily.    SYNTHROID 175 MCG TABLET    Take one tablet Monday-Friday and two tablets on the weekend.    TRAMADOL (ULTRAM) 50 MG TABLET    Take 1 tablet by mouth every 6 (six) hours as needed.        Objective:     There were no vitals filed for this visit.      Physical Exam  Constitutional:       Appearance: Normal appearance.   Pulmonary:      Effort: Pulmonary effort is normal.   Neurological:      Mental Status: She is alert and oriented to person, place, and time.   Psychiatric:         Mood and Affect: Mood normal.         Behavior: Behavior normal.         Thought Content: Thought content normal.         Judgment: Judgment normal.         Assessment:     Problem List Items Addressed This Visit       MARITZA (iron deficiency anemia)    Relevant Orders    CBC Auto Differential    Comprehensive Metabolic Panel    Ferritin    Iron and TIBC    Ambulatory referral/consult to Endo Procedure     History of Helicobacter pylori infection    Relevant Orders    CBC Auto Differential    Comprehensive Metabolic Panel    Ferritin    Iron and TIBC    Ambulatory referral/consult to Endo  Procedure      Other Visit Diagnoses         Normocytic anemia    -  Primary    Relevant Orders    CBC Auto Differential    Comprehensive Metabolic Panel    Ferritin    Iron and TIBC      Transaminitis        Relevant Orders    US Abdomen Complete    Comprehensive Metabolic Panel      Gastroesophageal reflux disease, unspecified whether esophagitis present        Relevant Orders    Ambulatory referral/consult to Endo Procedure             Lab Results   Component Value Date    WBC 5.75 03/21/2025    RBC 4.35 03/21/2025    HGB 11.7 (L) 03/21/2025    HCT 36.2 (L) 03/21/2025    MCV 83 03/21/2025    MCH 26.9 (L) 03/21/2025    MCHC 32.3 03/21/2025    RDW 14.7 (H) 03/21/2025     03/21/2025    MPV 11.2 03/21/2025    GRAN 2.8 03/21/2025    GRAN 49.1 03/21/2025    LYMPH 2.0 03/21/2025    LYMPH 34.6 03/21/2025    MONO 0.5 03/21/2025    MONO 8.9 03/21/2025    EOS 0.4 03/21/2025    BASO 0.03 03/21/2025    EOSINOPHIL 6.4 03/21/2025    BASOPHIL 0.5 03/21/2025      Lab Results   Component Value Date     02/18/2025    K 4.2 02/18/2025     02/18/2025    CO2 27 02/18/2025    BUN 18 02/18/2025    CREATININE 1.2 02/18/2025    CALCIUM 8.7 02/18/2025    ANIONGAP 11 02/18/2025    ESTGFRAFRICA 46.8 (A) 07/27/2022    EGFRNONAA 40.6 (A) 07/27/2022     Lab Results   Component Value Date    ALT 55 (H) 02/18/2025    AST 46 (H) 02/18/2025     (H) 06/03/2015    ALKPHOS 124 02/18/2025    BILITOT 0.5 02/18/2025     Lab Results   Component Value Date    IRON 66 03/21/2025    TRANSFERRIN 205 03/21/2025    TIBC 303 03/21/2025    FESATURATED 22 03/21/2025    FERRITIN 814 (H) 03/21/2025      Lab Results   Component Value Date    VTEJRQXW15 742 08/21/2024     Lab Results   Component Value Date    FOLATE 15.6 03/21/2025     Lab Results   Component Value Date    TSH 0.492 02/18/2025     Eval elevated liver enzymes with abdominal ultrasound.  Avoid any liver toxic medications or substances including Tylenol,  alcohol, any over-the-counter medications which may contain Tylenol.  Eval recurrent iron-deficiency anemia along with reflux/burping symptoms with history of H pylori infection in the past with EGD.  Follow up in 2 months with CBC CMP iron studies prior in a virtual visit after to assess need for additional IV iron.  Plan:   Normocytic anemia  -     CBC Auto Differential; Future; Expected date: 03/26/2025  -     Comprehensive Metabolic Panel; Future; Expected date: 03/26/2025  -     Ferritin; Future; Expected date: 03/26/2025  -     Iron and TIBC; Future; Expected date: 03/26/2025    History of Helicobacter pylori infection  -     Cancel: H. pylori antigen, stool; Future; Expected date: 03/26/2025  -     CBC Auto Differential; Future; Expected date: 03/26/2025  -     Comprehensive Metabolic Panel; Future; Expected date: 03/26/2025  -     Ferritin; Future; Expected date: 03/26/2025  -     Iron and TIBC; Future; Expected date: 03/26/2025  -     Ambulatory referral/consult to Endo Procedure ; Future; Expected date: 03/27/2025    Iron deficiency anemia, unspecified iron deficiency anemia type  -     Cancel: H. pylori antigen, stool; Future; Expected date: 03/26/2025  -     CBC Auto Differential; Future; Expected date: 03/26/2025  -     Comprehensive Metabolic Panel; Future; Expected date: 03/26/2025  -     Ferritin; Future; Expected date: 03/26/2025  -     Iron and TIBC; Future; Expected date: 03/26/2025  -     Ambulatory referral/consult to Endo Procedure ; Future; Expected date: 03/27/2025    Transaminitis  -     US Abdomen Complete; Future; Expected date: 03/26/2025  -     Comprehensive Metabolic Panel; Future; Expected date: 03/26/2025    Gastroesophageal reflux disease, unspecified whether esophagitis present  -     Ambulatory referral/consult to Endo Procedure ; Future; Expected date: 03/27/2025        Med Onc Chart Routing      Follow up with physician    Follow up with AMAN 2 months.  VV- labs prior   Infusion scheduling note   n/a   Injection scheduling note n/a   Labs   Scheduling:  Preferred lab: [facility]  Lab interval:  cbc, cmp, iron studies prior to f/u   Imaging   Abdominal US to be done in [place]   Pharmacy appointment No pharmacy appointment needed      Other referrals       No additional referrals needed  n/a         Total time spent on encounter: 30 minutes     Collaborating Provider:  Dr. Monique Morales MD    Thank You,  Wendy Blevins NP  Benign Hematology

## 2025-03-28 ENCOUNTER — HOSPITAL ENCOUNTER (OUTPATIENT)
Dept: RADIOLOGY | Facility: HOSPITAL | Age: 50
Discharge: HOME | End: 2025-03-28
Attending: NURSE PRACTITIONER
Payer: COMMERCIAL

## 2025-03-28 DIAGNOSIS — R74.01 TRANSAMINITIS: ICD-10-CM

## 2025-03-28 PROCEDURE — 76700 US EXAM ABDOM COMPLETE: CPT | Mod: TC,PO

## 2025-03-28 PROCEDURE — 76700 US EXAM ABDOM COMPLETE: CPT | Mod: 26,,, | Performed by: RADIOLOGY

## 2025-04-01 ENCOUNTER — HOSPITAL ENCOUNTER (OUTPATIENT)
Dept: PREADMISSION TESTING | Facility: HOSPITAL | Age: 50
Discharge: HOME | End: 2025-04-01
Attending: INTERNAL MEDICINE
Payer: COMMERCIAL

## 2025-04-01 DIAGNOSIS — K21.9 GASTROESOPHAGEAL REFLUX DISEASE, UNSPECIFIED WHETHER ESOPHAGITIS PRESENT: ICD-10-CM

## 2025-04-01 DIAGNOSIS — Z86.19 HISTORY OF HELICOBACTER PYLORI INFECTION: Primary | ICD-10-CM

## 2025-04-01 DIAGNOSIS — D50.9 IRON DEFICIENCY ANEMIA, UNSPECIFIED IRON DEFICIENCY ANEMIA TYPE: ICD-10-CM

## 2025-04-09 ENCOUNTER — ANESTHESIA EVENT (OUTPATIENT)
Dept: ENDOSCOPY | Facility: HOSPITAL | Age: 50
End: 2025-04-09
Payer: COMMERCIAL

## 2025-04-09 ENCOUNTER — HOSPITAL ENCOUNTER (OUTPATIENT)
Dept: ENDOSCOPY | Facility: HOSPITAL | Age: 50
Discharge: HOME | End: 2025-04-09
Attending: NURSE PRACTITIONER | Admitting: INTERNAL MEDICINE
Payer: COMMERCIAL

## 2025-04-09 ENCOUNTER — ANESTHESIA (OUTPATIENT)
Dept: ENDOSCOPY | Facility: HOSPITAL | Age: 50
End: 2025-04-09
Payer: COMMERCIAL

## 2025-04-09 DIAGNOSIS — D50.9 IRON DEFICIENCY ANEMIA, UNSPECIFIED IRON DEFICIENCY ANEMIA TYPE: ICD-10-CM

## 2025-04-09 DIAGNOSIS — Z86.19 HISTORY OF HELICOBACTER PYLORI INFECTION: ICD-10-CM

## 2025-04-09 DIAGNOSIS — K21.9 GASTROESOPHAGEAL REFLUX DISEASE, UNSPECIFIED WHETHER ESOPHAGITIS PRESENT: ICD-10-CM

## 2025-04-09 PROCEDURE — 27201012 HC FORCEPS, HOT/COLD, DISP: Performed by: INTERNAL MEDICINE

## 2025-04-09 PROCEDURE — 37000009 HC ANESTHESIA EA ADD 15 MINS

## 2025-04-09 PROCEDURE — 63600175 PHARM REV CODE 636 W HCPCS: Performed by: NURSE ANESTHETIST, CERTIFIED REGISTERED

## 2025-04-09 PROCEDURE — 88305 TISSUE EXAM BY PATHOLOGIST: CPT | Mod: TC | Performed by: INTERNAL MEDICINE

## 2025-04-09 PROCEDURE — 37000008 HC ANESTHESIA 1ST 15 MINUTES

## 2025-04-09 RX ORDER — PROPOFOL 10 MG/ML
VIAL (ML) INTRAVENOUS
Status: DISCONTINUED | OUTPATIENT
Start: 2025-04-09 | End: 2025-04-09

## 2025-04-09 RX ORDER — SODIUM CHLORIDE 9 MG/ML
INJECTION, SOLUTION INTRAVENOUS CONTINUOUS
Status: DISCONTINUED | OUTPATIENT
Start: 2025-04-09 | End: 2025-04-10 | Stop reason: HOSPADM

## 2025-04-09 RX ORDER — LIDOCAINE HYDROCHLORIDE 10 MG/ML
INJECTION, SOLUTION EPIDURAL; INFILTRATION; INTRACAUDAL; PERINEURAL
Status: DISCONTINUED | OUTPATIENT
Start: 2025-04-09 | End: 2025-04-09

## 2025-04-09 RX ADMIN — PROPOFOL 100 MG: 10 INJECTION, EMULSION INTRAVENOUS at 09:04

## 2025-04-09 RX ADMIN — LIDOCAINE HYDROCHLORIDE 100 MG: 10 SOLUTION INTRAVENOUS at 09:04

## 2025-04-09 RX ADMIN — PROPOFOL 50 MG: 10 INJECTION, EMULSION INTRAVENOUS at 09:04

## 2025-04-09 RX ADMIN — GLYCOPYRROLATE 0.2 MG: 0.2 INJECTION, SOLUTION INTRAMUSCULAR; INTRAVITREAL at 09:04

## 2025-04-09 NOTE — BRIEF OP NOTE
Outpatient Endoscopy Brief Operative Note      Admit Date: 4/9/2025    Discharge Date and Time:  4/9/2025 9:31 AM    Attending Physician: Wendy Blevins NP     Discharge Physician: Wendy Blevins NP     Principal Admitting Diagnoses: MARITZA (iron deficiency anemia)         Discharge Diagnosis: Diagnoses of History of Helicobacter pylori infection, Iron deficiency anemia, unspecified iron deficiency anemia type, and Gastroesophageal reflux disease, unspecified whether esophagitis present were pertinent to this visit.     Discharged Condition: Good    Indication for Admission: MARITZA (iron deficiency anemia)     Hospital Course: Patient was admitted for an outppatient procedure and tolerated the procedure well with no complications.    Significant Diagnostic Studies: EGD with biopsy    See [facility]UMENS report    Pathology (if any):  Specimen (24h ago, onward)      None            Estimated Blood Loss: 1 ml.    Discussed with: patient and family.    Disposition: Home.    Follow Up/Patient Instructions:   Patient's Medications   New Prescriptions    No medications on file   Previous Medications    CARVEDILOL (COREG) 12.5 MG TABLET    Take 1 tablet (12.5 mg total) by mouth 2 (two) times daily.    CETIRIZINE (ZYRTEC) 10 MG TABLET    Take 1 tablet (10 mg total) by mouth once daily.    COLCHICINE (COLCRYS) 0.6 MG TABLET    Treatment should be initiated within 24 hours of onset.  For gout attacks: Day 1: Oral: 1.2 mg, followed in 1 hour with a single dose of 0.6 mg. Day 2 and thereafter: Oral: 0.6 mg daily until flare resolves    DOCUSATE SODIUM (COLACE) 100 MG CAPSULE    Take 100 mg by mouth every other day.     ERGOCALCIFEROL (ERGOCALCIFEROL) 50,000 UNIT CAP    Take one cap 3x weekly.    FLUTICASONE PROPION-SALMETEROL 115-21 MCG/DOSE (ADVAIR HFA) 115-21 MCG/ACTUATION HFAA INHALER    Inhale 2 puffs into the lungs every 12 (twelve) hours. Controller    FOLIC ACID (FOLVITE) 1 MG TABLET    Take 1 tablet (1 mg total) by mouth once  daily.    MELOXICAM (MOBIC) 7.5 MG TABLET    Take 7.5 mg by mouth 2 (two) times daily as needed.    MONTELUKAST (SINGULAIR) 10 MG TABLET    Take 10 mg by mouth every evening.    MULTIVITAMIN (THERAGRAN) PER TABLET    Take 1 tablet by mouth once daily. Every day    OLMESARTAN-HYDROCHLOROTHIAZIDE (BENICAR HCT) 20-12.5 MG PER TABLET    Take 1 tablet by mouth once daily.    POTASSIUM CHLORIDE SA (K-DUR,KLOR-CON) 20 MEQ TABLET    Take 1 tablet (20 mEq total) by mouth 2 (two) times daily.    SPIRONOLACTONE (ALDACTONE) 25 MG TABLET    Take 0.5 tablets (12.5 mg total) by mouth once daily.    SYNTHROID 175 MCG TABLET    Take one tablet Monday-Friday and two tablets on the weekend.   Modified Medications    No medications on file   Discontinued Medications    CYCLOBENZAPRINE (FLEXERIL) 10 MG TABLET    Take 1/2 to 1 tab BID PRN muscle spasms. May cause drowsiness.    FAMOTIDINE (PEPCID) 20 MG TABLET    Take 1 tablet (20 mg total) by mouth every evening.       Discharge Procedure Orders   Diet general     Call MD for:  temperature >100.4     Call MD for:  persistent nausea and vomiting     Call MD for:  severe uncontrolled pain     Call MD for:  difficulty breathing, headache or visual disturbances     Activity as tolerated        Follow-up Information       Wendy Blevins NP Follow up.    Specialty: Hematology and Oncology  Contact information:  [address]  [place] [id number] [phone]                                 Staci Blackman MD  Inpatient Gastroenterology  [facility]

## 2025-04-09 NOTE — TRANSFER OF CARE
"Anesthesia Transfer of Care Note    Patient: Akiko Jameson    Procedure(s) Performed: * No procedures listed *    Patient location: GI    Anesthesia Type: MAC    Transport from OR: Transported from OR on room air with adequate spontaneous ventilation    Post pain: adequate analgesia    Post assessment: no apparent anesthetic complications and tolerated procedure well    Post vital signs: stable    Level of consciousness: responds to stimulation    Nausea/Vomiting: no nausea/vomiting    Complications: none    Transfer of care protocol was followedComments: Report given to PACU RN. Hand Off Tool Used. RN given opportunity to ask questions or clarify concerns. No Concerns verbalized. RN was asked if ready to assume care of patient. RN verbally confirmed. Pt. Left in stable condition. SV. Vital Signs Return to Near Baseline. No s/s of distress noted      Last vitals: Visit Vitals  /76 (BP Location: Left arm, Patient Position: Lying)   Pulse 76   Temp 36.7 °C (98.1 °F)   Resp 17   Ht 5' 3" (1.6 m)   Wt 118.4 kg (261 lb)   SpO2 97%   Breastfeeding No   BMI 46.23 kg/m²     "

## 2025-04-09 NOTE — ANESTHESIA PREPROCEDURE EVALUATION
04/09/2025  Akiko Jameson is a 50 y.o., female.    Problem List[1]  Stress test 11/2021  The test was stopped because the patient experienced fatigue.  The left ventricle is normal in size with normal systolic function.  Normal left ventricular diastolic function.  The estimated ejection fraction is 70%.  Normal right ventricular size with normal right ventricular systolic function.  Normal central venous pressure (3 mmHg).  The ascending aorta is mildly dilated.  The stress echo portion of this study is negative for myocardial ischemia.  The ECG portion of this study is negative for myocardial ischemia.  During stress, the following arrhythmias were observed: rare PVCs.  Mildly sclerotic aortic and mitral valves with no stenosis.  Decreased exercise capacity, the patient exercised for 3 m 17 s, achieved 6 Mets and 92% of MPHR.  Pre-op Assessment    I have reviewed the Patient Summary Reports.     I have reviewed the Nursing Notes. I have reviewed the NPO Status.   I have reviewed the Medications.     Review of Systems  Anesthesia Hx:  No problems with previous Anesthesia             Denies Family Hx of Anesthesia complications.    Denies Personal Hx of Anesthesia complications.                    Social:  Non-Smoker       Hematology/Oncology:  Hematology Normal   Oncology Normal                                   EENT/Dental:  EENT/Dental Normal           Cardiovascular:     Hypertension              ECG has been reviewed.                            Pulmonary:   COPD     Sleep Apnea                Renal/:  Renal/ Normal                 Hepatic/GI:     GERD Liver Disease,               Musculoskeletal:  Musculoskeletal Normal                Neurological:  Neurology Normal                                      Endocrine:   Hypothyroidism        Obesity / BMI > 30  Dermatological:  Skin Normal     Psych:  Psychiatric Normal                    Physical Exam  General: Well nourished, Cooperative, Alert and Oriented    Airway:  Mallampati: II   Mouth Opening: Normal  TM Distance: Normal  Tongue: Normal  Neck ROM: Normal ROM    Dental:  Intact  Pt denies loose or removable dentition  Heart:  Rate: Normal  Rhythm: Regular Rhythm        Anesthesia Plan  Type of Anesthesia, risks & benefits discussed:    Anesthesia Type: MAC, Gen Natural Airway  Intra-op Monitoring Plan: Standard ASA Monitors  Post Op Pain Control Plan: multimodal analgesia  Induction:  IV  Informed Consent: Informed consent signed with the Patient and all parties understand the risks and agree with anesthesia plan.  All questions answered.   ASA Score: 3  Day of Surgery Review of History & Physical: H&P Update referred to the surgeon/provider.I have interviewed and examined the patient. I have reviewed the patient's H&P dated: There are no significant changes.     Ready For Surgery From Anesthesia Perspective.     .           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    Chronic disease anemia    Right knee pain    Swelling of right knee joint    Chondromalacia of right patella    S/P right knee arthroscopy    Epigastric pain    Rectal pain    Essential hypertension    S/P Nissen fundoplication (without gastrostomy tube) procedure    Pulmonary nodules/lesions, multiple    Hilar adenopathy    Mediastinal adenopathy    Morbid obesity with BMI of 45.0-49.9, adult    Prediabetes    Post-operative hypothyroidism    Precordial pain    Family history of premature CAD    Ascending aorta dilation    Positive CHARLENE (antinuclear antibody)    Helicobacter positive gastritis    Idiopathic  eosinophilia    MARITZA (iron deficiency anemia)    Hypokalemia    Aortic valve sclerosis    Mitral valve sclerosis    Research subject    NAFLD (nonalcoholic fatty liver disease)    Pulmonary emphysema determined by X-ray    Cellulitis of right upper extremity    History of Helicobacter pylori infection

## 2025-04-09 NOTE — ANESTHESIA POSTPROCEDURE EVALUATION
Anesthesia Post Evaluation    Patient: Akiko Jameson    Procedure(s) Performed: * No procedures listed *    Final Anesthesia Type: MAC      Patient location during evaluation: GI PACU  Patient participation: Yes- Able to Participate  Level of consciousness: awake and alert  Post-procedure vital signs: reviewed and stable  Pain management: adequate  Airway patency: patent    PONV status at discharge: No PONV  Anesthetic complications: no      Cardiovascular status: blood pressure returned to baseline and hemodynamically stable  Respiratory status: unassisted, spontaneous ventilation and room air  Hydration status: euvolemic  Follow-up not needed.              Vitals Value Taken Time   /62 04/09/25 09:36   Temp 36.5 °C (97.7 °F) 04/09/25 09:36   Pulse 89 04/09/25 09:39   Resp 16 04/09/25 09:36   SpO2 99 % 04/09/25 09:36         No case tracking events are documented in the log.      Pain/Cele Score: Cele Score: 9 (4/9/2025  9:33 AM)

## 2025-04-09 NOTE — INTERVAL H&P NOTE
The patient has been examined and the H&P has been reviewed:    I concur with the findings and changes have been noted since the H&P was written: referred by Wendy Blevins NP from Heme-Onc from drop in iron counts. No overt GI bleeding. Was using weekly Ibuprofen 400mg at one time up to two weeks ago. No previous EGD. Hx of H Pylori in past but HP stool from 2024 showed eradication. Son at bedside.     The risks, benefits and alternatives of the procedure were discussed with the patient in detail. This discussion was had in the presence of her son. The risks include, risks of adverse reaction to sedation requiring the use of reversal agents, bleeding requiring blood transfusion, perforation requiring surgical intervention and technical failure. Other risks include aspiration leading to respiratory distress and respiratory failure resulting in endotracheal intubation and mechanical ventilation including death. If anesthesia is being utilized for this procedure, it is up to the anesthesiologist to determine airway safety including elective endotracheal intubation. Questions were answered, they agree to proceed. There was no language barriers.      Active Hospital Problems    Diagnosis  POA    *MARITZA (iron deficiency anemia) [D50.9]  Yes      Resolved Hospital Problems   No resolved problems to display.

## 2025-04-10 VITALS
BODY MASS INDEX: 46.25 KG/M2 | RESPIRATION RATE: 16 BRPM | HEIGHT: 63 IN | DIASTOLIC BLOOD PRESSURE: 78 MMHG | OXYGEN SATURATION: 99 % | TEMPERATURE: 98 F | SYSTOLIC BLOOD PRESSURE: 147 MMHG | HEART RATE: 88 BPM | WEIGHT: 261 LBS

## 2025-04-11 LAB
DHEA SERPL-MCNC: NORMAL
ESTROGEN SERPL-MCNC: NORMAL PG/ML
INSULIN SERPL-ACNC: NORMAL U[IU]/ML
LAB AP CLINICAL INFORMATION: NORMAL
LAB AP GROSS DESCRIPTION: NORMAL
LAB AP PERFORMING LOCATION(S): NORMAL
LAB AP REPORT FOOTNOTES: NORMAL

## 2025-04-14 ENCOUNTER — TELEPHONE (OUTPATIENT)
Dept: PREADMISSION TESTING | Facility: HOSPITAL | Age: 50
End: 2025-04-14
Payer: COMMERCIAL

## 2025-04-21 ENCOUNTER — TELEPHONE (OUTPATIENT)
Dept: CARDIOLOGY | Facility: CLINIC | Age: 50
End: 2025-04-21
Payer: COMMERCIAL

## 2025-04-21 NOTE — TELEPHONE ENCOUNTER
Lvm for pt in regards to r/s appt on 5/5 due Dr. Callahan not being in clinic. Asked to call us back or reach out to us through the portal to discuss dates and times to r/s.

## 2025-04-23 ENCOUNTER — RESULTS FOLLOW-UP (OUTPATIENT)
Dept: GASTROENTEROLOGY | Facility: HOSPITAL | Age: 50
End: 2025-04-23

## 2025-04-24 NOTE — PROGRESS NOTES
The biopsies of your stomach and small intestine do not show why your have iron deficiency. The function of your small intestine is normal and the biopsies of your stomach show no signs of infection. Since no abnormalities were seen on your upper endoscopy, a video capsule endoscopy (VCE) was not recommended since the likelihood of this test showing any abnormalities would be very low. Please follow up with Ms. Wendy Blevins NP in the Hematology-Oncology clinic.

## 2025-04-25 NOTE — PROGRESS NOTES
"Subjective:       Patient ID: Akiko Jameson is a 50 y.o. female Body mass index is 46.51 kg/m².    Chief Complaint: Diarrhea (Constipation) and Gastroesophageal Reflux    Referring Provider: No ref. provider found for acid reflux, diarrhea, and constipation .  Established patient of Dr. Sahu.     Patient reports she has "not been doing well" she reports severe acid reflux, urgent diarrhea after eating, and bouts of constipation.    She recently had an EGD and [place] which was normal except for a small hiatal hernia.  She is currently not on any PPI or anti-reflux medication at this time.    4/9/25 EGD with Dr Blackman ([facility] provider)  Impression  Overall Impression:   · Normal. Performed random biopsy to rule out celiac disease.  · Performed forceps biopsies in the stomach to rule out H. pylori  · Small type I hiatal hernia  · Normal.    PATH: no H. Pylori      Review of Systems   Constitutional:  Positive for appetite change. Negative for activity change, fatigue, fever and unexpected weight change.   HENT:  Negative for sore throat and trouble swallowing.    Respiratory:  Negative for cough and shortness of breath.    Cardiovascular:  Positive for chest pain.   Gastrointestinal:  Positive for abdominal pain, constipation, diarrhea and nausea. Negative for abdominal distention, anal bleeding, blood in stool, rectal pain and vomiting.       No LMP recorded. Patient has had a hysterectomy.  Past Medical History:   Diagnosis Date    ALLERGIC RHINITIS     Aortic valve sclerosis 1/20/2023    Arthritis     Cellulitis     Constipation due to opioid therapy     Eosinophilia     mild    GERD (gastroesophageal reflux disease)     Granular cell tumor     H. pylori infection 2018    History of 2019 novel coronavirus disease (COVID-19) 10/04/2020    Hypertension     Lymphedema     Mild anemia     Mitral valve sclerosis 3/21/2023    KEIRY on CPAP     does not regularly    Positive CHARLENE (antinuclear antibody) 07/27/2022 "    Prediabetes 08/06/2021    Sarcoidosis, unspecified     Sleep apnea     compliant with CPAP    Thyroid nodule     Urinary incontinence, mixed      Past Surgical History:   Procedure Laterality Date    24 HOUR IMPEDANCE PH MONITORING OF ESOPHAGUS IN PATIENT NOT TAKING ACID REDUCING MEDICATIONS N/A 01/06/2020    Procedure: IMPEDANCE PH STUDY, ESOPHAGEAL, 24 HOUR, IN PATIENT NOT TAKING ACID REDUCING MEDICATION;  Surgeon: First Available Tamika Panchal;  Location: [facility];  Service: Endoscopy;  Laterality: N/A;    AXILLARY NODE DISSECTION Right     granular cell tumor    BILATERAL SALPINGO-OOPHORECTOMY (BSO)  07/21/2020    BREAST CYST ASPIRATION      left    CHOLECYSTECTOMY      COLONOSCOPY N/A 05/10/2019    Procedure: COLONOSCOPY;  Surgeon: Edgar Gamble Jr., MD;  Location: [facility];  Service: Endoscopy;  Laterality: N/A;    COLONOSCOPY N/A 06/28/2024    Procedure: COLONOSCOPY;  Surgeon: Carina Sahu MD;  Location: [facility];  Service: Endoscopy;  Laterality: N/A;    ENDOBRONCHIAL ULTRASOUND Bilateral 04/27/2021    Procedure: ENDOBRONCHIAL ULTRASOUND (EBUS);  Surgeon: Armando Kirby MD;  Location: [facility];  Service: Pulmonary;  Laterality: Bilateral;  need fluoroscopy    ENDOMETRIAL ABLATION      ESOPHAGEAL MANOMETRY WITH MEASUREMENT OF IMPEDANCE N/A 01/06/2020    Procedure: MANOMETRY, ESOPHAGUS, WITH IMPEDANCE MEASUREMENT;  Surgeon: First Available [place];  Location: [facility];  Service: Endoscopy;  Laterality: N/A;    ESOPHAGOGASTRODUODENOSCOPY N/A 07/05/2018    Procedure: EGD (ESOPHAGOGASTRODUODENOSCOPY);  Surgeon: Edgar Gamble Jr., MD;  Location: [facility];  Service: Endoscopy;  Laterality: N/A;    ESOPHAGOGASTRODUODENOSCOPY N/A 11/23/2020    Procedure: ESOPHAGOGASTRODUODENOSCOPY (EGD);  Surgeon: Jina Kaufman MD;  Location: [facility];  Service: General;  Laterality: N/A;    EXCISION OF MASS OF ABDOMEN Left 06/18/2018    Procedure: EXCISION, MASS, ABDOMEN - flank left;  Surgeon:  Armando Tate MD;  Location: [facility];  Service: General;  Laterality: Left;  left flank removal of mass    FINE NEEDLE ASPIRATION      thyroid    FLEXIBLE SIGMOIDOSCOPY N/A 08/24/2022    Procedure: SIGMOIDOSCOPY, FLEXIBLE;  Surgeon: Amber West MD;  Location: [facility];  Service: Endoscopy;  Laterality: N/A;    HYSTERECTOMY  07/21/2020    INJECTION OF ANESTHETIC AGENT AROUND MEDIAL BRANCH NERVES INNERVATING LUMBAR FACET JOINT Right 02/24/2022    Procedure: Right L3, 4, 5 MBB;  Surgeon: Anam Montero MD;  Location: [facility];  Service: Pain Management;  Laterality: Right;    INJECTION OF ANESTHETIC AGENT AROUND MEDIAL BRANCH NERVES INNERVATING LUMBAR FACET JOINT Right 03/29/2022    Procedure: Right L3, 4, 5 MBB  (2nd block);  Surgeon: Anam Montero MD;  Location: [facility];  Service: Pain Management;  Laterality: Right;    KNEE ARTHROSCOPY W/ MENISCECTOMY Right     NASAL SEPTUM SURGERY      OOPHORECTOMY      RADIOFREQUENCY THERMOCOAGULATION Right 04/05/2022    Procedure: Right L3-5 Lumbar RFA;  Surgeon: Anam Montero MD;  Location: [facility];  Service: Pain Management;  Laterality: Right;    RADIOFREQUENCY THERMOCOAGULATION Right 02/22/2024    Procedure: Right L3-5 Lumbar RFA;  Surgeon: Anam Montero MD;  Location: [facility];  Service: Pain Management;  Laterality: Right;    ROBOT-ASSISTED NISSEN FUNDOPLICATION USING DA TAMAR XI N/A 02/28/2020    Procedure: XI ROBOTIC FUNDOPLICATION, NISSEN;  Surgeon: Jina Kaufman MD;  Location: [facility] OR;  Service: General;  Laterality: N/A;    THYROIDECTOMY Bilateral 03/11/2021    Procedure: THYROIDECTOMY;  Surgeon: Nixon Moe MD;  Location: [facility] OR;  Service: ENT;  Laterality: Bilateral;    TUBAL LIGATION      UPPER GASTROINTESTINAL ENDOSCOPY  07/05/2018    Dr. Gamble     Family History   Problem Relation Name Age of Onset    Diabetes Mother Deyonne     Kidney failure Mother Deyonne     Heart attack Mother Deyonne     Early  death Mother Deyonne     Heart disease Mother Deyonne     Kidney disease Mother Deyonne     Blindness Father Chandu     Cancer Father Chandu     Vision loss Father Chandu     Breast cancer Maternal Aunt great aunt     Breast cancer Paternal Aunt      Breast cancer Maternal Grandmother      Ovarian cancer Cousin      Breast cancer Cousin      Cancer Paternal Grandfather Chandu     Vision loss Paternal Grandfather Chandu     Hypertension Daughter Marie     Stroke Paternal Aunt Valethia     Cirrhosis Neg Hx      Colon polyps Neg Hx      Colon cancer Neg Hx      Crohn's disease Neg Hx      Esophageal cancer Neg Hx      Stomach cancer Neg Hx      Ulcerative colitis Neg Hx      Amblyopia Neg Hx      Cataracts Neg Hx      Glaucoma Neg Hx      Macular degeneration Neg Hx      Retinal detachment Neg Hx      Strabismus Neg Hx      Allergic rhinitis Neg Hx      Allergies Neg Hx      Angioedema Neg Hx      Asthma Neg Hx      Atopy Neg Hx      Eczema Neg Hx      Immunodeficiency Neg Hx      Rhinitis Neg Hx      Urticaria Neg Hx       Social History[1]  Wt Readings from Last 10 Encounters:   04/29/25 119.1 kg (262 lb 9.1 oz)   04/09/25 118.4 kg (261 lb)   02/28/25 118.5 kg (261 lb 3.9 oz)   02/05/25 121.1 kg (266 lb 15.6 oz)   01/15/25 120.1 kg (264 lb 11.2 oz)   01/06/25 117.9 kg (259 lb 14.8 oz)   12/27/24 117.9 kg (260 lb)   11/19/24 115 kg (253 lb 8.5 oz)   11/06/24 115 kg (253 lb 8.5 oz)   10/30/24 114.8 kg (253 lb)     Lab Results   Component Value Date    WBC 5.75 03/21/2025    HGB 11.7 (L) 03/21/2025    HCT 36.2 (L) 03/21/2025    MCV 83 03/21/2025     03/21/2025     CMP  Sodium   Date Value Ref Range Status   02/18/2025 141 136 - 145 mmol/L Final     Potassium   Date Value Ref Range Status   02/18/2025 4.2 3.5 - 5.1 mmol/L Final     Chloride   Date Value Ref Range Status   02/18/2025 103 95 - 110 mmol/L Final     CO2   Date Value Ref Range Status   02/18/2025 27 23 - 29 mmol/L Final     Glucose   Date Value Ref  "Range Status   02/18/2025 137 (H) 70 - 110 mg/dL Final     BUN   Date Value Ref Range Status   02/18/2025 18 6 - 20 mg/dL Final     Creatinine   Date Value Ref Range Status   02/18/2025 1.2 0.5 - 1.4 mg/dL Final     Calcium   Date Value Ref Range Status   02/18/2025 8.7 8.7 - 10.5 mg/dL Final     Total Protein   Date Value Ref Range Status   02/18/2025 8.0 6.0 - 8.4 g/dL Final     Albumin   Date Value Ref Range Status   02/18/2025 4.1 3.5 - 5.2 g/dL Final     Total Bilirubin   Date Value Ref Range Status   02/18/2025 0.5 0.1 - 1.0 mg/dL Final     Comment:     For infants and newborns, interpretation of results should be based  on gestational age, weight and in agreement with clinical  observations.    Premature Infant recommended reference ranges:  Up to 24 hours.............<8.0 mg/dL  Up to 48 hours............<12.0 mg/dL  3-5 days..................<15.0 mg/dL  6-29 days.................<15.0 mg/dL       Alkaline Phosphatase   Date Value Ref Range Status   02/18/2025 124 40 - 150 U/L Final     AST   Date Value Ref Range Status   02/18/2025 46 (H) 10 - 40 U/L Final     ALT   Date Value Ref Range Status   02/18/2025 55 (H) 10 - 44 U/L Final     Anion Gap   Date Value Ref Range Status   02/18/2025 11 8 - 16 mmol/L Final     eGFR if    Date Value Ref Range Status   07/27/2022 46.8 (A) >60 mL/min/1.73 m^2 Final     eGFR if non    Date Value Ref Range Status   07/27/2022 40.6 (A) >60 mL/min/1.73 m^2 Final     Comment:     Calculation used to obtain the estimated glomerular filtration  rate (eGFR) is the CKD-EPI equation.        No results found for: "AMYLASE"  No results found for: "LIPASE"  No results found for: "LIPASERES"  Lab Results   Component Value Date    TSH 0.492 02/18/2025       Reviewed prior medical records including radiology report of EGD with [facility] provider & endoscopy history (see surgical history).    Objective:      Physical Exam  Vitals and nursing note reviewed. "   Constitutional:       General: She is not in acute distress.     Appearance: She is obese. She is not ill-appearing.   HENT:      Head: Normocephalic and atraumatic.      Mouth/Throat:      Mouth: Mucous membranes are moist.      Pharynx: Oropharynx is clear.   Eyes:      Conjunctiva/sclera: Conjunctivae normal.   Cardiovascular:      Rate and Rhythm: Normal rate and regular rhythm.      Pulses: Normal pulses.   Pulmonary:      Effort: Pulmonary effort is normal. No respiratory distress.   Abdominal:      General: Abdomen is flat. There is no distension.      Palpations: Abdomen is soft.      Tenderness: There is no abdominal tenderness.   Skin:     General: Skin is warm and dry.      Capillary Refill: Capillary refill takes 2 to 3 seconds.   Neurological:      Mental Status: She is alert and oriented to person, place, and time.         Assessment:       1. Gastroesophageal reflux disease, unspecified whether esophagitis present    2. Chronic constipation with overflow    3. Diarrhea, unspecified type    4. Hiatal hernia with gastroesophageal reflux        Plan:       Gastroesophageal reflux disease, unspecified whether esophagitis present & Hiatal hernia with gastroesophageal reflux  -discussed about the different types of medications used to treat reflux and how to use them, antacids can be used PRN for breakthrough heartburn symptoms by reducing stomach acid that is already produced, H2 blockers work by limiting the amount acid production, & PPI's work to block acid production and are taken daily, patient verbalized understanding.  -Educated patient on lifestyle modifications to help control/reduce reflux/abdominal pain including: avoid large meals, avoid eating within 2-3 hours of bedtime (avoid late night eating & lying down soon after eating), elevate head of bed if nocturnal symptoms are present, smoking cessation (if current smoker), & weight loss (if overweight).   -Educated to avoid known foods which  trigger reflux symptoms & to minimize/avoid high-fat foods, chocolate, caffeine, citrus, alcohol, & tomato products.  -Advised to avoid/limit use of NSAID's, since they can cause GI upset, bleeding, and/or ulcers. If needed, take with food.     -discussed diagnosis with patient & that it is usually managed by controlling reflux symptoms, surgery is an option, but usually performed if reflux is uncontrolled by medication management and lifestyle/dietary modifications; if symptoms persist despite medication management and lifestyle/dietary modifications, we can refer to general surgery to consult about surgical options, patient verbalized understanding     Start daily omeprazole daily, pepcid nightly PRN, carafate 4x a day for 10 days.     Conside BID carafate if it helps and other oral medications do not improve symptoms.   -     famotidine (PEPCID) 40 MG tablet; Take 1 tablet (40 mg total) by mouth nightly as needed for Heartburn.  Dispense: 30 tablet; Refill: 11  -     omeprazole (PRILOSEC) 40 MG capsule; Take 1 capsule (40 mg total) by mouth once daily.  Dispense: 90 capsule; Refill: 3  -     sucralfate (CARAFATE) 1 gram tablet; Take 1 tablet (1 g total) by mouth 4 (four) times daily before meals and nightly. for 10 days  Dispense: 40 tablet; Refill: 0    Chronic constipation with overflow  Recommend daily exercise as tolerated, adequate water intake (six 8-oz glasses of water daily), and high fiber diet. OTC fiber supplements are recommended if diet does not reach daily fiber goal (20-30 grams daily), such as Metamucil, Citrucel, or FiberCon (take as directed, separate from other oral medications by >2 hours).  -Recommend taking an OTC stool softener such as Colace as directed to avoid hard stools and straining with bowel movements PRN  -Recommend trying OTC MiraLax once daily (17g PO) as directed  - If no improvement with above recommendations, try intermittently dosed Dulcolax OTC as directed (every 3-4  days)  PRN to facilitate bowel movements  -If still no improvement with these measures, call/follow-up    X-ray to assess for constipation vs overflow diarrhea/IBS with diarrhea    If confirmed constipation - will proceed with amitiza. If constipation not seen on x-ray then will hold off on amitiza and send in bentyl for possible IBS-D/Abdominal cramping. Along with stool studies to rule out infection.  -     X-Ray Abdomen AP 1 View; Future; Expected date: 04/29/2025  -     lubiprostone (AMITIZA) 8 MCG Cap; Take 1 capsule (8 mcg total) by mouth 2 (two) times daily with meals.  Dispense: 60 capsule; Refill: 11    Diarrhea, unspecified type  IBS-D vs infectious diarrhea vs functional diarrhea    If constipation not seen on x-ray will proceed with stool studies to rule out infectious cause.     Also will send in bentyl for abdominal cramping post meal.   -     Pancreatic elastase, fecal; Future; Expected date: 04/29/2025  -     Giardia / Cryptosporidum, EIA; Future; Expected date: 04/29/2025  -     Stool Exam-Ova,Cysts,Parasites; Future; Expected date: 04/29/2025  -     Clostridium difficile EIA; Future; Expected date: 04/29/2025  -     Stool culture; Future; Expected date: 04/29/2025      Follow up if symptoms worsen or fail to improve.      If no improvement in symptoms or symptoms worsen, call/follow-up at clinic or go to ER.       TIFFANIE Galeano, BETO-C    Encounter includes face to face time and non-face to face time preparing to see the patient (eg, review of tests), obtaining and/or reviewing separately obtained history, documenting clinical information in the electronic or other health record, independently interpreting results (not separately reported) and communicating results to the patient/family/caregiver, or care coordination (not separately reported).     A dictation software program was used for this note. Please expect some simple typographical errors in this note.         [1]   Social History  Tobacco  Use    Smoking status: Never    Smokeless tobacco: Never   Substance Use Topics    Alcohol use: No    Drug use: No

## 2025-04-28 RX ORDER — PANTOPRAZOLE SODIUM 40 MG/1
40 TABLET, DELAYED RELEASE ORAL DAILY
Qty: 90 TABLET | Refills: 3 | Status: CANCELLED | OUTPATIENT
Start: 2025-04-28 | End: 2026-04-28

## 2025-04-29 ENCOUNTER — HOSPITAL ENCOUNTER (OUTPATIENT)
Dept: RADIOLOGY | Facility: HOSPITAL | Age: 50
Discharge: HOME | End: 2025-04-29
Payer: COMMERCIAL

## 2025-04-29 ENCOUNTER — RESULTS FOLLOW-UP (OUTPATIENT)
Dept: GASTROENTEROLOGY | Facility: CLINIC | Age: 50
End: 2025-04-29

## 2025-04-29 ENCOUNTER — OFFICE VISIT (OUTPATIENT)
Dept: GASTROENTEROLOGY | Facility: CLINIC | Age: 50
End: 2025-04-29
Payer: COMMERCIAL

## 2025-04-29 VITALS
DIASTOLIC BLOOD PRESSURE: 75 MMHG | WEIGHT: 262.56 LBS | SYSTOLIC BLOOD PRESSURE: 113 MMHG | BODY MASS INDEX: 46.52 KG/M2 | HEART RATE: 88 BPM | HEIGHT: 63 IN

## 2025-04-29 DIAGNOSIS — K21.9 HIATAL HERNIA WITH GASTROESOPHAGEAL REFLUX: ICD-10-CM

## 2025-04-29 DIAGNOSIS — K59.09 CHRONIC CONSTIPATION WITH OVERFLOW: ICD-10-CM

## 2025-04-29 DIAGNOSIS — R19.7 DIARRHEA, UNSPECIFIED TYPE: ICD-10-CM

## 2025-04-29 DIAGNOSIS — K21.9 GASTROESOPHAGEAL REFLUX DISEASE, UNSPECIFIED WHETHER ESOPHAGITIS PRESENT: Primary | ICD-10-CM

## 2025-04-29 DIAGNOSIS — K44.9 HIATAL HERNIA WITH GASTROESOPHAGEAL REFLUX: ICD-10-CM

## 2025-04-29 PROCEDURE — 74018 RADEX ABDOMEN 1 VIEW: CPT | Mod: 26,,, | Performed by: RADIOLOGY

## 2025-04-29 PROCEDURE — 99214 OFFICE O/P EST MOD 30 MIN: CPT | Mod: S$GLB,,,

## 2025-04-29 PROCEDURE — 3008F BODY MASS INDEX DOCD: CPT | Mod: CPTII,S$GLB,,

## 2025-04-29 PROCEDURE — 99999 PR PBB SHADOW E&M-EST. PATIENT-LVL IV: CPT | Mod: PBBFAC,,,

## 2025-04-29 PROCEDURE — 3074F SYST BP LT 130 MM HG: CPT | Mod: CPTII,S$GLB,,

## 2025-04-29 PROCEDURE — 74018 RADEX ABDOMEN 1 VIEW: CPT | Mod: TC

## 2025-04-29 PROCEDURE — 3078F DIAST BP <80 MM HG: CPT | Mod: CPTII,S$GLB,,

## 2025-04-29 PROCEDURE — 1159F MED LIST DOCD IN RCRD: CPT | Mod: CPTII,S$GLB,,

## 2025-04-29 RX ORDER — FAMOTIDINE 40 MG/1
40 TABLET, FILM COATED ORAL NIGHTLY PRN
Qty: 30 TABLET | Refills: 11 | Status: SHIPPED | OUTPATIENT
Start: 2025-04-29 | End: 2026-04-29

## 2025-04-29 RX ORDER — SUCRALFATE 1 G/1
1 TABLET ORAL
Qty: 40 TABLET | Refills: 0 | Status: SHIPPED | OUTPATIENT
Start: 2025-04-29 | End: 2025-05-09

## 2025-04-29 RX ORDER — LUBIPROSTONE 8 UG/1
8 CAPSULE ORAL 2 TIMES DAILY WITH MEALS
Qty: 60 CAPSULE | Refills: 11 | Status: SHIPPED | OUTPATIENT
Start: 2025-04-29

## 2025-04-29 RX ORDER — OMEPRAZOLE 40 MG/1
40 CAPSULE, DELAYED RELEASE ORAL DAILY
Qty: 90 CAPSULE | Refills: 3 | Status: SHIPPED | OUTPATIENT
Start: 2025-04-29 | End: 2026-04-29

## 2025-04-29 NOTE — PATIENT INSTRUCTIONS
Recommend daily exercise as tolerated, adequate water intake (six 8-oz glasses of water daily), and high fiber diet. OTC fiber supplements are recommended if diet does not reach daily fiber goal (20-30 grams daily), such as Metamucil, Citrucel, or FiberCon (take as directed, separate from other oral medications by >2 hours).  -Recommend taking an OTC stool softener such as Colace as directed to avoid hard stools and straining with bowel movements PRN  -Recommend trying OTC MiraLax once daily (17g PO) as directed  - If no improvement with above recommendations, try intermittently dosed Dulcolax OTC as directed (every 3-4 days) PRN to facilitate bowel movements  -If still no improvement with these measures, call/follow-up    Amitiza 8 mcg twice a day

## 2025-05-01 ENCOUNTER — PATIENT MESSAGE (OUTPATIENT)
Dept: FAMILY MEDICINE | Facility: CLINIC | Age: 50
End: 2025-05-01
Payer: COMMERCIAL

## 2025-05-05 ENCOUNTER — LAB VISIT (OUTPATIENT)
Dept: LAB | Facility: HOSPITAL | Age: 50
End: 2025-05-05
Payer: COMMERCIAL

## 2025-05-05 DIAGNOSIS — R19.7 DIARRHEA, UNSPECIFIED TYPE: ICD-10-CM

## 2025-05-05 DIAGNOSIS — D50.9 IRON DEFICIENCY ANEMIA, UNSPECIFIED IRON DEFICIENCY ANEMIA TYPE: ICD-10-CM

## 2025-05-05 DIAGNOSIS — Z86.19 HISTORY OF HELICOBACTER PYLORI INFECTION: ICD-10-CM

## 2025-05-05 PROCEDURE — 87046 STOOL CULTR AEROBIC BACT EA: CPT

## 2025-05-05 PROCEDURE — 87045 FECES CULTURE AEROBIC BACT: CPT

## 2025-05-05 PROCEDURE — 87427 SHIGA-LIKE TOXIN AG IA: CPT | Mod: 59

## 2025-05-05 PROCEDURE — 87449 NOS EACH ORGANISM AG IA: CPT

## 2025-05-05 PROCEDURE — 87209 SMEAR COMPLEX STAIN: CPT

## 2025-05-05 PROCEDURE — 87329 GIARDIA AG IA: CPT

## 2025-05-05 PROCEDURE — 82653 EL-1 FECAL QUANTITATIVE: CPT

## 2025-05-06 ENCOUNTER — RESULTS FOLLOW-UP (OUTPATIENT)
Dept: GASTROENTEROLOGY | Facility: CLINIC | Age: 50
End: 2025-05-06

## 2025-05-06 LAB
C COLI+JEJ+UPSA DNA STL QL NAA+NON-PROBE: NEGATIVE
C DIFF GDH STL QL: NEGATIVE
C DIFF TOX A+B STL QL IA: NEGATIVE
ELASTASE, STOOL INTERPRETATION (OHS): NORMAL
PANCREATIC ELASTASE, FECAL (OHS): >800 ΜG/G

## 2025-05-07 LAB
CRYPTOSP AG SPEC QL: NEGATIVE
E COLI SXT1 STL QL IA: NEGATIVE
E COLI SXT2 STL QL IA: NEGATIVE
G LAMBLIA AG STL QL IA: NEGATIVE

## 2025-05-08 LAB — BACTERIA STL CULT: NORMAL

## 2025-05-10 LAB — O+P STL MICRO: NORMAL

## 2025-05-19 ENCOUNTER — LAB VISIT (OUTPATIENT)
Dept: LAB | Facility: HOSPITAL | Age: 50
End: 2025-05-19
Attending: NURSE PRACTITIONER
Payer: COMMERCIAL

## 2025-05-19 DIAGNOSIS — D64.9 NORMOCYTIC ANEMIA: ICD-10-CM

## 2025-05-19 DIAGNOSIS — Z86.19 HISTORY OF HELICOBACTER PYLORI INFECTION: ICD-10-CM

## 2025-05-19 DIAGNOSIS — R74.01 TRANSAMINITIS: ICD-10-CM

## 2025-05-19 DIAGNOSIS — D50.9 IRON DEFICIENCY ANEMIA, UNSPECIFIED IRON DEFICIENCY ANEMIA TYPE: ICD-10-CM

## 2025-05-19 LAB
ABSOLUTE EOSINOPHIL (OHS): 0.74 K/UL
ABSOLUTE MONOCYTE (OHS): 0.35 K/UL (ref 0.3–1)
ABSOLUTE NEUTROPHIL COUNT (OHS): 2.05 K/UL (ref 1.8–7.7)
ALBUMIN SERPL BCP-MCNC: 3.6 G/DL (ref 3.5–5.2)
ALP SERPL-CCNC: 103 UNIT/L (ref 40–150)
ALT SERPL W/O P-5'-P-CCNC: 23 UNIT/L (ref 10–44)
ANION GAP (OHS): 13 MMOL/L (ref 8–16)
AST SERPL-CCNC: 23 UNIT/L (ref 11–45)
BASOPHILS # BLD AUTO: 0.05 K/UL
BASOPHILS NFR BLD AUTO: 0.9 %
BILIRUB SERPL-MCNC: 0.3 MG/DL (ref 0.1–1)
BUN SERPL-MCNC: 19 MG/DL (ref 6–20)
CALCIUM SERPL-MCNC: 8.3 MG/DL (ref 8.7–10.5)
CHLORIDE SERPL-SCNC: 102 MMOL/L (ref 95–110)
CO2 SERPL-SCNC: 28 MMOL/L (ref 23–29)
CREAT SERPL-MCNC: 1.4 MG/DL (ref 0.5–1.4)
ERYTHROCYTE [DISTWIDTH] IN BLOOD BY AUTOMATED COUNT: 14.4 % (ref 11.5–14.5)
FERRITIN SERPL-MCNC: 538 NG/ML (ref 20–300)
GFR SERPLBLD CREATININE-BSD FMLA CKD-EPI: 45 ML/MIN/1.73/M2
GLUCOSE SERPL-MCNC: 153 MG/DL (ref 70–110)
HCT VFR BLD AUTO: 34.1 % (ref 37–48.5)
HGB BLD-MCNC: 10.8 GM/DL (ref 12–16)
IMM GRANULOCYTES # BLD AUTO: 0.02 K/UL (ref 0–0.04)
IMM GRANULOCYTES NFR BLD AUTO: 0.4 % (ref 0–0.5)
IRON SATN MFR SERPL: 21 % (ref 20–50)
IRON SERPL-MCNC: 57 UG/DL (ref 30–160)
LYMPHOCYTES # BLD AUTO: 2.14 K/UL (ref 1–4.8)
MCH RBC QN AUTO: 27.1 PG (ref 27–31)
MCHC RBC AUTO-ENTMCNC: 31.7 G/DL (ref 32–36)
MCV RBC AUTO: 86 FL (ref 82–98)
NUCLEATED RBC (/100WBC) (OHS): 0 /100 WBC
PLATELET # BLD AUTO: 223 K/UL (ref 150–450)
PMV BLD AUTO: 11 FL (ref 9.2–12.9)
POTASSIUM SERPL-SCNC: 3.4 MMOL/L (ref 3.5–5.1)
PROT SERPL-MCNC: 7.5 GM/DL (ref 6–8.4)
RBC # BLD AUTO: 3.98 M/UL (ref 4–5.4)
RELATIVE EOSINOPHIL (OHS): 13.8 %
RELATIVE LYMPHOCYTE (OHS): 40 % (ref 18–48)
RELATIVE MONOCYTE (OHS): 6.5 % (ref 4–15)
RELATIVE NEUTROPHIL (OHS): 38.4 % (ref 38–73)
SODIUM SERPL-SCNC: 143 MMOL/L (ref 136–145)
TIBC SERPL-MCNC: 266 UG/DL (ref 250–450)
TRANSFERRIN SERPL-MCNC: 180 MG/DL (ref 200–375)
WBC # BLD AUTO: 5.35 K/UL (ref 3.9–12.7)

## 2025-05-19 PROCEDURE — 83540 ASSAY OF IRON: CPT

## 2025-05-19 PROCEDURE — 84075 ASSAY ALKALINE PHOSPHATASE: CPT

## 2025-05-19 PROCEDURE — 36415 COLL VENOUS BLD VENIPUNCTURE: CPT | Mod: PO

## 2025-05-19 PROCEDURE — 85025 COMPLETE CBC W/AUTO DIFF WBC: CPT | Mod: PO

## 2025-05-19 PROCEDURE — 82728 ASSAY OF FERRITIN: CPT

## 2025-05-26 ENCOUNTER — OFFICE VISIT (OUTPATIENT)
Dept: HEMATOLOGY/ONCOLOGY | Facility: CLINIC | Age: 50
End: 2025-05-26
Payer: COMMERCIAL

## 2025-05-26 DIAGNOSIS — D64.9 NORMOCYTIC ANEMIA: Primary | ICD-10-CM

## 2025-05-26 PROCEDURE — 99499 UNLISTED E&M SERVICE: CPT | Mod: 95,,, | Performed by: NURSE PRACTITIONER

## 2025-05-26 NOTE — PROGRESS NOTES
The patient location is: {State:[facility]}  The chief complaint leading to consultation is: ***    Visit type: audiovisual    Face to Face time with patient: ***  *** minutes of total time spent on the encounter, which includes face to face time and non-face to face time preparing to see the patient (eg, review of tests), Obtaining and/or reviewing separately obtained history, Documenting clinical information in the electronic or other health record, Independently interpreting results (not separately reported) and communicating results to the patient/family/caregiver, or Care coordination (not separately reported).         Each patient to whom he or she provides medical services by telemedicine is:  (1) informed of the relationship between the physician and patient and the respective role of any other health care provider with respect to management of the patient; and (2) notified that he or she may decline to receive medical services by telemedicine and may withdraw from such care at any time.     51 yo female presents to the clinic due to low blood count.       She is known to Dr. Caicedo for anemia of chronic disease as well as idiopathic eosinophilia.    Unilateral bone marrow aspiration/biopsy in 2007, which did not reveal any clonal population of cells on flow and no evidence of dysplasia within the marrow.    Surveilled annually.    She also suffers with chronic lymphedema to RUE from lymphadenectomy.  EBUS in 04/21 due to mediastinal lymphadenopathy/nodule that was later diagnosed as Sarcoidosis, followed by Dr. Kirby.     Last visit on 08/31/22 c/o of fatigue & increased sleepiness.    Recent sigmoid scope on 08/24 did not reveal a bleeding source.  Occult blood x 3 = negative  Labs reviewed indicating MARITZA.  Patient was scheduled for Feraheme.     10/14/22:  Approximately 2 1/2 weeks post 2nd infusion patient presents via telemed for interval evaluation & review of lab.  Patient reports feeling much better;  ""big" improvement; no fatigue or sleepiness.  Denies CP, SOB, pica, palpitations, etc.     Interval History:   01/17/2023  Sitting up in a chair looking at her computer; respirations easy & unlabored.  Reports that she feels good.  More energy; no fatigue.  Denies any CP, SOB, pica, bleeding, dark stools, palpitations, etc.     Interval history:  1/15/2024 Today is the first time I am evaluating/assessing the patient.  Currently with normocytic anemia.  Hgb 11.3 normocytic.  Folate low normal at 4.9. had a h/o total hysterectomy in the past.  Denies any known abnormal bleeding. Is up to date on colonoscopy and mammogram.       Interval History:  3/26/2025 Received feraheme infusion x 1 on 2/5/2025 and started on folvite 1 mg Po daily d/t very low normal folic acid levels.  Presents today to evaluate response. States that she continues to have reflux and stomach discomfort.     Interval History:  5/26/2025       {New Patient Virtual Visit requirements-  01154  SYNCHRONOUS AUDIO-VIDEO VISIT, NEW, SF MDM 15+ MIN  21336  SYNCHRONOUS AUDIO-VIDEO VISIT, NEW, LOW MDM 30+ MIN  56577  SYNCHRONOUS AUDIO-VIDEO VISIT, NEW, MOD MDM 45+ MIN  24145  SYNCHRONOUS AUDIO-VIDEO VISIT, NEW, HIGH MDM 60+ MIN     Est Patient Virtual Visit requirements-  78788  SYNCHRONOUS AUDIO-VIDEO VISIT, EST, SF MDM 10+ MIN  65350  SYNCHRONOUS AUDIO-VIDEO VISIT, EST, LOW MDM 20+ MIN  73111  SYNCHRONOUS AUDIO-VIDEO VISIT, EST, MOD MDM 30+ MIN  49196  SYNCHRONOUS AUDIO-VIDEO VISIT, EST, HIGH MDM 40+ MIN  (This text will automatically delete):01589}                    "

## 2025-05-28 ENCOUNTER — OFFICE VISIT (OUTPATIENT)
Dept: HEMATOLOGY/ONCOLOGY | Facility: CLINIC | Age: 50
End: 2025-05-28
Payer: COMMERCIAL

## 2025-05-28 DIAGNOSIS — D63.1 ANEMIA DUE TO STAGE 3 CHRONIC KIDNEY DISEASE, UNSPECIFIED WHETHER STAGE 3A OR 3B CKD: ICD-10-CM

## 2025-05-28 DIAGNOSIS — N18.30 ANEMIA DUE TO STAGE 3 CHRONIC KIDNEY DISEASE, UNSPECIFIED WHETHER STAGE 3A OR 3B CKD: ICD-10-CM

## 2025-05-28 DIAGNOSIS — D64.9 NORMOCYTIC ANEMIA: Primary | ICD-10-CM

## 2025-05-28 DIAGNOSIS — N18.30 STAGE 3 CHRONIC KIDNEY DISEASE, UNSPECIFIED WHETHER STAGE 3A OR 3B CKD: ICD-10-CM

## 2025-05-28 DIAGNOSIS — E53.8 FOLIC ACID DEFICIENCY: ICD-10-CM

## 2025-05-28 PROCEDURE — 99499 UNLISTED E&M SERVICE: CPT | Mod: 95,,, | Performed by: NURSE PRACTITIONER

## 2025-05-28 NOTE — PROGRESS NOTES
Patient was not seen. She logged in an hour after her appointment time so needed to be rescheduled.

## 2025-05-28 NOTE — PROGRESS NOTES
"The patient location is: [place]    Subjective:      Patient ID: Akiko Jameson is a 50 y.o. female.    Chief Complaint:     HPI:  49 yo female presents to the clinic due to low blood count.       She is known to Dr. Caicedo for anemia of chronic disease as well as idiopathic eosinophilia.    Unilateral bone marrow aspiration/biopsy in 2007, which did not reveal any clonal population of cells on flow and no evidence of dysplasia within the marrow.    Surveilled annually.    She also suffers with chronic lymphedema to RUE from lymphadenectomy.  EBUS in 04/21 due to mediastinal lymphadenopathy/nodule that was later diagnosed as Sarcoidosis, followed by Dr. Kirby.     Last visit on 08/31/22 c/o of fatigue & increased sleepiness.    Recent sigmoid scope on 08/24 did not reveal a bleeding source.  Occult blood x 3 = negative  Labs reviewed indicating MARITZA.  Patient was scheduled for Feraheme.     10/14/22:  Approximately 2 1/2 weeks post 2nd infusion patient presents via telemed for interval evaluation & review of lab.  Patient reports feeling much better; "big" improvement; no fatigue or sleepiness.  Denies CP, SOB, pica, palpitations, etc.     Interval History:   01/17/2023  Sitting up in a chair looking at her computer; respirations easy & unlabored.  Reports that she feels good.  More energy; no fatigue.  Denies any CP, SOB, pica, bleeding, dark stools, palpitations, etc.     Interval history:  1/15/2024 Today is the first time I am evaluating/assessing the patient.  Currently with normocytic anemia.  Hgb 11.3 normocytic.  Folate low normal at 4.9. had a h/o total hysterectomy in the past.  Denies any known abnormal bleeding. Is up to date on colonoscopy and mammogram.       Interval History:  3/26/2025 Received feraheme infusion x 1 on 2/5/2025 and started on folvite 1 mg Po daily d/t very low normal folic acid levels.  Presents today to evaluate response. States that she continues to have reflux and " stomach discomfort.    Interval History:  5/28/2025  Abdominal US done 3/28/2025 reviewed and with the following results:    Impression:     1.  Stable fatty liver.  2.  Cholecystectomy.  Common bile duct 10 mm, likely reservoir effect, previously 7 mm.  No intrahepatic ductal dilatation.  Visualized pancreas is normal.     I have reviewed all of the patient's relevant lab work available in the medical record and have utilized this in my evaluation and management recommendations today.    Social History[1]    Family History   Problem Relation Name Age of Onset    Diabetes Mother Deyonne     Kidney failure Mother Deyonne     Heart attack Mother Deyonne     Early death Mother Deyonne     Heart disease Mother Deyonne     Kidney disease Mother Deyonne     Blindness Father Chandu     Cancer Father Chandu     Vision loss Father Chandu     Breast cancer Maternal Aunt great aunt     Breast cancer Paternal Aunt      Breast cancer Maternal Grandmother      Ovarian cancer Cousin      Breast cancer Cousin      Cancer Paternal Grandfather Chandu     Vision loss Paternal Grandfather Chandu     Hypertension Daughter Marie     Stroke Paternal Aunt Valethia     Cirrhosis Neg Hx      Colon polyps Neg Hx      Colon cancer Neg Hx      Crohn's disease Neg Hx      Esophageal cancer Neg Hx      Stomach cancer Neg Hx      Ulcerative colitis Neg Hx      Amblyopia Neg Hx      Cataracts Neg Hx      Glaucoma Neg Hx      Macular degeneration Neg Hx      Retinal detachment Neg Hx      Strabismus Neg Hx      Allergic rhinitis Neg Hx      Allergies Neg Hx      Angioedema Neg Hx      Asthma Neg Hx      Atopy Neg Hx      Eczema Neg Hx      Immunodeficiency Neg Hx      Rhinitis Neg Hx      Urticaria Neg Hx         Past Surgical History:   Procedure Laterality Date    24 HOUR IMPEDANCE PH MONITORING OF ESOPHAGUS IN PATIENT NOT TAKING ACID REDUCING MEDICATIONS N/A 01/06/2020    Procedure: IMPEDANCE PH STUDY, ESOPHAGEAL, 24 HOUR, IN PATIENT NOT TAKING  ACID REDUCING MEDICATION;  Surgeon: First Available [place];  Location: [facility];  Service: Endoscopy;  Laterality: N/A;    AXILLARY NODE DISSECTION Right     granular cell tumor    BILATERAL SALPINGO-OOPHORECTOMY (BSO)  07/21/2020    BREAST CYST ASPIRATION      left    CHOLECYSTECTOMY      COLONOSCOPY N/A 05/10/2019    Procedure: COLONOSCOPY;  Surgeon: Edgar Gamble Jr., MD;  Location: [facility] ENDO;  Service: Endoscopy;  Laterality: N/A;    COLONOSCOPY N/A 06/28/2024    Procedure: COLONOSCOPY;  Surgeon: Carina Sahu MD;  Location: [facility];  Service: Endoscopy;  Laterality: N/A;    ENDOBRONCHIAL ULTRASOUND Bilateral 04/27/2021    Procedure: ENDOBRONCHIAL ULTRASOUND (EBUS);  Surgeon: Armando Kirby MD;  Location: [facility];  Service: Pulmonary;  Laterality: Bilateral;  need fluoroscopy    ENDOMETRIAL ABLATION      ESOPHAGEAL MANOMETRY WITH MEASUREMENT OF IMPEDANCE N/A 01/06/2020    Procedure: MANOMETRY, ESOPHAGUS, WITH IMPEDANCE MEASUREMENT;  Surgeon: First Available [place];  Location: [facility];  Service: Endoscopy;  Laterality: N/A;    ESOPHAGOGASTRODUODENOSCOPY N/A 07/05/2018    Procedure: EGD (ESOPHAGOGASTRODUODENOSCOPY);  Surgeon: Edgar Gamble Jr., MD;  Location: [facility];  Service: Endoscopy;  Laterality: N/A;    ESOPHAGOGASTRODUODENOSCOPY N/A 11/23/2020    Procedure: ESOPHAGOGASTRODUODENOSCOPY (EGD);  Surgeon: Jina Kaufman MD;  Location: [facility];  Service: General;  Laterality: N/A;    EXCISION OF MASS OF ABDOMEN Left 06/18/2018    Procedure: EXCISION, MASS, ABDOMEN - flank left;  Surgeon: Armando Tate MD;  Location: [facility];  Service: General;  Laterality: Left;  left flank removal of mass    FINE NEEDLE ASPIRATION      thyroid    FLEXIBLE SIGMOIDOSCOPY N/A 08/24/2022    Procedure: SIGMOIDOSCOPY, FLEXIBLE;  Surgeon: Amber West MD;  Location: [facility];  Service: Endoscopy;  Laterality: N/A;    HYSTERECTOMY  07/21/2020    INJECTION OF ANESTHETIC AGENT AROUND  MEDIAL BRANCH NERVES INNERVATING LUMBAR FACET JOINT Right 02/24/2022    Procedure: Right L3, 4, 5 MBB;  Surgeon: Anam Montero MD;  Location: [facility];  Service: Pain Management;  Laterality: Right;    INJECTION OF ANESTHETIC AGENT AROUND MEDIAL BRANCH NERVES INNERVATING LUMBAR FACET JOINT Right 03/29/2022    Procedure: Right L3, 4, 5 MBB  (2nd block);  Surgeon: Anam Montero MD;  Location: [facility];  Service: Pain Management;  Laterality: Right;    KNEE ARTHROSCOPY W/ MENISCECTOMY Right     NASAL SEPTUM SURGERY      OOPHORECTOMY      RADIOFREQUENCY THERMOCOAGULATION Right 04/05/2022    Procedure: Right L3-5 Lumbar RFA;  Surgeon: Anam Montero MD;  Location: [facility];  Service: Pain Management;  Laterality: Right;    RADIOFREQUENCY THERMOCOAGULATION Right 02/22/2024    Procedure: Right L3-5 Lumbar RFA;  Surgeon: Anam Montero MD;  Location: [facility];  Service: Pain Management;  Laterality: Right;    ROBOT-ASSISTED NISSEN FUNDOPLICATION USING DA TAMAR XI N/A 02/28/2020    Procedure: XI ROBOTIC FUNDOPLICATION, NISSEN;  Surgeon: Jina Kaufman MD;  Location[facility] OR;  Service: General;  Laterality: N/A;    THYROIDECTOMY Bilateral 03/11/2021    Procedure: THYROIDECTOMY;  Surgeon: Nixon Moe MD;  Location: [facility] OR;  Service: ENT;  Laterality: Bilateral;    TUBAL LIGATION      UPPER GASTROINTESTINAL ENDOSCOPY  07/05/2018    Dr. Gamble       Past Medical History:   Diagnosis Date    ALLERGIC RHINITIS     Aortic valve sclerosis 1/20/2023    Arthritis     Cellulitis     Constipation due to opioid therapy     Eosinophilia     mild    GERD (gastroesophageal reflux disease)     Granular cell tumor     H. pylori infection 2018    History of 2019 novel coronavirus disease (COVID-19) 10/04/2020    Hypertension     Lymphedema     Mild anemia     Mitral valve sclerosis 3/21/2023    KEIRY on CPAP     does not regularly    Positive CHARLENE (antinuclear antibody) 07/27/2022    Prediabetes  08/06/2021    Sarcoidosis, unspecified     Sleep apnea     compliant with CPAP    Thyroid nodule     Urinary incontinence, mixed        Review of Systems   Constitutional:  Negative for appetite change and unexpected weight change.   HENT:  Negative for mouth sores.    Eyes:  Negative for visual disturbance.   Respiratory:  Negative for cough and shortness of breath.    Cardiovascular:  Negative for chest pain.   Gastrointestinal:  Positive for abdominal pain and diarrhea.   Genitourinary:  Negative for frequency.   Musculoskeletal:  Negative for back pain.   Skin:  Negative for rash.   Neurological:  Negative for headaches.   Hematological:  Negative for adenopathy.   Psychiatric/Behavioral:  The patient is not nervous/anxious.           Medication List with Changes/Refills   Current Medications    CARVEDILOL (COREG) 12.5 MG TABLET    Take 1 tablet (12.5 mg total) by mouth 2 (two) times daily.    CETIRIZINE (ZYRTEC) 10 MG TABLET    Take 1 tablet (10 mg total) by mouth once daily.    COLCHICINE (COLCRYS) 0.6 MG TABLET    Treatment should be initiated within 24 hours of onset.  For gout attacks: Day 1: Oral: 1.2 mg, followed in 1 hour with a single dose of 0.6 mg. Day 2 and thereafter: Oral: 0.6 mg daily until flare resolves    DOCUSATE SODIUM (COLACE) 100 MG CAPSULE    Take 100 mg by mouth every other day.     ERGOCALCIFEROL (ERGOCALCIFEROL) 50,000 UNIT CAP    Take one cap 3x weekly.    FAMOTIDINE (PEPCID) 40 MG TABLET    Take 1 tablet (40 mg total) by mouth nightly as needed for Heartburn.    FLUTICASONE PROPION-SALMETEROL 115-21 MCG/DOSE (ADVAIR HFA) 115-21 MCG/ACTUATION HFAA INHALER    Inhale 2 puffs into the lungs every 12 (twelve) hours. Controller    FOLIC ACID (FOLVITE) 1 MG TABLET    Take 1 tablet (1 mg total) by mouth once daily.    LUBIPROSTONE (AMITIZA) 8 MCG CAP    Take 1 capsule (8 mcg total) by mouth 2 (two) times daily with meals.    MONTELUKAST (SINGULAIR) 10 MG TABLET    Take 10 mg by mouth every  evening.    MULTIVITAMIN (THERAGRAN) PER TABLET    Take 1 tablet by mouth once daily. Every day    OLMESARTAN-HYDROCHLOROTHIAZIDE (BENICAR HCT) 20-12.5 MG PER TABLET    Take 1 tablet by mouth once daily.    OMEPRAZOLE (PRILOSEC) 40 MG CAPSULE    Take 1 capsule (40 mg total) by mouth once daily.    POTASSIUM CHLORIDE SA (K-DUR,KLOR-CON) 20 MEQ TABLET    Take 1 tablet (20 mEq total) by mouth 2 (two) times daily.    SPIRONOLACTONE (ALDACTONE) 25 MG TABLET    Take 0.5 tablets (12.5 mg total) by mouth once daily.    SYNTHROID 175 MCG TABLET    Take one tablet Monday-Friday and two tablets on the weekend.        Objective:   There were no vitals filed for this visit.    Physical Exam    Assessment:     Problem List Items Addressed This Visit    None      Lab Results   Component Value Date    WBC 5.35 05/19/2025    RBC 3.98 (L) 05/19/2025    HGB 10.8 (L) 05/19/2025    HCT 34.1 (L) 05/19/2025    MCV 86 05/19/2025    MCH 27.1 05/19/2025    MCHC 31.7 (L) 05/19/2025    RDW 14.4 05/19/2025     05/19/2025    MPV 11.0 05/19/2025    GRAN 2.8 03/21/2025    GRAN 49.1 03/21/2025    LYMPH 40.0 05/19/2025    LYMPH 2.14 05/19/2025    MONO 6.5 05/19/2025    MONO 0.35 05/19/2025    EOS 13.8 (H) 05/19/2025    EOS 0.74 (H) 05/19/2025    BASO 0.03 03/21/2025    EOSINOPHIL 6.4 03/21/2025    BASOPHIL 0.9 05/19/2025    BASOPHIL 0.05 05/19/2025      Lab Results   Component Value Date     05/19/2025    K 3.4 (L) 05/19/2025     05/19/2025    CO2 28 05/19/2025    BUN 19 05/19/2025    CREATININE 1.4 05/19/2025    CALCIUM 8.3 (L) 05/19/2025    ANIONGAP 13 05/19/2025    ESTGFRAFRICA 46.8 (A) 07/27/2022    EGFRNONAA 40.6 (A) 07/27/2022     Lab Results   Component Value Date    ALT 23 05/19/2025    AST 23 05/19/2025     (H) 06/03/2015    ALKPHOS 103 05/19/2025    BILITOT 0.3 05/19/2025     Lab Results   Component Value Date    IRON 57 05/19/2025    TRANSFERRIN 180 (L) 05/19/2025    TIBC 266 05/19/2025    FESATURATED 22  03/21/2025    FERRITIN 538.0 (H) 05/19/2025        Plan:   There are no diagnoses linked to this encounter.    Collaborating Provider:  Dr. Monique Morales MD    Thank You,  Wendy Blevins NP  Benign Hematology            Unable to complete visit due to patient being in [place].                   [1]   Social History  Socioeconomic History    Marital status:     Number of children: 2   Occupational History     Employer: [employer]   Tobacco Use    Smoking status: Never    Smokeless tobacco: Never   Substance and Sexual Activity    Alcohol use: No    Drug use: No    Sexual activity: Yes     Partners: Male     Birth control/protection: None     Social Drivers of Health     Financial Resource Strain: Low Risk  (7/29/2024)    Overall Financial Resource Strain (CARDIA)     Difficulty of Paying Living Expenses: Not hard at all   Food Insecurity: No Food Insecurity (7/29/2024)    Hunger Vital Sign     Worried About Running Out of Food in the Last Year: Never true     Ran Out of Food in the Last Year: Never true   Transportation Needs: Unknown (7/19/2024)    Received from [facility]    PRAPARE - Transportation     Lack of Transportation (Medical): Patient declined   Physical Activity: Insufficiently Active (7/29/2024)    Exercise Vital Sign     Days of Exercise per Week: 2 days     Minutes of Exercise per Session: 30 min   Stress: No Stress Concern Present (7/29/2024)    [employer] [place] [employer] - Occupational Stress Questionnaire     Feeling of Stress : Not at all   Housing Stability: Low Risk  (2/19/2024)    Housing Stability Vital Sign     Unable to Pay for Housing in the Last Year: No     Number of Places Lived in the Last Year: 1     Unstable Housing in the Last Year: No

## 2025-07-09 ENCOUNTER — OFFICE VISIT (OUTPATIENT)
Dept: NEPHROLOGY | Facility: CLINIC | Age: 50
End: 2025-07-09
Payer: COMMERCIAL

## 2025-07-09 ENCOUNTER — LAB VISIT (OUTPATIENT)
Dept: LAB | Facility: HOSPITAL | Age: 50
End: 2025-07-09
Attending: INTERNAL MEDICINE
Payer: COMMERCIAL

## 2025-07-09 VITALS
HEIGHT: 63 IN | SYSTOLIC BLOOD PRESSURE: 108 MMHG | BODY MASS INDEX: 46.49 KG/M2 | RESPIRATION RATE: 99 BRPM | DIASTOLIC BLOOD PRESSURE: 68 MMHG | WEIGHT: 262.38 LBS | HEART RATE: 66 BPM

## 2025-07-09 DIAGNOSIS — D64.9 NORMOCYTIC ANEMIA: ICD-10-CM

## 2025-07-09 DIAGNOSIS — D63.1 ANEMIA DUE TO STAGE 3A CHRONIC KIDNEY DISEASE: ICD-10-CM

## 2025-07-09 DIAGNOSIS — R73.03 PRE-DIABETES: ICD-10-CM

## 2025-07-09 DIAGNOSIS — N18.31 CHRONIC KIDNEY DISEASE, STAGE 3A: Primary | ICD-10-CM

## 2025-07-09 DIAGNOSIS — N18.31 ANEMIA DUE TO STAGE 3A CHRONIC KIDNEY DISEASE: ICD-10-CM

## 2025-07-09 DIAGNOSIS — I10 ESSENTIAL HYPERTENSION: ICD-10-CM

## 2025-07-09 DIAGNOSIS — N18.31 CHRONIC KIDNEY DISEASE, STAGE 3A: ICD-10-CM

## 2025-07-09 DIAGNOSIS — E66.01 MORBID OBESITY WITH BMI OF 45.0-49.9, ADULT: ICD-10-CM

## 2025-07-09 LAB
ANION GAP (OHS): 10 MMOL/L (ref 8–16)
BILIRUB UR QL STRIP.AUTO: NEGATIVE
BUN SERPL-MCNC: 18 MG/DL (ref 6–20)
CALCIUM SERPL-MCNC: 9 MG/DL (ref 8.7–10.5)
CHLORIDE SERPL-SCNC: 103 MMOL/L (ref 95–110)
CLARITY UR: CLEAR
CO2 SERPL-SCNC: 26 MMOL/L (ref 23–29)
COLOR UR AUTO: YELLOW
CREAT SERPL-MCNC: 1.1 MG/DL (ref 0.5–1.4)
CREAT UR-MCNC: 149 MG/DL (ref 15–325)
EAG (OHS): 126 MG/DL (ref 68–131)
GFR SERPLBLD CREATININE-BSD FMLA CKD-EPI: >60 ML/MIN/1.73/M2
GLUCOSE SERPL-MCNC: 116 MG/DL (ref 70–110)
GLUCOSE UR QL STRIP: NEGATIVE
HBA1C MFR BLD: 6 % (ref 4–5.6)
HGB UR QL STRIP: NEGATIVE
KETONES UR QL STRIP: NEGATIVE
LEUKOCYTE ESTERASE UR QL STRIP: NEGATIVE
NITRITE UR QL STRIP: NEGATIVE
PH UR STRIP: 6 [PH]
POTASSIUM SERPL-SCNC: 3.5 MMOL/L (ref 3.5–5.1)
PROT UR QL STRIP: NEGATIVE
PROT UR-MCNC: <7 MG/DL
PROT/CREAT UR: NORMAL MG/G{CREAT}
SODIUM SERPL-SCNC: 139 MMOL/L (ref 136–145)
SP GR UR STRIP: 1.02

## 2025-07-09 PROCEDURE — 36415 COLL VENOUS BLD VENIPUNCTURE: CPT | Mod: PO

## 2025-07-09 PROCEDURE — 3008F BODY MASS INDEX DOCD: CPT | Mod: CPTII,S$GLB,, | Performed by: INTERNAL MEDICINE

## 2025-07-09 PROCEDURE — 82570 ASSAY OF URINE CREATININE: CPT

## 2025-07-09 PROCEDURE — 3078F DIAST BP <80 MM HG: CPT | Mod: CPTII,S$GLB,, | Performed by: INTERNAL MEDICINE

## 2025-07-09 PROCEDURE — 80048 BASIC METABOLIC PNL TOTAL CA: CPT

## 2025-07-09 PROCEDURE — 3066F NEPHROPATHY DOC TX: CPT | Mod: CPTII,S$GLB,, | Performed by: INTERNAL MEDICINE

## 2025-07-09 PROCEDURE — 99999 PR PBB SHADOW E&M-EST. PATIENT-LVL V: CPT | Mod: PBBFAC,,, | Performed by: INTERNAL MEDICINE

## 2025-07-09 PROCEDURE — 99204 OFFICE O/P NEW MOD 45 MIN: CPT | Mod: S$GLB,,, | Performed by: INTERNAL MEDICINE

## 2025-07-09 PROCEDURE — 3074F SYST BP LT 130 MM HG: CPT | Mod: CPTII,S$GLB,, | Performed by: INTERNAL MEDICINE

## 2025-07-09 PROCEDURE — 83036 HEMOGLOBIN GLYCOSYLATED A1C: CPT

## 2025-07-09 PROCEDURE — 1159F MED LIST DOCD IN RCRD: CPT | Mod: CPTII,S$GLB,, | Performed by: INTERNAL MEDICINE

## 2025-07-09 PROCEDURE — 81002 URINALYSIS NONAUTO W/O SCOPE: CPT | Mod: PO

## 2025-07-09 NOTE — PROGRESS NOTES
Subjective:      Patient ID: Akiko Jameson is a 50 y.o. Black or  female who presents for new evaluation of No chief complaint on file.    HPI    July 2025:  Referred for decreased GFR since 2021 with progression noted   She has long-standing hypertension of over 20 years requiring medication management.   Has prediabetes   She has significant family history of kidney disease affecting her mother (required dialysis), maternal grandmother, maternal aunt, and two of her aunt's children all with kidney disease. There is also strong family history of hypertension on maternal side, with her sister and daughter diagnosed with the condition. Her daughter developed hypertension at age 16. She has a history of sarcoidosis affecting lungs diagnosed two years ago, currently in remission, treated with steroids. She underwent partial thyroidectomy during the COVID-19 pandemic with post-surgical elevated calcium levels. She has experienced one gout flare involving her hand. She has psoriatic arthritis. She has a history of angioedema with ACE inhibitors manifesting as lip swelling. She follows a low-salt diet with improved adherence. She primarily drinks Coke but has recently increased water intake. She has initiated a walking program with her daughter at the gym. She regularly consumes fruits and vegetables and demonstrates motivation to maintain kidney-healthy lifestyle modifications. She has a history of sarcoidosis affecting lungs diagnosed two years ago, currently in remission. She underwent partial thyroidectomy during the COVID-19 pandemic with post-surgical elevated calcium levels. She has experienced one gout flare involving her hand. She has psoriatic arthritis. She has a history of angioedema with ACE inhibitors manifesting as lip swelling. Using ibuprofen daily, stopped 2 weeks ago     Review of Systems   Objective:     Physical Exam  Assessment:     1. Chronic kidney disease, stage 3a    2.  Essential hypertension    3. Morbid obesity with BMI of 45.0-49.9, adult    4. Normocytic anemia    5. Anemia due to stage 3a chronic kidney disease    6. Pre-diabetes       Plan:     CHRONIC KIDNEY DISEASE, UNCLEAR STAGE:   - etiology likely due to long-standing HTN and daily NSAID use. With recent cessation of daily ibuprofen expected to improve renal function   Previous urinalysis showed no evidence of proteinuria   Family history of renal disease and dialysis noted   Previous imaging (ultrasound, CT) showed normal kidney size and structure with parapelvic cysts    Explained stages of CKD and current status,  protein spillage in urine as marker of kidney damage, impact of NSAIDs on renal function and blood flow.  Educated on importance of BP control, glucose management, hydration, and diet for kidney health   Patient to continue low-salt diet   Recommend increasing water intake for hydration   Patient to avoid all NSAIDs (provided list of NSAIDs to avoid)   Spironolactone providing renoprotective effects    Ordered basic metabolic panel, urine protein test, and urinalysis   Follow up after lab results are available   Contact the office via [employer] for lab results communication       PRE-DIABETES: - Pre-diabetes noted (A1C 5.7-6.4%), may be contributing to kidney dysfunction Ordered Hemoglobin A1C Follow up with Dr. Booth in August to discuss prediabetes management Explained connection between prediabetes and renal function Educated on importance of glucose management for kidney health

## 2025-07-09 NOTE — PATIENT INSTRUCTIONS
Kidney health:  1--no more NSAIDs  2--limiting salt  3--good BPs   4--normal blood sugars   5--increase water, best beverage for hydration   6--be as active as possible   7--fruits and vegetables  8--spironolactone is kidney treatment

## 2025-07-10 ENCOUNTER — PATIENT MESSAGE (OUTPATIENT)
Dept: NEPHROLOGY | Facility: CLINIC | Age: 50
End: 2025-07-10
Payer: COMMERCIAL

## 2025-07-10 LAB — HOLD SPECIMEN: NORMAL

## 2025-07-14 ENCOUNTER — PATIENT MESSAGE (OUTPATIENT)
Dept: ENDOCRINOLOGY | Facility: CLINIC | Age: 50
End: 2025-07-14
Payer: COMMERCIAL

## 2025-07-15 ENCOUNTER — OFFICE VISIT (OUTPATIENT)
Dept: ORTHOPEDICS | Facility: CLINIC | Age: 50
End: 2025-07-15
Payer: COMMERCIAL

## 2025-07-15 VITALS — HEIGHT: 63 IN | WEIGHT: 262.38 LBS | BODY MASS INDEX: 46.49 KG/M2

## 2025-07-15 DIAGNOSIS — M17.12 PRIMARY OSTEOARTHRITIS OF LEFT KNEE: Primary | ICD-10-CM

## 2025-07-15 PROCEDURE — 99999 PR PBB SHADOW E&M-EST. PATIENT-LVL III: CPT | Mod: PBBFAC,,, | Performed by: NURSE PRACTITIONER

## 2025-07-15 RX ORDER — TRIAMCINOLONE ACETONIDE 40 MG/ML
40 INJECTION, SUSPENSION INTRA-ARTICULAR; INTRAMUSCULAR
Status: DISCONTINUED | OUTPATIENT
Start: 2025-07-15 | End: 2025-07-15

## 2025-07-15 RX ORDER — TRIAMCINOLONE ACETONIDE 40 MG/ML
40 INJECTION, SUSPENSION INTRA-ARTICULAR; INTRAMUSCULAR
Status: DISCONTINUED | OUTPATIENT
Start: 2025-07-15 | End: 2025-07-15 | Stop reason: HOSPADM

## 2025-07-15 RX ADMIN — TRIAMCINOLONE ACETONIDE 40 MG: 40 INJECTION, SUSPENSION INTRA-ARTICULAR; INTRAMUSCULAR at 01:07

## 2025-07-15 NOTE — PROGRESS NOTES
Chief Complaint   Patient presents with    Left Knee - Pain         HPI:   This is a 50 y.o. who presents to clinic today complaining of left knee pain for 2 weeks after no known trauma. Pain is progressively worsening. No numbness or tingling. No associated signs or symptoms. She previously had injection to her left knee back in January 2025 with orthopedic in [place] and had 6 months of relief. Here today requesting another injection.     Past Medical History:   Diagnosis Date    ALLERGIC RHINITIS     Aortic valve sclerosis 01/20/2023    Arthritis     Cellulitis     CKD (chronic kidney disease)     Constipation due to opioid therapy     Eosinophilia     mild    GERD (gastroesophageal reflux disease)     Granular cell tumor     H. pylori infection 2018    History of 2019 novel coronavirus disease (COVID-19) 10/04/2020    Hypertension     Lymphedema     Mild anemia     Mitral valve sclerosis 03/21/2023    KEIRY on CPAP     does not regularly    Positive CHARLENE (antinuclear antibody) 07/27/2022    Prediabetes 08/06/2021    Sarcoidosis, unspecified     Sleep apnea     compliant with CPAP    Thyroid nodule     Urinary incontinence, mixed      Past Surgical History:   Procedure Laterality Date    24 HOUR IMPEDANCE PH MONITORING OF ESOPHAGUS IN PATIENT NOT TAKING ACID REDUCING MEDICATIONS N/A 01/06/2020    Procedure: IMPEDANCE PH STUDY, ESOPHAGEAL, 24 HOUR, IN PATIENT NOT TAKING ACID REDUCING MEDICATION;  Surgeon: First Available [place];  Location: [facility] ENDO;  Service: Endoscopy;  Laterality: N/A;    AXILLARY NODE DISSECTION Right     granular cell tumor    BILATERAL SALPINGO-OOPHORECTOMY (BSO)  07/21/2020    BREAST CYST ASPIRATION      left    CHOLECYSTECTOMY      COLONOSCOPY N/A 05/10/2019    Procedure: COLONOSCOPY;  Surgeon: Edgar Gamble Jr., MD;  Location: [facility] ENDO;  Service: Endoscopy;  Laterality: N/A;    COLONOSCOPY N/A 06/28/2024    Procedure: COLONOSCOPY;  Surgeon: Carina Sahu MD;  Location: [facility]  ENDO;  Service: Endoscopy;  Laterality: N/A;    ENDOBRONCHIAL ULTRASOUND Bilateral 04/27/2021    Procedure: ENDOBRONCHIAL ULTRASOUND (EBUS);  Surgeon: Armando Kirby MD;  Location: [facility];  Service: Pulmonary;  Laterality: Bilateral;  need fluoroscopy    ENDOMETRIAL ABLATION      ESOPHAGEAL MANOMETRY WITH MEASUREMENT OF IMPEDANCE N/A 01/06/2020    Procedure: MANOMETRY, ESOPHAGUS, WITH IMPEDANCE MEASUREMENT;  Surgeon: First Available [place];  Location: [facility] ENDO;  Service: Endoscopy;  Laterality: N/A;    ESOPHAGOGASTRODUODENOSCOPY N/A 07/05/2018    Procedure: EGD (ESOPHAGOGASTRODUODENOSCOPY);  Surgeon: Edgar Gamble Jr., MD;  Location: [facility];  Service: Endoscopy;  Laterality: N/A;    ESOPHAGOGASTRODUODENOSCOPY N/A 11/23/2020    Procedure: ESOPHAGOGASTRODUODENOSCOPY (EGD);  Surgeon: Jina Kaufman MD;  Location: [facility];  Service: General;  Laterality: N/A;    EXCISION OF MASS OF ABDOMEN Left 06/18/2018    Procedure: EXCISION, MASS, ABDOMEN - flank left;  Surgeon: Armando Tate MD;  Location: [facility] OR;  Service: General;  Laterality: Left;  left flank removal of mass    FINE NEEDLE ASPIRATION      thyroid    FLEXIBLE SIGMOIDOSCOPY N/A 08/24/2022    Procedure: SIGMOIDOSCOPY, FLEXIBLE;  Surgeon: Amber West MD;  Location: [facility];  Service: Endoscopy;  Laterality: N/A;    HYSTERECTOMY  07/21/2020    INJECTION OF ANESTHETIC AGENT AROUND MEDIAL BRANCH NERVES INNERVATING LUMBAR FACET JOINT Right 02/24/2022    Procedure: Right L3, 4, 5 MBB;  Surgeon: Anam Montero MD;  Location: [facility] PAIN MGT;  Service: Pain Management;  Laterality: Right;    INJECTION OF ANESTHETIC AGENT AROUND MEDIAL BRANCH NERVES INNERVATING LUMBAR FACET JOINT Right 03/29/2022    Procedure: Right L3, 4, 5 MBB  (2nd block);  Surgeon: Anam Montero MD;  Location: [facility] PAIN MGT;  Service: Pain Management;  Laterality: Right;    KNEE ARTHROSCOPY W/ MENISCECTOMY Right     NASAL SEPTUM SURGERY      OOPHORECTOMY       RADIOFREQUENCY THERMOCOAGULATION Right 04/05/2022    Procedure: Right L3-5 Lumbar RFA;  Surgeon: Anam Montero MD;  Location: [facility] PAIN MGT;  Service: Pain Management;  Laterality: Right;    RADIOFREQUENCY THERMOCOAGULATION Right 02/22/2024    Procedure: Right L3-5 Lumbar RFA;  Surgeon: Anam Montero MD;  Location: [facility] PAIN MGT;  Service: Pain Management;  Laterality: Right;    ROBOT-ASSISTED NISSEN FUNDOPLICATION USING DA TAMAR XI N/A 02/28/2020    Procedure: XI ROBOTIC FUNDOPLICATION, NISSEN;  Surgeon: Jina Kaufman MD;  Location: [facility] OR;  Service: General;  Laterality: N/A;    THYROIDECTOMY Bilateral 03/11/2021    Procedure: THYROIDECTOMY;  Surgeon: Nixon Moe MD;  Location: [facility] OR;  Service: ENT;  Laterality: Bilateral;    TUBAL LIGATION      UPPER GASTROINTESTINAL ENDOSCOPY  07/05/2018    Dr. Gamble     Medications Ordered Prior to Encounter[1]  Review of patient's allergies indicates:   Allergen Reactions    Biaxin [clarithromycin] Swelling    Omeprazole magnesium Swelling and Other (See Comments)    Prevacid [lansoprazole] Swelling     Lip swelling- was taking this along with blood pressure medication: benazepril; not sure which caused it. Reports she has taken OTC prilosec without any problems.    Ace inhibitors Swelling     Facial swelling    Benazepril Swelling     Lip swelling     Family History   Problem Relation Name Age of Onset    Diabetes Mother Deyonne     Kidney failure Mother Deyonne     Heart attack Mother Deyonne     Early death Mother Deyonne     Heart disease Mother Deyonne     Kidney disease Mother Deyonne     Blindness Father Chandu     Cancer Father Chandu     Vision loss Father Chandu     Breast cancer Maternal Aunt great aunt     Breast cancer Paternal Aunt      Breast cancer Maternal Grandmother      Ovarian cancer Cousin      Breast cancer Cousin      Cancer Paternal Grandfather Chandu     Vision loss Paternal Grandfather Chandu     Hypertension Daughter  Marie     Stroke Paternal Aunt Valethia     Cirrhosis Neg Hx      Colon polyps Neg Hx      Colon cancer Neg Hx      Crohn's disease Neg Hx      Esophageal cancer Neg Hx      Stomach cancer Neg Hx      Ulcerative colitis Neg Hx      Amblyopia Neg Hx      Cataracts Neg Hx      Glaucoma Neg Hx      Macular degeneration Neg Hx      Retinal detachment Neg Hx      Strabismus Neg Hx      Allergic rhinitis Neg Hx      Allergies Neg Hx      Angioedema Neg Hx      Asthma Neg Hx      Atopy Neg Hx      Eczema Neg Hx      Immunodeficiency Neg Hx      Rhinitis Neg Hx      Urticaria Neg Hx       Social History[2]    Review of Systems:  Constitutional:  Denies fever or chills   Eyes:  Denies change in visual acuity   HENT:  Denies nasal congestion or sore throat   Respiratory:  Denies cough or shortness of breath   Cardiovascular:  Denies chest pain or edema   GI:  Denies abdominal pain, nausea, vomiting, bloody stools or diarrhea   :  Denies dysuria   Integument:  Denies rash   Neurologic:  Denies headache, focal weakness or sensory changes   Endocrine:  Denies polyuria or polydipsia   Lymphatic:  Denies swollen glands   Psychiatric:  Denies depression or anxiety     Physical Exam:   Constitutional:  Well developed, well nourished, no acute distress, non-toxic appearance   Integument:  Well hydrated  Neurologic:  Alert & oriented x 3  Psychiatric:  Speech and behavior appropriate     Bilateral Knee Exam    left Knee Exam     Tenderness   The patient is experiencing tenderness in the medial joint line.    Range of Motion   Extension: abnormal   Flexion: abnormal     Muscle Strength     The patient has normal knee strength.    Tests   Roseanne:  Medial - positive   Lachman:  Anterior - negative      Varus: negative  Valgus: negative  Patellar Apprehension: negative    Other   Erythema: absent  Sensation: normal  Pulse: present  Swelling: mild      right Knee Exam   right knee exam performed same as contralateral side and is  normal.              Primary osteoarthritis of left knee  -     Large Joint Aspiration/Injection: L knee  -     triamcinolone acetonide injection 40 mg      Using an aseptic technique, I injected 5 cc of lidocaine 1% without and 1 cc of kenalog 40mg into the left Knee. The patient tolerated this well. I will have them return to clinic in 3 months or as needed.           [1]   Current Outpatient Medications on File Prior to Visit   Medication Sig Dispense Refill    carvediloL (COREG) 12.5 MG tablet Take 1 tablet (12.5 mg total) by mouth 2 (two) times daily. 180 tablet 2    cetirizine (ZYRTEC) 10 MG tablet Take 1 tablet (10 mg total) by mouth once daily.  0    colchicine (COLCRYS) 0.6 mg tablet Treatment should be initiated within 24 hours of onset.  For gout attacks: Day 1: Oral: 1.2 mg, followed in 1 hour with a single dose of 0.6 mg. Day 2 and thereafter: Oral: 0.6 mg daily until flare resolves 30 tablet 0    docusate sodium (COLACE) 100 MG capsule Take 100 mg by mouth every other day.       ergocalciferol (ERGOCALCIFEROL) 50,000 unit Cap Take one cap 3x weekly. 36 capsule 3    famotidine (PEPCID) 40 MG tablet Take 1 tablet (40 mg total) by mouth nightly as needed for Heartburn. 30 tablet 11    fluticasone propion-salmeterol 115-21 mcg/dose (ADVAIR HFA) 115-21 mcg/actuation HFAA inhaler Inhale 2 puffs into the lungs every 12 (twelve) hours. Controller (Patient not taking: Reported on 7/9/2025) 12 g 11    folic acid (FOLVITE) 1 MG tablet Take 1 tablet (1 mg total) by mouth once daily. (Patient taking differently: Take 1 mg by mouth once daily.) 90 tablet 1    lubiprostone (AMITIZA) 8 MCG Cap Take 1 capsule (8 mcg total) by mouth 2 (two) times daily with meals. 60 capsule 11    montelukast (SINGULAIR) 10 mg tablet Take 10 mg by mouth every evening.      multivitamin (THERAGRAN) per tablet Take 1 tablet by mouth once daily. Every day      olmesartan-hydrochlorothiazide (BENICAR HCT) 20-12.5 mg per tablet Take 1 tablet  by mouth once daily. 90 tablet 2    omeprazole (PRILOSEC) 40 MG capsule Take 1 capsule (40 mg total) by mouth once daily. (Patient taking differently: Take 40 mg by mouth once daily.) 90 capsule 3    potassium chloride SA (K-DUR,KLOR-CON) 20 MEQ tablet Take 1 tablet (20 mEq total) by mouth 2 (two) times daily. 180 tablet 3    spironolactone (ALDACTONE) 25 MG tablet Take 0.5 tablets (12.5 mg total) by mouth once daily. 45 tablet 2    SYNTHROID 175 mcg tablet Take one tablet Monday-Friday and two tablets on the weekend. 36 tablet 11     No current facility-administered medications on file prior to visit.   [2]   Social History  Socioeconomic History    Marital status:     Number of children: 2   Occupational History     Employer: [employer]   Tobacco Use    Smoking status: Never    Smokeless tobacco: Never   Substance and Sexual Activity    Alcohol use: No    Drug use: No    Sexual activity: Yes     Partners: Male     Birth control/protection: None     Social Drivers of Health     Financial Resource Strain: Low Risk  (7/29/2024)    Overall Financial Resource Strain (CARDIA)     Difficulty of Paying Living Expenses: Not hard at all   Food Insecurity: No Food Insecurity (7/29/2024)    Hunger Vital Sign     Worried About Running Out of Food in the Last Year: Never true     Ran Out of Food in the Last Year: Never true   Transportation Needs: Unknown (7/19/2024)    Received from [facility]    PRAPARE - Transportation     Lack of Transportation (Medical): Patient declined   Physical Activity: Insufficiently Active (7/29/2024)    Exercise Vital Sign     Days of Exercise per Week: 2 days     Minutes of Exercise per Session: 30 min   Stress: No Stress Concern Present (7/29/2024)    [employer] - Occupational Stress Questionnaire     Feeling of Stress : Not at all   Housing Stability: Low Risk  (2/19/2024)    Housing Stability Vital Sign     Unable to Pay for Housing in the Last  Year: No     Number of Places Lived in the Last Year: 1     Unstable Housing in the Last Year: No

## 2025-07-15 NOTE — PROCEDURES
Large Joint Aspiration/Injection: L knee    Date/Time: 7/15/2025 1:20 PM    Performed by: Catalina Paz FNP  Authorized by: Catalina Paz FNP    Consent Done?:  Yes (Verbal)  Indications:  Pain  Timeout: prior to procedure the correct patient, procedure, and site was verified    Prep: patient was prepped and draped in usual sterile fashion    Local anesthetic:  Lidocaine 1% without epinephrine  Anesthetic total (ml):  5      Details:  Needle Size:  21 G  Approach:  Anterolateral  Location:  Knee  Site:  L knee  Medications:  40 mg triamcinolone acetonide 40 mg/mL  Patient tolerance:  Patient tolerated the procedure well with no immediate complications

## 2025-07-16 DIAGNOSIS — N18.31 CHRONIC KIDNEY DISEASE, STAGE 3A: ICD-10-CM

## 2025-07-24 ENCOUNTER — PATIENT OUTREACH (OUTPATIENT)
Dept: ADMINISTRATIVE | Facility: HOSPITAL | Age: 50
End: 2025-07-24
Payer: COMMERCIAL

## 2025-07-24 NOTE — PROGRESS NOTES
Working CKD Lab Report.   Pt has lab appt scheduled, 7/28/25.  Micro Albumin urine linked to appt.

## 2025-07-29 ENCOUNTER — LAB VISIT (OUTPATIENT)
Dept: LAB | Facility: HOSPITAL | Age: 50
End: 2025-07-29
Attending: NURSE PRACTITIONER
Payer: COMMERCIAL

## 2025-07-29 DIAGNOSIS — D64.9 NORMOCYTIC ANEMIA: ICD-10-CM

## 2025-07-29 LAB
ABSOLUTE EOSINOPHIL (OHS): 0.36 K/UL
ABSOLUTE MONOCYTE (OHS): 0.61 K/UL (ref 0.3–1)
ABSOLUTE NEUTROPHIL COUNT (OHS): 4.92 K/UL (ref 1.8–7.7)
ALBUMIN SERPL BCP-MCNC: 3.6 G/DL (ref 3.5–5.2)
ALP SERPL-CCNC: 143 UNIT/L (ref 40–150)
ALT SERPL W/O P-5'-P-CCNC: 39 UNIT/L (ref 10–44)
ANION GAP (OHS): 7 MMOL/L (ref 8–16)
AST SERPL-CCNC: 24 UNIT/L (ref 11–45)
BASOPHILS # BLD AUTO: 0.08 K/UL
BASOPHILS NFR BLD AUTO: 0.9 %
BILIRUB SERPL-MCNC: 0.3 MG/DL (ref 0.1–1)
BUN SERPL-MCNC: 32 MG/DL (ref 6–20)
CALCIUM SERPL-MCNC: 8 MG/DL (ref 8.7–10.5)
CHLORIDE SERPL-SCNC: 103 MMOL/L (ref 95–110)
CO2 SERPL-SCNC: 31 MMOL/L (ref 23–29)
CREAT SERPL-MCNC: 1.6 MG/DL (ref 0.5–1.4)
ERYTHROCYTE [DISTWIDTH] IN BLOOD BY AUTOMATED COUNT: 14.4 % (ref 11.5–14.5)
FERRITIN SERPL-MCNC: 547 NG/ML (ref 20–300)
GFR SERPLBLD CREATININE-BSD FMLA CKD-EPI: 39 ML/MIN/1.73/M2
GLUCOSE SERPL-MCNC: 170 MG/DL (ref 70–110)
HAPTOGLOB SERPL-MCNC: 180 MG/DL (ref 30–250)
HCT VFR BLD AUTO: 34.6 % (ref 37–48.5)
HGB BLD-MCNC: 10.9 GM/DL (ref 12–16)
IMM GRANULOCYTES # BLD AUTO: 0.06 K/UL (ref 0–0.04)
IMM GRANULOCYTES NFR BLD AUTO: 0.7 % (ref 0–0.5)
IRON SATN MFR SERPL: 17 % (ref 20–50)
IRON SERPL-MCNC: 53 UG/DL (ref 30–160)
LDH SERPL-CCNC: 181 U/L (ref 110–260)
LYMPHOCYTES # BLD AUTO: 3.1 K/UL (ref 1–4.8)
MCH RBC QN AUTO: 27 PG (ref 27–31)
MCHC RBC AUTO-ENTMCNC: 31.5 G/DL (ref 32–36)
MCV RBC AUTO: 86 FL (ref 82–98)
NUCLEATED RBC (/100WBC) (OHS): 0 /100 WBC
PLATELET # BLD AUTO: 268 K/UL (ref 150–450)
PMV BLD AUTO: 10.5 FL (ref 9.2–12.9)
POTASSIUM SERPL-SCNC: 4.5 MMOL/L (ref 3.5–5.1)
PROT SERPL-MCNC: 7.2 GM/DL (ref 6–8.4)
RBC # BLD AUTO: 4.03 M/UL (ref 4–5.4)
RELATIVE EOSINOPHIL (OHS): 3.9 %
RELATIVE LYMPHOCYTE (OHS): 34 % (ref 18–48)
RELATIVE MONOCYTE (OHS): 6.7 % (ref 4–15)
RELATIVE NEUTROPHIL (OHS): 53.8 % (ref 38–73)
RETICS/RBC NFR AUTO: 1.3 % (ref 0.5–2.5)
SODIUM SERPL-SCNC: 141 MMOL/L (ref 136–145)
TIBC SERPL-MCNC: 318 UG/DL (ref 250–450)
TRANSFERRIN SERPL-MCNC: 215 MG/DL (ref 200–375)
WBC # BLD AUTO: 9.13 K/UL (ref 3.9–12.7)

## 2025-07-29 PROCEDURE — 36415 COLL VENOUS BLD VENIPUNCTURE: CPT | Mod: PO

## 2025-07-29 PROCEDURE — 86334 IMMUNOFIX E-PHORESIS SERUM: CPT

## 2025-07-29 PROCEDURE — 82728 ASSAY OF FERRITIN: CPT

## 2025-07-29 PROCEDURE — 84165 PROTEIN E-PHORESIS SERUM: CPT | Mod: 26,,, | Performed by: PATHOLOGY

## 2025-07-29 PROCEDURE — 85045 AUTOMATED RETICULOCYTE COUNT: CPT

## 2025-07-29 PROCEDURE — 83540 ASSAY OF IRON: CPT

## 2025-07-29 PROCEDURE — 84520 ASSAY OF UREA NITROGEN: CPT

## 2025-07-29 PROCEDURE — 84165 PROTEIN E-PHORESIS SERUM: CPT

## 2025-07-29 PROCEDURE — 83521 IG LIGHT CHAINS FREE EACH: CPT

## 2025-07-29 PROCEDURE — 85025 COMPLETE CBC W/AUTO DIFF WBC: CPT | Mod: PO

## 2025-07-29 PROCEDURE — 83010 ASSAY OF HAPTOGLOBIN QUANT: CPT

## 2025-07-29 PROCEDURE — 86334 IMMUNOFIX E-PHORESIS SERUM: CPT | Mod: 26,,, | Performed by: PATHOLOGY

## 2025-07-29 PROCEDURE — 83615 LACTATE (LD) (LDH) ENZYME: CPT

## 2025-07-30 LAB
ALBUMIN, SPE (OHS): 3.66 G/DL (ref 3.35–5.55)
ALPHA 1 GLOB (OHS): 0.28 GM/DL (ref 0.17–0.41)
ALPHA 2 GLOB (OHS): 0.71 GM/DL (ref 0.43–0.99)
BETA GLOB (OHS): 0.92 GM/DL (ref 0.5–1.1)
GAMMA GLOBULIN (OHS): 1.32 GM/DL (ref 0.67–1.58)
KAPPA LC FREE SER-MCNC: 1.1 MG/L (ref 0.26–1.65)
KAPPA LC FREE/LAMBDA FREE SER: 1.97 MG/DL (ref 0.33–1.94)
LAMBDA LC FREE SERPL-MCNC: 1.79 MG/DL (ref 0.57–2.63)
PATHOLOGIST INTERPRETATION - IFE SERUM (OHS): NORMAL
PATHOLOGIST REVIEW - SPE (OHS): NORMAL
PROT SERPL-MCNC: 6.9 GM/DL (ref 6–8.4)

## 2025-08-06 ENCOUNTER — OFFICE VISIT (OUTPATIENT)
Dept: HEMATOLOGY/ONCOLOGY | Facility: CLINIC | Age: 50
End: 2025-08-06
Payer: COMMERCIAL

## 2025-08-06 DIAGNOSIS — N28.9 FUNCTION KIDNEY DECREASED: Primary | ICD-10-CM

## 2025-08-06 DIAGNOSIS — R79.89 BLOOD CREATININE INCREASED COMPARED WITH PRIOR MEASUREMENT: ICD-10-CM

## 2025-08-06 DIAGNOSIS — D50.9 IRON DEFICIENCY ANEMIA, UNSPECIFIED IRON DEFICIENCY ANEMIA TYPE: ICD-10-CM

## 2025-08-06 DIAGNOSIS — R79.9 ELEVATED BUN: ICD-10-CM

## 2025-08-06 PROCEDURE — 98006 SYNCH AUDIO-VIDEO EST MOD 30: CPT | Mod: 95,,, | Performed by: NURSE PRACTITIONER

## 2025-08-06 PROCEDURE — 1159F MED LIST DOCD IN RCRD: CPT | Mod: CPTII,95,, | Performed by: NURSE PRACTITIONER

## 2025-08-06 PROCEDURE — 1160F RVW MEDS BY RX/DR IN RCRD: CPT | Mod: CPTII,95,, | Performed by: NURSE PRACTITIONER

## 2025-08-06 PROCEDURE — 3066F NEPHROPATHY DOC TX: CPT | Mod: CPTII,95,, | Performed by: NURSE PRACTITIONER

## 2025-08-06 PROCEDURE — 3044F HG A1C LEVEL LT 7.0%: CPT | Mod: CPTII,95,, | Performed by: NURSE PRACTITIONER

## 2025-08-06 NOTE — PROGRESS NOTES
The patient location is: [place]  The chief complaint leading to consultation is: no complaints    Visit type: audiovisual    Face to Face time with patient: 20  35 minutes of total time spent on the encounter, which includes face to face time and non-face to face time preparing to see the patient (eg, review of tests), Obtaining and/or reviewing separately obtained history, Documenting clinical information in the electronic or other health record, Independently interpreting results (not separately reported) and communicating results to the patient/family/caregiver, or Care coordination (not separately reported).         Each patient to whom he or she provides medical services by telemedicine is:  (1) informed of the relationship between the physician and patient and the respective role of any other health care provider with respect to management of the patient; and (2) notified that he or she may decline to receive medical services by telemedicine and may withdraw from such care at any time.        Subjective:      Patient ID: Akiko Jameson is a 50 y.o. female.    Chief Complaint: no complaints    HPI:  51 yo female presents to the clinic due to low blood count.       She is known to Dr. Caicedo for anemia of chronic disease as well as idiopathic eosinophilia.    Unilateral bone marrow aspiration/biopsy in 2007, which did not reveal any clonal population of cells on flow and no evidence of dysplasia within the marrow.    Surveilled annually.    She also suffers with chronic lymphedema to RUE from lymphadenectomy.  EBUS in 04/21 due to mediastinal lymphadenopathy/nodule that was later diagnosed as Sarcoidosis, followed by Dr. Kirby.     Last visit on 08/31/22 c/o of fatigue & increased sleepiness.    Recent sigmoid scope on 08/24 did not reveal a bleeding source.  Occult blood x 3 = negative  Labs reviewed indicating MARITZA.  Patient was scheduled for [place].     10/14/22:  Approximately 2 1/2 weeks post  "2nd infusion patient presents via telemed for interval evaluation & review of lab.  Patient reports feeling much better; "big" improvement; no fatigue or sleepiness.  Denies CP, SOB, pica, palpitations, etc.     Interval History:   01/17/2023  Sitting up in a chair looking at her computer; respirations easy & unlabored.  Reports that she feels good.  More energy; no fatigue.  Denies any CP, SOB, pica, bleeding, dark stools, palpitations, etc.     Interval history:  1/15/2024 Today is the first time I am evaluating/assessing the patient.  Currently with normocytic anemia.  Hgb 11.3 normocytic.  Folate low normal at 4.9. had a h/o total hysterectomy in the past.  Denies any known abnormal bleeding. Is up to date on colonoscopy and mammogram.       Interval History:  3/26/2025 Received feraheme infusion x 1 on 2/5/2025 and started on folvite 1 mg Po daily d/t very low normal folic acid levels.  Presents today to evaluate response. States that she continues to have reflux and stomach discomfort.    Interval History:  5/28/2025  Abdominal US done 3/28/2025 reviewed and with the following results:    Impression:     1.  Stable fatty liver.  2.  Cholecystectomy.  Common bile duct 10 mm, likely reservoir effect, previously 7 mm.  No intrahepatic ductal dilatation.  Visualized pancreas is normal.    Interval History:  8/6/2025 Patient states that she has not had a diarrhea illness or vomiting recently.  Denies being outside sweating.  Has been drinking enough fluids.  Denies taking NSAIDs.  Denies any urinary symptoms.  Denies any worsening of her chronic back pain. Denies h/o kidney stones in the past.  Has not started on any new medications.   She tells me that her blood pressures have been good.  Last hemoglobin A1C 6 7/9/2025.      I have reviewed all of the patient's relevant lab work available in the medical record and have utilized this in my evaluation and management recommendations today.    Social " History[1]    Family History   Problem Relation Name Age of Onset    Diabetes Mother Deyonne     Kidney failure Mother Deyonne     Heart attack Mother Deyonne     Early death Mother Deyonne     Heart disease Mother Deyonne     Kidney disease Mother Deyonne     Blindness Father Chandu     Cancer Father Chandu     Vision loss Father Chandu     Breast cancer Maternal Aunt great aunt     Breast cancer Paternal Aunt      Breast cancer Maternal Grandmother      Ovarian cancer Cousin      Breast cancer Cousin      Cancer Paternal Grandfather Chandu     Vision loss Paternal Grandfather Chandu     Hypertension Daughter Marie     Stroke Paternal Aunt Valethia     Cirrhosis Neg Hx      Colon polyps Neg Hx      Colon cancer Neg Hx      Crohn's disease Neg Hx      Esophageal cancer Neg Hx      Stomach cancer Neg Hx      Ulcerative colitis Neg Hx      Amblyopia Neg Hx      Cataracts Neg Hx      Glaucoma Neg Hx      Macular degeneration Neg Hx      Retinal detachment Neg Hx      Strabismus Neg Hx      Allergic rhinitis Neg Hx      Allergies Neg Hx      Angioedema Neg Hx      Asthma Neg Hx      Atopy Neg Hx      Eczema Neg Hx      Immunodeficiency Neg Hx      Rhinitis Neg Hx      Urticaria Neg Hx         Past Surgical History:   Procedure Laterality Date    24 HOUR IMPEDANCE PH MONITORING OF ESOPHAGUS IN PATIENT NOT TAKING ACID REDUCING MEDICATIONS N/A 01/06/2020    Procedure: IMPEDANCE PH STUDY, ESOPHAGEAL, 24 HOUR, IN PATIENT NOT TAKING ACID REDUCING MEDICATION;  Surgeon: First Available Tamika Panchal;  Location: [facility];  Service: Endoscopy;  Laterality: N/A;    AXILLARY NODE DISSECTION Right     granular cell tumor    BILATERAL SALPINGO-OOPHORECTOMY (BSO)  07/21/2020    BREAST CYST ASPIRATION      left    CHOLECYSTECTOMY      COLONOSCOPY N/A 05/10/2019    Procedure: COLONOSCOPY;  Surgeon: Edgar Gamble Jr., MD;  Location: [facility] ENDO;  Service: Endoscopy;  Laterality: N/A;    COLONOSCOPY N/A 06/28/2024    Procedure:  COLONOSCOPY;  Surgeon: Carina Sahu MD;  Location: [facility];  Service: Endoscopy;  Laterality: N/A;    ENDOBRONCHIAL ULTRASOUND Bilateral 04/27/2021    Procedure: ENDOBRONCHIAL ULTRASOUND (EBUS);  Surgeon: Armando Kirby MD;  Location: [facility];  Service: Pulmonary;  Laterality: Bilateral;  need fluoroscopy    ENDOMETRIAL ABLATION      ESOPHAGEAL MANOMETRY WITH MEASUREMENT OF IMPEDANCE N/A 01/06/2020    Procedure: MANOMETRY, ESOPHAGUS, WITH IMPEDANCE MEASUREMENT;  Surgeon: First Available [facility];  Location: [facility];  Service: Endoscopy;  Laterality: N/A;    ESOPHAGOGASTRODUODENOSCOPY N/A 07/05/2018    Procedure: EGD (ESOPHAGOGASTRODUODENOSCOPY);  Surgeon: Edgra Gamble Jr., MD;  Location: [facility];  Service: Endoscopy;  Laterality: N/A;    ESOPHAGOGASTRODUODENOSCOPY N/A 11/23/2020    Procedure: ESOPHAGOGASTRODUODENOSCOPY (EGD);  Surgeon: Jina Kaufman MD;  Location: [facility];  Service: General;  Laterality: N/A;    EXCISION OF MASS OF ABDOMEN Left 06/18/2018    Procedure: EXCISION, MASS, ABDOMEN - flank left;  Surgeon: Armando Tate MD;  Location: [facility];  Service: General;  Laterality: Left;  left flank removal of mass    FINE NEEDLE ASPIRATION      thyroid    FLEXIBLE SIGMOIDOSCOPY N/A 08/24/2022    Procedure: SIGMOIDOSCOPY, FLEXIBLE;  Surgeon: Amber West MD;  Location: [facility];  Service: Endoscopy;  Laterality: N/A;    HYSTERECTOMY  07/21/2020    INJECTION OF ANESTHETIC AGENT AROUND MEDIAL BRANCH NERVES INNERVATING LUMBAR FACET JOINT Right 02/24/2022    Procedure: Right L3, 4, 5 MBB;  Surgeon: Anam Montero MD;  Location: [facility] PAIN MGT;  Service: Pain Management;  Laterality: Right;    INJECTION OF ANESTHETIC AGENT AROUND MEDIAL BRANCH NERVES INNERVATING LUMBAR FACET JOINT Right 03/29/2022    Procedure: Right L3, 4, 5 MBB  (2nd block);  Surgeon: Anam Montero MD;  Location: [facility] PAIN [facility]GT;  Service: Pain Management;  Laterality: Right;    KNEE ARTHROSCOPY W/  MENISCECTOMY Right     NASAL SEPTUM SURGERY      OOPHORECTOMY      RADIOFREQUENCY THERMOCOAGULATION Right 04/05/2022    Procedure: Right L3-5 Lumbar RFA;  Surgeon: Anam Montero MD;  Location: [facility] PAIN [facility]GT;  Service: Pain Management;  Laterality: Right;    RADIOFREQUENCY THERMOCOAGULATION Right 02/22/2024    Procedure: Right L3-5 Lumbar RFA;  Surgeon: Anam Montero MD;  Location: [facility] PAIN [facility]GT;  Service: Pain Management;  Laterality: Right;    ROBOT-ASSISTED NISSEN FUNDOPLICATION USING DA TAMAR XI N/A 02/28/2020    Procedure: XI ROBOTIC FUNDOPLICATION, NISSEN;  Surgeon: Jina Kaufman MD;  Location: [facility] OR;  Service: General;  Laterality: N/A;    THYROIDECTOMY Bilateral 03/11/2021    Procedure: THYROIDECTOMY;  Surgeon: Nixon Moe MD;  Location: [facility] OR;  Service: ENT;  Laterality: Bilateral;    TUBAL LIGATION      UPPER GASTROINTESTINAL ENDOSCOPY  07/05/2018    Dr. Gamble       Past Medical History:   Diagnosis Date    ALLERGIC RHINITIS     Aortic valve sclerosis 01/20/2023    Arthritis     Cellulitis     CKD (chronic kidney disease)     Constipation due to opioid therapy     Eosinophilia     mild    GERD (gastroesophageal reflux disease)     Granular cell tumor     H. pylori infection 2018    History of 2019 novel coronavirus disease (COVID-19) 10/04/2020    Hypertension     Lymphedema     Mild anemia     Mitral valve sclerosis 03/21/2023    KEIRY on CPAP     does not regularly    Positive CHARLENE (antinuclear antibody) 07/27/2022    Prediabetes 08/06/2021    Sarcoidosis, unspecified     Sleep apnea     compliant with CPAP    Thyroid nodule     Urinary incontinence, mixed        Review of Systems   Constitutional:  Negative for appetite change and unexpected weight change.   HENT:  Negative for mouth sores.    Eyes:  Negative for visual disturbance.   Respiratory:  Negative for cough and shortness of breath.    Cardiovascular:  Negative for chest pain.   Gastrointestinal:  Positive for abdominal  pain and diarrhea.   Genitourinary:  Negative for frequency.   Musculoskeletal:  Negative for back pain.   Skin:  Negative for rash.   Neurological:  Negative for headaches.   Hematological:  Negative for adenopathy.   Psychiatric/Behavioral:  The patient is not nervous/anxious.           Medication List with Changes/Refills   Current Medications    CARVEDILOL (COREG) 12.5 MG TABLET    Take 1 tablet (12.5 mg total) by mouth 2 (two) times daily.    CETIRIZINE (ZYRTEC) 10 MG TABLET    Take 1 tablet (10 mg total) by mouth once daily.    COLCHICINE (COLCRYS) 0.6 MG TABLET    Treatment should be initiated within 24 hours of onset.  For gout attacks: Day 1: Oral: 1.2 mg, followed in 1 hour with a single dose of 0.6 mg. Day 2 and thereafter: Oral: 0.6 mg daily until flare resolves    DOCUSATE SODIUM (COLACE) 100 MG CAPSULE    Take 100 mg by mouth every other day.     ERGOCALCIFEROL (ERGOCALCIFEROL) 50,000 UNIT CAP    Take one cap 3x weekly.    FLUTICASONE PROPION-SALMETEROL 115-21 MCG/DOSE (ADVAIR HFA) 115-21 MCG/ACTUATION HFAA INHALER    Inhale 2 puffs into the lungs every 12 (twelve) hours. Controller    FOLIC ACID (FOLVITE) 1 MG TABLET    Take 1 tablet (1 mg total) by mouth once daily.    MONTELUKAST (SINGULAIR) 10 MG TABLET    Take 10 mg by mouth every evening.    MULTIVITAMIN (THERAGRAN) PER TABLET    Take 1 tablet by mouth once daily. Every day    OLMESARTAN-HYDROCHLOROTHIAZIDE (BENICAR HCT) 20-12.5 MG PER TABLET    Take 1 tablet by mouth once daily.    POTASSIUM CHLORIDE SA (K-DUR,KLOR-CON) 20 MEQ TABLET    Take 1 tablet (20 mEq total) by mouth 2 (two) times daily.    SPIRONOLACTONE (ALDACTONE) 25 MG TABLET    Take 0.5 tablets (12.5 mg total) by mouth once daily.    SYNTHROID 175 MCG TABLET    Take one tablet Monday-Friday and two tablets on the weekend.   Discontinued Medications    FAMOTIDINE (PEPCID) 40 MG TABLET    Take 1 tablet (40 mg total) by mouth nightly as needed for Heartburn.    LUBIPROSTONE (AMITIZA) 8  MCG CAP    Take 1 capsule (8 mcg total) by mouth 2 (two) times daily with meals.    OMEPRAZOLE (PRILOSEC) 40 MG CAPSULE    Take 1 capsule (40 mg total) by mouth once daily.        Objective:   There were no vitals filed for this visit.    Physical Exam  Constitutional:       Appearance: Normal appearance.   Pulmonary:      Effort: Pulmonary effort is normal.   Neurological:      Mental Status: She is alert and oriented to person, place, and time.   Psychiatric:         Mood and Affect: Mood normal.         Behavior: Behavior normal.         Thought Content: Thought content normal.         Judgment: Judgment normal.         Assessment:     Problem List Items Addressed This Visit       MARITZA (iron deficiency anemia)    Relevant Orders    CBC Auto Differential    Basic Metabolic Panel    Ferritin    Iron and TIBC     Other Visit Diagnoses         Function kidney decreased    -  Primary    Relevant Orders    Urinalysis, Reflex to Urine Culture Urine, Clean Catch (Completed)    Basic Metabolic Panel (Completed)    CBC Auto Differential    Basic Metabolic Panel      Blood creatinine increased compared with prior measurement        Relevant Orders    Urinalysis, Reflex to Urine Culture Urine, Clean Catch (Completed)    Basic Metabolic Panel (Completed)    CBC Auto Differential    Basic Metabolic Panel      Elevated BUN        Relevant Orders    Urinalysis, Reflex to Urine Culture Urine, Clean Catch (Completed)    Basic Metabolic Panel (Completed)    Basic Metabolic Panel            Lab Results   Component Value Date    WBC 9.13 07/29/2025    RBC 4.03 07/29/2025    HGB 10.9 (L) 07/29/2025    HCT 34.6 (L) 07/29/2025    MCV 86 07/29/2025    MCH 27.0 07/29/2025    MCHC 31.5 (L) 07/29/2025    RDW 14.4 07/29/2025     07/29/2025    MPV 10.5 07/29/2025    GRAN 2.8 03/21/2025    GRAN 49.1 03/21/2025    LYMPH 34.0 07/29/2025    LYMPH 3.10 07/29/2025    MONO 6.7 07/29/2025    MONO 0.61 07/29/2025    EOS 3.9 07/29/2025    EOS  0.36 07/29/2025    BASO 0.03 03/21/2025    EOSINOPHIL 6.4 03/21/2025    BASOPHIL 0.9 07/29/2025    BASOPHIL 0.08 07/29/2025       BMP  Lab Results   Component Value Date     08/07/2025    K 3.8 08/07/2025     08/07/2025    CO2 28 08/07/2025    BUN 24 (H) 08/07/2025    CREATININE 1.3 08/07/2025    CALCIUM 8.3 (L) 08/07/2025    ANIONGAP 11 08/07/2025    EGFRNORACEVR 50 (L) 08/07/2025         Lab Results   Component Value Date    ALT 39 07/29/2025    AST 24 07/29/2025     (H) 06/03/2015    ALKPHOS 143 07/29/2025    BILITOT 0.3 07/29/2025     Lab Results   Component Value Date    UIBC 270 11/10/2011    IRON 53 07/29/2025    TRANSFERRIN 215 07/29/2025    TIBC 318 07/29/2025    LABIRON 17 (L) 07/29/2025    FERRITIN 547.0 (H) 07/29/2025     Lab Results   Component Value Date    ABUXKLDU13 742 08/21/2024     Lab Results   Component Value Date    FOLATE 15.6 03/21/2025     Lab Results   Component Value Date    TSH 0.492 02/18/2025     Myeloma labs normal  Retic, ldh, haptoglobin normal      Plan:   Function kidney decreased  -     Urinalysis, Reflex to Urine Culture Urine, Clean Catch; Future; Expected date: 08/06/2025  -     Basic Metabolic Panel; Future; Expected date: 08/06/2025  -     CBC Auto Differential; Future; Expected date: 08/07/2025  -     Basic Metabolic Panel; Future; Expected date: 08/07/2025    Blood creatinine increased compared with prior measurement  -     Urinalysis, Reflex to Urine Culture Urine, Clean Catch; Future; Expected date: 08/06/2025  -     Basic Metabolic Panel; Future; Expected date: 08/06/2025  -     CBC Auto Differential; Future; Expected date: 08/07/2025  -     Basic Metabolic Panel; Future; Expected date: 08/07/2025    Elevated BUN  -     Urinalysis, Reflex to Urine Culture Urine, Clean Catch; Future; Expected date: 08/06/2025  -     Basic Metabolic Panel; Future; Expected date: 08/06/2025  -     Basic Metabolic Panel; Future; Expected date: 08/07/2025    Iron deficiency  anemia, unspecified iron deficiency anemia type  -     CBC Auto Differential; Future; Expected date: 08/07/2025  -     Basic Metabolic Panel; Future; Expected date: 08/07/2025  -     Ferritin; Future; Expected date: 08/07/2025  -     Iron and TIBC; Future; Expected date: 08/07/2025          Med Onc Chart Routing      Follow up with physician . F/u 6 weeks after dose of feraheme with labs prior - VV   Follow up with AMAN . F/u 6 weeks after iron infusion with labs prior - VV   Infusion scheduling note   schedule feraheme 510 mg [facility] x 1 at [facility]   Injection scheduling note n/a   Labs   Scheduling:  Preferred lab: [facility]  Lab interval:  cbc, bmp, iron studies prior to next visit   Imaging   N/a   Pharmacy appointment No pharmacy appointment needed      Other referrals No nutrition appointment needed -        No additional referrals needed  n/a      Collaborating Provider:  Dr. Monique Morales MD    Thank You,  Wendy Blevins NP  Benign Hematology                               [1]   Social History  Socioeconomic History    Marital status:     Number of children: 2   Occupational History     Employer: [employer]   Tobacco Use    Smoking status: Never    Smokeless tobacco: Never   Substance and Sexual Activity    Alcohol use: No    Drug use: No    Sexual activity: Yes     Partners: Male     Birth control/protection: None     Social Drivers of Health     Financial Resource Strain: Low Risk  (7/29/2024)    Overall Financial Resource Strain (CARDIA)     Difficulty of Paying Living Expenses: Not hard at all   Food Insecurity: No Food Insecurity (7/29/2024)    Hunger Vital Sign     Worried About Running Out of Food in the Last Year: Never true     Ran Out of Food in the Last Year: Never true   Transportation Needs: Unknown (7/19/2024)    Received from [facility] - Transportation     Lack of Transportation (Medical): Patient declined   Physical Activity: Insufficiently Active  (7/29/2024)    Exercise Vital Sign     Days of Exercise per Week: 2 days     Minutes of Exercise per Session: 30 min   Stress: No Stress Concern Present (7/29/2024)    [employer] [employer] - Occupational Stress Questionnaire     Feeling of Stress : Not at all   Housing Stability: Low Risk  (2/19/2024)    Housing Stability Vital Sign     Unable to Pay for Housing in the Last Year: No     Number of Places Lived in the Last Year: 1     Unstable Housing in the Last Year: No

## 2025-08-07 ENCOUNTER — LAB VISIT (OUTPATIENT)
Dept: LAB | Facility: HOSPITAL | Age: 50
End: 2025-08-07
Attending: NURSE PRACTITIONER
Payer: COMMERCIAL

## 2025-08-07 DIAGNOSIS — N28.9 FUNCTION KIDNEY DECREASED: ICD-10-CM

## 2025-08-07 DIAGNOSIS — R79.89 BLOOD CREATININE INCREASED COMPARED WITH PRIOR MEASUREMENT: ICD-10-CM

## 2025-08-07 DIAGNOSIS — R79.9 ELEVATED BUN: ICD-10-CM

## 2025-08-07 LAB
ANION GAP (OHS): 11 MMOL/L (ref 8–16)
BILIRUB UR QL STRIP.AUTO: NEGATIVE
BUN SERPL-MCNC: 24 MG/DL (ref 6–20)
CALCIUM SERPL-MCNC: 8.3 MG/DL (ref 8.7–10.5)
CHLORIDE SERPL-SCNC: 102 MMOL/L (ref 95–110)
CLARITY UR: CLEAR
CO2 SERPL-SCNC: 28 MMOL/L (ref 23–29)
COLOR UR AUTO: YELLOW
CREAT SERPL-MCNC: 1.3 MG/DL (ref 0.5–1.4)
GFR SERPLBLD CREATININE-BSD FMLA CKD-EPI: 50 ML/MIN/1.73/M2
GLUCOSE SERPL-MCNC: 134 MG/DL (ref 70–110)
GLUCOSE UR QL STRIP: NEGATIVE
HGB UR QL STRIP: NEGATIVE
KETONES UR QL STRIP: NEGATIVE
LEUKOCYTE ESTERASE UR QL STRIP: NEGATIVE
NITRITE UR QL STRIP: NEGATIVE
PH UR STRIP: 6 [PH]
POTASSIUM SERPL-SCNC: 3.8 MMOL/L (ref 3.5–5.1)
PROT UR QL STRIP: NEGATIVE
SODIUM SERPL-SCNC: 141 MMOL/L (ref 136–145)
SP GR UR STRIP: 1.02

## 2025-08-07 PROCEDURE — 81002 URINALYSIS NONAUTO W/O SCOPE: CPT | Mod: PO

## 2025-08-07 PROCEDURE — 82310 ASSAY OF CALCIUM: CPT

## 2025-08-07 PROCEDURE — 36415 COLL VENOUS BLD VENIPUNCTURE: CPT | Mod: PO

## 2025-08-08 LAB — HOLD SPECIMEN: NORMAL

## 2025-08-25 ENCOUNTER — PATIENT MESSAGE (OUTPATIENT)
Dept: FAMILY MEDICINE | Facility: CLINIC | Age: 50
End: 2025-08-25
Payer: COMMERCIAL

## 2025-08-26 ENCOUNTER — LAB VISIT (OUTPATIENT)
Dept: LAB | Facility: HOSPITAL | Age: 50
End: 2025-08-26
Attending: NURSE PRACTITIONER
Payer: COMMERCIAL

## 2025-08-26 ENCOUNTER — OFFICE VISIT (OUTPATIENT)
Dept: FAMILY MEDICINE | Facility: CLINIC | Age: 50
End: 2025-08-26
Payer: COMMERCIAL

## 2025-08-26 VITALS
WEIGHT: 254.81 LBS | HEIGHT: 63 IN | DIASTOLIC BLOOD PRESSURE: 82 MMHG | TEMPERATURE: 97 F | BODY MASS INDEX: 45.15 KG/M2 | OXYGEN SATURATION: 96 % | SYSTOLIC BLOOD PRESSURE: 116 MMHG | RESPIRATION RATE: 18 BRPM | HEART RATE: 85 BPM

## 2025-08-26 DIAGNOSIS — Z76.0 MEDICATION REFILL: ICD-10-CM

## 2025-08-26 DIAGNOSIS — R91.8 PULMONARY NODULES/LESIONS, MULTIPLE: ICD-10-CM

## 2025-08-26 DIAGNOSIS — E89.89 LYMPHEDEMA OF UPPER EXTREMITY FOLLOWING LYMPHADENECTOMY: ICD-10-CM

## 2025-08-26 DIAGNOSIS — J43.9 PULMONARY EMPHYSEMA DETERMINED BY X-RAY: ICD-10-CM

## 2025-08-26 DIAGNOSIS — D63.8 CHRONIC DISEASE ANEMIA: ICD-10-CM

## 2025-08-26 DIAGNOSIS — I05.8 MITRAL VALVE SCLEROSIS: ICD-10-CM

## 2025-08-26 DIAGNOSIS — I10 ESSENTIAL HYPERTENSION: ICD-10-CM

## 2025-08-26 DIAGNOSIS — D86.9 SARCOIDOSIS: ICD-10-CM

## 2025-08-26 DIAGNOSIS — G47.33 OSA ON CPAP: ICD-10-CM

## 2025-08-26 DIAGNOSIS — R73.03 PREDIABETES: ICD-10-CM

## 2025-08-26 DIAGNOSIS — I35.8 AORTIC VALVE SCLEROSIS: ICD-10-CM

## 2025-08-26 DIAGNOSIS — E66.01 MORBID OBESITY WITH BMI OF 45.0-49.9, ADULT: ICD-10-CM

## 2025-08-26 DIAGNOSIS — I89.0 LYMPHEDEMA OF UPPER EXTREMITY FOLLOWING LYMPHADENECTOMY: ICD-10-CM

## 2025-08-26 DIAGNOSIS — E89.0 POST-OPERATIVE HYPOTHYROIDISM: ICD-10-CM

## 2025-08-26 DIAGNOSIS — E87.6 HYPOKALEMIA: ICD-10-CM

## 2025-08-26 DIAGNOSIS — I77.810 ASCENDING AORTA DILATION: ICD-10-CM

## 2025-08-26 DIAGNOSIS — Z00.00 ANNUAL PHYSICAL EXAM: Primary | ICD-10-CM

## 2025-08-26 PROCEDURE — 1160F RVW MEDS BY RX/DR IN RCRD: CPT | Mod: CPTII,S$GLB,, | Performed by: NURSE PRACTITIONER

## 2025-08-26 PROCEDURE — 99396 PREV VISIT EST AGE 40-64: CPT | Mod: S$GLB,,, | Performed by: NURSE PRACTITIONER

## 2025-08-26 PROCEDURE — 3074F SYST BP LT 130 MM HG: CPT | Mod: CPTII,S$GLB,, | Performed by: NURSE PRACTITIONER

## 2025-08-26 PROCEDURE — 99999 PR PBB SHADOW E&M-EST. PATIENT-LVL V: CPT | Mod: PBBFAC,,, | Performed by: NURSE PRACTITIONER

## 2025-08-26 PROCEDURE — 3044F HG A1C LEVEL LT 7.0%: CPT | Mod: CPTII,S$GLB,, | Performed by: NURSE PRACTITIONER

## 2025-08-26 PROCEDURE — 3079F DIAST BP 80-89 MM HG: CPT | Mod: CPTII,S$GLB,, | Performed by: NURSE PRACTITIONER

## 2025-08-26 PROCEDURE — 1159F MED LIST DOCD IN RCRD: CPT | Mod: CPTII,S$GLB,, | Performed by: NURSE PRACTITIONER

## 2025-08-26 PROCEDURE — 3066F NEPHROPATHY DOC TX: CPT | Mod: CPTII,S$GLB,, | Performed by: NURSE PRACTITIONER

## 2025-08-26 PROCEDURE — 3008F BODY MASS INDEX DOCD: CPT | Mod: CPTII,S$GLB,, | Performed by: NURSE PRACTITIONER

## 2025-08-26 RX ORDER — MONTELUKAST SODIUM 10 MG/1
10 TABLET ORAL NIGHTLY
Qty: 90 TABLET | Refills: 3 | Status: SHIPPED | OUTPATIENT
Start: 2025-08-26

## 2025-08-26 RX ORDER — POTASSIUM CHLORIDE 20 MEQ/1
20 TABLET, EXTENDED RELEASE ORAL 2 TIMES DAILY
Qty: 180 TABLET | Refills: 3 | Status: SHIPPED | OUTPATIENT
Start: 2025-08-26

## 2025-08-27 ENCOUNTER — TELEPHONE (OUTPATIENT)
Dept: FAMILY MEDICINE | Facility: CLINIC | Age: 50
End: 2025-08-27
Payer: COMMERCIAL

## 2025-09-02 DIAGNOSIS — I10 ESSENTIAL HYPERTENSION: Chronic | ICD-10-CM

## 2025-09-02 RX ORDER — OLMESARTAN MEDOXOMIL AND HYDROCHLOROTHIAZIDE 20/12.5 20; 12.5 MG/1; MG/1
1 TABLET ORAL DAILY
Qty: 90 TABLET | Refills: 0 | Status: SHIPPED | OUTPATIENT
Start: 2025-09-02 | End: 2026-09-02

## (undated) DEVICE — DRAIN PENROSE XRAY 18 X 1/4 ST

## (undated) DEVICE — SEE MEDLINE ITEM 157128

## (undated) DEVICE — SOL NS 1000CC

## (undated) DEVICE — SUT SILK 3-0 SH 18IN BLACK

## (undated) DEVICE — NDL SAFETY 25G X 1.5 ECLIPSE

## (undated) DEVICE — OBTURATOR BLADELESS 8MM XI CLR

## (undated) DEVICE — Device

## (undated) DEVICE — SEE MEDLINE ITEM 157117

## (undated) DEVICE — SYR 30CC LUER LOCK

## (undated) DEVICE — DECANTER VIAL ASEPTIC TRANSFER

## (undated) DEVICE — ELECTRODE REM PLYHSV RETURN 9

## (undated) DEVICE — SUT STRATAFIX 2-0 30CM

## (undated) DEVICE — SEE MEDLINE ITEM 152739

## (undated) DEVICE — APPLICATOR CHLORAPREP ORN 26ML

## (undated) DEVICE — ELECTRODE BLADE W/SLEEVE 2.75

## (undated) DEVICE — ADHESIVE DERMABOND ADVANCED

## (undated) DEVICE — PENCIL ROCKER SWITCH 10FT CORD

## (undated) DEVICE — KIT WING PAD POSITIONING

## (undated) DEVICE — SUT VICRYL PLUS 4-0 P3 18IN

## (undated) DEVICE — SUT MONOCRYL 4.0 PS2 CP496G

## (undated) DEVICE — CLOSURE SKIN STERI STRIP 1/2X4

## (undated) DEVICE — SEAL UNIVERSAL 5MM-8MM XI

## (undated) DEVICE — DRESSING TRANS 4X4 TEGADERM

## (undated) DEVICE — COVER OVERHEAD SURG LT BLUE

## (undated) DEVICE — SEE MEDLINE ITEM 157148

## (undated) DEVICE — SUT STRATAFIX 1 SPIRAL .5

## (undated) DEVICE — SEALER VESSEL EXTEND

## (undated) DEVICE — DRAPE COLUMN DAVINCI XI

## (undated) DEVICE — NDL PNEUMO INSUFFLATI 120MM

## (undated) DEVICE — GLOVE SURGICAL LATEX SZ 7

## (undated) DEVICE — SEE MEDLINE ITEM 152622

## (undated) DEVICE — SYR 3CC LUER LOC

## (undated) DEVICE — SEE MEDLINE ITEM 157027

## (undated) DEVICE — GLOVE SURG BIOGEL LATEX SZ 7.5

## (undated) DEVICE — DRAPE ABDOMINAL TIBURON 14X11

## (undated) DEVICE — CARTRIDGE BABCOCK GRASPER 5X45

## (undated) DEVICE — SUT ETHIBOND EXCEL 0 ENS 48

## (undated) DEVICE — SEE MEDLINE ITEM 146313

## (undated) DEVICE — TUBING HEATED INSUFFLATOR

## (undated) DEVICE — KIT ANTIFOG

## (undated) DEVICE — MANIFOLD 4 PORT

## (undated) DEVICE — DRAPE ARM DAVINCI XI

## (undated) DEVICE — SUT X424H ETHIBOND 0-0